# Patient Record
Sex: MALE | Race: WHITE | Employment: OTHER | URBAN - NONMETROPOLITAN AREA
[De-identification: names, ages, dates, MRNs, and addresses within clinical notes are randomized per-mention and may not be internally consistent; named-entity substitution may affect disease eponyms.]

---

## 2017-05-23 PROBLEM — I73.9 PVD (PERIPHERAL VASCULAR DISEASE) (HCC): Status: ACTIVE | Noted: 2017-05-23

## 2017-10-11 RX ORDER — DULOXETIN HYDROCHLORIDE 30 MG/1
60 CAPSULE, DELAYED RELEASE ORAL 2 TIMES DAILY
COMMUNITY

## 2017-10-12 ENCOUNTER — APPOINTMENT (OUTPATIENT)
Dept: CARDIAC CATH/INVASIVE PROCEDURES | Age: 62
End: 2017-10-12
Attending: INTERNAL MEDICINE
Payer: MEDICARE

## 2017-10-12 ENCOUNTER — HOSPITAL ENCOUNTER (OUTPATIENT)
Dept: INPATIENT UNIT | Age: 62
Discharge: HOME | End: 2017-10-13
Attending: INTERNAL MEDICINE | Admitting: INTERNAL MEDICINE
Payer: MEDICARE

## 2017-10-12 LAB
ABO: NORMAL
ALBUMIN SERPL-MCNC: 4.2 G/DL (ref 3.5–5.1)
ALP BLD-CCNC: 95 U/L (ref 38–126)
ALT SERPL-CCNC: 32 U/L (ref 11–66)
ANION GAP SERPL CALCULATED.3IONS-SCNC: 15 MEQ/L (ref 8–16)
ANTIBODY SCREEN: NORMAL
AST SERPL-CCNC: 25 U/L (ref 5–40)
BILIRUB SERPL-MCNC: 0.3 MG/DL (ref 0.3–1.2)
BUN BLDV-MCNC: 16 MG/DL (ref 7–22)
CALCIUM SERPL-MCNC: 10.1 MG/DL (ref 8.5–10.5)
CHLORIDE BLD-SCNC: 102 MEQ/L (ref 98–111)
CHOLESTEROL, TOTAL: 132 MG/DL (ref 100–199)
CO2: 25 MEQ/L (ref 23–33)
CREAT SERPL-MCNC: 0.9 MG/DL (ref 0.4–1.2)
GFR SERPL CREATININE-BSD FRML MDRD: 85 ML/MIN/1.73M2
GLUCOSE BLD-MCNC: 177 MG/DL (ref 70–108)
GLUCOSE BLD-MCNC: 192 MG/DL (ref 70–108)
GLUCOSE BLD-MCNC: 224 MG/DL (ref 70–108)
HCT VFR BLD CALC: 38.1 % (ref 42–52)
HDLC SERPL-MCNC: 51 MG/DL
HEMOGLOBIN: 12.9 GM/DL (ref 14–18)
INR BLD: 0.89 (ref 0.85–1.13)
LDL CHOLESTEROL CALCULATED: 61 MG/DL
MCH RBC QN AUTO: 26.2 PG (ref 27–31)
MCHC RBC AUTO-ENTMCNC: 33.7 GM/DL (ref 33–37)
MCV RBC AUTO: 77.7 FL (ref 80–94)
PDW BLD-RTO: 15.8 % (ref 11.5–14.5)
PLATELET # BLD: 366 THOU/MM3 (ref 130–400)
PMV BLD AUTO: 8 MCM (ref 7.4–10.4)
POTASSIUM SERPL-SCNC: 5.6 MEQ/L (ref 3.5–5.2)
RBC # BLD: 4.9 MILL/MM3 (ref 4.7–6.1)
RH FACTOR: NORMAL
SODIUM BLD-SCNC: 142 MEQ/L (ref 135–145)
TOTAL PROTEIN: 6.5 G/DL (ref 6.1–8)
TRIGL SERPL-MCNC: 98 MG/DL (ref 0–199)
WBC # BLD: 9.2 THOU/MM3 (ref 4.8–10.8)

## 2017-10-12 PROCEDURE — 2580000003 HC RX 258: Performed by: INTERNAL MEDICINE

## 2017-10-12 PROCEDURE — 6370000000 HC RX 637 (ALT 250 FOR IP)

## 2017-10-12 PROCEDURE — 93005 ELECTROCARDIOGRAM TRACING: CPT | Performed by: INTERNAL MEDICINE

## 2017-10-12 PROCEDURE — C1887 CATHETER, GUIDING: HCPCS

## 2017-10-12 PROCEDURE — 2580000003 HC RX 258

## 2017-10-12 PROCEDURE — C1725 CATH, TRANSLUMIN NON-LASER: HCPCS

## 2017-10-12 PROCEDURE — C1874 STENT, COATED/COV W/DEL SYS: HCPCS

## 2017-10-12 PROCEDURE — 2500000003 HC RX 250 WO HCPCS

## 2017-10-12 PROCEDURE — 80053 COMPREHEN METABOLIC PANEL: CPT

## 2017-10-12 PROCEDURE — 85610 PROTHROMBIN TIME: CPT

## 2017-10-12 PROCEDURE — 86850 RBC ANTIBODY SCREEN: CPT

## 2017-10-12 PROCEDURE — 6360000002 HC RX W HCPCS

## 2017-10-12 PROCEDURE — 82948 REAGENT STRIP/BLOOD GLUCOSE: CPT

## 2017-10-12 PROCEDURE — 85027 COMPLETE CBC AUTOMATED: CPT

## 2017-10-12 PROCEDURE — 96360 HYDRATION IV INFUSION INIT: CPT

## 2017-10-12 PROCEDURE — C1760 CLOSURE DEV, VASC: HCPCS

## 2017-10-12 PROCEDURE — C1894 INTRO/SHEATH, NON-LASER: HCPCS

## 2017-10-12 PROCEDURE — 86900 BLOOD TYPING SEROLOGIC ABO: CPT

## 2017-10-12 PROCEDURE — 2780000010 HC IMPLANT OTHER

## 2017-10-12 PROCEDURE — 93458 L HRT ARTERY/VENTRICLE ANGIO: CPT | Performed by: INTERNAL MEDICINE

## 2017-10-12 PROCEDURE — 96361 HYDRATE IV INFUSION ADD-ON: CPT

## 2017-10-12 PROCEDURE — A6258 TRANSPARENT FILM >16<=48 IN: HCPCS

## 2017-10-12 PROCEDURE — 80061 LIPID PANEL: CPT

## 2017-10-12 PROCEDURE — 36415 COLL VENOUS BLD VENIPUNCTURE: CPT

## 2017-10-12 PROCEDURE — 86901 BLOOD TYPING SEROLOGIC RH(D): CPT

## 2017-10-12 PROCEDURE — C1769 GUIDE WIRE: HCPCS

## 2017-10-12 PROCEDURE — 92928 PRQ TCAT PLMT NTRAC ST 1 LES: CPT | Performed by: INTERNAL MEDICINE

## 2017-10-12 RX ORDER — ATORVASTATIN CALCIUM 20 MG/1
20 TABLET, FILM COATED ORAL DAILY
Status: DISCONTINUED | OUTPATIENT
Start: 2017-10-12 | End: 2017-10-13 | Stop reason: HOSPADM

## 2017-10-12 RX ORDER — SODIUM CHLORIDE 9 MG/ML
INJECTION, SOLUTION INTRAVENOUS CONTINUOUS
Status: ACTIVE | OUTPATIENT
Start: 2017-10-12 | End: 2017-10-12

## 2017-10-12 RX ORDER — DEXTROSE MONOHYDRATE 25 G/50ML
12.5 INJECTION, SOLUTION INTRAVENOUS PRN
Status: DISCONTINUED | OUTPATIENT
Start: 2017-10-12 | End: 2017-10-13 | Stop reason: HOSPADM

## 2017-10-12 RX ORDER — NITROGLYCERIN 0.4 MG/1
0.4 TABLET SUBLINGUAL EVERY 5 MIN PRN
Status: DISCONTINUED | OUTPATIENT
Start: 2017-10-12 | End: 2017-10-13 | Stop reason: HOSPADM

## 2017-10-12 RX ORDER — SODIUM CHLORIDE 0.9 % (FLUSH) 0.9 %
10 SYRINGE (ML) INJECTION 2 TIMES DAILY
Status: DISCONTINUED | OUTPATIENT
Start: 2017-10-12 | End: 2017-10-12 | Stop reason: SDUPTHER

## 2017-10-12 RX ORDER — ASPIRIN 325 MG
325 TABLET ORAL ONCE
Status: DISCONTINUED | OUTPATIENT
Start: 2017-10-12 | End: 2017-10-12

## 2017-10-12 RX ORDER — ONDANSETRON 2 MG/ML
4 INJECTION INTRAMUSCULAR; INTRAVENOUS EVERY 6 HOURS PRN
Status: DISCONTINUED | OUTPATIENT
Start: 2017-10-12 | End: 2017-10-13 | Stop reason: HOSPADM

## 2017-10-12 RX ORDER — ATROPINE SULFATE 0.4 MG/ML
0.5 AMPUL (ML) INJECTION
Status: ACTIVE | OUTPATIENT
Start: 2017-10-12 | End: 2017-10-12

## 2017-10-12 RX ORDER — MULTIVITAMIN WITH FOLIC ACID 400 MCG
1 TABLET ORAL DAILY
Status: DISCONTINUED | OUTPATIENT
Start: 2017-10-13 | End: 2017-10-13 | Stop reason: HOSPADM

## 2017-10-12 RX ORDER — SODIUM CHLORIDE 0.9 % (FLUSH) 0.9 %
10 SYRINGE (ML) INJECTION PRN
Status: DISCONTINUED | OUTPATIENT
Start: 2017-10-12 | End: 2017-10-13 | Stop reason: HOSPADM

## 2017-10-12 RX ORDER — ONDANSETRON 4 MG/1
8 TABLET, ORALLY DISINTEGRATING ORAL EVERY 6 HOURS PRN
Status: DISCONTINUED | OUTPATIENT
Start: 2017-10-12 | End: 2017-10-13 | Stop reason: HOSPADM

## 2017-10-12 RX ORDER — UBIDECARENONE 75 MG
1 CAPSULE ORAL 2 TIMES DAILY
Status: DISCONTINUED | OUTPATIENT
Start: 2017-10-12 | End: 2017-10-12 | Stop reason: RX

## 2017-10-12 RX ORDER — OXYCODONE HYDROCHLORIDE AND ACETAMINOPHEN 5; 325 MG/1; MG/1
1 TABLET ORAL EVERY 6 HOURS PRN
Status: DISCONTINUED | OUTPATIENT
Start: 2017-10-12 | End: 2017-10-13 | Stop reason: HOSPADM

## 2017-10-12 RX ORDER — AMLODIPINE BESYLATE 10 MG/1
5 TABLET ORAL NIGHTLY
Status: DISCONTINUED | OUTPATIENT
Start: 2017-10-12 | End: 2017-10-13 | Stop reason: HOSPADM

## 2017-10-12 RX ORDER — DULOXETIN HYDROCHLORIDE 30 MG/1
30 CAPSULE, DELAYED RELEASE ORAL
Status: DISCONTINUED | OUTPATIENT
Start: 2017-10-12 | End: 2017-10-13 | Stop reason: HOSPADM

## 2017-10-12 RX ORDER — SODIUM CHLORIDE 9 MG/ML
INJECTION, SOLUTION INTRAVENOUS CONTINUOUS
Status: DISCONTINUED | OUTPATIENT
Start: 2017-10-12 | End: 2017-10-12 | Stop reason: SDUPTHER

## 2017-10-12 RX ORDER — SPIRONOLACTONE 25 MG/1
25 TABLET ORAL DAILY
Status: DISCONTINUED | OUTPATIENT
Start: 2017-10-13 | End: 2017-10-13 | Stop reason: HOSPADM

## 2017-10-12 RX ORDER — DIPHENHYDRAMINE HCL 25 MG
50 TABLET ORAL ONCE
Status: DISCONTINUED | OUTPATIENT
Start: 2017-10-12 | End: 2017-10-13 | Stop reason: HOSPADM

## 2017-10-12 RX ORDER — AMPICILLIN TRIHYDRATE 250 MG
2 CAPSULE ORAL 2 TIMES DAILY
Status: DISCONTINUED | OUTPATIENT
Start: 2017-10-13 | End: 2017-10-12 | Stop reason: RX

## 2017-10-12 RX ORDER — PANTOPRAZOLE SODIUM 40 MG/1
40 TABLET, DELAYED RELEASE ORAL 2 TIMES DAILY
Status: DISCONTINUED | OUTPATIENT
Start: 2017-10-12 | End: 2017-10-13 | Stop reason: HOSPADM

## 2017-10-12 RX ORDER — ACETAMINOPHEN 325 MG/1
650 TABLET ORAL EVERY 4 HOURS PRN
Status: DISCONTINUED | OUTPATIENT
Start: 2017-10-12 | End: 2017-10-13 | Stop reason: HOSPADM

## 2017-10-12 RX ORDER — ASPIRIN 325 MG
325 TABLET ORAL DAILY
Status: DISCONTINUED | OUTPATIENT
Start: 2017-10-13 | End: 2017-10-13 | Stop reason: HOSPADM

## 2017-10-12 RX ORDER — NICOTINE POLACRILEX 4 MG
15 LOZENGE BUCCAL PRN
Status: DISCONTINUED | OUTPATIENT
Start: 2017-10-12 | End: 2017-10-13 | Stop reason: HOSPADM

## 2017-10-12 RX ORDER — SODIUM CHLORIDE 0.9 % (FLUSH) 0.9 %
10 SYRINGE (ML) INJECTION EVERY 12 HOURS SCHEDULED
Status: DISCONTINUED | OUTPATIENT
Start: 2017-10-12 | End: 2017-10-13 | Stop reason: HOSPADM

## 2017-10-12 RX ORDER — PRASUGREL 10 MG/1
10 TABLET, FILM COATED ORAL DAILY
Status: DISCONTINUED | OUTPATIENT
Start: 2017-10-13 | End: 2017-10-13 | Stop reason: HOSPADM

## 2017-10-12 RX ORDER — DEXTROSE MONOHYDRATE 50 MG/ML
100 INJECTION, SOLUTION INTRAVENOUS PRN
Status: DISCONTINUED | OUTPATIENT
Start: 2017-10-12 | End: 2017-10-13 | Stop reason: HOSPADM

## 2017-10-12 RX ORDER — ASPIRIN 325 MG
325 TABLET ORAL DAILY
Status: DISCONTINUED | OUTPATIENT
Start: 2017-10-13 | End: 2017-10-12 | Stop reason: SDUPTHER

## 2017-10-12 RX ORDER — INSULIN LISPRO 100 [IU]/ML
INJECTION, SOLUTION INTRAVENOUS; SUBCUTANEOUS 2 TIMES DAILY
COMMUNITY
End: 2022-09-08 | Stop reason: ALTCHOICE

## 2017-10-12 RX ORDER — ACETAMINOPHEN 325 MG/1
650 TABLET ORAL 3 TIMES DAILY
Status: DISCONTINUED | OUTPATIENT
Start: 2017-10-12 | End: 2017-10-13 | Stop reason: HOSPADM

## 2017-10-12 RX ADMIN — Medication 10 ML: at 19:17

## 2017-10-12 RX ADMIN — DULOXETIN HYDROCHLORIDE 30 MG: 30 CAPSULE, DELAYED RELEASE ORAL at 17:00

## 2017-10-12 RX ADMIN — Medication 10 ML: at 22:05

## 2017-10-12 RX ADMIN — ATORVASTATIN CALCIUM 20 MG: 20 TABLET, FILM COATED ORAL at 22:04

## 2017-10-12 RX ADMIN — SODIUM CHLORIDE: 9 INJECTION, SOLUTION INTRAVENOUS at 17:01

## 2017-10-12 RX ADMIN — SODIUM CHLORIDE: 9 INJECTION, SOLUTION INTRAVENOUS at 09:29

## 2017-10-12 RX ADMIN — Medication 25 MG: at 22:03

## 2017-10-12 RX ADMIN — PANTOPRAZOLE SODIUM 40 MG: 40 TABLET, DELAYED RELEASE ORAL at 22:02

## 2017-10-12 RX ADMIN — AMLODIPINE BESYLATE 5 MG: 10 TABLET ORAL at 22:04

## 2017-10-12 ASSESSMENT — SLEEP AND FATIGUE QUESTIONNAIRES
DIFFICULTY FALLING ASLEEP: NO
DO YOU USE A SLEEP AID: NO
DO YOU HAVE DIFFICULTY SLEEPING: YES
DIFFICULTY ARISING: NO
DIFFICULTY STAYING ASLEEP: YES
SLEEP PATTERN: DISTURBED/INTERRUPTED SLEEP
RESTFUL SLEEP: NO

## 2017-10-12 ASSESSMENT — PAIN SCALES - GENERAL
PAINLEVEL_OUTOF10: 0

## 2017-10-12 ASSESSMENT — LIFESTYLE VARIABLES: HISTORY_ALCOHOL_USE: COMMENT

## 2017-10-12 NOTE — PROGRESS NOTES
Returned to [phone]. Monitor attached showing SB. 0.9nss infusing with Angiomax. Dressing to right groin remains dry and intact.   No bleeding, swelling, or edema noted

## 2017-10-12 NOTE — OP NOTE
[address] [address]  Sedation/Analgesia Post Sedation Record      Pt Name: Elliot Robb  MRN: [id number]  YOB: 1955  Procedure Performed By: Berenice Felton MD  Primary Care Physician: Ronny De La Cruz DO    POST-PROCEDURE    Physician: Berenice Felton MD    Procedure Performed:  Left Heart Cath and Possible Percutaneous Coronary Intervention    Sedation/Anesthesia:  Local Anesthesia and IV Conscious Sedation with continuous O2 monitoring    Estimated Blood Loss:  Minimal    Specimens Removed:  None    Complications:  None     Post Procedure Diagnosis/Findings:  Coronary Artery Disease    Recommendations:  Medical treatment and review films.        Berenice Felton MD  Electronically signed 10/12/2017 at 1:22 PM

## 2017-10-12 NOTE — PROGRESS NOTES
Assumed care from 2-E staff. Patient awake, alert, and denies discomfort. IV 0.9 NS infusing. Right groin cath site intact with angioseal.  No swelling, firmness, or drainage noted. See post procedure flow sheet.

## 2017-10-12 NOTE — PROGRESS NOTES
Inpatient Cardiac Rehabilitation Consult    Received consult for Phase II Cardiac Rehabilitation. Patient does not meet qualifications for cardiac rehabilitation due to no documentation of PCI. We will continue to follow.

## 2017-10-12 NOTE — OP NOTE
[address] [address]  Sedation/Analgesia Post Sedation Record      Pt Name: Alee Arizmendi  MRN: [id number]  YOB: 1955  Procedure Performed By: Magnolia Le MD  Primary Care Physician: Kayden Carpenter DO    POST-PROCEDURE    Physician: Magnolia Le MD    Procedure Performed:  Left Heart Cath and  Percutaneous Coronary Intervention FFR OF LAD STENT OF LAD,SVG OG DIAG AND LIMA    Sedation/Anesthesia:  Local Anesthesia and IV Conscious Sedation with continuous O2 monitoring    Estimated Blood Loss:  Minimal    Specimens Removed:  None    Complications:  None     Post Procedure Diagnosis/Findings:  Coronary Artery Disease    Recommendations:  Medical treatment and review films.        Magnolia Le MD  Electronically signed 10/12/2017 at 11:44 AM

## 2017-10-12 NOTE — H&P
Provider, MD   metoprolol tartrate (LOPRESSOR) 25 MG tablet Take 25 mg by mouth 2 times daily   Yes Historical Provider, MD   aspirin 325 MG tablet Take 325 mg by mouth daily   Yes Historical Provider, MD   acetaminophen (TYLENOL) 325 MG tablet Take 650 mg by mouth 3 times daily   Yes Historical Provider, MD   ondansetron (ZOFRAN-ODT) 8 MG disintegrating tablet Take 8 mg by mouth every 6 hours as needed for Nausea or Vomiting   Yes Historical Provider, MD   pantoprazole (PROTONIX) 40 MG tablet Take 40 mg by mouth 2 times daily   Yes Historical Provider, MD   atorvastatin (LIPITOR) 20 MG tablet Take 20 mg by mouth daily   Yes Historical Provider, MD   Coenzyme Q10 (CO Q-10) 200 MG CAPS Take 1 capsule by mouth 2 times daily    Yes Historical Provider, MD   nitroGLYCERIN (NITROSTAT) 0.4 MG SL tablet Place 1 tablet under the tongue every 5 minutes as needed for Chest pain 7/16/15  Yes Bella Garza MD   spironolactone (ALDACTONE) 25 MG tablet Take 25 mg by mouth daily   Yes Historical Provider, MD   metFORMIN (GLUCOPHAGE) 500 MG tablet Take 1,000 mg by mouth 2 times daily (with meals)    Yes Historical Provider, MD   Cinnamon 500 MG CAPS Take 2 capsules by mouth 2 times daily    Yes Historical Provider, MD   amLODIPine (NORVASC) 10 MG tablet Take 1 tablet by mouth daily. Patient taking differently: Take 5 mg by mouth nightly  10/24/13  Yes Latia Friedman MD   insulin detemir (LEVEMIR FLEXPEN) 100 UNIT/ML injection Inject 24 Units into the skin 2 times daily. Patient taking differently: Inject 18 Units into the skin 2 times daily  5/14/13  Yes Michelle Luu CNP   multivitamin (THERAGRAN) per tablet Take 1 tablet by mouth daily.    Yes Historical Provider, MD   oxyCODONE-acetaminophen (PERCOCET) 5-325 MG per tablet Take 1 tablet by mouth every 6 hours as needed for Pain    Historical Provider, MD   amylase-lipase-protease (CREON 02453) 39421 UNITS per capsule Take 1 capsule by mouth 3 times daily (before meals)

## 2017-10-13 VITALS
BODY MASS INDEX: 22.68 KG/M2 | SYSTOLIC BLOOD PRESSURE: 189 MMHG | TEMPERATURE: 98.6 F | WEIGHT: 162 LBS | DIASTOLIC BLOOD PRESSURE: 91 MMHG | HEIGHT: 71 IN | RESPIRATION RATE: 15 BRPM | HEART RATE: 70 BPM | OXYGEN SATURATION: 97 %

## 2017-10-13 PROBLEM — R94.39 ABNORMAL NUCLEAR STRESS TEST: Status: ACTIVE | Noted: 2017-10-13

## 2017-10-13 PROBLEM — I20.9 ANGINA PECTORIS (HCC): Status: ACTIVE | Noted: 2017-10-13

## 2017-10-13 LAB
GLUCOSE BLD-MCNC: 201 MG/DL (ref 70–108)
GLUCOSE BLD-MCNC: 228 MG/DL (ref 70–108)
GLUCOSE BLD-MCNC: 249 MG/DL (ref 70–108)

## 2017-10-13 PROCEDURE — 2580000003 HC RX 258: Performed by: INTERNAL MEDICINE

## 2017-10-13 PROCEDURE — 82948 REAGENT STRIP/BLOOD GLUCOSE: CPT

## 2017-10-13 PROCEDURE — 96374 THER/PROPH/DIAG INJ IV PUSH: CPT

## 2017-10-13 PROCEDURE — 6370000000 HC RX 637 (ALT 250 FOR IP): Performed by: INTERNAL MEDICINE

## 2017-10-13 RX ORDER — PRASUGREL 10 MG/1
10 TABLET, FILM COATED ORAL DAILY
Qty: 30 TABLET | Refills: 5 | Status: SHIPPED | OUTPATIENT
Start: 2017-10-13 | End: 2021-02-25

## 2017-10-13 RX ORDER — LOSARTAN POTASSIUM 25 MG/1
50 TABLET ORAL DAILY
Qty: 30 TABLET | Refills: 3 | Status: SHIPPED | OUTPATIENT
Start: 2017-10-13 | End: 2021-02-25

## 2017-10-13 RX ADMIN — DULOXETIN HYDROCHLORIDE 30 MG: 30 CAPSULE, DELAYED RELEASE ORAL at 13:59

## 2017-10-13 RX ADMIN — SPIRONOLACTONE 25 MG: 25 TABLET ORAL at 08:11

## 2017-10-13 RX ADMIN — Medication 10 ML: at 08:16

## 2017-10-13 RX ADMIN — Medication 25 MG: at 08:12

## 2017-10-13 RX ADMIN — DULOXETIN HYDROCHLORIDE 30 MG: 30 CAPSULE, DELAYED RELEASE ORAL at 08:14

## 2017-10-13 RX ADMIN — PANTOPRAZOLE SODIUM 40 MG: 40 TABLET, DELAYED RELEASE ORAL at 08:13

## 2017-10-13 RX ADMIN — Medication 1 TABLET: at 08:10

## 2017-10-13 RX ADMIN — PRASUGREL HYDROCHLORIDE 10 MG: 10 TABLET, FILM COATED ORAL at 08:16

## 2017-10-13 RX ADMIN — DULOXETIN HYDROCHLORIDE 30 MG: 30 CAPSULE, DELAYED RELEASE ORAL at 16:43

## 2017-10-13 ASSESSMENT — PAIN SCALES - GENERAL
PAINLEVEL_OUTOF10: 0
PAINLEVEL_OUTOF10: 0

## 2017-10-13 NOTE — CARE COORDINATION
10/13/17, 2:00 PM    Anticipate discharge today. Plans home with wife. Denies needs. Discharge plan discussed by  and . Discharge plan reviewed with patient/ family. Patient/ family verbalize understanding of discharge plan and are in agreement with plan. Understanding was demonstrated using the teach back method.

## 2017-10-13 NOTE — PLAN OF CARE
Problem: Nutrition  Goal: Optimal nutrition therapy  Outcome: Ongoing  Nutrition Problem: Inadequate oral intake  Intervention: Food and/or Nutrient Delivery: Continue current diet, Start ONS  Nutritional Goals: Pt. will consume 75% or more of meals during LOS.

## 2017-10-13 NOTE — PLAN OF CARE
Problem: Falls - Risk of  Goal: Absence of falls  Outcome: Ongoing  Patient absent of falls this shift. Patient assisted with ambulation. Patients call light within reach, fall band on, non-skid socks, fall sign posted on door, and patient educated to not get up per self to reduce the risk of falls. Bed and chair alarms being used. Problem: Suicide risk  Goal: Provide patient with safe environment  Provide patient with safe environment   Outcome: Ongoing  Patient stated no intentions of self harm. Problem: Discharge Planning:  Goal: Participates in care planning  Participates in care planning   Outcome: Ongoing  Patient plans to return to home at discharge. Patient has no further needs at home. Problem: Tissue Perfusion, Altered:  Goal: Circulatory function within specified parameters  Circulatory function within specified parameters   Outcome: Ongoing  Patient on telemetry monitoring. Monitoring patient for edema. With any edema extremity elevated. Monitoring patients labs. Replacing electrolytes per protocol. Problem: Serum Glucose Level - Abnormal:  Goal: Ability to maintain appropriate glucose levels will improve  Ability to maintain appropriate glucose levels will improve   Outcome: Ongoing  Accu checks being done ACHS. PRN insulin and medications given as needed. Patient educated on signs and symptoms of hypo and hyperglycemia. Diet instructions given to patient on monitoring carb intake. Problem: Tissue Perfusion - Peripheral, Altered:  Goal: Absence of hematoma at arterial access site  Absence of hematoma at arterial access site   Outcome: Ongoing  Patient has no complications at arterial access site. Goal: Circulatory function of lower extremities is within specified parameters  Circulatory function of lower extremities is within specified parameters   Outcome: Ongoing  Pulses in lower extremities equal and palpable bilaterally.     Problem: Nutrition  Goal: Optimal nutrition

## 2017-10-13 NOTE — CARE COORDINATION
10/13/17, 8:22 AM      Meseret Huynh       Admitted from:2E 10/12/2017/ 0804 Hospital day: 0   Location: -23/023-A Reason for admit: CAD in native artery [I25.10] Status: Outpatient in a bed  Admit order signed?: no  PMH:  has a past medical history of Alcoholism ([facility] Alopecia; Angina at rest [facility]); CAD (coronary artery disease); Depression; Esophageal cancer ([facility] GERD (gastroesophageal reflux disease); Hyperlipidemia; Hypertension; Hypothyroidism; Kidney stones; Migraines; Osteoarthritis; PVD (peripheral vascular disease) ([facility] and Type II diabetes mellitus ([facility] Procedure: 10/13 Cardiac Cath  Medications:  Scheduled Meds:   diphenhydrAMINE  50 mg Oral Once    hydrocortisone sodium succinate PF  200 mg Intravenous Once    sodium chloride flush  10 mL Intravenous 2 times per day    aspirin  325 mg Oral Daily    prasugrel  10 mg Oral Daily    acetaminophen  650 mg Oral TID    amLODIPine  5 mg Oral Nightly    lipase-protease-amylase  1 capsule Oral TID AC    atorvastatin  20 mg Oral Daily    DULoxetine  30 mg Oral TID WC    insulin detemir  18 Units Subcutaneous BID    metoprolol tartrate  25 mg Oral BID    multivitamin  1 tablet Oral Daily    pantoprazole  40 mg Oral BID    spironolactone  25 mg Oral Daily     Continuous Infusions:   dextrose        Pertinent Info/Orders/Treatment Plan:  Post-cath care. Telemetry monitoring. Effient to start today. Diet: DIET CARDIAC;   DVT Prophylaxis: Heparin  Smoking status:  reports that he quit smoking about 5 years ago. He has a 40.00 pack-year smoking history.  He has never used smokeless tobacco.   Influenza Vaccination Screening Completed: yes  Pneumonia Vaccination Screening Completed:  yes  Core measures: vte  PCP: Tom Villanueva DO  Readmission:   no  Risk Score: 22.75     Discharge Planning  Current Residence:  Private Residence  Living Arrangements:  Spouse/Significant Other   Support Systems:  Spouse/Significant Other, Family

## 2017-10-13 NOTE — PROGRESS NOTES
Nutrition Assessment    Type and Reason for Visit: Initial, Positive Nutrition Screen (difficulty chewing/swallowing; gets throat stretched)    Nutrition Recommendations: Continue current diet as tolerated. Consider esophageal dilation as OP. Malnutrition Assessment:  · Malnutrition Status: No malnutrition    Nutrition Diagnosis:   · Problem: Inadequate oral intake  · Etiology: related to Alteration in GI function     Signs and symptoms:  as evidenced by Patient report of    Nutrition Assessment:  · Subjective Assessment: Pt. seen -reports having some trouble swallowing meats. States does ok if meat is moistened (broth or gravy). States needs to have esophagus stretched again. Denies need for altered textures. Reports \"not much\" of an appetite. Reports drinks Glucerna occasionally at home. States he will drink it more often if appetite is really poor. Denies any questions on diabetic diet. Pt. States has \"acute pancreatitis\". Reinforced low fat diet with pancreatitis. Pt. declines handouts. Rx includes Creon, MVI, Insulin. Agrees to Glucerna TID. · Wound Type: None  · Current Nutrition Therapies:  · Oral Diet Orders: Cardiac   · Oral Diet intake: Unable to assess  · Oral Nutrition Supplement (ONS) Orders: Diabetic Oral Supplement (TID)  · ONS intake:  (initiated)  · Anthropometric Measures:  · Ht: 5' 11\" (180.3 cm)   · Current Body Wt: 162 lb (73.5 kg) (no edema)  · Admission Body Wt: 162 lb (73.5 kg) (10/12; no edema)  · Usual Body Wt:  (per pt. weight goes up/down 4#; 5/23/17: 160#, 12/15: 156#; states weighed ~200# 3 years ago)  · BMI Classification: BMI 18.5 - 24.9 Normal Weight  · Comparative Standards (Estimated Nutrition Needs):  · Estimated Daily Total Kcal: 4971-5802 kcals  · Estimated Daily Protein (g):  gm    Estimated Intake vs Estimated Needs: Intake Less Than Needs    Nutrition Risk Level:  Moderate    Nutrition Interventions:   Continue current diet, Start ONS  Continued Inpatient Monitoring, Education Initiated (10/13 Reviewed low fat diet for pancreatitis (pt. declined handouts). Discussed appropriate use of ONS.)    Nutrition Evaluation:   · Evaluation: Goals set   · Goals: Pt. will consume 75% or more of meals during LOS. · Monitoring: Meal Intake, Supplement Intake, Diet Tolerance, Weight, Pertinent Labs, Chewing/Swallowing, Nausea or Vomiting    See Adult Nutrition Doc Flowsheet for more detail.      Electronically signed by Chelo Gleason RD, CONSUELO on 10/13/17 at 11:02 AM    Contact Number: [phone]

## 2017-10-13 NOTE — FLOWSHEET NOTE
10/13/17 1900   Provider Notification   Reason for Communication Evaluate   Provider Name Dr. Dejuan Sterling   Provider Notification Physician   Method of Communication Face to face   Response No new orders   Notification Time Rea Sterling about pt's potassium of 5.6 this a.m, MD did not give new orders, discharged pt

## 2017-10-14 LAB
EKG ATRIAL RATE: 49 BPM
EKG P AXIS: 2 DEGREES
EKG P-R INTERVAL: 158 MS
EKG Q-T INTERVAL: 452 MS
EKG QRS DURATION: 94 MS
EKG QTC CALCULATION (BAZETT): 408 MS
EKG R AXIS: 45 DEGREES
EKG T AXIS: 33 DEGREES
EKG VENTRICULAR RATE: 49 BPM

## 2017-10-14 NOTE — DISCHARGE SUMMARY
[address] [id number]                              DISCHARGE SUMMARY    PATIENT NAME: Karlene King                             :       1955  MED REC NO:   [id number]                                      ROOM:      0023  ACCOUNT NO:   [de-identified]                                      ADMISSION  DATE:  10/12/2017  PROVIDER:     Renuka Whitley. Naye Cesar M.D.                          [facility] Pick:  10/13/2017      FINAL DIAGNOSES:  1. Angina pectoris class III. 2.  Abnormal nuclear stress test.  3.  Atherosclerotic coronary artery disease. 4.  Diabetes mellitus, insulin-dependent. 5.  History of hypertension. 6.  Dyslipidemia. 7.  Hypercholesterolemia. 8.  History of recurrent pancreatitis secondary to pancreas divisum. 9.  History of tobacco use. 10.  History of peripheral vascular disease. PROCEDURES PERFORMED DURING THIS HOSPITALIZATION:  1.  IV conscious sedation. 2.  Left heart catheterization, left ventriculogram, selective  coronary angiogram.  3.  Angioplasty and stent deployment of the RCA. HOSPITALIZATION COURSE:  The patient is a 57-year-old gentleman with a  history of coronary artery disease, history of prior intervention, who  has recently been complaining of shortness of breath, chest pain. The  patient did have a nuclear stress test, which was abnormal high-risk  scan. He has angina pectoris class III. He was treated medically. He continued to be symptomatic, admitted for the above. The patient  has history of anemia of chronic disease, history of hypertension,  diabetes mellitus. He has a history of peripheral vascular disease. The patient was admitted to the cath lab, heart catheterization was  performed, significant disease of the RCA, underwent intervention of  posterolateral branch of the RCA utilizing drug-eluting stent,  reducing the stenosis to 0%.   The patient tolerated the procedure  well. The patient was monitored overnight, ambulated, continued to be  chest pain-free. The patient was discharged home in a stable  condition. The patient will be seen in the office and he is to seek medical  attention if he has any change in clinical condition. He will be  enrolled in cardiac rehab, need to be on dual antiplatelet treatment. The patient is to seek medical attention if he has any change in  clinical condition. The patient was found to have microcytic  hypochromic anemia and hyperkalemia. He is to follow up with his  family physician within a week for these issues. He is to hold his  metformin for 48 hours and seek medical attention if he has any change  in clinical condition.         Deny Machado M.D.    D: 10/13/2017 19:15:18       T: 10/13/2017 21:28:48     [email or web address]  Job#: [id number]     Doc#: [id number]

## 2017-10-16 NOTE — PROCEDURES
[address] [place] [address]                           CARDIAC CATHETERIZATION    PATIENT NAME: Retta Cabot                   :             1955  MED REC NO:   [id number]                            ROOM:  ACCOUNT NO:   [de-identified]                            ADMISSION DATE:  10/12/2017  PROVIDER:     Giselle Benjamin. Aureliano Canchola M.D.    Blancamyla Escamilla:  10/12/2017    PROCEDURE:  Cath intervention procedure. INDICATION FOR PROCEDURE:  This is a 49-year-old gentleman with a  history of coronary artery disease. The patient had a history of  prior intervention. Recently, the patient has been having angina  pectoris, class III. He did have a stress Cardiolite test and nuclear  stress test.  The patient had a high-risk scan. He was treated  medically, continued to be symptomatic, has angina pectoris, class  III, admitted for heart cath and possible intervention. The patient  understands the procedure, the benefits, the risks, the alternative  methods of treatment, the possible complications, and he wants it to  be done. This patient has a history of diabetes mellitus. He has a history of  peripheral vascular disease. The patient has a history of  hypertension, hypercholesterolemia, history of gastroesophageal  reflux, recurrent pancreatitis, history of depression, history of  alcohol utilization. The patient understands the procedure, the benefits, the risks, the  alternative methods of treatment, the possible complications, and he  wants it to be done. PROCEDURES PERFORMED:  1.  IV conscious sedation. 2.  Left heart cath, left ventriculogram, and selective coronary  angiogram.  3.  Angioplasty and stent deployment of the distal RCA to the first  lateral branch of the RCA utilizing PROMUS drug-eluting stent reducing  the stenosis from 95% to 0% HARIKA-3 flow.     Angiogram of the carotid artery, distal runoff, and deployment of inflated up to 15 atmospheric pressure. The balloon was then removed. We placed a 2.5 x 20 mm length PROMUS  drug-eluting stent into the posterolateral branch in the area of the  maximum stenosis. The stent balloon was deployed at 18 atmospheric  pressure for 25 seconds, reducing the stenosis to 0% with a HARIKA-3  flow. During the procedure, Angiomax therapy started, he has been already on  the aspirin and Plavix. The angiogram of the right iliac artery showed patent right iliac  artery with a good distal runoff and successful deployment of the  Angio-Seal closure device. IMPRESSION:  1. Preserved systolic function with left ventricular ejection  fraction of about 65%. 2.  The patient had small-caliber arteries. 3.  Severe stenosis of the mid posterolateral branch of the RCA. The  RCA is a dominant artery. Stented areas of the RCA are still patent  with diffuse disease of the proximal mid distal RCA. 4.  Patent left main. 5.  Stented area of the circumflex is still patent. 6.  Severe stenosis of distal circumflex in the AV groove of 1 mm  diameter artery. 7.  Diffuse nonobstructive disease of the mid LAD, distal LAD was  patent. 8.  Severe stenosis at the ostium of small-to-moderate-sized diagonal  artery. 9.  Successful angioplasty, subsequent stent deployment to  posterolateral branch of the RCA utilizing PROMUS drug-eluting stent,  reducing the stenosis to 0% HARIKA-3 flow. The successful deployment of the Angio-Seal closure device. The patient tolerated the procedure well. We recommend to continue  with aggressive medical treatment, risk factor modification,  dual-antiplatelet treatment, cardiac rehab, and close monitoring.         Kishan Rodriguez M.D.    D: 10/16/2017 6:36:52       T: 10/16/2017 8:18:06     [email or web address]  Job#: [id number]     Doc#: [id number]

## 2018-09-18 ENCOUNTER — HOSPITAL ENCOUNTER (OUTPATIENT)
Dept: WOUND CARE | Age: 63
Discharge: HOME | End: 2018-09-18
Payer: MEDICARE

## 2018-09-18 VITALS
OXYGEN SATURATION: 98 % | HEART RATE: 54 BPM | SYSTOLIC BLOOD PRESSURE: 180 MMHG | DIASTOLIC BLOOD PRESSURE: 74 MMHG | TEMPERATURE: 97.9 F | RESPIRATION RATE: 20 BRPM

## 2018-09-18 DIAGNOSIS — L97.522 DIABETIC ULCER OF TOE OF LEFT FOOT ASSOCIATED WITH TYPE 2 DIABETES MELLITUS, WITH FAT LAYER EXPOSED (HCC): Primary | ICD-10-CM

## 2018-09-18 DIAGNOSIS — E11.621 DIABETIC ULCER OF TOE OF LEFT FOOT ASSOCIATED WITH TYPE 2 DIABETES MELLITUS, WITH FAT LAYER EXPOSED (HCC): Primary | ICD-10-CM

## 2018-09-18 PROCEDURE — 99213 OFFICE O/P EST LOW 20 MIN: CPT

## 2018-09-18 RX ORDER — ALBUTEROL SULFATE 90 UG/1
2 AEROSOL, METERED RESPIRATORY (INHALATION) EVERY 6 HOURS PRN
COMMUNITY
End: 2021-02-25

## 2018-09-18 ASSESSMENT — PAIN SCALES - GENERAL: PAINLEVEL_OUTOF10: 0

## 2018-09-18 NOTE — PROGRESS NOTES
PROCEDURE      ESOPHAGECTOMY         FAMILY HISTORY    Family History   Problem Relation Age of Onset    Heart Disease Mother     Diabetes Mother     Heart Disease Father     Heart Attack Father     Early Death Father 46         at 48    Cancer Sister     Cancer Brother     Early Death Brother 52         52       SOCIAL HISTORY    Social History   Substance Use Topics    Smoking status: Former Smoker     Packs/day: 2.00     Years: 20.00     Quit date: 2012    Smokeless tobacco: Never Used    Alcohol use No       ALLERGIES    No Known Allergies    MEDICATIONS    Current Outpatient Prescriptions on File Prior to Encounter   Medication Sig Dispense Refill    losartan (COZAAR) 25 MG tablet Take 2 tablets by mouth daily 30 tablet 3    prasugrel (EFFIENT) 10 MG TABS Take 1 tablet by mouth daily 30 tablet 5    insulin lispro (HUMALOG) 100 UNIT/ML injection cartridge Inject into the skin 2 times daily If over 200 uses scale      DULoxetine (CYMBALTA) 30 MG extended release capsule Take 30 mg by mouth 3 times daily (with meals)      metoprolol tartrate (LOPRESSOR) 25 MG tablet Take 25 mg by mouth 2 times daily      aspirin 325 MG tablet Take 325 mg by mouth daily      ondansetron (ZOFRAN-ODT) 8 MG disintegrating tablet Take 8 mg by mouth every 6 hours as needed for Nausea or Vomiting      pantoprazole (PROTONIX) 40 MG tablet Take 40 mg by mouth 2 times daily      atorvastatin (LIPITOR) 20 MG tablet Take 20 mg by mouth daily      Coenzyme Q10 (CO Q-10) 200 MG CAPS Take 1 capsule by mouth 2 times daily       spironolactone (ALDACTONE) 25 MG tablet Take 25 mg by mouth daily      metFORMIN (GLUCOPHAGE) 500 MG tablet Take 1,000 mg by mouth 2 times daily (with meals)       Cinnamon 500 MG CAPS Take 2 capsules by mouth 2 times daily       amLODIPine (NORVASC) 10 MG tablet Take 1 tablet by mouth daily.  (Patient taking differently: Take 5 mg by mouth nightly ) 30 tablet 1    insulin detemir (LEVEMIR FLEXPEN) 100 UNIT/ML injection Inject 24 Units into the skin 2 times daily. (Patient taking differently: Inject 18 Units into the skin 2 times daily ) 1 Pen 3    multivitamin (THERAGRAN) per tablet Take 1 tablet by mouth daily.  acetaminophen (TYLENOL) 325 MG tablet Take 650 mg by mouth 3 times daily      nitroGLYCERIN (NITROSTAT) 0.4 MG SL tablet Place 1 tablet under the tongue every 5 minutes as needed for Chest pain 25 tablet 3    amylase-lipase-protease (CREON 17622) 54790 UNITS per capsule Take 1 capsule by mouth 3 times daily (before meals)        No current facility-administered medications on file prior to encounter. REVIEW OF SYSTEMS    A comprehensive review of systems was negative.     Objective:      BP (!) 180/74   Pulse 54   Temp 97.9 °F (36.6 °C) (Oral)   Resp 20   SpO2 98%     Wt Readings from Last 3 Encounters:   10/12/17 162 lb (73.5 kg)   05/23/17 160 lb (72.6 kg)   11/05/15 155 lb (70.3 kg)       PHYSICAL EXAM    General Appearance: alert and oriented to person, place and time, well developed and well- nourished, in no acute distress  Skin: warm and dry, no rash or erythema  Head: normocephalic and atraumatic  Eyes: pupils equal, round, and reactive to light, extraocular eye movements intact, conjunctivae normal  ENT: tympanic membrane, external ear and ear canal normal bilaterally, nose without deformity, nasal mucosa and turbinates normal without polyps  Neck: supple and non-tender without mass, no thyromegaly or thyroid nodules, no cervical lymphadenopathy  Pulmonary/Chest: clear to auscultation bilaterally- no wheezes, rales or rhonchi, normal air movement, no respiratory distress  Cardiovascular: normal rate, regular rhythm, normal S1 and S2, no murmurs, rubs, clicks, or gallops, distal pulses intact, no carotid bruits  Abdomen: soft, non-tender, non-distended, normal bowel sounds, no masses or organomegaly  Extremities: no cyanosis, clubbing or edema  Musculoskeletal: normal range of motion, no joint swelling, deformity or tenderness  Neurologic: reflexes normal and symmetric, no cranial nerve deficit, gait, coordination and speech normal    Full thickness wound with fibrotic base and macerated margins. Mild surrounding erythema and edema. Does not probe to bone. Serous exudate noted.             Wound 09/18/18 Toe (Comment  which one) Dorsal;Left 1 3rd toe (Active)   Wound Image   9/18/2018 11:05 AM   Wound Type Wound 9/18/2018 11:05 AM   Dressing Status Intact 9/18/2018 11:05 AM   Dressing Changed Changed/New 9/18/2018 11:05 AM   Wound Cleansed Rinsed/Irrigated with saline 9/18/2018 11:05 AM   Wound Length (cm) 0.3 cm 9/18/2018 11:05 AM   Wound Width (cm) 0.4 cm 9/18/2018 11:05 AM   Wound Depth (cm)  0.2 9/18/2018 11:05 AM   Calculated Wound Size (cm^2) (l*w) 0.12 cm^2 9/18/2018 11:05 AM   Undermining Starts ___ O'Clock 12 9/18/2018 11:05 AM   Undermining Ends___ O'Clock 12 9/18/2018 11:05 AM   Undermining Maxium Distance (cm) 0.1 9/18/2018 11:05 AM   Wound Assessment Yellow;Slough 9/18/2018 11:05 AM   Drainage Amount Moderate 9/18/2018 11:05 AM   Drainage Description Yellow 9/18/2018 11:05 AM   Odor None 9/18/2018 11:05 AM   Margins Unattached edges 9/18/2018 11:05 AM   Zeenat-wound Assessment Red 9/18/2018 11:05 AM   Yellow%Wound Bed 100 9/18/2018 11:05 AM   Number of days: 0       LABS      CBC:   Lab Results   Component Value Date    WBC 9.2 10/12/2017    HGB 12.9 10/12/2017    HCT 38.1 10/12/2017    MCV 77.7 10/12/2017     10/12/2017     BMP:   Lab Results   Component Value Date     10/12/2017    K 5.6 10/12/2017     10/12/2017    CO2 25 10/12/2017    BUN 16 10/12/2017    CREATININE 0.9 10/12/2017     PT/INR:   Lab Results   Component Value Date    INR 0.89 10/12/2017     Prealbumin: No results found for: PREALBUMIN  Albumin:  Lab Results   Component Value Date    LABALBU 4.2 10/12/2017     Sed Rate:  Lab Results   Component Value Date    SEDRATE 2 05/08/2013     CRP:   Lab Results   Component Value Date    CRP <0.40 10/22/2013     Micro: No results found for: BC   Hemoglobin A1C: No results found for: LABA1C    Assessment:     Ulcer Identification:  Ulcer Type: arterial and diabetic  Contributing Factors: diabetes and arterial insufficiency    Wound: Abrasion    Depth of Diabetic/Pressure/Non Pressure Ulcers or Wound:  Wound, full thickness    Patient Active Problem List   Diagnosis Code    Ischemia, bowel ([address] [facility] K55.9    Uncontrolled hypertension I10    Diabetes mellitus ([facility] [address] E11.9    CAD (coronary artery disease) I25.10    AF [facility]atrial fibrillation) ([facility] I48.91    Gastroenteritis K52.9    Diabetes mellitus type II, uncontrolled ([facility] E11.65    Peptic ulcer disease K27.9    History of esophageal cancer Z85.01    Hypothyroidism E03.9    Palpitation R00.2    Chest pain R07.9    PVD (peripheral vascular disease) ([facility]) I73.9    Angina pectoris ([facility]) I20.9    Abnormal nuclear stress test R94.39       Procedure Note  Indications:  Based on my examination of this patient's wound(s)/ulcer(s) today, debridement is not required to promote healing and evaluate the extent healing. Plan:     Patient examined and evaluated    - instructed patient to stop soaking his foot in epsom salt  - educated patient on signs of infection and informed patient that the wound is not infected  - dressed wound with iodoflex and gauze  - will return to clinic in 2 weeks     Treatment:   Orders Placed This Encounter   Procedures    Apply dressing     Standing Status:   Standing     Number of Occurrences:   1    Wound care     Left third toe: iodoflex, gauze, love. Standing Status:   Standing     Number of Occurrences:   1         Antibiotics: No    Follow up: 2 weeks    Please see attached Discharge Instructions    Written patient dismissal instructions given to patient and signed by patient or POA.          Discharge Instructions       Visit

## 2018-09-18 NOTE — PROGRESS NOTES
I was present with the resident during the visit . I discussed the case with the resident and agree with the findings and the plan as documented in the residents note.     Meg Mayorga DPM, FACFAS  Foot & Ankle Surgical Residency  [facility]

## 2018-10-02 ENCOUNTER — HOSPITAL ENCOUNTER (OUTPATIENT)
Dept: WOUND CARE | Age: 63
Discharge: HOME | End: 2018-10-02
Payer: MEDICARE

## 2018-10-02 VITALS
BODY MASS INDEX: 21.56 KG/M2 | RESPIRATION RATE: 18 BRPM | OXYGEN SATURATION: 97 % | SYSTOLIC BLOOD PRESSURE: 154 MMHG | TEMPERATURE: 98.1 F | WEIGHT: 154.6 LBS | DIASTOLIC BLOOD PRESSURE: 95 MMHG | HEART RATE: 55 BPM

## 2018-10-02 DIAGNOSIS — I73.9 PVD (PERIPHERAL VASCULAR DISEASE) (HCC): ICD-10-CM

## 2018-10-02 DIAGNOSIS — L98.491 ISCHEMIC ULCER, LIMITED TO BREAKDOWN OF SKIN (HCC): Primary | ICD-10-CM

## 2018-10-02 PROCEDURE — 2709999900 HC NON-CHARGEABLE SUPPLY

## 2018-10-02 PROCEDURE — 97597 DBRDMT OPN WND 1ST 20 CM/<: CPT

## 2018-10-02 NOTE — PROGRESS NOTES
44         at 44   [address] [facility] Early Death Brother 52         52       SOCIAL HISTORY    Social History   Substance Use Topics    Smoking status: Former Smoker     Packs/day: 2.00     Years: 20.00     Quit date: 2012    Smokeless tobacco: Never Used    Alcohol use No       ALLERGIES    No Known Allergies    MEDICATIONS    Current Outpatient Prescriptions on File Prior to Encounter   Medication Sig Dispense Refill    albuterol sulfate  (90 Base) MCG/ACT inhaler Inhale 2 puffs into the lungs every 6 hours as needed for Wheezing      losartan (COZAAR) 25 MG tablet Take 2 tablets by mouth daily 30 tablet 3    prasugrel (EFFIENT) 10 MG TABS Take 1 tablet by mouth daily 30 tablet 5    insulin lispro (HUMALOG) 100 UNIT/ML injection cartridge Inject into the skin 2 times daily If over 200 uses scale      DULoxetine (CYMBALTA) 30 MG extended release capsule Take 60 mg by mouth 2 times daily       metoprolol tartrate (LOPRESSOR) 25 MG tablet Take 25 mg by mouth 2 times daily      aspirin 325 MG tablet Take 325 mg by mouth daily      acetaminophen (TYLENOL) 325 MG tablet Take 650 mg by mouth 3 times daily      ondansetron (ZOFRAN-ODT) 8 MG disintegrating tablet Take 8 mg by mouth every 6 hours as needed for Nausea or Vomiting      pantoprazole (PROTONIX) 40 MG tablet Take 40 mg by mouth 2 times daily      atorvastatin (LIPITOR) 20 MG tablet Take 20 mg by mouth daily      Coenzyme Q10 (CO Q-10) 200 MG CAPS Take 1 capsule by mouth 2 times daily       spironolactone (ALDACTONE) 25 MG tablet Take 25 mg by mouth daily      metFORMIN (GLUCOPHAGE) 500 MG tablet Take 1,000 mg by mouth 2 times daily (with meals)       amylase-lipase-protease (CREON 67021) 25192 UNITS per capsule Take 1 capsule by mouth 3 times daily (before meals)       Cinnamon 500 MG CAPS Take 2 capsules by mouth 2 times daily       amLODIPine (NORVASC) 10 MG tablet Take 1 tablet by mouth daily. 10/2/2018 11:14 AM   Drainage Amount Scant 10/2/2018 11:14 AM   Drainage Description Yellow 10/2/2018 11:14 AM   Odor None 10/2/2018 11:14 AM   Margins Attached edges 10/2/2018 11:14 AM   Zeenat-wound Assessment Dry;Pink 10/2/2018 11:14 AM   Yellow%Wound Bed 100 10/2/2018 11:14 AM   Number of days: 14       LABS      CBC:   Lab Results   Component Value Date    WBC 9.2 10/12/2017    HGB 12.9 10/12/2017    HCT 38.1 10/12/2017    MCV 77.7 10/12/2017     10/12/2017     BMP:   Lab Results   Component Value Date     10/12/2017    K 5.6 10/12/2017     10/12/2017    CO2 25 10/12/2017    BUN 16 10/12/2017    CREATININE 0.9 10/12/2017     PT/INR:   Lab Results   Component Value Date    INR 0.89 10/12/2017     Prealbumin: No results found for: PREALBUMIN  Albumin:  Lab Results   Component Value Date    LABALBU 4.2 10/12/2017     Sed Rate:  Lab Results   Component Value Date    SEDRATE 2 05/08/2013     Micro: No results found for: BC     Assessment:     Ulcer Identification:  Ulcer Type: arterial  Contributing Factors: smoking, arterial insufficiency and non-adherence    Wound: N/A    Depth of Diabetic/Pressure/Non Pressure Ulcers or Wound:  Wound, full thickness\",    Patient Active Problem List   Diagnosis Code    Ischemia, bowel ([facility] [address] K55.9    Uncontrolled hypertension I10    Diabetes mellitus ([facility] [address] E11.9    CAD (coronary artery disease) I25.10    AF (atrial fibrillation) ([facility]) I48.91    Gastroenteritis K52.9    Diabetes mellitus type II, uncontrolled ([facility] [address] E11.65    Peptic ulcer disease K27.9    History of esophageal cancer Z85.01    Hypothyroidism E03.9    Palpitation R00.2    Chest pain R07.9    PVD (peripheral vascular disease) ([facility]) I73.9    Angina pectoris ([facility]) I20.9    Abnormal nuclear stress test R94.39       Procedure Note  Indications:  Based on my examination of this patient's wound(s)/ulcer(s) today, debridement is required to promote healing and evaluate the extent healing. Performed by: Ruthie Esparza DPM    Consent obtained: Yes    Time out taken:  Yes    Pain control: None      Debridement:Non-excisional Debridement    Using forceps the wound(s)/ulcer(s) was/were sharply debrided down through and including the removal of epidermis. Devitalized Tissue Debrided:  slough    Pre Debridement Measurements:  Are located in the Wound/Ulcer Documentation Flow Sheet    Wound/Ulcer #: 1    Post Debridement Measurements:    Wound/Ulcer Descriptions are listed under Physical Exam above. Wound/Ulcer Descriptions are Pre Debridement except measurements    Percent of Wound/Ulcer Debrided: 100%    Total Surface Area Debrided:  0.1 sq cm     Bleeding:  None    Hemostasis Achieved:  not needed    Procedural Pain:  0  / 10     Post Procedural Pain:  0 / 10     Response to treatment:  Well tolerated by patient. Plan:     Patient examined and evaluated    Discuss negative effects of tobacco use on wound healing. Clinically poor vascular flow to lower extremities. Ordered arterial doppler for further work up. Consider HBO/vasc intervention pending arterial doppler results    Treatment:   Orders Placed This Encounter   Procedures    Debridement     Selective of necrotic tissue     Standing Status:   Standing     Number of Occurrences:   1    VL DUP LOWER EXTREMITY ARTERIES BILATERAL     Standing Status:   Future     Standing Expiration Date:   10/2/2019         Antibiotics: No    Follow up: 2 weeks    Please see attached Discharge Instructions    Written patient dismissal instructions given to patient and signed by patient or POA.          Discharge Instructions         Discharge Instructions        Visit Discharge/Physician Orders:   - Stop foot soaks  - will give orders for segmental pressures     Wound Location: Left foot wound     Dressing orders:      1) Gather wound care supplies and arrange on clean table.      2) Wash your hands with soap and water or use alcohol based hand  for 20 seconds (sing \"Happy Birthday\" twice).    3) Cleanse wounds with normal saline or wound cleanser and gauze. Pat dry with clean gauze.    4) Apply iodoflex to wound bed.      5) Cover with gauze. Wrap with love.     6) Change dressing every other day.     Keep all dressings clean & dry.     Do not shower, take baths or get wound wet, unless otherwise instructed by your Wound Care doctor.      Follow up visit:   2 Weeks      Keep next scheduled appointment. Please give 24 hour notice if unable to keep appointment. [phone]     If you experience any of the following, please call the Wound Care Service during business hours: Monday through Friday 8:00 am - 4:30 pm  ([phone]). *Increase in pain              *Temperature over 101              *Increase in drainage from your wound or a foul odor              *Uncontrolled swelling              *Need for compression bandage changes due to slippage, breakthrough drainage     If you need medical attention outside of business hours, please contact your Primary Care Doctor or go to the nearest emergency room.            Electronically signed by Xochilt Higgins DPM on 10/2/2018 at 11:46 AM

## 2018-10-03 ENCOUNTER — HOSPITAL ENCOUNTER (OUTPATIENT)
Dept: INTERVENTIONAL RADIOLOGY/VASCULAR | Age: 63
Discharge: HOME | End: 2018-10-03
Payer: MEDICARE

## 2018-10-03 DIAGNOSIS — I73.9 PVD (PERIPHERAL VASCULAR DISEASE) (HCC): ICD-10-CM

## 2018-10-03 PROCEDURE — 93925 LOWER EXTREMITY STUDY: CPT

## 2018-10-11 ENCOUNTER — HOSPITAL ENCOUNTER (OUTPATIENT)
Dept: NUCLEAR MEDICINE | Age: 63
Discharge: HOME | End: 2018-10-11
Payer: MEDICARE

## 2018-10-11 DIAGNOSIS — S91.102A OPEN WOUND OF LEFT GREAT TOE, INITIAL ENCOUNTER: ICD-10-CM

## 2018-10-11 PROCEDURE — A9503 TC99M MEDRONATE: HCPCS | Performed by: FAMILY MEDICINE

## 2018-10-11 PROCEDURE — 3430000000 HC RX DIAGNOSTIC RADIOPHARMACEUTICAL: Performed by: FAMILY MEDICINE

## 2018-10-11 PROCEDURE — 78315 BONE IMAGING 3 PHASE: CPT

## 2018-10-11 RX ORDER — TC 99M MEDRONATE 20 MG/10ML
25 INJECTION, POWDER, LYOPHILIZED, FOR SOLUTION INTRAVENOUS
Status: COMPLETED | OUTPATIENT
Start: 2018-10-11 | End: 2018-10-11

## 2018-10-11 RX ADMIN — TC 99M MEDRONATE 26.7 MILLICURIE: 20 INJECTION, POWDER, LYOPHILIZED, FOR SOLUTION INTRAVENOUS at 09:35

## 2018-10-16 ENCOUNTER — HOSPITAL ENCOUNTER (OUTPATIENT)
Dept: WOUND CARE | Age: 63
Discharge: HOME | End: 2018-10-16
Payer: MEDICARE

## 2018-10-16 VITALS
DIASTOLIC BLOOD PRESSURE: 77 MMHG | RESPIRATION RATE: 15 BRPM | HEART RATE: 50 BPM | BODY MASS INDEX: 21.14 KG/M2 | HEIGHT: 71 IN | OXYGEN SATURATION: 100 % | SYSTOLIC BLOOD PRESSURE: 178 MMHG | WEIGHT: 151 LBS | TEMPERATURE: 98.8 F

## 2018-10-16 PROCEDURE — 97597 DBRDMT OPN WND 1ST 20 CM/<: CPT

## 2018-10-16 ASSESSMENT — PAIN SCALES - GENERAL: PAINLEVEL_OUTOF10: 0

## 2018-10-16 NOTE — PROGRESS NOTES
tablet Take 1 tablet by mouth daily. (Patient taking differently: Take 5 mg by mouth nightly ) 30 tablet 1    insulin detemir (LEVEMIR FLEXPEN) 100 UNIT/ML injection Inject 24 Units into the skin 2 times daily. (Patient taking differently: Inject 18 Units into the skin 2 times daily ) 1 Pen 3    multivitamin (THERAGRAN) per tablet Take 1 tablet by mouth daily. No current facility-administered medications on file prior to encounter. REVIEW OF SYSTEMS    A comprehensive review of systems was negative. Objective:      BP (!) 178/77   Pulse 50   Temp 98.8 °F (37.1 °C) (Oral)   Resp 15   Ht 5' 11\" (1.803 m)   Wt 151 lb (68.5 kg)   SpO2 100%   BMI 21.06 kg/m²     Wt Readings from Last 3 Encounters:   10/16/18 151 lb (68.5 kg)   10/02/18 154 lb 9.6 oz (70.1 kg)   10/12/17 162 lb (73.5 kg)       PHYSICAL EXAM    General Appearance: alert and oriented to person, place and time, well-developed and well-nourished, in no acute distress    Full thickness wound on the dorsal aspect of the 3rd digit with fibrotic base and epithelialized margins. No surrounding erythema, edema, or exudate noted. Does not probe to bone, track or undermine.           Wound 09/18/18 Toe (Comment  which one) Dorsal;Left 1 3rd toe (Active)   Wound Image   10/16/2018 11:53 AM   Wound Type Wound 10/16/2018 11:53 AM   Dressing Status Old drainage 10/2/2018 11:14 AM   Dressing Changed Changed/New 10/16/2018 11:53 AM   Wound Cleansed Rinsed/Irrigated with saline 10/16/2018 11:53 AM   Wound Length (cm) 0.3 cm 10/16/2018 11:53 AM   Wound Width (cm) 0.2 cm 10/16/2018 11:53 AM   Wound Depth (cm)  scabbed 10/16/2018 11:53 AM   Calculated Wound Size (cm^2) (l*w) 0.06 cm^2 10/16/2018 11:53 AM   Change in Wound Size % (l*w) 50 10/16/2018 11:53 AM   Undermining Starts ___ O'Clock 0 10/2/2018 11:14 AM   Undermining Ends___ O'Clock 0 10/2/2018 11:14 AM   Undermining Maxium Distance (cm) 0 10/2/2018 11:14 AM   Wound Assessment Shobha Rush 10/16/2018 11:53 AM   Drainage Amount None 10/16/2018 11:53 AM   Drainage Description Yellow 10/2/2018 11:14 AM   Odor None 10/16/2018 11:53 AM   Margins Attached edges 10/16/2018 11:53 AM   Zeenat-wound Assessment Dry 10/16/2018 11:53 AM   Yellow%Wound Bed 100 10/2/2018 11:14 AM   Other%Wound Bed 100 brown  10/16/2018 11:53 AM   Number of days: 28       LABS      CBC:   Lab Results   Component Value Date    WBC 9.2 10/12/2017    HGB 12.9 10/12/2017    HCT 38.1 10/12/2017    MCV 77.7 10/12/2017     10/12/2017     BMP:   Lab Results   Component Value Date     10/12/2017    K 5.6 10/12/2017     10/12/2017    CO2 25 10/12/2017    BUN 16 10/12/2017    CREATININE 0.9 10/12/2017     PT/INR:   Lab Results   Component Value Date    INR 0.89 10/12/2017     Prealbumin: No results found for: PREALBUMIN  Albumin:  Lab Results   Component Value Date    LABALBU 4.2 10/12/2017     Sed Rate:  Lab Results   Component Value Date    SEDRATE 2 05/08/2013     CRP:   Lab Results   Component Value Date    CRP <0.40 10/22/2013     Micro: No results found for: BC   Hemoglobin A1C: No results found for: LABA1C    Assessment:     Ulcer Identification:  Ulcer Type: arterial  Contributing Factors: arterial insufficiency    Wound: N/A    Depth of Diabetic/Pressure/Non Pressure Ulcers or Wound:  Wound, full thickness    Patient Active Problem List   Diagnosis Code    Ischemia, bowel ([facility]) K55.9    Uncontrolled hypertension I10    Diabetes mellitus ([facility] [address] E11.9    CAD (coronary artery disease) I25.10    AF (atrial fibrillation) ([facility]) I48.91    Gastroenteritis K52.9    Diabetes mellitus type II, uncontrolled ([facility]) E11.65    Peptic ulcer disease K27.9    History of esophageal cancer Z85.01    Hypothyroidism E03.9    Palpitation R00.2    Chest pain R07.9    PVD (peripheral vascular disease) ([facility]) I73.9    Angina pectoris ([facility]) I20.9    Abnormal nuclear stress test R94.39       Procedure Note  Indications:  Based on my examination

## 2018-11-06 ENCOUNTER — HOSPITAL ENCOUNTER (OUTPATIENT)
Dept: WOUND CARE | Age: 63
Discharge: HOME | End: 2018-11-06
Payer: MEDICARE

## 2018-11-06 VITALS
DIASTOLIC BLOOD PRESSURE: 78 MMHG | OXYGEN SATURATION: 98 % | HEART RATE: 52 BPM | RESPIRATION RATE: 18 BRPM | WEIGHT: 150.1 LBS | TEMPERATURE: 98.1 F | SYSTOLIC BLOOD PRESSURE: 170 MMHG | BODY MASS INDEX: 20.93 KG/M2

## 2018-11-06 PROCEDURE — 99212 OFFICE O/P EST SF 10 MIN: CPT

## 2018-11-06 NOTE — PROGRESS NOTES
[address] [facility]          Progress Note and Procedure Note      [address] [facility] RECORD NUMBER:  [id number]  AGE: 70 y.o. GENDER: male  : 1955  EPISODE DATE:  2018    Subjective:     Chief Complaint   Patient presents with    Wound Check     Left 3rd toe         HISTORY of PRESENT ILLNESS HPI     Angelito Burnett is a 70 y.o. male Established patient, who presents today for wound/ulcer evaluation. History of Wound Context: ischemic  Wound/Ulcer Pain Timing/Severity: none  Quality of pain: N/A  Severity:  0 / 10   Modifying Factors: None  Associated Signs/Symptoms: none    Interval History:   Patient presents today for follow up on wound/ulcer's progression. The patient is currently not on antibiotics. Current dressing care includes iodoflex, which patient discontinued about a week ago because he felt like the wound was healed.          PAST MEDICAL HISTORY        Diagnosis Date    Alcoholism [facility])      quit in the 80    Alopecia     Angina at rest [facility])     CAD (coronary artery disease)      self , stents     Depression     Esophageal cancer ([facility]     GERD (gastroesophageal reflux disease)     Hyperlipidemia     self and family    Hypertension     self and family    Hypothyroidism     family    Kidney stones     Migraines     self and family    Osteoarthritis     self and family (in back)   Marylu Hamman PVD (peripheral vascular disease) ([facility]     Type II diabetes mellitus ([facility] [address]     self and family       PAST SURGICAL HISTORY    Past Surgical History:   Procedure Laterality Date    CARDIAC CATHETERIZATION      8 stents    CHOLECYSTECTOMY      CORONARY ANGIOPLASTY  10/8/15    DIAGNOSTIC CARDIAC CATH LAB PROCEDURE      ESOPHAGECTOMY         FAMILY HISTORY    Family History   Problem Relation Age of Onset    Heart Disease Mother     Diabetes Mother     Heart Disease Father     Heart Attack Father     Early Death Father 46         at 46    Cancer Sister

## 2020-09-22 ENCOUNTER — HOSPITAL ENCOUNTER (OUTPATIENT)
Dept: NUCLEAR MEDICINE | Age: 65
Discharge: HOME | End: 2020-09-22
Payer: MEDICARE

## 2020-09-22 PROCEDURE — A9541 TC99M SULFUR COLLOID: HCPCS | Performed by: INTERNAL MEDICINE

## 2020-09-22 PROCEDURE — 78264 GASTRIC EMPTYING IMG STUDY: CPT

## 2020-09-22 PROCEDURE — 3430000000 HC RX DIAGNOSTIC RADIOPHARMACEUTICAL: Performed by: INTERNAL MEDICINE

## 2020-09-22 RX ADMIN — Medication 1 MILLICURIE: at 07:58

## 2020-09-28 ENCOUNTER — HOSPITAL ENCOUNTER (OUTPATIENT)
Dept: CT IMAGING | Age: 65
Discharge: HOME | End: 2020-09-28
Payer: MEDICARE

## 2020-09-28 LAB — POC CREATININE WHOLE BLOOD: 0.8 MG/DL (ref 0.5–1.2)

## 2020-09-28 PROCEDURE — 74174 CTA ABD&PLVS W/CONTRAST: CPT

## 2020-09-28 PROCEDURE — 6360000004 HC RX CONTRAST MEDICATION: Performed by: INTERNAL MEDICINE

## 2020-09-28 PROCEDURE — 82565 ASSAY OF CREATININE: CPT

## 2020-09-28 RX ADMIN — IOPAMIDOL 85 ML: 755 INJECTION, SOLUTION INTRAVENOUS at 09:24

## 2020-10-30 ENCOUNTER — HOSPITAL ENCOUNTER (OUTPATIENT)
Dept: GENERAL RADIOLOGY | Age: 65
Discharge: HOME | End: 2020-10-30
Payer: MEDICARE

## 2020-10-30 PROCEDURE — 74248 X-RAY SM INT F-THRU STD: CPT

## 2020-10-30 PROCEDURE — 2500000003 HC RX 250 WO HCPCS: Performed by: NURSE PRACTITIONER

## 2020-10-30 PROCEDURE — A4641 RADIOPHARM DX AGENT NOC: HCPCS | Performed by: NURSE PRACTITIONER

## 2020-10-30 PROCEDURE — 6370000000 HC RX 637 (ALT 250 FOR IP): Performed by: NURSE PRACTITIONER

## 2020-10-30 PROCEDURE — 6360000004 HC RX CONTRAST MEDICATION: Performed by: NURSE PRACTITIONER

## 2020-10-30 RX ADMIN — BARIUM SULFATE 140 ML: 980 POWDER, FOR SUSPENSION ORAL at 08:45

## 2020-10-30 RX ADMIN — ANTACID/ANTIFLATULENT 1 EACH: 380; 550; 10; 10 GRANULE, EFFERVESCENT ORAL at 08:45

## 2020-10-30 RX ADMIN — BARIUM SULFATE 100 ML: 0.6 SUSPENSION ORAL at 08:45

## 2021-02-15 ENCOUNTER — TELEPHONE (OUTPATIENT)
Dept: CARDIOLOGY CLINIC | Age: 66
End: 2021-02-15

## 2021-02-15 NOTE — TELEPHONE ENCOUNTER
Received re-faxed referral from Dr. Bello Crum office stating patient does want to follow with Dr. Apryl Boggs.     LM for patient to return call

## 2021-02-15 NOTE — TELEPHONE ENCOUNTER
Received referral from Dr Villanueva's office. Said patient has been seeing [facility] and would like to be seen closer to home. Fax said patient has been seen by this office in past, but it looks like patient has seen Dr Justin Mccray. I called Dr Villanueva's office in regards to this and they will talk to Dr and patient to find out if he wants seen by our office or Dr Justin Mccray. Referral notes are in chart.

## 2021-02-16 NOTE — TELEPHONE ENCOUNTER
Pt appt made, they wanted records from [facility] (in [place]) and [facility]. Called to have them faxed. Dr. Buck Severe is in care everywhere.

## 2021-02-25 ENCOUNTER — TELEPHONE (OUTPATIENT)
Dept: CARDIOLOGY CLINIC | Age: 66
End: 2021-02-25

## 2021-02-25 ENCOUNTER — OFFICE VISIT (OUTPATIENT)
Dept: CARDIOLOGY CLINIC | Age: 66
End: 2021-02-25
Payer: MEDICARE

## 2021-02-25 VITALS
BODY MASS INDEX: 21.84 KG/M2 | HEIGHT: 71 IN | HEART RATE: 81 BPM | SYSTOLIC BLOOD PRESSURE: 162 MMHG | WEIGHT: 156 LBS | DIASTOLIC BLOOD PRESSURE: 100 MMHG

## 2021-02-25 DIAGNOSIS — I10 ESSENTIAL HYPERTENSION: ICD-10-CM

## 2021-02-25 DIAGNOSIS — I25.10 CORONARY ARTERY DISEASE INVOLVING NATIVE CORONARY ARTERY OF NATIVE HEART WITHOUT ANGINA PECTORIS: Primary | ICD-10-CM

## 2021-02-25 DIAGNOSIS — R09.89 BRUIT: ICD-10-CM

## 2021-02-25 DIAGNOSIS — E78.01 FAMILIAL HYPERCHOLESTEROLEMIA: ICD-10-CM

## 2021-02-25 DIAGNOSIS — I48.91 ATRIAL FIBRILLATION, UNSPECIFIED TYPE (HCC): ICD-10-CM

## 2021-02-25 PROCEDURE — 3017F COLORECTAL CA SCREEN DOC REV: CPT | Performed by: NUCLEAR MEDICINE

## 2021-02-25 PROCEDURE — 99204 OFFICE O/P NEW MOD 45 MIN: CPT | Performed by: NUCLEAR MEDICINE

## 2021-02-25 PROCEDURE — G8484 FLU IMMUNIZE NO ADMIN: HCPCS | Performed by: NUCLEAR MEDICINE

## 2021-02-25 PROCEDURE — 1036F TOBACCO NON-USER: CPT | Performed by: NUCLEAR MEDICINE

## 2021-02-25 PROCEDURE — 1123F ACP DISCUSS/DSCN MKR DOCD: CPT | Performed by: NUCLEAR MEDICINE

## 2021-02-25 PROCEDURE — 93000 ELECTROCARDIOGRAM COMPLETE: CPT | Performed by: NUCLEAR MEDICINE

## 2021-02-25 PROCEDURE — G8420 CALC BMI NORM PARAMETERS: HCPCS | Performed by: NUCLEAR MEDICINE

## 2021-02-25 PROCEDURE — 4040F PNEUMOC VAC/ADMIN/RCVD: CPT | Performed by: NUCLEAR MEDICINE

## 2021-02-25 PROCEDURE — G8427 DOCREV CUR MEDS BY ELIG CLIN: HCPCS | Performed by: NUCLEAR MEDICINE

## 2021-02-25 RX ORDER — ATORVASTATIN CALCIUM 80 MG/1
80 TABLET, FILM COATED ORAL DAILY
COMMUNITY
End: 2021-09-07 | Stop reason: SDUPTHER

## 2021-02-25 RX ORDER — ISOSORBIDE MONONITRATE 60 MG/1
60 TABLET, EXTENDED RELEASE ORAL DAILY
COMMUNITY
End: 2021-10-06 | Stop reason: SDUPTHER

## 2021-02-25 RX ORDER — SOTALOL HYDROCHLORIDE 80 MG/1
80 TABLET ORAL 2 TIMES DAILY
COMMUNITY
End: 2021-03-29 | Stop reason: SDUPTHER

## 2021-02-25 RX ORDER — CLOPIDOGREL BISULFATE 75 MG/1
75 TABLET ORAL DAILY
COMMUNITY
End: 2021-09-07 | Stop reason: SDUPTHER

## 2021-02-25 RX ORDER — SUCRALFATE 1 G/1
1 TABLET ORAL 4 TIMES DAILY
COMMUNITY

## 2021-02-25 RX ORDER — LOSARTAN POTASSIUM 100 MG/1
100 TABLET ORAL DAILY
COMMUNITY
End: 2021-09-07 | Stop reason: SDUPTHER

## 2021-02-25 ASSESSMENT — ENCOUNTER SYMPTOMS
SHORTNESS OF BREATH: 1
COLOR CHANGE: 0
NAUSEA: 0
DIARRHEA: 0
BACK PAIN: 0
VOMITING: 0
ABDOMINAL DISTENTION: 0
ABDOMINAL PAIN: 0
CHEST TIGHTNESS: 0
ANAL BLEEDING: 0
PHOTOPHOBIA: 0
RECTAL PAIN: 0
BLOOD IN STOOL: 0
CONSTIPATION: 0

## 2021-02-25 NOTE — TELEPHONE ENCOUNTER
PT WAS SEEN BY DR Jolly Cisse TODAY. HAS A [employer] DEVICE. HE BELONGS TO THE PACER CLINIC AT [facility]. DORI NOTIFIED AND SHE SAID SHE WILL RELEASE THIS PT SO WE CAN ADD HIM TO OUR CLINIC.

## 2021-02-25 NOTE — PROGRESS NOTES
[address] [facility]  [address] .  [address]  [facility] [address]  Dept: [phone]  Dept Fax: [phone]  Loc: [phone]    Visit Date: 2021    Cristiana Toscano is a 74 y.o. male who presents todayfor:  Chief Complaint   Patient presents with    New Patient     EKG done today    Establish Cardiologist    Coronary Artery Disease    Atrial Fibrillation    Hypertension    Hyperlipidemia     Here for the firs time  Used to see Arnulfo munguia   Does have a pacemaker  Does have a fib   Has been on medical RX   Here to establish care  Does have stents   Last stent a while back  Does have some baseline dyspnea  Does have chest heaviness   Exertional   Does have Dm for many years   Seems fairly controlled  Does have HTn on medical Rx  Does have hyperlipidemia  On statins   No smoking   Family history of CAd     HPI:  HPI  Past Medical History:   Diagnosis Date    Alcoholism ([address]      quit in the 80    Alopecia     Ang[facility] at rest [facility])     CAD (coronary artery disease)      self , stents     Depression     Esophageal cancer ([address]     GERD (gastroesophageal reflux disease)     Hyperlipidemia     self and family    Hypertension     self and family    Hypothyroidism     family    Kidney stones     Migraines     self and family    Osteoarthritis     self and family (in back)   [facility] PVD (peripheral vascular disease) ([address]     Type II diabetes mellitus ([address]     self and family      Past Surgical History:   Procedure Laterality Date    CARDIAC CATHETERIZATION      8 stents    CHOLECYSTECTOMY      CORONARY ANGIOPLASTY  10/8/15    DIAGNOSTIC CARDIAC CATH LAB PROCEDURE      ESOPHAGECTOMY       Family History   Problem Relation Age of Onset    Heart Disease Mother     Diabetes Mother     Heart Disease Father     Heart Attack Father     Early Death Father 46         at 46    Cancer Sister     Cancer Brother     Early Death Brother 52         52     Social History     Tobacco Use    Smoking status: Former Smoker     Packs/day: 0.25     Years: 20.00     Pack years: 5.00     Types: Cigars     Quit date: 2012     Years since quittin.8    Smokeless tobacco: Never Used   Substance Use Topics    Alcohol use: No      Current Outpatient Medications   Medication Sig Dispense Refill    clopidogrel (PLAVIX) 75 MG tablet Take 75 mg by mouth daily      losartan (COZAAR) 100 MG tablet Take 100 mg by mouth daily      apixaban (ELIQUIS) 5 MG TABS tablet Take 5 mg by mouth 2 times daily      sotalol (BETAPACE) 80 MG tablet Take 80 mg by mouth 2 times daily      sucralfate (CARAFATE) 1 GM tablet Take 1 g by mouth 4 times daily      atorvastatin (LIPITOR) 80 MG tablet Take 80 mg by mouth daily      isosorbide mononitrate (IMDUR) 60 MG extended release tablet Take 60 mg by mouth daily      insulin lispro (HUMALOG) 100 UNIT/ML injection cartridge Inject into the skin 2 times daily If over 200 uses scale      DULoxetine (CYMBALTA) 30 MG extended release capsule Take 60 mg by mouth 2 times daily       pantoprazole (PROTONIX) 40 MG tablet Take 40 mg by mouth 2 times daily      Coenzyme Q10 (CO Q-10) 200 MG CAPS Take 1 capsule by mouth daily       nitroGLYCERIN (NITROSTAT) 0.4 MG SL tablet Place 1 tablet under the tongue every 5 minutes as needed for Chest pain 25 tablet 3    amLODIPine (NORVASC) 10 MG tablet Take 1 tablet by mouth daily. 30 tablet 1    insulin detemir (LEVEMIR FLEXPEN) 100 UNIT/ML injection Inject 24 Units into the skin 2 times daily. (Patient taking differently: Inject 28 Units into the skin 2 times daily 25 qhs) 1 Pen 3     No current facility-administered medications for this visit.       Allergies   Allergen Reactions    Erythromycin Nausea And Vomiting     Health Maintenance   Topic Date Due    Diabetic foot exam  1963    A1C test (Diabetic or Prediabetic)  1963    Diabetic retinal exam 02/22/1963    COVID-19 Vaccine (1 of 2) 02/22/1969    Diabetic microalbuminuria test  02/22/1971    DTaP/Tdap/Td vaccine (1 - Tdap) 02/22/1972    Shingles Vaccine (1 of 2) 02/22/2003    Colon cancer screen colonoscopy  02/22/2003    TSH testing  07/15/2016    Lipid screen  10/12/2018    Potassium monitoring  10/12/2018    Flu vaccine (1) 09/01/2020    Creatinine monitoring  12/31/2020    Annual Wellness Visit (AWV)  02/25/2021    Pneumococcal 65+ years Vaccine  Completed    AAA screen  Completed    Hepatitis C screen  Completed    Hepatitis A vaccine  Aged Out    Hib vaccine  Aged Out    Meningococcal (ACWY) vaccine  Aged Out       Subjective:  Review of Systems   Constitutional: Positive for fatigue. HENT: Negative for ear pain and nosebleeds. Eyes: Negative for photophobia. Respiratory: Positive for shortness of breath. Negative for chest tightness. Cardiovascular: Positive for chest pain and palpitations. Gastrointestinal: Negative for abdominal distention, abdominal pain, anal bleeding, blood in stool, constipation, diarrhea, nausea, rectal pain and vomiting. Endocrine: Negative for polyphagia. Genitourinary: Negative for dysuria, frequency and urgency. Musculoskeletal: Negative for arthralgias, back pain, gait problem, joint swelling, myalgias, neck pain and neck stiffness. Skin: Negative for color change, pallor, rash and wound. Allergic/Immunologic: Negative for food allergies. Neurological: Negative for dizziness, syncope and light-headedness. Psychiatric/Behavioral: Negative for confusion, decreased concentration, dysphoric mood and hallucinations. The patient is not nervous/anxious and is not hyperactive. Objective:  Physical Exam  HENT:      Head: Normocephalic. Nose: Nose normal.   Eyes:      Pupils: Pupils are equal, round, and reactive to light. Neck:      Musculoskeletal: Normal range of motion.    Cardiovascular:      Rate and Rhythm: Normal rate and regular rhythm. Heart sounds: Murmur present. Pulmonary:      Effort: No respiratory distress. Breath sounds: No stridor. No wheezing, rhonchi or rales. Chest:      Chest wall: No tenderness. Abdominal:      General: There is no distension. Palpations: There is no mass. Tenderness: There is no abdominal tenderness. There is no right CVA tenderness, left CVA tenderness, guarding or rebound. Hernia: No hernia is present. Musculoskeletal:         General: No swelling, tenderness, deformity or signs of injury. Right lower leg: No edema. Left lower leg: No edema. Skin:     Coloration: Skin is not jaundiced or pale. Findings: No bruising, erythema, lesion or rash. Neurological:      Mental Status: He is alert and oriented to person, place, and time. Cranial Nerves: No cranial nerve deficit. Sensory: No sensory deficit. Motor: No weakness. Coordination: Coordination normal.      Gait: Gait normal.      Deep Tendon Reflexes: Reflexes normal.   Psychiatric:         Mood and Affect: Mood normal.         Thought Content: Thought content normal.       BP (!) 162/100   Pulse 81   Ht 5' 11\" (1.803 m)   Wt 156 lb (70.8 kg)   BMI 21.76 kg/m²     Assessment:      Diagnosis Orders   1. Coronary artery disease involving native coronary artery of native heart without angina pectoris  EKG 12 Lead   2. Atrial fibrillation, unspecified type (HCC)  EKG 12 Lead   3. Familial hypercholesterolemia     4. Essential hypertension     as above  Complicated patient  Not followed in a while  Symptoms as above  . ECG in office was done today. I reviewed the ECG. No acute findings      Plan:  No follow-ups on file. As above  Non invasive cardiac testing will be ordered to further evaluate for any ischemic or structural heart disease as a cause of the patient symptoms. We will proceed with a Stress Cardiolite test and echo soon.   Continue risk factor modification and medical management,. Thank you for allowing me to participate in the care of your patient. Please don't hesitate to contact me regarding any further issues related to the patient care      Orders Placed:  Orders Placed This Encounter   Procedures    EKG 12 Lead     Order Specific Question:   Reason for Exam?     Answer: Other       Medications Prescribed:  No orders of the defined types were placed in this encounter. Discussed use, benefit, and side effects of prescribed medications. All patient questions answered. Pt voicedunderstanding. Instructed to continue current medications, diet and exercise. Continue risk factor modification and medical management. Patient agreed with treatment plan. Follow up as directed.     Electronically signedby Rad Bermudez MD on 2/25/2021 at 2:10 PM

## 2021-03-04 NOTE — TELEPHONE ENCOUNTER
Mevlin for pt to call this office .  Told him that we need to get his serial number off of his device so we can get him transferred into this clinic

## 2021-03-09 ENCOUNTER — TELEPHONE (OUTPATIENT)
Dept: CARDIOLOGY CLINIC | Age: 66
End: 2021-03-09

## 2021-03-16 ENCOUNTER — HOSPITAL ENCOUNTER (OUTPATIENT)
Dept: NON INVASIVE DIAGNOSTICS | Age: 66
Discharge: HOME | End: 2021-03-16
Payer: MEDICARE

## 2021-03-16 ENCOUNTER — HOSPITAL ENCOUNTER (OUTPATIENT)
Dept: INTERVENTIONAL RADIOLOGY/VASCULAR | Age: 66
Discharge: HOME | End: 2021-03-16
Payer: MEDICARE

## 2021-03-16 ENCOUNTER — TELEPHONE (OUTPATIENT)
Dept: CARDIOLOGY CLINIC | Age: 66
End: 2021-03-16

## 2021-03-16 VITALS — HEIGHT: 71 IN | WEIGHT: 150 LBS | BODY MASS INDEX: 21 KG/M2

## 2021-03-16 DIAGNOSIS — I48.91 ATRIAL FIBRILLATION, UNSPECIFIED TYPE (HCC): ICD-10-CM

## 2021-03-16 DIAGNOSIS — R09.89 BRUIT: ICD-10-CM

## 2021-03-16 DIAGNOSIS — R93.89 ABNORMAL CAROTID ULTRASOUND: Primary | ICD-10-CM

## 2021-03-16 DIAGNOSIS — E78.01 FAMILIAL HYPERCHOLESTEROLEMIA: ICD-10-CM

## 2021-03-16 DIAGNOSIS — I10 ESSENTIAL HYPERTENSION: ICD-10-CM

## 2021-03-16 DIAGNOSIS — I25.10 CORONARY ARTERY DISEASE INVOLVING NATIVE CORONARY ARTERY OF NATIVE HEART WITHOUT ANGINA PECTORIS: ICD-10-CM

## 2021-03-16 LAB
LV EF: 60 %
LVEF MODALITY: NORMAL

## 2021-03-16 PROCEDURE — 93880 EXTRACRANIAL BILAT STUDY: CPT

## 2021-03-16 PROCEDURE — 3430000000 HC RX DIAGNOSTIC RADIOPHARMACEUTICAL: Performed by: NUCLEAR MEDICINE

## 2021-03-16 PROCEDURE — 93306 TTE W/DOPPLER COMPLETE: CPT

## 2021-03-16 PROCEDURE — 78452 HT MUSCLE IMAGE SPECT MULT: CPT

## 2021-03-16 PROCEDURE — 93017 CV STRESS TEST TRACING ONLY: CPT | Performed by: NUCLEAR MEDICINE

## 2021-03-16 PROCEDURE — 6360000002 HC RX W HCPCS

## 2021-03-16 PROCEDURE — A9500 TC99M SESTAMIBI: HCPCS | Performed by: NUCLEAR MEDICINE

## 2021-03-16 RX ADMIN — Medication 30.9 MILLICURIE: at 15:40

## 2021-03-16 RX ADMIN — Medication 9 MILLICURIE: at 14:52

## 2021-03-16 NOTE — TELEPHONE ENCOUNTER
Dr Ariela Siu result note from [facility].      LM for pt to return call.    ----- Message from Marisa Foreman MD sent at 3/16/2021  3:45 PM EDT -----  CTA chest

## 2021-03-17 NOTE — TELEPHONE ENCOUNTER
Spoke to patient's wife. Clarified with Dr. Aurelio Ceron. Order CTA neck. Wife agreed to order. Order given to scheduling.

## 2021-03-25 ENCOUNTER — HOSPITAL ENCOUNTER (OUTPATIENT)
Dept: CT IMAGING | Age: 66
Discharge: HOME | End: 2021-03-25
Payer: MEDICARE

## 2021-03-25 ENCOUNTER — TELEPHONE (OUTPATIENT)
Dept: CARDIOLOGY CLINIC | Age: 66
End: 2021-03-25

## 2021-03-25 DIAGNOSIS — R93.89 ABNORMAL CAROTID ULTRASOUND: ICD-10-CM

## 2021-03-25 DIAGNOSIS — E78.01 FAMILIAL HYPERCHOLESTEROLEMIA: ICD-10-CM

## 2021-03-25 DIAGNOSIS — R09.89 BRUIT: ICD-10-CM

## 2021-03-25 DIAGNOSIS — I48.91 ATRIAL FIBRILLATION, UNSPECIFIED TYPE (HCC): ICD-10-CM

## 2021-03-25 DIAGNOSIS — I10 ESSENTIAL HYPERTENSION: ICD-10-CM

## 2021-03-25 DIAGNOSIS — I25.10 CORONARY ARTERY DISEASE INVOLVING NATIVE CORONARY ARTERY OF NATIVE HEART WITHOUT ANGINA PECTORIS: ICD-10-CM

## 2021-03-25 LAB — POC CREATININE WHOLE BLOOD: 0.9 MG/DL (ref 0.5–1.2)

## 2021-03-25 PROCEDURE — 82565 ASSAY OF CREATININE: CPT

## 2021-03-25 PROCEDURE — 6360000004 HC RX CONTRAST MEDICATION: Performed by: NUCLEAR MEDICINE

## 2021-03-25 PROCEDURE — 70498 CT ANGIOGRAPHY NECK: CPT

## 2021-03-25 RX ADMIN — IOPAMIDOL 85 ML: 755 INJECTION, SOLUTION INTRAVENOUS at 07:49

## 2021-03-25 NOTE — TELEPHONE ENCOUNTER
Result note for CTA NECK W WO CONTRAST      ----- Message from Rad Bermudez MD sent at 3/25/2021 12:51 PM EDT -----  See if jong wants to see this patient for his carotids

## 2021-03-29 RX ORDER — SOTALOL HYDROCHLORIDE 80 MG/1
80 TABLET ORAL 2 TIMES DAILY
Qty: 60 TABLET | Refills: 5 | Status: SHIPPED | OUTPATIENT
Start: 2021-03-29 | End: 2021-09-07 | Stop reason: SDUPTHER

## 2021-03-29 NOTE — TELEPHONE ENCOUNTER
Kyrie Lezama called requesting a refill on the following medications:    ONLY HAS ENOUGH FOR HENRIQUE EVENING  Requested Prescriptions     Pending Prescriptions Disp Refills    apixaban (ELIQUIS) 5 MG TABS tablet 60 tablet      Sig: Take 1 tablet by mouth 2 times daily    sotalol (BETAPACE) 80 MG tablet 60 tablet      Sig: Take 1 tablet by mouth 2 times daily     Pharmacy verified:  [facility]      Date of last visit: 02-25-21  Date of next visit (if applicable): 4/13/1577

## 2021-04-20 ENCOUNTER — PROCEDURE VISIT (OUTPATIENT)
Dept: CARDIOLOGY CLINIC | Age: 66
End: 2021-04-20
Payer: MEDICARE

## 2021-04-20 DIAGNOSIS — Z95.0 PACEMAKER: ICD-10-CM

## 2021-04-20 PROCEDURE — 93296 REM INTERROG EVL PM/IDS: CPT | Performed by: NUCLEAR MEDICINE

## 2021-04-20 PROCEDURE — 93294 REM INTERROG EVL PM/LDLS PM: CPT | Performed by: NUCLEAR MEDICINE

## 2021-04-20 NOTE — PROGRESS NOTES
[facility]     . Kory Flores Battery longevity:  11.6 years on device   Presenting rhythm  AS VS  Very long AV delay AAI>=<DDD programming   SVT     Atrial impedance 494  RV impedance 608    P wave sensing 2.9  R wave sensing 16.1    92.4 % atrial paced  2.8 % RV paced     Atrial threshold 1 V  at 0.4ms  RV threshold 0.875 V at 0.4ms  Mode switches   0

## 2021-04-21 ENCOUNTER — TELEPHONE (OUTPATIENT)
Dept: CARDIOLOGY CLINIC | Age: 66
End: 2021-04-21

## 2021-04-21 NOTE — TELEPHONE ENCOUNTER
Reviewed presenting strips reviewed with Dr Lety Vazquez  Could either be atrial lead in RV, junctional rhythm, or a slow VT    LM with Denisa at [facility], he was recently transferred to us, what did prior checks look like, has this been looked into before?   Asked to call office back or fax records [phone]

## 2021-04-26 ENCOUNTER — NURSE ONLY (OUTPATIENT)
Dept: CARDIOLOGY CLINIC | Age: 66
End: 2021-04-26
Payer: MEDICARE

## 2021-04-26 DIAGNOSIS — Z95.0 PACEMAKER: Primary | ICD-10-CM

## 2021-04-26 PROCEDURE — 93280 PM DEVICE PROGR EVAL DUAL: CPT | Performed by: INTERNAL MEDICINE

## 2021-04-26 NOTE — PROGRESS NOTES
medtronic dual pacer in office   tx from [facility], this is the first check in our office     Assessing at lead placement and rhythm  Has extended rhythms below plus VT         . Kandice Rued Battery longevity:  11.9 years on device   Presenting rhythm  AP VS     Atrial impedance 494  RV impedance 589    P wave sensing 1.1  R wave sensing 19    90.8 % atrial paced  1.8 % RV paced     Atrial threshold 1 V  at 0.4ms  RV threshold 1 V at 0.4ms

## 2021-09-07 ENCOUNTER — OFFICE VISIT (OUTPATIENT)
Dept: CARDIOLOGY CLINIC | Age: 66
End: 2021-09-07
Payer: MEDICARE

## 2021-09-07 VITALS
BODY MASS INDEX: 21.56 KG/M2 | DIASTOLIC BLOOD PRESSURE: 88 MMHG | HEIGHT: 71 IN | HEART RATE: 64 BPM | SYSTOLIC BLOOD PRESSURE: 142 MMHG | WEIGHT: 154 LBS

## 2021-09-07 DIAGNOSIS — I10 ESSENTIAL HYPERTENSION: Primary | ICD-10-CM

## 2021-09-07 DIAGNOSIS — I48.0 PAROXYSMAL ATRIAL FIBRILLATION (HCC): ICD-10-CM

## 2021-09-07 DIAGNOSIS — I25.10 CORONARY ARTERY DISEASE INVOLVING NATIVE CORONARY ARTERY OF NATIVE HEART WITHOUT ANGINA PECTORIS: ICD-10-CM

## 2021-09-07 DIAGNOSIS — Z95.0 PACEMAKER: ICD-10-CM

## 2021-09-07 PROCEDURE — 99214 OFFICE O/P EST MOD 30 MIN: CPT | Performed by: NUCLEAR MEDICINE

## 2021-09-07 PROCEDURE — G8427 DOCREV CUR MEDS BY ELIG CLIN: HCPCS | Performed by: NUCLEAR MEDICINE

## 2021-09-07 PROCEDURE — G8420 CALC BMI NORM PARAMETERS: HCPCS | Performed by: NUCLEAR MEDICINE

## 2021-09-07 PROCEDURE — 1123F ACP DISCUSS/DSCN MKR DOCD: CPT | Performed by: NUCLEAR MEDICINE

## 2021-09-07 PROCEDURE — 3017F COLORECTAL CA SCREEN DOC REV: CPT | Performed by: NUCLEAR MEDICINE

## 2021-09-07 PROCEDURE — 4040F PNEUMOC VAC/ADMIN/RCVD: CPT | Performed by: NUCLEAR MEDICINE

## 2021-09-07 PROCEDURE — 1036F TOBACCO NON-USER: CPT | Performed by: NUCLEAR MEDICINE

## 2021-09-07 RX ORDER — AMLODIPINE BESYLATE 10 MG/1
10 TABLET ORAL DAILY
Qty: 90 TABLET | Refills: 3 | Status: SHIPPED | OUTPATIENT
Start: 2021-09-07 | End: 2022-09-08 | Stop reason: SDUPTHER

## 2021-09-07 RX ORDER — NITROGLYCERIN 0.4 MG/1
0.4 TABLET SUBLINGUAL EVERY 5 MIN PRN
Qty: 25 TABLET | Refills: 3 | Status: SHIPPED | OUTPATIENT
Start: 2021-09-07 | End: 2022-09-08 | Stop reason: SDUPTHER

## 2021-09-07 RX ORDER — LOSARTAN POTASSIUM 100 MG/1
100 TABLET ORAL DAILY
Qty: 90 TABLET | Refills: 3 | Status: SHIPPED | OUTPATIENT
Start: 2021-09-07 | End: 2022-04-06 | Stop reason: ALTCHOICE

## 2021-09-07 RX ORDER — M-VIT,TX,IRON,MINS/CALC/FOLIC 27MG-0.4MG
1 TABLET ORAL DAILY
COMMUNITY

## 2021-09-07 RX ORDER — ATORVASTATIN CALCIUM 80 MG/1
80 TABLET, FILM COATED ORAL DAILY
Qty: 90 TABLET | Refills: 3 | Status: SHIPPED | OUTPATIENT
Start: 2021-09-07 | End: 2022-09-08 | Stop reason: SDUPTHER

## 2021-09-07 RX ORDER — CLOPIDOGREL BISULFATE 75 MG/1
75 TABLET ORAL DAILY
Qty: 90 TABLET | Refills: 3 | Status: SHIPPED | OUTPATIENT
Start: 2021-09-07 | End: 2022-09-08 | Stop reason: SDUPTHER

## 2021-09-07 RX ORDER — SOTALOL HYDROCHLORIDE 80 MG/1
80 TABLET ORAL 2 TIMES DAILY
Qty: 180 TABLET | Refills: 3 | Status: SHIPPED | OUTPATIENT
Start: 2021-09-07 | End: 2022-09-08 | Stop reason: SDUPTHER

## 2021-09-07 NOTE — PROGRESS NOTES
[address] [facility] [address]  [address] [place] [address]  Dept: [phone]  Dept Fax: [phone]  Loc: [phone]    Visit Date: 2021    Tee Benedict is a 74 y.o. male who presents todayfor:  Chief Complaint   Patient presents with    Check-Up    Coronary Artery Disease    Hypertension    Atrial Fibrillation   some issues with DM   Not well controlled  Known risk for CAD  Cath    Patent stents  Moderate CAD then   As well pacer and A fib   Some baseline dyspnea  No changes in breathing  Bp is stable   No dizziness  No syncope  Some baseline dyspnea  A fib seems to be stable   Pacer is followed   More limited by the hips and so on than the heart   On statins for hyperlipidemia      HPI:  HPI  Past Medical History:   Diagnosis Date    Alcoholism ([facility]      quit in the 80    Alopecia     [facility] rest [facility])     CAD (coronary artery disease)      self , stents     Depression     Esophageal cancer ([address]     GERD (gastroesophageal reflux disease)     Hyperlipidemia     self and family    Hypertension     self and family    Hypothyroidism     family    Kidney stones     medtronic dual pacer  2021    Migraines     self and family    Osteoarthritis     self and family (in back)    PVD (peripheral vascular disease) ([address]     Type II diabetes mellitus ([address]     self and family      Past Surgical History:   Procedure Laterality Date    CARDIAC CATHETERIZATION      8 stents    CHOLECYSTECTOMY      CORONARY ANGIOPLASTY  10/8/15    DIAGNOSTIC CARDIAC CATH LAB PROCEDURE      ESOPHAGECTOMY       Family History   Problem Relation Age of Onset    Heart Disease Mother     Diabetes Mother     Heart Disease Father     Heart Attack Father     Early Death Father 46         at 46    Cancer Sister     Cancer Brother     Early Death Brother 52         52     Social History     Tobacco Use    Smoking status: Former Smoker     Packs/day: 0.25     Years: 20.00     Pack years: 5.00     Types: Cigars     Quit date: 2012     Years since quittin.4    Smokeless tobacco: Never Used   Substance Use Topics    Alcohol use: No      Current Outpatient Medications   Medication Sig Dispense Refill    Multiple Vitamins-Minerals (THERAPEUTIC MULTIVITAMIN-MINERALS) tablet Take 1 tablet by mouth daily      apixaban (ELIQUIS) 5 MG TABS tablet Take 1 tablet by mouth 2 times daily 60 tablet 5    sotalol (BETAPACE) 80 MG tablet Take 1 tablet by mouth 2 times daily 60 tablet 5    clopidogrel (PLAVIX) 75 MG tablet Take 75 mg by mouth daily      losartan (COZAAR) 100 MG tablet Take 100 mg by mouth daily      sucralfate (CARAFATE) 1 GM tablet Take 1 g by mouth 4 times daily      atorvastatin (LIPITOR) 80 MG tablet Take 80 mg by mouth daily      isosorbide mononitrate (IMDUR) 60 MG extended release tablet Take 60 mg by mouth daily      insulin lispro (HUMALOG) 100 UNIT/ML injection cartridge Inject into the skin 2 times daily If over 200 uses scale      DULoxetine (CYMBALTA) 30 MG extended release capsule Take 60 mg by mouth 2 times daily       pantoprazole (PROTONIX) 40 MG tablet Take 40 mg by mouth 2 times daily      Coenzyme Q10 (CO Q-10) 200 MG CAPS Take 1 capsule by mouth daily       nitroGLYCERIN (NITROSTAT) 0.4 MG SL tablet Place 1 tablet under the tongue every 5 minutes as needed for Chest pain 25 tablet 3    amLODIPine (NORVASC) 10 MG tablet Take 1 tablet by mouth daily. 30 tablet 1    insulin detemir (LEVEMIR FLEXPEN) 100 UNIT/ML injection Inject 24 Units into the skin 2 times daily. (Patient taking differently: Inject 28 Units into the skin 2 times daily 25 qhs) 1 Pen 3     No current facility-administered medications for this visit.      Allergies   Allergen Reactions    Erythromycin Nausea And Vomiting     Health Maintenance   Topic Date Due    Diabetic foot exam  Never done    A1C test (Diabetic or Prediabetic) Never done    Diabetic retinal exam  Never done    Diabetic microalbuminuria test  Never done    DTaP/Tdap/Td vaccine (1 - Tdap) Never done    Colon cancer screen colonoscopy  Never done    TSH testing  07/15/2016    Lipid screen  10/12/2018    Potassium monitoring  10/12/2018    Creatinine monitoring  12/31/2020    Annual Wellness Visit (AWV)  Never done    Flu vaccine (1) 09/01/2021    Shingles Vaccine  Completed    Pneumococcal 65+ years Vaccine  Completed    COVID-19 Vaccine  Completed    AAA screen  Completed    Hepatitis C screen  Completed    Hepatitis A vaccine  Aged Out    Hib vaccine  Aged Out    Meningococcal (ACWY) vaccine  Aged Out       Subjective:  Review of Systems  General:   No fever, no chills, some fatigue or weight loss  Pulmonary:    some dyspnea, no wheezing  Cardiac:    Denies recent chest pain,   GI:     No nausea or vomiting, no abdominal pain  Neuro:    No dizziness or light headedness,   Musculoskeletal:  No recent active issues  Extremities:   No edema, no obvious claudication       Objective:  Physical Exam  BP (!) 142/88   Pulse 64   Ht 5' 11\" (1.803 m)   Wt 154 lb (69.9 kg)   BMI 21.48 kg/m²   General:   Well developed, well nourished  Lungs:   Clear to auscultation  Heart:    Normal S1 S2, Slight murmur. no rubs, no gallops  Abdomen:   Soft, non tender, no organomegalies, positive bowel sounds  Extremities:   No edema, no cyanosis, good peripheral pulses  Neurological:   Awake, alert, oriented. No obvious focal deficits  Musculoskelatal:  No obvious deformities    Assessment:      Diagnosis Orders   1. Essential hypertension     2. Paroxysmal atrial fibrillation (HCC)     3. Pacemaker     4. Coronary artery disease involving native coronary artery of native heart without angina pectoris     higher risk patient  Limited by the hips   Negative stress test lately   No clear indication for a cath     Plan:  No follow-ups on file.   As above  Discussed at length   No indication for a cath for now  Continue risk factor modification and medical management  Thank you for allowing me to participate in the care of your patient. Please don't hesitate to contact me regarding any further issues related to the patient care    Orders Placed:  No orders of the defined types were placed in this encounter. Medications Prescribed:  No orders of the defined types were placed in this encounter. Discussed use, benefit, and side effects of prescribed medications. All patient questions answered. Pt voicedunderstanding. Instructed to continue current medications, diet and exercise. Continue risk factor modification and medical management. Patient agreed with treatment plan. Follow up as directed.     Electronically signedby Nga Douglas MD on 9/7/2021 at 12:35 PM

## 2021-10-06 RX ORDER — ISOSORBIDE MONONITRATE 60 MG/1
60 TABLET, EXTENDED RELEASE ORAL DAILY
Qty: 30 TABLET | Refills: 11 | Status: SHIPPED | OUTPATIENT
Start: 2021-10-06 | End: 2022-04-06 | Stop reason: SDUPTHER

## 2021-10-06 NOTE — TELEPHONE ENCOUNTER
Deanne Howard called requesting a refill on the following medications:  Requested Prescriptions     Pending Prescriptions Disp Refills    isosorbide mononitrate (IMDUR) 60 MG extended release tablet 30 tablet      Sig: Take 1 tablet by mouth daily     [facility]  [email or web address]    Date of last visit: 09/07/2021  Date of next visit (if applicable): 65/13/1019

## 2021-11-23 ENCOUNTER — PROCEDURE VISIT (OUTPATIENT)
Dept: CARDIOLOGY CLINIC | Age: 66
End: 2021-11-23
Payer: MEDICARE

## 2021-11-23 ENCOUNTER — PATIENT MESSAGE (OUTPATIENT)
Dept: CARDIOLOGY CLINIC | Age: 66
End: 2021-11-23

## 2021-11-23 DIAGNOSIS — Z95.0 PACEMAKER: Primary | ICD-10-CM

## 2021-11-23 PROCEDURE — 93296 REM INTERROG EVL PM/IDS: CPT | Performed by: NUCLEAR MEDICINE

## 2021-11-23 PROCEDURE — 93294 REM INTERROG EVL PM/LDLS PM: CPT | Performed by: NUCLEAR MEDICINE

## 2021-11-23 NOTE — PROGRESS NOTES
[facility] Medtronic Dual Pacemaker --  Patient of [employer]    Battery 11.3 years    Presenting rhythm AP VS    A Impedance 475  RV Impedance 570    P wave sensing 1.3  R wave sensing 16.6    A Threshold 0.875 @ 0.40  A Amplitude 1.75 @ 0.40  RV Thresholds 0.875 @ 0.40  RV Amplitude 2.0 @ 0.40      A Paced 90.4%  V Paced 2.5%    Programmed Mode AAIR <=> DDDR       Afib Junior 0%    Episodes   10/10/21- 27 seconds SVT rate 154  11/9/21 - 10 beats VT rate 167-200

## 2021-11-23 NOTE — LETTER
[address] [place] [address] [address]  [place] [address]  Phone: [phone]  Fax: [phone]        November 30, 2021    [facility] [address]      Dear Kaycee Lopez: We receive your Carelink/Lat[employer]/Merlin on 11/23/21. It is on your physician's desk to read. Your next scheduled transmission from home is 3/1/22.     Your yearly pacemaker/defibrillator in office check is 5/2/22 at [address] [place] [address] Drive have 11.3 years left on your battery   You pace the top part of your heart (atrium) 90.4%  You pace the bottom part of your heart (ventricle) 2.5%        IF  YOU HAVE QUESTIONS REGARDING YOUR HOME MONITOR PLEASE CALL:     Medtronic Carelink:  [phone]        Thank You!   [address] [place] [address],[address]

## 2022-01-11 ENCOUNTER — HOSPITAL ENCOUNTER (EMERGENCY)
Age: 67
Discharge: HOME | End: 2022-01-11
Payer: MEDICARE

## 2022-01-11 ENCOUNTER — OFFICE VISIT (OUTPATIENT)
Dept: ENT CLINIC | Age: 67
End: 2022-01-11
Payer: MEDICARE

## 2022-01-11 VITALS
TEMPERATURE: 98.2 F | HEIGHT: 71 IN | RESPIRATION RATE: 12 BRPM | DIASTOLIC BLOOD PRESSURE: 81 MMHG | BODY MASS INDEX: 21.06 KG/M2 | WEIGHT: 150.4 LBS | HEART RATE: 110 BPM | SYSTOLIC BLOOD PRESSURE: 107 MMHG | OXYGEN SATURATION: 100 %

## 2022-01-11 VITALS
HEART RATE: 109 BPM | SYSTOLIC BLOOD PRESSURE: 110 MMHG | RESPIRATION RATE: 14 BRPM | TEMPERATURE: 97.1 F | OXYGEN SATURATION: 100 % | BODY MASS INDEX: 20.92 KG/M2 | DIASTOLIC BLOOD PRESSURE: 70 MMHG | WEIGHT: 150 LBS

## 2022-01-11 DIAGNOSIS — T16.1XXA ACUTE FOREIGN BODY OF RIGHT EAR CANAL, INITIAL ENCOUNTER: Primary | ICD-10-CM

## 2022-01-11 PROCEDURE — 3017F COLORECTAL CA SCREEN DOC REV: CPT | Performed by: NURSE PRACTITIONER

## 2022-01-11 PROCEDURE — G8427 DOCREV CUR MEDS BY ELIG CLIN: HCPCS | Performed by: NURSE PRACTITIONER

## 2022-01-11 PROCEDURE — 99202 OFFICE O/P NEW SF 15 MIN: CPT | Performed by: NURSE PRACTITIONER

## 2022-01-11 PROCEDURE — 4040F PNEUMOC VAC/ADMIN/RCVD: CPT | Performed by: NURSE PRACTITIONER

## 2022-01-11 PROCEDURE — 99213 OFFICE O/P EST LOW 20 MIN: CPT

## 2022-01-11 PROCEDURE — G8484 FLU IMMUNIZE NO ADMIN: HCPCS | Performed by: NURSE PRACTITIONER

## 2022-01-11 PROCEDURE — 1036F TOBACCO NON-USER: CPT | Performed by: NURSE PRACTITIONER

## 2022-01-11 PROCEDURE — 1123F ACP DISCUSS/DSCN MKR DOCD: CPT | Performed by: NURSE PRACTITIONER

## 2022-01-11 PROCEDURE — G8420 CALC BMI NORM PARAMETERS: HCPCS | Performed by: NURSE PRACTITIONER

## 2022-01-11 ASSESSMENT — ENCOUNTER SYMPTOMS
CHOKING: 0
STRIDOR: 0
ABDOMINAL PAIN: 0
DIFFICULTY BREATHING: 0
RECTAL BLEEDING: 0
VOICE CHANGE: 0
COUGH: 0
APNEA: 0
WHEEZING: 0
SHORTNESS OF BREATH: 0
SORE THROAT: 0
VOMITING: 0
TROUBLE SWALLOWING: 0
RECTAL PAIN: 0
CHEST TIGHTNESS: 0
RHINORRHEA: 0
NAUSEA: 0

## 2022-01-11 NOTE — ED PROVIDER NOTES
[place] [address]  Urgent Care Encounter      CHIEF COMPLAINT       Chief Complaint   Patient presents with    Foreign Body     right ear hearing aid piece       Nurses Notes reviewed and I agree except as noted in the HPI. HISTORY OFPRESENT ILLNESS   Subhash Pradhan is a 74 y.o. Foreign Body  Location:  R ear  Suspected object: Silicone tip of hearing aid. Pain quality:  Aching, tightness and pressure  Pain severity:  Severe  Duration:  12 hours  Timing:  Constant  Progression:  Worsening (Several attempts made at home with tweezers and eardrops)  Chronicity:  New  Ineffective treatments:  Removal attempts with tweezers (ear drops)  Associated symptoms: ear pain    Associated symptoms: no abdominal pain, no choking, no congestion, no cough, no cyanosis, no difficulty breathing, no drooling, no ear discharge, no hearing loss, no nasal discharge, no nausea, no nosebleeds, no rectal bleeding, no rectal pain, no rhinorrhea, no sore throat, no trouble swallowing, no vaginal discharge, no vaginal pain, no voice change and no vomiting    Risk factors: no developmental delay, no hx of esophageal strictures, no mental health problem, no prior similar events and no prior surgery to area        REVIEW OF SYSTEMS     Review of Systems   Constitutional: Negative for activity change, appetite change, chills, diaphoresis, fatigue, fever and unexpected weight change. HENT: Positive for ear pain. Negative for congestion, drooling, ear discharge, hearing loss, nosebleeds, rhinorrhea, sore throat, trouble swallowing and voice change. Respiratory: Negative for apnea, cough, choking, chest tightness, shortness of breath, wheezing and stridor. Cardiovascular: Negative for chest pain, palpitations, leg swelling and cyanosis. Gastrointestinal: Negative for abdominal pain, nausea, rectal pain and vomiting. Genitourinary: Negative for vaginal discharge and vaginal pain.    Neurological: Negative for dizziness, light-headedness and headaches. PAST MEDICAL HISTORY         Diagnosis Date    Alcoholism [facility])      quit in the 80    Alopecia     Angina at rest [facility])     CAD (coronary artery disease)      self , stents     Depression     Esophageal cancer ([facility]     GERD (gastroesophageal reflux disease)     Hyperlipidemia     self and family    Hypertension     self and family    Hypothyroidism     family    Kidney stones     medtronic dual pacer  4/20/2021    Migraines     self and family    Osteoarthritis     self and family (in back)    PVD (peripheral vascular disease) ([facility]     Type II diabetes mellitus ([facility]     self and family       SURGICAL HISTORY     Patient  has a past surgical history that includes Esophagectomy; Cholecystectomy; Cardiac catheterization; Diagnostic Cardiac Cath Lab Procedure; and Coronary angioplasty (10/8/15).     CURRENT MEDICATIONS       Discharge Medication List as of 1/11/2022  1:09 PM      CONTINUE these medications which have NOT CHANGED    Details   isosorbide mononitrate (IMDUR) 60 MG extended release tablet Take 1 tablet by mouth daily, Disp-30 tablet, R-11Normal      Multiple Vitamins-Minerals (THERAPEUTIC MULTIVITAMIN-MINERALS) tablet Take 1 tablet by mouth dailyHistorical Med      amLODIPine (NORVASC) 10 MG tablet Take 1 tablet by mouth daily, Disp-90 tablet, R-3Normal      clopidogrel (PLAVIX) 75 MG tablet Take 1 tablet by mouth daily, Disp-90 tablet, R-3Normal      losartan (COZAAR) 100 MG tablet Take 1 tablet by mouth daily, Disp-90 tablet, R-3Normal      apixaban (ELIQUIS) 5 MG TABS tablet Take 1 tablet by mouth 2 times daily, Disp-180 tablet, R-3Normal      sotalol (BETAPACE) 80 MG tablet Take 1 tablet by mouth 2 times daily, Disp-180 tablet, R-3Normal      atorvastatin (LIPITOR) 80 MG tablet Take 1 tablet by mouth daily, Disp-90 tablet, R-3Normal      sucralfate (CARAFATE) 1 GM tablet Take 1 g by mouth 4 times dailyHistorical Med      insulin lispro (HUMALOG) 100 UNIT/ML injection cartridge Inject into the skin 2 times daily If over 200 uses scaleHistorical Med      DULoxetine (CYMBALTA) 30 MG extended release capsule Take 60 mg by mouth 2 times daily Historical Med      pantoprazole (PROTONIX) 40 MG tablet Take 40 mg by mouth 2 times daily      Coenzyme Q10 (CO Q-10) 200 MG CAPS Take 1 capsule by mouth daily Historical Med      insulin detemir (LEVEMIR FLEXPEN) 100 UNIT/ML injection Inject 24 Units into the skin 2 times daily. , Disp-1 Pen, R-3      nitroGLYCERIN (NITROSTAT) 0.4 MG SL tablet Place 1 tablet under the tongue every 5 minutes as needed for Chest pain, Disp-25 tablet, R-3Normal             ALLERGIES     Patient is is allergic to erythromycin. FAMILY HISTORY     Patient's family history includes Cancer in his brother and sister; Diabetes in his mother; Early Death (age of onset: 52) in his brother; Early Death (age of onset: 46) in his father; Heart Attack in his father; Heart Disease in his father and mother. SOCIAL HISTORY     Patient  reports that he quit smoking about 9 years ago. His smoking use included cigars. He has a 5.00 pack-year smoking history. He has never used smokeless tobacco. He reports that he does not drink alcohol and does not use drugs. PHYSICAL EXAM     ED TRIAGE VITALS  BP: 107/81, Temp: 98.2 °F (36.8 °C), Pulse: 110, Resp: 12, SpO2: 100 %  Physical Exam  Vitals and nursing note reviewed. Constitutional:       General: He is not in acute distress. Appearance: Normal appearance. He is normal weight. He is not ill-appearing, toxic-appearing or diaphoretic. HENT:      Head: Normocephalic and atraumatic. Right Ear: External ear normal. A foreign body is present. Left Ear: External ear normal.      Ears:      Comments: Unsuccessful attempts made with lighted curette and alligator forceps patient is recoiling and noticeably in pain. Additional attempts made by Merline Feinstein, APRN without results. Talked with staff at ENT office, Brunilda STARKEY will see the patient today. Nose: Nose normal.      Mouth/Throat:      Mouth: Mucous membranes are moist.   Eyes:      Extraocular Movements: Extraocular movements intact. Conjunctiva/sclera: Conjunctivae normal.   Musculoskeletal:         General: Normal range of motion. Cervical back: Normal range of motion. Skin:     General: Skin is warm. Neurological:      General: No focal deficit present. Mental Status: He is alert and oriented to person, place, and time. Psychiatric:         Mood and Affect: Mood normal.         Behavior: Behavior normal.         Thought Content: Thought content normal.         Judgment: Judgment normal.         DIAGNOSTIC RESULTS   Labs:No results found for this visit on 01/11/22. IMAGING:  No orders to display     URGENT CARE COURSE:     Vitals:    01/11/22 1219   BP: 107/81   Pulse: 110   Resp: 12   Temp: 98.2 °F (36.8 °C)   TempSrc: Oral   SpO2: 100%   Weight: 150 lb 6.4 oz (68.2 kg)   Height: 5' 11\" (1.803 m)       Medications - No data to display  PROCEDURES:  None  FINAL IMPRESSION      1. Acute foreign body of right ear canal, initial encounter        DISPOSITION/PLAN   Decision To Discharge     After reviewing the patients History of Present illness, Vital Signs,Clinical Findings,Comorbities, and Clinical Data obtained I discussed with the patient or patient representative that there is a very real potential for serious injury / illness and the patient will need to be transfer to a level of higher care for further evaluation and continued care. It was explained that this would provide the best care for the patient.          PATIENT REFERRED TO:  Renetta Hogan MD  [address] [facility] [address]  [facility] [address]  [phone]    Go today      DISCHARGE MEDICATIONS:  Discharge Medication List as of 1/11/2022  1:09 PM        Discharge Medication List as of 1/11/2022  1:09 PM          JAKY Bolton - REECE Hillman, JAKY - REECE  01/11/22 7603

## 2022-01-11 NOTE — ED NOTES
ZOEY Saucedo CNp and HAROON Perez CNP attempt Fb removal without success.   Edith Ray CNp contacts ENT     Lawernce Hodgkin, RN  01/11/22 1257

## 2022-01-17 NOTE — PROGRESS NOTES
[facility] [place] [facility]  [facility] EAR, NOSE AND THROAT  [facility]  Dept: [phone]  Dept Fax: [phone]  Loc: [facility] [address] Dorita Brown is a 74 y.o. male who was referred by No ref. provider found for:  Chief Complaint   Patient presents with    New Patient     Patient is here for hearing aid piece stuck in ear   . HPI:     Lokesh Curry is a 74 y.o. male new patient here for evaluation of right ear canal foreign body. He was sent over form the ER after getting a hearing aid dome stuck in his right ear last night. Attempts to remove at home and in ER were unsuccessful. His ear is now tender. History:      Allergies   Allergen Reactions    Erythromycin Nausea And Vomiting     Current Outpatient Medications   Medication Sig Dispense Refill    isosorbide mononitrate (IMDUR) 60 MG extended release tablet Take 1 tablet by mouth daily 30 tablet 11    Multiple Vitamins-Minerals (THERAPEUTIC MULTIVITAMIN-MINERALS) tablet Take 1 tablet by mouth daily      amLODIPine (NORVASC) 10 MG tablet Take 1 tablet by mouth daily 90 tablet 3    clopidogrel (PLAVIX) 75 MG tablet Take 1 tablet by mouth daily 90 tablet 3    losartan (COZAAR) 100 MG tablet Take 1 tablet by mouth daily 90 tablet 3    apixaban (ELIQUIS) 5 MG TABS tablet Take 1 tablet by mouth 2 times daily 180 tablet 3    sotalol (BETAPACE) 80 MG tablet Take 1 tablet by mouth 2 times daily 180 tablet 3    atorvastatin (LIPITOR) 80 MG tablet Take 1 tablet by mouth daily 90 tablet 3    nitroGLYCERIN (NITROSTAT) 0.4 MG SL tablet Place 1 tablet under the tongue every 5 minutes as needed for Chest pain 25 tablet 3    sucralfate (CARAFATE) 1 GM tablet Take 1 g by mouth 4 times daily      insulin lispro (HUMALOG) 100 UNIT/ML injection cartridge Inject into the skin 2 times daily If over 200 uses scale      DULoxetine (CYMBALTA) 30 MG extended release capsule Take 60 mg by mouth 2 times daily       pantoprazole (PROTONIX) 40 MG tablet Take 40 mg by mouth 2 times daily      Coenzyme Q10 (CO Q-10) 200 MG CAPS Take 1 capsule by mouth daily       insulin detemir (LEVEMIR FLEXPEN) 100 UNIT/ML injection Inject 24 Units into the skin 2 times daily. (Patient taking differently: Inject 28 Units into the skin 2 times daily 25 qhs) 1 Pen 3     No current facility-administered medications for this visit. Past Medical History:   Diagnosis Date    Alcoholism ([facility]      quit in the 80    Alopecia     Angina at rest [facility])     CAD (coronary artery disease)      self , stents     Depression     Esophageal cancer ([facility]     GERD (gastroesophageal reflux disease)     Hyperlipidemia     self and family    Hypertension     self and family    Hypothyroidism     family    Kidney stones     medtronic dual pacer  2021    Migraines     self and family    Osteoarthritis     self and family (in back)   Sonya Lion PVD (peripheral vascular disease) ([facility]     Type II diabetes mellitus ([facility]     self and family      Past Surgical History:   Procedure Laterality Date    CARDIAC CATHETERIZATION      8 stents    CHOLECYSTECTOMY      CORONARY ANGIOPLASTY  10/8/15    DIAGNOSTIC CARDIAC CATH LAB PROCEDURE      ESOPHAGECTOMY       Family History   Problem Relation Age of Onset    Heart Disease Mother     Diabetes Mother     Heart Disease Father     Heart Attack Father     Early Death Father 46         at 46    Cancer Sister    [address] [facility] Early Death Brother [address] [facility]         [address] [facility]     Social History     Tobacco Use    Smoking status: Former Smoker     Packs/day: 0.25     Years: 20.00     Pack years: 5.00     Types: Cigars     Quit date: 2012     Years since quittin.7    Smokeless tobacco: Never Used   Substance Use Topics    Alcohol use: No        Subjective:      Review of Systems  Rest of review of systems are negative, except as noted in HPI.      Objective:     /70 (Site: Right Upper Arm, Position: Sitting)   Pulse 109   Temp 97.1 °F (36.2 °C)   Resp 14   Wt 150 lb (68 kg)   SpO2 100%   BMI 20.92 kg/m²     PHYSICAL EXAM    Left EAC clear with intact/translucent TM. White/clear hearing aid dome removed from right EAC with alligator forceps easily and without incident. The ear canal is mildly irritated. TM intact/translucent. Vitals reviewed. Data:  All of the past medical history, past surgical history, family history,social history, allergies and current medications were reviewed with the patient. Assessment & Plan   Diagnoses and all orders for this visit:     Diagnosis Orders   1. Acute foreign body of right ear canal, initial encounter         The findings were explained and his questions were answered. Right EAC foreign body removed. Given Otovel sample for one time use secondary to ear canal irritation from prior attempts at removal.  Patient to contact our office for removal if this happens again. Return if symptoms worsen or fail to improve. **This report has been created using voice recognition software. It may contain minor errors which are inherent in voice recognition technology. **

## 2022-03-01 ENCOUNTER — PROCEDURE VISIT (OUTPATIENT)
Dept: CARDIOLOGY CLINIC | Age: 67
End: 2022-03-01
Payer: MEDICARE

## 2022-03-01 DIAGNOSIS — Z95.0 PACEMAKER: Primary | ICD-10-CM

## 2022-03-01 PROCEDURE — 93296 REM INTERROG EVL PM/IDS: CPT | Performed by: NUCLEAR MEDICINE

## 2022-03-01 PROCEDURE — 93294 REM INTERROG EVL PM/LDLS PM: CPT | Performed by: NUCLEAR MEDICINE

## 2022-03-01 NOTE — PROGRESS NOTES
[employer] medtronic dual pacer     . Meryle Sandman Battery longevity:  11.3 years   Presenting rhythm  AP VS   SVT     Atrial impedance 513  RV impedance 589    P wave sensing 1.1  R wave sensing 17.6    85.6 % atrial paced  2.1 % RV paced     Atrial threshold 0.75 V  at 0.4ms  RV threshold 0.875 V at 0.4ms  Mode switches   0

## 2022-04-06 ENCOUNTER — OFFICE VISIT (OUTPATIENT)
Dept: CARDIOLOGY CLINIC | Age: 67
End: 2022-04-06
Payer: MEDICARE

## 2022-04-06 ENCOUNTER — NURSE ONLY (OUTPATIENT)
Dept: CARDIOLOGY CLINIC | Age: 67
End: 2022-04-06
Payer: MEDICARE

## 2022-04-06 VITALS
BODY MASS INDEX: 22.12 KG/M2 | HEIGHT: 71 IN | HEART RATE: 63 BPM | SYSTOLIC BLOOD PRESSURE: 128 MMHG | WEIGHT: 158 LBS | DIASTOLIC BLOOD PRESSURE: 78 MMHG

## 2022-04-06 DIAGNOSIS — R42 DIZZINESS: ICD-10-CM

## 2022-04-06 DIAGNOSIS — I65.23 BILATERAL CAROTID ARTERY STENOSIS: ICD-10-CM

## 2022-04-06 DIAGNOSIS — I25.10 CORONARY ARTERY DISEASE INVOLVING NATIVE CORONARY ARTERY OF NATIVE HEART WITHOUT ANGINA PECTORIS: ICD-10-CM

## 2022-04-06 DIAGNOSIS — I48.0 PAROXYSMAL ATRIAL FIBRILLATION (HCC): Primary | ICD-10-CM

## 2022-04-06 DIAGNOSIS — I10 UNCONTROLLED HYPERTENSION: ICD-10-CM

## 2022-04-06 DIAGNOSIS — Z95.0 PACEMAKER: Primary | ICD-10-CM

## 2022-04-06 PROCEDURE — G8420 CALC BMI NORM PARAMETERS: HCPCS | Performed by: STUDENT IN AN ORGANIZED HEALTH CARE EDUCATION/TRAINING PROGRAM

## 2022-04-06 PROCEDURE — 1036F TOBACCO NON-USER: CPT | Performed by: STUDENT IN AN ORGANIZED HEALTH CARE EDUCATION/TRAINING PROGRAM

## 2022-04-06 PROCEDURE — 99214 OFFICE O/P EST MOD 30 MIN: CPT | Performed by: STUDENT IN AN ORGANIZED HEALTH CARE EDUCATION/TRAINING PROGRAM

## 2022-04-06 PROCEDURE — 93000 ELECTROCARDIOGRAM COMPLETE: CPT | Performed by: STUDENT IN AN ORGANIZED HEALTH CARE EDUCATION/TRAINING PROGRAM

## 2022-04-06 PROCEDURE — 4040F PNEUMOC VAC/ADMIN/RCVD: CPT | Performed by: STUDENT IN AN ORGANIZED HEALTH CARE EDUCATION/TRAINING PROGRAM

## 2022-04-06 PROCEDURE — G8427 DOCREV CUR MEDS BY ELIG CLIN: HCPCS | Performed by: STUDENT IN AN ORGANIZED HEALTH CARE EDUCATION/TRAINING PROGRAM

## 2022-04-06 PROCEDURE — 93288 INTERROG EVL PM/LDLS PM IP: CPT | Performed by: NUCLEAR MEDICINE

## 2022-04-06 PROCEDURE — 1123F ACP DISCUSS/DSCN MKR DOCD: CPT | Performed by: STUDENT IN AN ORGANIZED HEALTH CARE EDUCATION/TRAINING PROGRAM

## 2022-04-06 PROCEDURE — 3017F COLORECTAL CA SCREEN DOC REV: CPT | Performed by: STUDENT IN AN ORGANIZED HEALTH CARE EDUCATION/TRAINING PROGRAM

## 2022-04-06 RX ORDER — ISOSORBIDE MONONITRATE 60 MG/1
60 TABLET, EXTENDED RELEASE ORAL DAILY
Qty: 90 TABLET | Refills: 3 | Status: SHIPPED | OUTPATIENT
Start: 2022-04-06 | End: 2022-09-08 | Stop reason: SDUPTHER

## 2022-04-06 RX ORDER — HYDRALAZINE HYDROCHLORIDE 25 MG/1
25 TABLET, FILM COATED ORAL 3 TIMES DAILY
Qty: 90 TABLET | Refills: 3 | Status: SHIPPED | OUTPATIENT
Start: 2022-04-06 | End: 2022-08-15

## 2022-04-06 RX ORDER — IRBESARTAN 300 MG/1
TABLET ORAL
COMMUNITY
Start: 2022-03-30 | End: 2022-09-08 | Stop reason: SDUPTHER

## 2022-04-06 RX ORDER — HYDROCHLOROTHIAZIDE 12.5 MG/1
TABLET ORAL
COMMUNITY
Start: 2022-03-21

## 2022-04-06 NOTE — PROGRESS NOTES
[facility] PROFESSIONAL SERVICES  [facility] [place]   [address] [facility] [address] [address]   Dept: [phone]   Dept Fax: [phone]   Loc: [phone]      Chief Complaint   Patient presents with   Orly Mays     uncontrolled b/p    Hypertension     Cardiologist:  Dr. Lisa Nguyen  68 yo male presents for BP issues. Hx of CAD with prior PCI, HTN, afib, pacer, DM. Denies cp, sob, swelling, does have headaches and dizzy, feels like he may pass out occasionally, unsure if LOC vs mechanical falls. Does have pacer, dizziness is worse lately. Has hx of carotid stenosis. States  His BP meds have been adjusted and added recently with no change in pressures. Losartan changed to irbesartan recently then HCTZ added. Still running 505U/673A systolic reguarly. Is 128/78 today in office.       General:   No fever, no chills, no weight loss, + fatigue  Pulmonary:    + dyspnea, no wheezing  Cardiac:    Denies recent chest pain   GI:     No nausea or vomiting, no abdominal pain  Neuro:      + dizziness or light headedness  Musculoskeletal:  No recent active issues  Extremities:   No edema      Past Medical History:   Diagnosis Date    Alcoholism ([facility]      quit in the 80    Alopecia     Angina at rest [facility])     CAD (coronary artery disease)      self , stents     Depression     Esophageal cancer ([facility]     GERD (gastroesophageal reflux disease)     Hyperlipidemia     self and family    Hypertension     self and family    Hypothyroidism     family    Kidney stones     medtronic dual pacer  4/20/2021    Migraines     self and family    Osteoarthritis     self and family (in back)    PVD (peripheral vascular disease) ([facility]     Type II diabetes mellitus ([address]     self and family       Allergies   Allergen Reactions    Erythromycin Nausea And Vomiting       Current Outpatient Medications   Medication Sig Dispense Refill    hydroCHLOROthiazide (HYDRODIURIL) 12.5 MG tablet TAKE 1/2 (ONE-HALF) TABLET BY MOUTH ONCE DAILY      irbesartan (AVAPRO) 300 MG tablet TAKE 1 TABLET BY MOUTH ONCE DAILY      Fluticasone Propionate (FLONASE NA) by Nasal route      isosorbide mononitrate (IMDUR) 60 MG extended release tablet Take 1 tablet by mouth daily 90 tablet 3    hydrALAZINE (APRESOLINE) 25 MG tablet Take 1 tablet by mouth 3 times daily 90 tablet 3    Multiple Vitamins-Minerals (THERAPEUTIC MULTIVITAMIN-MINERALS) tablet Take 1 tablet by mouth daily      amLODIPine (NORVASC) 10 MG tablet Take 1 tablet by mouth daily 90 tablet 3    clopidogrel (PLAVIX) 75 MG tablet Take 1 tablet by mouth daily 90 tablet 3    apixaban (ELIQUIS) 5 MG TABS tablet Take 1 tablet by mouth 2 times daily 180 tablet 3    sotalol (BETAPACE) 80 MG tablet Take 1 tablet by mouth 2 times daily 180 tablet 3    atorvastatin (LIPITOR) 80 MG tablet Take 1 tablet by mouth daily 90 tablet 3    nitroGLYCERIN (NITROSTAT) 0.4 MG SL tablet Place 1 tablet under the tongue every 5 minutes as needed for Chest pain 25 tablet 3    sucralfate (CARAFATE) 1 GM tablet Take 1 g by mouth 4 times daily      insulin lispro (HUMALOG) 100 UNIT/ML injection cartridge Inject into the skin 2 times daily If over 200 uses scale      DULoxetine (CYMBALTA) 30 MG extended release capsule Take 60 mg by mouth 2 times daily       pantoprazole (PROTONIX) 40 MG tablet Take 40 mg by mouth 2 times daily      Coenzyme Q10 (CO Q-10) 200 MG CAPS Take 1 capsule by mouth daily       insulin detemir (LEVEMIR FLEXPEN) 100 UNIT/ML injection Inject 24 Units into the skin 2 times daily. (Patient taking differently: Inject 28 Units into the skin 2 times daily 25 qhs) 1 Pen 3     No current facility-administered medications for this visit.        Social History     Socioeconomic History    Marital status:      Spouse name: None    Number of children: None    Years of education: None    Highest education level: None   Occupational History    None   Tobacco Use  Smoking status: Former Smoker     Packs/day: 0.25     Years: 20.00     Pack years: 5.00     Types: Cigars     Quit date: 2012     Years since quitting: 10.0    Smokeless tobacco: Never Used   Vaping Use    Vaping Use: Former   Substance and Sexual Activity    Alcohol use: No    Drug use: No    Sexual activity: None   Other Topics Concern    None   Social History Narrative    None     Social Determinants of Health     Financial Resource Strain:     Difficulty of Paying Living Expenses: Not on file   Food Insecurity:     Worried About Running Out of Food in the Last Year: Not on file    Lila of Food in the Last Year: Not on file   Transportation Needs:     Lack of Transportation (Medical): Not on file    Lack of Transportation (Non-Medical):  Not on file   Physical Activity:     Days of Exercise per Week: Not on file    Minutes of Exercise per Session: Not on file   Stress:     Feeling of Stress : Not on file   Social Connections:     Frequency of Communication with Friends and Family: Not on file    Frequency of Social Gatherings with Friends and Family: Not on file    Attends Yarsanism Services: Not on file    Active Member of [address] [place] [address] or Organizations: Not on file    Attends Club or Organization Meetings: Not on file    Marital Status: Not on file   Intimate Partner Violence:     Fear of Current or Ex-Partner: Not on file    Emotionally Abused: Not on file    Physically Abused: Not on file    Sexually Abused: Not on file   Housing Stability:     Unable to Pay for Housing in the Last Year: Not on file    Number of [place] in the Last Year: Not on file    Unstable Housing in the Last Year: Not on file       Family History   Problem Relation Age of Onset    Heart Disease Mother     Diabetes Mother     Heart Disease Father     Heart Attack Father     Early Death Father 46         at 46   [address] [facility] Early Death Brother 52         50 Blood pressure 128/78, pulse 63, height 5' 11\" (1.803 m), weight 158 lb (71.7 kg). General:   Well developed, well nourished  Lungs:   Clear to auscultation  Heart:    Normal S1 S2, No murmur, rubs, or gallops, ? Right Carotid bruit on exam  Abdomen:   Soft, non tender, no organomegalies, positive bowel sounds  Extremities:   No edema, no cyanosis, good peripheral pulses  Neurological:   Awake, alert, oriented. No obvious focal deficits  Musculoskeletal:  No obvious deformities    EKG: reviewed, no acute findings. Diagnosis Orders   1. Paroxysmal atrial fibrillation (HCC)  EKG 12 lead   2. Uncontrolled hypertension  EKG 12 lead   3. Coronary artery disease involving native coronary artery of native heart without angina pectoris  EKG 12 lead   4. Bilateral carotid artery stenosis  VL DUP CAROTID BILATERAL   5. Dizziness         Orders Placed This Encounter   Procedures    VL DUP CAROTID BILATERAL     Standing Status:   Future     Standing Expiration Date:   4/6/2023    EKG 12 lead     Order Specific Question:   Reason for Exam?     Answer: Other       Assessment/Plan:   PAF- no current issues with afib, has pacer. Continue eliquis, sotalol. Uncontrolled HTN- on norvasc 10, imdur 60, sotalol 80 BID, irbesartan 300. Will add hydralazine 25 mg TID, thinks he was on this previously but stopped. CAD- no cp, no sob. Some dizzy. Carotid stenosis- recheck carotid duplex for increasing dizziiness, occ syncope?, unclear if LOC or just mechanical falls, patient cannot describe, family not usually a witness to events. Carotid duplex for hx of carotid stenosis, increasing dizziness. Disposition:   F/u with Dr Juan Westbrook in 6-8 weeks.    Continue Dr Caryle Becker current treatment plan  Follow up with Dr Wilhelminia Claude as scheduled or sooner if needed

## 2022-04-06 NOTE — PROGRESS NOTES
medtronic dual pacer in office  [facility] implant     11/19/21  10 beats VT   12/19/21 VT vs SVT      . Paula Delio Battery longevity:  10.9 years   Presenting rhythm  AP VS     Atrial impedance 475  RV impedance 646    P wave sensing 1.8  R wave sensing 20    89.4 % atrial paced  2.5 % RV paced     Atrial threshold 1 V  at 0.4ms  RV threshold 1 V at 0.4ms  Mode switches   0

## 2022-04-06 NOTE — PROGRESS NOTES
Pt here for check up - uncontrolled b/p    Pt brought b/p log for review    Pt continues with chest pain since pacemaker placed, notices daily, dizziness, sob on exertion ,

## 2022-04-14 ENCOUNTER — HOSPITAL ENCOUNTER (OUTPATIENT)
Dept: INTERVENTIONAL RADIOLOGY/VASCULAR | Age: 67
Discharge: HOME | End: 2022-04-14
Payer: MEDICARE

## 2022-04-14 DIAGNOSIS — I65.23 BILATERAL CAROTID ARTERY STENOSIS: ICD-10-CM

## 2022-04-14 PROCEDURE — 93880 EXTRACRANIAL BILAT STUDY: CPT

## 2022-05-18 ENCOUNTER — HOSPITAL ENCOUNTER (OUTPATIENT)
Dept: INTERVENTIONAL RADIOLOGY/VASCULAR | Age: 67
Discharge: HOME | End: 2022-05-18
Payer: MEDICARE

## 2022-05-18 DIAGNOSIS — I73.9 PERIPHERAL VASCULAR DISEASE, UNSPECIFIED (HCC): ICD-10-CM

## 2022-05-18 DIAGNOSIS — I73.9 INTERMITTENT CLAUDICATION (HCC): ICD-10-CM

## 2022-05-18 PROCEDURE — 93925 LOWER EXTREMITY STUDY: CPT

## 2022-08-02 ENCOUNTER — HOSPITAL ENCOUNTER (OUTPATIENT)
Age: 67
Setting detail: SPECIMEN
Discharge: HOME | End: 2022-08-02

## 2022-08-04 ENCOUNTER — NURSE ONLY (OUTPATIENT)
Dept: LAB | Age: 67
End: 2022-08-04

## 2022-08-04 LAB
BASOPHILS # BLD: 1 %
BASOPHILS ABSOLUTE: 0.1 THOU/MM3 (ref 0–0.1)
CALCIUM URINE: 2.8 MG/DL
CALCIUM URINE: 31 MG/24HR (ref 100–240)
EOSINOPHIL # BLD: 3 %
EOSINOPHILS ABSOLUTE: 0.3 THOU/MM3 (ref 0–0.4)
ERYTHROCYTE [DISTWIDTH] IN BLOOD BY AUTOMATED COUNT: 14 % (ref 11.5–14.5)
ERYTHROCYTE [DISTWIDTH] IN BLOOD BY AUTOMATED COUNT: 46.2 FL (ref 35–45)
HCT VFR BLD CALC: 46.6 % (ref 42–52)
HEMOGLOBIN: 15.1 GM/DL (ref 14–18)
HOURS COLLECTED: 24 HRS
IMMATURE GRANS (ABS): 0.06 THOU/MM3 (ref 0–0.07)
IMMATURE GRANULOCYTES: 0.6 %
LYMPHOCYTES # BLD: 30.9 %
LYMPHOCYTES ABSOLUTE: 3.2 THOU/MM3 (ref 1–4.8)
MCH RBC QN AUTO: 29.3 PG (ref 26–33)
MCHC RBC AUTO-ENTMCNC: 32.4 GM/DL (ref 32.2–35.5)
MCV RBC AUTO: 90.3 FL (ref 80–94)
MONOCYTES # BLD: 7.7 %
MONOCYTES ABSOLUTE: 0.8 THOU/MM3 (ref 0.4–1.3)
NUCLEATED RED BLOOD CELLS: 0 /100 WBC
PLATELET # BLD: 358 THOU/MM3 (ref 130–400)
PMV BLD AUTO: 9.9 FL (ref 9.4–12.4)
POTASSIUM SERPL-SCNC: 4.4 MEQ/L (ref 3.5–5.2)
PROLACTIN: 13.5 NG/ML
PROSTATE SPECIFIC ANTIGEN: 0.66 NG/ML (ref 0–1)
PTH INTACT: 209.7 PG/ML (ref 15–65)
RBC # BLD: 5.16 MILL/MM3 (ref 4.7–6.1)
SEG NEUTROPHILS: 56.8 %
SEGMENTED NEUTROPHILS ABSOLUTE COUNT: 6 THOU/MM3 (ref 1.8–7.7)
URINE VOLUME MEASURE: 1100 ML
VITAMIN D 25-HYDROXY: 47 NG/ML (ref 30–100)
WBC # BLD: 10.5 THOU/MM3 (ref 4.8–10.8)

## 2022-08-07 LAB — VITAMIN D 1,25-DIHYDROXY: 59.9 PG/ML (ref 19.9–79.3)

## 2022-08-12 ENCOUNTER — PROCEDURE VISIT (OUTPATIENT)
Dept: CARDIOLOGY CLINIC | Age: 67
End: 2022-08-12
Payer: MEDICARE

## 2022-08-12 DIAGNOSIS — Z95.0 PACEMAKER: Primary | ICD-10-CM

## 2022-08-12 PROCEDURE — 93296 REM INTERROG EVL PM/IDS: CPT | Performed by: INTERNAL MEDICINE

## 2022-08-12 PROCEDURE — 93294 REM INTERROG EVL PM/LDLS PM: CPT | Performed by: INTERNAL MEDICINE

## 2022-08-15 RX ORDER — HYDRALAZINE HYDROCHLORIDE 25 MG/1
TABLET, FILM COATED ORAL
Qty: 90 TABLET | Refills: 0 | Status: SHIPPED | OUTPATIENT
Start: 2022-08-15 | End: 2022-09-08 | Stop reason: SDUPTHER

## 2022-09-08 ENCOUNTER — OFFICE VISIT (OUTPATIENT)
Dept: CARDIOLOGY CLINIC | Age: 67
End: 2022-09-08
Payer: MEDICARE

## 2022-09-08 ENCOUNTER — NURSE ONLY (OUTPATIENT)
Dept: CARDIOLOGY CLINIC | Age: 67
End: 2022-09-08
Payer: MEDICARE

## 2022-09-08 VITALS
HEART RATE: 64 BPM | SYSTOLIC BLOOD PRESSURE: 110 MMHG | WEIGHT: 148 LBS | HEIGHT: 71 IN | DIASTOLIC BLOOD PRESSURE: 58 MMHG | BODY MASS INDEX: 20.72 KG/M2

## 2022-09-08 DIAGNOSIS — I10 PRIMARY HYPERTENSION: Primary | ICD-10-CM

## 2022-09-08 DIAGNOSIS — Z95.0 PACEMAKER: Primary | ICD-10-CM

## 2022-09-08 DIAGNOSIS — E78.01 FAMILIAL HYPERCHOLESTEROLEMIA: ICD-10-CM

## 2022-09-08 DIAGNOSIS — I48.0 PAROXYSMAL ATRIAL FIBRILLATION (HCC): ICD-10-CM

## 2022-09-08 DIAGNOSIS — Z95.0 PACEMAKER: ICD-10-CM

## 2022-09-08 PROCEDURE — G8427 DOCREV CUR MEDS BY ELIG CLIN: HCPCS | Performed by: NUCLEAR MEDICINE

## 2022-09-08 PROCEDURE — 99214 OFFICE O/P EST MOD 30 MIN: CPT | Performed by: NUCLEAR MEDICINE

## 2022-09-08 PROCEDURE — 3017F COLORECTAL CA SCREEN DOC REV: CPT | Performed by: NUCLEAR MEDICINE

## 2022-09-08 PROCEDURE — G8420 CALC BMI NORM PARAMETERS: HCPCS | Performed by: NUCLEAR MEDICINE

## 2022-09-08 PROCEDURE — 1036F TOBACCO NON-USER: CPT | Performed by: NUCLEAR MEDICINE

## 2022-09-08 PROCEDURE — 93280 PM DEVICE PROGR EVAL DUAL: CPT | Performed by: NUCLEAR MEDICINE

## 2022-09-08 PROCEDURE — 1123F ACP DISCUSS/DSCN MKR DOCD: CPT | Performed by: NUCLEAR MEDICINE

## 2022-09-08 RX ORDER — HYDRALAZINE HYDROCHLORIDE 25 MG/1
TABLET, FILM COATED ORAL
Qty: 270 TABLET | Refills: 0 | Status: SHIPPED | OUTPATIENT
Start: 2022-09-08

## 2022-09-08 RX ORDER — ISOSORBIDE MONONITRATE 60 MG/1
60 TABLET, EXTENDED RELEASE ORAL DAILY
Qty: 90 TABLET | Refills: 3 | Status: SHIPPED | OUTPATIENT
Start: 2022-09-08

## 2022-09-08 RX ORDER — ATORVASTATIN CALCIUM 80 MG/1
80 TABLET, FILM COATED ORAL DAILY
Qty: 90 TABLET | Refills: 3 | Status: SHIPPED | OUTPATIENT
Start: 2022-09-08

## 2022-09-08 RX ORDER — AMLODIPINE BESYLATE 10 MG/1
10 TABLET ORAL DAILY
Qty: 90 TABLET | Refills: 3 | Status: SHIPPED | OUTPATIENT
Start: 2022-09-08 | End: 2022-10-19 | Stop reason: ALTCHOICE

## 2022-09-08 RX ORDER — INSULIN ASPART 100 [IU]/ML
INJECTION, SOLUTION INTRAVENOUS; SUBCUTANEOUS
COMMUNITY
Start: 2022-07-16

## 2022-09-08 RX ORDER — NITROGLYCERIN 0.4 MG/1
0.4 TABLET SUBLINGUAL EVERY 5 MIN PRN
Qty: 25 TABLET | Refills: 3 | Status: SHIPPED | OUTPATIENT
Start: 2022-09-08

## 2022-09-08 RX ORDER — CLOPIDOGREL BISULFATE 75 MG/1
75 TABLET ORAL DAILY
Qty: 90 TABLET | Refills: 3 | Status: SHIPPED | OUTPATIENT
Start: 2022-09-08

## 2022-09-08 RX ORDER — SOTALOL HYDROCHLORIDE 80 MG/1
80 TABLET ORAL 2 TIMES DAILY
Qty: 180 TABLET | Refills: 3 | Status: SHIPPED | OUTPATIENT
Start: 2022-09-08 | End: 2022-10-19 | Stop reason: DRUGHIGH

## 2022-09-08 RX ORDER — IRBESARTAN 300 MG/1
TABLET ORAL
Qty: 90 TABLET | Refills: 3 | Status: SHIPPED | OUTPATIENT
Start: 2022-09-08

## 2022-09-08 RX ORDER — BUPROPION HYDROCHLORIDE 150 MG/1
TABLET ORAL
COMMUNITY
Start: 2022-07-18

## 2022-09-08 NOTE — PROGRESS NOTES
[address] [facility] [place]  [email or web address]  [address]  [facility] [address]  Dept: [phone]  Dept Fax: [phone]  Loc: [phone]    Visit Date: 2022    Heriberto Zamora is a 69 y.o. male who presents todayfor:  Chief Complaint   Patient presents with    Check-Up    Coronary Artery Disease    Hypertension    Atrial Fibrillation    Hyperlipidemia     Having issues falling   Dizziness  Balance issues  Was thought to be BP related  Meds adjusted   Still having the symptoms  Know stents   Known pacer and A fib   A fib is PAF  Some chest pain at times  Intermittent in nature  More like a heart burn   On statins   BP seems to be stable now   Does have carotid disease   Moderate to severe   HPI:  HPI  Past Medical History:   Diagnosis Date    Alcoholism ([facility] [address]      quit in the     Alopecia     Angina at rest [facility])     CAD (coronary artery disease)      self , stents     Depression     Esophageal cancer (HCC)     GERD (gastroesophageal reflux disease)     Hyperlipidemia     self and family    Hypertension     self and family    Hypothyroidism     family    Kidney stones     medtronic dual pacer  2021    Migraines     self and family    Osteoarthritis     self and family (in back)    PVD (peripheral vascular disease) ([facility] [address]     Type II diabetes mellitus ([facility] [address]     self and family      Past Surgical History:   Procedure Laterality Date    CARDIAC CATHETERIZATION      8 stents    CHOLECYSTECTOMY      CORONARY ANGIOPLASTY  10/8/15    DIAGNOSTIC CARDIAC CATH LAB PROCEDURE      ESOPHAGECTOMY       Family History   Problem Relation Age of Onset    Heart Disease Mother     Diabetes Mother     Heart Disease Father     Heart Attack Father     Early Death Father 46         at 46    Cancer Sister     Cancer Brother     Early Death Brother 52         52     Social History     Tobacco Use    Smoking status: Former     Packs/day: 0.25     Years: 20.00     Pack years: 5.00     Types: Cigars, Cigarettes     Quit date: 4/7/2012     Years since quitting: 10.4    Smokeless tobacco: Never   Substance Use Topics    Alcohol use: No      Current Outpatient Medications   Medication Sig Dispense Refill    NOVOLOG FLEXPEN 100 UNIT/ML injection pen Inject into the skin 3 times daily (before meals) Sliding scale      buPROPion (WELLBUTRIN XL) 150 MG extended release tablet TAKE 1 TABLET BY MOUTH ONCE DAILY      hydrALAZINE (APRESOLINE) 25 MG tablet TAKE 1 TABLET BY MOUTH THREE TIMES DAILY 90 tablet 0    apixaban (ELIQUIS) 5 MG TABS tablet Take 1 tablet by mouth 2 times daily 180 tablet 1    hydroCHLOROthiazide (HYDRODIURIL) 12.5 MG tablet TAKE 1/2 (ONE-HALF) TABLET BY MOUTH ONCE DAILY      irbesartan (AVAPRO) 300 MG tablet TAKE 1 TABLET BY MOUTH ONCE DAILY      Fluticasone Propionate (FLONASE NA) by Nasal route      isosorbide mononitrate (IMDUR) 60 MG extended release tablet Take 1 tablet by mouth daily 90 tablet 3    Multiple Vitamins-Minerals (THERAPEUTIC MULTIVITAMIN-MINERALS) tablet Take 1 tablet by mouth daily      amLODIPine (NORVASC) 10 MG tablet Take 1 tablet by mouth daily 90 tablet 3    clopidogrel (PLAVIX) 75 MG tablet Take 1 tablet by mouth daily 90 tablet 3    sotalol (BETAPACE) 80 MG tablet Take 1 tablet by mouth 2 times daily 180 tablet 3    atorvastatin (LIPITOR) 80 MG tablet Take 1 tablet by mouth daily 90 tablet 3    nitroGLYCERIN (NITROSTAT) 0.4 MG SL tablet Place 1 tablet under the tongue every 5 minutes as needed for Chest pain 25 tablet 3    sucralfate (CARAFATE) 1 GM tablet Take 1 g by mouth 4 times daily      DULoxetine (CYMBALTA) 30 MG extended release capsule Take 60 mg by mouth 2 times daily       pantoprazole (PROTONIX) 40 MG tablet Take 40 mg by mouth 2 times daily      Coenzyme Q10 (CO Q-10) 200 MG CAPS Take 1 capsule by mouth daily       insulin detemir (LEVEMIR FLEXPEN) 100 UNIT/ML injection Inject 24 Units into the skin 2 times daily.  (Patient taking differently: Inject 28 Units into the skin 2 times daily 25 qhs) 1 Pen 3     No current facility-administered medications for this visit. Allergies   Allergen Reactions    Erythromycin Nausea And Vomiting     Health Maintenance   Topic Date Due    Annual Wellness Visit (AWV)  Never done    Diabetic foot exam  Never done    A1C test (Diabetic or Prediabetic)  Never done    Depression Screen  Never done    Diabetic microalbuminuria test  Never done    Diabetic retinal exam  Never done    DTaP/Tdap/Td vaccine (1 - Tdap) Never done    Colorectal Cancer Screen  Never done    Lipids  10/12/2018    Pneumococcal 65+ years Vaccine (2 - PCV) 10/04/2020    COVID-19 Vaccine (4 - Booster for Pfizer series) 03/12/2022    Flu vaccine (1) 09/01/2022    Shingles vaccine  Completed    AAA screen  Completed    Hepatitis C screen  Completed    Hepatitis A vaccine  Aged Out    Hib vaccine  Aged Out    Meningococcal (ACWY) vaccine  Aged Out       Subjective:  Review of Systems  General:   No fever, no chills, some fatigue or weight loss  Pulmonary:    Some more dyspnea, no wheezing  Cardiac:    Did have recent chest pain,   GI:     No nausea or vomiting, no abdominal pain  Neuro:    Recurrent  dizziness or light headedness,   Musculoskeletal:  No recent active issues  Extremities:   No edema, no obvious claudication     Objective:  Physical Exam  BP (!) 110/58   Pulse 64   Ht 5' 11\" (1.803 m)   Wt 148 lb (67.1 kg)   BMI 20.64 kg/m²   General:   Well developed, well nourished  Lungs:   Clear to auscultation  Heart:    Normal S1 S2, Slight murmur. no rubs, no gallops  Abdomen:   Soft, non tender, no organomegalies, positive bowel sounds  Extremities:   No edema, no cyanosis, good peripheral pulses  Neurological:   Awake, alert, oriented. No obvious focal deficits  Musculoskelatal:  No obvious deformities    Assessment:      Diagnosis Orders   1. Primary hypertension        2. Familial hypercholesterolemia        3.  Paroxysmal atrial fibrillation ([facility] [address]        4. Pacemaker        As above  Possible orthostatic vs neurological   Does have carotid disease      Plan:  No follow-ups on file. As above  Refer to neurology   Consider a tilt if he can   Continue risk factor modification and medical management  Thank you for allowing me to participate in the care of your patient. Please don't hesitate to contact me regarding any further issues related to the patient care    Orders Placed:  No orders of the defined types were placed in this encounter. Medications Prescribed:  No orders of the defined types were placed in this encounter. Discussed use, benefit, and side effects of prescribed medications. All patient questions answered. Pt voicedunderstanding. Instructed to continue current medications, diet and exercise. Continue risk factor modification and medical management. Patient agreed with treatment plan. Follow up as directed.     Electronically signedby Ashley Estrada MD on 9/8/2022 at 1:46 PM

## 2022-09-08 NOTE — PROGRESS NOTES
Dr Mee Pulido pt   Pt here to see dr Mee Pulido today   Medtronic dual pacer check in office / has [facility]       Battery 10.6 yrs remaining    Known afib/ eliquis  Afib burden 0.2%  Longest afib episode with an ekg attached 9/7/22 for 2 minutes     Aair<=>dddr   A paced 92.2%  V paced 2.8%    Presents in apvs 86  Underlying asvs 30's    P waves 1.3  Rv waves 17.4    Atrial impedence 494  Vent impedence 627    Atrial threshold 0.75 @ 0.4  Vent threshold 1 @ 0.4    Atrial amplitude 1.5 @ 0.4  Vent amplitude 2 @ 0.4

## 2022-10-19 ENCOUNTER — HOSPITAL ENCOUNTER (OUTPATIENT)
Dept: NON INVASIVE DIAGNOSTICS | Age: 67
Discharge: HOME | End: 2022-10-19
Payer: MEDICARE

## 2022-10-19 ENCOUNTER — TELEPHONE (OUTPATIENT)
Dept: CARDIOLOGY CLINIC | Age: 67
End: 2022-10-19

## 2022-10-19 DIAGNOSIS — I48.0 PAROXYSMAL ATRIAL FIBRILLATION (HCC): ICD-10-CM

## 2022-10-19 DIAGNOSIS — E78.01 FAMILIAL HYPERCHOLESTEROLEMIA: ICD-10-CM

## 2022-10-19 DIAGNOSIS — I48.0 PAROXYSMAL ATRIAL FIBRILLATION (HCC): Primary | ICD-10-CM

## 2022-10-19 DIAGNOSIS — Z95.0 PACEMAKER: ICD-10-CM

## 2022-10-19 DIAGNOSIS — I10 PRIMARY HYPERTENSION: ICD-10-CM

## 2022-10-19 PROCEDURE — 93660 TILT TABLE EVALUATION: CPT | Performed by: NUCLEAR MEDICINE

## 2022-10-19 RX ORDER — SOTALOL HYDROCHLORIDE 120 MG/1
120 TABLET ORAL 2 TIMES DAILY
COMMUNITY
End: 2022-10-19 | Stop reason: SDUPTHER

## 2022-10-19 RX ORDER — SOTALOL HYDROCHLORIDE 120 MG/1
120 TABLET ORAL 2 TIMES DAILY
Qty: 180 TABLET | Refills: 1 | Status: SHIPPED | OUTPATIENT
Start: 2022-10-19

## 2022-10-19 RX ORDER — SOTALOL HYDROCHLORIDE 120 MG/1
120 TABLET ORAL 2 TIMES DAILY
Qty: 60 TABLET | Refills: 11 | Status: CANCELLED | OUTPATIENT
Start: 2022-10-19

## 2022-10-19 NOTE — TELEPHONE ENCOUNTER
Per Dr. Fartun Bob verbal order stop amlodipine. Increase Sotalol to 120 mg BID. Referral to Dr. Misti Matthews for uncontrolled AFIB. Have patient come to office for 12 lead EKG after being on Sotalol for a few days. Spoke to patient's wife Carlene Mustafa. Orders above given to kin. Carlene Mustafa scheduled EKG nurse visit for Monday 10-24-22. Referral given to Dr. Misti Matthews office. Script pended in refill encounter.

## 2022-10-20 NOTE — PROCEDURES
[address] [place] [address]                                TILT TABLE TEST    PATIENT NAME: July Carrington                  :        1955  MED REC NO:   [id number]                           ROOM:  ACCOUNT NO:   [de-identified]                           ADMIT DATE: 10/19/2022  PROVIDER:     Linus Etienne M.D.    Alice Shell STUDY:  10/19/2022    CLINICAL HISTORY AND INDICATION:  This is a gentleman with symptoms of  dizziness. TILT TABLE TEST DESCRIPTION AND FINDINGS:  Baseline EKG showed sinus  rhythm alternating with AFib. Baseline blood pressure was 117/92. Baseline heart rate was 90 beats per minute. The patient was tilted for  a total of 30 minutes. Tilt was associated with no major symptoms,  however, blood pressure was noted to be relatively on the low side  fluctuating between high 12O systolic to 864A. Heart rate was ranging  between 100 to 130 with frequent paroxysmal atrial fibrillation. CONCLUSION:  1. Tilt table test with no acute changes, however, the patient was  noted to have relatively low blood pressure with tachycardia and  uncontrolled AFib. 2.  No complication. RECOMMENDATION:  At this point, we will cut down on the patient's  amlodipine, increase the dose of sotalol. Follow closely and consider  referral for possible EP evaluation. Thank you for allowing me to participate in the care of this patient.         Romana Rich, M.D.    D: 10/19/2022 14:57:25       T: 10/19/2022 15:10:58     [email or web address]  Job#: [id number]     Doc#: [id number]    CC:

## 2022-10-24 ENCOUNTER — PROCEDURE VISIT (OUTPATIENT)
Dept: CARDIOLOGY CLINIC | Age: 67
End: 2022-10-24
Payer: MEDICARE

## 2022-10-24 VITALS — SYSTOLIC BLOOD PRESSURE: 118 MMHG | DIASTOLIC BLOOD PRESSURE: 60 MMHG

## 2022-10-24 DIAGNOSIS — I48.0 PAROXYSMAL ATRIAL FIBRILLATION (HCC): Primary | ICD-10-CM

## 2022-10-24 PROCEDURE — 93000 ELECTROCARDIOGRAM COMPLETE: CPT | Performed by: NUCLEAR MEDICINE

## 2022-10-24 NOTE — PROGRESS NOTES
NURSE/MA visit for EKG due to increasing Sotalol dose. Patient states dizziness has improved. Dr. Sergio German verbal order keep medications the same. Order 30 day event monitor. Follow up six weeks after event monitor. Patient notified and voiced understanding. Patient taken to scheduling.

## 2022-11-01 ENCOUNTER — HOSPITAL ENCOUNTER (OUTPATIENT)
Dept: NON INVASIVE DIAGNOSTICS | Age: 67
Discharge: HOME | End: 2022-11-01
Payer: MEDICARE

## 2022-11-01 DIAGNOSIS — I48.0 PAROXYSMAL ATRIAL FIBRILLATION (HCC): ICD-10-CM

## 2022-11-01 PROCEDURE — 93270 REMOTE 30 DAY ECG REV/REPORT: CPT

## 2022-11-01 NOTE — PROCEDURES
30 Day Cardiac Event Monitor was applied to patient. Instructions were given and skin/monitor prep and application was demonstrated. Patient was instructed to remove monitor on 12/1 and mail back to [employer].

## 2022-11-17 ENCOUNTER — INITIAL CONSULT (OUTPATIENT)
Dept: NEUROLOGY | Age: 67
End: 2022-11-17
Payer: MEDICARE

## 2022-11-17 ENCOUNTER — NURSE ONLY (OUTPATIENT)
Dept: LAB | Age: 67
End: 2022-11-17

## 2022-11-17 ENCOUNTER — HOSPITAL ENCOUNTER (OUTPATIENT)
Age: 67
Discharge: HOME | End: 2022-11-17

## 2022-11-17 VITALS
HEART RATE: 83 BPM | HEIGHT: 68 IN | OXYGEN SATURATION: 99 % | SYSTOLIC BLOOD PRESSURE: 144 MMHG | DIASTOLIC BLOOD PRESSURE: 78 MMHG | BODY MASS INDEX: 22.58 KG/M2 | WEIGHT: 149 LBS

## 2022-11-17 DIAGNOSIS — I65.21 STENOSIS OF RIGHT CAROTID ARTERY: ICD-10-CM

## 2022-11-17 DIAGNOSIS — R27.8 SENSORY ATAXIA: ICD-10-CM

## 2022-11-17 DIAGNOSIS — R42 DIZZINESS: Primary | ICD-10-CM

## 2022-11-17 DIAGNOSIS — R42 DIZZINESS: ICD-10-CM

## 2022-11-17 DIAGNOSIS — R29.6 FREQUENT FALLS: ICD-10-CM

## 2022-11-17 LAB
FOLATE: > 20 NG/ML (ref 4.8–24.2)
VITAMIN B-12: 1240 PG/ML (ref 211–911)

## 2022-11-17 PROCEDURE — 93294 REM INTERROG EVL PM/LDLS PM: CPT | Performed by: NUCLEAR MEDICINE

## 2022-11-17 PROCEDURE — 99205 OFFICE O/P NEW HI 60 MIN: CPT | Performed by: PSYCHIATRY & NEUROLOGY

## 2022-11-17 PROCEDURE — 3074F SYST BP LT 130 MM HG: CPT | Performed by: PSYCHIATRY & NEUROLOGY

## 2022-11-17 PROCEDURE — 3017F COLORECTAL CA SCREEN DOC REV: CPT | Performed by: PSYCHIATRY & NEUROLOGY

## 2022-11-17 PROCEDURE — 93296 REM INTERROG EVL PM/IDS: CPT | Performed by: NUCLEAR MEDICINE

## 2022-11-17 PROCEDURE — 3078F DIAST BP <80 MM HG: CPT | Performed by: PSYCHIATRY & NEUROLOGY

## 2022-11-17 PROCEDURE — G8427 DOCREV CUR MEDS BY ELIG CLIN: HCPCS | Performed by: PSYCHIATRY & NEUROLOGY

## 2022-11-17 PROCEDURE — G8420 CALC BMI NORM PARAMETERS: HCPCS | Performed by: PSYCHIATRY & NEUROLOGY

## 2022-11-17 PROCEDURE — G8484 FLU IMMUNIZE NO ADMIN: HCPCS | Performed by: PSYCHIATRY & NEUROLOGY

## 2022-11-17 PROCEDURE — 1036F TOBACCO NON-USER: CPT | Performed by: PSYCHIATRY & NEUROLOGY

## 2022-11-17 PROCEDURE — 1123F ACP DISCUSS/DSCN MKR DOCD: CPT | Performed by: PSYCHIATRY & NEUROLOGY

## 2022-11-17 RX ORDER — DULOXETIN HYDROCHLORIDE 60 MG/1
CAPSULE, DELAYED RELEASE ORAL
COMMUNITY
Start: 2022-11-07

## 2022-11-17 NOTE — PATIENT INSTRUCTIONS
MRI brain WO contrast  CTA head and neck W/WO contrast  You need to use your cane at all times  Continue following with cardiology   Continue with 30 day cardiac event monitor   Vitamin B12, folate  Vitamin B6 level  Call with any new symptoms or concerns. Follow up in 4 weeks.

## 2022-11-18 ENCOUNTER — PROCEDURE VISIT (OUTPATIENT)
Dept: CARDIOLOGY CLINIC | Age: 67
End: 2022-11-18
Payer: MEDICARE

## 2022-11-18 DIAGNOSIS — Z95.0 PACEMAKER: Primary | ICD-10-CM

## 2022-11-18 NOTE — PROGRESS NOTES
Dr Shantell Jackman pt  Medtronic dual pacer remote   Battery 10.4 yrs remaining    Aair<=>dddr   A paced 75.9%  V paced 3.8%    P waves 0.9  Rv waves 13.1    Atrial impedence 418  Vent impedence 570    Atrial threshold 0.625 @ 0.4  Vent threshold 0.875 @ 0.4    Atrial amplitude 1.5  @ 0.4  Vent amplitude 2 @ 0.4      Known afib/eliquis   Afib burden 19%      10/1/22  high vent rate episode / lasted :05 seconds looks like it could be nsvt       10/09/22 episode of afib with rvr 353/162 for :08 seconds

## 2022-11-22 LAB — VITAMIN B6: 41.4 NMOL/L (ref 20–125)

## 2022-11-28 NOTE — PROGRESS NOTES
Chief Complaint   Patient presents with    Consultation     NP dizziness, balance issues         Anjali Dukes is a 69 y.o. male who presents today for evaluation of dizziness for the past 1 year that has progressively gotten worse. He describes the dizziness as lightheaded wooziness. When he falls, he can fall in all directions. He experiences the dizziness when changing positions or standing for prolonged periods of time. Tilt table test done 10/19/22 showed Tilt table test with no acute changes, however, the patient was noted to have relatively low blood pressure with tachycardia and uncontrolled AFib. He had some of his medications adjusted by cardiology. He is also is wearing 30 day cardiac event monitor. CTA neck W/WO contrast done 3/25/21 showed Calcified and noncalcified mural plaque at the distal most common carotid arteries extending into the bifurcations with tandem lesion in the proximal right internal carotid artery with approximately 74% stenosis in the right carotid bulb and approximately 41% stenosis in the proximal left internal carotid artery by NASCET criteria. Moderate stenosis at the takeoff of the left common carotid artery and takeoffs of the bilateral subclavian arteries. Severe stenosis at the takeoff of the left vertebral artery. He has been diabetic for the past 27 years, poorly controlled. He denies chest pain. No shortness of breath, no neck pain. No vision changes. No dysphagia. No fever. No rash. No weight loss.  History provided by patient       Past Medical History:   Diagnosis Date    Alcoholism ([address]      quit in the 90    Alopecia     Angina at rest [facility])     CAD (coronary artery disease)      self , stents     Depression     Esophageal cancer (HCC)     GERD (gastroesophageal reflux disease)     Hyperlipidemia     self and family    Hypertension     self and family    Hypothyroidism     family    Kidney stones     medtronic dual pacer  4/20/2021    Migraines     self and family Osteoarthritis     self and family (in back)    PVD (peripheral vascular disease) ([facility]     Type II diabetes mellitus ([facility]     self and family       Patient Active Problem List   Diagnosis    Ischemia, bowel ([facility]    Uncontrolled hypertension    Diabetes mellitus ([facility])    CAD (coronary artery disease)    AF (atrial fibrillation) ([facility]    Gastroenteritis    Diabetes mellitus type II, uncontrolled    Peptic ulcer disease    History of esophageal cancer    Hypothyroidism    Palpitation    Chest pain    PVD (peripheral vascular disease) ([facility])    Angina pectoris ([facility])    Abnormal nuclear stress test    medtronic dual pacer        Allergies   Allergen Reactions    Erythromycin Nausea And Vomiting       Current Outpatient Medications   Medication Sig Dispense Refill    DULoxetine (CYMBALTA) 60 MG extended release capsule TAKE 1 CAPSULE BY MOUTH TWICE DAILY      sotalol (BETAPACE) 120 MG tablet Take 1 tablet by mouth 2 times daily 180 tablet 1    Glucagon, rDNA, (GLUCAGON EMERGENCY) 1 MG KIT Use as directed for hypoglycemia 1 kit 0    NOVOLOG FLEXPEN 100 UNIT/ML injection pen Inject into the skin 3 times daily (before meals) Sliding scale      buPROPion (WELLBUTRIN XL) 150 MG extended release tablet TAKE 1 TABLET BY MOUTH ONCE DAILY      apixaban (ELIQUIS) 5 MG TABS tablet Take 1 tablet by mouth 2 times daily 180 tablet 3    atorvastatin (LIPITOR) 80 MG tablet Take 1 tablet by mouth daily 90 tablet 3    hydrALAZINE (APRESOLINE) 25 MG tablet TAKE 1 TABLET BY MOUTH THREE TIMES DAILY 270 tablet 0    irbesartan (AVAPRO) 300 MG tablet TAKE 1 TABLET BY MOUTH ONCE DAILY 90 tablet 3    isosorbide mononitrate (IMDUR) 60 MG extended release tablet Take 1 tablet by mouth daily 90 tablet 3    nitroGLYCERIN (NITROSTAT) 0.4 MG SL tablet Place 1 tablet under the tongue every 5 minutes as needed for Chest pain 25 tablet 3    hydroCHLOROthiazide (HYDRODIURIL) 12.5 MG tablet TAKE 1/2 (ONE-HALF) TABLET BY MOUTH ONCE DAILY      Fluticasone Propionate (FLONASE NA) by Nasal route      Multiple Vitamins-Minerals (THERAPEUTIC MULTIVITAMIN-MINERALS) tablet Take 1 tablet by mouth daily      sucralfate (CARAFATE) 1 GM tablet Take 1 g by mouth 4 times daily      DULoxetine (CYMBALTA) 30 MG extended release capsule Take 60 mg by mouth 2 times daily       pantoprazole (PROTONIX) 40 MG tablet Take 40 mg by mouth 2 times daily      Coenzyme Q10 (CO Q-10) 200 MG CAPS Take 1 capsule by mouth daily       insulin detemir (LEVEMIR FLEXPEN) 100 UNIT/ML injection Inject 24 Units into the skin 2 times daily. (Patient taking differently: Inject 28 Units into the skin 2 times daily 25 qhs) 1 Pen 3    clopidogrel (PLAVIX) 75 MG tablet Take 1 tablet by mouth daily (Patient not taking: Reported on 2022) 90 tablet 3     No current facility-administered medications for this visit. Social History     Socioeconomic History    Marital status:      Spouse name: None    Number of children: None    Years of education: None    Highest education level: None   Tobacco Use    Smoking status: Former     Packs/day: 1.00     Years: 40.00     Pack years: 40.00     Types: Cigars, Cigarettes     Quit date: 2012     Years since quitting: 10.6    Smokeless tobacco: Never   Vaping Use    Vaping Use: Former   Substance and Sexual Activity    Alcohol use: No    Drug use: No    Sexual activity: Yes     Partners: Female       Family History   Problem Relation Age of Onset    Heart Disease Mother     Diabetes Mother     Heart Disease Father     Heart Attack Father     Early Death Father 46         at 46    Cancer Sister     Cancer Brother     Early Death Brother 52         52         I reviewed the past medical history, allergies, medications, social history and family history.    Review of Systems   All systems reviewed, and are all negative, except what is mentioned in HPI      Vitals:    22 1349   BP: (!) 144/78   Site: Right Upper Arm   Position: Sitting   Cuff Size: Medium Adult   Pulse: 83   SpO2: 99%   Weight: 149 lb (67.6 kg)   Height: 5' 8\" (1.727 m)       Physical Examination:  General appearance - alert, well appearing, and in no distress, oriented to person, place, and time and normal weight  Mental status- Level of Alertness: awake  Orientation: person, place, time  Memory: normal  Fund of Knowledge: normal  Attention/Concentration: normal  Language: normal. Mood is normal.   Neck - supple, no significant adenopathy, carotids upstroke normal bilaterally. There is no axillary lymphadenopathy. There is no carotid bruit. No neck lymphadenopathy. No thyroid enlargement   Neurological -   Cranial [id number]-[id number]-SCC:.   Cranial nerve II: Normal   Cranial nerve III: Pupils: equal, round, reactive to light  Cranial nerves III, IV, VI: Extraocular Movements: intact   Cranial nerve V: Facial sensation: intact   Cranial nerve VII:Facial strength: intact   Cranial nerve VIII: Hearing: intact    Cranial nerve IX: Palate Elevation intact bilaterally  Cranial nerve XI: Shoulder shrug intact bilaterally  Cranial nerve XII: Tongue midline   neck supple without rigidity, there is no limitation of range of motion of the neck. DTR's are brisk distal and symmetric  Babinski sign negative   Motor exam is 5/5 in the bilateral  upper and  -3/5 in bilateral lower extremities. Normal muscle tone. There is no muscle atrophy. Sensory is intact for light touch   Coordination: finger to nose,  intact  Gait and station guarded, uses cane  Abnormal movement none, vibration reduced distally  Skin - warm, dry to touch, normal coloration, no rashes, no suspicious skin lesions  Superficial temporal artery pulses are normal.   There is no limitation of range of motion of the neck. There is no resting tremor, no pin rolling, no bradykinesia, no Hypohonia, normal blink rate. Musculoskeletal: Has no hand arthritis, no limitation of ROM in any of the four extremities. There is no leg edema.    The Heart was regular in rate and rhythm. No heart murmur  Chest Clear, with  good effort. Abdomen soft, intact bowel sounds. Tilt table test done 10/19/22:  TILT TABLE TEST DESCRIPTION AND FINDINGS:  Baseline EKG showed sinus  rhythm alternating with AFib. Baseline blood pressure was 117/92. Baseline heart rate was 90 beats per minute. The patient was tilted for  a total of 30 minutes. Tilt was associated with no major symptoms,  however, blood pressure was noted to be relatively on the low side  fluctuating between high 52T systolic to 486N. Heart rate was ranging  between 100 to 130 with frequent paroxysmal atrial fibrillation. CONCLUSION:  1. Tilt table test with no acute changes, however, the patient was  noted to have relatively low blood pressure with tachycardia and  uncontrolled AFib. 2.  No complication. RECOMMENDATION:  At this point, we will cut down on the patient's  amlodipine, increase the dose of sotalol. Follow closely and consider  referral for possible EP evaluation. Thank you for allowing me to participate in the care of this patient. Colin Michelle M.D. Results for orders placed during the hospital encounter of 03/25/21    CTA NECK W WO CONTRAST    Narrative  PROCEDURE: CTA NECK W WO CONTRAST    CLINICAL INFORMATION: Abnormal carotid ultrasound, Bruit, Coronary artery disease involving native coronary artery of native heart without angina pectoris, Atrial fibrillation, unspecified type ([address] Familial hypercholesterolemia, Essential  hypertension. Intermittent vertigo. History of esophageal cancer with prior esophagectomy and neoesophagus. COMPARISON: Carotid ultrasound dated 3/16/2021. TECHNIQUE: 1 mm axial images were obtained through the neck during fast bolus administration of 90 mL Isovue-370 injected in the right AC. A noncontrast localizer was obtained. 3-D reconstructions were performed on a dedicated 3-D workstation.  These  include multiplanar MPR images, multiplanar MIP images and centerline reconstructions. All CT scans at this facility use dose modulation, iterative reconstruction, and/or weight-based dosing when appropriate to reduce radiation dose to as low as reasonably achievable. FINDINGS:  There is a typical 3 vessel arch. There is calcified and noncalcified mural plaque in the great vessels with moderate stenosis at the takeoffs of the bilateral subclavian arteries and moderate stenosis at the takeoff of the left common carotid artery. There is no flow-limiting stenosis in the brachiocephalic artery. There are areas of mild stenosis in the mid common carotid arteries. There is prominent calcified and noncalcified mural plaque at the distal common carotid arteries extending into the  bifurcations with severe stenosis at the distal most common carotid artery in the right and moderate stenosis of the distal most segment of the common carotid artery on the left. The narrowest luminal diameter of the carotid bulb on the right is  approximately 1 mm. There is a tandem lesion in the proximal right internal carotid artery with narrowest luminal diameter approximately 1.5 mm. The point more distal, where the walls are parallel, measures 3.8 mm. On the left side, the narrowest luminal  diameter of the proximal internal carotid artery is 2.7 mm with a point more distal, where the walls are parallel, measuring 4.6 mm. There are areas of mild stenosis in the distal cervical segments of internal carotid arteries bilaterally. Cervical  segments are otherwise patent without focal stenosis. Vertebral arteries are codominant. There is severe stenosis at the takeoff of left vertebral artery. There are focal areas of mild stenosis in the V2 segment of the right vertebral artery. There is  focal area of moderate stenosis in the proximal V4 segment on the right.  There are focal calcifications in the distal V4 segments without flow-limiting stenosis. There are mural calcifications in the petrous, cavernous and clinoid segments of the internal carotid arteries with areas of mild stenosis. There is no aneurysmal dilatation identified in the intracranial segments of internal carotid arteries. The  visualized portions of the middle cerebral and anterior cerebral arteries appear patent. The basilar artery is patent without focal stenosis or visualized aneurysm. The visualized portions of the posterior cerebral arteries are patent. Visualized intracranial contents are unremarkable. The patient is status post bilateral lens extractions. Orbits are otherwise unremarkable. Visualized portions of the paranasal sinuses are clear. Mastoid air cells are clear. There is medialization of  the superior horn of the thyroid cartilage on the right which causes mild asymmetry in the hypopharyngeal mucosa. The focal cords are closely apposed limiting evaluation of this area. However, there appears to be some rotation of the right arytenoid  cartilage with ipsilateral enlargement of the right piriform sinus. Mucosal surfaces of the aerodigestive tract are otherwise symmetric without focal nodular thickening or visualized mass. No cervical lymphadenopathy is identified. The parotid,  submandibular and thyroid glands are unremarkable. The neoesophagus is patulous. Visualized portions of the lungs are clear. There are mild degenerative changes of the cervical spine without suspicious osseous lesion. Impression  1. Calcified and noncalcified mural plaque at the distal most common carotid arteries extending into the bifurcations with tandem lesion in the proximal right internal carotid artery with approximately 74% stenosis in the right carotid bulb and  approximately 41% stenosis in the proximal left internal carotid artery by NASCET criteria. 2. Moderate stenosis at the takeoff of the left common carotid artery and takeoffs of the bilateral subclavian arteries.   3. Severe stenosis at the takeoff of the left vertebral artery. 4. Possible right vocal cord paresis. **This report has been created using voice recognition software. It may contain minor errors which are inherent in voice recognition technology. **    Final report electronically signed by Dr. Diane Mann MD on 3/25/2021 11:54 AM    No results found for this or any previous visit. Results for orders placed during the hospital encounter of 12/16/16    MRI Brain W WO Contrast    Narrative  PROCEDURE: MRI BRAIN W WO CONTRAST    CLINICAL INFORMATION: Hearing loss, sensorineural    COMPARISON: MRI brain, 27 January 2012. TECHNIQUE: Using a 1.5 Felicia Siemens scanner, axial diffusion weighted echoplanar imaging, T2-weighted turbo spin-echo, fat-saturated T2-weighted fluid attenuated inversion recovery, and proton density weighted gradient recall images were obtained of the  entire brain. Pre-and postcontrast (OptiMARK, 15 mL) sagittal 3-D T1-weighted volume acquisition gradient recall images were also obtained, with multiplanar reconstructions provided. Additionally, high-resolution fat-saturated T2-weighted turbo  spin-echo images were obtained of the posterior fossa in the axial plane as well as pre-and postcontrast fat-saturated T1-weighted turbo spin-echo images, centered on the internal auditory canal. Reconstructions from the pre-and postcontrast T1-weighted  gradient recall images were also provided in the coronal plane centered on the internal auditory canals. FINDINGS: The global brain volume is at the lower limits of normal, stable. There are no areas of restricted diffusion. There are no areas of convincing blood products in the brain parenchyma. Minimal amounts of hyperintense T2 abnormalities are again demonstrated in the white matter of both cerebral hemispheres.  These are slightly more numerous, though still within minimal degree severity, and again are most consistent with minimal small  vessel disease sequelae. The cerebral cortical surfaces demonstrate no definite signal abnormality, and have not changed since the prior exam.    Posterior fossa parenchyma also is without definite abnormal signal, though possibly there is a small remote lacunar infarction of the inferior left cerebellum. More likely, this represents tangential imaging artifact, and was also present on the prior  exam, having not appreciably changed. There is also a minimal amount of hyperintense T2 signal in the pontine white matter, also similar to the prior exam, also likely representing minimal small vessel disease sequelae. There are no areas of pathologic contrast enhancement within the brain parenchyma. The internal auditory canals are symmetric. Cerebellopontine angles are widely patent bilaterally. Normal contour of the cisternal seventh and eighth cranial nerves demonstrated on both sides, neither having evidence of associated pathologic enhancement. No pathologic enhancement demonstrated in the internal auditory canal on either side. The middle ear cavities are within normal limits, and the labyrinthine structures also demonstrate normal characteristics. Very minimal amounts of fluid signal  demonstrated in the mastoid air cells bilaterally, nonspecific. No structural abnormality demonstrated that would indicate explanation for right side sensorineural hearing loss. There are normal characteristics of the ventricular system throughout. Remaining extra-axial spaces as well are within normal limits. Vascular structures are grossly satisfactory. Mild cervical spine degenerative changes are implied in the limited detail of the cervical canal, though there is no acute or aggressive finding appreciated. Sella contents are grossly unremarkable. The paranasal sinuses are grossly clear.     Impression  NO EVIDENCE OF ACUTE BRAIN PROCESS; ESSENTIALLY STABLE EXAM DEMONSTRATED, THOUGH THE PREVIOUSLY DEMONSTRATED WHITE MATTER ABNORMALITIES ARE SLIGHTLY MORE CONSPICUOUS, STILL CONSISTENT WITH MINIMAL SMALL VESSEL DISEASE SEQUELAE. NO STRUCTURAL  ABNORMALITY TO SUGGEST EXPLANATION FOR RIGHT-SIDED HEARING LOSS/TINNITUS. OTHER INCIDENTAL FINDINGS AS DISCUSSED. **This report has been created using voice recognition software. It may contain minor errors which are inherent in voice recognition technology. **          Final report electronically signed by Dr. Jose David on 12/16/2016 11:08 PM    No results found for this or any previous visit. No results found for this or any previous visit. We reviewed the patient records from referring provider and available information in the EHR       ASSESSMENT:      Diagnosis Orders   1. Dizziness        2. Frequent falls        3. Sensory ataxia        4. Stenosis of right carotid artery           This is a 64-year-old male who presents with dizziness for the past 1 year that is progressively gotten worse. He describes his dizziness as lightheaded wooziness. He experiences the dizziness when changing positions or standing for prolonged periods of time. Tilt table test done 10/19/22 showed Tilt table test with no acute changes, however, the patient was noted to have relatively low blood pressure with tachycardia and uncontrolled AFib. He had some of his medications adjusted by cardiology. He is also is wearing 30 day cardiac event monitor. I reviewed the CT Brain and agree with interpretation, I also reviewed the patient pertinent labs and records in the EHR and from other providers.    CTA neck W/WO contrast done 3/25/21 showed calcified and noncalcified mural plaque at the distal most common carotid arteries extending into the bifurcations with tandem lesion in the proximal right internal carotid artery with approximately 74% stenosis in the right carotid bulb and approximately 41% stenosis in the proximal left internal carotid artery by NASCET criteria. Moderate stenosis at the takeoff of the left common carotid artery and takeoffs of the bilateral subclavian arteries. Severe stenosis at the takeoff of the left vertebral artery. The patient was counseled about his symptoms and work up recommended, he was also counseled about medication options and side effects. We will arrange for him to undergo an MRI of the brain without contrast to screen for organic cause of his symptoms as well as CTA head and neck with without contrast given his previous history of right carotid artery stenosis. After detailed discussion with patient we agreed on the following plan. Plan    MRI brain WO contrast  CTA head and neck W/WO contrast  You need to use your cane at all times  Continue following with cardiology   Continue with 30 day cardiac event monitor   Vitamin B12, folate  Vitamin B6 level  Call with any new symptoms or concerns. Follow up in 4 weeks.      Total time 64 min    Tiffani Beck MD

## 2022-12-07 ENCOUNTER — TELEPHONE (OUTPATIENT)
Dept: CARDIOLOGY CLINIC | Age: 67
End: 2022-12-07

## 2022-12-07 NOTE — PROCEDURES
[address] [place] [address]                                 EVENT MONITOR    PATIENT NAME: Luciana Turner                  :        1955  MED REC NO:   [id number]                           ROOM:  ACCOUNT NO:   [de-identified]                           ADMIT DATE: 2022  PROVIDER:     Gary Salter M.D.    TEST TYPE:  Event monitor. CLINICAL HISTORY AND INDICATION:  This is a patient with atrial  fibrillation. EVENT MONITOR DESCRIPTION:  Event monitor was attached to the patient  between 2022 and 2022. EVENT MONITOR FINDINGS:  Baseline rhythm showed sinus rhythm. Short  episodes of narrow complex tachycardia were noted, likely  supraventricular tachycardia. Heart rate ranging between 110 and 140  beats per minute. Episodes of wide complex tachycardia were also noted,  cannot exclude nonsustained ventricular tachycardia versus possible SVT  with aberrant conduction. No pauses and no high-grade AV block. CONCLUSIONS:  1. Sinus rhythm. 2.  Intermittent episodes of supraventricular tachycardia with episodes  of wide complex tachycardia as described above. 3.  No pauses. 4.  Abnormal event monitor.         David Duval M.D.    D: 2022 7:17:26       T: 2022 7:19:54     [email or web address]  Job#: [id number]     Doc#: [id number]    CC:

## 2022-12-08 ENCOUNTER — HOSPITAL ENCOUNTER (OUTPATIENT)
Dept: CT IMAGING | Age: 67
Discharge: HOME | End: 2022-12-08
Payer: MEDICARE

## 2022-12-08 DIAGNOSIS — R27.8 SENSORY ATAXIA: ICD-10-CM

## 2022-12-08 DIAGNOSIS — I65.21 STENOSIS OF RIGHT CAROTID ARTERY: ICD-10-CM

## 2022-12-08 DIAGNOSIS — R29.6 FREQUENT FALLS: ICD-10-CM

## 2022-12-08 DIAGNOSIS — R42 DIZZINESS: ICD-10-CM

## 2022-12-08 LAB — POC CREATININE WHOLE BLOOD: 1.2 MG/DL (ref 0.5–1.2)

## 2022-12-08 PROCEDURE — 70498 CT ANGIOGRAPHY NECK: CPT

## 2022-12-08 PROCEDURE — 82565 ASSAY OF CREATININE: CPT

## 2022-12-08 PROCEDURE — 6360000004 HC RX CONTRAST MEDICATION: Performed by: NURSE PRACTITIONER

## 2022-12-08 PROCEDURE — 70496 CT ANGIOGRAPHY HEAD: CPT

## 2022-12-08 RX ADMIN — IOPAMIDOL 80 ML: 755 INJECTION, SOLUTION INTRAVENOUS at 14:24

## 2022-12-09 ENCOUNTER — TELEPHONE (OUTPATIENT)
Dept: NEUROLOGY | Age: 67
End: 2022-12-09

## 2022-12-09 DIAGNOSIS — R29.6 FREQUENT FALLS: ICD-10-CM

## 2022-12-09 DIAGNOSIS — I65.21 STENOSIS OF RIGHT CAROTID ARTERY: ICD-10-CM

## 2022-12-09 DIAGNOSIS — R42 DIZZINESS: Primary | ICD-10-CM

## 2022-12-09 NOTE — TELEPHONE ENCOUNTER
----- Message from JAKY Breaux CNP sent at 12/9/2022  8:13 AM EST -----  Please let patient know his CTA head and neck done showed There is approximately 70% stenosis at the origin of the right internal carotid artery. There is approximately 50%. He needs to be seen by interventional neurology re: the right carotid artery stenosis.   Tasneem Breaux CNP

## 2022-12-12 ENCOUNTER — OFFICE VISIT (OUTPATIENT)
Dept: NEUROLOGY | Age: 67
End: 2022-12-12
Payer: MEDICARE

## 2022-12-12 VITALS
HEIGHT: 68 IN | HEART RATE: 93 BPM | WEIGHT: 150 LBS | SYSTOLIC BLOOD PRESSURE: 110 MMHG | DIASTOLIC BLOOD PRESSURE: 65 MMHG | BODY MASS INDEX: 22.73 KG/M2 | OXYGEN SATURATION: 99 %

## 2022-12-12 DIAGNOSIS — I65.21 STENOSIS OF RIGHT CAROTID ARTERY: ICD-10-CM

## 2022-12-12 DIAGNOSIS — R42 DIZZINESS: Primary | ICD-10-CM

## 2022-12-12 DIAGNOSIS — R29.6 FREQUENT FALLS: ICD-10-CM

## 2022-12-12 DIAGNOSIS — R27.8 SENSORY ATAXIA: ICD-10-CM

## 2022-12-12 PROCEDURE — G8420 CALC BMI NORM PARAMETERS: HCPCS | Performed by: NURSE PRACTITIONER

## 2022-12-12 PROCEDURE — G8427 DOCREV CUR MEDS BY ELIG CLIN: HCPCS | Performed by: NURSE PRACTITIONER

## 2022-12-12 PROCEDURE — 3078F DIAST BP <80 MM HG: CPT | Performed by: NURSE PRACTITIONER

## 2022-12-12 PROCEDURE — 3074F SYST BP LT 130 MM HG: CPT | Performed by: NURSE PRACTITIONER

## 2022-12-12 PROCEDURE — G8484 FLU IMMUNIZE NO ADMIN: HCPCS | Performed by: NURSE PRACTITIONER

## 2022-12-12 PROCEDURE — 3017F COLORECTAL CA SCREEN DOC REV: CPT | Performed by: NURSE PRACTITIONER

## 2022-12-12 PROCEDURE — 1036F TOBACCO NON-USER: CPT | Performed by: NURSE PRACTITIONER

## 2022-12-12 PROCEDURE — 1123F ACP DISCUSS/DSCN MKR DOCD: CPT | Performed by: NURSE PRACTITIONER

## 2022-12-12 PROCEDURE — 99213 OFFICE O/P EST LOW 20 MIN: CPT | Performed by: NURSE PRACTITIONER

## 2022-12-12 NOTE — PROGRESS NOTES
NEUROLOGY OUT PATIENT FOLLOW UP NOTE:  12/12/20222:26 PM    Charles Fuentes is here for follow up for dizziness, frequent falls, sensory ataxia. Patient Active Problem List   Diagnosis    Ischemia, bowel ([facility]    Uncontrolled hypertension    Diabetes mellitus ([facility])    CAD (coronary artery disease)    [facility] (at[facility]) [facility]    Gastroenteritis    Diabetes mellitus type II, uncontrolled    Peptic ulcer disease    History of esophageal cancer    Hypothyroidism    Palpitation    Chest pain    PVD (peripheral vascular disease) ([facility])    Angina pectoris ([facility])    Abnormal nuclear stress test    medtronic dual pacer              ROS:  Respiratory : no cough, no shortness of breath  Cardiac: no chest pain. No palpitations.   Renal : no flank pain, no hematuria    Skin: no rash      Allergies   Allergen Reactions    Erythromycin Nausea And Vomiting       Current Outpatient Medications:     DULoxetine (CYMBALTA) 60 MG extended release capsule, TAKE 1 CAPSULE BY MOUTH TWICE DAILY, Disp: , Rfl:     sotalol (BETAPACE) 120 MG tablet, Take 1 tablet by mouth 2 times daily, Disp: 180 tablet, Rfl: 1    NOVOLOG FLEXPEN 100 UNIT/ML injection pen, Inject into the skin 3 times daily (before meals) Sliding scale, Disp: , Rfl:     buPROPion (WELLBUTRIN XL) 150 MG extended release tablet, TAKE 1 TABLET BY MOUTH ONCE DAILY, Disp: , Rfl:     apixaban (ELIQUIS) 5 MG TABS tablet, Take 1 tablet by mouth 2 times daily, Disp: 180 tablet, Rfl: 3    atorvastatin (LIPITOR) 80 MG tablet, Take 1 tablet by mouth daily, Disp: 90 tablet, Rfl: 3    clopidogrel (PLAVIX) 75 MG tablet, Take 1 tablet by mouth daily, Disp: 90 tablet, Rfl: 3    hydrALAZINE (APRESOLINE) 25 MG tablet, TAKE 1 TABLET BY MOUTH THREE TIMES DAILY, Disp: 270 tablet, Rfl: 0    irbesartan (AVAPRO) 300 MG tablet, TAKE 1 TABLET BY MOUTH ONCE DAILY, Disp: 90 tablet, Rfl: 3    isosorbide mononitrate (IMDUR) 60 MG extended release tablet, Take 1 tablet by mouth daily, Disp: 90 tablet, Rfl: 3    nitroGLYCERIN (NITROSTAT) 0.4 MG SL tablet, Place 1 tablet under the tongue every 5 minutes as needed for Chest pain, Disp: 25 tablet, Rfl: 3    hydroCHLOROthiazide (HYDRODIURIL) 12.5 MG tablet, TAKE 1/2 (ONE-HALF) TABLET BY MOUTH ONCE DAILY, Disp: , Rfl:     Fluticasone Propionate (FLONASE NA), by Nasal route, Disp: , Rfl:     Multiple Vitamins-Minerals (THERAPEUTIC MULTIVITAMIN-MINERALS) tablet, Take 1 tablet by mouth daily, Disp: , Rfl:     sucralfate (CARAFATE) 1 GM tablet, Take 1 g by mouth 4 times daily, Disp: , Rfl:     pantoprazole (PROTONIX) 40 MG tablet, Take 40 mg by mouth 2 times daily, Disp: , Rfl:     Coenzyme Q10 (CO Q-10) 200 MG CAPS, Take 1 capsule by mouth daily , Disp: , Rfl:     insulin detemir (LEVEMIR FLEXPEN) 100 UNIT/ML injection, Inject 24 Units into the skin 2 times daily. (Patient taking differently: Inject 28 Units into the skin 2 times daily 25 qhs), Disp: 1 Pen, Rfl: 3    Glucagon, rDNA, (GLUCAGON EMERGENCY) 1 MG KIT, Use as directed for hypoglycemia (Patient not taking: Reported on 12/12/2022), Disp: 1 kit, Rfl: 0    DULoxetine (CYMBALTA) 30 MG extended release capsule, Take 60 mg by mouth 2 times daily  (Patient not taking: Reported on 12/12/2022), Disp: , Rfl:     I reviewed the past medical history, allergies, medications, social history and family history. PE:   Vitals:    12/12/22 1412   BP: 110/65   Site: Left Upper Arm   Position: Sitting   Cuff Size: Medium Adult   Pulse: 93   SpO2: 99%   Weight: 150 lb (68 kg)   Height: 5' 8\" (1.727 m)     General Appearance:  awake, alert, oriented, in no acute distress  Gen: NAD, Language is Intact. Skin: no rash, lesion, dry  to touch. warm  Head: no rash, no icterus  Neck: There is no carotid bruits. The Neck is supple. There is no neck lymphadenopathy. Neuro: CN 2-12 grossly intact with no focal deficits. Power 5/5 in the bilateral  upper and  -3/5 in bilateral lower extremities, Reflexes are +2 symmetric.  Long tracts are reduced distally. Cerebellar exam is Intact. Sensory exam is intact to light touch. Gait is guarded, uses cane  Musculoskeletal:  Has no hand arthritis, no limitation of ROM in any of the four extremities,. Lower extremities no edema          DATA:      Results for orders placed or performed during the hospital encounter of 12/08/22   POCT Creatinine   Result Value Ref Range    POC CREATININE WHOLE BLOOD 1.2 0.5 - 1.2 mg/dl          No results found for this or any previous visit. No results found for this or any previous visit. Results for orders placed during the hospital encounter of 12/08/22    CTA HEAD W WO CONTRAST    Narrative  PROCEDURE: CTA NECK W WO CONTRAST, CTA HEAD W WO CONTRAST    CLINICAL INFORMATION: Dizziness, Frequent falls, Sensory ataxia, Stenosis of right carotid artery. COMPARISON: Carotid ultrasound dated 4/14/2022. CTA neck dated 3/25/2021. .    TECHNIQUE: 1 mm axial images were obtained through the head and neck after the fast bolus administration of contrast. A noncontrast localizer was obtained. 3-D reconstructions were performed on a dedicated 3-D workstation. These include multiplanar MPR  images and multiplanar MIP images. Centerline reconstructions were obtained of the carotid systems. Isovue intravenous contrast was given. All carotid artery measurements are performed utilizing Nascet criteria. All CT scans at this facility use dose modulation, iterative reconstruction, and/or weight-based dosing when appropriate to reduce radiation dose to as low as reasonably achievable. FINDINGS:      CTA NECK:    Aortic arch and branches: There is atherosclerotic calcification in the aortic arch at the origin of the brachiocephalic, left common carotid and left subclavian arteries. Right common carotid artery/ICA: There is calcified plaque involving the right common carotid artery, carotid bifurcation and origin of the right internal carotid artery.  There is approximately 70% stenosis in the proximal right internal carotid artery  and 50% stenosis in the distal right common carotid artery. Left common carotid artery/ICA: There is calcified plaque involving the left common carotid artery, left carotid bifurcation and origin of the left internal carotid artery. There is approximately 50% stenosis in the proximal left internal carotid artery. There is no significant hemodynamic stenosis in the left common carotid artery. Vertebral arteries: There is antegrade flow in the right and left vertebral arteries        CTA HEAD:      Internal carotid arteries: Atherosclerotic calcification in the cavernous segments of both internal carotid arteries. No evidence of severe stenosis. .    Middle cerebral arteries: Antegrade flow in the middle cerebral arteries bilaterally. Anterior cerebral arteries: Antegrade flow in the anterior cerebral arteries bilaterally. There is a normal small anterior communicating artery. Vertebral arteries: There is calcification in both distal vertebral arteries. There is antegrade flow in the vertebral arteries bilaterally    Basilar artery: Antegrade flow in the basilar artery. Superior cerebellar arteries: Antegrade flow in the right and left superior cerebellar arteries. .    Posterior cerebral arteries: This a patent posterior communicating artery which helps supply the right posterior cerebral artery. There is antegrade flow in the left posterior cerebral artery. No aneurysms, stenoses or occlusions are noted. The superior sagittal sinus, vein of Nacho, internal cerebral veins, straight sinus, transverse sinuses and sigmoid sinuses are patent. Axial source data: There is a pacemaker in place. There are postoperative changes in the left supraclavicular region. There is evidence for old granulomatous disease in the right upper lobe. Impression  1.  Stable CTA of the head and neck, no interval change since previous study dated 25 March 2021.  2. Calcified plaque involving the right common carotid artery, carotid bifurcation and origin of the right internal carotid artery. There is approximately 70% stenosis at the origin of the right internal carotid artery. There is approximately 50%  stenosis in the distal right common carotid artery. 3. Calcified plaque involving the left common carotid artery, carotid bifurcation and origin of the left internal carotid artery. There is approximately 50% stenosis at the origin of the left internal carotid artery. Is no hemodynamic stenosis in the  left common carotid artery. 4. Atherosclerotic calcification in the aortic arch, at the origins of the brachiocephalic, left common carotid and left subclavian arteries. 5. Atherosclerotic calcification in the cavernous segments of both internal carotid arteries. 6. Atherosclerotic calcification in the distal vertebral arteries. Antegrade flow in the right and left vertebral arteries. 7. There is a patent posterior communicating artery which helps supply the right posterior cerebral artery. 8. Otherwise negative CTA of the head and neck. 9. Pacemaker in place. An. Old granulomatous disease in the right upper lobe of the lung. **This report has been created using voice recognition software. It may contain minor errors which are inherent in voice recognition technology. **    Final report electronically signed by DR Stan Smith on 12/8/2022 4:49 PM    Results for orders placed during the hospital encounter of 12/08/22    CTA NECK W WO CONTRAST    Narrative  PROCEDURE: CTA NECK W WO CONTRAST, CTA HEAD W WO CONTRAST    CLINICAL INFORMATION: Dizziness, Frequent falls, Sensory ataxia, Stenosis of right carotid artery. COMPARISON: Carotid ultrasound dated 4/14/2022. CTA neck dated 3/25/2021. .    TECHNIQUE: 1 mm axial images were obtained through the head and neck after the fast bolus administration of contrast. A noncontrast localizer was obtained.  3-D reconstructions were performed on a dedicated 3-D workstation. These include multiplanar MPR  images and multiplanar MIP images. Centerline reconstructions were obtained of the carotid systems. Isovue intravenous contrast was given. All carotid artery measurements are performed utilizing Nascet criteria. All CT scans at this facility use dose modulation, iterative reconstruction, and/or weight-based dosing when appropriate to reduce radiation dose to as low as reasonably achievable. FINDINGS:      CTA NECK:    Aortic arch and branches: There is atherosclerotic calcification in the aortic arch at the origin of the brachiocephalic, left common carotid and left subclavian arteries. Right common carotid artery/ICA: There is calcified plaque involving the right common carotid artery, carotid bifurcation and origin of the right internal carotid artery. There is approximately 70% stenosis in the proximal right internal carotid artery  and 50% stenosis in the distal right common carotid artery. Left common carotid artery/ICA: There is calcified plaque involving the left common carotid artery, left carotid bifurcation and origin of the left internal carotid artery. There is approximately 50% stenosis in the proximal left internal carotid artery. There is no significant hemodynamic stenosis in the left common carotid artery. Vertebral arteries: There is antegrade flow in the right and left vertebral arteries        CTA HEAD:      Internal carotid arteries: Atherosclerotic calcification in the cavernous segments of both internal carotid arteries. No evidence of severe stenosis. .    Middle cerebral arteries: Antegrade flow in the middle cerebral arteries bilaterally. Anterior cerebral arteries: Antegrade flow in the anterior cerebral arteries bilaterally. There is a normal small anterior communicating artery. Vertebral arteries: There is calcification in both distal vertebral arteries.  There is antegrade flow in the vertebral arteries bilaterally    Basilar artery: Antegrade flow in the basilar artery. Superior cerebellar arteries: Antegrade flow in the right and left superior cerebellar arteries. .    Posterior cerebral arteries: This a patent posterior communicating artery which helps supply the right posterior cerebral artery. There is antegrade flow in the left posterior cerebral artery. No aneurysms, stenoses or occlusions are noted. The superior sagittal sinus, vein of Nacho, internal cerebral veins, straight sinus, transverse sinuses and sigmoid sinuses are patent. Axial source data: There is a pacemaker in place. There are postoperative changes in the left supraclavicular region. There is evidence for old granulomatous disease in the right upper lobe. Impression  1. Stable CTA of the head and neck, no interval change since previous study dated 25 March 2021.  2. Calcified plaque involving the right common carotid artery, carotid bifurcation and origin of the right internal carotid artery. There is approximately 70% stenosis at the origin of the right internal carotid artery. There is approximately 50%  stenosis in the distal right common carotid artery. 3. Calcified plaque involving the left common carotid artery, carotid bifurcation and origin of the left internal carotid artery. There is approximately 50% stenosis at the origin of the left internal carotid artery. Is no hemodynamic stenosis in the  left common carotid artery. 4. Atherosclerotic calcification in the aortic arch, at the origins of the brachiocephalic, left common carotid and left subclavian arteries. 5. Atherosclerotic calcification in the cavernous segments of both internal carotid arteries. 6. Atherosclerotic calcification in the distal vertebral arteries. Antegrade flow in the right and left vertebral arteries.   7. There is a patent posterior communicating artery which helps supply the right posterior cerebral artery. 8. Otherwise negative CTA of the head and neck. 9. Pacemaker in place. An. Old granulomatous disease in the right upper lobe of the lung. **This report has been created using voice recognition software. It may contain minor errors which are inherent in voice recognition technology. **    Final report electronically signed by DR Bill Mancera on 12/8/2022 4:49 PM    No results found for this or any previous visit. Results for orders placed during the hospital encounter of 12/16/16    MRI Brain W WO Contrast    Narrative  PROCEDURE: MRI BRAIN W WO CONTRAST    CLINICAL INFORMATION: Hearing loss, sensorineural    COMPARISON: MRI brain, 27 January 2012. TECHNIQUE: Using a 1.5 Felicia Siemens scanner, axial diffusion weighted echoplanar imaging, T2-weighted turbo spin-echo, fat-saturated T2-weighted fluid attenuated inversion recovery, and proton density weighted gradient recall images were obtained of the  entire brain. Pre-and postcontrast (OptiMARK, 15 mL) sagittal 3-D T1-weighted volume acquisition gradient recall images were also obtained, with multiplanar reconstructions provided. Additionally, high-resolution fat-saturated T2-weighted turbo  spin-echo images were obtained of the posterior fossa in the axial plane as well as pre-and postcontrast fat-saturated T1-weighted turbo spin-echo images, centered on the internal auditory canal. Reconstructions from the pre-and postcontrast T1-weighted  gradient recall images were also provided in the coronal plane centered on the internal auditory canals. FINDINGS: The global brain volume is at the lower limits of normal, stable. There are no areas of restricted diffusion. There are no areas of convincing blood products in the brain parenchyma. Minimal amounts of hyperintense T2 abnormalities are again demonstrated in the white matter of both cerebral hemispheres.  These are slightly more numerous, though still within minimal degree severity, and again are most consistent with minimal small  vessel disease sequelae. The cerebral cortical surfaces demonstrate no definite signal abnormality, and have not changed since the prior exam.    Posterior fossa parenchyma also is without definite abnormal signal, though possibly there is a small remote lacunar infarction of the inferior left cerebellum. More likely, this represents tangential imaging artifact, and was also present on the prior  exam, having not appreciably changed. There is also a minimal amount of hyperintense T2 signal in the pontine white matter, also similar to the prior exam, also likely representing minimal small vessel disease sequelae. There are no areas of pathologic contrast enhancement within the brain parenchyma. The internal auditory canals are symmetric. Cerebellopontine angles are widely patent bilaterally. Normal contour of the cisternal seventh and eighth cranial nerves demonstrated on both sides, neither having evidence of associated pathologic enhancement. No pathologic enhancement demonstrated in the internal auditory canal on either side. The middle ear cavities are within normal limits, and the labyrinthine structures also demonstrate normal characteristics. Very minimal amounts of fluid signal  demonstrated in the mastoid air cells bilaterally, nonspecific. No structural abnormality demonstrated that would indicate explanation for right side sensorineural hearing loss. There are normal characteristics of the ventricular system throughout. Remaining extra-axial spaces as well are within normal limits. Vascular structures are grossly satisfactory. Mild cervical spine degenerative changes are implied in the limited detail of the cervical canal, though there is no acute or aggressive finding appreciated. Sella contents are grossly unremarkable. The paranasal sinuses are grossly clear.     Impression  NO EVIDENCE OF ACUTE BRAIN PROCESS; ESSENTIALLY STABLE EXAM DEMONSTRATED, THOUGH THE PREVIOUSLY DEMONSTRATED WHITE MATTER ABNORMALITIES ARE SLIGHTLY MORE CONSPICUOUS, STILL CONSISTENT WITH MINIMAL SMALL VESSEL DISEASE SEQUELAE. NO STRUCTURAL  ABNORMALITY TO SUGGEST EXPLANATION FOR RIGHT-SIDED HEARING LOSS/TINNITUS. OTHER INCIDENTAL FINDINGS AS DISCUSSED. **This report has been created using voice recognition software. It may contain minor errors which are inherent in voice recognition technology. **          Final report electronically signed by Dr. Mahesh Sidhu on 12/16/2016 11:08 PM    30 day cardiac event monitor done 11/1/22:  CONCLUSIONS:  1. Sinus rhythm. 2.  Intermittent episodes of supraventricular tachycardia with episodes  of wide complex tachycardia as described above. 3.  No pauses. 4.  Abnormal event monitor. Jonathan Gill M.D. Component Ref Range & Units 11/17/22 1530 6/10/19 1621   Vitamin B-12 211 - 911 pg/mL 1240 High   836 R, CM    Folate 4.8 - 24.2 ng/mL > 20.0  18.4 R         11/17/22 1530      Vitamin B6, Plasma 20.0 - 125.0 nmol/L 41.4      Assessment:     Diagnosis Orders   1. Dizziness        2. Frequent falls        3. Sensory ataxia        4. Stenosis of right carotid artery             He reports his dizziness is some better. He denies any falls. He is pending MRI brain to be done next week. CTA head and neck done that showed 70% stenosis in the right ICA, 50% on the left ICA. His cardiac event monitor done 11/1/22 Intermittent episodes of supraventricular tachycardia with episodes of wide complex tachycardia as described above. No pauses. He is following with cardiology and recently had his medications adjusted which has helped with his dizziness. I shared with the patient and his wife that his lab work checking vitamin levels were within acceptable range. After detailed discussion with patient and his wife we agreed on the following plan.          Plan:  Continue with plavix and eliquis  Continue with lipid lowering medication target LDL below 70  Proceed with MRI brain WO contrast as scheduled  Follow up with cardiology re: abnormal cardiac event montor  Follow up with interventional neurology re: 70% stenosis in the right ICA  Continue using your cane. Follow up in 2 months or sooner if needed. Call if any questions or concerns.     Total time 26 min    JAKY Loco - CNP

## 2022-12-12 NOTE — PATIENT INSTRUCTIONS
Continue with plavix and eliquis  Continue with lipid lowering medication target LDL below 70  Proceed with MRI brain WO contrast as scheduled  Follow up with cardiology re: abnormal cardiac event montor  Follow up with interventional neurology re: 70% stenosis in the right ICA  Continue using your cane. Follow up in 2 months or sooner if needed. Call if any questions or concerns.

## 2022-12-14 ENCOUNTER — OFFICE VISIT (OUTPATIENT)
Dept: CARDIOLOGY CLINIC | Age: 67
End: 2022-12-14
Payer: MEDICARE

## 2022-12-14 ENCOUNTER — NURSE ONLY (OUTPATIENT)
Dept: CARDIOLOGY CLINIC | Age: 67
End: 2022-12-14
Payer: MEDICARE

## 2022-12-14 VITALS
SYSTOLIC BLOOD PRESSURE: 118 MMHG | BODY MASS INDEX: 22.73 KG/M2 | HEART RATE: 76 BPM | HEIGHT: 68 IN | DIASTOLIC BLOOD PRESSURE: 77 MMHG | OXYGEN SATURATION: 100 % | WEIGHT: 150 LBS

## 2022-12-14 DIAGNOSIS — Z95.0 PACEMAKER: Primary | ICD-10-CM

## 2022-12-14 DIAGNOSIS — I48.0 PAROXYSMAL ATRIAL FIBRILLATION (HCC): Primary | ICD-10-CM

## 2022-12-14 PROCEDURE — 1123F ACP DISCUSS/DSCN MKR DOCD: CPT | Performed by: INTERNAL MEDICINE

## 2022-12-14 PROCEDURE — 1036F TOBACCO NON-USER: CPT | Performed by: INTERNAL MEDICINE

## 2022-12-14 PROCEDURE — 93000 ELECTROCARDIOGRAM COMPLETE: CPT | Performed by: INTERNAL MEDICINE

## 2022-12-14 PROCEDURE — 99204 OFFICE O/P NEW MOD 45 MIN: CPT | Performed by: INTERNAL MEDICINE

## 2022-12-14 PROCEDURE — 3017F COLORECTAL CA SCREEN DOC REV: CPT | Performed by: INTERNAL MEDICINE

## 2022-12-14 PROCEDURE — 93288 INTERROG EVL PM/LDLS PM IP: CPT | Performed by: INTERNAL MEDICINE

## 2022-12-14 PROCEDURE — G8484 FLU IMMUNIZE NO ADMIN: HCPCS | Performed by: INTERNAL MEDICINE

## 2022-12-14 PROCEDURE — 3074F SYST BP LT 130 MM HG: CPT | Performed by: INTERNAL MEDICINE

## 2022-12-14 PROCEDURE — G8427 DOCREV CUR MEDS BY ELIG CLIN: HCPCS | Performed by: INTERNAL MEDICINE

## 2022-12-14 PROCEDURE — 3078F DIAST BP <80 MM HG: CPT | Performed by: INTERNAL MEDICINE

## 2022-12-14 PROCEDURE — G8420 CALC BMI NORM PARAMETERS: HCPCS | Performed by: INTERNAL MEDICINE

## 2022-12-14 NOTE — PROGRESS NOTES
[address] ([place]) [address] [facility]  Dept: [phone]  Cardiac Electrophysiology: Consultation Note  Patient's demographics:  Date:   12/14/2022  Patient name:              Ariana Segovia  YOB: 1955  Sex:    male   MRN:   [id number]    Primary Care Physician:  Natali Medley DO    Cardiologist:  Chuy Dooley MD    Reason for Consultation:  Atrial fibrillation and atrial flutter. Clinical Summary:  67/M was diagnosed to have atrial fibrillation and atrial flutter many years ago, currently on sotalol and apixaban has been experiencing repeated episodes of dizziness not related to the posture. He was noted to have frequent episodes of atrial flutter and fibrillation on device interrogation. Electrical study was unremarkable. He was referred here for evaluation for catheter ablation. Patient denies palpitation. Denies syncope, shortness of breath, orthopnea or lower extremity swelling. Medical Hx: PAF and atrial flutter x many years on Sotalol and apixaban, SSS/DCPM (Medtronic DOI 9/2/2019), CAD/multiple PCI most recent-OM (10/2015), HTN, HPL, DM2, non-obstructive and asymptomatic carotid artery disease, PVD/right femoral stent, GERD, hypothyroidism and migraine. Review of systems:  Constitutional: Negative for chills and fever  HENT: Negative for congestion, sinus pressure, sneezing and sore throat. Eyes: Negative for pain, discharge, redness and itching. Respiratory: Negative for apnea, cough  Gastrointestinal: Negative for blood in stool, constipation, diarrhea   Endocrine: Negative for cold intolerance, heat intolerance, polydipsia. Genitourinary: Negative for dysuria, enuresis, flank pain and hematuria. Musculoskeletal: Negative for arthralgias, joint swelling and neck pain. Neurological: Negative for numbness and headaches.    Psychiatric/Behavioral: Negative for agitation, confusion, decreased concentration and dysphoric mood. Past Medical History[de-identified]  Past Medical History:   Diagnosis Date    Alcoholism ([facility] [address]      quit in the 78    Alopecia     Angina at rest [facility])     CAD (coronary artery disease)      self , stents     Depression     Esophageal cancer (HCC)     GERD (gastroesophageal reflux disease)     Hyperlipidemia     self and family    Hypertension     self and family    Hypothyroidism     family    Kidney stones     medtronic dual pacer  2021    Migraines     self and family    Osteoarthritis     self and family (in back)    PVD (peripheral vascular disease) ([facility] [address]     Type II diabetes mellitus ([facility] [address]     self and family       Past Surgical History:  Past Surgical History:   Procedure Laterality Date    CARDIAC CATHETERIZATION      8 stents    CHOLECYSTECTOMY      CORONARY ANGIOPLASTY  10/8/15    DIAGNOSTIC CARDIAC CATH LAB PROCEDURE      ESOPHAGECTOMY         Family History:  Family History   Problem Relation Age of Onset    Heart Disease Mother     Diabetes Mother     Heart Disease Father     Heart Attack Father     Early Death Father 46         at 46    Cancer Sister     Cancer Brother     Early Death Brother 52         52        Social History:  Social History     Socioeconomic History    Marital status:    Tobacco Use    Smoking status: Former     Packs/day: 1.00     Years: 40.00     Pack years: 40.00     Types: Cigars, Cigarettes     Quit date: 2012     Years since quitting: 10.6    Smokeless tobacco: Never   Vaping Use    Vaping Use: Former   Substance and Sexual Activity    Alcohol use: No    Drug use: No    Sexual activity: Yes     Partners: Female        Allergies:   Allergies   Allergen Reactions    Erythromycin Nausea And Vomiting        Medications:  Current Outpatient Medications   Medication Sig Dispense Refill    Coenzyme Q10 (UBIDECARENONE) POWD by Does not apply route      DULoxetine (CYMBALTA) 60 MG extended release capsule TAKE 1 CAPSULE BY MOUTH TWICE DAILY      sotalol (BETAPACE) 120 MG tablet Take 1 tablet by mouth 2 times daily 180 tablet 1    Glucagon, rDNA, (GLUCAGON EMERGENCY) 1 MG KIT Use as directed for hypoglycemia (Patient not taking: Reported on 12/12/2022) 1 kit 0    NOVOLOG FLEXPEN 100 UNIT/ML injection pen Inject into the skin 3 times daily (before meals) Sliding scale      buPROPion (WELLBUTRIN XL) 150 MG extended release tablet TAKE 1 TABLET BY MOUTH ONCE DAILY      apixaban (ELIQUIS) 5 MG TABS tablet Take 1 tablet by mouth 2 times daily 180 tablet 3    atorvastatin (LIPITOR) 80 MG tablet Take 1 tablet by mouth daily 90 tablet 3    clopidogrel (PLAVIX) 75 MG tablet Take 1 tablet by mouth daily 90 tablet 3    hydrALAZINE (APRESOLINE) 25 MG tablet TAKE 1 TABLET BY MOUTH THREE TIMES DAILY 270 tablet 0    irbesartan (AVAPRO) 300 MG tablet TAKE 1 TABLET BY MOUTH ONCE DAILY 90 tablet 3    isosorbide mononitrate (IMDUR) 60 MG extended release tablet Take 1 tablet by mouth daily 90 tablet 3    nitroGLYCERIN (NITROSTAT) 0.4 MG SL tablet Place 1 tablet under the tongue every 5 minutes as needed for Chest pain 25 tablet 3    hydroCHLOROthiazide (HYDRODIURIL) 12.5 MG tablet TAKE 1/2 (ONE-HALF) TABLET BY MOUTH ONCE DAILY      Fluticasone Propionate (FLONASE NA) by Nasal route      Multiple Vitamins-Minerals (THERAPEUTIC MULTIVITAMIN-MINERALS) tablet Take 1 tablet by mouth daily      sucralfate (CARAFATE) 1 GM tablet Take 1 g by mouth 4 times daily      DULoxetine (CYMBALTA) 30 MG extended release capsule Take 60 mg by mouth 2 times daily  (Patient not taking: Reported on 12/12/2022)      pantoprazole (PROTONIX) 40 MG tablet Take 40 mg by mouth 2 times daily      Coenzyme Q10 (CO Q-10) 200 MG CAPS Take 1 capsule by mouth daily       insulin detemir (LEVEMIR FLEXPEN) 100 UNIT/ML injection Inject 24 Units into the skin 2 times daily.  (Patient taking differently: Inject 28 Units into the skin 2 times daily 25 qhs) 1 Pen 3     No current facility-administered medications for this visit. Physical Examination:  Vitals:    12/14/22 0934   BP: 118/77   Pulse: 76   SpO2: 100%     Body mass index is 22.81 kg/m². GENERAL: Alert and oriented. No distress. EYES: No pallor or icterus. ENT: No cyanosis. No thyromegaly or cervical LAP. VESSELS: No jugular venous distension or carotid bruits. HEART: Tachycardic, Normal S1/S2. No murmur, rub or gallop. LUNGS: Clear to auscultation. ABDOMEN: Soft and non-tender. EXTREMITIES: No lower extremity edema. Feet are warm. NEUROLOGICAL: Grossly normal.     Laboratory And Diagnostic Data  I have personally reviewed and interpreted the results of the following diagnostic testing    Lab Results   Component Value Date    WBC 10.5 08/04/2022    HGB 15.1 08/04/2022    HCT 46.6 08/04/2022     08/04/2022    CHOL 132 10/12/2017    TRIG 98 10/12/2017    HDL 51 10/12/2017    ALT 17 06/10/2019    AST 18 06/10/2019     10/12/2017    K 4.4 08/04/2022     10/12/2017    CREATININE 1.08 12/31/2019    BUN 15 12/31/2019    CO2 25 10/12/2017    TSH 2.560 09/12/2022    INR 1.36 (H) 12/31/2019     Echocardiogram 2/25/2021: LVEF 60%, LVWT 0.9 cm, LAD/YENNIFER 4.2 cm. No significant valvular abnormalities. ECG sinus rhythm and old anterior infarct. Coronary angiogram patent stent to LCx and severe disease of posterolateral branch of RCA (not intervene)  Pacemaker interrogation [employer], longevity 10.2 years. Stable electrical parameters. Currently in atrial flutter with 2 is to 1 AV conduction. AT/AF burden 1%. Atrial pacing 76% and ventricle pacing 4.2%. Impression:  Intermittent dizziness. Atrial flutter with CVR  Paroxysmal atrial fibrillation. [id number] score 4 (age, HTN, DM and PVD. On sotalol and apixaban. Sick sinus syndrome/dual-chamber pacemaker. CAD/multiple PCI, on Plavix. PVD/PCI to right femoral artery.     Assessment And Recommendations:  I do not think the patient's dizziness is related to atrial fibrillation/atrial flutter. He is currently in atrial flutter with controlled ventricular rate. He is compliant with sotalol and apixaban. We will proceed with cardioversion and see if improves his symptoms. His tilt table study is negative for neurocardiogenic syncope or orthostatic hypotension. If he has recurrences with symptom correlation will consider catheter ablation for atrial fibrillation atrial flutter. Discussed plan with the patient and his wife. Questions answered. **This report has been created using voice recognition software. It may contain minor errors which are inherent in voice recognition technology. **       Julio Cheng MD, MRCP, [facility], [facility] on 12/14/2022 at 10:12 AM

## 2022-12-14 NOTE — PROGRESS NOTES
Medtronic dual pacer in office w/ hajanaym   At flutter 19.5% burden    . [facility] Battery longevity:  10.2 years  Presenting rhythm  at flutter VS     Atrial impedance 399  RV impedance 570    P wave sensing 1.9  R wave sensing 18.1    76.7 % atrial paced  4.2 % RV paced     Atrial threshold at flutter   RV threshold 1 V at 0.4ms

## 2022-12-15 ENCOUNTER — PRE-PROCEDURE TELEPHONE (OUTPATIENT)
Dept: INPATIENT UNIT | Age: 67
End: 2022-12-15

## 2022-12-15 ENCOUNTER — PREP FOR PROCEDURE (OUTPATIENT)
Dept: CARDIOLOGY | Age: 67
End: 2022-12-15

## 2022-12-15 RX ORDER — SODIUM CHLORIDE 9 MG/ML
INJECTION, SOLUTION INTRAVENOUS PRN
Status: CANCELLED | OUTPATIENT
Start: 2022-12-15

## 2022-12-15 RX ORDER — SODIUM CHLORIDE 0.9 % (FLUSH) 0.9 %
5-40 SYRINGE (ML) INJECTION PRN
Status: CANCELLED | OUTPATIENT
Start: 2022-12-15

## 2022-12-15 RX ORDER — SODIUM CHLORIDE 9 MG/ML
INJECTION, SOLUTION INTRAVENOUS CONTINUOUS
Status: CANCELLED | OUTPATIENT
Start: 2022-12-15

## 2022-12-15 RX ORDER — SODIUM CHLORIDE 0.9 % (FLUSH) 0.9 %
5-40 SYRINGE (ML) INJECTION EVERY 12 HOURS SCHEDULED
Status: CANCELLED | OUTPATIENT
Start: 2022-12-15

## 2022-12-15 NOTE — PROGRESS NOTES
Called patient  scheduled for cardioversion, patient asleep, spoke with wife,   instructed to bring in  license,  Insurance cards, overnight bag, medications in the original bottles. Patient instructed on precedure and where and when to arrive. Medication to take day of procedure  went over with patient. NPO after midnight , 12 hour fasting. Wear comfortable clean clothes, shower morning of and night before with liquid antibacterial,and  remove jewery. Follow all instructions given  to you by your doctor. Please notify your doctor if you need to cancel or reschedule your procedure.  needed at discharge. Instructed to take usual heart medications, no diabetic meds am of procedure.

## 2022-12-16 ENCOUNTER — ANESTHESIA (OUTPATIENT)
Dept: CARDIAC CATH/INVASIVE PROCEDURES | Age: 67
End: 2022-12-16
Payer: MEDICARE

## 2022-12-16 ENCOUNTER — ANESTHESIA EVENT (OUTPATIENT)
Dept: CARDIAC CATH/INVASIVE PROCEDURES | Age: 67
End: 2022-12-16
Payer: MEDICARE

## 2022-12-16 ENCOUNTER — HOSPITAL ENCOUNTER (OUTPATIENT)
Dept: INPATIENT UNIT | Age: 67
Discharge: HOME | End: 2022-12-16
Attending: INTERNAL MEDICINE | Admitting: INTERNAL MEDICINE
Payer: MEDICARE

## 2022-12-16 VITALS
SYSTOLIC BLOOD PRESSURE: 116 MMHG | TEMPERATURE: 97.3 F | HEART RATE: 80 BPM | WEIGHT: 150 LBS | DIASTOLIC BLOOD PRESSURE: 60 MMHG | RESPIRATION RATE: 18 BRPM | OXYGEN SATURATION: 100 % | HEIGHT: 68 IN | BODY MASS INDEX: 22.73 KG/M2

## 2022-12-16 LAB
ANION GAP SERPL CALCULATED.3IONS-SCNC: 11 MEQ/L (ref 8–16)
BUN BLDV-MCNC: 15 MG/DL (ref 7–22)
CALCIUM SERPL-MCNC: 9.9 MG/DL (ref 8.5–10.5)
CHLORIDE BLD-SCNC: 103 MEQ/L (ref 98–111)
CO2: 25 MEQ/L (ref 23–33)
CREAT SERPL-MCNC: 1.1 MG/DL (ref 0.4–1.2)
ERYTHROCYTE [DISTWIDTH] IN BLOOD BY AUTOMATED COUNT: 14.8 % (ref 11.5–14.5)
ERYTHROCYTE [DISTWIDTH] IN BLOOD BY AUTOMATED COUNT: 47.1 FL (ref 35–45)
GFR SERPL CREATININE-BSD FRML MDRD: > 60 ML/MIN/1.73M2
GLUCOSE BLD-MCNC: 197 MG/DL (ref 70–108)
GLUCOSE BLD-MCNC: 300 MG/DL (ref 70–108)
HCT VFR BLD CALC: 43.2 % (ref 42–52)
HEMOGLOBIN: 13.8 GM/DL (ref 14–18)
INR BLD: 1.39 (ref 0.85–1.13)
MCH RBC QN AUTO: 27.8 PG (ref 26–33)
MCHC RBC AUTO-ENTMCNC: 31.9 GM/DL (ref 32.2–35.5)
MCV RBC AUTO: 87.1 FL (ref 80–94)
PLATELET # BLD: 349 THOU/MM3 (ref 130–400)
PMV BLD AUTO: 10 FL (ref 9.4–12.4)
POTASSIUM SERPL-SCNC: 4.4 MEQ/L (ref 3.5–5.2)
RBC # BLD: 4.96 MILL/MM3 (ref 4.7–6.1)
SODIUM BLD-SCNC: 139 MEQ/L (ref 135–145)
WBC # BLD: 11.2 THOU/MM3 (ref 4.8–10.8)

## 2022-12-16 PROCEDURE — 6360000002 HC RX W HCPCS: Performed by: REGISTERED NURSE

## 2022-12-16 PROCEDURE — 2580000003 HC RX 258: Performed by: PHYSICIAN ASSISTANT

## 2022-12-16 PROCEDURE — 93005 ELECTROCARDIOGRAM TRACING: CPT | Performed by: PHYSICIAN ASSISTANT

## 2022-12-16 PROCEDURE — 92960 CARDIOVERSION ELECTRIC EXT: CPT

## 2022-12-16 PROCEDURE — 80048 BASIC METABOLIC PNL TOTAL CA: CPT

## 2022-12-16 PROCEDURE — 2500000003 HC RX 250 WO HCPCS

## 2022-12-16 PROCEDURE — 36415 COLL VENOUS BLD VENIPUNCTURE: CPT

## 2022-12-16 PROCEDURE — 85027 COMPLETE CBC AUTOMATED: CPT

## 2022-12-16 PROCEDURE — 92960 CARDIOVERSION ELECTRIC EXT: CPT | Performed by: INTERNAL MEDICINE

## 2022-12-16 PROCEDURE — 82948 REAGENT STRIP/BLOOD GLUCOSE: CPT

## 2022-12-16 PROCEDURE — 85610 PROTHROMBIN TIME: CPT

## 2022-12-16 RX ORDER — SODIUM CHLORIDE 0.9 % (FLUSH) 0.9 %
5-40 SYRINGE (ML) INJECTION PRN
Status: DISCONTINUED | OUTPATIENT
Start: 2022-12-16 | End: 2022-12-16 | Stop reason: HOSPADM

## 2022-12-16 RX ORDER — PROPOFOL 10 MG/ML
INJECTION, EMULSION INTRAVENOUS PRN
Status: DISCONTINUED | OUTPATIENT
Start: 2022-12-16 | End: 2022-12-16 | Stop reason: SDUPTHER

## 2022-12-16 RX ORDER — SODIUM CHLORIDE 0.9 % (FLUSH) 0.9 %
5-40 SYRINGE (ML) INJECTION EVERY 12 HOURS SCHEDULED
Status: DISCONTINUED | OUTPATIENT
Start: 2022-12-16 | End: 2022-12-16 | Stop reason: HOSPADM

## 2022-12-16 RX ORDER — SODIUM CHLORIDE 9 MG/ML
INJECTION, SOLUTION INTRAVENOUS CONTINUOUS
Status: DISCONTINUED | OUTPATIENT
Start: 2022-12-16 | End: 2022-12-16 | Stop reason: HOSPADM

## 2022-12-16 RX ADMIN — SODIUM CHLORIDE: 9 INJECTION, SOLUTION INTRAVENOUS at 12:16

## 2022-12-16 RX ADMIN — PROPOFOL 25 MG: 10 INJECTION, EMULSION INTRAVENOUS at 14:12

## 2022-12-16 RX ADMIN — PROPOFOL 25 MG: 10 INJECTION, EMULSION INTRAVENOUS at 14:13

## 2022-12-16 RX ADMIN — PHENYLEPHRINE HYDROCHLORIDE 200 MCG: 10 INJECTION INTRAVENOUS at 14:15

## 2022-12-16 RX ADMIN — PROPOFOL 25 MG: 10 INJECTION, EMULSION INTRAVENOUS at 14:11

## 2022-12-16 RX ADMIN — PROPOFOL 25 MG: 10 INJECTION, EMULSION INTRAVENOUS at 14:14

## 2022-12-16 ASSESSMENT — LIFESTYLE VARIABLES
HOW MANY STANDARD DRINKS CONTAINING ALCOHOL DO YOU HAVE ON A TYPICAL DAY: PATIENT DOES NOT DRINK
HOW OFTEN DO YOU HAVE A DRINK CONTAINING ALCOHOL: NEVER

## 2022-12-16 ASSESSMENT — PAIN SCALES - GENERAL: PAINLEVEL_OUTOF10: 0

## 2022-12-16 NOTE — PROCEDURES
[address]  [id number] [place] [address] [address]  Dept: [phone]  Loc: [phone]   CARDIAC ELECTROPHYSIOLOGY: PROCEDURE NOTE  Patients Demographics:  Date:   12/16/2022  Patient name:              Simi Katz  YOB: 1955  Sex: male   MRN:   [id number]    Primary Care Provider:   Linda Padron DO     Cardiologist:  Daniel Shell MD    Procedure Planned:   Direct Current Electro-cardioversion . Indication of the Procedure:  Atrial fibrillation. Procedure/s Performed:    Direct Current Electro-cardioversion . Description of the Procedure: The risks and benefits of DCCV were discussed with the patient. Informed consent was obtained. Uninterrupted anticoagulation with Apixaban was confirmed. Time out performed and patient identified by 2 identifiers. After achieving deep sedation  with intravenous Propofol a synchronized electric shock  with 100 Joules, 200 Joules  and 360 Joules were unsuccessful (AP vector with external compression). The patient's hemodynamics including heart rate, blood pressure and oxygen saturation were monitored throughout the procedure. Arousal from sedation was spontaneous and uncomplicated. He tolerated the procedure well. Medications:  Propofol 100 mg ntravenously. Complications:  None. Summary:  Unsuccessful attempts at cardioversion for atrial fibrillation. Recommendations:   Continue antiarrhythmic medications and anticoagulation. Discharge and post procedure care per protocol. Follow up 2 weeks in EP clinic to discuss catheter ablation.          Electronically signed by Kiana Mathews MD, Nickolas Mendenhall on 12/16/2022 at 2:38 PM

## 2022-12-16 NOTE — FLOWSHEET NOTE
Discharged per wc per transport team with personal belongings with no needs, has dentures    Has ambulated and did well

## 2022-12-16 NOTE — H&P
fElectrophysiology: H&P and pre-procedure sedation      Patient demographics:  Date:   12/16/2022  Patient name: Jessica Mccray  YOB: 1955  Sex: male   MRN:   [id number]    Reason for admission:  DCCV    Code Status:FULL CODE    Consent: I have discussed with the patient and/or the patient representative the indication, alternatives, and the possible risks and/or complications of the planned procedure and the anesthesia methods. The patient and/or patient representative appear to understand and agree to proceed. Brief clinical summary:  Atrial fibrillation with RVR. Uninterrupted anticoagulation. Past Medical History:  Past Medical History:   Diagnosis Date    Alcoholism ([facility] [address]      quit in the 78    Alopecia     Angina at rest [facility])     CAD (coronary artery disease)      self , stents     Depression     Esophageal cancer (HCC)     Gastroparesis     GERD (gastroesophageal reflux disease)     Hyperlipidemia     self and family    Hypertension     self and family    Hypothyroidism     family    Kidney stones     medtronic dual pacer  04/20/2021    Migraines     self and family    Osteoarthritis     self and family (in back)    PVD (peripheral vascular disease) ([facility] [address]     Type II diabetes mellitus ([facility] [address]     self and family       Past Surgical History:  Past Surgical History:   Procedure Laterality Date    CARDIAC CATHETERIZATION      8 stents    CHOLECYSTECTOMY      CORONARY ANGIOPLASTY  10/8/15    DIAGNOSTIC CARDIAC CATH LAB PROCEDURE      ESOPHAGECTOMY         Physical examination:  Vital Signs:   Vitals:    12/16/22 1142   BP: 104/74   Pulse: (!) 120   Resp: 20   Temp: 97.3 °F (36.3 °C)   SpO2: 99%     GENERAL: Alert and oriented. No distress. EYES: No pallor or icterus. ENT: No central cyanosis. VESSELS: No jugular venous distension or carotid bruits. HEART: Normal S1/S2. No murmur, rub or gallop. LUNGS: Clear to auscultation. ABDOMEN: Soft and non-tender.    EXTREMITIES: No lower extremity edema. Feet are warm. NEUROLOGICAL: Grossly intact. Family history:  Family History   Problem Relation Age of Onset    Heart Disease Mother     Diabetes Mother     Heart Disease Father     Heart Attack Father     Early Death Father 46         at 46    Cancer Sister     Cancer Brother     Early Death Brother 52         52        Laboratory results:  Lab Results   Component Value Date    WBC 11.2 (H) 2022    HGB 13.8 (L) 2022    HCT 43.2 2022     2022    CHOL 132 10/12/2017    TRIG 98 10/12/2017    HDL 51 10/12/2017    ALT 17 06/10/2019    AST 18 06/10/2019     2022    K 4.4 2022     2022    CREATININE 1.1 2022    BUN 15 2022    CO2 25 2022    TSH 2.560 2022    INR 1.39 (H) 2022            Medications:   PRN Meds: sodium chloride flush  Home Meds:   No current facility-administered medications on file prior to encounter.      Current Outpatient Medications on File Prior to Encounter   Medication Sig Dispense Refill    Insulin Detemir (LEVEMIR FLEXPEN SC) Inject 50 Units into the skin daily      DULoxetine (CYMBALTA) 60 MG extended release capsule TAKE 1 CAPSULE BY MOUTH TWICE DAILY      sotalol (BETAPACE) 120 MG tablet Take 1 tablet by mouth 2 times daily 180 tablet 1    Glucagon, rDNA, (GLUCAGON EMERGENCY) 1 MG KIT Use as directed for hypoglycemia (Patient not taking: No sig reported) 1 kit 0    NOVOLOG FLEXPEN 100 UNIT/ML injection pen Inject into the skin 3 times daily (before meals) Sliding scale      buPROPion (WELLBUTRIN XL) 150 MG extended release tablet TAKE 1 TABLET BY MOUTH ONCE DAILY      apixaban (ELIQUIS) 5 MG TABS tablet Take 1 tablet by mouth 2 times daily 180 tablet 3    atorvastatin (LIPITOR) 80 MG tablet Take 1 tablet by mouth daily 90 tablet 3    clopidogrel (PLAVIX) 75 MG tablet Take 1 tablet by mouth daily 90 tablet 3    hydrALAZINE (APRESOLINE) 25 MG tablet TAKE 1 TABLET BY MOUTH THREE TIMES DAILY 270 tablet 0    irbesartan (AVAPRO) 300 MG tablet TAKE 1 TABLET BY MOUTH ONCE DAILY 90 tablet 3    isosorbide mononitrate (IMDUR) 60 MG extended release tablet Take 1 tablet by mouth daily 90 tablet 3    nitroGLYCERIN (NITROSTAT) 0.4 MG SL tablet Place 1 tablet under the tongue every 5 minutes as needed for Chest pain 25 tablet 3    hydroCHLOROthiazide (HYDRODIURIL) 12.5 MG tablet TAKE 1/2 (ONE-HALF) TABLET BY MOUTH ONCE DAILY      Fluticasone Propionate (FLONASE NA) by Nasal route      Multiple Vitamins-Minerals (THERAPEUTIC MULTIVITAMIN-MINERALS) tablet Take 1 tablet by mouth daily      sucralfate (CARAFATE) 1 GM tablet Take 1 g by mouth 4 times daily      pantoprazole (PROTONIX) 40 MG tablet Take 40 mg by mouth 2 times daily      Coenzyme Q10 (CO Q-10) 200 MG CAPS Take 1 capsule by mouth daily      . Coumadin Use Last 7 Days:  no  Antiplatelet drug therapy use last 7 days: no  Other anticoagulant use last 7 days: YEs  Additional Medication Information:  Per medical records.          Electronically signed by Oleg Cabrera MD, MRCP, Jud G[facility]mez on 12/16/2022 at 1:31 PM

## 2022-12-16 NOTE — ANESTHESIA POSTPROCEDURE EVALUATION
Department of Anesthesiology  Postprocedure Note    Patient: Felipa Norton  MRN: [id number]  YOB: 1955  Date of evaluation: 12/16/2022      Procedure Summary     Date: 12/16/22 Room / Location: [facility] CATH LAB    Anesthesia Start: 1409 Anesthesia Stop: 5357    Procedure: STR CARDIOVERSION W/ ANES Diagnosis:     Scheduled Providers:  Responsible Provider: Lilibeth Garcia MD    Anesthesia Type: MAC ASA Status: 4          Anesthesia Type: No value filed. Rosaline Phase I:      Roslaine Phase II:        Anesthesia Post Evaluation    Patient location during evaluation: bedside  Patient participation: complete - patient cannot participate  Level of consciousness: awake and alert  Airway patency: patent  Nausea & Vomiting: no nausea and no vomiting  Complications: no  Cardiovascular status: hemodynamically stable  Respiratory status: acceptable  Hydration status: euvolemic      [address]  POST-ANESTHESIA NOTE       Name:  Felipa Norton                                         Age:  69 y.o.   MRN:  [id number]      Last Vitals:  /60   Pulse 80   Temp 97.3 °F (36.3 °C) (Oral)   Resp 18   Ht 5' 8\" (1.727 m)   Wt 150 lb (68 kg)   SpO2 100%   BMI 22.81 kg/m²   Patient Vitals for the past 4 hrs:   BP Pulse Resp SpO2   12/16/22 1630 116/60 80 18 100 %   12/16/22 1600 127/72 91 20 99 %   12/16/22 1530 105/80 (!) 102 15 --   12/16/22 1510 111/69 89 20 98 %   12/16/22 1450 117/79 82 20 98 %   12/16/22 1445 117/79 81 12 97 %   12/16/22 1440 (!) 97/55 75 18 98 %   12/16/22 1435 103/62 74 20 98 %   12/16/22 1434 120/61 86 20 98 %   12/16/22 1433 -- 72 20 99 %   12/16/22 1432 (!) 85/54 93 12 100 %   12/16/22 1431 (!) 77/59 84 30 98 %   12/16/22 1430 (!) 86/56 83 21 99 %   12/16/22 1429 -- 77 16 100 %   12/16/22 1428 (!) 79/49 93 15 100 %   12/16/22 1427 (!) 98/54 89 23 100 %   12/16/22 1426 (!) 95/59 93 19 97 %   12/16/22 1425 -- 81 21 100 %   12/16/22 1424 101/62 93 20 100 %   12/16/22 1423 -- 77 23 100 %   12/16/22 1422 (!) 148/70 79 22 100 %   12/16/22 1421 125/83 93 21 --   12/16/22 1420 (!) 70/50 90 23 --   12/16/22 1419 (!) 73/46 (!) 101 21 --   12/16/22 1418 (!) 72/60 93 20 --   12/16/22 1417 -- 97 18 98 %   12/16/22 1416 (!) 83/58 (!) 111 22 100 %   12/16/22 1415 -- 89 26 --   12/16/22 1414 99/70 (!) 105 26 --   12/16/22 1413 -- (!) 112 24 --   12/16/22 1412 -- 99 28 --   12/16/22 1411 -- 91 19 100 %   12/16/22 1410 (!) 144/80 90 13 100 %   12/16/22 1409 -- 77 -- 100 %   12/16/22 1408 (!) 141/81 88 13 100 %   12/16/22 1407 -- (!) 118 22 --   12/16/22 1406 -- (!) 103 15 --   12/16/22 1405 -- (!) 122 15 --       Level of Consciousness:  Awake    Respiratory:  Stable    Oxygen Saturation:  Stable    Cardiovascular:  Stable    Hydration:  Adequate    PONV:  Stable    Post-op Pain:  Adequate analgesia    Post-op Assessment:  No apparent anesthetic complications    Additional Follow-Up / Treatment / Comment:  None    Nicci Schaffer MD  December 16, 2022   5:09 PM

## 2022-12-16 NOTE — ANESTHESIA PRE PROCEDURE
Department of Anesthesiology  Preprocedure Note       Name:  Ariana Segovia   Age:  69 y.o.  :  1955                                          MRN:  [id number]         Date:  2022      Surgeon: * Surgery not found *    Procedure:     Medications prior to admission:   Prior to Admission medications    Medication Sig Start Date End Date Taking?  Authorizing Provider   Insulin Detemir (LEVEMIR FLEXPEN SC) Inject 50 Units into the skin daily   Yes Historical Provider, MD   DULoxetine (CYMBALTA) 60 MG extended release capsule TAKE 1 CAPSULE BY MOUTH TWICE DAILY 22   Historical Provider, MD   sotalol (BETAPACE) 120 MG tablet Take 1 tablet by mouth 2 times daily 10/19/22   Cinthya Machado MD   Glucagon, rDNA, (GLUCAGON EMERGENCY) 1 MG KIT Use as directed for hypoglycemia  Patient not taking: No sig reported 10/11/22   Alex Live MD   NOVOLOG FLEXPEN 100 UNIT/ML injection pen Inject into the skin 3 times daily (before meals) Sliding scale 22   Historical Provider, MD   buPROPion (WELLBUTRIN XL) 150 MG extended release tablet TAKE 1 TABLET BY MOUTH ONCE DAILY 22   Historical Provider, MD   apixaban (ELIQUIS) 5 MG TABS tablet Take 1 tablet by mouth 2 times daily 22   Sandy Guillen MD   atorvastatin (LIPITOR) 80 MG tablet Take 1 tablet by mouth daily 22   Sandy Guillen MD   clopidogrel (PLAVIX) 75 MG tablet Take 1 tablet by mouth daily 22   Sandy Guillen MD   hydrALAZINE (APRESOLINE) 25 MG tablet TAKE 1 TABLET BY MOUTH THREE TIMES DAILY 22   Sandy Guillen MD   irbesartan (AVAPRO) 300 MG tablet TAKE 1 TABLET BY MOUTH ONCE DAILY 22   Cinthya Hampton MD   isosorbide mononitrate (IMDUR) 60 MG extended release tablet Take 1 tablet by mouth daily 22   Sandy Guillen MD   nitroGLYCERIN (NITROSTAT) 0.4 MG SL tablet Place 1 tablet under the tongue every 5 minutes as needed for Chest pain 22   Sandy Guillen MD   hydroCHLOROthiazide (HYDRODIURIL) 12.5 MG tablet TAKE 1/2 (ONE-HALF) TABLET BY MOUTH ONCE DAILY 3/21/22   Historical Provider, MD   Fluticasone Propionate (FLONASE NA) by Nasal route    Historical Provider, MD   Multiple Vitamins-Minerals (THERAPEUTIC MULTIVITAMIN-MINERALS) tablet Take 1 tablet by mouth daily    Historical Provider, MD   sucralfate (CARAFATE) 1 GM tablet Take 1 g by mouth 4 times daily    Historical Provider, MD   pantoprazole (PROTONIX) 40 MG tablet Take 40 mg by mouth 2 times daily    Historical Provider, MD   Coenzyme Q10 (CO Q-10) 200 MG CAPS Take 1 capsule by mouth daily     Historical Provider, MD       Current medications:    Current Facility-Administered Medications   Medication Dose Route Frequency Provider Last Rate Last Admin    0.9 % sodium chloride infusion   IntraVENous Continuous Ace Muss, PA-C 75 mL/hr at 12/16/22 1216 New Bag at 12/16/22 1216    sodium chloride flush 0.9 % injection 5-40 mL  5-40 mL IntraVENous 2 times per day Ace Muss, PA-C        sodium chloride flush 0.9 % injection 5-40 mL  5-40 mL IntraVENous PRN Ace Muss, PA-C           Allergies:     Allergies   Allergen Reactions    Erythromycin Nausea And Vomiting       Problem List:    Patient Active Problem List   Diagnosis Code    [facility] K55.9    Uncontrolled hypertension I10    Diabetes mellitus ([facility] E11.9    CAD (coronary artery disease) I25.10    AF (atrial fibrillation) ([facility]) I48.91    Gastroenteritis K52.9    Diabetes mellitus type II, uncontrolled [id number]    Peptic ulcer disease K27.9    History of esophageal cancer Z85.01    Hypothyroidism E03.9    Palpitation R00.2    Chest pain R07.9    PVD (peripheral vascular disease) ([facility]) I73.9    Angina pectoris ([facility]) I20.9    Abnormal nuclear stress test R94.39    medtronic dual pacer  Z95.0       Past Medical History:        Diagnosis Date    Alcoholism ([facility]      quit in the 80    Alopecia     [facility] at rest [facility])     CAD (coronary artery disease)      self , stents     Depression     Esophageal cancer (HCC)     Gastroparesis     GERD (gastroesophageal reflux disease)     Hyperlipidemia     self and family    Hypertension     self and family    Hypothyroidism     family    Kidney stones     medtronic dual pacer  04/20/2021    Migraines     self and family    Osteoarthritis     self and family (in back)    PVD [facility]peripheral vascular disease) [facility]     Type II diabetes mellitus ([facility]     self and family       Past Surgical History:        Procedure Laterality Date    CARDIAC CATHETERIZATION      8 stents    CHOLECYSTECTOMY      CORONARY ANGIOPLASTY  10/8/15    DIAGNOSTIC CARDIAC CATH LAB PROCEDURE      ESOPHAGECTOMY         Social History:    Social History     Tobacco Use    Smoking status: Former     Packs/day: 1.00     Years: 40.00     Pack years: 40.00     Types: [de-identified], Cigarettes     Quit date: 4/7/2012     Years since quitting: 10.6    Smokeless tobacco: Never   Substance Use Topics    Alcohol use: No                                Counseling given: Not Answered      Vital Signs (Current):   Vitals:    12/16/22 1130 12/16/22 1142   BP: 116/72 104/74   Pulse: (!) 121 (!) 120   Resp: 24 20   Temp: 36.7 °C (98.1 °F) 36.3 °C (97.3 °F)   TempSrc: Oral Oral   SpO2:  99%   Weight: 150 lb (68 kg)    Height: 5' 8\" (1.727 m)                                               BP Readings from Last 3 Encounters:   12/16/22 104/74   12/14/22 118/77   12/13/22 (!) 150/100       NPO Status:                                                                                 BMI:   Wt Readings from Last 3 Encounters:   12/16/22 150 lb (68 kg)   12/14/22 150 lb (68 kg)   12/13/22 151 lb (68.5 kg)     Body mass index is 22.81 kg/m².     CBC:   Lab Results   Component Value Date/Time    WBC 11.2 12/16/2022 11:31 AM    RBC 4.96 12/16/2022 11:31 AM    HGB 13.8 12/16/2022 11:31 AM    HCT 43.2 12/16/2022 11:31 AM    MCV 87.1 12/16/2022 11:31 AM    RDW 24.3 08/13/2019 02:50 PM     12/16/2022 11:31 AM       CMP:   Lab Results   Component Value Date/Time     12/16/2022 11:31 AM    K 4.4 12/16/2022 11:31 AM     12/16/2022 11:31 AM    CO2 25 12/16/2022 11:31 AM    BUN 15 12/16/2022 11:31 AM    CREATININE 1.1 12/16/2022 11:31 AM    LABGLOM >60 12/16/2022 11:31 AM    GLUCOSE 197 12/16/2022 11:31 AM    PROT 6.8 06/10/2019 04:21 PM    CALCIUM 9.9 12/16/2022 11:31 AM    BILITOT 0.3 06/10/2019 04:21 PM    ALKPHOS 70 06/10/2019 04:21 PM    AST 18 06/10/2019 04:21 PM    ALT 17 06/10/2019 04:21 PM       POC Tests: No results for input(s): POCGLU, POCNA, POCK, POCCL, POCBUN, POCHEMO, POCHCT in the last 72 hours.     Coags:   Lab Results   Component Value Date/Time    PROTIME 15.6 12/31/2019 09:11 AM    INR 1.39 12/16/2022 11:31 AM    APTT 38.2 12/31/2019 09:11 AM       HCG (If Applicable): No results found for: PREGTESTUR, PREGSERUM, HCG, HCGQUANT     ABGs: No results found for: PHART, PO2ART, VFY4MNQ, IID2TYT, BEART, M7KQYMKC     Type & Screen (If Applicable):  Lab Results   Component Value Date    LABRH POS 10/12/2017       Drug/Infectious Status (If Applicable):  Lab Results   Component Value Date/Time    HEPCAB NEGATIVE 10/22/2013 04:17 PM       COVID-19 Screening (If Applicable): No results found for: COVID19        Anesthesia Evaluation  Patient summary reviewed and Nursing notes reviewed  Airway: Mallampati: II  TM distance: <3 FB   Neck ROM: full  Mouth opening: > = 3 FB   Dental:    (+) edentulous      Pulmonary:Negative Pulmonary ROS and normal exam                               Cardiovascular:    (+) hypertension:, angina:, pacemaker: pacemaker, CAD:, CABG/stent:, dysrhythmias: atrial fibrillation,       ECG reviewed  Rhythm: irregular    Echocardiogram reviewed               ROS comment: 9 heart stents last placed 2019     Neuro/Psych:   (+) TIA, headaches:, psychiatric history:             ROS comment: Pt had a stroke after his last cardiac stent placement at [facility]: wife took him to [address] [place] [address] for treatment  GI/Hepatic/Renal:   (+) GERD:, PUD,           Endo/Other:    (+) DiabetesType II DM, poorly controlled, , hypothyroidism::., malignancy/cancer. ROS comment: 1 pancreatic stent pt has gastroparesis: last food was at 1130 12/15/2022: pudding: history of esophageal CA  Abdominal:             Vascular: Other Findings:           Anesthesia Plan      MAC     ASA 4       Induction: intravenous. Anesthetic plan and risks discussed with patient and spouse. Plan discussed with attending.                     JAKY Rios - CRNA   12/16/2022

## 2022-12-16 NOTE — DISCHARGE INSTRUCTIONS
Discharge Instructions for Cardioversion    Your healthcare provider performed a procedure called cardioversion. Your healthcare provider used a controlled electric shock or a medicine to briefly stop all electrical activity in your heart. This helped restore your hearts normal rhythm. Here are some instructions to follow while you recover. Home care  Because cardioversion typically requires sedation, you won't be able to drive home. You will need a ride. Wait at least 24 hours before driving a car or operating heavy machinery after receiving sedating medicines. Dont be alarmed if the skin on your chest is irritated or feels like it is sunburned. Your healthcare provider may prescribe a soothing lotion to relieve this discomfort. These minor symptoms will go away in a few days. Ask your healthcare provider about medicines to keep your heart rhythm steady. If you were prescribed medicine, take it as instructed by your healthcare provider. Dont skip doses or take double doses. Cardioversion requires blood thinners for at least 4 weeks to prevent a delayed risk of stroke when treating atrial fibrillation or atrial flutter. Be sure you discuss which medicine you are taking to prevent stroke. Ask when you need to have your medicine levels checked, and whether you may be able to stop taking it in the future or whether it is recommended that you take it for life. Some of these blood-thinning medicines will have the dose adjusted and interact with other medicines or foods. Your healthcare team will give you full instructions on what to watch out for. Report bleeding or symptoms of stroke immediately to your healthcare team and seek emergency medical attention. Learn to take your own pulse. Keep a record of your results. Ask your healthcare provider when you should seek emergency medical attention. He or she will tell you which pulse rate reading is dangerous.      Keep in mind this procedure may need to be repeated if the abnormal heart rhythm returns. After the procedure, your healthcare provider will tell you if the treatment worked or if you will need further treatments or medication. Follow-up Care  Make a follow-up appointment, or as directed. Call 911  Call 911 right away if you have:    Chest pain    Shortness of breath    Loss of vision, speech, or strength or coordination in any body part    When to call your healthcare provider  Call your healthcare provider right away if you:    Feel faint, dizzy, or lightheaded    Have chest pain with increased activity    Have irregular heartbeat or fast pulse    Have bleeding issues from blood-thinning medicines.

## 2022-12-16 NOTE — PROGRESS NOTES
1130  Pt admitted to  2E09 ambulatory for Cardioversion. Pt NPO. Patient accompanied by spouse. Vital signs obtained. Assessment and data collection initiated. Oriented to room. Policies and procedures for 2E explained   All questions answered with no further questions at this time. Fall prevention and safety precautions discussed with patient. Care plan reviewed with patient and spouse. Patient and spouse verbalize understanding of the plan of care and contribute to goal setting.      Data collection and medication review per Bella Hall RN
Class II - visualization of the soft palate, fauces, and uvula

## 2022-12-16 NOTE — PROCEDURES
1405: This RN and Lindsey RN into room to prepare patient for cardioversion. Consent and patient identifiers checked. 1406: [facility] CRNA arrives. 1408: Dr. Juana Merino notified that patient ready for cardioversion. 1410: Dr. Juana Merino in room, procedure start. Timeout complete. 1411: Medications given per Anesthesia. 1412: Attempted synchronized cardioversion at 100J. Patient post rhythm remains in Afib.     1413: Attempted synchronized cardioversion at 200J. Patient post rhythm remains in Afib.     1413: Attempted synchronized cardioversion at 360J Patient post rhythm remains in Afib.    1414: Procedure end. Dr. Juana Merino to speak with family. 1428: Pt to room air. 1430: Pt responding and moving all extremities to command. 1435: Pt awake and talking. 1439: Report given at bedside to primary RN Honey Pablo.

## 2022-12-18 LAB
EKG Q-T INTERVAL: 340 MS
EKG QRS DURATION: 100 MS
EKG QTC CALCULATION (BAZETT): 488 MS
EKG R AXIS: 76 DEGREES
EKG T AXIS: -55 DEGREES
EKG VENTRICULAR RATE: 124 BPM

## 2022-12-18 PROCEDURE — 93010 ELECTROCARDIOGRAM REPORT: CPT | Performed by: INTERNAL MEDICINE

## 2022-12-19 ENCOUNTER — HOSPITAL ENCOUNTER (OUTPATIENT)
Dept: MRI IMAGING | Age: 67
Discharge: HOME | End: 2022-12-19
Payer: MEDICARE

## 2022-12-19 VITALS — OXYGEN SATURATION: 98 % | HEART RATE: 119 BPM

## 2022-12-19 DIAGNOSIS — R27.8 SENSORY ATAXIA: ICD-10-CM

## 2022-12-19 DIAGNOSIS — R29.6 FREQUENT FALLS: ICD-10-CM

## 2022-12-19 DIAGNOSIS — R42 DIZZINESS: ICD-10-CM

## 2022-12-19 PROCEDURE — 70551 MRI BRAIN STEM W/O DYE: CPT

## 2022-12-19 NOTE — PROGRESS NOTES
44 Pt arrived to MRI for MRI Brain with pacemaker. Wife Nya Pedro remains in waiting room. 1359 Exam explained using teach back method. Pt states understanding. 1403 Call placed to [employer] rep. Rep not available at this time. Awaiting a return call. Cm Whatley returned call to adjust pacemaker settings. 1413 Patient assisted to table in supine position. Monitor attached to patient. Comfort ensured. 1416 Exam begins. 1430 Exam complete. 1431 Monitor removed. Patient assisted to seated position. Comfort ensured. Call placed to [employer] rep Kyleigh Whatley to adjust pacemaker settings. 1434 Patient ambulated to discharge lobby in stable condition. Wife at side.

## 2022-12-21 ENCOUNTER — OFFICE VISIT (OUTPATIENT)
Dept: INTERNAL MEDICINE CLINIC | Age: 67
End: 2022-12-21
Payer: MEDICARE

## 2022-12-21 VITALS
DIASTOLIC BLOOD PRESSURE: 60 MMHG | SYSTOLIC BLOOD PRESSURE: 122 MMHG | WEIGHT: 149.6 LBS | HEART RATE: 89 BPM | BODY MASS INDEX: 22.67 KG/M2 | TEMPERATURE: 97.3 F | HEIGHT: 68 IN

## 2022-12-21 DIAGNOSIS — Z79.4 TYPE 2 DIABETES MELLITUS WITH OTHER SPECIFIED COMPLICATION, WITH LONG-TERM CURRENT USE OF INSULIN (HCC): Primary | ICD-10-CM

## 2022-12-21 DIAGNOSIS — E11.69 TYPE 2 DIABETES MELLITUS WITH OTHER SPECIFIED COMPLICATION, WITH LONG-TERM CURRENT USE OF INSULIN (HCC): Primary | ICD-10-CM

## 2022-12-21 PROCEDURE — G0108 DIAB MANAGE TRN  PER INDIV: HCPCS | Performed by: REGISTERED NURSE

## 2022-12-21 ASSESSMENT — PATIENT HEALTH QUESTIONNAIRE - PHQ9
SUM OF ALL RESPONSES TO PHQ9 QUESTIONS 1 & 2: 4
SUM OF ALL RESPONSES TO PHQ QUESTIONS 1-9: 13
10. IF YOU CHECKED OFF ANY PROBLEMS, HOW DIFFICULT HAVE THESE PROBLEMS MADE IT FOR YOU TO DO YOUR WORK, TAKE CARE OF THINGS AT HOME, OR GET ALONG WITH OTHER PEOPLE: 2
9. THOUGHTS THAT YOU WOULD BE BETTER OFF DEAD, OR OF HURTING YOURSELF: 1
4. FEELING TIRED OR HAVING LITTLE ENERGY: 2
6. FEELING BAD ABOUT YOURSELF - OR THAT YOU ARE A FAILURE OR HAVE LET YOURSELF OR YOUR FAMILY DOWN: 1
8. MOVING OR SPEAKING SO SLOWLY THAT OTHER PEOPLE COULD HAVE NOTICED. OR THE OPPOSITE, BEING SO FIGETY OR RESTLESS THAT YOU HAVE BEEN MOVING AROUND A LOT MORE THAN USUAL: 0
3. TROUBLE FALLING OR STAYING ASLEEP: 2
7. TROUBLE CONCENTRATING ON THINGS, SUCH AS READING THE NEWSPAPER OR WATCHING TELEVISION: 2
SUM OF ALL RESPONSES TO PHQ QUESTIONS 1-9: 14
2. FEELING DOWN, DEPRESSED OR HOPELESS: 2
SUM OF ALL RESPONSES TO PHQ QUESTIONS 1-9: 14
1. LITTLE INTEREST OR PLEASURE IN DOING THINGS: 2
5. POOR APPETITE OR OVEREATING: 2
SUM OF ALL RESPONSES TO PHQ QUESTIONS 1-9: 14

## 2022-12-21 NOTE — PROGRESS NOTES
Diabetes Mellitus Type II, Initial Visit:   Dr. Gabriella Jones  Patient here for an initial evaluation of Type 2 diabetes mellitus. Current symptoms/problems include hyperglycemia  , hypoglycemia , and paresthesia of the feet and have been stable. Symptoms have been present for a few years. The patient was initially diagnosed with Type 2 diabetes mellitus ; 42 yo. Known diabetic complications: peripheral neuropathy, cardiovascular disease, and peripheral vascular disease  Gastroparesis    Cardiovascular risk factors: advanced age (older than 54 for men, 72 for women), diabetes mellitus, dyslipidemia, hypertension, male gender, sedentary lifestyle, and smoking/ tobacco exposure  Current diabetic medications include:               Levemir 50 units in the morning               Novolog 3 units with each meal; plus group 3 scale  Depression screenin22  Score 14  *better in the past 2 months                       Eye exam current (within one year): no  [facility]  Foot exam:  10/28/2022  Previous Dietician visit: none  Weight trend: stable trying to keep weight at 150#  Prior visit with dietician: no  Current diet: B 10am hashbrown cake/ 2 eggs                     L 2pm sandwich --bologna/ cheese                     D 6-7pm baked potato/ steak                     Snacks - cookies  or munchies single serving daytime                            Bedtime -9pm cheese/ crackers or pudding                     Drink: crystal light; coffee w/ Jenifer Mimi  Current exercise: busy outdoors during spring/fall/ summer                        Winter -recumbant bike/ treadmill 1 mile                               Total gym  Current monitoring regimen: Rimma CGM. Using phone and reader--download information is very limited. He did not bring his reader, which he reports has all the other BS information on it. Time in Range:  Very High 10%, High 16% In Target Range 60%, Low 6%, Very Low 8%   Any episodes of hypoglycemia?  yes - 3

## 2022-12-21 NOTE — PATIENT INSTRUCTIONS
Try to use your phone for all scans on your OROS Crum so that we can get all             The information on blood sugars      *keep phone by the bedside for nighttime checks      -we will check on the [place] C[place] 2 sensor for high and low alarms       Make sure you scan at least every 8 hours--and right before going to                 bed  We will check on the [address] for severe low blood sugars  Continue with meal plan at this time  Make an effort to exercise every day--outdoors or a combination                    Of your bike/treadmill and total gym

## 2022-12-22 ENCOUNTER — OFFICE VISIT (OUTPATIENT)
Dept: CARDIOLOGY CLINIC | Age: 67
End: 2022-12-22

## 2022-12-22 VITALS
SYSTOLIC BLOOD PRESSURE: 144 MMHG | WEIGHT: 150 LBS | HEIGHT: 68 IN | HEART RATE: 74 BPM | BODY MASS INDEX: 22.73 KG/M2 | DIASTOLIC BLOOD PRESSURE: 82 MMHG

## 2022-12-22 DIAGNOSIS — Z95.0 PACEMAKER: ICD-10-CM

## 2022-12-22 DIAGNOSIS — I10 PRIMARY HYPERTENSION: ICD-10-CM

## 2022-12-22 DIAGNOSIS — I48.0 PAROXYSMAL ATRIAL FIBRILLATION (HCC): Primary | ICD-10-CM

## 2022-12-22 NOTE — PROGRESS NOTES
[id number]  [address]  [address]  [facility] [address]  Dept: [phone]  Dept Fax: [phone]  Loc: [phone]    Visit Date: 2022    Franchesca Stack is a 69 y.o. male who presents todayfor:  Chief Complaint   Patient presents with    Check-Up    Atrial Fibrillation    Hypertension   Known a fib   Failed cardioversion with Yudelka   Seeing him for possible ablation   No chest pain  Some dyspnea  No dizziness  No syncope  BP is stab; e        HPI:  HPI  Past Medical History:   Diagnosis Date    Alcoholism ([facility]      quit in the 90    Alopecia     Angina at rest [facility])     CAD (coronary artery disease)      self , stents     Depression     Esophageal cancer (HCC)     Gastroparesis     GERD (gastroesophageal reflux disease)     Hyperlipidemia     self and family    Hypertension     self and family    Hypothyroidism     family    Kidney stones     medtronic dual pacer  2021    Migraines     self and family    Osteoarthritis     self and family (in back)    PVD (peripheral vascular disease) ([facility]     Type II diabetes mellitus ([facility]     self and family      Past Surgical History:   Procedure Laterality Date    CARDIAC CATHETERIZATION      8 stents    CHOLECYSTECTOMY      CORONARY ANGIOPLASTY  10/8/15    DIAGNOSTIC CARDIAC CATH LAB PROCEDURE      ESOPHAGECTOMY       Family History   Problem Relation Age of Onset    Heart Disease Mother     Diabetes Mother     Heart Disease Father     Heart Attack Father     Early Death Father 46         at 46    Cancer Sister     Cancer Brother     Early Death Brother 52         52     Social History     Tobacco Use    Smoking status: Former     Packs/day: 1.00     Years: 40.00     Pack years: 40.00     Types: [de-identified], Cigarettes     Quit date: 2012     Years since quitting: 10.7    Smokeless tobacco: Never   Substance Use Topics    Alcohol use: No      Current Outpatient Medications   Medication Sig Dispense Refill    Insulin Detemir (LEVEMIR FLEXPEN SC) Inject 50 Units into the skin every morning      DULoxetine (CYMBALTA) 60 MG extended release capsule TAKE 1 CAPSULE BY MOUTH TWICE DAILY      sotalol (BETAPACE) 120 MG tablet Take 1 tablet by mouth 2 times daily 180 tablet 1    Glucagon, rDNA, (GLUCAGON EMERGENCY) 1 MG KIT Use as directed for hypoglycemia 1 kit 0    NOVOLOG FLEXPEN 100 UNIT/ML injection pen Inject into the skin 3 times daily (before meals) Inject 3 units with meals plus group 3 Sliding scale      buPROPion (WELLBUTRIN XL) 150 MG extended release tablet TAKE 1 TABLET BY MOUTH ONCE DAILY      apixaban (ELIQUIS) 5 MG TABS tablet Take 1 tablet by mouth 2 times daily 180 tablet 3    atorvastatin (LIPITOR) 80 MG tablet Take 1 tablet by mouth daily 90 tablet 3    clopidogrel (PLAVIX) 75 MG tablet Take 1 tablet by mouth daily 90 tablet 3    hydrALAZINE (APRESOLINE) 25 MG tablet TAKE 1 TABLET BY MOUTH THREE TIMES DAILY 270 tablet 0    irbesartan (AVAPRO) 300 MG tablet TAKE 1 TABLET BY MOUTH ONCE DAILY 90 tablet 3    isosorbide mononitrate (IMDUR) 60 MG extended release tablet Take 1 tablet by mouth daily 90 tablet 3    nitroGLYCERIN (NITROSTAT) 0.4 MG SL tablet Place 1 tablet under the tongue every 5 minutes as needed for Chest pain 25 tablet 3    hydroCHLOROthiazide (HYDRODIURIL) 12.5 MG tablet TAKE 1/2 (ONE-HALF) TABLET BY MOUTH ONCE DAILY      Fluticasone Propionate (FLONASE NA) by Nasal route      Multiple Vitamins-Minerals (THERAPEUTIC MULTIVITAMIN-MINERALS) tablet Take 1 tablet by mouth daily      sucralfate (CARAFATE) 1 GM tablet Take 1 g by mouth 4 times daily      pantoprazole (PROTONIX) 40 MG tablet Take 40 mg by mouth 2 times daily      Coenzyme Q10 (CO Q-10) 200 MG CAPS Take 1 capsule by mouth daily        No current facility-administered medications for this visit.      Allergies   Allergen Reactions    Erythromycin Nausea And Vomiting     Health Maintenance   Topic Date Due    Diabetic retinal exam  Never done    DTaP/Tdap/Td vaccine (1 - Tdap) Never done    Colorectal Cancer Screen  Never done    Low dose CT lung screening  Never done    Pneumococcal 65+ years Vaccine (2 - PCV) 10/04/2020    Annual Wellness Visit (AWV)  Never done    Diabetic foot exam  10/28/2023    A1C test (Diabetic or Prediabetic)  12/20/2023    Diabetic microalbuminuria test  12/20/2023    Lipids  12/20/2023    Depression Monitoring  12/21/2023    Flu vaccine  Completed    Shingles vaccine  Completed    COVID-19 Vaccine  Completed    AAA screen  Completed    Hepatitis C screen  Completed    Hepatitis A vaccine  Aged Out    Hib vaccine  Aged Out    Meningococcal (ACWY) vaccine  Aged Out       Subjective:  Review of Systems  General:   No fever, no chills, some fatigue or weight loss  Pulmonary:    some dyspnea, no wheezing  Cardiac:    Denies recent chest pain,   GI:     No nausea or vomiting, no abdominal pain  Neuro:    No dizziness or light headedness,   Musculoskeletal:  No recent active issues  Extremities:   No edema, no obvious claudication     Objective:  Physical Exam  BP (!) 144/82   Pulse 74   Ht 5' 8\" (1.727 m)   Wt 150 lb (68 kg)   BMI 22.81 kg/m²   General:   Well developed, well nourished  Lungs:   Clear to auscultation  Heart:    Normal S1 S2, Slight murmur. no rubs, no gallops  Abdomen:   Soft, non tender, no organomegalies, positive bowel sounds  Extremities:   No edema, no cyanosis, good peripheral pulses  Neurological:   Awake, alert, oriented. No obvious focal deficits  Musculoskelatal:  No obvious deformities    Assessment:      Diagnosis Orders   1. Paroxysmal atrial fibrillation (HCC)  EKG 12 Lead      2. Pacemaker        3. Primary hypertension        As above  Cardiac seems stable for now  ECG in office was done today. I reviewed the ECG. No acute findings    Plan:  No follow-ups on file.   As above  Follow with Yudelka   Continue risk factor modification and medical management  Thank you for allowing me to participate in the care of your patient. Please don't hesitate to contact me regarding any further issues related to the patient care    Orders Placed:  Orders Placed This Encounter   Procedures    EKG 12 Lead     Order Specific Question:   Reason for Exam?     Answer: Other       Medications Prescribed:  No orders of the defined types were placed in this encounter. Discussed use, benefit, and side effects of prescribed medications. All patient questions answered. Pt voicedunderstanding. Instructed to continue current medications, diet and exercise. Continue risk factor modification and medical management. Patient agreed with treatment plan. Follow up as directed.     Electronically signedby Bard Rayna MD on 12/22/2022 at 12:28 PM

## 2022-12-28 ENCOUNTER — PHARMACY VISIT (OUTPATIENT)
Dept: INTERNAL MEDICINE CLINIC | Age: 67
End: 2022-12-28
Payer: MEDICARE

## 2022-12-28 VITALS
HEART RATE: 82 BPM | DIASTOLIC BLOOD PRESSURE: 84 MMHG | SYSTOLIC BLOOD PRESSURE: 152 MMHG | BODY MASS INDEX: 22.96 KG/M2 | WEIGHT: 151 LBS | TEMPERATURE: 97.2 F

## 2022-12-28 DIAGNOSIS — Z79.4 TYPE 2 DIABETES MELLITUS WITH OTHER SPECIFIED COMPLICATION, WITH LONG-TERM CURRENT USE OF INSULIN (HCC): Primary | ICD-10-CM

## 2022-12-28 DIAGNOSIS — E11.69 TYPE 2 DIABETES MELLITUS WITH OTHER SPECIFIED COMPLICATION, WITH LONG-TERM CURRENT USE OF INSULIN (HCC): Primary | ICD-10-CM

## 2022-12-28 PROCEDURE — G0108 DIAB MANAGE TRN  PER INDIV: HCPCS | Performed by: INTERNAL MEDICINE

## 2022-12-28 RX ORDER — INSULIN DETEMIR 100 [IU]/ML
36 INJECTION, SOLUTION SUBCUTANEOUS EVERY MORNING
Qty: 5 ADJUSTABLE DOSE PRE-FILLED PEN SYRINGE | Refills: 3
Start: 2022-12-28 | End: 2022-12-29 | Stop reason: SDUPTHER

## 2022-12-28 RX ORDER — INSULIN ASPART 100 [IU]/ML
INJECTION, SOLUTION INTRAVENOUS; SUBCUTANEOUS
Qty: 5 ADJUSTABLE DOSE PRE-FILLED PEN SYRINGE | Refills: 3
Start: 2022-12-28 | End: 2022-12-29 | Stop reason: SDUPTHER

## 2022-12-28 NOTE — PATIENT INSTRUCTIONS
Decrease Levemir to 36 units        Increase Novolog to 5 units + scale with meals    GROUP 2    Blood Sugar Dose fast acting insulin    None + 5 units   140-199 2 unit + 5 units   200-249 4 units + 5 units   250-299 6 units + 5 units   300-349 8 units + 5 units   350-399 10 units + 5 units   400 and above 12 units + 5 units     2. Try to get scheduled with [facility] as soon as possible    3. We will talk to Dr. Zainab Kahn about getting a podiatry referral    4. Consider the Prevnar 20 pneumonia vaccine     5.  We will send the [place] 2 to [address] [facility]   -Try to get switched to the [place] 2 as soon as possible

## 2022-12-28 NOTE — TELEPHONE ENCOUNTER
Leonardrosanne Andree reports that he has been unable to get Glucagon; not covered by insurance. Per insurance letter 10/12/22, Vickie Marian is preferred agent for Haylee Crane. Dr. Glenn Ortez,   Please consider the pended script for [employer]ickie Marian emergency kit.

## 2022-12-28 NOTE — TELEPHONE ENCOUNTER
Dr. Maco Richard is being seeing by the [facility] for T2DM      Diabetes Pharmacotherapy:  Levemir 44 units QAM (decreasing by 2 units every 2-3 days)  Novolog 3 units + group 2 scale    Glucose Trends:   Current monitoring regimen: Freestyle Rimma 14 Day CGM - checks 4+ times daily  Home blood sugar trends:    -V. High: 24%, High: 23%, Target: 41%, Low: 3%, V. Low: 9%   -Significant period of lows overnight  Any episodes of hypoglycemia? yes - nightly   -Treats with OJ or glucose gel   -Fairlife protein drinks   -Low overnight last night caused by overtreatment of bedtime hyperglycemia (500) with Novolog 15 units     Last A1C: 8.2% (12/20/22)    Assessment:   Moo Elliott is experiencing lows 12% of the time with significant hypoglycemia unawareness. Emphasized the importance of switching to the Garcia 2 as soon as possible for hyper and hypoglycemia alarms. Mumtaz needs a rebalance of basal/bolus insulin. Reduced TDD of insulin by 2 units and distributed 6 units of insulin from basal to bolus dosing today. Moo Elliott would benefit from podiatry referral due to significant neuropathy. Recommendations/Plan:    Decreased Levemir to 36 units in the morning (change made during visit)        Increased Novolog to 5 units + scale with meals    GROUP 2    Blood Sugar Dose fast acting insulin    None + 5 units   140-199 2 unit + 5 units   200-249 4 units + 5 units   250-299 6 units + 5 units   300-349 8 units + 5 units   350-399 10 units + 5 units   400 and above 12 units + 5 units     2 . Recommend placement of podiatry referral d/t significant neuropathy     3. Recommend order for insurance preferred form of glucagon (Gvoke) **pended    4. An order for Garcia 2 sensors has been sent to [address] [place] [address] through [employer] per patient request- your office staff can review/sign electronically or via fax    Please message back and/or review orders in response to the above recommendations.      Thank you,     Tyrese Espinoza, PharmD, [address]  Internal Medicine Clinical Pharmacist  [phone],

## 2022-12-28 NOTE — PROGRESS NOTES
[facility]  [facility]. [address] [facility], [address]  [phone] (phone)  [phone] (fax)    Patient ID: Simi Katz 1955  Referring Provider: Dr. Janae Khoury     Patient's name and  were verified. Subjective:    He presents for His follow-up diabetic visit. He has type 2 diabetes mellitus. Home regimen includes: Insulin He is compliant most of the time. Assessment:     Lab Results   Component Value Date/Time    LABA1C 8.2 2022 12:00 AM    BUN 23 2022 12:00 AM    CREATININE 1.3 2022 12:00 AM     Vitals:    22 1516 22 1517   BP: (!) 173/79 (!) 152/84   Pulse: 82    Temp: 97.2 °F (36.2 °C)    Weight: 151 lb (68.5 kg)      Wt Readings from Last 3 Encounters:   22 151 lb (68.5 kg)   22 150 lb (68 kg)   22 149 lb 9.6 oz (67.9 kg)     Ht Readings from Last 3 Encounters:   22 5' 8\" (1.727 m)   22 5' 8\" (1.727 m)   22 5' 8\" (1.727 m)       Diabetes Pharmacotherapy:  Levemir 44 units QAM (decreasing by 2 units every 2-3 days)  Novolog 3 units + group 2 scale    Glucose Trends:   Current monitoring regimen: Freestyle Rimma 14 Day CGM - checks 4+ times daily  Home blood sugar trends:    -V. High: 24%, High: 23%, Target: 41%, Low: 3%, V. Low: 9%   -Significant period of lows overnight  Any episodes of hypoglycemia? yes - nightly   -Treats with OJ or glucose gel   -Fairlife protein drinks   -Low overnight last night caused by overtreatment of bedtime hyperglycemia (500) with Novolog 15 units     Lifestyle Factors:   Did not review in depth    Health Maintenance:  Eye exam current (within one year): no Date: 2019, [facility]   -Red spots/floaters in vision  Any history of foot problems?  yes - significant neuropathy  Foot exam up to date: yes Date: 10/2022, Dr. Janae Khoury   Abbreviated foot exam  Skin: without breakdown   Ulcers/calluses: none   Nails: short due to breakage  Sensation: significantly reduced sensation on both feet  Immunizations up to date:    Pneumonia: no   Influenza: yes  Last panel - LDL:   Lab Results   Component Value Date    LDLCALC 50 12/20/2022   Taking ASA:  No   Taking statin: Yes - atorvastatin  Last microalbumin/creatinine ratio:   Microalbumin Creatinine Ratio   Date Value Ref Range Status   12/20/2022 16.5  Final    Taking ACE/ARB: Yes- irbesartan  Depression screening completed 12/2022    Focus:   Diabetes Education. Last A1C: 8.2% (12/20/22). CGM TIR is below target at 41%. Stephan Romano is experiencing lows 12% of the time with significant hypoglycemia unawareness. Emphasized the importance of switching to the Jacquelyn Bach 2 as soon as possible - will use the Jacquelyn Bach 2 valentín instead of the reader. Per instructions previously provided, Stephan Romano & his wife have been decreasing his Levemir dose by 2 units every 2-3 days; decreased from 50 units to 44 units. Despite this decrease, Stephan Romano continues to have frequent and severe lows. They still do not have glucagon on hand- will discuss with Dr. Ivonne Cardoso. Reviewed tx hypoglycemia. One episode of hypoglycemia was caused by overtreatment of hyperglycemia (500) at bedtime with Novolog 15 units. We discussed Novolog for meal coverage vs. correction. Mumtaz needs a rebalance of basal/bolus insulin. Reduced TDD of insulin by 2 units and distributed 6 units of insulin from basal to bolus dosing today. Stephan Romano and wife will continue to decrease Levemir in 2 unit increments if lows persist. Encouraged podiatry referral due to significant neuropathy. Encouraged prompt scheduling of diabetes eye exam. Encouraged continued checking of home BP due to elevated readings in offfce x2. Curriculum Area Focus:  Foot care, Retinal exams, Immunizations, Glucose checks/goals, Hypoglycemia management, and Diabetes Technology  DSME PLAN:     Decrease Levemir to 36 units in the morning        Increase Novolog to 5 units + scale with meals    GROUP 2    Blood Sugar Dose fast acting insulin    None + 5 units   140-199 2 unit + 5 units   200-249 4 units + 5 units   250-299 6 units + 5 units   300-349 8 units + 5 units   350-399 10 units + 5 units   400 and above 12 units + 5 units     2. Try to get scheduled with [facility] as soon as possible    3. We will talk to Dr. Tracy Sevilla about getting a podiatry referral    4. Consider the Prevnar 20 pneumonia vaccine     5. We will send the [place] 2 to [facility]   -Try to get switched to the Noe Hartfield 2 as soon as possible     Goals Addressed                   This Visit's Progress     HEMOGLOBIN A1C < 7        scan blood sugars at least every 8 hours using your phone   On track             Meter download, medications, PMH and nursing assessment reviewed. Cuong Peters states He is willing to participate in this plan of care and verbalized understanding of all instructions provided. Teach back used to verify comprehension. Follow-up: 1 week download review, 1 month PharmD     Total time involved in direct patient education: 60 minutes.      For Pharmacy Admin Tracking Only    Program: Medical Group  CPA in place:  Yes  Recommendation Provided To: Provider: 1 via Fax sent to office and Patient/Caregiver: 1 via In person  Intervention Detail: Dose Adjustment: 1, reason: Therapy Optimization and New Rx: 1, reason: Patient Preference  Intervention Accepted By: Provider: 1 and Patient/Caregiver: 1  Gap Closed?: No   Time Spent (min): Epi Moseley, [facility]  Internal Medicine Clinical Pharmacist  [phone]

## 2022-12-29 ENCOUNTER — TELEPHONE (OUTPATIENT)
Dept: INTERNAL MEDICINE CLINIC | Age: 67
End: 2022-12-29

## 2022-12-29 DIAGNOSIS — E11.69 TYPE 2 DIABETES MELLITUS WITH OTHER SPECIFIED COMPLICATION, WITH LONG-TERM CURRENT USE OF INSULIN (HCC): Primary | ICD-10-CM

## 2022-12-29 DIAGNOSIS — Z79.4 TYPE 2 DIABETES MELLITUS WITH OTHER SPECIFIED COMPLICATION, WITH LONG-TERM CURRENT USE OF INSULIN (HCC): Primary | ICD-10-CM

## 2022-12-29 RX ORDER — INSULIN DETEMIR 100 [IU]/ML
36 INJECTION, SOLUTION SUBCUTANEOUS EVERY MORNING
Qty: 10 ADJUSTABLE DOSE PRE-FILLED PEN SYRINGE | Refills: 1 | Status: SHIPPED | OUTPATIENT
Start: 2022-12-29

## 2022-12-29 RX ORDER — INSULIN ASPART 100 [IU]/ML
INJECTION, SOLUTION INTRAVENOUS; SUBCUTANEOUS
Qty: 10 ADJUSTABLE DOSE PRE-FILLED PEN SYRINGE | Refills: 1 | Status: SHIPPED | OUTPATIENT
Start: 2022-12-29

## 2022-12-29 NOTE — TELEPHONE ENCOUNTER
Please review & sign pended Gvoke order. Please place a podiatry referral order.      Thank you,     Zeinab Yadav

## 2022-12-29 NOTE — TELEPHONE ENCOUNTER
Denis's wife called- he is almost out of insulin (Levemir & Novolog). Refills provided per CPA. Also briefly reviewed [employer]. He experienced an episode of hypoglycemia yesterday (likely d/t overcorrection of a high blood sugar). Today, BG have remained in the target range almost all day with the new insulin dosing. Will review again next week.        For Pharmacy Admin Tracking Only    Program: Medical Group  CPA in place:  Yes  Recommendation Provided To: Patient/Caregiver: 1 via Telephone  Intervention Detail: Refill(s) Provided  Intervention Accepted By: Patient/Caregiver: 1  Gap Closed?: No   Time Spent (min): 1 [facility] 270, PharmD, [facility]  Internal Medicine Clinical Pharmacist  [phone]

## 2023-01-02 RX ORDER — GLUCAGON INJECTION, SOLUTION 1 MG/.2ML
1 INJECTION, SOLUTION SUBCUTANEOUS PRN
Qty: 1 EACH | Refills: 2 | Status: SHIPPED | OUTPATIENT
Start: 2023-01-02 | End: 2023-02-13

## 2023-01-04 ENCOUNTER — OFFICE VISIT (OUTPATIENT)
Dept: CARDIOLOGY CLINIC | Age: 68
End: 2023-01-04
Payer: MEDICARE

## 2023-01-04 ENCOUNTER — NURSE ONLY (OUTPATIENT)
Dept: CARDIOLOGY CLINIC | Age: 68
End: 2023-01-04
Payer: MEDICARE

## 2023-01-04 VITALS
HEIGHT: 68 IN | WEIGHT: 149 LBS | BODY MASS INDEX: 22.58 KG/M2 | DIASTOLIC BLOOD PRESSURE: 90 MMHG | HEART RATE: 64 BPM | OXYGEN SATURATION: 100 % | SYSTOLIC BLOOD PRESSURE: 156 MMHG

## 2023-01-04 DIAGNOSIS — I48.0 PAROXYSMAL ATRIAL FIBRILLATION (HCC): Primary | ICD-10-CM

## 2023-01-04 DIAGNOSIS — Z95.0 PACEMAKER: Primary | ICD-10-CM

## 2023-01-04 PROCEDURE — 1124F ACP DISCUSS-NO DSCNMKR DOCD: CPT | Performed by: INTERNAL MEDICINE

## 2023-01-04 PROCEDURE — 3080F DIAST BP >= 90 MM HG: CPT | Performed by: INTERNAL MEDICINE

## 2023-01-04 PROCEDURE — 3077F SYST BP >= 140 MM HG: CPT | Performed by: INTERNAL MEDICINE

## 2023-01-04 PROCEDURE — 99214 OFFICE O/P EST MOD 30 MIN: CPT | Performed by: INTERNAL MEDICINE

## 2023-01-04 PROCEDURE — 1036F TOBACCO NON-USER: CPT | Performed by: INTERNAL MEDICINE

## 2023-01-04 PROCEDURE — G8484 FLU IMMUNIZE NO ADMIN: HCPCS | Performed by: INTERNAL MEDICINE

## 2023-01-04 PROCEDURE — G8427 DOCREV CUR MEDS BY ELIG CLIN: HCPCS | Performed by: INTERNAL MEDICINE

## 2023-01-04 PROCEDURE — G8420 CALC BMI NORM PARAMETERS: HCPCS | Performed by: INTERNAL MEDICINE

## 2023-01-04 PROCEDURE — 3017F COLORECTAL CA SCREEN DOC REV: CPT | Performed by: INTERNAL MEDICINE

## 2023-01-04 NOTE — PROGRESS NOTES
Medtronic dual pacer in office /hakim   AV delays increased for AV conduction  . Annie Kate Battery longevity:  10.1 years   Presenting rhythm  AP     Atrial impedance 380  RV impedance 570    P wave sensing 1.6  R wave sensing 16.9    79 % atrial paced  3.3 % RV paced     Atrial threshold 0.75 V  at 0.4ms  RV threshold 1 V at 0.4ms  Mode switches   0.2

## 2023-01-04 NOTE — PROGRESS NOTES
[address] ([place])  [address] [address]  Dept: [phone]  Cardiac Electrophysiology: Follow up Note  Patient's demographics:  Date:   1/4/2023  Patient name:              Johanny Yepez  YOB: 1955  Sex:    male   MRN:   [id number]    Primary Care Physician:  Awa Mongtomery DO    Cardiologist:  Doc Kuhn MD    Reason for Follow up:  Status post cardioversion for atrial fibrillation. Clinical Summary:  67/M underwent elective cardioversion for atrial fibrillation on 12/16/2022. Here for follow-up visit. Doing well. More energy. No palpitation no chest pain or shortness of breath. Patient denies palpitation. Denies syncope, shortness of breath, orthopnea or lower extremity swelling. Medical Hx: PAF and atrial flutter x many years on Sotalol and apixaban => DCCV (12/16/22), SSS/DCPM (Medtronic DOI 9/2/2019), CAD/multiple PCI most recent-OM (10/2015), HTN, HPL, DM2, non-obstructive and asymptomatic carotid artery disease, PVD/right femoral stent, GERD, hypothyroidism and migraine. Review of systems:  Constitutional: Negative for chills and fever  HENT: Negative for congestion, sinus pressure, sneezing and sore throat. Eyes: Negative for pain, discharge, redness and itching. Respiratory: Negative for apnea, cough  Gastrointestinal: Negative for blood in stool, constipation, diarrhea   Endocrine: Negative for cold intolerance, heat intolerance, polydipsia. Genitourinary: Negative for dysuria, enuresis, flank pain and hematuria. Musculoskeletal: Negative for arthralgias, joint swelling and neck pain. Neurological: Negative for numbness and headaches. Psychiatric/Behavioral: Negative for agitation, confusion, decreased concentration and dysphoric mood.       Past Medical History[de-identified]  Past Medical History:   Diagnosis Date    Alcoholism ([address]      quit in the 90    Alopecia     Angina at rest [facility])     CAD (coronary artery disease)      self , stents     Depression     Esophageal cancer (HCC)     Gastroparesis     GERD (gastroesophageal reflux disease)     Hyperlipidemia     self and family    Hypertension     self and family    Hypothyroidism     family    Kidney stones     medtronic dual pacer  2021    Migraines     self and family    Osteoarthritis     self and family (in back)    PVD (peripheral vascular disease) ([facility]     Type II diabetes mellitus ([facility]     self and family       Past Surgical History:  Past Surgical History:   Procedure Laterality Date    CARDIAC CATHETERIZATION      8 stents    CHOLECYSTECTOMY      CORONARY ANGIOPLASTY  10/8/15    DIAGNOSTIC CARDIAC CATH LAB PROCEDURE      ESOPHAGECTOMY         Family History:  Family History   Problem Relation Age of Onset    Heart Disease Mother     Diabetes Mother     Heart Disease Father     Heart Attack Father     Early Death Father 46         at 46    Cancer Sister     Cancer Brother     Early Death Brother 52         52        Social History:  Social History     Socioeconomic History    Marital status:    Tobacco Use    Smoking status: Former     Packs/day: 1.00     Years: 40.00     Pack years: 40.00     Types: Cigars, Cigarettes     Quit date: 2012     Years since quitting: 10.7    Smokeless tobacco: Never   Vaping Use    Vaping Use: Former   Substance and Sexual Activity    Alcohol use: No    Drug use: No    Sexual activity: Yes     Partners: Female        Allergies: Allergies   Allergen Reactions    Erythromycin Nausea And Vomiting        Medications:  Current Outpatient Medications   Medication Sig Dispense Refill    Glucagon (GVOKE HYPOPEN 2-PACK) 1 MG/0.2ML SOAJ Inject 1 each into the skin as needed (as needed for severe low blood sugar when unable to drink or swallow) 1 each 2    NOVOLOG FLEXPEN 100 UNIT/ML injection pen Inject 5 units plus group 2 Sliding scale into the skin 3x daily before meals.  Max daily dose 51 units 10 Adjustable Dose Pre-filled Pen Syringe 1    insulin detemir (LEVEMIR FLEXTOUCH) 100 UNIT/ML injection pen Inject 36 Units into the skin every morning 10 Adjustable Dose Pre-filled Pen Syringe 1    apixaban (ELIQUIS) 5 MG TABS tablet Take 1 tablet by mouth 2 times daily 180 tablet 3    DULoxetine (CYMBALTA) 60 MG extended release capsule TAKE 1 CAPSULE BY MOUTH TWICE DAILY      sotalol (BETAPACE) 120 MG tablet Take 1 tablet by mouth 2 times daily 180 tablet 1    Glucagon, rDNA, (GLUCAGON EMERGENCY) 1 MG KIT Use as directed for hypoglycemia 1 kit 0    buPROPion (WELLBUTRIN XL) 150 MG extended release tablet TAKE 1 TABLET BY MOUTH ONCE DAILY      atorvastatin (LIPITOR) 80 MG tablet Take 1 tablet by mouth daily 90 tablet 3    clopidogrel (PLAVIX) 75 MG tablet Take 1 tablet by mouth daily 90 tablet 3    hydrALAZINE (APRESOLINE) 25 MG tablet TAKE 1 TABLET BY MOUTH THREE TIMES DAILY 270 tablet 0    irbesartan (AVAPRO) 300 MG tablet TAKE 1 TABLET BY MOUTH ONCE DAILY 90 tablet 3    isosorbide mononitrate (IMDUR) 60 MG extended release tablet Take 1 tablet by mouth daily 90 tablet 3    nitroGLYCERIN (NITROSTAT) 0.4 MG SL tablet Place 1 tablet under the tongue every 5 minutes as needed for Chest pain 25 tablet 3    hydroCHLOROthiazide (HYDRODIURIL) 12.5 MG tablet TAKE 1/2 (ONE-HALF) TABLET BY MOUTH ONCE DAILY      Fluticasone Propionate (FLONASE NA) by Nasal route      Multiple Vitamins-Minerals (THERAPEUTIC MULTIVITAMIN-MINERALS) tablet Take 1 tablet by mouth daily      sucralfate (CARAFATE) 1 GM tablet Take 1 g by mouth 4 times daily      pantoprazole (PROTONIX) 40 MG tablet Take 40 mg by mouth 2 times daily      Coenzyme Q10 (CO Q-10) 200 MG CAPS Take 1 capsule by mouth daily        No current facility-administered medications for this visit. Physical Examination:  Vitals:    01/04/23 1303   BP: (!) 164/91   Pulse: 64   SpO2: 100%       GENERAL: Alert and oriented. No distress. EYES: No pallor or icterus.   ENT: No cyanosis. No thyromegaly or cervical LAP. VESSELS: No jugular venous distension or carotid bruits. HEART: Normal S1/S2. No murmur, rub or gallop. LUNGS: Clear to auscultation. ABDOMEN: Soft and non-tender. EXTREMITIES: No lower extremity edema. Feet are warm. NEUROLOGICAL: Grossly normal.     Laboratory And Diagnostic Data  I have personally reviewed and interpreted the results of the following diagnostic testing    Lab Results   Component Value Date    WBC 11.2 (H) 12/16/2022    HGB 13.8 (L) 12/16/2022    HCT 43.2 12/16/2022     12/16/2022    CHOL 106 12/20/2022    TRIG 92 12/20/2022    HDL 38 12/20/2022    ALT 31 12/20/2022    AST 26 12/20/2022     12/20/2022    K 4.2 12/20/2022     12/20/2022    CREATININE 1.3 12/20/2022    BUN 23 12/20/2022    CO2 29 12/20/2022    TSH 2.560 09/12/2022    INR 1.39 (H) 12/16/2022    LABA1C 8.2 (H) 12/20/2022     Echocardiogram 2/25/2021: LVEF 60%, LVWT 0.9 cm, LAD/YENNIFER 4.2 cm. No significant valvular abnormalities. ECG 1/4/2023: sinus rhythm and old anterior infarct. QTc 445  Coronary angiogram patent stent to LCx and severe disease of posterolateral branch of RCA (not intervene)  Pacemaker interrogation PeeplePasstronic, longevity 10.1 years. Stable electrical parameters. Currently in atrial flutter with 2 is to 1 AV conduction. AT/AF burden 0% since DCCV. Atrial pacing 76% and ventricle pacing 3%. Impression:  PeAF s/p DCCV. [id number] score 4 (age, HTN, DM and PVD. On sotalol and apixaban. Hx of atrial flutter. Sick sinus syndrome/dual-chamber pacemaker. CAD/multiple PCI, on Plavix. PVD/PCI to right femoral artery. Assessment And Recommendations:  Doing well. Feeling better no palpitations. In sinus rhythm. Tolerating sotalol, QTC within normal limits. Follow-up in a year. **This report has been created using voice recognition software. It may contain minor errors which are inherent in voice recognition technology. Avtar AVILES MD Yudelka, MRCP, Wesley Campbell on 1/4/2023 at 1:16 PM

## 2023-01-05 PROCEDURE — 93288 INTERROG EVL PM/LDLS PM IP: CPT | Performed by: INTERNAL MEDICINE

## 2023-01-31 ENCOUNTER — PHARMACY VISIT (OUTPATIENT)
Dept: INTERNAL MEDICINE CLINIC | Age: 68
End: 2023-01-31
Payer: MEDICARE

## 2023-01-31 DIAGNOSIS — E11.69 TYPE 2 DIABETES MELLITUS WITH OTHER SPECIFIED COMPLICATION, WITH LONG-TERM CURRENT USE OF INSULIN (HCC): Primary | ICD-10-CM

## 2023-01-31 DIAGNOSIS — Z79.4 TYPE 2 DIABETES MELLITUS WITH OTHER SPECIFIED COMPLICATION, WITH LONG-TERM CURRENT USE OF INSULIN (HCC): Primary | ICD-10-CM

## 2023-01-31 PROCEDURE — G0108 DIAB MANAGE TRN  PER INDIV: HCPCS | Performed by: INTERNAL MEDICINE

## 2023-01-31 NOTE — PROGRESS NOTES
[facility]  [facility]. [address] [facility], [address]  [phone] (phone)  [phone] (fax)    Patient ID: Lalitha Bailey 1955  Referring Provider: Dr. Karel Sterling     Patient's name and  were verified. Subjective:    He presents for His follow-up diabetic visit. He has type 2 diabetes mellitus. Home regimen includes: Insulin He is compliant a majority of the time. Assessment:     Lab Results   Component Value Date/Time    LABA1C 8.2 2022 12:00 AM    BUN 23 2022 12:00 AM    CREATININE 1.3 2022 12:00 AM     Vitals:    23 0810 23 0811   BP: (!) 139/94 (!) 170/90   Pulse: 75    Temp: 97.3 °F (36.3 °C)    Weight: 147 lb 9.6 oz (67 kg)      Wt Readings from Last 3 Encounters:   23 147 lb 9.6 oz (67 kg)   23 149 lb (67.6 kg)   22 151 lb (68.5 kg)     Ht Readings from Last 3 Encounters:   23 5' 8\" (1.727 m)   22 5' 8\" (1.727 m)   22 5' 8\" (1.727 m)       Diabetes Pharmacotherapy:  Levemir 36 units QAM  Novolog per sliding scale    -5 units + gp 2 scale before meals    Glucose Trends:   Current monitoring regimen: Freestyle Rimma 14 Day CGM - checks 4+ times daily  Home blood sugar trends:    -V. High: 30%, High: 32%, Target: 37%, Low: 1%, V. Low: 0%   -Average B mg/dL  Any episodes of hypoglycemia?  yes - 1-2x per week   -Treats with OJ & protein drink    Lifestyle Factors:   Previous visit with dietician: no  Current diet: B: 2 slices breaux + 2 eggs  + hashbrown            L: bologna sand (2 pcs bologna, 2 pieces bread, 2 slices cheese, ketchup)                       D: turkey sausage + eggs + baked potato + cheese +/- applesauce OR mixed fruit                       Snacks: 3 big pretzels                       Beverages:crystal light, coffee in the AM w/ stevia  Current exercise:  walk or recumbent bike; once weekly, limited by tiredness    Health Maintenance:  Eye exam current (within one year): no, overdue 2019 [facility]; scheduled for 3/2023  Any history of foot problems? no  Foot exam up to date: yes Date: 10/2022, Dr. Bennett Mahmood up to date:    Pneumonia: no   Influenza: yes  Last panel - LDL:   Lab Results   Component Value Date    LDLCALC 50 12/20/2022   Taking ASA:  No   Taking statin: Yes - atorvastatin  Last microalbumin/creatinine ratio:   Microalbumin Creatinine Ratio   Date Value Ref Range Status   12/20/2022 16.5  Final      Taking ACE/ARB: Yes- irbesartan  Depression screening completed 12/2022. Focus:   Diabetes Education. Last A1C: 8.2% (12/2022). CGM TIR is mostly unchanged from Mumtaz's previous appt; however, his percentage of lows has decreased from 12% lows to 1%, which is a significant improvement! We will continue to shift the basal bolus balance today - less basal, more bolus insulin to continue to improve lows. Early Yoko still does not have a glucagon pen at home, as it was expensive. Mumtaz/his wife refuse another home glucagon pen due to cost. Of note, Mumtaz's blood pressures were elevated x2 in office today. Home BP checks are 150s/60-70s, per patient. We discussed the risks of uncontrolled hypertension. Encouraged Mumtaz to bring his BP log to his PCP appt tomorrow. Curriculum Area Focus: Cardiovascular risk management, Glucose checks/goals, Hypoglycemia management, and Pattern management  DSME PLAN:     Decrease Levemir to 34 units in the morning        Increase Novolog to 6 units + scale with meals     GROUP 2     Blood Sugar Dose fast acting insulin    None + 6 units   140-199 2 unit + 6 units   200-249 4 units + 6 units   250-299 6 units + 6 units   300-349 8 units + 6 units   350-399 10 units + 6 units   400 and above 12 units + 6 units     2. Bring home blood pressure numbers into your provider appointment tomorrow    3.  Try to limit your bedtime carbs to 20 grams or less       Goals Addressed                   This Visit's Progress     Blood Pressure < 140/90   170/90     Exercise 15-20 minutes daily   Not on track     scan blood sugars at least every 8 hours using your phone   On track             Meter download, medications, PMH and nursing assessment reviewed. Lucas Ramsey states He is willing to participate in this plan of care and verbalized understanding of all instructions provided. Teach back used to verify comprehension. Follow-up: 2 week Dr. Josse Jean, 2 month [facility] Clinic RN    Total time involved in direct patient education: 60 minutes.      For Pharmacy Admin Tracking Only    Program: Medical Group  CPA in place:  Yes  Recommendation Provided To: Patient/Caregiver: 2 via In person  Intervention Detail: Dose Adjustment: 2, reason: Therapy Optimization  Intervention Accepted By: Patient/Caregiver: 2  Gap Closed?: No   Time Spent (min): [address] Jay, PharmD, [facility]  Internal Medicine Clinical Pharmacist  [phone]

## 2023-01-31 NOTE — PATIENT INSTRUCTIONS
Decrease Levemir to 34 units in the morning        Increase Novolog to 6 units + scale with meals     GROUP 2     Blood Sugar Dose fast acting insulin    None + 6 units   140-199 2 unit + 6 units   200-249 4 units + 6 units   250-299 6 units + 6 units   300-349 8 units + 6 units   350-399 10 units + 6 units   400 and above 12 units + 6 units     2. Bring home blood pressure numbers into your provider appointment tomorrow    3.  Try to limit your bedtime carbs to 20 grams or less

## 2023-02-06 VITALS
SYSTOLIC BLOOD PRESSURE: 170 MMHG | TEMPERATURE: 97.3 F | BODY MASS INDEX: 22.44 KG/M2 | DIASTOLIC BLOOD PRESSURE: 90 MMHG | HEART RATE: 75 BPM | WEIGHT: 147.6 LBS

## 2023-02-07 RX ORDER — HYDRALAZINE HYDROCHLORIDE 25 MG/1
TABLET, FILM COATED ORAL
Qty: 270 TABLET | Refills: 2 | Status: SHIPPED | OUTPATIENT
Start: 2023-02-07

## 2023-02-07 NOTE — TELEPHONE ENCOUNTER
Lalitha Bailey called requesting a refill on the following medications:  Requested Prescriptions     Pending Prescriptions Disp Refills    hydrALAZINE (APRESOLINE) 25 MG tablet 270 tablet 0     Sig: TAKE 1 TABLET BY MOUTH THREE TIMES DAILY     Pharmacy verified: [facility]  .       Date of last visit: 12/22/2022  Date of next visit (if applicable): 22/72/5877

## 2023-02-13 ENCOUNTER — OFFICE VISIT (OUTPATIENT)
Dept: NEUROLOGY | Age: 68
End: 2023-02-13

## 2023-02-13 VITALS
WEIGHT: 148 LBS | DIASTOLIC BLOOD PRESSURE: 65 MMHG | BODY MASS INDEX: 22.43 KG/M2 | SYSTOLIC BLOOD PRESSURE: 120 MMHG | HEIGHT: 68 IN | OXYGEN SATURATION: 99 % | HEART RATE: 75 BPM

## 2023-02-13 DIAGNOSIS — R27.8 SENSORY ATAXIA: ICD-10-CM

## 2023-02-13 DIAGNOSIS — R29.6 FREQUENT FALLS: ICD-10-CM

## 2023-02-13 DIAGNOSIS — R42 DIZZINESS: Primary | ICD-10-CM

## 2023-02-13 DIAGNOSIS — I65.21 CAROTID STENOSIS, RIGHT: ICD-10-CM

## 2023-02-13 NOTE — PROGRESS NOTES
NEUROLOGY OUT PATIENT FOLLOW UP NOTE:  2/13/20231:11 PM    Alvaro Zapata is here for follow up for dizziness, frequent falls, sensory ataxia. Patient Active Problem List   Diagnosis    Ischemia, bowel ([facility] [address]    Uncontrolled hypertension    Diabetes mellitus ([facility] [place])    CAD (coronary artery disease)    AF (atrial fibrillation) ([facility] [address]    Gastroenteritis    Diabetes mellitus type II, uncontrolled    Peptic ulcer disease    History of esophageal cancer    Hypothyroidism    Palpitation    Chest pain    PVD (peripheral vascular disease) ([facility] [place])    Angina pectoris ([facility] [place])    Abnormal nuclear stress test    medtronic dual pacer        Follow up for falls, sensory ataxia, he is here to go over plan. Allergies   Allergen Reactions    Erythromycin Nausea And Vomiting       Current Outpatient Medications:     hydrALAZINE (APRESOLINE) 25 MG tablet, TAKE 1 TABLET BY MOUTH THREE TIMES DAILY, Disp: 270 tablet, Rfl: 2    NOVOLOG FLEXPEN 100 UNIT/ML injection pen, Inject 5 units plus group 2 Sliding scale into the skin 3x daily before meals.  Max daily dose 51 units, Disp: 10 Adjustable Dose Pre-filled Pen Syringe, Rfl: 1    insulin detemir (LEVEMIR FLEXTOUCH) 100 UNIT/ML injection pen, Inject 36 Units into the skin every morning, Disp: 10 Adjustable Dose Pre-filled Pen Syringe, Rfl: 1    apixaban (ELIQUIS) 5 MG TABS tablet, Take 1 tablet by mouth 2 times daily, Disp: 180 tablet, Rfl: 3    DULoxetine (CYMBALTA) 60 MG extended release capsule, TAKE 1 CAPSULE BY MOUTH TWICE DAILY, Disp: , Rfl:     sotalol (BETAPACE) 120 MG tablet, Take 1 tablet by mouth 2 times daily, Disp: 180 tablet, Rfl: 1    buPROPion (WELLBUTRIN XL) 150 MG extended release tablet, TAKE 1 TABLET BY MOUTH ONCE DAILY, Disp: , Rfl:     atorvastatin (LIPITOR) 80 MG tablet, Take 1 tablet by mouth daily, Disp: 90 tablet, Rfl: 3    clopidogrel (PLAVIX) 75 MG tablet, Take 1 tablet by mouth daily, Disp: 90 tablet, Rfl: 3    irbesartan (AVAPRO) 300 MG tablet, TAKE 1 TABLET BY MOUTH ONCE DAILY, Disp: 90 tablet, Rfl: 3    isosorbide mononitrate (IMDUR) 60 MG extended release tablet, Take 1 tablet by mouth daily, Disp: 90 tablet, Rfl: 3    nitroGLYCERIN (NITROSTAT) 0.4 MG SL tablet, Place 1 tablet under the tongue every 5 minutes as needed for Chest pain, Disp: 25 tablet, Rfl: 3    hydroCHLOROthiazide (HYDRODIURIL) 12.5 MG tablet, TAKE 1/2 (ONE-HALF) TABLET BY MOUTH ONCE DAILY, Disp: , Rfl:     Multiple Vitamins-Minerals (THERAPEUTIC MULTIVITAMIN-MINERALS) tablet, Take 1 tablet by mouth daily, Disp: , Rfl:     sucralfate (CARAFATE) 1 GM tablet, Take 1 g by mouth 4 times daily, Disp: , Rfl:     pantoprazole (PROTONIX) 40 MG tablet, Take 40 mg by mouth 2 times daily, Disp: , Rfl:     Coenzyme Q10 (CO Q-10) 200 MG CAPS, Take 1 capsule by mouth daily , Disp: , Rfl:     I reviewed the past medical history, allergies, medications, social history and family history. PE:   Vitals:    02/13/23 1300   BP: 120/65   Site: Left Upper Arm   Position: Sitting   Cuff Size: Medium Adult   Pulse: 75   SpO2: 99%   Weight: 148 lb (67.1 kg)   Height: 5' 8\" (1.727 m)     General Appearance:  awake, alert, oriented, in no acute distress  Gen: NAD, Language is Intact. Skin: no rash, lesion, dry  to touch. warm  Head: no rash, no icterus  Neck: There is no carotid bruits. The Neck is supple. There is LROM of the neck to the sides. Neuro: CN 2-12 he is wearing hearing aids, otherwise CN are grossly intact with no focal deficits. Power 5/5 right arm, 4/5 left arm, 4/5 right leg, -4/5 left leg. Reflexes are symmetric. Long tracts are reduced distally. Cerebellar exam is Intact. Sensory exam is intact to light touch. Gait is guarded, uses cane  Musculoskeletal:  Has no hand arthritis, no limitation of ROM in any of the four extremities,.   Lower extremities no edema          DATA:      Results for orders placed or performed in visit on 12/20/22   Comprehensive Metabolic Panel   Result Value Ref Range Sodium 140 mmol/L    Chloride 107 mmol/L    Potassium 4.2 mmol/L    BUN 23 mg/dL    Creatinine 1.3     Glucose 141 mg/dL    AST 26 U/L    ALT 31 U/L    Calcium 10.3 mg/dL    Total Protein 6.3     CO2 29 mmol/L    Albumin 3.9     Alkaline Phosphatase 61 U/L    Total Bilirubin 0.7 0.1 - 1.4 mg/dL    Gfr Calculated 59     Anion Gap 8 mmol/L   Hemoglobin A1C   Result Value Ref Range    Hemoglobin A1C 8.2 (H) %    AVERAGE GLUCOSE 189 (H)    Microalbumin / Creatinine Urine Ratio   Result Value Ref Range    Microalbumin Creatinine Ratio 16.5     Microalb, Ur 19.0     Creatinine, Urine 115.2    Lipid Panel   Result Value Ref Range    Cholesterol, Total 106 mg/dL    HDL 38 35 - 70 mg/dL    LDL Calculated 50 0 - 160 mg/dL    Triglycerides 92 mg/dL    Chol/HDL Ratio 3     VLDL 18 mg/dL    Cholesterol non HDL     PTH, Intact   Result Value Ref Range    Pth Intact 226    Vitamin D 25 Hydroxy   Result Value Ref Range    Vit D, 25-Hydroxy 47     Vitamin D3,25 Hydroxy      Vitamin D2, 25 Hydroxy            CTA HEAD W WO CONTRAST    Narrative  PROCEDURE: CTA NECK W WO CONTRAST, CTA HEAD W WO CONTRAST    CLINICAL INFORMATION: Dizziness, Frequent falls, Sensory ataxia, Stenosis of right carotid artery. COMPARISON: Carotid ultrasound dated 4/14/2022. CTA neck dated 3/25/2021. .    TECHNIQUE: 1 mm axial images were obtained through the head and neck after the fast bolus administration of contrast. A noncontrast localizer was obtained. 3-D reconstructions were performed on a dedicated 3-D workstation. These include multiplanar MPR  images and multiplanar MIP images. Centerline reconstructions were obtained of the carotid systems. Isovue intravenous contrast was given. All carotid artery measurements are performed utilizing Nascet criteria.     All CT scans at this facility use dose modulation, iterative reconstruction, and/or weight-based dosing when appropriate to reduce radiation dose to as low as reasonably achievable. FINDINGS:      CTA NECK:    Aortic arch and branches: There is atherosclerotic calcification in the aortic arch at the origin of the brachiocephalic, left common carotid and left subclavian arteries. Right common carotid artery/ICA: There is calcified plaque involving the right common carotid artery, carotid bifurcation and origin of the right internal carotid artery. There is approximately 70% stenosis in the proximal right internal carotid artery  and 50% stenosis in the distal right common carotid artery. Left common carotid artery/ICA: There is calcified plaque involving the left common carotid artery, left carotid bifurcation and origin of the left internal carotid artery. There is approximately 50% stenosis in the proximal left internal carotid artery. There is no significant hemodynamic stenosis in the left common carotid artery. Vertebral arteries: There is antegrade flow in the right and left vertebral arteries    CTA HEAD:  Internal carotid arteries: Atherosclerotic calcification in the cavernous segments of both internal carotid arteries. No evidence of severe stenosis. .  Middle cerebral arteries: Antegrade flow in the middle cerebral arteries bilaterally. Anterior cerebral arteries: Antegrade flow in the anterior cerebral arteries bilaterally. There is a normal small anterior communicating artery. Vertebral arteries: There is calcification in both distal vertebral arteries. There is antegrade flow in the vertebral arteries bilaterally  Basilar artery: Antegrade flow in the basilar artery. Superior cerebellar arteries: Antegrade flow in the right and left superior cerebellar arteries. .  Posterior cerebral arteries: This a patent posterior communicating artery which helps supply the right posterior cerebral artery. There is antegrade flow in the left posterior cerebral artery. No aneurysms, stenoses or occlusions are noted.   The superior sagittal sinus, vein of Nacho, internal cerebral veins, straight sinus, transverse sinuses and sigmoid sinuses are patent. Axial source data: There is a pacemaker in place. There are postoperative changes in the left supraclavicular region. There is evidence for old granulomatous disease in the right upper lobe. Impression  1. Stable CTA of the head and neck, no interval change since previous study dated 25 March 2021.  2. Calcified plaque involving the right common carotid artery, carotid bifurcation and origin of the right internal carotid artery. There is approximately 70% stenosis at the origin of the right internal carotid artery. There is approximately 50%  stenosis in the distal right common carotid artery. 3. Calcified plaque involving the left common carotid artery, carotid bifurcation and origin of the left internal carotid artery. There is approximately 50% stenosis at the origin of the left internal carotid artery. Is no hemodynamic stenosis in the  left common carotid artery. 4. Atherosclerotic calcification in the aortic arch, at the origins of the brachiocephalic, left common carotid and left subclavian arteries. 5. Atherosclerotic calcification in the cavernous segments of both internal carotid arteries. 6. Atherosclerotic calcification in the distal vertebral arteries. Antegrade flow in the right and left vertebral arteries. 7. There is a patent posterior communicating artery which helps supply the right posterior cerebral artery. 8. Otherwise negative CTA of the head and neck. 9. Pacemaker in place. An. Old granulomatous disease in the right upper lobe of the lung. **This report has been created using voice recognition software. It may contain minor errors which are inherent in voice recognition technology. **  Final report electronically signed by DR Emily Stewart on 12/8/2022 4:49 PM    Results for orders placed during the hospital encounter of 12/08/22    CTA NECK W WO CONTRAST    Narrative  PROCEDURE: CTA [address] CTA HEAD W WO CONTRAST    CLINICAL INFORMATION: Dizziness, Frequent falls, Sensory ataxia, Stenosis of right carotid artery.    COMPARISON: Carotid ultrasound dated 4/14/2022. CTA neck dated 3/25/2021..    TECHNIQUE: 1 mm axial images were obtained through the head and neck after the fast bolus administration of contrast. A noncontrast localizer was obtained. 3-D reconstructions were performed on a dedicated 3-D workstation. These include multiplanar MPR  images and multiplanar MIP images.  Centerline reconstructions were obtained of the carotid systems. Isovue intravenous contrast was given. All carotid artery measurements are performed utilizing Nascet criteria.    All CT scans at this facility use dose modulation, iterative reconstruction, and/or weight-based dosing when appropriate to reduce radiation dose to as low as reasonably achievable.    FINDINGS:      CTA NECK:  Aortic arch and branches: There is atherosclerotic calcification in the aortic arch at the origin of the brachiocephalic, left common carotid and left subclavian arteries.  Right common carotid artery/ICA: There is calcified plaque involving the right common carotid artery, carotid bifurcation and origin of the right internal carotid artery. There is approximately 70% stenosis in the proximal right internal carotid artery  and 50% stenosis in the distal right common carotid artery.  Left common carotid artery/ICA: There is calcified plaque involving the left common carotid artery, left carotid bifurcation and origin of the left internal carotid artery. There is approximately 50% stenosis in the proximal left internal carotid artery.  There is no significant hemodynamic stenosis in the left common carotid artery.  Vertebral arteries: There is antegrade flow in the right and left vertebral arteries        CTA HEAD:  Internal carotid arteries: Atherosclerotic calcification in the cavernous segments of both internal carotid arteries. No evidence of  severe stenosis. .  Middle cerebral arteries: Antegrade flow in the middle cerebral arteries bilaterally. Anterior cerebral arteries: Antegrade flow in the anterior cerebral arteries bilaterally. There is a normal small anterior communicating artery. Vertebral arteries: There is calcification in both distal vertebral arteries. There is antegrade flow in the vertebral arteries bilaterally  Basilar artery: Antegrade flow in the basilar artery. Superior cerebellar arteries: Antegrade flow in the right and left superior cerebellar arteries. .  Posterior cerebral arteries: This a patent posterior communicating artery which helps supply the right posterior cerebral artery. There is antegrade flow in the left posterior cerebral artery. No aneurysms, stenoses or occlusions are noted. The superior sagittal sinus, vein of Nacho, internal cerebral veins, straight sinus, transverse sinuses and sigmoid sinuses are patent. Axial source data: There is a pacemaker in place. There are postoperative changes in the left supraclavicular region. There is evidence for old granulomatous disease in the right upper lobe. Impression  1. Stable CTA of the head and neck, no interval change since previous study dated 25 March 2021.  2. Calcified plaque involving the right common carotid artery, carotid bifurcation and origin of the right internal carotid artery. There is approximately 70% stenosis at the origin of the right internal carotid artery. There is approximately 50%  stenosis in the distal right common carotid artery. 3. Calcified plaque involving the left common carotid artery, carotid bifurcation and origin of the left internal carotid artery. There is approximately 50% stenosis at the origin of the left internal carotid artery. Is no hemodynamic stenosis in the  left common carotid artery.   4. Atherosclerotic calcification in the aortic arch, at the origins of the brachiocephalic, left common carotid and left subclavian arteries. 5. Atherosclerotic calcification in the cavernous segments of both internal carotid arteries. 6. Atherosclerotic calcification in the distal vertebral arteries. Antegrade flow in the right and left vertebral arteries. 7. There is a patent posterior communicating artery which helps supply the right posterior cerebral artery. 8. Otherwise negative CTA of the head and neck. 9. Pacemaker in place. An. Old granulomatous disease in the right upper lobe of the lung. **This report has been created using voice recognition software. It may contain minor errors which are inherent in voice recognition technology. **  Final report electronically signed by DR Radha Mejia on 12/8/2022 4:49 PM        MRI Brain W WO Contrast    Narrative  PROCEDURE: MRI BRAIN W WO CONTRAST    CLINICAL INFORMATION: Hearing loss, sensorineural    COMPARISON: MRI brain, 27 January 2012. TECHNIQUE: Using a 1.5 Felicia Siemens scanner, axial diffusion weighted echoplanar imaging, T2-weighted turbo spin-echo, fat-saturated T2-weighted fluid attenuated inversion recovery, and proton density weighted gradient recall images were obtained of the  entire brain. Pre-and postcontrast (OptiMARK, 15 mL) sagittal 3-D T1-weighted volume acquisition gradient recall images were also obtained, with multiplanar reconstructions provided. Additionally, high-resolution fat-saturated T2-weighted turbo  spin-echo images were obtained of the posterior fossa in the axial plane as well as pre-and postcontrast fat-saturated T1-weighted turbo spin-echo images, centered on the internal auditory canal. Reconstructions from the pre-and postcontrast T1-weighted  gradient recall images were also provided in the coronal plane centered on the internal auditory canals. FINDINGS: The global brain volume is at the lower limits of normal, stable. There are no areas of restricted diffusion.  There are no areas of convincing blood products in the brain parenchyma. Minimal amounts of hyperintense T2 abnormalities are again demonstrated in the white matter of both cerebral hemispheres. These are slightly more numerous, though still within minimal degree severity, and again are most consistent with minimal small  vessel disease sequelae. The cerebral cortical surfaces demonstrate no definite signal abnormality, and have not changed since the prior exam.    Posterior fossa parenchyma also is without definite abnormal signal, though possibly there is a small remote lacunar infarction of the inferior left cerebellum. More likely, this represents tangential imaging artifact, and was also present on the prior  exam, having not appreciably changed. There is also a minimal amount of hyperintense T2 signal in the pontine white matter, also similar to the prior exam, also likely representing minimal small vessel disease sequelae. There are no areas of pathologic contrast enhancement within the brain parenchyma. The internal auditory canals are symmetric. Cerebellopontine angles are widely patent bilaterally. Normal contour of the cisternal seventh and eighth cranial nerves demonstrated on both sides, neither having evidence of associated pathologic enhancement. No pathologic enhancement demonstrated in the internal auditory canal on either side. The middle ear cavities are within normal limits, and the labyrinthine structures also demonstrate normal characteristics. Very minimal amounts of fluid signal  demonstrated in the mastoid air cells bilaterally, nonspecific. No structural abnormality demonstrated that would indicate explanation for right side sensorineural hearing loss. There are normal characteristics of the ventricular system throughout. Remaining extra-axial spaces as well are within normal limits. Vascular structures are grossly satisfactory.     Mild cervical spine degenerative changes are implied in the limited detail of the cervical canal, though there is no acute or aggressive finding appreciated. Sella contents are grossly unremarkable. The paranasal sinuses are grossly clear. Impression  NO EVIDENCE OF ACUTE BRAIN PROCESS; ESSENTIALLY STABLE EXAM DEMONSTRATED, THOUGH THE PREVIOUSLY DEMONSTRATED WHITE MATTER ABNORMALITIES ARE SLIGHTLY MORE CONSPICUOUS, STILL CONSISTENT WITH MINIMAL SMALL VESSEL DISEASE SEQUELAE. NO STRUCTURAL  ABNORMALITY TO SUGGEST EXPLANATION FOR RIGHT-SIDED HEARING LOSS/TINNITUS. OTHER INCIDENTAL FINDINGS AS DISCUSSED. **This report has been created using voice recognition software. It may contain minor errors which are inherent in voice recognition technology. **          Final report electronically signed by Dr. Cali Levin on 12/16/2016 11:08 PM    30 day cardiac event monitor done 11/1/22:  CONCLUSIONS:  1. Sinus rhythm. 2.  Intermittent episodes of supraventricular tachycardia with episodes  of wide complex tachycardia as described above. 3.  No pauses. 4.  Abnormal event monitor. Zack Tran M.D. Component Ref Range & Units 11/17/22 1530 6/10/19 1621   Vitamin B-12 211 - 911 pg/mL 1240 High   836 R, CM    Folate 4.8 - 24.2 ng/mL > 20.0  18.4 R         11/17/22 1530      Vitamin B6, Plasma 20.0 - 125.0 nmol/L 41.4      Assessment:     Diagnosis Orders   1. Kulwant Stiles MD, Neuro Intervention, [facility] [place] [facility] [place]      2. Frequent falls  [address], MD, Neuro Intervention, [place] [facility] [place]      3. Sensory ataxia        4. Carotid stenosis, right  [address], MD, Neuro Intervention, [place] [facility] [place]           He reports his dizziness is some better. He denies any falls. He is pending MRI brain to be done next week. CTA head and neck done that showed 70% stenosis in the right ICA, 50% on the left ICA. His cardiac event monitor done 11/1/22 Intermittent episodes of supraventricular tachycardia with episodes of wide complex tachycardia as described above. No pauses.  He is following with cardiology and recently had his medications adjusted which has helped with his dizziness. I shared with the patient and his wife that his lab work checking vitamin levels were within acceptable range. After detailed discussion with patient and his wife we agreed on the following plan. Follow up for dizziness, falls, sensory ataxia, right ICA stenosis. He reports one fall since last visit here is 12/2022. MRI brain 12/19/2022 shows mild atrophy, small old left basal ganglia hge. He is steady with his ambulation using. He was not using the cane when he had the fall. He has not followed with the interventional group re the 70% right ICA stenosis. He denies any change in vision. He is guarded when he ambulates, he is using a cane today. I reviewed the MRI Brain 12/19/2022, and agree with interpretation, I also reviewed the patient pertinent labs and records in the EHR and from other providers. After detailed discussion with patient we agreed on the following plan. Plan:  Continue with Plavix and Eliquis  Continue with lipid lowering medication target LDL below 70  Follow up with cardiology re: abnormal cardiac event florecitaor  Referral to interventional neurology re: 70% stenosis in the right ICA  Continue using your cane. Follow up in 4 months or sooner if needed. Call if any questions or concerns.     Total time 32 min    Mitesh Kidd MD

## 2023-02-13 NOTE — PATIENT INSTRUCTIONS
Continue with Plavix and Eliquis  Continue with lipid lowering medication target LDL below 70  Follow up with cardiology re: abnormal cardiac event baudilio  Referral to interventional neurology re: 70% stenosis in the right ICA  Continue using your cane. Follow up in 4 months or sooner if needed. Call if any questions or concerns.

## 2023-02-14 ENCOUNTER — OFFICE VISIT (OUTPATIENT)
Dept: NEUROLOGY | Age: 68
End: 2023-02-14

## 2023-02-14 VITALS
WEIGHT: 153 LBS | OXYGEN SATURATION: 99 % | HEIGHT: 68 IN | DIASTOLIC BLOOD PRESSURE: 70 MMHG | HEART RATE: 56 BPM | SYSTOLIC BLOOD PRESSURE: 134 MMHG | BODY MASS INDEX: 23.19 KG/M2

## 2023-02-14 DIAGNOSIS — I65.23 BILATERAL CAROTID ARTERY STENOSIS: Primary | ICD-10-CM

## 2023-02-14 ASSESSMENT — ENCOUNTER SYMPTOMS
TROUBLE SWALLOWING: 0
SHORTNESS OF BREATH: 0
PHOTOPHOBIA: 0
NAUSEA: 0
VOMITING: 0
COUGH: 0
COLOR CHANGE: 0
DIARRHEA: 0

## 2023-02-14 NOTE — PROGRESS NOTES
Outpatient Interventional Neurology Progress Note    Date:2/14/2023         Patient Name:Mumtaz Vergara Last     YOB: 1955     Age:67 y.o.    CC: Carotid Stenosis     Subjective      Jr Mendez is a 68-year-old  male with past medical history significant for CAD, HTN, type 2 diabetes, pacemaker and A-fib on Eliquis and Plavix who presents to outpatient interventional neurology clinic for evaluation of right internal carotid artery stenosis. Patient was seeing outpatient neurology for dizziness, frequent falls and sensory ataxia. He reports that his dizziness usually occurs when standing and is not characterized by room spinning, but rather by a feeling of lightheadedness. Patient does report 1 previous neuro event which was in 2019 after a stent procedure, he was having double vision and difficulty moving. He was evaluated at [facility] clinic where he was found to have a negative brain MRI and carotid ultrasound demonstrating 50 to 69% stenosis in the right ICA and 39 to 50% stenosis in the left ICA. Most recent CTA read as 70% stenosis of the right internal carotid artery. Review of Systems   Review of Systems   Constitutional:  Negative for activity change, fatigue and fever. HENT:  Negative for tinnitus and trouble swallowing. Eyes:  Negative for photophobia and visual disturbance. Respiratory:  Negative for cough and shortness of breath. Cardiovascular:  Negative for chest pain and palpitations. Gastrointestinal:  Negative for diarrhea, nausea and vomiting. Genitourinary:  Negative for dysuria and flank pain. Musculoskeletal:  Negative for neck pain and neck stiffness. Skin:  Negative for color change and rash. Neurological:  Positive for light-headedness. Negative for dizziness, facial asymmetry, speech difficulty, weakness, numbness and headaches. Psychiatric/Behavioral:  Negative for agitation and confusion.       Medications     Current Outpatient Medications   Medication Sig Dispense Refill    hydrALAZINE (APRESOLINE) 25 MG tablet TAKE 1 TABLET BY MOUTH THREE TIMES DAILY 270 tablet 2    NOVOLOG FLEXPEN 100 UNIT/ML injection pen Inject 5 units plus group 2 Sliding scale into the skin 3x daily before meals. Max daily dose 51 units 10 Adjustable Dose Pre-filled Pen Syringe 1    insulin detemir (LEVEMIR FLEXTOUCH) 100 UNIT/ML injection pen Inject 36 Units into the skin every morning 10 Adjustable Dose Pre-filled Pen Syringe 1    apixaban (ELIQUIS) 5 MG TABS tablet Take 1 tablet by mouth 2 times daily 180 tablet 3    DULoxetine (CYMBALTA) 60 MG extended release capsule TAKE 1 CAPSULE BY MOUTH TWICE DAILY      sotalol (BETAPACE) 120 MG tablet Take 1 tablet by mouth 2 times daily 180 tablet 1    buPROPion (WELLBUTRIN XL) 150 MG extended release tablet TAKE 1 TABLET BY MOUTH ONCE DAILY      atorvastatin (LIPITOR) 80 MG tablet Take 1 tablet by mouth daily 90 tablet 3    clopidogrel (PLAVIX) 75 MG tablet Take 1 tablet by mouth daily 90 tablet 3    irbesartan (AVAPRO) 300 MG tablet TAKE 1 TABLET BY MOUTH ONCE DAILY 90 tablet 3    isosorbide mononitrate (IMDUR) 60 MG extended release tablet Take 1 tablet by mouth daily 90 tablet 3    nitroGLYCERIN (NITROSTAT) 0.4 MG SL tablet Place 1 tablet under the tongue every 5 minutes as needed for Chest pain 25 tablet 3    hydroCHLOROthiazide (HYDRODIURIL) 12.5 MG tablet TAKE 1/2 (ONE-HALF) TABLET BY MOUTH ONCE DAILY      Multiple Vitamins-Minerals (THERAPEUTIC MULTIVITAMIN-MINERALS) tablet Take 1 tablet by mouth daily      sucralfate (CARAFATE) 1 GM tablet Take 1 g by mouth 4 times daily      pantoprazole (PROTONIX) 40 MG tablet Take 40 mg by mouth 2 times daily      Coenzyme Q10 (CO Q-10) 200 MG CAPS Take 1 capsule by mouth daily        No current facility-administered medications for this visit.         Past History    Past Medical History:   has a past medical history of Alcoholism ([address] Alopecia, Angina at rest [facility]), CAD (coronary artery disease), Depression, Esophageal cancer ([facility] Gastroparesis, GERD (gastroesophageal reflux disease), Hyperlipidemia, Hypertension, Hypothyroidism, Kidney stones, medtronic dual pacer , Migraines, Osteoarthritis, PVD (peripheral vascular disease) ([facility] [address] and [facility] Social History:   reports that he quit smoking about 10 years ago. His smoking use included cigars and cigarettes. He has a 40.00 pack-year smoking history. He has never used smokeless tobacco. He reports that he does not drink alcohol and does not use drugs. Family History:   Family History   Problem Relation Age of Onset    Heart Disease Mother     Diabetes Mother     Heart Disease Father     Heart Attack Father     Early Death Father 46         at 46    Cancer Sister         Breast   52's    Cancer Sister         covid    Cancer Brother         Bone    Early Death Brother 52         52    Early Death Brother     Heart Disease Brother     Diabetes Brother        Physical Examination      Vitals:  /70 (Site: Left Upper Arm, Position: Sitting, Cuff Size: Large Adult)   Pulse 56   Ht 5' 7.99\" (1.727 m)   Wt 153 lb (69.4 kg)   SpO2 99%   BMI 23.27 kg/m²     Physical Exam  Vitals reviewed. Constitutional:       Appearance: Normal appearance. HENT:      Head: Normocephalic and atraumatic. Right Ear: External ear normal.      Left Ear: External ear normal.      Nose: Nose normal.      Mouth/Throat:      Mouth: Mucous membranes are moist.      Pharynx: Oropharynx is clear. Eyes:      Extraocular Movements: Extraocular movements intact and EOM normal.      Pupils: Pupils are equal, round, and reactive to light. Cardiovascular:      Rate and Rhythm: Normal rate and regular rhythm. Pulmonary:      Effort: Pulmonary effort is normal. No respiratory distress. Abdominal:      General: There is no distension. Palpations: Abdomen is soft. Tenderness:  There is no abdominal tenderness. Musculoskeletal:         General: No deformity or signs of injury. Cervical back: No rigidity or tenderness. Skin:     General: Skin is warm and dry. Neurological:      Mental Status: He is alert and oriented to person, place, and time. Coordination: Finger-Nose-Finger Test and Heel to [place] jackelyn Test normal.      Deep Tendon Reflexes:      Reflex Scores:       Bicep reflexes are 2+ on the right side and 2+ on the left side. Brachioradialis reflexes are 2+ on the right side and 2+ on the left side. Patellar reflexes are 1+ on the right side and 1+ on the left side. Psychiatric:         Mood and Affect: Mood normal.         Speech: Speech normal.         Behavior: Behavior normal.         Thought Content: Thought content normal.     Neurologic Exam     Mental Status   Oriented to person, place, and time. Follows 2 step commands. Attention: normal.   Speech: speech is normal   Level of consciousness: alert  Knowledge: good. Able to name object. Able to read. Able to repeat. Cranial Nerves     CN II   Visual fields full to confrontation. CN III, IV, VI   Pupils are equal, round, and reactive to light. Extraocular motions are normal.     CN V   Facial sensation intact. CN VII   Facial expression full, symmetric.      CN VIII   Hearing: impaired    CN IX, X   CN IX normal.   Palate: symmetric    CN XI   CN XI normal.   Right sternocleidomastoid strength: normal  Left sternocleidomastoid strength: normal  Right trapezius strength: normal  Left trapezius strength: normal    CN XII   CN XII normal.   Tongue: not atrophic    Motor Exam   Muscle bulk: normal  Overall muscle tone: normal  Strength:  RUE 5/5  LUE 4/5  BLE 4/5     Sensory Exam   Light touch normal.     Gait, Coordination, and Reflexes     Coordination   Finger to nose coordination: normal  Heel to shin coordination: normal    Reflexes   Right brachioradialis: 2+  Left brachioradialis: 2+  Right biceps: 2+  Left biceps: 2+  Right patellar: 1+  Left patellar: 1+     Labs/Imaging/Diagnostics   Labs:  CBC:No results for input(s): WBC, RBC, HGB, HCT, MCV, RDW, PLT in the last 72 hours. CHEMISTRIES:No results for input(s): NA, K, CL, CO2, BUN, CREATININE, GLUCOSE, CA, PHOS, MG in the last 72 hours. PT/INR:No results for input(s): PROTIME, INR in the last 72 hours. APTT:No results for input(s): APTT in the last 72 hours. LIVER PROFILE:No results for input(s): AST, ALT, BILIDIR, BILITOT, ALKPHOS in the last 72 hours. Imaging Last 24 Hours:      Assessment and Plan            Bilateral Carotid Stenosis, asymptomatic  CTA H&N- stable since previous study in 3/2021. Calcified plaque at origin of right ICA, stenosis thought to be less than 70%. Left internal carotid artery origin less than 50% stenosis. MRI brain no acute or chronic infarcts visualized  No need for intervention at this time  Continue Eliquis plus Plavix  Follow-up with neuro interventional clinic in 6 months with carotid ultrasound prior to visit. This patient was seen and evaluated with Dr. Cynthia Squires and he is in agreement with the assessment and plan.     Electronically signed by Adi Noonan PA-C on 2/14/23 at 6:10 PM EST

## 2023-02-20 ENCOUNTER — TELEPHONE (OUTPATIENT)
Dept: NEUROLOGY | Age: 68
End: 2023-02-20

## 2023-02-20 DIAGNOSIS — I65.23 BILATERAL CAROTID ARTERY STENOSIS: Primary | ICD-10-CM

## 2023-02-24 ENCOUNTER — PROCEDURE VISIT (OUTPATIENT)
Dept: CARDIOLOGY CLINIC | Age: 68
End: 2023-02-24

## 2023-02-24 ENCOUNTER — NURSE ONLY (OUTPATIENT)
Dept: LAB | Age: 68
End: 2023-02-24

## 2023-02-24 DIAGNOSIS — Z95.0 PACEMAKER: Primary | ICD-10-CM

## 2023-02-24 DIAGNOSIS — E21.3 HYPERPARATHYROIDISM (HCC): ICD-10-CM

## 2023-02-24 LAB
ANION GAP SERPL CALC-SCNC: 13 MEQ/L (ref 8–16)
BUN SERPL-MCNC: 26 MG/DL (ref 7–22)
CALCIUM SERPL-MCNC: 11.1 MG/DL (ref 8.5–10.5)
CHLORIDE SERPL-SCNC: 99 MEQ/L (ref 98–111)
CO2 SERPL-SCNC: 25 MEQ/L (ref 23–33)
CREAT SERPL-MCNC: 1.4 MG/DL (ref 0.4–1.2)
GFR SERPL CREATININE-BSD FRML MDRD: 54 ML/MIN/1.73M2
GLUCOSE SERPL-MCNC: 370 MG/DL (ref 70–108)
POTASSIUM SERPL-SCNC: 4.5 MEQ/L (ref 3.5–5.2)
PTH-INTACT SERPL-MCNC: 156.7 PG/ML (ref 15–65)
SODIUM SERPL-SCNC: 137 MEQ/L (ref 135–145)

## 2023-02-24 NOTE — PROGRESS NOTES
[employer] Medtronic Dual Pacemaker   Patient of Baki    Battery 10.2 years    Presenting rhythm APVS    A Impedance 456  RV Impedance 608    P wave sensing 1.9  R wave sensing 17.8    A Threshold 0.625 @ 0.40  A Amplitude 1.5 @ 0.40  RV Thresholds 0.75 @ 0.40  RV Amplitude 2.0 @ 0.40      A Paced 92.9%  V Paced 0.6%    Programmed Mode AAIR <=> DDDR       Afib [place] <0.1%    Episodes   1/19/23- 2 runs SVT rates 154-- lasting 1 min 38 sec and 2 min 58 sec

## 2023-02-28 ENCOUNTER — NURSE ONLY (OUTPATIENT)
Dept: LAB | Age: 68
End: 2023-02-28

## 2023-02-28 DIAGNOSIS — E21.3 HYPERPARATHYROIDISM (HCC): ICD-10-CM

## 2023-02-28 LAB
CALCIUM 24H UR-MRATE: 62 MG/24HR (ref 100–240)
CALCIUM UR-MCNC: 4.4 MG/DL
CREAT 24H UR-MRATE: 1.1 GM/24HR
CREAT UR-MCNC: 81.2 MG/DL
HOURS COLLECTED: 24 HRS
SODIUM 24H UR-SRATE: 161 MEQ/24HR (ref 75–200)
SODIUM UR-SCNC: 115 MEQ/L
URINE VOLUME MEASURE: 1400 ML
URINE VOLUME, 24 HOUR: 1400 ML
URINE VOLUME, 24 HOUR: 1400 ML

## 2023-04-20 ENCOUNTER — OFFICE VISIT (OUTPATIENT)
Dept: INTERNAL MEDICINE CLINIC | Age: 68
End: 2023-04-20

## 2023-04-20 VITALS
BODY MASS INDEX: 22.82 KG/M2 | HEIGHT: 67 IN | WEIGHT: 145.4 LBS | HEART RATE: 76 BPM | TEMPERATURE: 97.6 F | DIASTOLIC BLOOD PRESSURE: 74 MMHG | SYSTOLIC BLOOD PRESSURE: 138 MMHG

## 2023-04-20 DIAGNOSIS — Z79.4 TYPE 2 DIABETES MELLITUS WITH OTHER SPECIFIED COMPLICATION, WITH LONG-TERM CURRENT USE OF INSULIN (HCC): Primary | ICD-10-CM

## 2023-04-20 DIAGNOSIS — E11.69 TYPE 2 DIABETES MELLITUS WITH OTHER SPECIFIED COMPLICATION, WITH LONG-TERM CURRENT USE OF INSULIN (HCC): Primary | ICD-10-CM

## 2023-04-20 LAB — HBA1C MFR BLD: 8.8 % (ref 4.3–5.7)

## 2023-04-20 PROCEDURE — NBSRV NON-BILLABLE SERVICE: Performed by: REGISTERED NURSE

## 2023-05-25 ENCOUNTER — NURSE ONLY (OUTPATIENT)
Dept: LAB | Age: 68
End: 2023-05-25

## 2023-05-25 DIAGNOSIS — E11.65 TYPE 2 DIABETES MELLITUS WITH HYPERGLYCEMIA, WITH LONG-TERM CURRENT USE OF INSULIN (HCC): ICD-10-CM

## 2023-05-25 DIAGNOSIS — E83.52 HYPERCALCEMIA: ICD-10-CM

## 2023-05-25 DIAGNOSIS — Z79.4 TYPE 2 DIABETES MELLITUS WITH HYPERGLYCEMIA, WITH LONG-TERM CURRENT USE OF INSULIN (HCC): ICD-10-CM

## 2023-05-25 DIAGNOSIS — E21.3 HYPERPARATHYROIDISM (HCC): ICD-10-CM

## 2023-05-25 LAB
25(OH)D3 SERPL-MCNC: 38 NG/ML (ref 30–100)
ALBUMIN SERPL BCG-MCNC: 4 G/DL (ref 3.5–5.1)
ALP SERPL-CCNC: 84 U/L (ref 38–126)
ALT SERPL W/O P-5'-P-CCNC: 33 U/L (ref 11–66)
ANION GAP SERPL CALC-SCNC: 11 MEQ/L (ref 8–16)
AST SERPL-CCNC: 35 U/L (ref 5–40)
BILIRUB SERPL-MCNC: 0.5 MG/DL (ref 0.3–1.2)
BUN SERPL-MCNC: 33 MG/DL (ref 7–22)
CALCIUM SERPL-MCNC: 10.7 MG/DL (ref 8.5–10.5)
CHLORIDE SERPL-SCNC: 107 MEQ/L (ref 98–111)
CO2 SERPL-SCNC: 24 MEQ/L (ref 23–33)
CREAT SERPL-MCNC: 1.1 MG/DL (ref 0.4–1.2)
FOLATE SERPL-MCNC: > 20 NG/ML (ref 4.8–24.2)
GFR SERPL CREATININE-BSD FRML MDRD: > 60 ML/MIN/1.73M2
GLUCOSE SERPL-MCNC: 193 MG/DL (ref 70–108)
MAGNESIUM SERPL-MCNC: 2.3 MG/DL (ref 1.6–2.4)
POTASSIUM SERPL-SCNC: 4.8 MEQ/L (ref 3.5–5.2)
PROT SERPL-MCNC: 6.5 G/DL (ref 6.1–8)
PTH-INTACT SERPL-MCNC: 134.4 PG/ML (ref 15–65)
SODIUM SERPL-SCNC: 142 MEQ/L (ref 135–145)
VIT B12 SERPL-MCNC: 1270 PG/ML (ref 211–911)

## 2023-06-05 RX ORDER — CLOPIDOGREL BISULFATE 75 MG/1
75 TABLET ORAL DAILY
Qty: 90 TABLET | Refills: 3 | Status: SHIPPED | OUTPATIENT
Start: 2023-06-05

## 2023-06-05 RX ORDER — SOTALOL HYDROCHLORIDE 120 MG/1
120 TABLET ORAL 2 TIMES DAILY
Qty: 180 TABLET | Refills: 1 | Status: SHIPPED | OUTPATIENT
Start: 2023-06-05

## 2023-06-05 RX ORDER — ISOSORBIDE MONONITRATE 60 MG/1
60 TABLET, EXTENDED RELEASE ORAL DAILY
Qty: 90 TABLET | Refills: 3 | Status: SHIPPED | OUTPATIENT
Start: 2023-06-05

## 2023-06-05 RX ORDER — NITROGLYCERIN 0.4 MG/1
0.4 TABLET SUBLINGUAL EVERY 5 MIN PRN
Qty: 25 TABLET | Refills: 3 | Status: SHIPPED | OUTPATIENT
Start: 2023-06-05

## 2023-06-05 RX ORDER — HYDRALAZINE HYDROCHLORIDE 25 MG/1
TABLET, FILM COATED ORAL
Qty: 270 TABLET | Refills: 2 | Status: SHIPPED | OUTPATIENT
Start: 2023-06-05

## 2023-06-05 RX ORDER — ATORVASTATIN CALCIUM 80 MG/1
80 TABLET, FILM COATED ORAL DAILY
Qty: 90 TABLET | Refills: 3 | Status: SHIPPED | OUTPATIENT
Start: 2023-06-05

## 2023-06-05 NOTE — TELEPHONE ENCOUNTER
Tammy Mckeon called requesting a refill on the following medications:    PT HAS REFILLS AT [facility] BUT SWITCH TO MAIL ORDER DUE TO BEING CHEAPER  Requested Prescriptions     Pending Prescriptions Disp Refills    clopidogrel (PLAVIX) 75 MG tablet 90 tablet 3     Sig: Take 1 tablet by mouth daily    apixaban (ELIQUIS) 5 MG TABS tablet 180 tablet 3     Sig: Take 1 tablet by mouth 2 times daily    sotalol (BETAPACE) 120 MG tablet 180 tablet 1     Sig: Take 1 tablet by mouth 2 times daily    atorvastatin (LIPITOR) 80 MG tablet 90 tablet 3     Sig: Take 1 tablet by mouth daily    hydrALAZINE (APRESOLINE) 25 MG tablet 270 tablet 2     Sig: TAKE 1 TABLET BY MOUTH THREE TIMES DAILY    isosorbide mononitrate (IMDUR) 60 MG extended release tablet 90 tablet 3     Sig: Take 1 tablet by mouth daily    nitroGLYCERIN (NITROSTAT) 0.4 MG SL tablet 25 tablet 3     Sig: Place 1 tablet under the tongue every 5 minutes as needed for Chest pain     Pharmacy verified:  [facility]      Date of last visit: 12-22-22  Date of next visit (if applicable): [id number]

## 2023-06-09 ENCOUNTER — PROCEDURE VISIT (OUTPATIENT)
Dept: CARDIOLOGY CLINIC | Age: 68
End: 2023-06-09
Payer: MEDICARE

## 2023-06-09 DIAGNOSIS — Z95.0 PACEMAKER: Primary | ICD-10-CM

## 2023-06-09 PROCEDURE — 93296 REM INTERROG EVL PM/IDS: CPT | Performed by: NUCLEAR MEDICINE

## 2023-06-09 PROCEDURE — 93294 REM INTERROG EVL PM/LDLS PM: CPT | Performed by: NUCLEAR MEDICINE

## 2023-06-09 NOTE — PROGRESS NOTES
[facility] medtronic dual pacer     . Emeterio Oliveros Battery longevity:  9.6 years   Presenting rhythm  we've got either junctional or a huge AV delay of 440ms  AS VS  Programmed MVP    Atrial impedance 361  RV impedance 513    P wave sensing 1.3  R wave sensing 8.4    96.4 % atrial paced  0.7 % RV paced     Atrial threshold 0.625 V  at 0.4ms  RV threshold 0.875 V at 0.4ms  Mode switches   <0.1 eliquis

## 2023-06-14 ENCOUNTER — HOSPITAL ENCOUNTER (EMERGENCY)
Age: 68
Discharge: HOME | End: 2023-06-14
Payer: MEDICARE

## 2023-06-14 VITALS
RESPIRATION RATE: 16 BRPM | WEIGHT: 150 LBS | DIASTOLIC BLOOD PRESSURE: 60 MMHG | TEMPERATURE: 98.5 F | SYSTOLIC BLOOD PRESSURE: 101 MMHG | HEIGHT: 67 IN | BODY MASS INDEX: 23.54 KG/M2 | HEART RATE: 62 BPM | OXYGEN SATURATION: 99 %

## 2023-06-14 DIAGNOSIS — M62.838 SPASM OF MUSCLE: Primary | ICD-10-CM

## 2023-06-14 DIAGNOSIS — S39.012A STRAIN OF LUMBAR REGION, INITIAL ENCOUNTER: ICD-10-CM

## 2023-06-14 PROCEDURE — 99213 OFFICE O/P EST LOW 20 MIN: CPT | Performed by: NURSE PRACTITIONER

## 2023-06-14 PROCEDURE — 6370000000 HC RX 637 (ALT 250 FOR IP): Performed by: NURSE PRACTITIONER

## 2023-06-14 PROCEDURE — 99213 OFFICE O/P EST LOW 20 MIN: CPT

## 2023-06-14 RX ORDER — HYDROCODONE BITARTRATE AND ACETAMINOPHEN 5; 325 MG/1; MG/1
1 TABLET ORAL EVERY 6 HOURS PRN
COMMUNITY

## 2023-06-14 RX ORDER — CYCLOBENZAPRINE HCL 10 MG
10 TABLET ORAL ONCE
Status: COMPLETED | OUTPATIENT
Start: 2023-06-14 | End: 2023-06-14

## 2023-06-14 RX ORDER — CYCLOBENZAPRINE HCL 5 MG
5 TABLET ORAL 3 TIMES DAILY PRN
Qty: 15 TABLET | Refills: 0 | Status: SHIPPED | OUTPATIENT
Start: 2023-06-14 | End: 2023-06-19

## 2023-06-14 RX ORDER — KETOROLAC TROMETHAMINE 30 MG/ML
15 INJECTION, SOLUTION INTRAMUSCULAR; INTRAVENOUS ONCE
Status: DISCONTINUED | OUTPATIENT
Start: 2023-06-14 | End: 2023-06-14

## 2023-06-14 RX ADMIN — CYCLOBENZAPRINE 10 MG: 10 TABLET, FILM COATED ORAL at 16:51

## 2023-06-14 ASSESSMENT — ENCOUNTER SYMPTOMS
NAUSEA: 0
BACK PAIN: 1
VOMITING: 0

## 2023-06-14 ASSESSMENT — PAIN DESCRIPTION - ONSET: ONSET: SUDDEN

## 2023-06-14 ASSESSMENT — PAIN DESCRIPTION - DESCRIPTORS: DESCRIPTORS: STABBING;SHOOTING

## 2023-06-14 ASSESSMENT — PAIN DESCRIPTION - PAIN TYPE: TYPE: ACUTE PAIN

## 2023-06-14 ASSESSMENT — PAIN DESCRIPTION - ORIENTATION: ORIENTATION: RIGHT;LOWER

## 2023-06-14 ASSESSMENT — PAIN SCALES - GENERAL: PAINLEVEL_OUTOF10: 8

## 2023-06-14 ASSESSMENT — PAIN - FUNCTIONAL ASSESSMENT: PAIN_FUNCTIONAL_ASSESSMENT: 0-10

## 2023-06-14 ASSESSMENT — PAIN DESCRIPTION - LOCATION: LOCATION: BACK

## 2023-06-14 ASSESSMENT — PAIN DESCRIPTION - FREQUENCY: FREQUENCY: INTERMITTENT

## 2023-06-14 NOTE — ED PROVIDER NOTES
[facility]  Urgent Care Encounter       CHIEF COMPLAINT       Chief Complaint   Patient presents with    Back Pain     Lower right, fell on 5/26       Nurses Notes reviewed and I agree except as noted in the HPI. HISTORY OF PRESENT ILLNESS   Lisa Correa is a 77 y.o. male who presents with worsening lower back pain with spasms. Patient reports falling on 5/26/23 to his left side and rolling down a hill. Since that time he has complained of left-sided rib and back discomfort. However, greater than 3 days ago he began to have spasms in his right lower back. Admits to improvement when laying down. Denies any numbness, tingling, or radiation of pain. Was given Norco by his PCP for discomfort. Has not taken since Sunday. Complains of pain 10 out of 10. Has also tried lidocaine patches and Tylenol. The history is provided by the patient and the spouse. REVIEW OF SYSTEMS     Review of Systems   Constitutional:  Negative for activity change. Cardiovascular:  Negative for chest pain. Gastrointestinal:  Negative for nausea and vomiting. Musculoskeletal:  Positive for back pain and gait problem. Skin:  Negative for rash. Neurological:  Negative for weakness and numbness.      PAST MEDICAL HISTORY         Diagnosis Date    Alcoholism ([facility]      quit in the 80    Alopecia     Angina at rest [facility])     CAD (coronary artery disease)      self , stents     Depression     Esophageal cancer (HCC)     Gastroparesis     GERD (gastroesophageal reflux disease)     Hyperlipidemia     self and family    Hypertension     self and family    Hypothyroidism     family    Kidney stones     medtronic dual pacer  04/20/2021    Migraines     self and family    Osteoarthritis     self and family (in back)    PVD (peripheral vascular disease) ([address]     Type II diabetes mellitus ([address]     self and family       SURGICALHISTORY     Patient  has a past surgical history that includes Esophagectomy;

## 2023-06-16 ENCOUNTER — OFFICE VISIT (OUTPATIENT)
Dept: NEUROLOGY | Age: 68
End: 2023-06-16
Payer: MEDICARE

## 2023-06-16 VITALS
HEIGHT: 67 IN | OXYGEN SATURATION: 99 % | DIASTOLIC BLOOD PRESSURE: 80 MMHG | BODY MASS INDEX: 22.44 KG/M2 | SYSTOLIC BLOOD PRESSURE: 115 MMHG | WEIGHT: 143 LBS | HEART RATE: 54 BPM

## 2023-06-16 DIAGNOSIS — I65.21 STENOSIS OF RIGHT CAROTID ARTERY: ICD-10-CM

## 2023-06-16 DIAGNOSIS — R27.8 SENSORY ATAXIA: ICD-10-CM

## 2023-06-16 DIAGNOSIS — R42 DIZZINESS: Primary | ICD-10-CM

## 2023-06-16 DIAGNOSIS — R29.6 FREQUENT FALLS: ICD-10-CM

## 2023-06-16 PROCEDURE — G8427 DOCREV CUR MEDS BY ELIG CLIN: HCPCS | Performed by: NURSE PRACTITIONER

## 2023-06-16 PROCEDURE — 99213 OFFICE O/P EST LOW 20 MIN: CPT | Performed by: NURSE PRACTITIONER

## 2023-06-16 PROCEDURE — 1124F ACP DISCUSS-NO DSCNMKR DOCD: CPT | Performed by: NURSE PRACTITIONER

## 2023-06-16 PROCEDURE — 1036F TOBACCO NON-USER: CPT | Performed by: NURSE PRACTITIONER

## 2023-06-16 PROCEDURE — 3017F COLORECTAL CA SCREEN DOC REV: CPT | Performed by: NURSE PRACTITIONER

## 2023-06-16 PROCEDURE — 3078F DIAST BP <80 MM HG: CPT | Performed by: NURSE PRACTITIONER

## 2023-06-16 PROCEDURE — G8420 CALC BMI NORM PARAMETERS: HCPCS | Performed by: NURSE PRACTITIONER

## 2023-06-16 PROCEDURE — 3074F SYST BP LT 130 MM HG: CPT | Performed by: NURSE PRACTITIONER

## 2023-06-21 ENCOUNTER — OFFICE VISIT (OUTPATIENT)
Dept: INTERNAL MEDICINE CLINIC | Age: 68
End: 2023-06-21

## 2023-06-21 VITALS
TEMPERATURE: 97.8 F | HEART RATE: 64 BPM | HEIGHT: 67 IN | WEIGHT: 144.4 LBS | BODY MASS INDEX: 22.66 KG/M2 | SYSTOLIC BLOOD PRESSURE: 102 MMHG | DIASTOLIC BLOOD PRESSURE: 58 MMHG

## 2023-06-21 DIAGNOSIS — E11.69 TYPE 2 DIABETES MELLITUS WITH OTHER SPECIFIED COMPLICATION, WITH LONG-TERM CURRENT USE OF INSULIN (HCC): Primary | ICD-10-CM

## 2023-06-21 DIAGNOSIS — Z79.4 TYPE 2 DIABETES MELLITUS WITH OTHER SPECIFIED COMPLICATION, WITH LONG-TERM CURRENT USE OF INSULIN (HCC): Primary | ICD-10-CM

## 2023-06-21 NOTE — PROGRESS NOTES
[facility]  [facility] [address] [facility], [address]  [phone] (phone)  [phone] (fax)    Patient ID: Jamee Garcia 1955  Referring Provider: Dr. Jose Pike     Patient's name and  were verified. Subjective:    He presents for a follow-up diabetic visit. He has type 2  diabetes mellitus. Home regimen includes: Insulin He is compliant some of the time. Assessment:     Lab Results   Component Value Date/Time    LABA1C 8.8 2023 08:03 AM    LABA1C 8.2 2022 12:00 AM    BUN 33 2023 12:29 PM    CREATININE 1.1 2023 12:29 PM     Vitals:    23 1843 23 1844   BP: (!) 104/56 (!) 102/58   Site: Right Upper Arm Left Upper Arm   Position: Sitting Sitting   Pulse: 64    Temp: 97.8 °F (36.6 °C)    Weight: 144 lb 6.4 oz (65.5 kg)    Height: 5' 7\" (1.702 m)      Wt Readings from Last 3 Encounters:   23 144 lb 6.4 oz (65.5 kg)   23 143 lb (64.9 kg)   23 150 lb (68 kg)     Ht Readings from Last 3 Encounters:   23 5' 7\" (1.702 m)   23 5' 7\" (1.702 m)   23 5' 7\" (1.702 m)       Diabetes Pharmacotherapy:  Levemir 19 units morning and night  Novolog 8 units with meals plus scale              Only takes 6 units with lunch if going outside in the aftrenoon    Glucose Trends:   Current monitoring regimen: Fingerstick blood tests - 2 times daily. Charlotte Brody and his wife have been unable to get their [address] working-phone won't scan. Sample [address] given per Dr. Marky Barrios cannot get phone to scan  Home blood sugar trends:    -Fasting AM: 23,   -Before lunch:    -Before dinner:    -Bedtime: 219-367  Any episodes of hypoglycemia?  yes -2-3 times per week   - highest risk while working outdoors in the afternoon             -treats with juice or [employer] protein drink  Lifestyle Factors:   Previous visit with dietician: remote  Current diet: B: sausage croisant            L: not hungry;skips several day per week

## 2023-06-21 NOTE — TELEPHONE ENCOUNTER
Patient was in for an appointment with Radha Peters RN, CDE and was having issues with his phone to scan the [facility] 2 Sensor. I believe that his phone is not compatible to scan the sensor. Please sign the script for the [facility] 2 [place].  Thank you

## 2023-06-21 NOTE — PATIENT INSTRUCTIONS
Focus on getting lunch every day    \"Not hungry\" --offer it anyway.  Focus on foods you like  Cut Novolog dose at lunch time to 5-6  units for carbs when                      Going outside afterwards  When blood sugar is low --treat the low BS and give Novolog for            Your meal  Meals should have 2-3 servings of carbohydrate in them  Go home and get Mon[place] Rain 2 [address]entín open and start new sensor          --if unable to start call the clinic or call [employer]!!

## 2023-06-26 RX ORDER — BLOOD-GLUCOSE METER
1 EACH MISCELLANEOUS ONCE
Qty: 1 KIT | Refills: 0 | Status: SHIPPED | OUTPATIENT
Start: 2023-06-26 | End: 2023-06-26

## 2023-07-11 ENCOUNTER — HOSPITAL ENCOUNTER (OUTPATIENT)
Dept: INTERVENTIONAL RADIOLOGY/VASCULAR | Age: 68
Discharge: HOME | End: 2023-07-11
Payer: MEDICARE

## 2023-07-11 DIAGNOSIS — I65.23 BILATERAL CAROTID ARTERY STENOSIS: ICD-10-CM

## 2023-07-11 PROCEDURE — 93880 EXTRACRANIAL BILAT STUDY: CPT

## 2023-07-13 RX ORDER — IRBESARTAN 300 MG/1
TABLET ORAL
Qty: 90 TABLET | Refills: 3 | Status: SHIPPED | OUTPATIENT
Start: 2023-07-13

## 2023-07-28 ENCOUNTER — HOSPITAL ENCOUNTER (EMERGENCY)
Age: 68
Discharge: HOME | End: 2023-07-28
Payer: MEDICARE

## 2023-07-28 VITALS
DIASTOLIC BLOOD PRESSURE: 69 MMHG | OXYGEN SATURATION: 100 % | TEMPERATURE: 98.4 F | HEART RATE: 60 BPM | RESPIRATION RATE: 20 BRPM | SYSTOLIC BLOOD PRESSURE: 141 MMHG

## 2023-07-28 DIAGNOSIS — E16.2 HYPOGLYCEMIA: Primary | ICD-10-CM

## 2023-07-28 DIAGNOSIS — S61.213A LACERATION OF MIDDLE FINGER OF LEFT HAND WITHOUT COMPLICATION, INITIAL ENCOUNTER: ICD-10-CM

## 2023-07-28 LAB
BUN BLD-MCNC: 24 MG/DL (ref 8–26)
CHLORIDE BLD-SCNC: 106 MEQ/L (ref 98–109)
CO2 BLD CALC-SCNC: 22 MEQ/L (ref 23–33)
CREAT BLD-MCNC: 1.4 MG/DL (ref 0.5–1.2)
ERYTHROCYTE [DISTWIDTH] IN BLOOD BY AUTOMATED COUNT: 14.8 % (ref 11.5–14.5)
GFR SERPL CREATININE-BSD FRML MDRD: 54 ML/MIN/1.73M2
GLUCOSE BLD STRIP.AUTO-MCNC: 289 MG/DL (ref 70–108)
GLUCOSE BLD STRIP.AUTO-MCNC: 306 MG/DL (ref 70–108)
GLUCOSE BLD-MCNC: 327 MG/DL (ref 70–108)
HCT VFR BLD AUTO: 36.9 % (ref 42–52)
HGB BLD-MCNC: 12.4 GM/DL (ref 14–18)
MCH RBC QN AUTO: 26.4 PG (ref 27–31)
MCHC RBC AUTO-ENTMCNC: 33.6 GM/DL (ref 33–37)
MCV RBC AUTO: 79 FL (ref 80–94)
PLATELET # BLD AUTO: 346 THOU/MM3 (ref 130–400)
PMV BLD AUTO: 10.3 FL (ref 7.4–10.4)
POTASSIUM BLD-SCNC: 4.8 MEQ/L (ref 3.5–4.9)
RBC # BLD AUTO: 4.69 MILL/MM3 (ref 4.7–6.1)
SODIUM BLD-SCNC: 138 MEQ/L (ref 138–146)
WBC # BLD AUTO: 8.7 THOU/MM3 (ref 4.8–10.8)

## 2023-07-28 PROCEDURE — 90715 TDAP VACCINE 7 YRS/> IM: CPT | Performed by: EMERGENCY MEDICINE

## 2023-07-28 PROCEDURE — 80051 ELECTROLYTE PANEL: CPT

## 2023-07-28 PROCEDURE — 2500000003 HC RX 250 WO HCPCS: Performed by: EMERGENCY MEDICINE

## 2023-07-28 PROCEDURE — 85027 COMPLETE CBC AUTOMATED: CPT

## 2023-07-28 PROCEDURE — 82565 ASSAY OF CREATININE: CPT

## 2023-07-28 PROCEDURE — 84520 ASSAY OF UREA NITROGEN: CPT

## 2023-07-28 PROCEDURE — 82947 ASSAY GLUCOSE BLOOD QUANT: CPT

## 2023-07-28 PROCEDURE — 93005 ELECTROCARDIOGRAM TRACING: CPT | Performed by: EMERGENCY MEDICINE

## 2023-07-28 PROCEDURE — 6370000000 HC RX 637 (ALT 250 FOR IP): Performed by: EMERGENCY MEDICINE

## 2023-07-28 PROCEDURE — 99213 OFFICE O/P EST LOW 20 MIN: CPT

## 2023-07-28 PROCEDURE — 82948 REAGENT STRIP/BLOOD GLUCOSE: CPT

## 2023-07-28 PROCEDURE — 99215 OFFICE O/P EST HI 40 MIN: CPT | Performed by: EMERGENCY MEDICINE

## 2023-07-28 PROCEDURE — 12001 RPR S/N/AX/GEN/TRNK 2.5CM/<: CPT | Performed by: EMERGENCY MEDICINE

## 2023-07-28 PROCEDURE — 90471 IMMUNIZATION ADMIN: CPT | Performed by: EMERGENCY MEDICINE

## 2023-07-28 PROCEDURE — 6360000002 HC RX W HCPCS: Performed by: EMERGENCY MEDICINE

## 2023-07-28 RX ORDER — BACITRACIN ZINC 500 [USP'U]/G
OINTMENT TOPICAL 2 TIMES DAILY
Status: DISCONTINUED | OUTPATIENT
Start: 2023-07-28 | End: 2023-07-28 | Stop reason: HOSPADM

## 2023-07-28 RX ORDER — LIDOCAINE HYDROCHLORIDE 10 MG/ML
2 INJECTION, SOLUTION EPIDURAL; INFILTRATION; INTRACAUDAL; PERINEURAL ONCE
Status: COMPLETED | OUTPATIENT
Start: 2023-07-28 | End: 2023-07-28

## 2023-07-28 RX ADMIN — BACITRACIN ZINC: 500 OINTMENT TOPICAL at 19:40

## 2023-07-28 RX ADMIN — LIDOCAINE HYDROCHLORIDE 2 ML: 10 INJECTION, SOLUTION EPIDURAL; INFILTRATION; INTRACAUDAL; PERINEURAL at 19:43

## 2023-07-28 RX ADMIN — TETANUS TOXOID, REDUCED DIPHTHERIA TOXOID AND ACELLULAR PERTUSSIS VACCINE, ADSORBED 0.5 ML: 5; 2.5; 8; 8; 2.5 SUSPENSION INTRAMUSCULAR at 19:25

## 2023-07-28 ASSESSMENT — ENCOUNTER SYMPTOMS
COUGH: 0
SHORTNESS OF BREATH: 0

## 2023-07-28 ASSESSMENT — PAIN - FUNCTIONAL ASSESSMENT: PAIN_FUNCTIONAL_ASSESSMENT: NONE - DENIES PAIN

## 2023-07-28 NOTE — ED TRIAGE NOTES
Pt with complaints of a left middle finger laceration and low blood sugar that started today. Caretaker accompanied pt with complaints of pt becoming combative and diaphoretic while using a  today. Caretaker states he cut his finger and was not allowing her to dress the area. States she gave him grape juice. BS noted at 306. Pt states he feels better. Denies any chest pain or shortness of breath. EKG complete.

## 2023-07-28 NOTE — ED PROVIDER NOTES
by: JAKY Yee CNP     Consent:     Consent obtained:  Verbal    Consent given by:  Patient    Risks, benefits, and alternatives were discussed: yes      Risks discussed:  Infection, need for additional repair, pain, poor cosmetic result, vascular damage, poor wound healing and nerve damage    Alternatives discussed:  No treatment  Universal protocol:     Procedure explained and questions answered to patient or proxy's satisfaction: yes      Patient identity confirmed:  Verbally with patient  Anesthesia:     Anesthesia method:  Local infiltration    Local anesthetic:  Lidocaine 1% w/o epi  Laceration details:     Location:  Finger    Finger location:  L long finger    Length (cm):  2  Pre-procedure details:     Preparation:  Patient was prepped and draped in usual sterile fashion  Exploration:     Limited defect created (wound extended): no      Wound extent: no foreign bodies/material noted, no muscle damage noted, no nerve damage noted, no tendon damage noted, no underlying fracture noted and no vascular damage noted      Contaminated: no    Treatment:     Area cleansed with:  Shdarien-Clens    Amount of cleaning:  Standard  Skin repair:     Repair method:  Sutures    Suture size:  4-0    Suture material:  Nylon    Suture technique:  Simple interrupted    Number of sutures:  4  Approximation:     Approximation:  Close  Repair type:     Repair type:  Simple  Post-procedure details:     Dressing:  Antibiotic ointment and non-adherent dressing    Procedure completion:  Tolerated well, no immediate complications     FINAL IMPRESSION      1. Hypoglycemia    2. Laceration of middle finger of left hand without complication, initial encounter          DISPOSITION/ PLAN   Patient presents for what was likely hypoglycemic state. Finger laceration. The patient's wife administered grape juice to the patient prior to arrival to get his blood sugar up.   He was confused, somewhat combative, and diaphoretic on arrival but quickly started becoming alert. His initial blood sugar at the urgent care was 306. I advised the wife and patient that he should be sent to the emergency department for further evaluation and monitoring but they declined transfer. They report that this is how he acts when his blood sugar goes low and that he is now improving. An EKG was performed and showed no significant changes. A CBC and BMP were drawn with IV start with reassuring results. Patient's blood glucose was similar in his BMP. Patient's finger laceration was repaired while the patient was being monitored. He also drank an entire protein shake with 30 g of protein while at the urgent care. His skin was pink warm and dry after 30 minutes at the urgent care. He remained alert and oriented with no neuro deficits. Patient was monitored for an hour and repeat blood sugar 289. The patient was again offered transfer to the emergency department to assure there is no other reason for his confused state but the patient and wife declined transfer. Patient will be discharged and advised to eat a meal when he gets home. The wife and patient will monitor his blood sugars closely this evening. They are advised to call 911 for any further episodes of hypoglycemia, confusion or any other concerns.         PATIENT REFERRED TO:  Tom Villanueva, DO  [address] / [facility] [address]      DISCHARGE MEDICATIONS:  New Prescriptions    No medications on file       Discontinued Medications    No medications on file       Current Discharge Medication List          JAKY Mauricio CNP    (Please note that portions of this note were completed with a voice recognition program. Efforts were made to edit the dictations but occasionally words are mis-transcribed.)           JAKY Mauricio CNP  07/29/23 7622

## 2023-07-29 LAB
EKG ATRIAL RATE: 66 BPM
EKG P-R INTERVAL: 266 MS
EKG Q-T INTERVAL: 422 MS
EKG QRS DURATION: 114 MS
EKG QTC CALCULATION (BAZETT): 442 MS
EKG R AXIS: 41 DEGREES
EKG T AXIS: -18 DEGREES
EKG VENTRICULAR RATE: 66 BPM

## 2023-07-29 PROCEDURE — 93010 ELECTROCARDIOGRAM REPORT: CPT | Performed by: INTERNAL MEDICINE

## 2023-07-29 NOTE — DISCHARGE INSTRUCTIONS
The wife is to monitor the patient closely this evening at home.   Call 911 for any further episodes of confusion or low blood sugar    Eat a full meal when you arrive at home    Wash the wound twice daily with soap and water, pat dry and apply antibiotic ointment    Follow-up with your endocrinologist for further management of your blood sugars

## 2023-07-29 NOTE — ED NOTES
with 20g of protein consumed since last blood sugar check.      Laureen Sullivan, SINA  04/39/32 [id number]

## 2023-08-22 ENCOUNTER — OFFICE VISIT (OUTPATIENT)
Dept: NEUROLOGY | Age: 68
End: 2023-08-22
Payer: MEDICARE

## 2023-08-22 VITALS
BODY MASS INDEX: 22.66 KG/M2 | DIASTOLIC BLOOD PRESSURE: 71 MMHG | HEART RATE: 71 BPM | WEIGHT: 144.4 LBS | HEIGHT: 67 IN | SYSTOLIC BLOOD PRESSURE: 120 MMHG

## 2023-08-22 DIAGNOSIS — I65.23 BILATERAL CAROTID ARTERY STENOSIS: Primary | ICD-10-CM

## 2023-08-22 PROCEDURE — 3074F SYST BP LT 130 MM HG: CPT | Performed by: NURSE PRACTITIONER

## 2023-08-22 PROCEDURE — 3078F DIAST BP <80 MM HG: CPT | Performed by: NURSE PRACTITIONER

## 2023-08-22 PROCEDURE — G8427 DOCREV CUR MEDS BY ELIG CLIN: HCPCS | Performed by: NURSE PRACTITIONER

## 2023-08-22 PROCEDURE — 1036F TOBACCO NON-USER: CPT | Performed by: NURSE PRACTITIONER

## 2023-08-22 PROCEDURE — 1124F ACP DISCUSS-NO DSCNMKR DOCD: CPT | Performed by: NURSE PRACTITIONER

## 2023-08-22 PROCEDURE — 99213 OFFICE O/P EST LOW 20 MIN: CPT | Performed by: NURSE PRACTITIONER

## 2023-08-22 PROCEDURE — G8420 CALC BMI NORM PARAMETERS: HCPCS | Performed by: NURSE PRACTITIONER

## 2023-08-22 PROCEDURE — 3017F COLORECTAL CA SCREEN DOC REV: CPT | Performed by: NURSE PRACTITIONER

## 2023-08-22 NOTE — PROGRESS NOTES
daily      pantoprazole (PROTONIX) 40 MG tablet Take 1 tablet by mouth 2 times daily      Coenzyme Q10 (CO Q-10) 200 MG CAPS Take 1 capsule by mouth daily       No current facility-administered medications on file prior to visit. PRN Meds:     Past History    Past Medical History:   has a past medical history of Alcoholism ([facility]), Alopecia, Angina at rest [facility]), CAD (coronary artery disease), Depression, Esophageal cancer ([facility]), Gastroparesis, GERD (gastroesophageal reflux disease), Hyperlipidemia, Hypertension, Hypothyroidism, Kidney stones, medtronic dual pacer , Migraines, Osteoarthritis, PVD (peripheral vascular disease) ([facility]), and Type II diabetes mellitus ([address] [place] [address]). Social History:   reports that he quit smoking about 11 years ago. His smoking use included cigars and cigarettes. He has a 40.00 pack-year smoking history. He has never used smokeless tobacco. He reports that he does not drink alcohol and does not use drugs. Family History:   Family History   Problem Relation Age of Onset    Heart Disease Mother     Diabetes Mother     Heart Disease Father     Heart Attack Father     Early Death Father 46         at 46    Cancer Sister         Breast   52's    Cancer Sister         covid    Cancer Brother         Bone    Early Death Brother 52         52    Early Death Brother     Heart Disease Brother     Diabetes Brother        Physical Examination      Vitals:  Vitals:    23 1327   BP: 120/71   Pulse: 71               Physical Exam  Vitals reviewed. Constitutional:       General: He is not in acute distress. Appearance: Normal appearance. He is not ill-appearing. HENT:      Head: Normocephalic and atraumatic. Right Ear: External ear normal.      Left Ear: External ear normal.      Nose: Nose normal.      Mouth/Throat:      Mouth: Mucous membranes are moist.      Pharynx: No oropharyngeal exudate or posterior oropharyngeal erythema.    Eyes:      Extraocular Movements: EOM

## 2023-09-22 ENCOUNTER — PROCEDURE VISIT (OUTPATIENT)
Dept: CARDIOLOGY CLINIC | Age: 68
End: 2023-09-22

## 2023-09-22 DIAGNOSIS — Z95.0 PACEMAKER: Primary | ICD-10-CM

## 2023-09-22 NOTE — PROGRESS NOTES
[facility] Medtronic Dual Pacemaker   Patient of Baki    Battery 9.3 years    Presenting rhythm APVS    A Impedance 342  RV Impedance 494    P wave sensing 1.5  R wave sensing 10.1    A Threshold 0.625 @ 0.40  A Amplitude 1.50 @ 0.40  RV Thresholds 0.875 @ 0.40  RV Amplitude 2.0 @ 0.40      A Paced 93.1%  V Paced 1.2%    Programmed Mode AAIR <=> DDDR     PAF/eliquis  Afib [place] <0.1%    Episodes   AF

## 2023-09-26 ENCOUNTER — OFFICE VISIT (OUTPATIENT)
Dept: INTERNAL MEDICINE CLINIC | Age: 68
End: 2023-09-26
Payer: MEDICARE

## 2023-09-26 DIAGNOSIS — E11.9 TYPE 2 DIABETES MELLITUS WITHOUT COMPLICATION, WITH LONG-TERM CURRENT USE OF INSULIN (HCC): Primary | ICD-10-CM

## 2023-09-26 DIAGNOSIS — Z79.4 TYPE 2 DIABETES MELLITUS WITHOUT COMPLICATION, WITH LONG-TERM CURRENT USE OF INSULIN (HCC): Primary | ICD-10-CM

## 2023-09-26 PROCEDURE — G0108 DIAB MANAGE TRN  PER INDIV: HCPCS | Performed by: INTERNAL MEDICINE

## 2023-09-26 PROCEDURE — NBSRV NON-BILLABLE SERVICE

## 2023-09-26 RX ORDER — BUPROPION HYDROCHLORIDE 150 MG/1
150 TABLET ORAL DAILY
COMMUNITY
Start: 2023-09-07

## 2023-09-26 ASSESSMENT — PATIENT HEALTH QUESTIONNAIRE - PHQ9
7. TROUBLE CONCENTRATING ON THINGS, SUCH AS READING THE NEWSPAPER OR WATCHING TELEVISION: NEARLY EVERY DAY
9. THOUGHTS THAT YOU WOULD BE BETTER OFF DEAD, OR OF HURTING YOURSELF: SEVERAL DAYS
1. LITTLE INTEREST OR PLEASURE IN DOING THINGS: NEARLY EVERY DAY
5. POOR APPETITE OR OVEREATING: NEARLY EVERY DAY
4. FEELING TIRED OR HAVING LITTLE ENERGY: NEARLY EVERY DAY
10. IF YOU CHECKED OFF ANY PROBLEMS, HOW DIFFICULT HAVE THESE PROBLEMS MADE IT FOR YOU TO DO YOUR WORK, TAKE CARE OF THINGS AT HOME, OR GET ALONG WITH OTHER PEOPLE: SOMEWHAT DIFFICULT
SUM OF ALL RESPONSES TO PHQ QUESTIONS 1-9: 21
SUM OF ALL RESPONSES TO PHQ9 QUESTIONS 1 & 2: 5
6. FEELING BAD ABOUT YOURSELF - OR THAT YOU ARE A FAILURE OR HAVE LET YOURSELF OR YOUR FAMILY DOWN: NEARLY EVERY DAY
3. TROUBLE FALLING OR STAYING ASLEEP: NEARLY EVERY DAY
8. MOVING OR SPEAKING SO SLOWLY THAT OTHER PEOPLE COULD HAVE NOTICED. OR THE OPPOSITE, BEING SO FIGETY OR RESTLESS THAT YOU HAVE BEEN MOVING AROUND A LOT MORE THAN USUAL: NOT AT ALL
2. FEELING DOWN, DEPRESSED OR HOPELESS: MORE THAN HALF THE DAYS

## 2023-09-26 NOTE — PROGRESS NOTES
[facility]  [facility] [place]. [address] [facility], [address] [place] [address]  [phone] (phone)  [phone] (fax)    Patient ID: Juvenal Marshall 1955  Referring Provider: Dr. Berry Caraballo     Patient's name and  were verified. Subjective:    He presents for a follow-up diabetic visit. He has type 2 diabetes mellitus. Home regimen includes: Insulin He is compliant some of the time. Assessment:     Lab Results   Component Value Date/Time    LABA1C 8.8 2023 08:03 AM    LABA1C 8.2 2022 12:00 AM    BUN 33 2023 12:29 PM    CREATININE 1.4 2023 07:26 PM    CREATININE 1.1 2023 12:29 PM     Wt Readings from Last 3 Encounters:   23 140 lb 9.6 oz (63.8 kg)   23 144 lb 6.4 oz (65.5 kg)   23 144 lb 6.4 oz (65.5 kg)     Ht Readings from Last 3 Encounters:   23 5' 7.01\" (1.702 m)   23 5' 7.01\" (1.702 m)   23 5' 7\" (1.702 m)       Diabetes Pharmacotherapy:  Levemir 19 units BID  Novolog 8 units/7-9 units/8 units plus gp 2 scale before B/L/D   -Gives between 7-9 units before lunch depending on activity    Glucose Trends:   Current monitoring regimen: Fingerstick blood tests - 3-4 times daily, has stopped using CGM  Home blood sugar trends:               -Fasting AM:    -Before lunch:    -Before dinner:    -Bedtime: 113-287  Any episodes of hypoglycemia?  yes - 3-4x weekly, random timing   -Treats with half OJ and half Fairlife milk (15 grams carb total    Lifestyle Factors:   Previous visit with dietician: no  Current diet: B: hashbrown cube w/ cheese + eggs +/- breaux (rarely) + 2-3 coffees w/ Stevia            L: bologna OR beef hotdogs w/ 1 piece bread                       D: steak/noodles/corn OR green beans                       Snacks: cheese  & crackers OR 2 biscuits w/ protein + cheese                       Beverages: crystal light  Current exercise:  yardwork in the afternoon    Health Maintenance:  Diabetes Management   Topic Date Due

## 2023-09-26 NOTE — PATIENT INSTRUCTIONS
We will send in the [place] 3 prescription to [facility]    2. Keep taking Levemir 19 units twice a day      Decrease Novolog to 7 units before meals, except for lunch (6-8 units depending on activity level)    3.  We will research if there are any less expensive glucagon options for you

## 2023-10-02 ENCOUNTER — HOSPITAL ENCOUNTER (OUTPATIENT)
Dept: ULTRASOUND IMAGING | Age: 68
Discharge: HOME | End: 2023-10-02
Attending: INTERNAL MEDICINE
Payer: MEDICARE

## 2023-10-02 ENCOUNTER — HOSPITAL ENCOUNTER (OUTPATIENT)
Dept: WOMENS IMAGING | Age: 68
Discharge: HOME | End: 2023-10-02
Attending: INTERNAL MEDICINE
Payer: MEDICARE

## 2023-10-02 DIAGNOSIS — E21.3 HYPERPARATHYROIDISM (HCC): ICD-10-CM

## 2023-10-02 PROCEDURE — 76770 US EXAM ABDO BACK WALL COMP: CPT

## 2023-10-02 PROCEDURE — 77080 DXA BONE DENSITY AXIAL: CPT

## 2023-10-20 ENCOUNTER — OFFICE VISIT (OUTPATIENT)
Dept: NEUROLOGY | Age: 68
End: 2023-10-20
Payer: MEDICARE

## 2023-10-20 VITALS
BODY MASS INDEX: 21.97 KG/M2 | OXYGEN SATURATION: 99 % | HEIGHT: 67 IN | HEART RATE: 60 BPM | WEIGHT: 140 LBS | SYSTOLIC BLOOD PRESSURE: 140 MMHG | DIASTOLIC BLOOD PRESSURE: 68 MMHG

## 2023-10-20 DIAGNOSIS — I65.23 BILATERAL CAROTID ARTERY STENOSIS: ICD-10-CM

## 2023-10-20 DIAGNOSIS — R42 DIZZINESS: ICD-10-CM

## 2023-10-20 DIAGNOSIS — R27.8 SENSORY ATAXIA: ICD-10-CM

## 2023-10-20 DIAGNOSIS — R29.6 FREQUENT FALLS: Primary | ICD-10-CM

## 2023-10-20 PROCEDURE — 3074F SYST BP LT 130 MM HG: CPT | Performed by: PSYCHIATRY & NEUROLOGY

## 2023-10-20 PROCEDURE — G8420 CALC BMI NORM PARAMETERS: HCPCS | Performed by: PSYCHIATRY & NEUROLOGY

## 2023-10-20 PROCEDURE — 3017F COLORECTAL CA SCREEN DOC REV: CPT | Performed by: PSYCHIATRY & NEUROLOGY

## 2023-10-20 PROCEDURE — 1124F ACP DISCUSS-NO DSCNMKR DOCD: CPT | Performed by: PSYCHIATRY & NEUROLOGY

## 2023-10-20 PROCEDURE — G8427 DOCREV CUR MEDS BY ELIG CLIN: HCPCS | Performed by: PSYCHIATRY & NEUROLOGY

## 2023-10-20 PROCEDURE — 3078F DIAST BP <80 MM HG: CPT | Performed by: PSYCHIATRY & NEUROLOGY

## 2023-10-20 PROCEDURE — 1036F TOBACCO NON-USER: CPT | Performed by: PSYCHIATRY & NEUROLOGY

## 2023-10-20 PROCEDURE — G8484 FLU IMMUNIZE NO ADMIN: HCPCS | Performed by: PSYCHIATRY & NEUROLOGY

## 2023-10-20 PROCEDURE — 99213 OFFICE O/P EST LOW 20 MIN: CPT | Performed by: PSYCHIATRY & NEUROLOGY

## 2023-10-20 NOTE — PROGRESS NOTES
restricted diffusion. There are no areas of convincing blood products in the brain parenchyma. Minimal amounts of hyperintense T2 abnormalities are again demonstrated in the white matter of both cerebral hemispheres. These are slightly more numerous, though still within minimal degree severity, and again are most consistent with minimal small  vessel disease sequelae. The cerebral cortical surfaces demonstrate no definite signal abnormality, and have not changed since the prior exam.  Posterior fossa parenchyma also is without definite abnormal signal, though possibly there is a small remote lacunar infarction of the inferior left cerebellum. More likely, this represents tangential imaging artifact, and was also present on the prior  exam, having not appreciably changed. There is also a minimal amount of hyperintense T2 signal in the pontine white matter, also similar to the prior exam, also likely representing minimal small vessel disease sequelae. There are no areas of pathologic contrast enhancement within the brain parenchyma. The internal auditory canals are symmetric. Cerebellopontine angles are widely patent bilaterally. Normal contour of the cisternal seventh and eighth cranial nerves demonstrated on both sides, neither having evidence of associated pathologic enhancement. No pathologic enhancement demonstrated in the internal auditory canal on either side. The middle ear cavities are within normal limits, and the labyrinthine structures also demonstrate normal characteristics. Very minimal amounts of fluid signal  demonstrated in the mastoid air cells bilaterally, nonspecific. No structural abnormality demonstrated that would indicate explanation for right side sensorineural hearing loss. There are normal characteristics of the ventricular system throughout. Remaining extra-axial spaces as well are within normal limits. Vascular structures are grossly satisfactory.   Mild cervical spine degenerative

## 2023-10-20 NOTE — PATIENT INSTRUCTIONS
Continue with Plavix and Eliquis  Continue with lipid lowering medication target LDL below 70  Continue following interventional neurology recommendations as discussed. Continue using your cane. Follow up as needed. Call if any questions or concerns.

## 2023-10-30 ENCOUNTER — HOSPITAL ENCOUNTER (OUTPATIENT)
Age: 68
Setting detail: OBSERVATION
Discharge: HOME | End: 2023-11-02
Attending: EMERGENCY MEDICINE | Admitting: PHYSICIAN ASSISTANT
Payer: MEDICARE

## 2023-10-30 ENCOUNTER — APPOINTMENT (OUTPATIENT)
Dept: GENERAL RADIOLOGY | Age: 68
End: 2023-10-30
Payer: MEDICARE

## 2023-10-30 DIAGNOSIS — E16.2 HYPOGLYCEMIA: Primary | ICD-10-CM

## 2023-10-30 DIAGNOSIS — T68.XXXA HYPOTHERMIA, INITIAL ENCOUNTER: ICD-10-CM

## 2023-10-30 LAB
ALBUMIN SERPL BCG-MCNC: 4.4 G/DL (ref 3.5–5.1)
ALP SERPL-CCNC: 126 U/L (ref 38–126)
ALT SERPL W/O P-5'-P-CCNC: 55 U/L (ref 11–66)
ANION GAP SERPL CALC-SCNC: 26 MEQ/L (ref 8–16)
ANISOCYTOSIS BLD QL SMEAR: PRESENT
AST SERPL-CCNC: 37 U/L (ref 5–40)
BACTERIA: ABNORMAL
BASOPHILS ABSOLUTE: 0.1 THOU/MM3 (ref 0–0.1)
BASOPHILS NFR BLD AUTO: 0.9 %
BILIRUB CONJ SERPL-MCNC: < 0.2 MG/DL (ref 0–0.3)
BILIRUB SERPL-MCNC: 0.4 MG/DL (ref 0.3–1.2)
BILIRUB UR QL STRIP: NEGATIVE
BUN SERPL-MCNC: 12 MG/DL (ref 7–22)
CALCIUM SERPL-MCNC: 11.1 MG/DL (ref 8.5–10.5)
CASTS #/AREA URNS LPF: ABNORMAL /LPF
CASTS #/AREA URNS LPF: ABNORMAL /LPF
CHARACTER UR: CLEAR
CHARCOAL URNS QL MICRO: ABNORMAL
CHLORIDE SERPL-SCNC: 103 MEQ/L (ref 98–111)
CO2 SERPL-SCNC: 15 MEQ/L (ref 23–33)
COLOR UR: YELLOW
CREAT SERPL-MCNC: 1.1 MG/DL (ref 0.4–1.2)
CRYSTALS URNS QL MICRO: ABNORMAL
DEPRECATED RDW RBC AUTO: 45.7 FL (ref 35–45)
EOSINOPHIL NFR BLD AUTO: 2.9 %
EOSINOPHILS ABSOLUTE: 0.4 THOU/MM3 (ref 0–0.4)
EPITHELIAL CELLS, UA: ABNORMAL /HPF
ERYTHROCYTE [DISTWIDTH] IN BLOOD BY AUTOMATED COUNT: 15.8 % (ref 11.5–14.5)
FLUAV RNA RESP QL NAA+PROBE: NOT DETECTED
FLUBV RNA RESP QL NAA+PROBE: NOT DETECTED
GFR SERPL CREATININE-BSD FRML MDRD: > 60 ML/MIN/1.73M2
GLUCOSE BLD STRIP.AUTO-MCNC: 112 MG/DL (ref 70–108)
GLUCOSE BLD STRIP.AUTO-MCNC: 167 MG/DL (ref 70–108)
GLUCOSE BLD STRIP.AUTO-MCNC: 193 MG/DL (ref 70–108)
GLUCOSE BLD STRIP.AUTO-MCNC: 200 MG/DL (ref 70–108)
GLUCOSE BLD STRIP.AUTO-MCNC: 247 MG/DL (ref 70–108)
GLUCOSE BLD STRIP.AUTO-MCNC: 259 MG/DL (ref 70–108)
GLUCOSE BLD STRIP.AUTO-MCNC: 88 MG/DL (ref 70–108)
GLUCOSE SERPL-MCNC: 128 MG/DL (ref 70–108)
GLUCOSE UR QL STRIP.AUTO: NEGATIVE MG/DL
HCT VFR BLD AUTO: 39.8 % (ref 42–52)
HGB BLD-MCNC: 11.5 GM/DL (ref 14–18)
HGB UR QL STRIP.AUTO: NEGATIVE
HYPOCHROMIA BLD QL SMEAR: PRESENT
IMM GRANULOCYTES # BLD AUTO: 0.09 THOU/MM3 (ref 0–0.07)
IMM GRANULOCYTES NFR BLD AUTO: 0.6 %
KETONES UR QL STRIP.AUTO: NEGATIVE
LACTIC ACID, SEPSIS: 4.4 MMOL/L (ref 0.5–1.9)
LEUKOCYTE ESTERASE UR QL STRIP.AUTO: NEGATIVE
LYMPHOCYTES ABSOLUTE: 4.3 THOU/MM3 (ref 1–4.8)
LYMPHOCYTES NFR BLD AUTO: 27.9 %
MCH RBC QN AUTO: 23.3 PG (ref 26–33)
MCHC RBC AUTO-ENTMCNC: 28.9 GM/DL (ref 32.2–35.5)
MCV RBC AUTO: 80.6 FL (ref 80–94)
MONOCYTES ABSOLUTE: 1.2 THOU/MM3 (ref 0.4–1.3)
MONOCYTES NFR BLD AUTO: 7.6 %
NEUTROPHILS NFR BLD AUTO: 60.1 %
NITRITE UR QL STRIP.AUTO: NEGATIVE
NRBC BLD AUTO-RTO: 0 /100 WBC
OSMOLALITY SERPL CALC.SUM OF ELEC: 288.2 MOSMOL/KG (ref 275–300)
PH UR STRIP.AUTO: 6 [PH] (ref 5–9)
PLATELET # BLD AUTO: 465 THOU/MM3 (ref 130–400)
PLATELET BLD QL SMEAR: ADEQUATE
PMV BLD AUTO: 10.2 FL (ref 9.4–12.4)
POTASSIUM SERPL-SCNC: 4.6 MEQ/L (ref 3.5–5.2)
PROCALCITONIN SERPL IA-MCNC: 0.04 NG/ML (ref 0.01–0.09)
PROT SERPL-MCNC: 6.9 G/DL (ref 6.1–8)
PROT UR STRIP.AUTO-MCNC: ABNORMAL MG/DL
RBC # BLD AUTO: 4.94 MILL/MM3 (ref 4.7–6.1)
RBC #/AREA URNS HPF: ABNORMAL /HPF
RENAL EPI CELLS #/AREA URNS HPF: ABNORMAL /[HPF]
SARS-COV-2 RNA RESP QL NAA+PROBE: NOT DETECTED
SCAN OF BLOOD SMEAR: NORMAL
SEGMENTED NEUTROPHILS ABSOLUTE COUNT: 9.3 THOU/MM3 (ref 1.8–7.7)
SODIUM SERPL-SCNC: 144 MEQ/L (ref 135–145)
SP GR UR REFRACT.AUTO: 1.01 (ref 1–1.03)
T4 FREE SERPL-MCNC: 1.29 NG/DL (ref 0.93–1.76)
TSH SERPL DL<=0.005 MIU/L-ACNC: 4.2 UIU/ML (ref 0.4–4.2)
UROBILINOGEN UR QL STRIP.AUTO: 0.2 EU/DL (ref 0–1)
WBC # BLD AUTO: 15.5 THOU/MM3 (ref 4.8–10.8)
WBC #/AREA URNS HPF: ABNORMAL /HPF
YEAST LIKE FUNGI URNS QL MICRO: ABNORMAL

## 2023-10-30 PROCEDURE — 84145 PROCALCITONIN (PCT): CPT

## 2023-10-30 PROCEDURE — 99285 EMERGENCY DEPT VISIT HI MDM: CPT

## 2023-10-30 PROCEDURE — 82248 BILIRUBIN DIRECT: CPT

## 2023-10-30 PROCEDURE — 83605 ASSAY OF LACTIC ACID: CPT

## 2023-10-30 PROCEDURE — 84443 ASSAY THYROID STIM HORMONE: CPT

## 2023-10-30 PROCEDURE — 96361 HYDRATE IV INFUSION ADD-ON: CPT

## 2023-10-30 PROCEDURE — 96360 HYDRATION IV INFUSION INIT: CPT

## 2023-10-30 PROCEDURE — 87086 URINE CULTURE/COLONY COUNT: CPT

## 2023-10-30 PROCEDURE — 87040 BLOOD CULTURE FOR BACTERIA: CPT

## 2023-10-30 PROCEDURE — 71046 X-RAY EXAM CHEST 2 VIEWS: CPT

## 2023-10-30 PROCEDURE — 82948 REAGENT STRIP/BLOOD GLUCOSE: CPT

## 2023-10-30 PROCEDURE — 93005 ELECTROCARDIOGRAM TRACING: CPT | Performed by: EMERGENCY MEDICINE

## 2023-10-30 PROCEDURE — 36415 COLL VENOUS BLD VENIPUNCTURE: CPT

## 2023-10-30 PROCEDURE — 84439 ASSAY OF FREE THYROXINE: CPT

## 2023-10-30 PROCEDURE — 96365 THER/PROPH/DIAG IV INF INIT: CPT

## 2023-10-30 PROCEDURE — 81001 URINALYSIS AUTO W/SCOPE: CPT

## 2023-10-30 PROCEDURE — 87636 SARSCOV2 & INF A&B AMP PRB: CPT

## 2023-10-30 PROCEDURE — 80053 COMPREHEN METABOLIC PANEL: CPT

## 2023-10-30 PROCEDURE — 2580000003 HC RX 258: Performed by: EMERGENCY MEDICINE

## 2023-10-30 PROCEDURE — 85025 COMPLETE CBC W/AUTO DIFF WBC: CPT

## 2023-10-30 PROCEDURE — 6360000002 HC RX W HCPCS: Performed by: EMERGENCY MEDICINE

## 2023-10-30 RX ORDER — SOTALOL HYDROCHLORIDE 120 MG/1
120 TABLET ORAL 2 TIMES DAILY
Qty: 180 TABLET | Refills: 0 | Status: SHIPPED | OUTPATIENT
Start: 2023-10-30

## 2023-10-30 RX ORDER — DEXTROSE AND SODIUM CHLORIDE 5; .9 G/100ML; G/100ML
INJECTION, SOLUTION INTRAVENOUS CONTINUOUS
Status: DISCONTINUED | OUTPATIENT
Start: 2023-10-30 | End: 2023-10-31

## 2023-10-30 RX ORDER — 0.9 % SODIUM CHLORIDE 0.9 %
1000 INTRAVENOUS SOLUTION INTRAVENOUS ONCE
Status: COMPLETED | OUTPATIENT
Start: 2023-10-30 | End: 2023-10-30

## 2023-10-30 RX ADMIN — SODIUM CHLORIDE 1000 ML: 9 INJECTION, SOLUTION INTRAVENOUS at 21:53

## 2023-10-30 RX ADMIN — DEXTROSE MONOHYDRATE 250 ML: 100 INJECTION, SOLUTION INTRAVENOUS at 19:55

## 2023-10-30 RX ADMIN — DEXTROSE AND SODIUM CHLORIDE: 5; 900 INJECTION, SOLUTION INTRAVENOUS at 21:45

## 2023-10-30 RX ADMIN — PIPERACILLIN AND TAZOBACTAM 4500 MG: 4; .5 INJECTION, POWDER, LYOPHILIZED, FOR SOLUTION INTRAVENOUS at 21:49

## 2023-10-30 ASSESSMENT — PAIN - FUNCTIONAL ASSESSMENT
PAIN_FUNCTIONAL_ASSESSMENT: 0-10
PAIN_FUNCTIONAL_ASSESSMENT: NONE - DENIES PAIN
PAIN_FUNCTIONAL_ASSESSMENT: NONE - DENIES PAIN

## 2023-10-30 ASSESSMENT — PAIN SCALES - GENERAL: PAINLEVEL_OUTOF10: 5

## 2023-10-30 ASSESSMENT — PAIN DESCRIPTION - LOCATION: LOCATION: CHEST

## 2023-10-31 PROBLEM — I73.9 PAD (PERIPHERAL ARTERY DISEASE) (HCC): Status: ACTIVE | Noted: 2019-10-03

## 2023-10-31 PROBLEM — C15.9 ESOPHAGEAL CARCINOMA (HCC): Status: ACTIVE | Noted: 2019-10-03

## 2023-10-31 PROBLEM — E16.2 HYPOGLYCEMIA: Status: ACTIVE | Noted: 2023-10-31

## 2023-10-31 PROBLEM — I48.0 PAF (PAROXYSMAL ATRIAL FIBRILLATION) (HCC): Status: ACTIVE | Noted: 2019-10-11

## 2023-10-31 PROBLEM — E11.9 TYPE 2 DIABETES MELLITUS WITHOUT COMPLICATIONS (HCC): Status: ACTIVE | Noted: 2019-10-11

## 2023-10-31 PROBLEM — I63.9 CEREBELLAR INFARCT (HCC): Status: ACTIVE | Noted: 2019-10-09

## 2023-10-31 LAB
ANION GAP SERPL CALC-SCNC: 9 MEQ/L (ref 8–16)
BASOPHILS ABSOLUTE: 0.1 THOU/MM3 (ref 0–0.1)
BASOPHILS NFR BLD AUTO: 0.6 %
BUN SERPL-MCNC: 10 MG/DL (ref 7–22)
CALCIUM SERPL-MCNC: 9.7 MG/DL (ref 8.5–10.5)
CHLORIDE SERPL-SCNC: 109 MEQ/L (ref 98–111)
CO2 SERPL-SCNC: 21 MEQ/L (ref 23–33)
CREAT SERPL-MCNC: 0.9 MG/DL (ref 0.4–1.2)
DEPRECATED MEAN GLUCOSE BLD GHB EST-ACNC: 183 MG/DL (ref 70–126)
DEPRECATED RDW RBC AUTO: 43.3 FL (ref 35–45)
EOSINOPHIL NFR BLD AUTO: 1.1 %
EOSINOPHILS ABSOLUTE: 0.1 THOU/MM3 (ref 0–0.4)
ERYTHROCYTE [DISTWIDTH] IN BLOOD BY AUTOMATED COUNT: 15.9 % (ref 11.5–14.5)
GFR SERPL CREATININE-BSD FRML MDRD: > 60 ML/MIN/1.73M2
GLUCOSE BLD STRIP.AUTO-MCNC: 180 MG/DL (ref 70–108)
GLUCOSE BLD STRIP.AUTO-MCNC: 229 MG/DL (ref 70–108)
GLUCOSE BLD STRIP.AUTO-MCNC: 283 MG/DL (ref 70–108)
GLUCOSE BLD STRIP.AUTO-MCNC: 337 MG/DL (ref 70–108)
GLUCOSE BLD STRIP.AUTO-MCNC: 406 MG/DL (ref 70–108)
GLUCOSE SERPL-MCNC: 203 MG/DL (ref 70–108)
HBA1C MFR BLD HPLC: 8.1 % (ref 4.4–6.4)
HCT VFR BLD AUTO: 32.8 % (ref 42–52)
HGB BLD-MCNC: 10 GM/DL (ref 14–18)
IMM GRANULOCYTES # BLD AUTO: 0.04 THOU/MM3 (ref 0–0.07)
IMM GRANULOCYTES NFR BLD AUTO: 0.4 %
LACTATE SERPL-SCNC: 1.4 MMOL/L (ref 0.5–2)
LYMPHOCYTES ABSOLUTE: 2.4 THOU/MM3 (ref 1–4.8)
LYMPHOCYTES NFR BLD AUTO: 21.6 %
MAGNESIUM SERPL-MCNC: 1.6 MG/DL (ref 1.6–2.4)
MCH RBC QN AUTO: 23.4 PG (ref 26–33)
MCHC RBC AUTO-ENTMCNC: 30.5 GM/DL (ref 32.2–35.5)
MCV RBC AUTO: 76.6 FL (ref 80–94)
MONOCYTES ABSOLUTE: 1 THOU/MM3 (ref 0.4–1.3)
MONOCYTES NFR BLD AUTO: 9.6 %
NEUTROPHILS NFR BLD AUTO: 66.7 %
NRBC BLD AUTO-RTO: 0 /100 WBC
OSMOLALITY SERPL CALC.SUM OF ELEC: 282.4 MOSMOL/KG (ref 275–300)
PLATELET # BLD AUTO: 324 THOU/MM3 (ref 130–400)
PMV BLD AUTO: 10.3 FL (ref 9.4–12.4)
POTASSIUM SERPL-SCNC: 4 MEQ/L (ref 3.5–5.2)
RBC # BLD AUTO: 4.28 MILL/MM3 (ref 4.7–6.1)
SEGMENTED NEUTROPHILS ABSOLUTE COUNT: 7.3 THOU/MM3 (ref 1.8–7.7)
SODIUM SERPL-SCNC: 139 MEQ/L (ref 135–145)
TROPONIN, HIGH SENSITIVITY: 37 NG/L (ref 0–12)
WBC # BLD AUTO: 10.9 THOU/MM3 (ref 4.8–10.8)

## 2023-10-31 PROCEDURE — 99223 1ST HOSP IP/OBS HIGH 75: CPT | Performed by: PHYSICIAN ASSISTANT

## 2023-10-31 PROCEDURE — 6370000000 HC RX 637 (ALT 250 FOR IP): Performed by: PHYSICIAN ASSISTANT

## 2023-10-31 PROCEDURE — 6370000000 HC RX 637 (ALT 250 FOR IP)

## 2023-10-31 PROCEDURE — 85025 COMPLETE CBC W/AUTO DIFF WBC: CPT

## 2023-10-31 PROCEDURE — 99232 SBSQ HOSP IP/OBS MODERATE 35: CPT | Performed by: STUDENT IN AN ORGANIZED HEALTH CARE EDUCATION/TRAINING PROGRAM

## 2023-10-31 PROCEDURE — 83605 ASSAY OF LACTIC ACID: CPT

## 2023-10-31 PROCEDURE — 84681 ASSAY OF C-PEPTIDE: CPT

## 2023-10-31 PROCEDURE — 2580000003 HC RX 258: Performed by: PHYSICIAN ASSISTANT

## 2023-10-31 PROCEDURE — 93010 ELECTROCARDIOGRAM REPORT: CPT | Performed by: NUCLEAR MEDICINE

## 2023-10-31 PROCEDURE — 83036 HEMOGLOBIN GLYCOSYLATED A1C: CPT

## 2023-10-31 PROCEDURE — 93005 ELECTROCARDIOGRAM TRACING: CPT | Performed by: STUDENT IN AN ORGANIZED HEALTH CARE EDUCATION/TRAINING PROGRAM

## 2023-10-31 PROCEDURE — G0378 HOSPITAL OBSERVATION PER HR: HCPCS

## 2023-10-31 PROCEDURE — 82948 REAGENT STRIP/BLOOD GLUCOSE: CPT

## 2023-10-31 PROCEDURE — 6360000002 HC RX W HCPCS

## 2023-10-31 PROCEDURE — 83735 ASSAY OF MAGNESIUM: CPT

## 2023-10-31 PROCEDURE — 36415 COLL VENOUS BLD VENIPUNCTURE: CPT

## 2023-10-31 PROCEDURE — 6360000002 HC RX W HCPCS: Performed by: EMERGENCY MEDICINE

## 2023-10-31 PROCEDURE — 84484 ASSAY OF TROPONIN QUANT: CPT

## 2023-10-31 PROCEDURE — 80048 BASIC METABOLIC PNL TOTAL CA: CPT

## 2023-10-31 RX ORDER — HYDRALAZINE HYDROCHLORIDE 25 MG/1
25 TABLET, FILM COATED ORAL EVERY 8 HOURS SCHEDULED
Status: DISCONTINUED | OUTPATIENT
Start: 2023-10-31 | End: 2023-11-02 | Stop reason: HOSPADM

## 2023-10-31 RX ORDER — SODIUM CHLORIDE 0.9 % (FLUSH) 0.9 %
5-40 SYRINGE (ML) INJECTION EVERY 12 HOURS SCHEDULED
Status: DISCONTINUED | OUTPATIENT
Start: 2023-10-31 | End: 2023-11-02 | Stop reason: HOSPADM

## 2023-10-31 RX ORDER — POTASSIUM CHLORIDE 20 MEQ/1
40 TABLET, EXTENDED RELEASE ORAL PRN
Status: DISCONTINUED | OUTPATIENT
Start: 2023-10-31 | End: 2023-11-02 | Stop reason: HOSPADM

## 2023-10-31 RX ORDER — SODIUM CHLORIDE 9 MG/ML
INJECTION, SOLUTION INTRAVENOUS PRN
Status: DISCONTINUED | OUTPATIENT
Start: 2023-10-31 | End: 2023-11-02 | Stop reason: HOSPADM

## 2023-10-31 RX ORDER — ONDANSETRON 2 MG/ML
4 INJECTION INTRAMUSCULAR; INTRAVENOUS EVERY 6 HOURS PRN
Status: DISCONTINUED | OUTPATIENT
Start: 2023-10-31 | End: 2023-11-02 | Stop reason: HOSPADM

## 2023-10-31 RX ORDER — SUCRALFATE 1 G/1
1 TABLET ORAL 4 TIMES DAILY
Status: DISCONTINUED | OUTPATIENT
Start: 2023-10-31 | End: 2023-11-02 | Stop reason: HOSPADM

## 2023-10-31 RX ORDER — PANTOPRAZOLE SODIUM 40 MG/1
40 TABLET, DELAYED RELEASE ORAL 2 TIMES DAILY
Status: DISCONTINUED | OUTPATIENT
Start: 2023-10-31 | End: 2023-11-02 | Stop reason: HOSPADM

## 2023-10-31 RX ORDER — INSULIN GLARGINE 100 [IU]/ML
10 INJECTION, SOLUTION SUBCUTANEOUS NIGHTLY
Status: DISCONTINUED | OUTPATIENT
Start: 2023-10-31 | End: 2023-11-02 | Stop reason: HOSPADM

## 2023-10-31 RX ORDER — SODIUM CHLORIDE 0.9 % (FLUSH) 0.9 %
5-40 SYRINGE (ML) INJECTION PRN
Status: DISCONTINUED | OUTPATIENT
Start: 2023-10-31 | End: 2023-11-02 | Stop reason: HOSPADM

## 2023-10-31 RX ORDER — DEXTROSE MONOHYDRATE 100 MG/ML
INJECTION, SOLUTION INTRAVENOUS CONTINUOUS PRN
Status: DISCONTINUED | OUTPATIENT
Start: 2023-10-31 | End: 2023-11-02 | Stop reason: HOSPADM

## 2023-10-31 RX ORDER — HYDROCHLOROTHIAZIDE 25 MG/1
12.5 TABLET ORAL DAILY
Status: DISCONTINUED | OUTPATIENT
Start: 2023-10-31 | End: 2023-11-02 | Stop reason: HOSPADM

## 2023-10-31 RX ORDER — INSULIN GLARGINE 100 [IU]/ML
19 INJECTION, SOLUTION SUBCUTANEOUS 2 TIMES DAILY
Status: DISCONTINUED | OUTPATIENT
Start: 2023-10-31 | End: 2023-10-31

## 2023-10-31 RX ORDER — BUPROPION HYDROCHLORIDE 150 MG/1
150 TABLET ORAL DAILY
Status: DISCONTINUED | OUTPATIENT
Start: 2023-10-31 | End: 2023-11-02 | Stop reason: HOSPADM

## 2023-10-31 RX ORDER — ISOSORBIDE MONONITRATE 60 MG/1
60 TABLET, EXTENDED RELEASE ORAL DAILY
Status: DISCONTINUED | OUTPATIENT
Start: 2023-10-31 | End: 2023-11-02 | Stop reason: HOSPADM

## 2023-10-31 RX ORDER — ATORVASTATIN CALCIUM 80 MG/1
80 TABLET, FILM COATED ORAL DAILY
Status: DISCONTINUED | OUTPATIENT
Start: 2023-10-31 | End: 2023-11-02 | Stop reason: HOSPADM

## 2023-10-31 RX ORDER — IBUPROFEN 600 MG/1
1 TABLET ORAL PRN
Status: DISCONTINUED | OUTPATIENT
Start: 2023-10-31 | End: 2023-11-02 | Stop reason: HOSPADM

## 2023-10-31 RX ORDER — LOSARTAN POTASSIUM 100 MG/1
100 TABLET ORAL DAILY
Status: DISCONTINUED | OUTPATIENT
Start: 2023-10-31 | End: 2023-11-02 | Stop reason: HOSPADM

## 2023-10-31 RX ORDER — DOXEPIN HYDROCHLORIDE 10 MG/1
10 CAPSULE ORAL NIGHTLY PRN
Status: DISCONTINUED | OUTPATIENT
Start: 2023-10-31 | End: 2023-11-02 | Stop reason: HOSPADM

## 2023-10-31 RX ORDER — ACETAMINOPHEN 650 MG/1
650 SUPPOSITORY RECTAL EVERY 6 HOURS PRN
Status: DISCONTINUED | OUTPATIENT
Start: 2023-10-31 | End: 2023-11-02 | Stop reason: HOSPADM

## 2023-10-31 RX ORDER — MAGNESIUM SULFATE IN WATER 40 MG/ML
2000 INJECTION, SOLUTION INTRAVENOUS ONCE
Status: COMPLETED | OUTPATIENT
Start: 2023-10-31 | End: 2023-10-31

## 2023-10-31 RX ORDER — POTASSIUM CHLORIDE 7.45 MG/ML
10 INJECTION INTRAVENOUS PRN
Status: DISCONTINUED | OUTPATIENT
Start: 2023-10-31 | End: 2023-11-02 | Stop reason: HOSPADM

## 2023-10-31 RX ORDER — MAGNESIUM SULFATE IN WATER 40 MG/ML
2000 INJECTION, SOLUTION INTRAVENOUS PRN
Status: DISCONTINUED | OUTPATIENT
Start: 2023-10-31 | End: 2023-11-02 | Stop reason: HOSPADM

## 2023-10-31 RX ORDER — SOTALOL HYDROCHLORIDE 80 MG/1
120 TABLET ORAL 2 TIMES DAILY
Status: DISCONTINUED | OUTPATIENT
Start: 2023-10-31 | End: 2023-11-02 | Stop reason: HOSPADM

## 2023-10-31 RX ORDER — POLYETHYLENE GLYCOL 3350 17 G/17G
17 POWDER, FOR SOLUTION ORAL DAILY PRN
Status: DISCONTINUED | OUTPATIENT
Start: 2023-10-31 | End: 2023-11-02 | Stop reason: HOSPADM

## 2023-10-31 RX ORDER — ACETAMINOPHEN 325 MG/1
650 TABLET ORAL EVERY 6 HOURS PRN
Status: DISCONTINUED | OUTPATIENT
Start: 2023-10-31 | End: 2023-11-02 | Stop reason: HOSPADM

## 2023-10-31 RX ORDER — CLOPIDOGREL BISULFATE 75 MG/1
75 TABLET ORAL DAILY
Status: DISCONTINUED | OUTPATIENT
Start: 2023-10-31 | End: 2023-11-02 | Stop reason: HOSPADM

## 2023-10-31 RX ORDER — ONDANSETRON 4 MG/1
4 TABLET, ORALLY DISINTEGRATING ORAL EVERY 8 HOURS PRN
Status: DISCONTINUED | OUTPATIENT
Start: 2023-10-31 | End: 2023-11-02 | Stop reason: HOSPADM

## 2023-10-31 RX ORDER — DULOXETIN HYDROCHLORIDE 60 MG/1
60 CAPSULE, DELAYED RELEASE ORAL 2 TIMES DAILY
Status: DISCONTINUED | OUTPATIENT
Start: 2023-10-31 | End: 2023-11-02 | Stop reason: HOSPADM

## 2023-10-31 RX ADMIN — SODIUM CHLORIDE, PRESERVATIVE FREE 10 ML: 5 INJECTION INTRAVENOUS at 20:35

## 2023-10-31 RX ADMIN — SOTALOL HYDROCHLORIDE 120 MG: 80 TABLET ORAL at 11:38

## 2023-10-31 RX ADMIN — DULOXETINE HYDROCHLORIDE 60 MG: 60 CAPSULE, DELAYED RELEASE ORAL at 20:13

## 2023-10-31 RX ADMIN — MAGNESIUM SULFATE HEPTAHYDRATE 2000 MG: 40 INJECTION, SOLUTION INTRAVENOUS at 14:10

## 2023-10-31 RX ADMIN — Medication 1250 MG: at 02:01

## 2023-10-31 RX ADMIN — APIXABAN 5 MG: 5 TABLET, FILM COATED ORAL at 20:14

## 2023-10-31 RX ADMIN — SUCRALFATE 1 G: 1 TABLET ORAL at 20:16

## 2023-10-31 RX ADMIN — LOSARTAN POTASSIUM 100 MG: 100 TABLET, FILM COATED ORAL at 11:34

## 2023-10-31 RX ADMIN — DOXEPIN HYDROCHLORIDE 10 MG: 10 CAPSULE ORAL at 04:51

## 2023-10-31 RX ADMIN — BUPROPION HYDROCHLORIDE 150 MG: 150 TABLET, EXTENDED RELEASE ORAL at 11:34

## 2023-10-31 RX ADMIN — PANTOPRAZOLE SODIUM 40 MG: 40 TABLET, DELAYED RELEASE ORAL at 11:34

## 2023-10-31 RX ADMIN — HYDRALAZINE HYDROCHLORIDE 25 MG: 25 TABLET, FILM COATED ORAL at 14:11

## 2023-10-31 RX ADMIN — APIXABAN 5 MG: 5 TABLET, FILM COATED ORAL at 11:34

## 2023-10-31 RX ADMIN — DULOXETINE HYDROCHLORIDE 60 MG: 60 CAPSULE, DELAYED RELEASE ORAL at 11:34

## 2023-10-31 RX ADMIN — SUCRALFATE 1 G: 1 TABLET ORAL at 11:37

## 2023-10-31 RX ADMIN — SUCRALFATE 1 G: 1 TABLET ORAL at 15:47

## 2023-10-31 RX ADMIN — HYDRALAZINE HYDROCHLORIDE 25 MG: 25 TABLET, FILM COATED ORAL at 06:26

## 2023-10-31 RX ADMIN — CLOPIDOGREL BISULFATE 75 MG: 75 TABLET ORAL at 11:34

## 2023-10-31 RX ADMIN — ISOSORBIDE MONONITRATE 60 MG: 60 TABLET, EXTENDED RELEASE ORAL at 11:34

## 2023-10-31 RX ADMIN — HYDRALAZINE HYDROCHLORIDE 25 MG: 25 TABLET, FILM COATED ORAL at 20:14

## 2023-10-31 RX ADMIN — SOTALOL HYDROCHLORIDE 120 MG: 80 TABLET ORAL at 20:13

## 2023-10-31 RX ADMIN — PANTOPRAZOLE SODIUM 40 MG: 40 TABLET, DELAYED RELEASE ORAL at 20:13

## 2023-10-31 RX ADMIN — INSULIN GLARGINE 10 UNITS: 100 INJECTION, SOLUTION SUBCUTANEOUS at 20:13

## 2023-10-31 ASSESSMENT — PAIN - FUNCTIONAL ASSESSMENT
PAIN_FUNCTIONAL_ASSESSMENT: NONE - DENIES PAIN
PAIN_FUNCTIONAL_ASSESSMENT: NONE - DENIES PAIN

## 2023-10-31 ASSESSMENT — ENCOUNTER SYMPTOMS
RESPIRATORY NEGATIVE: 1
EYES NEGATIVE: 1
ALLERGIC/IMMUNOLOGIC NEGATIVE: 1
GASTROINTESTINAL NEGATIVE: 1

## 2023-10-31 ASSESSMENT — PAIN SCALES - GENERAL: PAINLEVEL_OUTOF10: 5

## 2023-10-31 ASSESSMENT — PAIN DESCRIPTION - ORIENTATION: ORIENTATION: MID

## 2023-10-31 ASSESSMENT — PAIN DESCRIPTION - LOCATION: LOCATION: CHEST

## 2023-10-31 ASSESSMENT — PAIN DESCRIPTION - PAIN TYPE: TYPE: CHRONIC PAIN

## 2023-10-31 ASSESSMENT — PAIN DESCRIPTION - DESCRIPTORS: DESCRIPTORS: CRUSHING

## 2023-10-31 NOTE — CARE COORDINATION
Case Management Assessment  Initial Evaluation    Date/Time of Evaluation: 10/31/2023 12:21 PM  Assessment Completed by: Marva Menendez RN    If patient is discharged prior to next notation, then this note serves as note for discharge by case management. Patient Name: Alicia Snow                   YOB: 1955  Diagnosis: Hypoglycemia [E16.2]  Hypothermia, initial encounter Sree Hernandez. XXXA]                   Date / Time: 10/30/2023  7:06 PM  Location: [facility]     Patient Admission Status: Observation   Readmission Risk Low 0-14, Mod 15-19), High > 20: No data recorded  Current PCP: Tom Villanueva, DO  PCP verified by CM? Yes    Chart Reviewed: Yes      History Provided by: Patient  Patient Orientation: Alert and Oriented    Patient Cognition: Alert    Hospitalization in the last 30 days (Readmission):  No    If yes, Readmission Assessment in CM Navigator will be completed. Advance Directives:      Code Status: Full Code   Patient's Primary Decision Maker is: Legal Next of Kin      Discharge Planning:    Patient lives with: Spouse/Significant Other Type of Home: House  Primary Care Giver: Self  Patient Support Systems include: Spouse/Significant Other   Current Financial resources: Medicare  Current community resources: None  Current services prior to admission: Durable Medical Equipment            Current DME: John Linares            Type of Home Care services:  None    ADLS  Prior functional level: Independent in ADLs/IADLs  Current functional level: Independent in ADLs/IADLs    Family can provide assistance at DC: Yes  Would you like Case Management to discuss the discharge plan with any other family members/significant others, and if so, who?  No  Plans to Return to Present Housing: Yes  Other Identified Issues/Barriers to RETURNING to current housing: no  Potential Assistance needed at discharge: N/A            Potential DME:    Patient expects to discharge to:

## 2023-10-31 NOTE — ED NOTES
. No action taken.       Kary Frausto RN  10/30/23 2037
. Pt is eating and drinking orange juice.      Shawna Hopson, RN  10/30/23 5100
BS 88. Provider made aware     Eder Beltre RN  10/30/23 9771
D10 infusion completed.  at this time. Pt states he is feeling a little better and feeling warmer.      Bhavin Gresham RN  10/30/23 2020
EKG unable to perform at this time due to pt shivering.       Jess Stover RN  10/30/23 1945
Pt . Pt medicated per MAR. Pt expresses no needs at this time.       Desire Jasso RN  10/31/23 2035
Pt back from x-ray. Pt no longer connected to the bear hugger. Medicating pt per [facility].       Richardean Hatchet, RN  10/30/23 1762
Pt c/o sternal area hurting rates pain 5/10. Pt states pain feels like needles poking.      Jane Johns RN  10/30/23 211
Pt is resting in bed with family at bedside. Pt states he is starting to get warmer. Pt and family have no requests at this time.       Bethany Pagan RN  10/30/23 2026
Pt resting in bed with asia hugger on. Pt has no signs of distress. Pt medicated per MAR. Pt has no requests at this time.       Faraz Pearson RN  10/31/23 2288
Pt resting in bed. Pt has no signs of distress. Pt has no requests at this time.       Kary Frausto RN  10/31/23 8898
Pt resting in bed. Respirations even and unlabored. Blood sugar 247, Dr Thompson Mars informed. Bed lowest position, call light in reach, side rails x2.       Lisbeth Delacruz RN  10/30/23 3975
Pt resting in bed. Respirations even and unlabored. Updated pt on plan of care. Urine sample collected and sent to lab. Provided pt with warm blankets. No other needs expressed at this time. Bed lowest position, call light in reach, side rails x2.      Charles Leblanc RN  10/30/23 0954
Pt resting on cot comfortably at this time. No additional needs expressed at this time. Pt repositioned for comfort.       Padmini Warren RN  10/31/23 2564
Pt to ED via [address] [place] [address] EMS for hypoglycemia, blood sugar stated as 27 at home. EMS states giving dextrose en route, blood sugar currently 112 upon arrival.  Pt shivers in bed violently, axillary temp 92.1, will obtain rectal upon moving to room 19 as pt is in hallway at this time. Soha hugger warming measures in room 19 awaiting pt arrival.  Pt states no pain, only states complaint as being cold. No distress noted, pt alert and oriented X4 and breaths easy and unlabored.        Byron Cardoso RN  10/30/23 1922
Report given to Tawana Orr.       Holden Noel RN  10/31/23 3895
Soha donaldson applied to pt upon arrival to room 19.        Hilda Su RN  10/30/23 1946
Past Medical History:   Diagnosis Date    Alcoholism ([address])      quit in the 78    Alopecia     Angina at rest     CAD (coronary artery disease)      self , stents     Depression     Esophageal cancer (HCC)     Gastroparesis     GERD (gastroesophageal reflux disease)     Hyperlipidemia     self and family    Hypertension     self and family    Hypothyroidism     family    Kidney stones     medtronic dual pacer  04/20/2021    Migraines     self and family    Osteoarthritis     self and family (in back)    PVD (peripheral vascular disease) ([address])     Type II diabetes mellitus ([address])     self and family           Electronically signed by Nida Lobo RN on 10/31/2023 at 6:51 AM      Emely Barnett  10/31/23 5467

## 2023-10-31 NOTE — ED PROVIDER NOTES
[facility] DEPT  EMERGENCY DEPARTMENT ENCOUNTER          Pt Name: Enrike Arellano  MRN: [id number]  [address] [facility] 1955  Date of evaluation: 10/30/2023  Physician: Roberta Allan MD, Karina Morin, [place]      CHIEF COMPLAINT       Chief Complaint   Patient presents with    Hypoglycemia         HISTORY OF PRESENT ILLNESS    HPI  Enrike Arellano is a 77 y.o. male who presents to the emergency department from home, brought in by EMS for evaluation of hypoglycemia. Patient's wife states that he has not been feeling well for about a week but he is unable to characterize exactly why. Today patient's wife heard the dog barking and running back-and-forth on went to check on him and found him unresponsive in the floor. She called 911 and checked his glucose and found her glucose to be 27. Try to get some glucose at home and upon arrival paramedics also gave glucose. At the moment of my evaluation patient is awake and back to baseline but despite my insistence he is reluctant to eating at all as he says he does not feel hungry. He states he did eat lunch today and this episode happened while dinner was being prepared. On further questions, states that he has been having some cough and having increased sputum production, but is not sure for how long he has had the symptom. The patient has no other acute complaints at this time.       PAST MEDICAL AND SURGICAL HISTORY     Past Medical History:   Diagnosis Date    Alcoholism ([address])      quit in the 80    Alopecia     Angina at rest     CAD (coronary artery disease)      self , stents     Depression     Esophageal cancer (HCC)     Gastroparesis     GERD (gastroesophageal reflux disease)     Hyperlipidemia     self and family    Hypertension     self and family    Hypothyroidism     family    Kidney stones     medtronic dual pacer  04/20/2021    Migraines     self and family    Osteoarthritis     self and family (in back)    PVD (peripheral

## 2023-10-31 NOTE — H&P
Hospitalist - History & Physical      Patient: Rosemary Larose    Unit/Bed:19/019A  YOB: 1955  MRN: [id number]   Acct: [de-identified]   PCP: Tom Villanueva,       Assessment and Plan:        Hypoglycemia in IDDM type II:   Blood glucose 27 on arrival to the ED  Persistently low despite D10 and nutrition  Patient also hypothermic in the ED  Hold insulin therapy overnight  Continue D5/NS at 100 ml/hour  Paroxysmal atrial fibrillation:   Anticoagulated on Eliquis -we will continue  Continue rate/rhythm control with sotalol  PAD:   Continue Plavix, statin therapy  CAD:   As #3  Essential hypertension:   Continue irbesartan -Sub with losartan  Continue hydralazine  Continue Imdur  Continue hydrochlorothiazide  Lactic acidosis:   Patient was given empiric vancomycin and Zosyn in the emergency department -we will not plan to continue   Most likely due to prolonged hypoglycemia and hypothermia  Will trend    CC: Hypoglycemia    HPI: Patient presents to the emergency department today from home with a blood glucose of 27. Per the patient's wife the patient has not been feeling well for approximately 1 week today she found him unresponsive on the floor. She contacted 911 and took his blood sugar and found it to be 27. She administered glucose at home, EMS administered glucose and the patient's blood glucose was still low. Patient denies any recent fevers. He does endorse some recent cough and increased sputum production but no shortness of breath. Patient denies any nausea vomiting or diarrhea illness. Patient will be admitted to observation for D5 fluid administration and continued monitoring. ROS: Review of Systems   Constitutional:  Positive for activity change and fatigue. HENT: Negative. Eyes: Negative. Respiratory: Negative. Cardiovascular: Negative. Gastrointestinal: Negative. Endocrine: Negative. Genitourinary: Negative. Musculoskeletal: Negative.     Skin:

## 2023-10-31 NOTE — DISCHARGE INSTRUCTIONS
Pt to follow up with Dr. Seamus Deleon, endocrinology, for optimization of insulin     Pt to follow up with urology for BPH

## 2023-11-01 LAB
ANION GAP SERPL CALC-SCNC: 8 MEQ/L (ref 8–16)
BACTERIA UR CULT: ABNORMAL
BUN SERPL-MCNC: 14 MG/DL (ref 7–22)
C PEPTIDE SERPL-MCNC: 2.1 NG/ML (ref 1.1–4.4)
CALCIUM SERPL-MCNC: 10.1 MG/DL (ref 8.5–10.5)
CHLORIDE SERPL-SCNC: 106 MEQ/L (ref 98–111)
CO2 SERPL-SCNC: 22 MEQ/L (ref 23–33)
CREAT SERPL-MCNC: 1 MG/DL (ref 0.4–1.2)
DEPRECATED RDW RBC AUTO: 43 FL (ref 35–45)
EKG ATRIAL RATE: 64 BPM
EKG ATRIAL RATE: 66 BPM
EKG P-R INTERVAL: 220 MS
EKG Q-T INTERVAL: 470 MS
EKG Q-T INTERVAL: 480 MS
EKG QRS DURATION: 102 MS
EKG QRS DURATION: 102 MS
EKG QTC CALCULATION (BAZETT): 492 MS
EKG QTC CALCULATION (BAZETT): 495 MS
EKG R AXIS: 66 DEGREES
EKG R AXIS: 79 DEGREES
EKG T AXIS: -3 DEGREES
EKG T AXIS: -4 DEGREES
EKG VENTRICULAR RATE: 64 BPM
EKG VENTRICULAR RATE: 66 BPM
ERYTHROCYTE [DISTWIDTH] IN BLOOD BY AUTOMATED COUNT: 15.7 % (ref 11.5–14.5)
GFR SERPL CREATININE-BSD FRML MDRD: > 60 ML/MIN/1.73M2
GLUCOSE BLD STRIP.AUTO-MCNC: 241 MG/DL (ref 70–108)
GLUCOSE BLD STRIP.AUTO-MCNC: 266 MG/DL (ref 70–108)
GLUCOSE BLD STRIP.AUTO-MCNC: 292 MG/DL (ref 70–108)
GLUCOSE BLD STRIP.AUTO-MCNC: 295 MG/DL (ref 70–108)
GLUCOSE BLD STRIP.AUTO-MCNC: 310 MG/DL (ref 70–108)
GLUCOSE SERPL-MCNC: 256 MG/DL (ref 70–108)
HCT VFR BLD AUTO: 32.7 % (ref 42–52)
HGB BLD-MCNC: 9.9 GM/DL (ref 14–18)
MAGNESIUM SERPL-MCNC: 1.9 MG/DL (ref 1.6–2.4)
MCH RBC QN AUTO: 23.3 PG (ref 26–33)
MCHC RBC AUTO-ENTMCNC: 30.3 GM/DL (ref 32.2–35.5)
MCV RBC AUTO: 76.9 FL (ref 80–94)
ORGANISM: ABNORMAL
PLATELET # BLD AUTO: 307 THOU/MM3 (ref 130–400)
PMV BLD AUTO: 9.9 FL (ref 9.4–12.4)
POTASSIUM SERPL-SCNC: 4.2 MEQ/L (ref 3.5–5.2)
RBC # BLD AUTO: 4.25 MILL/MM3 (ref 4.7–6.1)
SODIUM SERPL-SCNC: 136 MEQ/L (ref 135–145)
WBC # BLD AUTO: 10 THOU/MM3 (ref 4.8–10.8)

## 2023-11-01 PROCEDURE — 80048 BASIC METABOLIC PNL TOTAL CA: CPT

## 2023-11-01 PROCEDURE — 97166 OT EVAL MOD COMPLEX 45 MIN: CPT

## 2023-11-01 PROCEDURE — 36415 COLL VENOUS BLD VENIPUNCTURE: CPT

## 2023-11-01 PROCEDURE — 2580000003 HC RX 258: Performed by: PHYSICIAN ASSISTANT

## 2023-11-01 PROCEDURE — G0378 HOSPITAL OBSERVATION PER HR: HCPCS

## 2023-11-01 PROCEDURE — 97530 THERAPEUTIC ACTIVITIES: CPT

## 2023-11-01 PROCEDURE — 83735 ASSAY OF MAGNESIUM: CPT

## 2023-11-01 PROCEDURE — 6370000000 HC RX 637 (ALT 250 FOR IP)

## 2023-11-01 PROCEDURE — 6370000000 HC RX 637 (ALT 250 FOR IP): Performed by: PHYSICIAN ASSISTANT

## 2023-11-01 PROCEDURE — 97535 SELF CARE MNGMENT TRAINING: CPT

## 2023-11-01 PROCEDURE — 85027 COMPLETE CBC AUTOMATED: CPT

## 2023-11-01 PROCEDURE — 82948 REAGENT STRIP/BLOOD GLUCOSE: CPT

## 2023-11-01 RX ORDER — INSULIN LISPRO 100 [IU]/ML
0-4 INJECTION, SOLUTION INTRAVENOUS; SUBCUTANEOUS
Status: DISCONTINUED | OUTPATIENT
Start: 2023-11-01 | End: 2023-11-02

## 2023-11-01 RX ORDER — METOCLOPRAMIDE 10 MG/1
5 TABLET ORAL
Status: DISCONTINUED | OUTPATIENT
Start: 2023-11-01 | End: 2023-11-02 | Stop reason: HOSPADM

## 2023-11-01 RX ORDER — INSULIN LISPRO 100 [IU]/ML
0-4 INJECTION, SOLUTION INTRAVENOUS; SUBCUTANEOUS NIGHTLY
Status: DISCONTINUED | OUTPATIENT
Start: 2023-11-01 | End: 2023-11-02

## 2023-11-01 RX ADMIN — BUPROPION HYDROCHLORIDE 150 MG: 150 TABLET, EXTENDED RELEASE ORAL at 08:58

## 2023-11-01 RX ADMIN — INSULIN LISPRO 1 UNITS: 100 INJECTION, SOLUTION INTRAVENOUS; SUBCUTANEOUS at 10:11

## 2023-11-01 RX ADMIN — SUCRALFATE 1 G: 1 TABLET ORAL at 21:26

## 2023-11-01 RX ADMIN — INSULIN GLARGINE 10 UNITS: 100 INJECTION, SOLUTION SUBCUTANEOUS at 21:26

## 2023-11-01 RX ADMIN — DULOXETINE HYDROCHLORIDE 60 MG: 60 CAPSULE, DELAYED RELEASE ORAL at 21:26

## 2023-11-01 RX ADMIN — ATORVASTATIN CALCIUM 80 MG: 80 TABLET, FILM COATED ORAL at 08:58

## 2023-11-01 RX ADMIN — APIXABAN 5 MG: 5 TABLET, FILM COATED ORAL at 08:58

## 2023-11-01 RX ADMIN — HYDRALAZINE HYDROCHLORIDE 25 MG: 25 TABLET, FILM COATED ORAL at 14:20

## 2023-11-01 RX ADMIN — HYDROCHLOROTHIAZIDE 12.5 MG: 25 TABLET ORAL at 08:59

## 2023-11-01 RX ADMIN — PANTOPRAZOLE SODIUM 40 MG: 40 TABLET, DELAYED RELEASE ORAL at 21:26

## 2023-11-01 RX ADMIN — LOSARTAN POTASSIUM 100 MG: 100 TABLET, FILM COATED ORAL at 08:58

## 2023-11-01 RX ADMIN — SUCRALFATE 1 G: 1 TABLET ORAL at 14:20

## 2023-11-01 RX ADMIN — SOTALOL HYDROCHLORIDE 120 MG: 80 TABLET ORAL at 21:26

## 2023-11-01 RX ADMIN — SODIUM CHLORIDE, PRESERVATIVE FREE 10 ML: 5 INJECTION INTRAVENOUS at 21:27

## 2023-11-01 RX ADMIN — HYDRALAZINE HYDROCHLORIDE 25 MG: 25 TABLET, FILM COATED ORAL at 05:20

## 2023-11-01 RX ADMIN — SUCRALFATE 1 G: 1 TABLET ORAL at 08:59

## 2023-11-01 RX ADMIN — METOCLOPRAMIDE 5 MG: 10 TABLET ORAL at 21:27

## 2023-11-01 RX ADMIN — ISOSORBIDE MONONITRATE 60 MG: 60 TABLET, EXTENDED RELEASE ORAL at 08:59

## 2023-11-01 RX ADMIN — INSULIN LISPRO 2 UNITS: 100 INJECTION, SOLUTION INTRAVENOUS; SUBCUTANEOUS at 17:42

## 2023-11-01 RX ADMIN — APIXABAN 5 MG: 5 TABLET, FILM COATED ORAL at 21:26

## 2023-11-01 RX ADMIN — PANTOPRAZOLE SODIUM 40 MG: 40 TABLET, DELAYED RELEASE ORAL at 08:59

## 2023-11-01 RX ADMIN — CLOPIDOGREL BISULFATE 75 MG: 75 TABLET ORAL at 08:59

## 2023-11-01 RX ADMIN — SUCRALFATE 1 G: 1 TABLET ORAL at 17:43

## 2023-11-01 RX ADMIN — DULOXETINE HYDROCHLORIDE 60 MG: 60 CAPSULE, DELAYED RELEASE ORAL at 08:59

## 2023-11-01 RX ADMIN — SOTALOL HYDROCHLORIDE 120 MG: 80 TABLET ORAL at 08:59

## 2023-11-01 RX ADMIN — HYDRALAZINE HYDROCHLORIDE 25 MG: 25 TABLET, FILM COATED ORAL at 21:26

## 2023-11-01 RX ADMIN — INSULIN LISPRO 2 UNITS: 100 INJECTION, SOLUTION INTRAVENOUS; SUBCUTANEOUS at 13:06

## 2023-11-01 ASSESSMENT — PAIN SCALES - GENERAL
PAINLEVEL_OUTOF10: 0
PAINLEVEL_OUTOF10: 0

## 2023-11-01 NOTE — CARE COORDINATION
11/1/23, 1:46 PM EDT    DISCHARGE ON GOING [place] day: 0  Location: 8A-06/006-A Reason for admit: Hypoglycemia [E16.2]  Hypothermia, initial encounter [T68. XXXA]   Procedure: 10/30 CXR: Bilateral pleural effusions. Recommend follow-up to clearing  Barriers to Discharge: Hospitalist and therapy following. - Orthostatic vitals today. Glucose management. RN reports there are concerns with dizziness and weight loss prior to admission. PCP: Tom Villanueva, DO   %  Patient Goals/Plan/Treatment Preferences: Planning to return home with wife. Has walker, cane, wheelchair, and glucometer. Denies needs at this time.

## 2023-11-01 NOTE — PLAN OF CARE
Problem: Discharge Planning  Goal: Discharge to home or other facility with appropriate resources  Outcome: Progressing  Flowsheets (Taken 10/31/2023 2000)  Discharge to home or other facility with appropriate resources: Identify barriers to discharge with patient and caregiver     Problem: Pain  Goal: Verbalizes/displays adequate comfort level or baseline comfort level  Outcome: Progressing     Problem: ABCDS Injury Assessment  Goal: Absence of physical injury  Outcome: Progressing     Problem: Safety - Adult  Goal: Free from fall injury  Outcome: Progressing

## 2023-11-02 VITALS
RESPIRATION RATE: 18 BRPM | BODY MASS INDEX: 21.07 KG/M2 | WEIGHT: 139 LBS | OXYGEN SATURATION: 100 % | HEART RATE: 67 BPM | HEIGHT: 68 IN | SYSTOLIC BLOOD PRESSURE: 130 MMHG | TEMPERATURE: 98 F | DIASTOLIC BLOOD PRESSURE: 61 MMHG

## 2023-11-02 LAB
ANION GAP SERPL CALC-SCNC: 14 MEQ/L (ref 8–16)
BUN SERPL-MCNC: 15 MG/DL (ref 7–22)
CALCIUM SERPL-MCNC: 10.9 MG/DL (ref 8.5–10.5)
CHLORIDE SERPL-SCNC: 104 MEQ/L (ref 98–111)
CO2 SERPL-SCNC: 22 MEQ/L (ref 23–33)
CREAT SERPL-MCNC: 1 MG/DL (ref 0.4–1.2)
DEPRECATED RDW RBC AUTO: 42.1 FL (ref 35–45)
ERYTHROCYTE [DISTWIDTH] IN BLOOD BY AUTOMATED COUNT: 15.9 % (ref 11.5–14.5)
GFR SERPL CREATININE-BSD FRML MDRD: > 60 ML/MIN/1.73M2
GLUCOSE BLD STRIP.AUTO-MCNC: 274 MG/DL (ref 70–108)
GLUCOSE BLD STRIP.AUTO-MCNC: 279 MG/DL (ref 70–108)
GLUCOSE SERPL-MCNC: 252 MG/DL (ref 70–108)
HCT VFR BLD AUTO: 34.5 % (ref 42–52)
HGB BLD-MCNC: 10.6 GM/DL (ref 14–18)
MAGNESIUM SERPL-MCNC: 1.8 MG/DL (ref 1.6–2.4)
MCH RBC QN AUTO: 22.8 PG (ref 26–33)
MCHC RBC AUTO-ENTMCNC: 30.7 GM/DL (ref 32.2–35.5)
MCV RBC AUTO: 74.4 FL (ref 80–94)
PLATELET # BLD AUTO: 338 THOU/MM3 (ref 130–400)
PMV BLD AUTO: 9.7 FL (ref 9.4–12.4)
POTASSIUM SERPL-SCNC: 4.2 MEQ/L (ref 3.5–5.2)
RBC # BLD AUTO: 4.64 MILL/MM3 (ref 4.7–6.1)
SODIUM SERPL-SCNC: 140 MEQ/L (ref 135–145)
WBC # BLD AUTO: 9.1 THOU/MM3 (ref 4.8–10.8)

## 2023-11-02 PROCEDURE — 36415 COLL VENOUS BLD VENIPUNCTURE: CPT

## 2023-11-02 PROCEDURE — G0378 HOSPITAL OBSERVATION PER HR: HCPCS

## 2023-11-02 PROCEDURE — 83735 ASSAY OF MAGNESIUM: CPT

## 2023-11-02 PROCEDURE — 2580000003 HC RX 258: Performed by: PHYSICIAN ASSISTANT

## 2023-11-02 PROCEDURE — 97116 GAIT TRAINING THERAPY: CPT

## 2023-11-02 PROCEDURE — 97162 PT EVAL MOD COMPLEX 30 MIN: CPT

## 2023-11-02 PROCEDURE — 80048 BASIC METABOLIC PNL TOTAL CA: CPT

## 2023-11-02 PROCEDURE — 85027 COMPLETE CBC AUTOMATED: CPT

## 2023-11-02 PROCEDURE — 82948 REAGENT STRIP/BLOOD GLUCOSE: CPT

## 2023-11-02 PROCEDURE — 97535 SELF CARE MNGMENT TRAINING: CPT

## 2023-11-02 PROCEDURE — 6370000000 HC RX 637 (ALT 250 FOR IP): Performed by: PHYSICIAN ASSISTANT

## 2023-11-02 PROCEDURE — 6370000000 HC RX 637 (ALT 250 FOR IP)

## 2023-11-02 RX ORDER — INSULIN LISPRO 100 [IU]/ML
2 INJECTION, SOLUTION INTRAVENOUS; SUBCUTANEOUS
Status: DISCONTINUED | OUTPATIENT
Start: 2023-11-02 | End: 2023-11-02 | Stop reason: HOSPADM

## 2023-11-02 RX ORDER — METOCLOPRAMIDE 5 MG/1
5 TABLET ORAL
Qty: 120 TABLET | Refills: 3 | Status: SHIPPED | OUTPATIENT
Start: 2023-11-02

## 2023-11-02 RX ORDER — ALBUTEROL SULFATE 90 UG/1
2 AEROSOL, METERED RESPIRATORY (INHALATION) EVERY 6 HOURS PRN
Qty: 18 G | Refills: 3 | Status: SHIPPED | OUTPATIENT
Start: 2023-11-02

## 2023-11-02 RX ORDER — INSULIN LISPRO 100 [IU]/ML
0-8 INJECTION, SOLUTION INTRAVENOUS; SUBCUTANEOUS
Status: DISCONTINUED | OUTPATIENT
Start: 2023-11-02 | End: 2023-11-02 | Stop reason: HOSPADM

## 2023-11-02 RX ORDER — HYDRALAZINE HYDROCHLORIDE 25 MG/1
25 TABLET, FILM COATED ORAL EVERY 8 HOURS SCHEDULED
Qty: 90 TABLET | Refills: 1 | Status: SHIPPED | OUTPATIENT
Start: 2023-11-02

## 2023-11-02 RX ORDER — INSULIN LISPRO 100 [IU]/ML
0-4 INJECTION, SOLUTION INTRAVENOUS; SUBCUTANEOUS NIGHTLY
Status: DISCONTINUED | OUTPATIENT
Start: 2023-11-02 | End: 2023-11-02 | Stop reason: HOSPADM

## 2023-11-02 RX ORDER — ALBUTEROL SULFATE 90 UG/1
2 AEROSOL, METERED RESPIRATORY (INHALATION) EVERY 6 HOURS PRN
Status: DISCONTINUED | OUTPATIENT
Start: 2023-11-02 | End: 2023-11-02 | Stop reason: HOSPADM

## 2023-11-02 RX ORDER — HYDROCHLOROTHIAZIDE 12.5 MG/1
12.5 TABLET ORAL DAILY
Qty: 30 TABLET | Refills: 1 | Status: SHIPPED | OUTPATIENT
Start: 2023-11-02

## 2023-11-02 RX ADMIN — HYDRALAZINE HYDROCHLORIDE 25 MG: 25 TABLET, FILM COATED ORAL at 05:25

## 2023-11-02 RX ADMIN — PANTOPRAZOLE SODIUM 40 MG: 40 TABLET, DELAYED RELEASE ORAL at 08:18

## 2023-11-02 RX ADMIN — ISOSORBIDE MONONITRATE 60 MG: 60 TABLET, EXTENDED RELEASE ORAL at 08:19

## 2023-11-02 RX ADMIN — INSULIN LISPRO 2 UNITS: 100 INJECTION, SOLUTION INTRAVENOUS; SUBCUTANEOUS at 13:28

## 2023-11-02 RX ADMIN — SUCRALFATE 1 G: 1 TABLET ORAL at 12:49

## 2023-11-02 RX ADMIN — ACETAMINOPHEN 650 MG: 325 TABLET ORAL at 14:54

## 2023-11-02 RX ADMIN — BUPROPION HYDROCHLORIDE 150 MG: 150 TABLET, EXTENDED RELEASE ORAL at 08:19

## 2023-11-02 RX ADMIN — METOCLOPRAMIDE 5 MG: 10 TABLET ORAL at 05:25

## 2023-11-02 RX ADMIN — INSULIN LISPRO 4 UNITS: 100 INJECTION, SOLUTION INTRAVENOUS; SUBCUTANEOUS at 13:28

## 2023-11-02 RX ADMIN — LOSARTAN POTASSIUM 100 MG: 100 TABLET, FILM COATED ORAL at 08:18

## 2023-11-02 RX ADMIN — HYDRALAZINE HYDROCHLORIDE 25 MG: 25 TABLET, FILM COATED ORAL at 12:49

## 2023-11-02 RX ADMIN — CLOPIDOGREL BISULFATE 75 MG: 75 TABLET ORAL at 08:20

## 2023-11-02 RX ADMIN — APIXABAN 5 MG: 5 TABLET, FILM COATED ORAL at 08:19

## 2023-11-02 RX ADMIN — SUCRALFATE 1 G: 1 TABLET ORAL at 08:18

## 2023-11-02 RX ADMIN — METOCLOPRAMIDE 5 MG: 10 TABLET ORAL at 12:49

## 2023-11-02 RX ADMIN — INSULIN LISPRO 4 UNITS: 100 INJECTION, SOLUTION INTRAVENOUS; SUBCUTANEOUS at 08:18

## 2023-11-02 RX ADMIN — SOTALOL HYDROCHLORIDE 120 MG: 80 TABLET ORAL at 08:21

## 2023-11-02 RX ADMIN — ATORVASTATIN CALCIUM 80 MG: 80 TABLET, FILM COATED ORAL at 08:21

## 2023-11-02 RX ADMIN — DULOXETINE HYDROCHLORIDE 60 MG: 60 CAPSULE, DELAYED RELEASE ORAL at 08:19

## 2023-11-02 RX ADMIN — HYDROCHLOROTHIAZIDE 12.5 MG: 25 TABLET ORAL at 08:19

## 2023-11-02 RX ADMIN — SODIUM CHLORIDE, PRESERVATIVE FREE 10 ML: 5 INJECTION INTRAVENOUS at 08:21

## 2023-11-02 ASSESSMENT — PAIN DESCRIPTION - LOCATION: LOCATION: ABDOMEN

## 2023-11-02 ASSESSMENT — PAIN SCALES - GENERAL: PAINLEVEL_OUTOF10: 3

## 2023-11-02 NOTE — DISCHARGE SUMMARY
Resident Discharge Summary (Hospitalist)      Patient Identification:   Negin Sosa   : 1955  MRN: [id number]   Account: [de-identified]   Patient's PCP: Katherine Robbins DO    Admit Date: 10/30/2023   Discharge Date:   23    Admitting Physician: No admitting provider for patient encounter. Discharge Physician: Ector Perez MD       Discharge Diagnoses:  Hypoglycemia 2/2 IDDMII, iso decreased appetite from gastroparesis: pt noted to have episode of hypoglycemia as noted in HPI. BS improving after administration of IV dextrose. Noted prior episode of hypoglycemia 23. 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 Continue home insulin regimen, making sure to eat meals and adjust insulin accordingly  Patient to f/u with Dr. Suzan Loo outpatient  Starting Metoclopramide 5mg QID for gastroparesis  Discussed risks vs benefits of Metoclopramide; including but not limited to the adverse effect of tardive dyskinesia. Discussed the possibility of this side effect, as well as the possibility that the TD may or may not resolve with cessation of the medication. Patient and his wife verbalize understanding and are in agreement to begin the medication. IDDMII: hypoglycemia episode noted above. Prior A1c 10/31 8.1%  Paroxysmal Afib: [id number] 3, HAS-BLED 3. Has pacemaker. Continue home Eliquis and Sotalol  CAD s/p stent placement: follows with Dr. Frederick Kent, cardiology. Last stent placed  by Dr. Vimal Marrero. F/u with cardio outpatient  BPH?: previously on Tamsulosin, but stopped d/t need for renal U/S. Patient notes increased urinary flow, frequency, and occasional accidents. Recommend f/u with urology outpatient  Physical deconditioning: Patient's wife concerned d/t pt not using cane/walker more consistently. Concerns for falls, especially given current admit.    Patient and wife decline home health services at

## 2023-11-02 NOTE — CARE COORDINATION
11/2/23, 3:18 PM EDT    Patient goals/plan/ treatment preferences discussed by  and . Patient goals/plan/ treatment preferences reviewed with patient/ family. Patient/ family verbalize understanding of discharge plan and are in agreement with goal/plan/treatment preferences. Understanding was demonstrated using the teach back method. AVS provided by RN at time of discharge, which includes all necessary medical information pertaining to the patients current course of illness, treatment, post-discharge goals of care, and treatment preferences. Services At/After Discharge: None       IMM Letter  Observation Status Letter date given[de-identified] 10/31/23  Observation Status Letter time given[de-identified] 0007  Observation Status Letter given to Patient/Family/Significant other/Guardian/POA/by[de-identified] patient registration         CM spoke with patient and his wife this afternoon. They both decline  services for any nursing or therapy. Deny and needs for discharge at this time.

## 2023-11-02 NOTE — PLAN OF CARE
Problem: Discharge Planning  Goal: Discharge to home or other facility with appropriate resources  Outcome: Progressing  Flowsheets (Taken 11/1/2023 1949)  Discharge to home or other facility with appropriate resources: Identify barriers to discharge with patient and caregiver     Problem: Pain  Goal: Verbalizes/displays adequate comfort level or baseline comfort level  Outcome: Progressing     Problem: ABCDS Injury Assessment  Goal: Absence of physical injury  Outcome: Progressing     Problem: Safety - Adult  Goal: Free from fall injury  Outcome: Progressing

## 2023-11-04 LAB
BACTERIA BLD AEROBE CULT: NORMAL
BACTERIA BLD AEROBE CULT: NORMAL

## 2023-11-08 VITALS
WEIGHT: 141.4 LBS | TEMPERATURE: 98.4 F | BODY MASS INDEX: 22.14 KG/M2 | HEART RATE: 78 BPM | DIASTOLIC BLOOD PRESSURE: 76 MMHG | SYSTOLIC BLOOD PRESSURE: 170 MMHG

## 2023-11-08 NOTE — TELEPHONE ENCOUNTER
Noted recent hospitalization for hypoglycemia (10/30-11/2). Called patient and spoke with wife Steven Rose) to obtain history. Steven Rose reports that Amari Silvestre was feeling poorly before the hospitalization. She administered his Novolog dose before his meal (7 units) and then suspected that Amari Silvestre did not eat much of his meal (fed to dog instead). She later found him severely hypoglycemic (27mg/dL) & seizing in his room. Steven Rose reports that Amari Silvestre has been having rattling in his chest and a cough since hospitalization. He also had a fever over the weekend. Amari Silvestre will be following up with Dr. Shikha Arce today. Regarding glycemic control, Mumtaz's insulin was not adjusted at discharge, per Adriana's report. Wife already has slightly decreased insulin for safety. No additional lows noted since hospital discharge.   -Levemir 19 units BID  -Novolog before eating - 6 units    Follow-up appt is scheduled with Dr. Suzan Loo 11/27/23      Plan:  -Amari Silvestre may need a further insulin decrease; [facility]; however, wife/Mumtaz are overwhelmed with appts. Requested a device download at Dr. Yulia De La Rosa office by [facility] of next week. -Amari Silvestre was previously unable to afford glucagon; given hypoglycemic episode, will reattempt coverage    -We were working on obtaining a CGM for Amari Silvestre; will check on order status    -Steven Rose reports that Amari Silvestre has been struggling with adhering to a gastroparesis diet; offered dietician visit, but Steven Rose defers at this time. -------------------------------------------------------------------------------------    Dr. Suzan Loo,     Please see pended order for glucagon.      ------------------------------------------------------------------------------------    For Pharmacy Admin Tracking Only    Program: Medical Group  CPA in place:  Yes  Recommendation Provided To: Patient/Caregiver: 2 via Telephone  Intervention Detail: New Rx: 1, reason: Needs

## 2023-11-10 RX ORDER — GLUCAGON INJECTION, SOLUTION 1 MG/.2ML
INJECTION, SOLUTION SUBCUTANEOUS
Qty: 0.4 ML | Refills: 0 | Status: SHIPPED | OUTPATIENT
Start: 2023-11-10

## 2023-11-24 ENCOUNTER — HOSPITAL ENCOUNTER (EMERGENCY)
Age: 68
Discharge: HOME | End: 2023-11-25
Attending: EMERGENCY MEDICINE
Payer: MEDICARE

## 2023-11-24 ENCOUNTER — ANESTHESIA (OUTPATIENT)
Dept: ENDOSCOPY | Age: 68
End: 2023-11-24
Payer: MEDICARE

## 2023-11-24 ENCOUNTER — ANESTHESIA EVENT (OUTPATIENT)
Dept: ENDOSCOPY | Age: 68
End: 2023-11-24
Payer: MEDICARE

## 2023-11-24 DIAGNOSIS — T18.108A FOREIGN BODY IN ESOPHAGUS, INITIAL ENCOUNTER: Primary | ICD-10-CM

## 2023-11-24 LAB
ALBUMIN SERPL BCG-MCNC: 3.9 G/DL (ref 3.5–5.1)
ALP SERPL-CCNC: 118 U/L (ref 38–126)
ALT SERPL W/O P-5'-P-CCNC: 38 U/L (ref 11–66)
ANION GAP SERPL CALC-SCNC: 14 MEQ/L (ref 8–16)
AST SERPL-CCNC: 35 U/L (ref 5–40)
B-OH-BUTYR SERPL-MSCNC: 14.45 MG/DL (ref 0.2–2.81)
BASE EXCESS BLDA CALC-SCNC: -2.7 MMOL/L (ref -2–3)
BASOPHILS ABSOLUTE: 0.1 THOU/MM3 (ref 0–0.1)
BASOPHILS NFR BLD AUTO: 1 %
BILIRUB SERPL-MCNC: 0.5 MG/DL (ref 0.3–1.2)
BUN SERPL-MCNC: 23 MG/DL (ref 7–22)
CALCIUM SERPL-MCNC: 11.1 MG/DL (ref 8.5–10.5)
CHLORIDE SERPL-SCNC: 104 MEQ/L (ref 98–111)
CO2 SERPL-SCNC: 21 MEQ/L (ref 23–33)
COLLECTED BY:: ABNORMAL
CREAT SERPL-MCNC: 1 MG/DL (ref 0.4–1.2)
DEPRECATED RDW RBC AUTO: 44.3 FL (ref 35–45)
EOSINOPHIL NFR BLD AUTO: 1.1 %
EOSINOPHILS ABSOLUTE: 0.1 THOU/MM3 (ref 0–0.4)
ERYTHROCYTE [DISTWIDTH] IN BLOOD BY AUTOMATED COUNT: 17.7 % (ref 11.5–14.5)
GFR SERPL CREATININE-BSD FRML MDRD: > 60 ML/MIN/1.73M2
GLUCOSE SERPL-MCNC: 250 MG/DL (ref 70–108)
HCO3 BLDA-SCNC: 21 MMOL/L (ref 23–28)
HCT VFR BLD AUTO: 40.9 % (ref 42–52)
HGB BLD-MCNC: 12.2 GM/DL (ref 14–18)
IMM GRANULOCYTES # BLD AUTO: 0.03 THOU/MM3 (ref 0–0.07)
IMM GRANULOCYTES NFR BLD AUTO: 0.3 %
LACTIC ACID, SEPSIS: 2.6 MMOL/L (ref 0.5–1.9)
LYMPHOCYTES ABSOLUTE: 2.4 THOU/MM3 (ref 1–4.8)
LYMPHOCYTES NFR BLD AUTO: 20.8 %
MCH RBC QN AUTO: 22.1 PG (ref 26–33)
MCHC RBC AUTO-ENTMCNC: 29.8 GM/DL (ref 32.2–35.5)
MCV RBC AUTO: 74.2 FL (ref 80–94)
MONOCYTES ABSOLUTE: 0.9 THOU/MM3 (ref 0.4–1.3)
MONOCYTES NFR BLD AUTO: 7.8 %
NEUTROPHILS NFR BLD AUTO: 69 %
NRBC BLD AUTO-RTO: 0 /100 WBC
OSMOLALITY SERPL CALC.SUM OF ELEC: 289.6 MOSMOL/KG (ref 275–300)
PCO2 TEMP ADJ BLDMV: 34 MMHG (ref 41–51)
PH BLDMV: 7.41 [PH] (ref 7.31–7.41)
PLATELET # BLD AUTO: 562 THOU/MM3 (ref 130–400)
PMV BLD AUTO: 9.3 FL (ref 9.4–12.4)
PO2 BLDMV: 39 MMHG (ref 25–40)
POTASSIUM SERPL-SCNC: 5.4 MEQ/L (ref 3.5–5.2)
PROT SERPL-MCNC: 6.6 G/DL (ref 6.1–8)
RBC # BLD AUTO: 5.51 MILL/MM3 (ref 4.7–6.1)
SAO2 % BLDMV: 75 %
SEGMENTED NEUTROPHILS ABSOLUTE COUNT: 7.9 THOU/MM3 (ref 1.8–7.7)
SODIUM SERPL-SCNC: 139 MEQ/L (ref 135–145)
TROPONIN, HIGH SENSITIVITY: 37 NG/L (ref 0–12)
WBC # BLD AUTO: 11.5 THOU/MM3 (ref 4.8–10.8)

## 2023-11-24 PROCEDURE — 2709999900 HC NON-CHARGEABLE SUPPLY: Performed by: INTERNAL MEDICINE

## 2023-11-24 PROCEDURE — 93005 ELECTROCARDIOGRAM TRACING: CPT | Performed by: EMERGENCY MEDICINE

## 2023-11-24 PROCEDURE — 87040 BLOOD CULTURE FOR BACTERIA: CPT

## 2023-11-24 PROCEDURE — 80053 COMPREHEN METABOLIC PANEL: CPT

## 2023-11-24 PROCEDURE — 85025 COMPLETE CBC W/AUTO DIFF WBC: CPT

## 2023-11-24 PROCEDURE — 3609012900 HC EGD FOREIGN BODY REMOVAL: Performed by: INTERNAL MEDICINE

## 2023-11-24 PROCEDURE — 96361 HYDRATE IV INFUSION ADD-ON: CPT

## 2023-11-24 PROCEDURE — 2580000003 HC RX 258

## 2023-11-24 PROCEDURE — 83605 ASSAY OF LACTIC ACID: CPT

## 2023-11-24 PROCEDURE — 82803 BLOOD GASES ANY COMBINATION: CPT

## 2023-11-24 PROCEDURE — 96360 HYDRATION IV INFUSION INIT: CPT

## 2023-11-24 PROCEDURE — 96372 THER/PROPH/DIAG INJ SC/IM: CPT

## 2023-11-24 PROCEDURE — 3700000001 HC ADD 15 MINUTES (ANESTHESIA): Performed by: INTERNAL MEDICINE

## 2023-11-24 PROCEDURE — 2720000010 HC SURG SUPPLY STERILE: Performed by: INTERNAL MEDICINE

## 2023-11-24 PROCEDURE — 7100000001 HC PACU RECOVERY - ADDTL 15 MIN: Performed by: INTERNAL MEDICINE

## 2023-11-24 PROCEDURE — 6360000002 HC RX W HCPCS: Performed by: EMERGENCY MEDICINE

## 2023-11-24 PROCEDURE — 36415 COLL VENOUS BLD VENIPUNCTURE: CPT

## 2023-11-24 PROCEDURE — 84484 ASSAY OF TROPONIN QUANT: CPT

## 2023-11-24 PROCEDURE — 2500000003 HC RX 250 WO HCPCS

## 2023-11-24 PROCEDURE — 82010 KETONE BODYS QUAN: CPT

## 2023-11-24 PROCEDURE — 99284 EMERGENCY DEPT VISIT MOD MDM: CPT

## 2023-11-24 PROCEDURE — 7100000000 HC PACU RECOVERY - FIRST 15 MIN: Performed by: INTERNAL MEDICINE

## 2023-11-24 PROCEDURE — 2580000003 HC RX 258: Performed by: EMERGENCY MEDICINE

## 2023-11-24 PROCEDURE — 3700000000 HC ANESTHESIA ATTENDED CARE: Performed by: INTERNAL MEDICINE

## 2023-11-24 PROCEDURE — 6360000002 HC RX W HCPCS

## 2023-11-24 RX ORDER — IBUPROFEN 600 MG/1
1 TABLET ORAL ONCE
Status: COMPLETED | OUTPATIENT
Start: 2023-11-24 | End: 2023-11-24

## 2023-11-24 RX ORDER — DEXAMETHASONE SODIUM PHOSPHATE 10 MG/ML
INJECTION, EMULSION INTRAMUSCULAR; INTRAVENOUS PRN
Status: DISCONTINUED | OUTPATIENT
Start: 2023-11-24 | End: 2023-11-24 | Stop reason: SDUPTHER

## 2023-11-24 RX ORDER — PROPOFOL 10 MG/ML
INJECTION, EMULSION INTRAVENOUS PRN
Status: DISCONTINUED | OUTPATIENT
Start: 2023-11-24 | End: 2023-11-24 | Stop reason: SDUPTHER

## 2023-11-24 RX ORDER — 0.9 % SODIUM CHLORIDE 0.9 %
1000 INTRAVENOUS SOLUTION INTRAVENOUS ONCE
Status: COMPLETED | OUTPATIENT
Start: 2023-11-24 | End: 2023-11-24

## 2023-11-24 RX ORDER — ONDANSETRON 2 MG/ML
INJECTION INTRAMUSCULAR; INTRAVENOUS PRN
Status: DISCONTINUED | OUTPATIENT
Start: 2023-11-24 | End: 2023-11-24 | Stop reason: SDUPTHER

## 2023-11-24 RX ORDER — KETOROLAC TROMETHAMINE 30 MG/ML
INJECTION, SOLUTION INTRAMUSCULAR; INTRAVENOUS PRN
Status: DISCONTINUED | OUTPATIENT
Start: 2023-11-24 | End: 2023-11-24 | Stop reason: SDUPTHER

## 2023-11-24 RX ORDER — SODIUM CHLORIDE 9 MG/ML
25 INJECTION, SOLUTION INTRAVENOUS PRN
Status: CANCELLED | OUTPATIENT
Start: 2023-11-24

## 2023-11-24 RX ORDER — SODIUM CHLORIDE 0.9 % (FLUSH) 0.9 %
5-40 SYRINGE (ML) INJECTION PRN
Status: CANCELLED | OUTPATIENT
Start: 2023-11-24

## 2023-11-24 RX ORDER — SODIUM CHLORIDE, SODIUM LACTATE, POTASSIUM CHLORIDE, CALCIUM CHLORIDE 600; 310; 30; 20 MG/100ML; MG/100ML; MG/100ML; MG/100ML
INJECTION, SOLUTION INTRAVENOUS CONTINUOUS PRN
Status: DISCONTINUED | OUTPATIENT
Start: 2023-11-24 | End: 2023-11-24 | Stop reason: SDUPTHER

## 2023-11-24 RX ORDER — SODIUM CHLORIDE 0.9 % (FLUSH) 0.9 %
5-40 SYRINGE (ML) INJECTION EVERY 12 HOURS SCHEDULED
Status: CANCELLED | OUTPATIENT
Start: 2023-11-24

## 2023-11-24 RX ORDER — SUCCINYLCHOLINE/SOD CL,ISO/PF 200MG/10ML
SYRINGE (ML) INTRAVENOUS PRN
Status: DISCONTINUED | OUTPATIENT
Start: 2023-11-24 | End: 2023-11-24 | Stop reason: SDUPTHER

## 2023-11-24 RX ADMIN — SODIUM CHLORIDE, POTASSIUM CHLORIDE, SODIUM LACTATE AND CALCIUM CHLORIDE: 600; 310; 30; 20 INJECTION, SOLUTION INTRAVENOUS at 22:28

## 2023-11-24 RX ADMIN — DEXAMETHASONE SODIUM PHOSPHATE 10 MG: 10 INJECTION, EMULSION INTRAMUSCULAR; INTRAVENOUS at 22:34

## 2023-11-24 RX ADMIN — SODIUM CHLORIDE 1000 ML: 9 INJECTION, SOLUTION INTRAVENOUS at 19:33

## 2023-11-24 RX ADMIN — Medication 200 MG: at 22:34

## 2023-11-24 RX ADMIN — Medication 1 MG: at 20:00

## 2023-11-24 RX ADMIN — PROPOFOL 100 MG: 10 INJECTION, EMULSION INTRAVENOUS at 22:34

## 2023-11-24 RX ADMIN — ONDANSETRON 4 MG: 2 INJECTION INTRAMUSCULAR; INTRAVENOUS at 22:40

## 2023-11-24 RX ADMIN — KETOROLAC TROMETHAMINE 15 MG: 30 INJECTION, SOLUTION INTRAMUSCULAR at 22:40

## 2023-11-24 RX ADMIN — PROPOFOL 50 MG: 10 INJECTION, EMULSION INTRAVENOUS at 22:40

## 2023-11-24 ASSESSMENT — PAIN - FUNCTIONAL ASSESSMENT
PAIN_FUNCTIONAL_ASSESSMENT: NONE - DENIES PAIN
PAIN_FUNCTIONAL_ASSESSMENT: 0-10
PAIN_FUNCTIONAL_ASSESSMENT: 0-10

## 2023-11-24 ASSESSMENT — PAIN SCALES - GENERAL: PAINLEVEL_OUTOF10: 5

## 2023-11-24 ASSESSMENT — PAIN DESCRIPTION - LOCATION: LOCATION: THROAT

## 2023-11-24 ASSESSMENT — PAIN DESCRIPTION - DESCRIPTORS: DESCRIPTORS: TIGHTNESS

## 2023-11-25 VITALS
OXYGEN SATURATION: 98 % | DIASTOLIC BLOOD PRESSURE: 68 MMHG | HEIGHT: 68 IN | WEIGHT: 132 LBS | BODY MASS INDEX: 20 KG/M2 | HEART RATE: 93 BPM | SYSTOLIC BLOOD PRESSURE: 128 MMHG | TEMPERATURE: 97.4 F | RESPIRATION RATE: 18 BRPM

## 2023-11-25 LAB
EKG Q-T INTERVAL: 328 MS
EKG QRS DURATION: 116 MS
EKG QTC CALCULATION (BAZETT): 469 MS
EKG R AXIS: 67 DEGREES
EKG T AXIS: -30 DEGREES
EKG VENTRICULAR RATE: 123 BPM

## 2023-11-25 PROCEDURE — 93010 ELECTROCARDIOGRAM REPORT: CPT | Performed by: NUCLEAR MEDICINE

## 2023-11-25 NOTE — ED NOTES
Patient medicated per [facility] and updated on plan of care at this time. Respirations even and unlabored at this time.      Farideh Recinos RN  11/24/23 2028

## 2023-11-25 NOTE — ED TRIAGE NOTES
Patient presents to ED from home with report of a piece of steak stuck in his throat as of 1300 yesterday. Patient reports 5/10 throat pain at this time. Patient is A/O x4 and ambulatory upon arrival. Patient is tachycardic on arrival but states that is his baseline. Wife states she gave 4mg zofran sublingual around 1700 for nausea.

## 2023-11-25 NOTE — ED NOTES
Patient updated on plan of care and scope process at this time.       Dawna Mendoza RN  11/24/23 1925

## 2023-11-25 NOTE — ED NOTES
Patient returned from [facility] at this time and is updated with discharge information by Dr. Iram rBenner at this time. Patient resting in bed, respirations even and unlabored.       Yenifer Amlanza RN  11/24/23 0116

## 2023-11-25 NOTE — H&P
[place] [address]                              HISTORY AND PHYSICAL    PATIENT NAME: Anil Hanson                  :        1955  MED REC NO:   [id number]                           ROOM:       030  ACCOUNT NO:   [de-identified]                           ADMIT DATE: 2023  PROVIDER:     Brian Villalobos. Suzanne Ovalle M.D. REASON FOR VISIT:  Foreign body, esophagus. HISTORY OF PRESENT ILLNESS:  The patient is a 73-year-old male who has a  past medical history of esophageal resection in the past for cancer. He  has had a stricture and the last dilation he had was last year by Dr. Monalisa Tang. He has remained on pantoprazole. At about 5:00 p.m., he got a piece of steak stuck. He is unable to  swallow since. Thus, presented to the emergency room. We are planning  EGD and foreign body removal.    MEDICATIONS:  Albuterol, Proventil, Eliquis, atorvastatin, Lipitor,  BuSpar, Plavix, Coenzyme-Q, hydralazine, hydrochlorothiazide, Avapro,  Imdur, Levemir, Reglan, multivitamins, pantoprazole, Betapace, Carafate,  and turmeric. SOCIAL HISTORY:  He did smoke in the past.  No alcohol. FAMILY HISTORY:  No GI malignancy is listed. REVIEW OF SYSTEMS:  GENERAL:  No fever, chills, or weight loss. EYES:   Negative. ENT:  Negative. CARDIAC:  As per HPI. PULMONARY:  No TB or  pneumonia. He did smoke in the past.  GI:  As per HPI. :   Unremarkable. MUSCULOSKELETAL:  Negative. SKIN:  Negative. BREASTS:   Negative. NEUROLOGIC:  No stroke or seizure disorder. EMOTIONAL:   Depression and anxiety. ENDOCRINE:  No diabetes or thyroid disease. BLOOD:  No anemia or bleeding disorder. IMMUNOLOGIC:  No HIV or lupus. CANCER:  No history of cancer. PHYSICAL EXAMINATION:  GENERAL:  Awake, alert, and oriented x3. VITAL SIGNS:  The patient is afebrile.   Heart rate 57, respiratory rate  20, blood pressure 128/764, and oxygen

## 2023-11-25 NOTE — ED PROVIDER NOTES
[facility]  [address] [facility],[address] [address]  Phone: [phone]      CHIEF COMPLAINT     No chief complaint on file. Nurses Notes reviewed and I agree except as noted in the HPI. HISTORY OF PRESENT ILLNESS    Richard Brown is a 77 y.o. male. Yesterday was Thanksgiving. Since then he has been having trouble swallowing liquids as it comes right back up. He thinks he has a piece of steak stuck in his esophagus. He has a distant history of esophageal surgery from more than a decade ago when he had esophageal cancer which is now believed to be in remission. He is a patient formerly of Dr. Savannah Garcia who is now retired, he says someone else from the group did a scope on him last year but he does not remember who did it    [address] of review of systems is otherwise reviewed as negative. PAST MEDICAL HISTORY    has a past medical history of Alcoholism ([address]), Alopecia, Angina at rest, CAD (coronary artery disease), Depression, Esophageal cancer ([address]), Gastroparesis, GERD (gastroesophageal reflux disease), Hyperlipidemia, Hypertension, Hypothyroidism, Kidney stones, medtronic dual pacer , Migraines, Osteoarthritis, PVD (peripheral vascular disease) ([address]), and Type II diabetes mellitus ([address]). SURGICAL HISTORY      has a past surgical history that includes Esophagectomy; Cholecystectomy; Cardiac catheterization; Diagnostic Cardiac Cath Lab Procedure; and Coronary angioplasty (10/8/15). CURRENT MEDICATIONS       Current Discharge Medication List        CONTINUE these medications which have NOT CHANGED    Details   LEVEMIR FLEXPEN 100 UNIT/ML injection pen INJECT 19 UNITS UNDER THE SKIN TWO TIMES DAILY  Qty: 45 mL, Refills: 1    Associated Diagnoses: Type 2 diabetes mellitus with other specified complication, with long-term current use of insulin (HCC)      Glucagon (GVOKE HYPOPEN 2-PACK) 1 MG/0.2ML SOAJ Inject for emergency treatment of hypoglycemia.   Qty: 0.4

## 2023-11-25 NOTE — PROGRESS NOTES
EGD with foreign body removal complete, photos taken, pt tolerated procedure well. Scope Number  used.

## 2023-11-25 NOTE — OP NOTE
Operative Note      Patient: Jesus Metz  YOB: 1955  MRN: [id number]    Date of Procedure: 11/24/2023    Pre-Op Diagnosis Codes:     * Food impaction of esophagus, initial encounter [T18.128A, W44.F3XA]    Post-Op Diagnosis: Same       Procedure(s):  EGD FOREIGN BODY REMOVAL    Surgeon(s):  Ngoc Marte MD    Assistant:   * No surgical staff found *    Anesthesia: General    Estimated Blood Loss (mL): Minimal    Complications: None    Specimens:   * No specimens in log *    Implants:  * No implants in log *      Drains: * No LDAs found *    Findings: above        Detailed Description of Procedure:   dictated    Electronically signed by Ngoc Marte MD on 11/24/2023 at 10:57 PM

## 2023-11-25 NOTE — DISCHARGE INSTRUCTIONS
Liquids for 24 hours    Continue Pantoprazole BID    May resume blood thinners 11/25/23    Call office for appt.  In 3-4 weeks  Kassi Charles

## 2023-11-25 NOTE — OP NOTE
[place] [address]                                OPERATIVE REPORT    PATIENT NAME: Anil Hanson                  :        1955  MED REC NO:   [id number]                           ROOM:       030  ACCOUNT NO:   [de-identified]                           ADMIT DATE: 2023  PROVIDER:     Brian Villalobos. Suzanne Ovalle M.D.    Joaquim Persaudumann:  2023    PROCEDURE PERFORMED:  Upper endoscopy, foreign body removal.    SURGEON:  Brian Villalobos. Suzanne Ovalle MD    INDICATION:  Foreign body, esophagus. ANESTHESIA:  IV Diprivan per Anesthesia. DESCRIPTION OF PROCEDURE:  Prior to procedure, risks, benefits, and  complications (including but not limited to the following; bleeding,  infection, perforation, emergency surgery, stroke, heart attack, death,  possible anesthetic risks, and the fact that the procedure is not 100%  accurate or successful), indications, and alternatives of upper  endoscopy and foreign body removal were explained to the patient. He  understood and agreed to the procedure. All questions were answered. With the patient in supine position, the GIF-180 forward-viewing  videoscope was introduced under direct visualization. The endoscope was  advanced to upper esophagus. There was a large bolus of food present in  the upper esophagus. I tried to use the four-prong catheter to remove  this, but the bolus disintegrated. I then advanced the scope and I was  able to advance some of the food bolus towards the stomach. It was  still unable to be moved, so I grasped a small piece of the foreign body  and removed it through the mouth. The endoscope was reintroduced and I  was able to advance it with multiple passes of the scope into the  stomach (at least 20). The anastomosis was at 20 cm with slight  stricture. There was some edema of the mucosa present.   Once all the  food was in the stomach, endoscope was advanced

## 2023-11-25 NOTE — BRIEF OP NOTE
Brief Postoperative Note      Patient: Jolly Rader  YOB: 1955  MRN: [id number]    Date of Procedure: 11/24/2023    Pre-Op Diagnosis Codes:     * Food impaction of esophagus, initial encounter [T18.128A, W44.F3XA]    Post-Op Diagnosis: Same       Procedure(s):  EGD FOREIGN BODY REMOVAL    Surgeon(s):  Kenrick Vincent MD    Assistant:  * No surgical staff found *    Anesthesia: General    Estimated Blood Loss (mL): Minimal    Complications: None    Specimens:   * No specimens in log *    Implants:  * No implants in log *      Drains: * No LDAs found *    Findings: above      Electronically signed by Kenrick Vincent MD on 11/24/2023 at 10:57 PM

## 2023-11-26 LAB — BACTERIA BLD AEROBE CULT: NORMAL

## 2023-11-29 LAB
BACTERIA BLD AEROBE CULT: NORMAL
EKG Q-T INTERVAL: 328 MS
EKG QRS DURATION: 116 MS
EKG QTC CALCULATION (BAZETT): 469 MS
EKG R AXIS: 67 DEGREES
EKG T AXIS: -30 DEGREES
EKG VENTRICULAR RATE: 123 BPM

## 2023-12-21 ENCOUNTER — CLINICAL DOCUMENTATION (OUTPATIENT)
Age: 68
End: 2023-12-21

## 2023-12-21 DIAGNOSIS — E83.52 HYPERCALCEMIA: Primary | ICD-10-CM

## 2023-12-26 ENCOUNTER — OFFICE VISIT (OUTPATIENT)
Dept: CARDIOLOGY CLINIC | Age: 68
End: 2023-12-26
Payer: MEDICARE

## 2023-12-26 VITALS
HEIGHT: 71 IN | WEIGHT: 136.6 LBS | DIASTOLIC BLOOD PRESSURE: 62 MMHG | BODY MASS INDEX: 19.12 KG/M2 | SYSTOLIC BLOOD PRESSURE: 118 MMHG | HEART RATE: 68 BPM

## 2023-12-26 DIAGNOSIS — I10 PRIMARY HYPERTENSION: ICD-10-CM

## 2023-12-26 DIAGNOSIS — I48.0 PAROXYSMAL ATRIAL FIBRILLATION (HCC): Primary | ICD-10-CM

## 2023-12-26 DIAGNOSIS — I25.119 CORONARY ARTERY DISEASE INVOLVING NATIVE CORONARY ARTERY OF NATIVE HEART WITH ANGINA PECTORIS (HCC): ICD-10-CM

## 2023-12-26 DIAGNOSIS — Z95.0 PACEMAKER: ICD-10-CM

## 2023-12-26 PROCEDURE — G8428 CUR MEDS NOT DOCUMENT: HCPCS | Performed by: NUCLEAR MEDICINE

## 2023-12-26 PROCEDURE — 1123F ACP DISCUSS/DSCN MKR DOCD: CPT | Performed by: NUCLEAR MEDICINE

## 2023-12-26 PROCEDURE — 3074F SYST BP LT 130 MM HG: CPT | Performed by: NUCLEAR MEDICINE

## 2023-12-26 PROCEDURE — 3078F DIAST BP <80 MM HG: CPT | Performed by: NUCLEAR MEDICINE

## 2023-12-26 PROCEDURE — 3017F COLORECTAL CA SCREEN DOC REV: CPT | Performed by: NUCLEAR MEDICINE

## 2023-12-26 PROCEDURE — 1036F TOBACCO NON-USER: CPT | Performed by: NUCLEAR MEDICINE

## 2023-12-26 PROCEDURE — G8420 CALC BMI NORM PARAMETERS: HCPCS | Performed by: NUCLEAR MEDICINE

## 2023-12-26 PROCEDURE — 99214 OFFICE O/P EST MOD 30 MIN: CPT | Performed by: NUCLEAR MEDICINE

## 2023-12-26 PROCEDURE — G8484 FLU IMMUNIZE NO ADMIN: HCPCS | Performed by: NUCLEAR MEDICINE

## 2023-12-26 RX ORDER — SOTALOL HYDROCHLORIDE 120 MG/1
120 TABLET ORAL 2 TIMES DAILY
Qty: 180 TABLET | Refills: 0 | Status: SHIPPED | OUTPATIENT
Start: 2023-12-26

## 2023-12-26 RX ORDER — NITROGLYCERIN 0.4 MG/1
0.4 TABLET SUBLINGUAL EVERY 5 MIN PRN
Qty: 25 TABLET | Refills: 3 | Status: SHIPPED | OUTPATIENT
Start: 2023-12-26

## 2023-12-26 NOTE — PROGRESS NOTES
[facility].  [address] [place] [address]  Dept: [phone]  Dept Fax: [phone]  Loc: [phone]    Visit Date: 2023    Cherri Bell is a 77 y.o. male who presents todayfor:  Chief Complaint   Patient presents with    Atrial Fibrillation    Hypertension   Know stents and cath with naomy   Last cath at [facility]  Multiple stents   Know A fib   PAF  And pacer   Follows with [facility]  Higher BP lately   Some chest pain lately   Intermittent   Some dyspnea as well   Some dizziness  No syncope  On statins for hyperlipidemia     HPI:  HPI  Past Medical History:   Diagnosis Date    Alcoholism ([address])      quit in the     Alopecia     Angina at rest     CAD (coronary artery disease)      self , stents     Depression     Esophageal cancer ([address])     Gastroparesis     GERD (gastroesophageal reflux disease)     Hyperlipidemia     self and family    Hypertension     self and family    Hypothyroidism     family    Kidney stones     medtronic dual pacer  2021    Migraines     self and family    Osteoarthritis     self and family (in back)    PVD (peripheral vascular disease) ([address])     Type II diabetes mellitus ([address])     self and family      Past Surgical History:   Procedure Laterality Date    CARDIAC CATHETERIZATION      8 stents    CHOLECYSTECTOMY      CORONARY ANGIOPLASTY  10/8/15    DIAGNOSTIC CARDIAC CATH LAB PROCEDURE      ESOPHAGECTOMY      UPPER GASTROINTESTINAL ENDOSCOPY N/A 2023    EGD FOREIGN BODY REMOVAL performed by Melina De MD at [address] History   Problem Relation Age of Onset    Heart Disease Mother     Diabetes Mother     Heart Disease Father     Heart Attack Father     Early Death Father 46         at 46    Cancer Sister         Breast   52's    Cancer Sister         covid    Cancer Brother         Bone    Early Death Brother 52         52    Early Death Brother     Heart

## 2023-12-28 ENCOUNTER — OFFICE VISIT (OUTPATIENT)
Dept: INTERNAL MEDICINE CLINIC | Age: 68
End: 2023-12-28

## 2023-12-28 DIAGNOSIS — Z79.4 TYPE 2 DIABETES MELLITUS WITH OTHER SPECIFIED COMPLICATION, WITH LONG-TERM CURRENT USE OF INSULIN (HCC): Primary | ICD-10-CM

## 2023-12-28 DIAGNOSIS — E11.69 TYPE 2 DIABETES MELLITUS WITH OTHER SPECIFIED COMPLICATION, WITH LONG-TERM CURRENT USE OF INSULIN (HCC): Primary | ICD-10-CM

## 2023-12-28 PROCEDURE — NBSRV NON-BILLABLE SERVICE: Performed by: REGISTERED NURSE

## 2023-12-28 RX ORDER — BLOOD-GLUCOSE SENSOR
EACH MISCELLANEOUS
COMMUNITY

## 2023-12-28 ASSESSMENT — PATIENT HEALTH QUESTIONNAIRE - PHQ9
7. TROUBLE CONCENTRATING ON THINGS, SUCH AS READING THE NEWSPAPER OR WATCHING TELEVISION: 3
SUM OF ALL RESPONSES TO PHQ QUESTIONS 1-9: 22
5. POOR APPETITE OR OVEREATING: 1
4. FEELING TIRED OR HAVING LITTLE ENERGY: 3
SUM OF ALL RESPONSES TO PHQ QUESTIONS 1-9: 22
6. FEELING BAD ABOUT YOURSELF - OR THAT YOU ARE A FAILURE OR HAVE LET YOURSELF OR YOUR FAMILY DOWN: 3
2. FEELING DOWN, DEPRESSED OR HOPELESS: 3
9. THOUGHTS THAT YOU WOULD BE BETTER OFF DEAD, OR OF HURTING YOURSELF: 3
SUM OF ALL RESPONSES TO PHQ QUESTIONS 1-9: 22
10. IF YOU CHECKED OFF ANY PROBLEMS, HOW DIFFICULT HAVE THESE PROBLEMS MADE IT FOR YOU TO DO YOUR WORK, TAKE CARE OF THINGS AT HOME, OR GET ALONG WITH OTHER PEOPLE: 2
SUM OF ALL RESPONSES TO PHQ9 QUESTIONS 1 & 2: 6
1. LITTLE INTEREST OR PLEASURE IN DOING THINGS: 3
3. TROUBLE FALLING OR STAYING ASLEEP: 3
8. MOVING OR SPEAKING SO SLOWLY THAT OTHER PEOPLE COULD HAVE NOTICED. OR THE OPPOSITE, BEING SO FIGETY OR RESTLESS THAT YOU HAVE BEEN MOVING AROUND A LOT MORE THAN USUAL: 0
SUM OF ALL RESPONSES TO PHQ QUESTIONS 1-9: 19

## 2023-12-28 ASSESSMENT — COLUMBIA-SUICIDE SEVERITY RATING SCALE - C-SSRS
2. HAVE YOU ACTUALLY HAD ANY THOUGHTS OF KILLING YOURSELF?: NO
1. WITHIN THE PAST MONTH, HAVE YOU WISHED YOU WERE DEAD OR WISHED YOU COULD GO TO SLEEP AND NOT WAKE UP?: YES
6. HAVE YOU EVER DONE ANYTHING, STARTED TO DO ANYTHING, OR PREPARED TO DO ANYTHING TO END YOUR LIFE?: NO

## 2023-12-28 NOTE — PROGRESS NOTES
[facility]  [address] [place] [address]  [phone] (phone)  [phone] (fax)    Patient ID: Mumtaz Islas 1955  Referring Provider: Dr. Blackman     Patient's name and  were verified.    Subjective:    He presents for a follow-up diabetic visit. He has type 2 diabetes mellitus. He is compliant some of the time.  Assessment:     Lab Results   Component Value Date/Time    LABA1C 8.1 10/31/2023 07:58 AM    BUN 23 2023 07:37 PM    CREATININE 1.0 2023 07:37 PM     Vitals:    24 1829 24 1830   BP: (!) 144/66 (!) 142/70   Site: Left Upper Arm Left Upper Arm   Position: Sitting Sitting   Pulse: 88    Temp: 97.3 °F (36.3 °C)    Weight: 62.4 kg (137 lb 9.6 oz)    Height: 1.803 m (5' 11\")      Wt Readings from Last 3 Encounters:   24 62.4 kg (137 lb 9.6 oz)   23 62 kg (136 lb 9.6 oz)   23 60.8 kg (134 lb)     Ht Readings from Last 3 Encounters:   24 1.803 m (5' 11\")   23 1.803 m (5' 11\")   23 1.727 m (5' 8\")     Est, Glom Filt Rate   Date Value Ref Range Status   2023 >60 >60 ml/min/1.73m2 Final         Diabetes Pharmacotherapy:  Levemir 15 units BID  Novolog 4 units plus group 1 scale    Glucose Trends:   Current monitoring regimen: Freestyle Rimma 3 CGM - checks 4+ times daily  Home blood sugar trends:    -V. High: 61%, High: 28%, Target: 11%, Low: 0%, V. Low: 0%   -Average Glucose: 286mg/dL; GMI: 10.2%;  %time CGM active 92%              -glucose levels closest to goal overnight. Elevated after 7am until evening  Any episodes of hypoglycemia? None since reduction in insulin doses s/p    -Treats with juice       Recent history of severe low blood sugar with hospitalization:  doses for noon meal                But did not eat/ went to bed. Had severe low by evening meal  Lifestyle Factors:   Previous visit with dietician: no  Current diet: B: 2 eggs/ potato celestine/ cheese/ coffee            L: 2 hotdogs -2 small buns;

## 2023-12-28 NOTE — PATIENT INSTRUCTIONS
Continue with present insulin doses until we call  Good job eating 3 meals and 2-3 snacks per day  Try to exercise 10 minutes almost every day           --stretch bands or small weights sitting in the chair           --pedaling machine           -stationery recumbant bike--bring upstairs       *keep your legs strong  Always have low blood sugar treatment readily available           If your blood sugar drops low

## 2024-01-02 VITALS
SYSTOLIC BLOOD PRESSURE: 142 MMHG | WEIGHT: 137.6 LBS | DIASTOLIC BLOOD PRESSURE: 70 MMHG | HEIGHT: 71 IN | HEART RATE: 88 BPM | BODY MASS INDEX: 19.26 KG/M2 | TEMPERATURE: 97.3 F

## 2024-01-04 ENCOUNTER — OFFICE VISIT (OUTPATIENT)
Dept: INTERNAL MEDICINE CLINIC | Age: 69
End: 2024-01-04

## 2024-01-04 VITALS
SYSTOLIC BLOOD PRESSURE: 136 MMHG | TEMPERATURE: 97.7 F | BODY MASS INDEX: 19.23 KG/M2 | HEART RATE: 76 BPM | HEIGHT: 71 IN | DIASTOLIC BLOOD PRESSURE: 62 MMHG | WEIGHT: 137.4 LBS

## 2024-01-04 DIAGNOSIS — E11.69 TYPE 2 DIABETES MELLITUS WITH OTHER SPECIFIED COMPLICATION, WITH LONG-TERM CURRENT USE OF INSULIN (HCC): Primary | ICD-10-CM

## 2024-01-04 DIAGNOSIS — Z79.4 TYPE 2 DIABETES MELLITUS WITH OTHER SPECIFIED COMPLICATION, WITH LONG-TERM CURRENT USE OF INSULIN (HCC): Primary | ICD-10-CM

## 2024-01-04 PROCEDURE — NBSRV NON-BILLABLE SERVICE: Performed by: REGISTERED NURSE

## 2024-01-04 NOTE — PROGRESS NOTES
[facility]  [address] [place] [address]  [phone] (phone)  [phone] (fax)    Patient ID: Mumtaz Islas 1955  Referring Provider: Dr. Blackman     Patient's name and  were verified.    Subjective:    He presents for a follow-up diabetic visit. He has type 2 diabetes mellitus. He is compliant some of the time.  Assessment:     Lab Results   Component Value Date/Time    LABA1C 8.1 10/31/2023 07:58 AM    BUN 23 2023 07:37 PM    CREATININE 1.0 2023 07:37 PM     Vitals:    24 1159   BP: 136/62   Site: Right Upper Arm   Position: Sitting   Pulse: 76   Temp: 97.7 °F (36.5 °C)   Weight: 62.3 kg (137 lb 6.4 oz)   Height: 1.803 m (5' 11\")     Wt Readings from Last 3 Encounters:   24 62.3 kg (137 lb 6.4 oz)   24 62.4 kg (137 lb 9.6 oz)   23 62 kg (136 lb 9.6 oz)     Ht Readings from Last 3 Encounters:   24 1.803 m (5' 11\")   24 1.803 m (5' 11\")   23 1.803 m (5' 11\")     Est, Glom Filt Rate   Date Value Ref Range Status   2023 >60 >60 ml/min/1.73m2 Final     Diabetes Pharmacotherapy:  Levemir 15 units morning and evening  Novolog 4 units plus the group 2 scale    Glucose Trends:   Glucose at 1 hrs PPD today resulted at 203mg/dl  Current monitoring regimen: Freestyle Rimma 2 CGM - checks 4+ times daily; Rimma 3 cgm  Home blood sugar trends:    -V. High: 59%, High: 28%, Target: 13%, Low: 0%, V. Low: 0%   -Average Glucose: 277mg/dL; GMI: 9.9%;  %time CGM active 96%              -glucose levels are quite variable; Denis has a fairly recent history of severe hypoglycemia  Any episodes of hypoglycemia? yes - fasting in the past week   -Treats with juice or glucose tablets    Lifestyle Factors:   Previous visit with dietician: no  Current diet: B: 2 breaux/ 1 egg/ potato celestine/ cheese/ fruit juice 8oz (low BS)            L: usually skips lunch or dinner                       D: rice te/ beef/ mushrooms                       Snacks:

## 2024-01-04 NOTE — PATIENT INSTRUCTIONS
Continue Levemir 15 units in the morning and          At night       *let the clinic know which long acting insulin         Your insurance is going to cover  Take Novolog/ Humalog with meals 5 units plus the scale          Try to get 3 meals each day  Keep to keep snacks limited to 1 serving of            Carbohydrate         --try boiled eggs/ 2-4 crackers with peanut          Butter or 2 rice cakes or light yogurt           Sugar free pudding or Sugar free jello  Take the Metoclopromide for appetite with                   Breakfast and lunch  Always treat the blood sugar of 80 and dropping       Right away. If you under 100 and dropping         Treat  with fast glucose  Call your wife and/ or check your blood sugar            Before leaving for home from your friend's            House  Call Dr. Mayorga and make an appointment to be seen

## 2024-01-08 ENCOUNTER — TELEPHONE (OUTPATIENT)
Dept: INTERNAL MEDICINE CLINIC | Age: 69
End: 2024-01-08

## 2024-01-08 NOTE — TELEPHONE ENCOUNTER
Diabetes Pharmacotherapy:  Levemir 15 units morning and evening  Novolog 4 units plus the group 2 scale     Glucose Trends:   Glucose at 1 hrs PPD today resulted at 203mg/dl  Current monitoring regimen: Freestyle Rimma 2 CGM - checks 4+ times daily; Rimma 3 cgm  Home blood sugar trends:               -V. High: 59%, High: 28%, Target: 13%, Low: 0%, V. Low: 0%              -Average Glucose: 277mg/dL; GMI: 9.9%;  %time CGM active 96%              -glucose levels are quite variable; Denis has a fairly recent history of severe hypoglycemia  Any episodes of hypoglycemia? yes - fasting in the past week    Consider:  - Increasing Novolog with meals to 5 units plus group 2 scale    Please advise/ authorize.

## 2024-01-10 ENCOUNTER — NURSE ONLY (OUTPATIENT)
Dept: CARDIOLOGY CLINIC | Age: 69
End: 2024-01-10
Payer: MEDICARE

## 2024-01-10 ENCOUNTER — OFFICE VISIT (OUTPATIENT)
Dept: CARDIOLOGY CLINIC | Age: 69
End: 2024-01-10
Payer: MEDICARE

## 2024-01-10 VITALS
HEART RATE: 69 BPM | DIASTOLIC BLOOD PRESSURE: 84 MMHG | BODY MASS INDEX: 18.81 KG/M2 | SYSTOLIC BLOOD PRESSURE: 166 MMHG | WEIGHT: 134.4 LBS | HEIGHT: 71 IN | OXYGEN SATURATION: 100 %

## 2024-01-10 DIAGNOSIS — Z95.0 PACEMAKER: Primary | ICD-10-CM

## 2024-01-10 DIAGNOSIS — I48.0 PAROXYSMAL ATRIAL FIBRILLATION (HCC): Primary | ICD-10-CM

## 2024-01-10 PROCEDURE — G8427 DOCREV CUR MEDS BY ELIG CLIN: HCPCS | Performed by: INTERNAL MEDICINE

## 2024-01-10 PROCEDURE — 1036F TOBACCO NON-USER: CPT | Performed by: INTERNAL MEDICINE

## 2024-01-10 PROCEDURE — 3017F COLORECTAL CA SCREEN DOC REV: CPT | Performed by: INTERNAL MEDICINE

## 2024-01-10 PROCEDURE — 93280 PM DEVICE PROGR EVAL DUAL: CPT | Performed by: INTERNAL MEDICINE

## 2024-01-10 PROCEDURE — G8420 CALC BMI NORM PARAMETERS: HCPCS | Performed by: INTERNAL MEDICINE

## 2024-01-10 PROCEDURE — 3077F SYST BP >= 140 MM HG: CPT | Performed by: INTERNAL MEDICINE

## 2024-01-10 PROCEDURE — 3079F DIAST BP 80-89 MM HG: CPT | Performed by: INTERNAL MEDICINE

## 2024-01-10 PROCEDURE — 1123F ACP DISCUSS/DSCN MKR DOCD: CPT | Performed by: INTERNAL MEDICINE

## 2024-01-10 PROCEDURE — G8484 FLU IMMUNIZE NO ADMIN: HCPCS | Performed by: INTERNAL MEDICINE

## 2024-01-10 PROCEDURE — 99214 OFFICE O/P EST MOD 30 MIN: CPT | Performed by: INTERNAL MEDICINE

## 2024-01-10 NOTE — PROGRESS NOTES
[facility]   [facility] ([facility])  [address] [facility] [address]  Dept: [phone]  Cardiac Electrophysiology: Follow up Note  Patient's demographics:  Date:   1/10/2024  Patient name:              Mumtaz Islas  YOB: 1955  Sex:    male   MRN:   [id number]    Primary Care Physician:  Tom Villanueva DO    Cardiologist:  Chaparro Villalobos MD    Reason for Follow up:  Status post cardioversion for atrial fibrillation.    Clinical Summary:  67/M with persistent atrial fibrillation and prior cardioversion, currently on sotalol is here for follow-up visit.      Medical Hx: PAF and atrial flutter for many years on Sotalol and apixaban => DCCV (12/16/22), SSS/DCPM (Medtronic DOI 9/2/2019), CAD/multiple PCI most recent-OM (10/2015), HTN, HPL, DM2, non-obstructive and asymptomatic carotid artery disease, PVD/right femoral stent, GERD, hypothyroidism and migraine.     Review of systems:  Constitutional: Negative for chills and fever  HENT: Negative for congestion, sinus pressure, sneezing and sore throat.    Eyes: Negative for pain, discharge, redness and itching.   Respiratory: Negative for apnea, cough  Gastrointestinal: Negative for blood in stool, constipation, diarrhea   Endocrine: Negative for cold intolerance, heat intolerance, polydipsia.  Genitourinary: Negative for dysuria, enuresis, flank pain and hematuria.   Musculoskeletal: Negative for arthralgias, joint swelling and neck pain.   Neurological: Negative for numbness and headaches.   Psychiatric/Behavioral: Negative for agitation, confusion, decreased concentration and dysphoric mood.      Past Medical History::  Past Medical History:   Diagnosis Date    Alcoholism (HCC)      quit in the 90    Alopecia     Angina at rest     CAD (coronary artery disease)      self , stents     Depression     Esophageal cancer (HCC)     Gastroparesis     GERD (gastroesophageal reflux disease)     Hyperlipidemia     self and

## 2024-01-10 NOTE — PROGRESS NOTES
[employer] dual pacer in office [place]      12/16/22 cardioversion    ..Battery longevity:  9.3 years on device   Presenting rhythm  AP VS  Underlying SV 30's    Atrial impedance 418  RV impedance 532    P wave sensing 1.5  R wave sensing 10.6    92.4 % atrial paced  0.9 % RV paced     Atrial threshold 0.75 V  at 0.4ms  RV threshold 1 V at 0.4ms  Mode switches   <0.1%

## 2024-01-10 NOTE — PATIENT INSTRUCTIONS
You may receive a survey regarding the care you received during your visit.  Your input is valuable to us.  We encourage you to complete and return your survey.  We hope you will choose us in the future for your healthcare needs.    Thank you for choosing [facility]!    Your Medical Assistant Today:  Colette REA      Your RN Today:  Marisa REA  Your Device Clinic RN Today :  Cammie MUNOZ  Your Provider for Today: Dr. Jha  Ph. [phone]

## 2024-01-11 DIAGNOSIS — I10 PRIMARY HYPERTENSION: ICD-10-CM

## 2024-01-11 DIAGNOSIS — I48.0 PAROXYSMAL ATRIAL FIBRILLATION (HCC): ICD-10-CM

## 2024-01-11 RX ORDER — SOTALOL HYDROCHLORIDE 120 MG/1
120 TABLET ORAL 2 TIMES DAILY
Qty: 180 TABLET | Refills: 3 | Status: SHIPPED | OUTPATIENT
Start: 2024-01-11

## 2024-01-12 ENCOUNTER — TELEPHONE (OUTPATIENT)
Dept: CARDIOLOGY CLINIC | Age: 69
End: 2024-01-12

## 2024-01-12 ENCOUNTER — HOSPITAL ENCOUNTER (OUTPATIENT)
Age: 69
End: 2024-01-12
Attending: NUCLEAR MEDICINE
Payer: MEDICARE

## 2024-01-12 ENCOUNTER — HOSPITAL ENCOUNTER (OUTPATIENT)
Dept: NUCLEAR MEDICINE | Age: 69
Discharge: HOME | End: 2024-01-12
Attending: NUCLEAR MEDICINE
Payer: MEDICARE

## 2024-01-12 VITALS
WEIGHT: 134 LBS | SYSTOLIC BLOOD PRESSURE: 166 MMHG | HEIGHT: 71 IN | DIASTOLIC BLOOD PRESSURE: 84 MMHG | BODY MASS INDEX: 18.76 KG/M2

## 2024-01-12 VITALS — BODY MASS INDEX: 20 KG/M2 | WEIGHT: 132 LBS | HEIGHT: 68 IN

## 2024-01-12 DIAGNOSIS — I48.0 PAROXYSMAL ATRIAL FIBRILLATION (HCC): Primary | ICD-10-CM

## 2024-01-12 DIAGNOSIS — I10 PRIMARY HYPERTENSION: ICD-10-CM

## 2024-01-12 DIAGNOSIS — I25.119 CORONARY ARTERY DISEASE INVOLVING NATIVE CORONARY ARTERY OF NATIVE HEART WITH ANGINA PECTORIS (HCC): ICD-10-CM

## 2024-01-12 DIAGNOSIS — Z95.0 PACEMAKER: ICD-10-CM

## 2024-01-12 DIAGNOSIS — R42 DIZZINESS: ICD-10-CM

## 2024-01-12 DIAGNOSIS — R94.39 ABNORMAL STRESS TEST: ICD-10-CM

## 2024-01-12 DIAGNOSIS — I48.0 PAROXYSMAL ATRIAL FIBRILLATION (HCC): ICD-10-CM

## 2024-01-12 DIAGNOSIS — I10 UNCONTROLLED HYPERTENSION: ICD-10-CM

## 2024-01-12 LAB
ECHO AV CUSP MM: 1.9 CM
ECHO AV PEAK GRADIENT: 5 MMHG
ECHO AV PEAK VELOCITY: 1.1 M/S
ECHO AV VELOCITY RATIO: 0.73
ECHO BSA: 1.69 M2
ECHO BSA: 1.74 M2
ECHO LA AREA 2C: 14.3 CM2
ECHO LA AREA 4C: 17 CM2
ECHO LA DIAMETER INDEX: 1.97 CM/M2
ECHO LA DIAMETER: 3.5 CM
ECHO LA MAJOR AXIS: 4.4 CM
ECHO LA MINOR AXIS: 4.7 CM
ECHO LA VOL BP: 44 ML (ref 18–58)
ECHO LA VOL MOD A2C: 35 ML (ref 18–58)
ECHO LA VOL MOD A4C: 51 ML (ref 18–58)
ECHO LA VOL/BSA BIPLANE: 25 ML/M2 (ref 16–34)
ECHO LA VOLUME INDEX MOD A2C: 20 ML/M2 (ref 16–34)
ECHO LA VOLUME INDEX MOD A4C: 29 ML/M2 (ref 16–34)
ECHO LV E' LATERAL VELOCITY: 10 CM/S
ECHO LV E' SEPTAL VELOCITY: 4 CM/S
ECHO LV EDV A2C: 74 ML
ECHO LV EDV A4C: 77 ML
ECHO LV EDV INDEX A4C: 43 ML/M2
ECHO LV EDV NDEX A2C: 42 ML/M2
ECHO LV EJECTION FRACTION A2C: 54 %
ECHO LV EJECTION FRACTION A4C: 59 %
ECHO LV EJECTION FRACTION BIPLANE: 56 % (ref 55–100)
ECHO LV ESV A2C: 34 ML
ECHO LV ESV A4C: 32 ML
ECHO LV ESV INDEX A2C: 19 ML/M2
ECHO LV ESV INDEX A4C: 18 ML/M2
ECHO LV ISOVOLUMETRIC RELAXATION TIME (IVRT): 102 MS
ECHO LV IVSD: 1 CM (ref 0.6–1)
ECHO LV POSTERIOR WALL DIASTOLIC: 1 CM (ref 0.6–1)
ECHO LVOT PEAK GRADIENT: 3 MMHG
ECHO LVOT PEAK VELOCITY: 0.8 M/S
ECHO MV A VELOCITY: 0.67 M/S
ECHO MV E DECELERATION TIME (DT): 282 MS
ECHO MV E VELOCITY: 0.79 M/S
ECHO MV E/A RATIO: 1.18
ECHO MV E/E' LATERAL: 7.9
ECHO MV E/E' RATIO (AVERAGED): 13.83
ECHO MV REGURGITANT PEAK GRADIENT: 88 MMHG
ECHO MV REGURGITANT PEAK VELOCITY: 4.7 M/S
ECHO PV MAX VELOCITY: 0.6 M/S
ECHO PV PEAK GRADIENT: 1 MMHG
ECHO RV INTERNAL DIMENSION: 2.5 CM
ECHO RV TAPSE: 1.6 CM (ref 1.7–?)
ECHO TV E WAVE: 0.5 M/S
ECHO TV REGURGITANT MAX VELOCITY: 3.07 M/S
ECHO TV REGURGITANT PEAK GRADIENT: 38 MMHG
NUC STRESS EJECTION FRACTION: 46 %
STRESS BASELINE DIAS BP: 62 MMHG
STRESS BASELINE HR: 60 BPM
STRESS BASELINE SYS BP: 131 MMHG
STRESS STAGE 1 BP: NORMAL MMHG
STRESS STAGE 1 DURATION: 1 MIN:SEC
STRESS STAGE 1 HR: 63 BPM
STRESS STAGE 2 BP: NORMAL MMHG
STRESS STAGE 2 DURATION: 1 MIN:SEC
STRESS STAGE 2 HR: 60 BPM
STRESS STAGE 3 BP: NORMAL MMHG
STRESS STAGE 3 DURATION: 1 MIN:SEC
STRESS STAGE 3 HR: 60 BPM
STRESS STAGE RECOVERY 1 BP: NORMAL MMHG
STRESS STAGE RECOVERY 1 DURATION: 1 MIN:SEC
STRESS STAGE RECOVERY 1 HR: 60 BPM
STRESS STAGE RECOVERY 2 BP: NORMAL MMHG
STRESS STAGE RECOVERY 2 DURATION: 1 MIN:SEC
STRESS STAGE RECOVERY 2 HR: 60 BPM
STRESS STAGE RECOVERY 3 BP: NORMAL MMHG
STRESS STAGE RECOVERY 3 DURATION: 1 MIN:SEC
STRESS STAGE RECOVERY 3 HR: 60 BPM
STRESS STAGE RECOVERY 4 BP: NORMAL MMHG
STRESS STAGE RECOVERY 4 DURATION: 2 MIN:SEC
STRESS STAGE RECOVERY 4 HR: 66 BPM
STRESS TARGET HR: 150 BPM

## 2024-01-12 PROCEDURE — 3430000000 HC RX DIAGNOSTIC RADIOPHARMACEUTICAL: Performed by: NUCLEAR MEDICINE

## 2024-01-12 PROCEDURE — 6360000002 HC RX W HCPCS: Performed by: NUCLEAR MEDICINE

## 2024-01-12 PROCEDURE — 93017 CV STRESS TEST TRACING ONLY: CPT

## 2024-01-12 PROCEDURE — A9500 TC99M SESTAMIBI: HCPCS | Performed by: NUCLEAR MEDICINE

## 2024-01-12 PROCEDURE — 78452 HT MUSCLE IMAGE SPECT MULT: CPT

## 2024-01-12 PROCEDURE — 93306 TTE W/DOPPLER COMPLETE: CPT

## 2024-01-12 RX ORDER — REGADENOSON 0.08 MG/ML
0.4 INJECTION, SOLUTION INTRAVENOUS
Status: COMPLETED | OUTPATIENT
Start: 2024-01-12 | End: 2024-01-12

## 2024-01-12 RX ORDER — TETRAKIS(2-METHOXYISOBUTYLISOCYANIDE)COPPER(I) TETRAFLUOROBORATE 1 MG/ML
29.3 INJECTION, POWDER, LYOPHILIZED, FOR SOLUTION INTRAVENOUS
Status: COMPLETED | OUTPATIENT
Start: 2024-01-12 | End: 2024-01-12

## 2024-01-12 RX ORDER — TETRAKIS(2-METHOXYISOBUTYLISOCYANIDE)COPPER(I) TETRAFLUOROBORATE 1 MG/ML
9.1 INJECTION, POWDER, LYOPHILIZED, FOR SOLUTION INTRAVENOUS
Status: COMPLETED | OUTPATIENT
Start: 2024-01-12 | End: 2024-01-12

## 2024-01-12 RX ADMIN — REGADENOSON 0.4 MG: 0.08 INJECTION, SOLUTION INTRAVENOUS at 10:35

## 2024-01-12 RX ADMIN — Medication 29.3 MILLICURIE: at 10:34

## 2024-01-12 RX ADMIN — Medication 9.1 MILLICURIE: at 09:40

## 2024-01-13 ENCOUNTER — CLINICAL DOCUMENTATION (OUTPATIENT)
Age: 69
End: 2024-01-13

## 2024-01-16 ENCOUNTER — TELEPHONE (OUTPATIENT)
Dept: CARDIOLOGY CLINIC | Age: 69
End: 2024-01-16

## 2024-01-16 PROBLEM — R94.39 ABNORMAL STRESS TEST: Status: ACTIVE | Noted: 2024-01-12

## 2024-01-16 NOTE — TELEPHONE ENCOUNTER
Reviewed glucose trends with KIKO Rayo on 1/4/24.    Recommend increasing Novolog with meals from 4 up to 5 units plus group 2 scale    Follow-up 2 weeks w/ KIKO Rayo    For Pharmacy Admin Tracking Only    Program: Medical Group  CPA in place:  Yes  Recommendation Provided To: Patient/Caregiver: 1 via In person  Intervention Detail: Dose Adjustment: 1, reason: Therapy Optimization  Intervention Accepted By: Patient/Caregiver: 1  Gap Closed?: No   Time Spent (min): 10        Barby Sandhu, PharmD, BCPS, [facility]  Internal Medicine Clinical Pharmacist  [phone]

## 2024-01-16 NOTE — TELEPHONE ENCOUNTER
PROCEDURE: cardiac cath     DATE OF SERVICE: 01/18/2024    SERVICE LOCATION: [facility]     CPT CODE: 14500    PHYSICIAN: DR. VILLATORO     DATE PRIOR AUTH SUBMITTED: 01/16/2024    STATUS: APPROVED.     CASE NUMBER: [id number]     AUTH NUMBER: [id number]     VALID:  01/16/2024-02/18/2024

## 2024-01-16 NOTE — PROGRESS NOTES
Spoke with patients wife, instructions reviewed:  NPO after midnight  Bring drivers license and insurance information  Wear comfortable clean clothes  Shower morning of and night before with liquid antibacterial soap  Remove jewelry   May have to stay overnight if have PTCA/stent  Bring medications in original bottles  Made aware of visitors limit to 2 at a time  Follow all instructions given by your physician  Please notify doctor office if you need to cancel or reschedule your procedure   needed at discharge

## 2024-01-17 ENCOUNTER — TELEPHONE (OUTPATIENT)
Dept: INTERNAL MEDICINE CLINIC | Age: 69
End: 2024-01-17

## 2024-01-17 ENCOUNTER — OFFICE VISIT (OUTPATIENT)
Dept: INTERNAL MEDICINE CLINIC | Age: 69
End: 2024-01-17
Payer: MEDICARE

## 2024-01-17 ENCOUNTER — PREP FOR PROCEDURE (OUTPATIENT)
Dept: CARDIOLOGY | Age: 69
End: 2024-01-17

## 2024-01-17 VITALS
DIASTOLIC BLOOD PRESSURE: 80 MMHG | BODY MASS INDEX: 20.55 KG/M2 | HEIGHT: 68 IN | HEART RATE: 76 BPM | TEMPERATURE: 97.5 F | SYSTOLIC BLOOD PRESSURE: 150 MMHG | WEIGHT: 135.6 LBS

## 2024-01-17 DIAGNOSIS — E11.69 TYPE 2 DIABETES MELLITUS WITH OTHER SPECIFIED COMPLICATION, WITH LONG-TERM CURRENT USE OF INSULIN (HCC): Primary | ICD-10-CM

## 2024-01-17 DIAGNOSIS — Z79.4 TYPE 2 DIABETES MELLITUS WITH OTHER SPECIFIED COMPLICATION, WITH LONG-TERM CURRENT USE OF INSULIN (HCC): Primary | ICD-10-CM

## 2024-01-17 PROCEDURE — G0108 DIAB MANAGE TRN  PER INDIV: HCPCS | Performed by: REGISTERED NURSE

## 2024-01-17 PROCEDURE — NBSRV NON-BILLABLE SERVICE: Performed by: REGISTERED NURSE

## 2024-01-17 RX ORDER — SODIUM CHLORIDE 0.9 % (FLUSH) 0.9 %
5-40 SYRINGE (ML) INJECTION EVERY 12 HOURS SCHEDULED
Status: CANCELLED | OUTPATIENT
Start: 2024-01-17

## 2024-01-17 RX ORDER — SODIUM CHLORIDE 9 MG/ML
INJECTION, SOLUTION INTRAVENOUS CONTINUOUS
Status: CANCELLED | OUTPATIENT
Start: 2024-01-17

## 2024-01-17 RX ORDER — SODIUM CHLORIDE 9 MG/ML
INJECTION, SOLUTION INTRAVENOUS PRN
Status: CANCELLED | OUTPATIENT
Start: 2024-01-17

## 2024-01-17 RX ORDER — NITROGLYCERIN 0.4 MG/1
0.4 TABLET SUBLINGUAL EVERY 5 MIN PRN
Status: CANCELLED | OUTPATIENT
Start: 2024-01-17

## 2024-01-17 RX ORDER — SODIUM CHLORIDE 0.9 % (FLUSH) 0.9 %
5-40 SYRINGE (ML) INJECTION PRN
Status: CANCELLED | OUTPATIENT
Start: 2024-01-17

## 2024-01-17 RX ORDER — ASPIRIN 325 MG
325 TABLET ORAL ONCE
Status: CANCELLED | OUTPATIENT
Start: 2024-01-17 | End: 2024-01-17

## 2024-01-17 NOTE — PROGRESS NOTES
[facility]  [address] [place] [address]  [phone] (phone)  [phone] (fax)    Patient ID: Mumtaz Islas 1955  Referring Provider: Dr. Blackman     Patient's name and  were verified.    Subjective:    He presents for a follow-up diabetic visit. He has type 2 diabetes mellitus. He is compliant some of the time.  Assessment:     Lab Results   Component Value Date/Time    LABA1C 8.1 10/31/2023 07:58 AM    BUN 23 2023 07:37 PM    CREATININE 1.0 2023 07:37 PM     Vitals:    24 1607 24 1609   BP: (!) 176/80 (!) 150/80   Site: Right Upper Arm Left Upper Arm   Position: Sitting Sitting   Pulse: 76    Temp: 97.5 °F (36.4 °C)    Weight: 61.5 kg (135 lb 9.6 oz)    Height: 1.727 m (5' 8\")      Wt Readings from Last 3 Encounters:   24 61.5 kg (135 lb 9.6 oz)   24 59.9 kg (132 lb)   24 60.8 kg (134 lb)     Ht Readings from Last 3 Encounters:   24 1.727 m (5' 8\")   24 1.727 m (5' 8\")   24 1.803 m (5' 11\")     Est, Glom Filt Rate   Date Value Ref Range Status   2023 >60 >60 ml/min/1.73m2 Final     Diabetes Pharmacotherapy:  Levemir 15 units morning and night  Novolog 6 units (7 units if over 300mg) group 2 scale    Glucose Trends:   Current monitoring regimen: Freestyle Rimma 3 CGM - checks 4+ times daily  Home blood sugar trends:    -V. High: 55%, High: 30%, Target: 15%, Low: 0%, V. Low: 0%   -Average Glucose: 270mg/dL; GMI: 9.8%;  %time CGM active 96%              -glucose levels variable; overall elevated with random overnight or daytime trends downward.   Any episodes of hypoglycemia? no   -Treats with juice then pb    Lifestyle Factors:   Previous visit with dietician: no  Current diet: B: croissant (frozen)            L: bologna sandwich; 2 breads (no crust); 8 oz milk                       D: orange chicken 1/2 cup, 1/3 vup rice                       Snacks: bedtime 8 crackers/ 4 slices cheese or yogurt or pudding

## 2024-01-17 NOTE — PATIENT INSTRUCTIONS
Take Novolog 7 units with each meal            Plus the group 2 scale     *do not take the Novolog scale for a blood sugar                 After eating     *do not take the Novolog closer than 4 hours apart  Continue taking Levemir 15 units morning and night  Set a goal to have 2-3 servings of carbohydrate at            Each meal  (30-45 grams carbohydrate)     Good job eating about 1 serving or 15-20 grams carb                 At bedtime  You will want to start pedaling 5-10 minutes after lunch            Every day (unless Dr. Mayorga says not to)  Always carry treatment for low blood sugar  Take your Novolog with you when you are going out        To eat

## 2024-01-17 NOTE — TELEPHONE ENCOUNTER
Denis will no longer be able to obtain Levemir for his basal insulin. He is asking for  a script for alternative therapy that his covered by his insurance.   Letter from [facility] indicates these are the alternate options for Denis:   Lantus, Lantus Solostar   Toujeo Solostar   Tresiba FlextouchRx will need sent to [facility]    Please advise.

## 2024-01-18 ENCOUNTER — HOSPITAL ENCOUNTER (OUTPATIENT)
Age: 69
Setting detail: OUTPATIENT SURGERY
Discharge: HOME | End: 2024-01-18
Attending: NUCLEAR MEDICINE | Admitting: NUCLEAR MEDICINE
Payer: MEDICARE

## 2024-01-18 VITALS
TEMPERATURE: 97.6 F | OXYGEN SATURATION: 97 % | WEIGHT: 135 LBS | DIASTOLIC BLOOD PRESSURE: 64 MMHG | BODY MASS INDEX: 20.46 KG/M2 | SYSTOLIC BLOOD PRESSURE: 123 MMHG | HEART RATE: 60 BPM | RESPIRATION RATE: 18 BRPM | HEIGHT: 68 IN

## 2024-01-18 DIAGNOSIS — R94.39 ABNORMAL STRESS TEST: ICD-10-CM

## 2024-01-18 LAB
ABO: NORMAL
ANION GAP SERPL CALC-SCNC: 13 MEQ/L (ref 8–16)
ANTIBODY SCREEN: NORMAL
APTT PPP: 33.9 SECONDS (ref 22–38)
BUN SERPL-MCNC: 18 MG/DL (ref 7–22)
CALCIUM SERPL-MCNC: 11 MG/DL (ref 8.5–10.5)
CHLORIDE SERPL-SCNC: 107 MEQ/L (ref 98–111)
CHOLEST SERPL-MCNC: 119 MG/DL (ref 100–199)
CO2 SERPL-SCNC: 22 MEQ/L (ref 23–33)
CREAT SERPL-MCNC: 1 MG/DL (ref 0.4–1.2)
DEPRECATED RDW RBC AUTO: 54.3 FL (ref 35–45)
ECHO BSA: 1.71 M2
ERYTHROCYTE [DISTWIDTH] IN BLOOD BY AUTOMATED COUNT: 21.6 % (ref 11.5–14.5)
GFR SERPL CREATININE-BSD FRML MDRD: > 60 ML/MIN/1.73M2
GLUCOSE SERPL-MCNC: 179 MG/DL (ref 70–108)
HCT VFR BLD AUTO: 40.3 % (ref 42–52)
HDLC SERPL-MCNC: 46 MG/DL
HGB BLD-MCNC: 12.1 GM/DL (ref 14–18)
INR PPP: 0.98 (ref 0.85–1.13)
LDLC SERPL CALC-MCNC: 58 MG/DL
MCH RBC QN AUTO: 21.7 PG (ref 26–33)
MCHC RBC AUTO-ENTMCNC: 30 GM/DL (ref 32.2–35.5)
MCV RBC AUTO: 72.4 FL (ref 80–94)
PLATELET # BLD AUTO: 443 THOU/MM3 (ref 130–400)
PMV BLD AUTO: 9.5 FL (ref 9.4–12.4)
POTASSIUM SERPL-SCNC: 4.7 MEQ/L (ref 3.5–5.2)
RBC # BLD AUTO: 5.57 MILL/MM3 (ref 4.7–6.1)
RH FACTOR: NORMAL
SODIUM SERPL-SCNC: 142 MEQ/L (ref 135–145)
TRIGL SERPL-MCNC: 75 MG/DL (ref 0–199)
WBC # BLD AUTO: 10.8 THOU/MM3 (ref 4.8–10.8)

## 2024-01-18 PROCEDURE — 99153 MOD SED SAME PHYS/QHP EA: CPT | Performed by: NUCLEAR MEDICINE

## 2024-01-18 PROCEDURE — C1769 GUIDE WIRE: HCPCS | Performed by: NUCLEAR MEDICINE

## 2024-01-18 PROCEDURE — 86900 BLOOD TYPING SEROLOGIC ABO: CPT

## 2024-01-18 PROCEDURE — 7100000010 HC PHASE II RECOVERY - FIRST 15 MIN: Performed by: NUCLEAR MEDICINE

## 2024-01-18 PROCEDURE — 86850 RBC ANTIBODY SCREEN: CPT

## 2024-01-18 PROCEDURE — 6360000004 HC RX CONTRAST MEDICATION: Performed by: NUCLEAR MEDICINE

## 2024-01-18 PROCEDURE — 80061 LIPID PANEL: CPT

## 2024-01-18 PROCEDURE — 6360000002 HC RX W HCPCS: Performed by: NUCLEAR MEDICINE

## 2024-01-18 PROCEDURE — 7100000011 HC PHASE II RECOVERY - ADDTL 15 MIN: Performed by: NUCLEAR MEDICINE

## 2024-01-18 PROCEDURE — 2500000003 HC RX 250 WO HCPCS: Performed by: NUCLEAR MEDICINE

## 2024-01-18 PROCEDURE — 99152 MOD SED SAME PHYS/QHP 5/>YRS: CPT | Performed by: NUCLEAR MEDICINE

## 2024-01-18 PROCEDURE — 85730 THROMBOPLASTIN TIME PARTIAL: CPT

## 2024-01-18 PROCEDURE — 93458 L HRT ARTERY/VENTRICLE ANGIO: CPT | Performed by: NUCLEAR MEDICINE

## 2024-01-18 PROCEDURE — 93005 ELECTROCARDIOGRAM TRACING: CPT | Performed by: PHYSICIAN ASSISTANT

## 2024-01-18 PROCEDURE — 85027 COMPLETE CBC AUTOMATED: CPT

## 2024-01-18 PROCEDURE — 85610 PROTHROMBIN TIME: CPT

## 2024-01-18 PROCEDURE — 80048 BASIC METABOLIC PNL TOTAL CA: CPT

## 2024-01-18 PROCEDURE — C1894 INTRO/SHEATH, NON-LASER: HCPCS | Performed by: NUCLEAR MEDICINE

## 2024-01-18 PROCEDURE — 36415 COLL VENOUS BLD VENIPUNCTURE: CPT

## 2024-01-18 PROCEDURE — 86901 BLOOD TYPING SEROLOGIC RH(D): CPT

## 2024-01-18 PROCEDURE — 2709999900 HC NON-CHARGEABLE SUPPLY: Performed by: NUCLEAR MEDICINE

## 2024-01-18 RX ORDER — SODIUM CHLORIDE 9 MG/ML
INJECTION, SOLUTION INTRAVENOUS PRN
Status: DISCONTINUED | OUTPATIENT
Start: 2024-01-18 | End: 2024-01-18 | Stop reason: HOSPADM

## 2024-01-18 RX ORDER — ATROPINE SULFATE 0.4 MG/ML
0.5 INJECTION, SOLUTION INTRAVENOUS
Status: DISCONTINUED | OUTPATIENT
Start: 2024-01-18 | End: 2024-01-18 | Stop reason: HOSPADM

## 2024-01-18 RX ORDER — NITROGLYCERIN 0.4 MG/1
0.4 TABLET SUBLINGUAL EVERY 5 MIN PRN
Status: DISCONTINUED | OUTPATIENT
Start: 2024-01-18 | End: 2024-01-18 | Stop reason: HOSPADM

## 2024-01-18 RX ORDER — SODIUM CHLORIDE 9 MG/ML
INJECTION, SOLUTION INTRAVENOUS CONTINUOUS
Status: DISCONTINUED | OUTPATIENT
Start: 2024-01-18 | End: 2024-01-18 | Stop reason: HOSPADM

## 2024-01-18 RX ORDER — FENTANYL CITRATE 50 UG/ML
INJECTION, SOLUTION INTRAMUSCULAR; INTRAVENOUS PRN
Status: DISCONTINUED | OUTPATIENT
Start: 2024-01-18 | End: 2024-01-18 | Stop reason: HOSPADM

## 2024-01-18 RX ORDER — ASPIRIN 325 MG
325 TABLET ORAL ONCE
Status: DISCONTINUED | OUTPATIENT
Start: 2024-01-18 | End: 2024-01-18 | Stop reason: HOSPADM

## 2024-01-18 RX ORDER — SODIUM CHLORIDE 0.9 % (FLUSH) 0.9 %
5-40 SYRINGE (ML) INJECTION PRN
Status: DISCONTINUED | OUTPATIENT
Start: 2024-01-18 | End: 2024-01-18 | Stop reason: HOSPADM

## 2024-01-18 RX ORDER — MIDAZOLAM HYDROCHLORIDE 1 MG/ML
INJECTION INTRAMUSCULAR; INTRAVENOUS PRN
Status: DISCONTINUED | OUTPATIENT
Start: 2024-01-18 | End: 2024-01-18 | Stop reason: HOSPADM

## 2024-01-18 RX ORDER — AMOXICILLIN 500 MG/1
500 CAPSULE ORAL 3 TIMES DAILY
COMMUNITY

## 2024-01-18 RX ORDER — LIDOCAINE HYDROCHLORIDE 20 MG/ML
INJECTION, SOLUTION EPIDURAL; INFILTRATION; INTRACAUDAL; PERINEURAL PRN
Status: DISCONTINUED | OUTPATIENT
Start: 2024-01-18 | End: 2024-01-18 | Stop reason: HOSPADM

## 2024-01-18 RX ORDER — ACETAMINOPHEN 325 MG/1
650 TABLET ORAL EVERY 4 HOURS PRN
Status: DISCONTINUED | OUTPATIENT
Start: 2024-01-18 | End: 2024-01-18 | Stop reason: HOSPADM

## 2024-01-18 RX ORDER — SODIUM CHLORIDE 0.9 % (FLUSH) 0.9 %
5-40 SYRINGE (ML) INJECTION EVERY 12 HOURS SCHEDULED
Status: DISCONTINUED | OUTPATIENT
Start: 2024-01-18 | End: 2024-01-18 | Stop reason: HOSPADM

## 2024-01-18 NOTE — DISCHARGE INSTRUCTIONS
Post Procedure Care - Radial Site    Home Care    * Keep procedure site clean and dry.  * You may shower tomorrow  * Wash site gently with soap and water, pat the area dry and cover with a bandaid daily for the next 5days  * Monitor site for signs of bleeding, swelling or infection.  * Do not use any powders, lotions or creams in the area of the procedure until the site is healed.  * Do not soak in any pools of water until site is healed, such as dish water, a bathtub, hot tub or swimming pool (generally 5-7 days).    * The access site may appear bruised.  It is normal to have some discoloration at the site.  If the discoloration is enlarging or is larger than the palm of your hand, call the Physicians office    * It is normal to have some tenderness or soreness at the site of the procedure.  This may be relieved with the use of tylenol.     Physical Activity     * Minimize movement of right hand/wrist for 24 hours  * Maintain armboard for 24 hours  * Limit activity times 2 days.  * No driving times 2 days.  * No lifting greater than 5 pounds, pushing or pulling with right arm for 72 hours      Call 911 if   You are bleeding from the area where the catheter was put in your artery.  Immediately hold firm pressure on the site, lie down and call 911 to transport you to nearest emergency room.   You have a fast-growing, painful lump at the catheter site. Immediately hold firm pressure on site, lie down and call 911 to transport you to nearest emergency room.  If you notice numbness, tingling, coldness or loss of feeling in the extremity on the side of the procedure, CALL THE PHYSICIAN IMMEDIATELY or 911   Notify office if you have signs of infection, such as:  Increased pain, swelling, warmth, or redness.  Red streaks leading from the catheter site.  Pus draining from the catheter site.  If you develop a fever within 2-3 days after your procedure

## 2024-01-18 NOTE — H&P
[facility]  Sedation/Analgesia History & Physical    Pt Name: Mumtaz Islas  Account number: [id number]  MRN: [id number]  YOB: 1955  Provider Performing Procedure: Cinthya Villalobos MD MD [facility]  Primary Care Physician: Tom Villanueva DO  Date: 1/18/2024    PRE-PROCEDURE    Code Status: FULL CODE  Brief History/Pre-Procedure Diagnosis:   Angina      Consent: : I have discussed with the patient risks, benefits, and alternatives (and relevant risks, benefits, and side effects related to alternatives or not receiving care), and likelihood of the success.   The patient and/or representative appear to understand and agree to proceed.  The discussion encompasses risks, benefits, and side effects related to the alternatives and the risks related to not receiving the proposed care, treatment, and services.         MEDICAL HISTORY  []ASHD/ANGINA/MI/CHF   [x]Hypertension  []Diabetes  []Hyperlipidemia  []Smoking  []Family Hx of ASHD  []Additional information:       has a past medical history of Alcoholism ([facility]), Alopecia, Angina at rest, CAD (coronary artery disease), Depression, Esophageal cancer ([facility]), Gastroparesis, GERD (gastroesophageal reflux disease), Hyperlipidemia, Hypertension, Hypothyroidism, Kidney stones, medtronic dual pacer , Migraines, Osteoarthritis, PVD (peripheral vascular disease) ([facility]), and Type II diabetes mellitus ([facility]).    SURGICAL HISTORY   has a past surgical history that includes Esophagectomy; Cholecystectomy; Cardiac catheterization; Diagnostic Cardiac Cath Lab Procedure; Coronary angioplasty (10/8/15); and Upper gastrointestinal endoscopy (N/A, 11/24/2023).  Additional information:       ALLERGIES   Allergies as of 01/16/2024 - Fully Reviewed 01/12/2024   Allergen Reaction Noted    Erythromycin Nausea And Vomiting 09/22/2020    Seasonal  05/25/2023     Additional information:       MEDICATIONS   Aspirin  [x] 81 mg  [] 325 mg  [] None  Antiplatelet drug

## 2024-01-18 NOTE — FLOWSHEET NOTE
1100 Patient admitted to 2E14  Ambulatory for left heart catherization  Patient NPO. Patient accompanied by spouse, Adriana.   Vital signs obtained.   Assessment and data collection intiated.   Oriented to room.  Policies and procedures for 2E explained.   All questions answered with no further questions at this time.   Fall precautions reviewed with patient.     1100 Care plan reviewed with patient and spouse.  Patient and spouse verbalize understanding of the plan of care and contribute to goal setting.       1230 to cath lab per bed, stable condition.     1340 care taken over from cath lab, site with vasc band on right radial dry and intact.     1455 removed first 2 ml of air from band.   1510 removed 2 ml of air from band.  1525 removed 2 ml of air from band.   1540 last 2 ml of air removed from band.     1545 Dr Mello was in to see patient regarding high risk stent procedure.     1600  up in room, tolerated activity well.     1640 Discharge instructions given.   Patient goals/plan/treatment preferences discussed by this RN.  Discharge planning provided by this RN.  Patient goals/plan/treatment preferences reviewed with patient/family.  Patient/family verbalize understanding of discharge plan and are in agreement with goal/plan/treatment preferences.  Understanding was demonstrated using the teach back method.  AVS provided by this RN at time of discharge, which includes all necessary medical information pertaining to the patients current course of illness, treatment, post-discharge goals of care, and treatment preferences.      1700 Discharged per wheelchair, stable condition.

## 2024-01-21 LAB
EKG Q-T INTERVAL: 376 MS
EKG QRS DURATION: 122 MS
EKG QTC CALCULATION (BAZETT): 499 MS
EKG R AXIS: 62 DEGREES
EKG T AXIS: -39 DEGREES
EKG VENTRICULAR RATE: 106 BPM

## 2024-01-23 ENCOUNTER — TELEPHONE (OUTPATIENT)
Dept: CARDIOLOGY CLINIC | Age: 69
End: 2024-01-23

## 2024-01-23 DIAGNOSIS — I25.119 CORONARY ARTERY DISEASE INVOLVING NATIVE CORONARY ARTERY OF NATIVE HEART WITH ANGINA PECTORIS (HCC): Primary | ICD-10-CM

## 2024-01-23 NOTE — TELEPHONE ENCOUNTER
Pts wife calling in follow up to heart cath, they have thought about their options, they would like to see CT/CV surgeon to discuss bypass.  Okay to place referral?

## 2024-02-01 RX ORDER — INSULIN GLARGINE 100 [IU]/ML
INJECTION, SOLUTION SUBCUTANEOUS
Qty: 30 ML | Refills: 3 | Status: SHIPPED | OUTPATIENT
Start: 2024-02-01

## 2024-02-06 ENCOUNTER — OFFICE VISIT (OUTPATIENT)
Age: 69
End: 2024-02-06

## 2024-02-06 VITALS
WEIGHT: 136 LBS | HEIGHT: 68 IN | BODY MASS INDEX: 20.61 KG/M2 | SYSTOLIC BLOOD PRESSURE: 142 MMHG | DIASTOLIC BLOOD PRESSURE: 68 MMHG | HEART RATE: 56 BPM

## 2024-02-06 DIAGNOSIS — E11.69 TYPE 2 DIABETES MELLITUS WITH OTHER SPECIFIED COMPLICATION, WITH LONG-TERM CURRENT USE OF INSULIN (HCC): Primary | ICD-10-CM

## 2024-02-06 DIAGNOSIS — Z79.4 TYPE 2 DIABETES MELLITUS WITH OTHER SPECIFIED COMPLICATION, WITH LONG-TERM CURRENT USE OF INSULIN (HCC): Primary | ICD-10-CM

## 2024-02-06 DIAGNOSIS — E21.3 HYPERPARATHYROIDISM (HCC): ICD-10-CM

## 2024-02-06 DIAGNOSIS — E83.52 HYPERCALCEMIA: ICD-10-CM

## 2024-02-06 NOTE — PATIENT INSTRUCTIONS
Take 15 units of levemir in the morning and 16 units in the evening     Take mealtime novolog as follows:  Breakfast: 4 units  Lunch: 4 units  Dinner: 4 units     Plus sliding scale novolog as follows:  Below 70, treat for hypoglycemia  , no additional insulin  151-200, 1 units  201-250, 2 units  251-300, 3 units  301-350, 4 units  351-400, 5 units  Above 400, call MD

## 2024-02-06 NOTE — PROGRESS NOTES
Mumtaz Islas is a 68 y.o. , male who comes for f/u for Type 2 diabetes mellitus and hypercalcemia.  PCP: Tom Villanueva, DO HAM     This patient was diagnosed with diabetes mellitus 28 years ago.   Diabetic complications include the following:  Neuropathy and Coronary Heart Disease   current therapy includesLEVEMIR 15 units twice daily with 4 units of mealtime Humalog plus SSI.  The patient has not had any recurrence of GI problems.  Hypoglycemia has also not recurred.  He is dealing with 2 new problems today.  First, he was recently diagnosed with a left heel ulcer which is being followed by podiatry.  The patient is on antibiotics.  It is healing slowly but he still gets pain.  The second issue is that he recently underwent cardiac catheterization and was shown to have extensive occlusive disease for which he is not discussing CABG.  The patient will be seeing the cardiovascular surgeon soon.  He is not really having any chest pains or shortness of breath.  The patient is checking blood sugars using freestyle antonio CGMS.  Average blood glucose is 262 mg/dL with a glucose management indicator of 9.6%.  Target is 15% with no significant hypoglycemia.  His symptoms include tingling and numbness in the toes, burning in the feet, frequent urination.  Finally this patient has osteoporosis.  There is no report of fracture.  We have discussed treatment.  At this point the wife would like to address the more urgent issues and then to call other issues at a later time.  Past Medical History:   Diagnosis Date    Alcoholism ([facility])      quit in the 90    Alopecia     Angina at rest     CAD (coronary artery disease)      self , stents     Depression     Esophageal cancer (HCC)     Gastroparesis     GERD (gastroesophageal reflux disease)     Hyperlipidemia     self and family    Hypertension     self and family    Hypothyroidism     family    Kidney stones     medtronic dual pacer  04/20/2021    Migraines     self and

## 2024-02-08 ENCOUNTER — OFFICE VISIT (OUTPATIENT)
Dept: CARDIOTHORACIC SURGERY | Age: 69
End: 2024-02-08

## 2024-02-08 VITALS
HEART RATE: 62 BPM | WEIGHT: 135.6 LBS | BODY MASS INDEX: 20.55 KG/M2 | OXYGEN SATURATION: 100 % | SYSTOLIC BLOOD PRESSURE: 174 MMHG | DIASTOLIC BLOOD PRESSURE: 80 MMHG | HEIGHT: 68 IN

## 2024-02-08 DIAGNOSIS — R93.89 ABNORMAL FINDINGS ON DIAGNOSTIC IMAGING OF OTHER SPECIFIED BODY STRUCTURES: Primary | ICD-10-CM

## 2024-02-08 DIAGNOSIS — R09.89 OTHER SPECIFIED SYMPTOMS AND SIGNS INVOLVING THE CIRCULATORY AND RESPIRATORY SYSTEMS: ICD-10-CM

## 2024-02-08 DIAGNOSIS — Z01.818 ENCOUNTER FOR OTHER PREPROCEDURAL EXAMINATION: ICD-10-CM

## 2024-02-08 DIAGNOSIS — I05.9 MITRAL VALVE DISEASE: ICD-10-CM

## 2024-02-08 DIAGNOSIS — R93.1 ABNORMAL FINDINGS ON DIAGNOSTIC IMAGING OF HEART AND CORONARY CIRCULATION: ICD-10-CM

## 2024-02-08 DIAGNOSIS — I24.89 OTHER FORMS OF ACUTE ISCHEMIC HEART DISEASE: ICD-10-CM

## 2024-02-08 DIAGNOSIS — I48.20 CHRONIC ATRIAL FIBRILLATION, UNSPECIFIED (HCC): ICD-10-CM

## 2024-02-08 NOTE — PROGRESS NOTES
CT surgery New Patient Office Appointment        Patient's Name/Date of Birth: Mumtaz Islas / 1955 (68 y.o.)    PCP: Tom Villanueva DO    Date: 2024     CC:     Chief Complaint   Patient presents with    New Patient     Ref  Baki- CABG       HPI  We had the pleasure of seeing Mumtaz Islas in the office today, as you know this is a very pleasant 68 y.o. year old male with a history of PAF and atrial flutter for many years on Sotalol and apixaban => DCCV (22), SSS/DCPM (Medtronic DOI 2019), CAD/multiple PCI most recent-OM (10/2015), HTN, HPL, DM2, non-obstructive and asymptomatic carotid artery disease, PVD/right femoral stent, GERD, migraines, osteoporosis,  hypothyroidism, h/o esophageal cancer with previous esophageal resection leading to strictures requiring dilation in the past.  He is referred from cardiology service by Dr. Fry.  His recent cardiac cath results noted below.  He is currently being seen by podiatry service for L heel ulcer.  His main complaint is fatigue and PATHAK with limited activities at home.    TTE:24  Echo (TTE) complete    Patient Name: Mumtaz Islas \"Denis\"   Patient MRN: [id number]   Patient : 1955 (68 y.o.)   Legal Sex: Male   Height: 1.803 m (5' 11\")   Weight: 60.8 kg (134 lb)   BSA: 1.75 m²   Blood Pressure: 166/84    Accession Number: [id number]   Date of Study: 2024 ( 9:24 AM)   Ordering Provider: Cinthya Villalobos MD   Clinical Indications: other       Interpreting Physicians  Performing Staff   Cinthya Villalobos MD Tech/Nurse: Jayda Hinson RDCS        Reason for Exam  Priority: Routine  other   Dx: Paroxysmal atrial fibrillation (HCC) [I48.0 (ICD-10-CM)]; Primary hypertension [I10 (ICD-10-CM)]; Pacemaker [Z95.0 (ICD-10-CM)]; Coronary artery disease involving native coronary artery of native heart with angina pectoris (HCC) [I25.119 (ICD-10-CM)]     PACS Images     Show images for Echo (TTE) complete (PRN

## 2024-02-08 NOTE — PATIENT INSTRUCTIONS
You may receive a survey regarding the care you received during your visit.  Your input is valuable to us.  We encourage you to complete and return your survey.  We hope you will choose us in the future for your healthcare needs.    Thank you for choosing [facility]!    Your Medical Assistant Today:  Marisa REA   Your Provider for Today: [place] Lang PA-C  Your Provider Today: Dr. Bess  Ph. [phone]

## 2024-02-09 ENCOUNTER — PREP FOR PROCEDURE (OUTPATIENT)
Dept: CARDIOTHORACIC SURGERY | Age: 69
End: 2024-02-09

## 2024-02-09 DIAGNOSIS — I25.118 CORONARY ARTERY DISEASE INVOLVING NATIVE CORONARY ARTERY OF NATIVE HEART WITH OTHER FORM OF ANGINA PECTORIS (HCC): Primary | ICD-10-CM

## 2024-02-09 RX ORDER — SODIUM CHLORIDE 0.9 % (FLUSH) 0.9 %
5-40 SYRINGE (ML) INJECTION PRN
Status: CANCELLED | OUTPATIENT
Start: 2024-02-09

## 2024-02-09 RX ORDER — SODIUM CHLORIDE 9 MG/ML
INJECTION, SOLUTION INTRAVENOUS CONTINUOUS
Status: CANCELLED | OUTPATIENT
Start: 2024-02-09

## 2024-02-09 RX ORDER — SODIUM CHLORIDE 0.9 % (FLUSH) 0.9 %
5-40 SYRINGE (ML) INJECTION EVERY 12 HOURS SCHEDULED
Status: CANCELLED | OUTPATIENT
Start: 2024-02-09

## 2024-02-09 RX ORDER — SODIUM CHLORIDE 9 MG/ML
INJECTION, SOLUTION INTRAVENOUS PRN
Status: CANCELLED | OUTPATIENT
Start: 2024-02-09

## 2024-02-12 ENCOUNTER — HOSPITAL ENCOUNTER (OUTPATIENT)
Dept: INTERVENTIONAL RADIOLOGY/VASCULAR | Age: 69
Discharge: HOME | End: 2024-02-14
Attending: THORACIC SURGERY (CARDIOTHORACIC VASCULAR SURGERY)
Payer: MEDICARE

## 2024-02-12 DIAGNOSIS — R93.1 ABNORMAL FINDINGS ON DIAGNOSTIC IMAGING OF HEART AND CORONARY CIRCULATION: ICD-10-CM

## 2024-02-12 DIAGNOSIS — I24.89 OTHER FORMS OF ACUTE ISCHEMIC HEART DISEASE: ICD-10-CM

## 2024-02-12 DIAGNOSIS — Z01.818 ENCOUNTER FOR OTHER PREPROCEDURAL EXAMINATION: ICD-10-CM

## 2024-02-12 DIAGNOSIS — R09.89 OTHER SPECIFIED SYMPTOMS AND SIGNS INVOLVING THE CIRCULATORY AND RESPIRATORY SYSTEMS: ICD-10-CM

## 2024-02-12 DIAGNOSIS — I05.9 MITRAL VALVE DISEASE: ICD-10-CM

## 2024-02-12 DIAGNOSIS — R93.89 ABNORMAL FINDINGS ON DIAGNOSTIC IMAGING OF OTHER SPECIFIED BODY STRUCTURES: ICD-10-CM

## 2024-02-12 DIAGNOSIS — I48.20 CHRONIC ATRIAL FIBRILLATION, UNSPECIFIED (HCC): ICD-10-CM

## 2024-02-12 PROCEDURE — 93880 EXTRACRANIAL BILAT STUDY: CPT

## 2024-02-12 PROCEDURE — 93970 EXTREMITY STUDY: CPT

## 2024-02-13 ENCOUNTER — HOSPITAL ENCOUNTER (OUTPATIENT)
Dept: CT IMAGING | Age: 69
Discharge: HOME | End: 2024-02-13
Attending: THORACIC SURGERY (CARDIOTHORACIC VASCULAR SURGERY)
Payer: MEDICARE

## 2024-02-13 DIAGNOSIS — I05.9 MITRAL VALVE DISEASE: ICD-10-CM

## 2024-02-13 PROCEDURE — 70450 CT HEAD/BRAIN W/O DYE: CPT

## 2024-02-13 PROCEDURE — 71250 CT THORAX DX C-: CPT

## 2024-02-13 RX ORDER — SODIUM CHLORIDE 0.9 % (FLUSH) 0.9 %
5-40 SYRINGE (ML) INJECTION EVERY 12 HOURS SCHEDULED
Status: DISCONTINUED | OUTPATIENT
Start: 2024-02-13 | End: 2024-02-15 | Stop reason: HOSPADM

## 2024-02-13 RX ORDER — SODIUM CHLORIDE 0.9 % (FLUSH) 0.9 %
5-40 SYRINGE (ML) INJECTION PRN
Status: DISCONTINUED | OUTPATIENT
Start: 2024-02-13 | End: 2024-02-15 | Stop reason: HOSPADM

## 2024-02-13 RX ORDER — SODIUM CHLORIDE 9 MG/ML
INJECTION, SOLUTION INTRAVENOUS CONTINUOUS
Status: DISCONTINUED | OUTPATIENT
Start: 2024-02-13 | End: 2024-02-15 | Stop reason: HOSPADM

## 2024-02-13 RX ORDER — SODIUM CHLORIDE 9 MG/ML
25 INJECTION, SOLUTION INTRAVENOUS PRN
Status: DISCONTINUED | OUTPATIENT
Start: 2024-02-13 | End: 2024-02-15 | Stop reason: HOSPADM

## 2024-02-15 ENCOUNTER — HOSPITAL ENCOUNTER (OUTPATIENT)
Age: 69
Setting detail: OUTPATIENT SURGERY
Discharge: HOME | End: 2024-02-15
Attending: NUCLEAR MEDICINE | Admitting: NUCLEAR MEDICINE
Payer: MEDICARE

## 2024-02-15 ENCOUNTER — APPOINTMENT (OUTPATIENT)
Age: 69
End: 2024-02-15
Attending: NUCLEAR MEDICINE
Payer: MEDICARE

## 2024-02-15 VITALS
BODY MASS INDEX: 20.49 KG/M2 | OXYGEN SATURATION: 98 % | RESPIRATION RATE: 18 BRPM | DIASTOLIC BLOOD PRESSURE: 71 MMHG | HEART RATE: 60 BPM | HEIGHT: 68 IN | SYSTOLIC BLOOD PRESSURE: 150 MMHG | TEMPERATURE: 97.1 F | WEIGHT: 135.2 LBS

## 2024-02-15 DIAGNOSIS — I25.10 CAD (CORONARY ARTERY DISEASE): Primary | ICD-10-CM

## 2024-02-15 LAB
ECHO BSA: 1.72 M2
ECHO MV REGURGITANT ALIASING (NYQUIST) VELOCITY: 40 CM/S
ECHO MV REGURGITANT PEAK GRADIENT: 185 MMHG
ECHO MV REGURGITANT PEAK VELOCITY: 6.8 M/S
ECHO MV REGURGITANT RADIUS PISA: 1.1 CM
ECHO MV REGURGITANT VTIA: 252 CM

## 2024-02-15 PROCEDURE — 99152 MOD SED SAME PHYS/QHP 5/>YRS: CPT | Performed by: NUCLEAR MEDICINE

## 2024-02-15 PROCEDURE — 6360000002 HC RX W HCPCS: Performed by: NUCLEAR MEDICINE

## 2024-02-15 PROCEDURE — 7100000010 HC PHASE II RECOVERY - FIRST 15 MIN: Performed by: NUCLEAR MEDICINE

## 2024-02-15 PROCEDURE — 7100000011 HC PHASE II RECOVERY - ADDTL 15 MIN: Performed by: NUCLEAR MEDICINE

## 2024-02-15 PROCEDURE — 2580000003 HC RX 258: Performed by: NUCLEAR MEDICINE

## 2024-02-15 PROCEDURE — 93312 ECHO TRANSESOPHAGEAL: CPT

## 2024-02-15 PROCEDURE — 93312 ECHO TRANSESOPHAGEAL: CPT | Performed by: NUCLEAR MEDICINE

## 2024-02-15 RX ORDER — MIDAZOLAM HYDROCHLORIDE 1 MG/ML
INJECTION INTRAMUSCULAR; INTRAVENOUS PRN
Status: DISCONTINUED | OUTPATIENT
Start: 2024-02-15 | End: 2024-02-15 | Stop reason: ALTCHOICE

## 2024-02-15 RX ORDER — FENTANYL CITRATE 50 UG/ML
INJECTION, SOLUTION INTRAMUSCULAR; INTRAVENOUS PRN
Status: DISCONTINUED | OUTPATIENT
Start: 2024-02-15 | End: 2024-02-15 | Stop reason: ALTCHOICE

## 2024-02-15 RX ADMIN — SODIUM CHLORIDE: 9 INJECTION, SOLUTION INTRAVENOUS at 12:52

## 2024-02-15 NOTE — H&P
[facility]  Sedation/Analgesia History & Physical    Pt Name: Mumtaz Islas  Account number: [id number]  MRN: [id number]  YOB: 1955  Provider Performing Procedure: Cinthya Villalobos MD MD [facility]  Primary Care Physician: Tom Villanueva DO  Date: 2/15/2024    PRE-PROCEDURE    Code Status: FULL CODE  Brief History/Pre-Procedure Diagnosis:   MR     Consent: : I have discussed with the patient risks, benefits, and alternatives (and relevant risks, benefits, and side effects related to alternatives or not receiving care), and likelihood of the success.   The patient and/or representative appear to understand and agree to proceed.  The discussion encompasses risks, benefits, and side effects related to the alternatives and the risks related to not receiving the proposed care, treatment, and services.         MEDICAL HISTORY  []ASHD/ANGINA/MI/CHF   [x]Hypertension  []Diabetes  []Hyperlipidemia  []Smoking  []Family Hx of ASHD  []Additional information:       has a past medical history of Alcoholism ([facility]), Alopecia, Angina at rest, CAD (coronary artery disease), Depression, Esophageal cancer ([facility]), Gastroparesis, GERD (gastroesophageal reflux disease), Hyperlipidemia, Hypertension, Hypothyroidism, Kidney stones, medtronic dual pacer , Migraines, Osteoarthritis, PVD (peripheral vascular disease) ([facility]), and Type II diabetes mellitus ([facility]).    SURGICAL HISTORY   has a past surgical history that includes Esophagectomy; Cholecystectomy; Cardiac catheterization; Diagnostic Cardiac Cath Lab Procedure; Coronary angioplasty (10/8/15); Upper gastrointestinal endoscopy (N/A, 11/24/2023); and Cardiac procedure (N/A, 1/18/2024).  Additional information:       ALLERGIES   Allergies as of 02/08/2024 - Fully Reviewed 02/08/2024   Allergen Reaction Noted    Erythromycin Nausea And Vomiting 09/22/2020    Seasonal  05/25/2023     Additional information:       MEDICATIONS   Aspirin  [x] 81 mg  [] 325 mg  []

## 2024-02-16 ENCOUNTER — ANESTHESIA EVENT (OUTPATIENT)
Dept: OPERATING ROOM | Age: 69
End: 2024-02-16
Payer: MEDICARE

## 2024-02-22 ENCOUNTER — OFFICE VISIT (OUTPATIENT)
Dept: CARDIOTHORACIC SURGERY | Age: 69
End: 2024-02-22
Payer: MEDICARE

## 2024-02-22 VITALS
DIASTOLIC BLOOD PRESSURE: 70 MMHG | HEIGHT: 68 IN | HEART RATE: 64 BPM | BODY MASS INDEX: 21.07 KG/M2 | WEIGHT: 139 LBS | SYSTOLIC BLOOD PRESSURE: 150 MMHG

## 2024-02-22 DIAGNOSIS — I65.23 BILATERAL CAROTID ARTERY STENOSIS: Primary | ICD-10-CM

## 2024-02-22 PROCEDURE — 1036F TOBACCO NON-USER: CPT | Performed by: THORACIC SURGERY (CARDIOTHORACIC VASCULAR SURGERY)

## 2024-02-22 PROCEDURE — 1123F ACP DISCUSS/DSCN MKR DOCD: CPT | Performed by: THORACIC SURGERY (CARDIOTHORACIC VASCULAR SURGERY)

## 2024-02-22 PROCEDURE — G8484 FLU IMMUNIZE NO ADMIN: HCPCS | Performed by: THORACIC SURGERY (CARDIOTHORACIC VASCULAR SURGERY)

## 2024-02-22 PROCEDURE — 3077F SYST BP >= 140 MM HG: CPT | Performed by: THORACIC SURGERY (CARDIOTHORACIC VASCULAR SURGERY)

## 2024-02-22 PROCEDURE — G8427 DOCREV CUR MEDS BY ELIG CLIN: HCPCS | Performed by: THORACIC SURGERY (CARDIOTHORACIC VASCULAR SURGERY)

## 2024-02-22 PROCEDURE — 3017F COLORECTAL CA SCREEN DOC REV: CPT | Performed by: THORACIC SURGERY (CARDIOTHORACIC VASCULAR SURGERY)

## 2024-02-22 PROCEDURE — 99214 OFFICE O/P EST MOD 30 MIN: CPT | Performed by: THORACIC SURGERY (CARDIOTHORACIC VASCULAR SURGERY)

## 2024-02-22 PROCEDURE — 3078F DIAST BP <80 MM HG: CPT | Performed by: THORACIC SURGERY (CARDIOTHORACIC VASCULAR SURGERY)

## 2024-02-22 PROCEDURE — G8420 CALC BMI NORM PARAMETERS: HCPCS | Performed by: THORACIC SURGERY (CARDIOTHORACIC VASCULAR SURGERY)

## 2024-02-22 NOTE — PROGRESS NOTES
Preoperative workup completed  Transesophageal echo done by Dr. Fry with smooth, insertion of the probe went well despite the patient's history of esophageal resection for cancer; there is a moderate mitral regurgitation which is significant and will quire repair  Ventricular function is mildly depressed  Carotid Dopplers show 50 to 69% stenosis bilaterally; not significant narrowing and will need to be followed.;  I have scheduled him for repeat CAT scan of the neck in a couple of months after surgery; I will continue to follow his carotid stenosis    Remainder of workup including venous Dopplers acceptable  Surgery scheduled for March 6 tentatively  The risks, benefits and alternatives were discussed in detail with the patient and family.  The risks include, but are not limited to: Death, stroke, bleeding requiring reoperation, infection, Heart attack with graft failure, cardiac arrhythmias, thromboembolism, renal failure requiring dialysis, pneumonia with respiratory failure requiring tracheostomy. The patient expressed understanding of these issues, confirmed that all questions were answered, and desires to proceed.

## 2024-02-23 ENCOUNTER — TELEPHONE (OUTPATIENT)
Dept: CARDIOTHORACIC SURGERY | Age: 69
End: 2024-02-23

## 2024-02-23 DIAGNOSIS — Z79.4 TYPE 2 DIABETES MELLITUS WITHOUT COMPLICATION, WITH LONG-TERM CURRENT USE OF INSULIN (HCC): Primary | ICD-10-CM

## 2024-02-23 DIAGNOSIS — E11.9 TYPE 2 DIABETES MELLITUS WITHOUT COMPLICATION, WITH LONG-TERM CURRENT USE OF INSULIN (HCC): Primary | ICD-10-CM

## 2024-02-23 PROCEDURE — APPSS30 APP SPLIT SHARED TIME 16-30 MINUTES: Performed by: PHYSICIAN ASSISTANT

## 2024-02-23 NOTE — TELEPHONE ENCOUNTER
PROCEDURE: cabg, mv repair, maze     DATE OF SERVICE: 03/06/2024    SERVICE LOCATION: [facility]    CPT CODE: [id number]    PHYSICIAN:  DR MURRY     DATE PRIOR AUTH SUBMITTED: 02/23/2024    STATUS: APPROVED.    CASE NUMBER: [id number]     AUTH NUMBER: [id number]    VALID:  3/06/2024-05/25/2024

## 2024-02-23 NOTE — TELEPHONE ENCOUNTER
Procedure: cabg, MV REPAIR, MAZE  Date: 03/06/2024  Arrival Time: 0530 AM, 0730 AM ARRIVAL TIME     Meds to Hold: PLAVIX  HOLD 5 DAYS PRIOR TO SURGERY,     ELIQUIS HOLD 2 DAYS PRIOR TO SURGERY,     1/2 INSULIN NIGHT PRIOR TO SURGERY,     HOLD IRBESARTAN 48 HOURS PRIOR TO SURGERY.     TAKE SOTALOL AM OF SURGERY WITH A SMALL SIP OF WATER.   DOES NOT NEED TO TAKE IMDUR AM OF SURGERY.   DOES NOT NEED STARTED ON ASPIRIN PRIOR TO SURGERY, PATIENT IS ON BOTH PLAVIX,AND ELIQUIS.     DOES NOT NEED DENTAL CLEARANCE, HAS DENTURES     PAT scheduled for: 03/01/2024 AT 8 AM     BILATERAL VEIN MAPPING COMPLETED 02/12/2024  BILATERAL CAROTID DUPLEX COMPLETED 02/12/2024  CT CHEST W/O CONTRAST COMPLETED 02/14/2024  CT HEAD W/O CONTRAST COMPLETED 02/14/2024    Instructions verbalized to the patient.  Instructions mailed to the patient.      DR. MURRY, PLEASE AGREE?      I agree

## 2024-02-26 ENCOUNTER — OFFICE VISIT (OUTPATIENT)
Dept: INTERNAL MEDICINE CLINIC | Age: 69
End: 2024-02-26
Payer: MEDICARE

## 2024-02-26 VITALS
SYSTOLIC BLOOD PRESSURE: 176 MMHG | TEMPERATURE: 98.1 F | BODY MASS INDEX: 21.01 KG/M2 | HEART RATE: 83 BPM | DIASTOLIC BLOOD PRESSURE: 72 MMHG | WEIGHT: 138.2 LBS

## 2024-02-26 DIAGNOSIS — E11.69 TYPE 2 DIABETES MELLITUS WITH OTHER SPECIFIED COMPLICATION, WITH LONG-TERM CURRENT USE OF INSULIN (HCC): ICD-10-CM

## 2024-02-26 DIAGNOSIS — Z79.4 TYPE 2 DIABETES MELLITUS WITH OTHER SPECIFIED COMPLICATION, WITH LONG-TERM CURRENT USE OF INSULIN (HCC): ICD-10-CM

## 2024-02-26 PROCEDURE — G0108 DIAB MANAGE TRN  PER INDIV: HCPCS

## 2024-02-26 PROCEDURE — NBSRV NON-BILLABLE SERVICE

## 2024-02-26 RX ORDER — INSULIN ASPART 100 [IU]/ML
INJECTION, SOLUTION INTRAVENOUS; SUBCUTANEOUS
Qty: 10 ADJUSTABLE DOSE PRE-FILLED PEN SYRINGE | Refills: 1
Start: 2024-02-26

## 2024-02-26 RX ORDER — DIABETIC SUPPLIES,MISCELL
MISCELLANEOUS MISCELLANEOUS
Qty: 1 EACH | Refills: 0 | Status: SHIPPED | OUTPATIENT
Start: 2024-02-26 | End: 2024-04-07

## 2024-02-26 RX ORDER — BOLUS INSULIN PUMP, 200 UNIT 2 UNIT
EACH MISCELLANEOUS
Qty: 10 EACH | Refills: 3 | Status: SHIPPED | OUTPATIENT
Start: 2024-02-26 | End: 2024-04-07

## 2024-02-26 NOTE — TELEPHONE ENCOUNTER
Denis is being seen by the [facility] for T2DM.     Current Diabetes Pharmacotherapy:  Levemir 15 units morning and 16 units at night -> Lantus  Novolog 7 units plus group 2 scale before meals              -4 units before bedtime snack     Glucose Trends:   Current monitoring regimen: Freestyle Rimma 3 CGM - checks 4+ times daily  Home blood sugar trends:               -V. High: 38%, High: 34%, Target: 28%, Low: 0%, V. Low: 0%              -Average Glucose: 232 mg/dL; GMI: 8.9%;  %time CGM active: 97%  Any episodes of hypoglycemia? yes - once a week              -Treats with glucose gel and half of a fair life protein drink    Considerations:     Denis expresses an interest in decreasing the number of daily insulin injections. He would be a good candidate for the [employer]qur Simplicity device to administer Novolog and reduce his needlesticks.Denis expressed a strong interest in trying this device.       Please message back and/or review pended order(s) in response to the above.    Thank you for allowing me to participate in the care of your patient,     Barby Sandhu, PharmD, BCPS, [facility]  Internal Medicine Clinical Pharmacist  [phone]

## 2024-02-26 NOTE — PATIENT INSTRUCTIONS
Levemir/Lantus: 15 units in the morning and 16 units in the evening        Novolog 8 units before breakfast, 7 units before lunch, 8 units before supper, and 2 units + scale  before snack    -Plus sliding scale novolog as follows:  Below 70, treat for hypoglycemia  , no additional insulin  151-200, 2 units  201-250, 4 units  251-300, 6 units  301-350, 8 units  351-400, 10 units  Above 400, call MD    2. We will talk to Dr. Blackman about the Cequr Simplicity Device for your Novolog injections

## 2024-02-26 NOTE — PROGRESS NOTES
checks/goals, Carbohydrate counting/meal planning, Medication adherence, and Diabetes Technology  DSME PLAN:     Patient Instructions   Levemir/Lantus: 15 units in the morning and 16 units in the evening        Novolog 8 units before breakfast, 7 units before lunch, 8 units before supper, and 2 units + scale  before snack    -Plus sliding scale novolog as follows:  Below 70, treat for hypoglycemia  , no additional insulin  151-200, 2 units  201-250, 4 units  251-300, 6 units  301-350, 8 units  351-400, 10 units  Above 400, call MD    2. We will talk to Dr. Blackman about the Cequr Simplicity Device for your Novolog injections     Goals Addressed                   This Visit's Progress     Blood Pressure < 130/80   176/72     scan blood sugars at least every 8 hours using your phone   On track             Meter download, medications, PMH and nursing assessment reviewed.  Mumtaz Islas states He is willing to participate in this plan of care and verbalized understanding of all instructions provided. Teach back used to verify comprehension.      Follow-up: 1.5 month [facility] Clinic     Total time involved in direct patient education: 60 minutes.   Individual Education appointment justified due to: Class Availability    For Pharmacy Admin Tracking Only    Program: Medical Group  CPA in place:  Yes  Recommendation Provided To: Patient/Caregiver: 1 via In person  Intervention Detail: Dose Adjustment: 1, reason: Therapy Optimization  Intervention Accepted By: Patient/Caregiver: 1  Gap Closed?: No   Time Spent (min): 60        Barby Sandhu, PharmD, BCPS, [facility]  Internal Medicine Clinical Pharmacist  [phone]

## 2024-03-01 ENCOUNTER — TELEPHONE (OUTPATIENT)
Dept: CARDIOTHORACIC SURGERY | Age: 69
End: 2024-03-01

## 2024-03-01 ENCOUNTER — HOSPITAL ENCOUNTER (OUTPATIENT)
Dept: GENERAL RADIOLOGY | Age: 69
Discharge: HOME | End: 2024-03-01
Payer: MEDICARE

## 2024-03-01 ENCOUNTER — HOSPITAL ENCOUNTER (OUTPATIENT)
Dept: PREADMISSION TESTING | Age: 69
End: 2024-03-01
Payer: MEDICARE

## 2024-03-01 VITALS
DIASTOLIC BLOOD PRESSURE: 76 MMHG | RESPIRATION RATE: 18 BRPM | TEMPERATURE: 97.1 F | HEART RATE: 71 BPM | BODY MASS INDEX: 19.98 KG/M2 | OXYGEN SATURATION: 100 % | SYSTOLIC BLOOD PRESSURE: 151 MMHG | HEIGHT: 69 IN | WEIGHT: 134.92 LBS

## 2024-03-01 DIAGNOSIS — E11.9 TYPE 2 DIABETES MELLITUS WITHOUT COMPLICATION, WITH LONG-TERM CURRENT USE OF INSULIN (HCC): ICD-10-CM

## 2024-03-01 DIAGNOSIS — I25.118 CORONARY ARTERY DISEASE INVOLVING NATIVE CORONARY ARTERY OF NATIVE HEART WITH OTHER FORM OF ANGINA PECTORIS (HCC): ICD-10-CM

## 2024-03-01 DIAGNOSIS — E11.59 TYPE 2 DIABETES MELLITUS WITH OTHER CIRCULATORY COMPLICATION, WITH LONG-TERM CURRENT USE OF INSULIN (HCC): Primary | ICD-10-CM

## 2024-03-01 DIAGNOSIS — Z79.4 TYPE 2 DIABETES MELLITUS WITH OTHER CIRCULATORY COMPLICATION, WITH LONG-TERM CURRENT USE OF INSULIN (HCC): Primary | ICD-10-CM

## 2024-03-01 DIAGNOSIS — Z79.4 TYPE 2 DIABETES MELLITUS WITHOUT COMPLICATION, WITH LONG-TERM CURRENT USE OF INSULIN (HCC): ICD-10-CM

## 2024-03-01 LAB
ABO: NORMAL
ANION GAP SERPL CALC-SCNC: 8 MEQ/L (ref 8–16)
ANTIBODY SCREEN: NORMAL
BACTERIA: ABNORMAL
BILIRUB UR QL STRIP: NEGATIVE
BUN SERPL-MCNC: 23 MG/DL (ref 7–22)
CALCIUM SERPL-MCNC: 10.4 MG/DL (ref 8.5–10.5)
CASTS #/AREA URNS LPF: ABNORMAL /LPF
CASTS #/AREA URNS LPF: ABNORMAL /LPF
CHARACTER UR: CLEAR
CHARCOAL URNS QL MICRO: ABNORMAL
CHLORIDE SERPL-SCNC: 102 MEQ/L (ref 98–111)
CO2 SERPL-SCNC: 23 MEQ/L (ref 23–33)
COLOR UR: YELLOW
CREAT SERPL-MCNC: 1 MG/DL (ref 0.4–1.2)
CRYSTALS URNS QL MICRO: ABNORMAL
DEPRECATED MEAN GLUCOSE BLD GHB EST-ACNC: 216 MG/DL (ref 70–126)
DEPRECATED RDW RBC AUTO: 47.4 FL (ref 35–45)
EKG ATRIAL RATE: 62 BPM
EKG P AXIS: 40 DEGREES
EKG P-R INTERVAL: 250 MS
EKG Q-T INTERVAL: 460 MS
EKG QRS DURATION: 110 MS
EKG QTC CALCULATION (BAZETT): 466 MS
EKG R AXIS: 69 DEGREES
EKG T AXIS: 21 DEGREES
EKG VENTRICULAR RATE: 62 BPM
EPITHELIAL CELLS, UA: ABNORMAL /HPF
ERYTHROCYTE [DISTWIDTH] IN BLOOD BY AUTOMATED COUNT: 18 % (ref 11.5–14.5)
GFR SERPL CREATININE-BSD FRML MDRD: > 60 ML/MIN/1.73M2
GLUCOSE SERPL-MCNC: 226 MG/DL (ref 70–108)
GLUCOSE UR QL STRIP.AUTO: NEGATIVE MG/DL
HBA1C MFR BLD HPLC: 9.2 % (ref 4.4–6.4)
HCT VFR BLD AUTO: 35.8 % (ref 42–52)
HGB BLD-MCNC: 10.8 GM/DL (ref 14–18)
HGB UR QL STRIP.AUTO: NEGATIVE
INR PPP: 1.46 (ref 0.85–1.13)
KETONES UR QL STRIP.AUTO: ABNORMAL
LEUKOCYTE ESTERASE UR QL STRIP.AUTO: NEGATIVE
MCH RBC QN AUTO: 22.4 PG (ref 26–33)
MCHC RBC AUTO-ENTMCNC: 30.2 GM/DL (ref 32.2–35.5)
MCV RBC AUTO: 74.3 FL (ref 80–94)
MRSA DNA SPEC QL NAA+PROBE: NEGATIVE
NITRITE UR QL STRIP.AUTO: NEGATIVE
PH UR STRIP.AUTO: 5.5 [PH] (ref 5–9)
PLATELET # BLD AUTO: 396 THOU/MM3 (ref 130–400)
PMV BLD AUTO: 9.7 FL (ref 9.4–12.4)
POTASSIUM SERPL-SCNC: 4.9 MEQ/L (ref 3.5–5.2)
PROT UR STRIP.AUTO-MCNC: ABNORMAL MG/DL
RBC # BLD AUTO: 4.82 MILL/MM3 (ref 4.7–6.1)
RBC #/AREA URNS HPF: ABNORMAL /HPF
RENAL EPI CELLS #/AREA URNS HPF: ABNORMAL /[HPF]
RH FACTOR: NORMAL
SODIUM SERPL-SCNC: 133 MEQ/L (ref 135–145)
SP GR UR REFRACT.AUTO: 1.02 (ref 1–1.03)
UROBILINOGEN UR QL STRIP.AUTO: 0.2 EU/DL (ref 0–1)
WBC # BLD AUTO: 9.8 THOU/MM3 (ref 4.8–10.8)
WBC #/AREA URNS HPF: ABNORMAL /HPF
YEAST LIKE FUNGI URNS QL MICRO: ABNORMAL

## 2024-03-01 PROCEDURE — 86850 RBC ANTIBODY SCREEN: CPT

## 2024-03-01 PROCEDURE — 86923 COMPATIBILITY TEST ELECTRIC: CPT

## 2024-03-01 PROCEDURE — 93005 ELECTROCARDIOGRAM TRACING: CPT

## 2024-03-01 PROCEDURE — 86901 BLOOD TYPING SEROLOGIC RH(D): CPT

## 2024-03-01 PROCEDURE — 71046 X-RAY EXAM CHEST 2 VIEWS: CPT

## 2024-03-01 PROCEDURE — 80048 BASIC METABOLIC PNL TOTAL CA: CPT

## 2024-03-01 PROCEDURE — 81001 URINALYSIS AUTO W/SCOPE: CPT

## 2024-03-01 PROCEDURE — 36415 COLL VENOUS BLD VENIPUNCTURE: CPT

## 2024-03-01 PROCEDURE — 85610 PROTHROMBIN TIME: CPT

## 2024-03-01 PROCEDURE — 85027 COMPLETE CBC AUTOMATED: CPT

## 2024-03-01 PROCEDURE — 83036 HEMOGLOBIN GLYCOSYLATED A1C: CPT

## 2024-03-01 PROCEDURE — 93010 ELECTROCARDIOGRAM REPORT: CPT | Performed by: INTERNAL MEDICINE

## 2024-03-01 PROCEDURE — 86900 BLOOD TYPING SEROLOGIC ABO: CPT

## 2024-03-01 PROCEDURE — 87641 MR-STAPH DNA AMP PROBE: CPT

## 2024-03-01 NOTE — PROGRESS NOTES
Preop surgery showering for open heart   To keep your skin clean as possible and to help prevent infection, please follow these instructions:  Shower with cleanser containing chlorhexidine gluconate the morning of surgery. Note when using Clearance don not use it on mucous membranes such as your genital area don't get soap in your eyes.  CHG is absorbed by cotton washcloths and may cause discoloration.  In the shower wet skin and wash your body and hair with Cleanser.  Pay special attention to the area(s) where you will have surgery.  Ask someone for help if you are unable to wash certain area of your body.  Rinse well  Gently dry with clean cloth  When you arrive the day of surgery part of your prep for surgery will include clipping the hair on the front of your body and showering again.   Please remember  DO NOT SHAVE ANY BODY PARTS from your neck down (including your legs or underarms) for least 2 days before surgery.  Shaving can increase your risk of infection when you have surgery    AFTER YOUR SHOWER do not use any powder, deodorant, perfumes or lotions prior to surgery   WEAR FRESHLY LAUNDERED pajamas to bed that night and sleep on freshly laundered sheets NPO after midnight  Bring insurance info and drivers license  Wear loose, comfortable clean clothing  Do not bring jewelry or valuables. Bring case for glasses. Do not glue in dentures/partials  You may bring cell phone and     Bring medications in original bottles  Routine preop instructions given for pain, hand hygiene, fall prevention, infection prevention, anesthesia, cough and deep breath, MRSA/MSSA and progressive diet and ambulation  4% Chlorhexidine soap bottle given to patient. Shower and wash hair with chlorhexidine soap morning of surgery  IS instruction given and return demonstration  Open Heart Book reviewed, questions answered and book give to patient  To help prevent bowel problems after surgery we ask that you drink a hot beverage

## 2024-03-01 NOTE — PROGRESS NOTES
Sent message to Dr Bess office in regards to   HGBA1C 9.2, urine abnormal, HBG 10.8 HC 35.8 INR 1.46

## 2024-03-01 NOTE — DISCHARGE INSTRUCTIONS
NPO after midnight  Bring insurance info and drivers license  Wear loose, comfortable clean clothing  Do not bring jewelry or valuables. Bring case for glasses. Do not glue in dentures/partials  You may bring cell phone and     Bring medications in original bottles  Routine preop instructions given for pain, hand hygiene, fall prevention, infection prevention, anesthesia, cough and deep breath, MRSA/MSSA and progressive diet and ambulation  4% Chlorhexidine soap bottle given to patient. Shower and wash hair with chlorhexidine soap morning of surgery  IS instruction given and return demonstration  Open Heart Book reviewed, questions answered and book give to patient  To help prevent bowel problems after surgery we ask that you drink a hot beverage and eat an Activia yogurt with each meal starting 3-4 days before surgery.  Viewed open heart video  IS  Follow all instructions give by your physician     needed at discharge  Report to [facility] on 2nd floor  If you would become ill prior to surgery, please call the surgeon  Masks are recommended but not required. You may  a new mask by our .Preop surgery showering for open heart   To keep your skin clean as possible and to help prevent infection, please follow these instructions:  Shower with cleanser containing chlorhexidine gluconate the morning of surgery. Note when using Clearance don not use it on mucous membranes such as your genital area don't get soap in your eyes.  CHG is absorbed by cotton washcloths and may cause discoloration.  In the shower wet skin and wash your body and hair with Cleanser.  Pay special attention to the area(s) where you will have surgery.  Ask someone for help if you are unable to wash certain area of your body.  Rinse well  Gently dry with clean cloth  When you arrive the day of surgery part of your prep for surgery will include clipping the hair on the front of your body and showering again.   Please remember  DO

## 2024-03-01 NOTE — PROGRESS NOTES
1410  Did message Dr Bess office in regards that pt has a left heel ulcer that pt see dr Mayorga for  Pt wife states dr Bess is aware.

## 2024-03-01 NOTE — PROGRESS NOTES
STARTCLEAN® KIT SHOWER INSTRUCITONS    ___8-2-33_____ (date)   FIRST SHOWER: Two days before surgery: Take a shower and wash your entire body, including your hair and scalp in the following manner:  Wash your hair using normal shampoo. Make sure you rinse the shampoo from your hair and body. Wash your face with your regular soap or cleanser.  Use one of the sponges in the StartClean® kit and apply 1/3 of the Chlorhexidine soap to the sponge, wash from your neck down.  This is very important. Do not use the soap on your face or hair and avoid private areas.  Rinse your body thoroughly. This is very important.  Using a fresh, clean towel, dry your body.  Dress in freshly washed clothes.  Do not use lotions, powders, or creams after this shower.      __6-0-94______ (date)   SECOND SHOWER: The day before surgery: Take a shower and wash your entire body, including your hair and scalp in the following manner:  Wash your hair using normal shampoo. Make sure you rinse the shampoo from your hair and body. Wash your face with your regular soap or cleanser.  Use one of the sponges in the StartClean® kit and apply 1/3 of the Chlorhexidine soap to the sponge, wash from your neck down.  This is very important. Do not use the soap on your face or hair and avoid private areas.  Rinse your body thoroughly. This is very important.  Using a fresh, clean towel, dry your body.  Dress in freshly washed clothes.  Fresh clean sheets and pillow cases should be used after this shower.  Do not use lotions, powders, or creams after this shower.    ___9-1-67_____ (date)   THE FINAL SHOWER: The morning of surgery: Take a shower and wash your entire body, including your hair and scalp in the following manner:  Wash your hair using normal shampoo. Make sure you rinse the shampoo from your hair and body. Wash your face with your regular soap or cleanser.  Use one of the sponges in the StartClean® kit and apply 1/3 of the Chlorhexidine soap to the

## 2024-03-01 NOTE — PROGRESS NOTES
[11:32 AM] Stacy Philip  i sent a message to Denver about [employer], and [employer].

## 2024-03-01 NOTE — PROGRESS NOTES
Pt is very unsteady on feet. Uses cane.   Called Milady at [employer] in regards to surgery she said they got it.     Patient baseline mental status/Drowsy/Alert and oriented to person, place and time

## 2024-03-01 NOTE — PROGRESS NOTES
Preliminary Discharge Planning Questionnaire  Date of Surgery 3-6-24   Surgeon Dr Bess      Having the proper help and care after surgery is very important to your recovery. Who will be able to help you at home when you are discharged from the hospital?    spouse    Name(s) Lisa with cat and dog      How many steps to enter your home?  2    Bathroom on first floor?  Yes    Bedroom on the first floor?  Yes    Do you have an elevated toilet seat to use at home?  No    Do you have a walker to use at home? yes    Total Joints - with wheels N/A   Spine - with wheels  N/A     Have you been doing home exercises?    *You will go home with some outpatient physical therapy, where do you prefer to go? [facility]    This typically will not start until after your post visit to your Dr. (3-4 weeks after surgery)    * What home health agency would you like to use?[facility]      List of all Home Health Agencies Available given to patient, with website ( per Social Work Team)      Please have 2 preferences when you come for surgery- 1st and 2nd choice                                                               *Cardiac Rehab plans [facility]    Pt is wanting to go to   [facility] 1st choice then [facility] in [place].

## 2024-03-05 ENCOUNTER — ANESTHESIA (OUTPATIENT)
Dept: OPERATING ROOM | Age: 69
End: 2024-03-05
Payer: MEDICARE

## 2024-03-06 ENCOUNTER — APPOINTMENT (OUTPATIENT)
Dept: GENERAL RADIOLOGY | Age: 69
DRG: 003 | End: 2024-03-06
Attending: THORACIC SURGERY (CARDIOTHORACIC VASCULAR SURGERY)
Payer: MEDICARE

## 2024-03-06 ENCOUNTER — ANESTHESIA EVENT (OUTPATIENT)
Dept: OPERATING ROOM | Age: 69
End: 2024-03-06
Payer: MEDICARE

## 2024-03-06 ENCOUNTER — ANESTHESIA (OUTPATIENT)
Dept: OPERATING ROOM | Age: 69
End: 2024-03-06
Payer: MEDICARE

## 2024-03-06 ENCOUNTER — HOSPITAL ENCOUNTER (INPATIENT)
Age: 69
LOS: 31 days | DRG: 003 | End: 2024-04-06
Attending: THORACIC SURGERY (CARDIOTHORACIC VASCULAR SURGERY) | Admitting: THORACIC SURGERY (CARDIOTHORACIC VASCULAR SURGERY)
Payer: MEDICARE

## 2024-03-06 DIAGNOSIS — I25.10 CAD, MULTIPLE VESSEL: ICD-10-CM

## 2024-03-06 DIAGNOSIS — I73.9 PAD (PERIPHERAL ARTERY DISEASE) (HCC): ICD-10-CM

## 2024-03-06 DIAGNOSIS — I95.89 OTHER HYPOTENSION: ICD-10-CM

## 2024-03-06 DIAGNOSIS — Z98.890 S/P MITRAL VALVE REPAIR: Primary | ICD-10-CM

## 2024-03-06 DIAGNOSIS — I25.10 CAD IN NATIVE ARTERY: ICD-10-CM

## 2024-03-06 DIAGNOSIS — I25.10 CORONARY ARTERY DISEASE INVOLVING NATIVE CORONARY ARTERY OF NATIVE HEART WITHOUT ANGINA PECTORIS: ICD-10-CM

## 2024-03-06 LAB
ACTIVATED CLOTTING TIME: 131 SECONDS (ref 99–130)
ACTIVATED CLOTTING TIME: 145 SECONDS (ref 99–130)
ACTIVATED CLOTTING TIME: 499 SECONDS (ref 99–130)
ACTIVATED CLOTTING TIME: 549 SECONDS (ref 99–130)
ACTIVATED CLOTTING TIME: 573 SECONDS (ref 99–130)
ACTIVATED CLOTTING TIME: 618 SECONDS (ref 99–130)
ACTIVATED CLOTTING TIME: 648 SECONDS (ref 99–130)
ANION GAP SERPL CALC-SCNC: 13 MEQ/L (ref 8–16)
BASE EXCESS BLDA CALC-SCNC: -1.4 MMOL/L (ref -2.5–2.5)
BASE EXCESS BLDA CALC-SCNC: -3.3 MMOL/L (ref -2.5–2.5)
BASE EXCESS BLDA CALC-SCNC: -4.4 MMOL/L (ref -2–3)
BASE EXCESS BLDA CALC-SCNC: -5.5 MMOL/L (ref -2–3)
BASE EXCESS BLDA CALC-SCNC: -6.1 MMOL/L (ref -2.5–2.5)
BASE EXCESS BLDA CALC-SCNC: -6.8 MMOL/L (ref -2.5–2.5)
BASE EXCESS BLDA CALC-SCNC: -6.9 MMOL/L (ref -2.5–2.5)
BASE EXCESS BLDA CALC-SCNC: -8.2 MMOL/L (ref -2.5–2.5)
BASE EXCESS BLDA CALC-SCNC: -8.6 MMOL/L (ref -2.5–2.5)
BASE EXCESS BLDA CALC-SCNC: 0 MMOL/L (ref -2.5–2.5)
BASE EXCESS BLDA CALC-SCNC: 0.2 MMOL/L (ref -2.5–2.5)
BASE EXCESS BLDA CALC-SCNC: 0.5 MMOL/L (ref -2.5–2.5)
BDY SITE: ABNORMAL
BDY SITE: ABNORMAL
BREATHS SETTING VENT: 16 BPM
BREATHS SETTING VENT: 16 BPM
BUN SERPL-MCNC: 18 MG/DL (ref 7–22)
CA-I BLD ISE-SCNC: 1.02 MMOL/L (ref 1.12–1.32)
CA-I BLD ISE-SCNC: 1.09 MMOL/L (ref 1.12–1.32)
CA-I BLD ISE-SCNC: 1.1 MMOL/L (ref 1.12–1.32)
CA-I BLD ISE-SCNC: 1.1 MMOL/L (ref 1.12–1.32)
CA-I BLD ISE-SCNC: 1.13 MMOL/L (ref 1.12–1.32)
CA-I BLD ISE-SCNC: 1.15 MMOL/L (ref 1.12–1.32)
CA-I BLD ISE-SCNC: 1.16 MMOL/L (ref 1.12–1.32)
CA-I BLD ISE-SCNC: 1.19 MMOL/L (ref 1.12–1.32)
CA-I BLD ISE-SCNC: 1.31 MMOL/L (ref 1.12–1.32)
CA-I BLD ISE-SCNC: 1.38 MMOL/L (ref 1.12–1.32)
CA-I BLD ISE-SCNC: 1.4 MMOL/L (ref 1.12–1.32)
CALCIUM SERPL-MCNC: 8.2 MG/DL (ref 8.5–10.5)
CARTRIDGE COLOR: NORMAL
CFT BLD TEG: 2.7 MINUTES (ref 0.8–2.1)
CHLORIDE BLD-SCNC: 114 MEQ/L (ref 98–109)
CHLORIDE SERPL-SCNC: 110 MEQ/L (ref 98–111)
CLOT ANGLE BLD TEG: 13.9 MM (ref 15–32)
CLOT ANGLE BLD TEG: 57.2 DEG (ref 63–78)
CLOT INIT BLD TEG: 7.3 MINUTES (ref 4.6–9.1)
CLOT INIT P HPASE BLD TEG: 7.1 MINUTES (ref 4.3–8.3)
CO2 SERPL-SCNC: 20 MEQ/L (ref 23–33)
COLLECTED BY:: ABNORMAL
COMMENT: ABNORMAL
CREAT SERPL-MCNC: 0.9 MG/DL (ref 0.4–1.2)
DEPRECATED RDW RBC AUTO: 51.6 FL (ref 35–45)
DEPRECATED RDW RBC AUTO: 56.8 FL (ref 35–45)
DEVICE: ABNORMAL
DEVICE: ABNORMAL
ERYTHROCYTE [DISTWIDTH] IN BLOOD BY AUTOMATED COUNT: 17.9 % (ref 11.5–14.5)
ERYTHROCYTE [DISTWIDTH] IN BLOOD BY AUTOMATED COUNT: 20.5 % (ref 11.5–14.5)
FIO2 ON VENT O2 ANALYZER: 40 %
FIO2 ON VENT O2 ANALYZER: 80 %
FUNCT FIBRINOGEN LEVEL: 253.6 MG/DL (ref 278–581)
GFR SERPL CREATININE-BSD FRML MDRD: > 60 ML/MIN/1.73M2
GLUCOSE BLD STRIP.AUTO-MCNC: 114 MG/DL (ref 70–108)
GLUCOSE BLD STRIP.AUTO-MCNC: 124 MG/DL (ref 70–108)
GLUCOSE BLD STRIP.AUTO-MCNC: 170 MG/DL (ref 70–108)
GLUCOSE BLD STRIP.AUTO-MCNC: 206 MG/DL (ref 70–108)
GLUCOSE BLD STRIP.AUTO-MCNC: 208 MG/DL (ref 70–108)
GLUCOSE BLD STRIP.AUTO-MCNC: 322 MG/DL (ref 70–108)
GLUCOSE BLD-MCNC: 101 MG/DL (ref 70–108)
GLUCOSE BLD-MCNC: 112 MG/DL (ref 70–108)
GLUCOSE BLD-MCNC: 129 MG/DL (ref 70–108)
GLUCOSE BLD-MCNC: 131 MG/DL (ref 70–108)
GLUCOSE BLD-MCNC: 139 MG/DL (ref 70–108)
GLUCOSE BLD-MCNC: 168 MG/DL (ref 70–108)
GLUCOSE BLD-MCNC: 168 MG/DL (ref 70–108)
GLUCOSE BLD-MCNC: 169 MG/DL (ref 70–108)
GLUCOSE BLD-MCNC: 213 MG/DL (ref 70–108)
GLUCOSE BLD-MCNC: 248 MG/DL (ref 70–108)
GLUCOSE BLD-MCNC: 282 MG/DL (ref 70–108)
GLUCOSE BLD-MCNC: 58 MG/DL (ref 70–108)
GLUCOSE SERPL-MCNC: 104 MG/DL (ref 70–108)
HCO3 BLDA-SCNC: 17 MMOL/L (ref 23–28)
HCO3 BLDA-SCNC: 17 MMOL/L (ref 23–28)
HCO3 BLDA-SCNC: 18 MMOL/L (ref 23–28)
HCO3 BLDA-SCNC: 19 MMOL/L (ref 23–28)
HCO3 BLDA-SCNC: 19 MMOL/L (ref 23–28)
HCO3 BLDA-SCNC: 21 MMOL/L (ref 23–28)
HCO3 BLDA-SCNC: 22 MMOL/L (ref 23–28)
HCO3 BLDA-SCNC: 22 MMOL/L (ref 23–28)
HCO3 BLDA-SCNC: 23 MMOL/L (ref 23–28)
HCO3 BLDA-SCNC: 25 MMOL/L (ref 23–28)
HCO3 BLDA-SCNC: 26 MMOL/L (ref 23–28)
HCO3 BLDA-SCNC: 28 MMOL/L (ref 23–28)
HCT VFR BLD AUTO: 21.9 % (ref 42–52)
HCT VFR BLD AUTO: 29.5 % (ref 42–52)
HCT VFR BLD AUTO: 31.1 % (ref 42–52)
HCT VFR BLD AUTO: 41 % (ref 42–52)
HEMOGLOBIN FINGERSTICK, POC: 5.4 G/DL (ref 14–18)
HEMOGLOBIN FINGERSTICK, POC: 6.1 G/DL (ref 14–18)
HEMOGLOBIN FINGERSTICK, POC: 6.2 G/DL (ref 14–18)
HEMOGLOBIN FINGERSTICK, POC: 6.6 G/DL (ref 14–18)
HEMOGLOBIN FINGERSTICK, POC: 7.1 G/DL (ref 14–18)
HEMOGLOBIN FINGERSTICK, POC: 7.7 G/DL (ref 14–18)
HEMOGLOBIN FINGERSTICK, POC: 8.6 G/DL (ref 14–18)
HEMOGLOBIN FINGERSTICK, POC: 9.2 G/DL (ref 14–18)
HGB BLD-MCNC: 10 GM/DL (ref 14–18)
HGB BLD-MCNC: 13.4 GM/DL (ref 14–18)
HGB BLD-MCNC: 7.1 GM/DL (ref 14–18)
HGB BLD-MCNC: 9.4 GM/DL (ref 14–18)
LACTATE BLD-SCNC: 8.4 MMOL/L (ref 0.5–1.9)
MAGNESIUM SERPL-MCNC: 3.3 MG/DL (ref 1.6–2.4)
MCF BLD TEG: 53.1 MM (ref 52–69)
MCF.EXTRINSIC BLD ROTEM: 51.5 MM (ref 52–70)
MCH RBC QN AUTO: 25.8 PG (ref 26–33)
MCH RBC QN AUTO: 25.9 PG (ref 26–33)
MCHC RBC AUTO-ENTMCNC: 32.2 GM/DL (ref 32.2–35.5)
MCHC RBC AUTO-ENTMCNC: 32.7 GM/DL (ref 32.2–35.5)
MCV RBC AUTO: 79 FL (ref 80–94)
MCV RBC AUTO: 80.6 FL (ref 80–94)
PATIENT BOLUS: NORMAL
PATIENT HEPARIN CONCENTRATION: 0
PATIENT HEPARIN CONCENTRATION: 2
PATIENT HEPARIN CONCENTRATION: 3
PCO2 BLDA: 29 MMHG (ref 35–45)
PCO2 BLDA: 34 MMHG (ref 35–45)
PCO2 BLDA: 34 MMHG (ref 35–45)
PCO2 BLDA: 35 MMHG (ref 35–45)
PCO2 BLDA: 38 MMHG (ref 35–45)
PCO2 BLDA: 40 MMHG (ref 35–45)
PCO2 BLDA: 40 MMHG (ref 35–45)
PCO2 BLDA: 44 MMHG (ref 35–45)
PCO2 BLDA: 47 MMHG (ref 35–45)
PCO2 BLDA: 67 MMHG (ref 35–45)
PCO2 TEMP ADJ BLDMV: 44 MMHG (ref 41–51)
PCO2 TEMP ADJ BLDMV: 47 MMHG (ref 41–51)
PEEP SETTING VENT: 10 MMHG
PEEP SETTING VENT: 6 MMHG
PH BLDA: 7.23 [PH] (ref 7.35–7.45)
PH BLDA: 7.29 [PH] (ref 7.35–7.45)
PH BLDA: 7.29 [PH] (ref 7.35–7.45)
PH BLDA: 7.3 [PH] (ref 7.35–7.45)
PH BLDA: 7.31 [PH] (ref 7.35–7.45)
PH BLDA: 7.31 [PH] (ref 7.35–7.45)
PH BLDA: 7.37 [PH] (ref 7.35–7.45)
PH BLDA: 7.4 [PH] (ref 7.35–7.45)
PH BLDA: 7.4 [PH] (ref 7.35–7.45)
PH BLDA: 7.43 [PH] (ref 7.35–7.45)
PH BLDMV: 7.26 [PH] (ref 7.31–7.41)
PH BLDMV: 7.3 [PH] (ref 7.31–7.41)
PIP: 18 CMH2O
PIP: 22 CMH2O
PLATELET # BLD AUTO: 135 THOU/MM3 (ref 130–400)
PLATELET # BLD AUTO: 159 THOU/MM3 (ref 130–400)
PLATELET # BLD AUTO: 166 THOU/MM3 (ref 130–400)
PLATELET # BLD AUTO: 333 THOU/MM3 (ref 130–400)
PMV BLD AUTO: 9.9 FL (ref 9.4–12.4)
PMV BLD AUTO: 9.9 FL (ref 9.4–12.4)
PO2 BLDA: 188 MMHG (ref 71–104)
PO2 BLDA: 193 MMHG (ref 71–104)
PO2 BLDA: 318 MMHG (ref 71–104)
PO2 BLDA: 324 MMHG (ref 71–104)
PO2 BLDA: 327 MMHG (ref 71–104)
PO2 BLDA: 338 MMHG (ref 71–104)
PO2 BLDA: 375 MMHG (ref 71–104)
PO2 BLDA: 490 MMHG (ref 71–104)
PO2 BLDA: 498 MMHG (ref 71–104)
PO2 BLDA: 537 MMHG (ref 71–104)
PO2 BLDMV: 23 MMHG (ref 25–40)
PO2 BLDMV: 40 MMHG (ref 25–40)
POC CREATININE WHOLE BLOOD: 1.6 MG/DL (ref 0.5–1.2)
POTASSIUM BLD-SCNC: 4 MEQ/L (ref 3.5–4.9)
POTASSIUM BLD-SCNC: 4.1 MEQ/L (ref 3.5–4.9)
POTASSIUM BLD-SCNC: 4.1 MEQ/L (ref 3.5–4.9)
POTASSIUM BLD-SCNC: 4.4 MEQ/L (ref 3.5–4.9)
POTASSIUM BLD-SCNC: 4.7 MEQ/L (ref 3.5–4.9)
POTASSIUM BLD-SCNC: 5.1 MEQ/L (ref 3.5–4.9)
POTASSIUM BLD-SCNC: 5.1 MEQ/L (ref 3.5–4.9)
POTASSIUM BLD-SCNC: 5.2 MEQ/L (ref 3.5–4.9)
POTASSIUM BLD-SCNC: 5.8 MEQ/L (ref 3.5–4.9)
POTASSIUM SERPL-SCNC: 4.8 MEQ/L (ref 3.5–5.2)
POTASSIUM SERPL-SCNC: 6 MEQ/L (ref 3.5–5.2)
PROJECTED HEPARIN CONCENTATION: 2
RANGE: NORMAL
RBC # BLD AUTO: 3.86 MILL/MM3 (ref 4.7–6.1)
RBC # BLD AUTO: 5.19 MILL/MM3 (ref 4.7–6.1)
SAO2 % BLDA: 100 %
SAO2 % BLDMV: 31 %
SAO2 % BLDMV: 70 %
SODIUM BLD-SCNC: 138 MEQ/L (ref 138–146)
SODIUM BLD-SCNC: 139 MEQ/L (ref 138–146)
SODIUM BLD-SCNC: 140 MEQ/L (ref 138–146)
SODIUM BLD-SCNC: 143 MEQ/L (ref 138–146)
SODIUM BLD-SCNC: 143 MEQ/L (ref 138–146)
SODIUM BLD-SCNC: 145 MEQ/L (ref 138–146)
SODIUM BLD-SCNC: 145 MEQ/L (ref 138–146)
SODIUM BLD-SCNC: 146 MEQ/L (ref 138–146)
SODIUM BLD-SCNC: 150 MEQ/L (ref 138–146)
SODIUM SERPL-SCNC: 143 MEQ/L (ref 135–145)
VENTILATION MODE VENT: ABNORMAL
WBC # BLD AUTO: 10.4 THOU/MM3 (ref 4.8–10.8)
WBC # BLD AUTO: 15.6 THOU/MM3 (ref 4.8–10.8)

## 2024-03-06 PROCEDURE — 94761 N-INVAS EAR/PLS OXIMETRY MLT: CPT

## 2024-03-06 PROCEDURE — 94002 VENT MGMT INPAT INIT DAY: CPT

## 2024-03-06 PROCEDURE — 82803 BLOOD GASES ANY COMBINATION: CPT

## 2024-03-06 PROCEDURE — 2580000003 HC RX 258: Performed by: PHYSICIAN ASSISTANT

## 2024-03-06 PROCEDURE — 3600000018 HC SURGERY OHS ADDTL 15MIN: Performed by: THORACIC SURGERY (CARDIOTHORACIC VASCULAR SURGERY)

## 2024-03-06 PROCEDURE — 71045 X-RAY EXAM CHEST 1 VIEW: CPT

## 2024-03-06 PROCEDURE — 2500000003 HC RX 250 WO HCPCS: Performed by: PHYSICIAN ASSISTANT

## 2024-03-06 PROCEDURE — 36415 COLL VENOUS BLD VENIPUNCTURE: CPT

## 2024-03-06 PROCEDURE — 84295 ASSAY OF SERUM SODIUM: CPT

## 2024-03-06 PROCEDURE — 86923 COMPATIBILITY TEST ELECTRIC: CPT

## 2024-03-06 PROCEDURE — 2500000003 HC RX 250 WO HCPCS: Performed by: THORACIC SURGERY (CARDIOTHORACIC VASCULAR SURGERY)

## 2024-03-06 PROCEDURE — 2720000010 HC SURG SUPPLY STERILE: Performed by: THORACIC SURGERY (CARDIOTHORACIC VASCULAR SURGERY)

## 2024-03-06 PROCEDURE — 6360000002 HC RX W HCPCS: Performed by: REGISTERED NURSE

## 2024-03-06 PROCEDURE — P9037 PLATE PHERES LEUKOREDU IRRAD: HCPCS

## 2024-03-06 PROCEDURE — 2500000003 HC RX 250 WO HCPCS: Performed by: NURSE ANESTHETIST, CERTIFIED REGISTERED

## 2024-03-06 PROCEDURE — 33508 ENDOSCOPIC VEIN HARVEST: CPT | Performed by: THORACIC SURGERY (CARDIOTHORACIC VASCULAR SURGERY)

## 2024-03-06 PROCEDURE — 82948 REAGENT STRIP/BLOOD GLUCOSE: CPT

## 2024-03-06 PROCEDURE — 87086 URINE CULTURE/COLONY COUNT: CPT

## 2024-03-06 PROCEDURE — 2700000000 HC OXYGEN THERAPY PER DAY

## 2024-03-06 PROCEDURE — 36600 WITHDRAWAL OF ARTERIAL BLOOD: CPT

## 2024-03-06 PROCEDURE — 82565 ASSAY OF CREATININE: CPT

## 2024-03-06 PROCEDURE — 2709999900 HC NON-CHARGEABLE SUPPLY: Performed by: THORACIC SURGERY (CARDIOTHORACIC VASCULAR SURGERY)

## 2024-03-06 PROCEDURE — 85384 FIBRINOGEN ACTIVITY: CPT

## 2024-03-06 PROCEDURE — 6370000000 HC RX 637 (ALT 250 FOR IP): Performed by: STUDENT IN AN ORGANIZED HEALTH CARE EDUCATION/TRAINING PROGRAM

## 2024-03-06 PROCEDURE — 84132 ASSAY OF SERUM POTASSIUM: CPT

## 2024-03-06 PROCEDURE — 2580000003 HC RX 258: Performed by: THORACIC SURGERY (CARDIOTHORACIC VASCULAR SURGERY)

## 2024-03-06 PROCEDURE — 6360000002 HC RX W HCPCS: Performed by: PHYSICIAN ASSISTANT

## 2024-03-06 PROCEDURE — 2500000003 HC RX 250 WO HCPCS: Performed by: REGISTERED NURSE

## 2024-03-06 PROCEDURE — 02L70DK OCCLUSION OF LEFT ATRIAL APPENDAGE WITH INTRALUMINAL DEVICE, OPEN APPROACH: ICD-10-PCS | Performed by: THORACIC SURGERY (CARDIOTHORACIC VASCULAR SURGERY)

## 2024-03-06 PROCEDURE — 83605 ASSAY OF LACTIC ACID: CPT

## 2024-03-06 PROCEDURE — 31500 INSERT EMERGENCY AIRWAY: CPT

## 2024-03-06 PROCEDURE — 6360000002 HC RX W HCPCS: Performed by: THORACIC SURGERY (CARDIOTHORACIC VASCULAR SURGERY)

## 2024-03-06 PROCEDURE — 35820 EXPLORE CHEST VESSELS: CPT | Performed by: THORACIC SURGERY (CARDIOTHORACIC VASCULAR SURGERY)

## 2024-03-06 PROCEDURE — 6360000002 HC RX W HCPCS: Performed by: NURSE ANESTHETIST, CERTIFIED REGISTERED

## 2024-03-06 PROCEDURE — 82330 ASSAY OF CALCIUM: CPT

## 2024-03-06 PROCEDURE — 3600000008 HC SURGERY OHS BASE: Performed by: THORACIC SURGERY (CARDIOTHORACIC VASCULAR SURGERY)

## 2024-03-06 PROCEDURE — 6370000000 HC RX 637 (ALT 250 FOR IP): Performed by: PHYSICIAN ASSISTANT

## 2024-03-06 PROCEDURE — P9012 CRYOPRECIPITATE EACH UNIT: HCPCS

## 2024-03-06 PROCEDURE — 85018 HEMOGLOBIN: CPT

## 2024-03-06 PROCEDURE — 85347 COAGULATION TIME ACTIVATED: CPT

## 2024-03-06 PROCEDURE — 2000000000 HC ICU R&B

## 2024-03-06 PROCEDURE — 2500000003 HC RX 250 WO HCPCS

## 2024-03-06 PROCEDURE — 82947 ASSAY GLUCOSE BLOOD QUANT: CPT

## 2024-03-06 PROCEDURE — 80048 BASIC METABOLIC PNL TOTAL CA: CPT

## 2024-03-06 PROCEDURE — 33259 ABLATE ATRIA W/BYPASS ADD-ON: CPT | Performed by: THORACIC SURGERY (CARDIOTHORACIC VASCULAR SURGERY)

## 2024-03-06 PROCEDURE — 99291 CRITICAL CARE FIRST HOUR: CPT | Performed by: INTERNAL MEDICINE

## 2024-03-06 PROCEDURE — 85520 HEPARIN ASSAY: CPT

## 2024-03-06 PROCEDURE — P9047 ALBUMIN (HUMAN), 25%, 50ML: HCPCS

## 2024-03-06 PROCEDURE — 6360000002 HC RX W HCPCS: Performed by: INTERNAL MEDICINE

## 2024-03-06 PROCEDURE — 6370000000 HC RX 637 (ALT 250 FOR IP): Performed by: REGISTERED NURSE

## 2024-03-06 PROCEDURE — 02UG0JZ SUPPLEMENT MITRAL VALVE WITH SYNTHETIC SUBSTITUTE, OPEN APPROACH: ICD-10-PCS | Performed by: THORACIC SURGERY (CARDIOTHORACIC VASCULAR SURGERY)

## 2024-03-06 PROCEDURE — P9016 RBC LEUKOCYTES REDUCED: HCPCS

## 2024-03-06 PROCEDURE — 33518 CABG ARTERY-VEIN TWO: CPT | Performed by: THORACIC SURGERY (CARDIOTHORACIC VASCULAR SURGERY)

## 2024-03-06 PROCEDURE — 3700000000 HC ANESTHESIA ATTENDED CARE: Performed by: THORACIC SURGERY (CARDIOTHORACIC VASCULAR SURGERY)

## 2024-03-06 PROCEDURE — 02100Z9 BYPASS CORONARY ARTERY, ONE ARTERY FROM LEFT INTERNAL MAMMARY, OPEN APPROACH: ICD-10-PCS | Performed by: THORACIC SURGERY (CARDIOTHORACIC VASCULAR SURGERY)

## 2024-03-06 PROCEDURE — 3700000001 HC ADD 15 MINUTES (ANESTHESIA): Performed by: THORACIC SURGERY (CARDIOTHORACIC VASCULAR SURGERY)

## 2024-03-06 PROCEDURE — 33426 REPAIR OF MITRAL VALVE: CPT | Performed by: THORACIC SURGERY (CARDIOTHORACIC VASCULAR SURGERY)

## 2024-03-06 PROCEDURE — 85576 BLOOD PLATELET AGGREGATION: CPT

## 2024-03-06 PROCEDURE — 2580000003 HC RX 258: Performed by: NURSE ANESTHETIST, CERTIFIED REGISTERED

## 2024-03-06 PROCEDURE — C1713 ANCHOR/SCREW BN/BN,TIS/BN: HCPCS | Performed by: THORACIC SURGERY (CARDIOTHORACIC VASCULAR SURGERY)

## 2024-03-06 PROCEDURE — 85027 COMPLETE CBC AUTOMATED: CPT

## 2024-03-06 PROCEDURE — 82435 ASSAY OF BLOOD CHLORIDE: CPT

## 2024-03-06 PROCEDURE — 021109W BYPASS CORONARY ARTERY, TWO ARTERIES FROM AORTA WITH AUTOLOGOUS VENOUS TISSUE, OPEN APPROACH: ICD-10-PCS | Performed by: THORACIC SURGERY (CARDIOTHORACIC VASCULAR SURGERY)

## 2024-03-06 PROCEDURE — 2500000003 HC RX 250 WO HCPCS: Performed by: STUDENT IN AN ORGANIZED HEALTH CARE EDUCATION/TRAINING PROGRAM

## 2024-03-06 PROCEDURE — 36430 TRANSFUSION BLD/BLD COMPNT: CPT

## 2024-03-06 PROCEDURE — P9017 PLASMA 1 DONOR FRZ W/IN 8 HR: HCPCS

## 2024-03-06 PROCEDURE — 6370000000 HC RX 637 (ALT 250 FOR IP)

## 2024-03-06 PROCEDURE — 3E033XZ INTRODUCTION OF VASOPRESSOR INTO PERIPHERAL VEIN, PERCUTANEOUS APPROACH: ICD-10-PCS | Performed by: THORACIC SURGERY (CARDIOTHORACIC VASCULAR SURGERY)

## 2024-03-06 PROCEDURE — 94640 AIRWAY INHALATION TREATMENT: CPT

## 2024-03-06 PROCEDURE — P9045 ALBUMIN (HUMAN), 5%, 250 ML: HCPCS | Performed by: NURSE ANESTHETIST, CERTIFIED REGISTERED

## 2024-03-06 PROCEDURE — 37799 UNLISTED PX VASCULAR SURGERY: CPT

## 2024-03-06 PROCEDURE — C1889 IMPLANT/INSERT DEVICE, NOC: HCPCS | Performed by: THORACIC SURGERY (CARDIOTHORACIC VASCULAR SURGERY)

## 2024-03-06 PROCEDURE — 5A1955Z RESPIRATORY VENTILATION, GREATER THAN 96 CONSECUTIVE HOURS: ICD-10-PCS | Performed by: THORACIC SURGERY (CARDIOTHORACIC VASCULAR SURGERY)

## 2024-03-06 PROCEDURE — 6360000002 HC RX W HCPCS

## 2024-03-06 PROCEDURE — C1729 CATH, DRAINAGE: HCPCS | Performed by: THORACIC SURGERY (CARDIOTHORACIC VASCULAR SURGERY)

## 2024-03-06 PROCEDURE — 2580000003 HC RX 258: Performed by: REGISTERED NURSE

## 2024-03-06 PROCEDURE — 33533 CABG ARTERIAL SINGLE: CPT | Performed by: THORACIC SURGERY (CARDIOTHORACIC VASCULAR SURGERY)

## 2024-03-06 PROCEDURE — 83735 ASSAY OF MAGNESIUM: CPT

## 2024-03-06 DEVICE — IMPLANTABLE DEVICE: Type: IMPLANTABLE DEVICE | Status: FUNCTIONAL

## 2024-03-06 DEVICE — PLATE BNE 8 H X SHP STERNALOCK BLU PRI CLSR SYS: Type: IMPLANTABLE DEVICE | Status: FUNCTIONAL

## 2024-03-06 RX ORDER — ENALAPRILAT 1.25 MG/ML
0.62 INJECTION INTRAVENOUS
Status: DISCONTINUED | OUTPATIENT
Start: 2024-03-06 | End: 2024-03-22

## 2024-03-06 RX ORDER — PAPAVERINE HYDROCHLORIDE 30 MG/ML
INJECTION INTRAMUSCULAR; INTRAVENOUS PRN
Status: DISCONTINUED | OUTPATIENT
Start: 2024-03-06 | End: 2024-03-06 | Stop reason: HOSPADM

## 2024-03-06 RX ORDER — MAGNESIUM SULFATE IN WATER 40 MG/ML
2000 INJECTION, SOLUTION INTRAVENOUS PRN
Status: DISCONTINUED | OUTPATIENT
Start: 2024-03-06 | End: 2024-03-08 | Stop reason: SDUPTHER

## 2024-03-06 RX ORDER — NOREPINEPHRINE BITARTRATE 0.06 MG/ML
1-100 INJECTION, SOLUTION INTRAVENOUS CONTINUOUS
Status: DISCONTINUED | OUTPATIENT
Start: 2024-03-06 | End: 2024-03-07

## 2024-03-06 RX ORDER — SENNA AND DOCUSATE SODIUM 50; 8.6 MG/1; MG/1
1 TABLET, FILM COATED ORAL 2 TIMES DAILY
Status: DISCONTINUED | OUTPATIENT
Start: 2024-03-06 | End: 2024-03-12 | Stop reason: SDUPTHER

## 2024-03-06 RX ORDER — LIDOCAINE HCL/PF 100 MG/5ML
SYRINGE (ML) INJECTION PRN
Status: DISCONTINUED | OUTPATIENT
Start: 2024-03-06 | End: 2024-03-06 | Stop reason: SDUPTHER

## 2024-03-06 RX ORDER — SENNOSIDES A AND B 8.6 MG/1
1 TABLET, FILM COATED ORAL DAILY PRN
Status: DISCONTINUED | OUTPATIENT
Start: 2024-03-06 | End: 2024-04-06

## 2024-03-06 RX ORDER — POTASSIUM CHLORIDE 29.8 MG/ML
20 INJECTION INTRAVENOUS PRN
Status: DISCONTINUED | OUTPATIENT
Start: 2024-03-06 | End: 2024-03-08

## 2024-03-06 RX ORDER — SODIUM CHLORIDE 0.9 % (FLUSH) 0.9 %
5-40 SYRINGE (ML) INJECTION PRN
Status: DISCONTINUED | OUTPATIENT
Start: 2024-03-06 | End: 2024-03-06 | Stop reason: HOSPADM

## 2024-03-06 RX ORDER — ALBUMIN, HUMAN INJ 5% 5 %
SOLUTION INTRAVENOUS PRN
Status: DISCONTINUED | OUTPATIENT
Start: 2024-03-06 | End: 2024-03-07 | Stop reason: SDUPTHER

## 2024-03-06 RX ORDER — AMINOCAPROIC ACID 250 MG/ML
INJECTION, SOLUTION INTRAVENOUS PRN
Status: DISCONTINUED | OUTPATIENT
Start: 2024-03-06 | End: 2024-03-06 | Stop reason: SDUPTHER

## 2024-03-06 RX ORDER — MIDAZOLAM HYDROCHLORIDE 1 MG/ML
INJECTION INTRAMUSCULAR; INTRAVENOUS PRN
Status: DISCONTINUED | OUTPATIENT
Start: 2024-03-06 | End: 2024-03-06 | Stop reason: SDUPTHER

## 2024-03-06 RX ORDER — SODIUM CHLORIDE 9 MG/ML
INJECTION, SOLUTION INTRAVENOUS PRN
Status: DISCONTINUED | OUTPATIENT
Start: 2024-03-06 | End: 2024-03-07

## 2024-03-06 RX ORDER — NOREPINEPHRINE BITARTRATE 0.06 MG/ML
INJECTION, SOLUTION INTRAVENOUS
Status: COMPLETED
Start: 2024-03-06 | End: 2024-03-06

## 2024-03-06 RX ORDER — IPRATROPIUM BROMIDE AND ALBUTEROL SULFATE 2.5; .5 MG/3ML; MG/3ML
1 SOLUTION RESPIRATORY (INHALATION) ONCE
Status: COMPLETED | OUTPATIENT
Start: 2024-03-06 | End: 2024-03-06

## 2024-03-06 RX ORDER — ATORVASTATIN CALCIUM 40 MG/1
40 TABLET, FILM COATED ORAL NIGHTLY
Status: DISCONTINUED | OUTPATIENT
Start: 2024-03-07 | End: 2024-04-06

## 2024-03-06 RX ORDER — FAMOTIDINE 20 MG/1
20 TABLET, FILM COATED ORAL 2 TIMES DAILY
Status: DISCONTINUED | OUTPATIENT
Start: 2024-03-06 | End: 2024-03-07

## 2024-03-06 RX ORDER — EPINEPHRINE 1 MG/ML
INJECTION, SOLUTION, CONCENTRATE INTRAVENOUS PRN
Status: DISCONTINUED | OUTPATIENT
Start: 2024-03-06 | End: 2024-03-07 | Stop reason: SDUPTHER

## 2024-03-06 RX ORDER — MORPHINE SULFATE 2 MG/ML
2 INJECTION, SOLUTION INTRAMUSCULAR; INTRAVENOUS
Status: DISCONTINUED | OUTPATIENT
Start: 2024-03-06 | End: 2024-04-05

## 2024-03-06 RX ORDER — AMIODARONE HYDROCHLORIDE 200 MG/1
200 TABLET ORAL 2 TIMES DAILY
Status: DISCONTINUED | OUTPATIENT
Start: 2024-03-06 | End: 2024-03-08

## 2024-03-06 RX ORDER — ROCURONIUM BROMIDE 10 MG/ML
INJECTION, SOLUTION INTRAVENOUS PRN
Status: DISCONTINUED | OUTPATIENT
Start: 2024-03-06 | End: 2024-03-06 | Stop reason: SDUPTHER

## 2024-03-06 RX ORDER — HYDRALAZINE HYDROCHLORIDE 20 MG/ML
5 INJECTION INTRAMUSCULAR; INTRAVENOUS EVERY 5 MIN PRN
Status: DISCONTINUED | OUTPATIENT
Start: 2024-03-06 | End: 2024-04-06

## 2024-03-06 RX ORDER — ALBUTEROL SULFATE 90 UG/1
2 AEROSOL, METERED RESPIRATORY (INHALATION) EVERY 6 HOURS PRN
Status: DISCONTINUED | OUTPATIENT
Start: 2024-03-06 | End: 2024-03-09

## 2024-03-06 RX ORDER — CEFAZOLIN SODIUM 1 G/3ML
INJECTION, POWDER, FOR SOLUTION INTRAMUSCULAR; INTRAVENOUS PRN
Status: DISCONTINUED | OUTPATIENT
Start: 2024-03-06 | End: 2024-03-06 | Stop reason: SDUPTHER

## 2024-03-06 RX ORDER — METOPROLOL TARTRATE 1 MG/ML
2.5 INJECTION, SOLUTION INTRAVENOUS EVERY 10 MIN PRN
Status: DISCONTINUED | OUTPATIENT
Start: 2024-03-06 | End: 2024-04-06

## 2024-03-06 RX ORDER — SODIUM CHLORIDE 0.9 % (FLUSH) 0.9 %
5-40 SYRINGE (ML) INJECTION EVERY 12 HOURS SCHEDULED
Status: DISCONTINUED | OUTPATIENT
Start: 2024-03-06 | End: 2024-03-06 | Stop reason: HOSPADM

## 2024-03-06 RX ORDER — PROTAMINE SULFATE 10 MG/ML
50 INJECTION, SOLUTION INTRAVENOUS
Status: DISCONTINUED | OUTPATIENT
Start: 2024-03-06 | End: 2024-03-06 | Stop reason: SDUPTHER

## 2024-03-06 RX ORDER — SODIUM CHLORIDE 0.9 % (FLUSH) 0.9 %
5-40 SYRINGE (ML) INJECTION EVERY 12 HOURS SCHEDULED
Status: DISCONTINUED | OUTPATIENT
Start: 2024-03-06 | End: 2024-04-06

## 2024-03-06 RX ORDER — PHENYLEPHRINE HYDROCHLORIDE 10 MG/ML
INJECTION INTRAVENOUS PRN
Status: DISCONTINUED | OUTPATIENT
Start: 2024-03-06 | End: 2024-03-06

## 2024-03-06 RX ORDER — DEXTROSE MONOHYDRATE 25 G/50ML
50 INJECTION, SOLUTION INTRAVENOUS ONCE
Status: COMPLETED | OUTPATIENT
Start: 2024-03-06 | End: 2024-03-06

## 2024-03-06 RX ORDER — SODIUM CHLORIDE 9 MG/ML
INJECTION, SOLUTION INTRAVENOUS CONTINUOUS
Status: DISCONTINUED | OUTPATIENT
Start: 2024-03-06 | End: 2024-03-14

## 2024-03-06 RX ORDER — DEXTROSE MONOHYDRATE 25 G/50ML
INJECTION, SOLUTION INTRAVENOUS
Status: COMPLETED
Start: 2024-03-06 | End: 2024-03-06

## 2024-03-06 RX ORDER — ROCURONIUM BROMIDE 10 MG/ML
INJECTION, SOLUTION INTRAVENOUS PRN
Status: DISCONTINUED | OUTPATIENT
Start: 2024-03-06 | End: 2024-03-07 | Stop reason: SDUPTHER

## 2024-03-06 RX ORDER — 0.9 % SODIUM CHLORIDE 0.9 %
500 INTRAVENOUS SOLUTION INTRAVENOUS CONTINUOUS PRN
Status: DISCONTINUED | OUTPATIENT
Start: 2024-03-06 | End: 2024-04-06

## 2024-03-06 RX ORDER — ONDANSETRON 2 MG/ML
4 INJECTION INTRAMUSCULAR; INTRAVENOUS EVERY 6 HOURS PRN
Status: DISCONTINUED | OUTPATIENT
Start: 2024-03-06 | End: 2024-03-19 | Stop reason: SDUPTHER

## 2024-03-06 RX ORDER — SODIUM CHLORIDE 0.9 % (FLUSH) 0.9 %
5-40 SYRINGE (ML) INJECTION PRN
Status: DISCONTINUED | OUTPATIENT
Start: 2024-03-06 | End: 2024-04-06

## 2024-03-06 RX ORDER — ONDANSETRON 2 MG/ML
INJECTION INTRAMUSCULAR; INTRAVENOUS PRN
Status: DISCONTINUED | OUTPATIENT
Start: 2024-03-06 | End: 2024-03-06 | Stop reason: SDUPTHER

## 2024-03-06 RX ORDER — FENTANYL CITRATE 50 UG/ML
INJECTION, SOLUTION INTRAMUSCULAR; INTRAVENOUS PRN
Status: DISCONTINUED | OUTPATIENT
Start: 2024-03-06 | End: 2024-03-06 | Stop reason: SDUPTHER

## 2024-03-06 RX ORDER — ONDANSETRON 4 MG/1
4 TABLET, ORALLY DISINTEGRATING ORAL EVERY 8 HOURS PRN
Status: DISCONTINUED | OUTPATIENT
Start: 2024-03-06 | End: 2024-03-19 | Stop reason: SDUPTHER

## 2024-03-06 RX ORDER — MULTIVITAMIN WITH IRON
1 TABLET ORAL
Status: DISCONTINUED | OUTPATIENT
Start: 2024-03-07 | End: 2024-04-06

## 2024-03-06 RX ORDER — GLUCAGON 1 MG/ML
1 KIT INJECTION PRN
Status: DISCONTINUED | OUTPATIENT
Start: 2024-03-06 | End: 2024-03-19 | Stop reason: SDUPTHER

## 2024-03-06 RX ORDER — PROTAMINE SULFATE 10 MG/ML
INJECTION, SOLUTION INTRAVENOUS PRN
Status: DISCONTINUED | OUTPATIENT
Start: 2024-03-06 | End: 2024-03-06 | Stop reason: SDUPTHER

## 2024-03-06 RX ORDER — DEXTROSE MONOHYDRATE 100 MG/ML
INJECTION, SOLUTION INTRAVENOUS CONTINUOUS PRN
Status: DISCONTINUED | OUTPATIENT
Start: 2024-03-06 | End: 2024-03-19 | Stop reason: SDUPTHER

## 2024-03-06 RX ORDER — DEXAMETHASONE SODIUM PHOSPHATE 10 MG/ML
INJECTION, EMULSION INTRAMUSCULAR; INTRAVENOUS PRN
Status: DISCONTINUED | OUTPATIENT
Start: 2024-03-06 | End: 2024-03-06 | Stop reason: SDUPTHER

## 2024-03-06 RX ORDER — MILRINONE LACTATE 0.2 MG/ML
0.25 INJECTION, SOLUTION INTRAVENOUS CONTINUOUS
Status: DISCONTINUED | OUTPATIENT
Start: 2024-03-06 | End: 2024-03-07

## 2024-03-06 RX ORDER — PROPOFOL 10 MG/ML
INJECTION, EMULSION INTRAVENOUS PRN
Status: DISCONTINUED | OUTPATIENT
Start: 2024-03-06 | End: 2024-03-06 | Stop reason: SDUPTHER

## 2024-03-06 RX ORDER — SODIUM CHLORIDE 9 MG/ML
INJECTION, SOLUTION INTRAVENOUS CONTINUOUS
Status: DISCONTINUED | OUTPATIENT
Start: 2024-03-06 | End: 2024-04-06

## 2024-03-06 RX ORDER — SODIUM CHLORIDE 9 MG/ML
INJECTION, SOLUTION INTRAVENOUS PRN
Status: DISCONTINUED | OUTPATIENT
Start: 2024-03-06 | End: 2024-03-08

## 2024-03-06 RX ORDER — ACETAMINOPHEN 325 MG/1
650 TABLET ORAL EVERY 4 HOURS PRN
Status: DISCONTINUED | OUTPATIENT
Start: 2024-03-06 | End: 2024-04-06

## 2024-03-06 RX ORDER — SODIUM CHLORIDE, SODIUM GLUCONATE, SODIUM ACETATE, POTASSIUM CHLORIDE AND MAGNESIUM CHLORIDE 526; 502; 368; 37; 30 MG/100ML; MG/100ML; MG/100ML; MG/100ML; MG/100ML
INJECTION, SOLUTION INTRAVENOUS CONTINUOUS PRN
Status: DISCONTINUED | OUTPATIENT
Start: 2024-03-06 | End: 2024-03-06 | Stop reason: SDUPTHER

## 2024-03-06 RX ORDER — SODIUM CHLORIDE 9 MG/ML
INJECTION, SOLUTION INTRAVENOUS PRN
Status: DISCONTINUED | OUTPATIENT
Start: 2024-03-06 | End: 2024-03-06 | Stop reason: HOSPADM

## 2024-03-06 RX ORDER — HEPARIN SODIUM 1000 [USP'U]/ML
INJECTION, SOLUTION INTRAVENOUS; SUBCUTANEOUS PRN
Status: DISCONTINUED | OUTPATIENT
Start: 2024-03-06 | End: 2024-03-06 | Stop reason: SDUPTHER

## 2024-03-06 RX ADMIN — PROTAMINE SULFATE 25 MG: 10 INJECTION, SOLUTION INTRAVENOUS at 15:53

## 2024-03-06 RX ADMIN — SODIUM CHLORIDE 2.5 MG/HR: 9 INJECTION, SOLUTION INTRAVENOUS at 15:07

## 2024-03-06 RX ADMIN — Medication 10 UNITS: at 09:23

## 2024-03-06 RX ADMIN — PHENYLEPHRINE HYDROCHLORIDE 200 MCG: 10 INJECTION INTRAVENOUS at 22:43

## 2024-03-06 RX ADMIN — EPINEPHRINE 20 MCG: 1 INJECTION, SOLUTION, CONCENTRATE INTRAVENOUS at 23:00

## 2024-03-06 RX ADMIN — Medication 60 MG: at 07:40

## 2024-03-06 RX ADMIN — DEXTROSE MONOHYDRATE 50 G: 25 INJECTION, SOLUTION INTRAVENOUS at 21:10

## 2024-03-06 RX ADMIN — PHENYLEPHRINE HYDROCHLORIDE 300 MCG: 10 INJECTION INTRAVENOUS at 23:32

## 2024-03-06 RX ADMIN — SODIUM CHLORIDE, PRESERVATIVE FREE 10 ML: 5 INJECTION INTRAVENOUS at 22:00

## 2024-03-06 RX ADMIN — AMINOCAPROIC ACID 5000 MG: 250 INJECTION, SOLUTION INTRAVENOUS at 08:04

## 2024-03-06 RX ADMIN — SODIUM CHLORIDE, SODIUM GLUCONATE, SODIUM ACETATE, POTASSIUM CHLORIDE AND MAGNESIUM CHLORIDE: 526; 502; 368; 37; 30 INJECTION, SOLUTION INTRAVENOUS at 22:24

## 2024-03-06 RX ADMIN — Medication 5 MCG/MIN: at 22:05

## 2024-03-06 RX ADMIN — PHENYLEPHRINE HYDROCHLORIDE 200 MCG: 10 INJECTION INTRAVENOUS at 23:34

## 2024-03-06 RX ADMIN — AMINOCAPROIC ACID 5000 MG: 250 INJECTION, SOLUTION INTRAVENOUS at 12:17

## 2024-03-06 RX ADMIN — PHENYLEPHRINE HYDROCHLORIDE 300 MCG: 10 INJECTION INTRAVENOUS at 23:02

## 2024-03-06 RX ADMIN — ROCURONIUM BROMIDE 50 MG: 10 INJECTION INTRAVENOUS at 22:34

## 2024-03-06 RX ADMIN — CEFAZOLIN 2 G: 1 INJECTION, POWDER, FOR SOLUTION INTRAMUSCULAR; INTRAVENOUS at 12:20

## 2024-03-06 RX ADMIN — SODIUM CHLORIDE: 9 INJECTION, SOLUTION INTRAVENOUS at 13:59

## 2024-03-06 RX ADMIN — FENTANYL CITRATE 150 MCG: 50 INJECTION INTRAMUSCULAR; INTRAVENOUS at 08:45

## 2024-03-06 RX ADMIN — VANCOMYCIN HYDROCHLORIDE 1000 MG: 1 INJECTION, POWDER, LYOPHILIZED, FOR SOLUTION INTRAVENOUS at 07:45

## 2024-03-06 RX ADMIN — AMINOCAPROIC ACID 1 G/HR: 250 INJECTION, SOLUTION INTRAVENOUS at 13:01

## 2024-03-06 RX ADMIN — WATER 2000 MG: 1 INJECTION INTRAMUSCULAR; INTRAVENOUS; SUBCUTANEOUS at 20:03

## 2024-03-06 RX ADMIN — PHENYLEPHRINE HYDROCHLORIDE 200 MCG: 10 INJECTION INTRAVENOUS at 23:05

## 2024-03-06 RX ADMIN — SODIUM BICARBONATE 50 MEQ: 84 INJECTION, SOLUTION INTRAVENOUS at 20:48

## 2024-03-06 RX ADMIN — MIDAZOLAM 3 MG: 1 INJECTION INTRAMUSCULAR; INTRAVENOUS at 11:34

## 2024-03-06 RX ADMIN — PROTAMINE SULFATE 120 MG: 10 INJECTION, SOLUTION INTRAVENOUS at 12:09

## 2024-03-06 RX ADMIN — PHENYLEPHRINE HYDROCHLORIDE 300 MCG: 10 INJECTION INTRAVENOUS at 23:25

## 2024-03-06 RX ADMIN — ONDANSETRON 4 MG: 2 INJECTION INTRAMUSCULAR; INTRAVENOUS at 08:04

## 2024-03-06 RX ADMIN — PHENYLEPHRINE HYDROCHLORIDE 300 MCG: 10 INJECTION INTRAVENOUS at 22:47

## 2024-03-06 RX ADMIN — IPRATROPIUM BROMIDE AND ALBUTEROL SULFATE 1 DOSE: .5; 3 SOLUTION RESPIRATORY (INHALATION) at 07:16

## 2024-03-06 RX ADMIN — EPINEPHRINE 4 MCG/MIN: 1 INJECTION INTRAMUSCULAR; INTRAVENOUS; SUBCUTANEOUS at 11:57

## 2024-03-06 RX ADMIN — SODIUM CHLORIDE: 9 INJECTION, SOLUTION INTRAVENOUS at 07:19

## 2024-03-06 RX ADMIN — Medication 10 UNITS: at 08:27

## 2024-03-06 RX ADMIN — PHENYLEPHRINE HYDROCHLORIDE 200 MCG: 10 INJECTION INTRAVENOUS at 23:12

## 2024-03-06 RX ADMIN — MIDAZOLAM 2 MG: 1 INJECTION INTRAMUSCULAR; INTRAVENOUS at 07:33

## 2024-03-06 RX ADMIN — SODIUM BICARBONATE 50 MEQ: 84 INJECTION, SOLUTION INTRAVENOUS at 20:53

## 2024-03-06 RX ADMIN — PHENYLEPHRINE HYDROCHLORIDE 300 MCG: 10 INJECTION INTRAVENOUS at 22:53

## 2024-03-06 RX ADMIN — SODIUM CHLORIDE 500 ML: 9 INJECTION, SOLUTION INTRAVENOUS at 13:59

## 2024-03-06 RX ADMIN — PROPOFOL 120 MG: 10 INJECTION, EMULSION INTRAVENOUS at 07:40

## 2024-03-06 RX ADMIN — PHENYLEPHRINE HYDROCHLORIDE 300 MCG: 10 INJECTION INTRAVENOUS at 23:27

## 2024-03-06 RX ADMIN — PHENYLEPHRINE HYDROCHLORIDE 200 MCG: 10 INJECTION INTRAVENOUS at 22:37

## 2024-03-06 RX ADMIN — ROCURONIUM BROMIDE 100 MG: 10 INJECTION INTRAVENOUS at 07:40

## 2024-03-06 RX ADMIN — EPINEPHRINE 20 MCG: 1 INJECTION, SOLUTION, CONCENTRATE INTRAVENOUS at 23:05

## 2024-03-06 RX ADMIN — PHENYLEPHRINE HYDROCHLORIDE 200 MCG: 10 INJECTION INTRAVENOUS at 23:35

## 2024-03-06 RX ADMIN — HEPARIN SODIUM 22000 UNITS: 1000 INJECTION INTRAVENOUS; SUBCUTANEOUS at 09:12

## 2024-03-06 RX ADMIN — ALBUMIN (HUMAN) 12.5 G: 12.5 INJECTION, SOLUTION INTRAVENOUS at 22:46

## 2024-03-06 RX ADMIN — Medication 2 MCG/MIN: at 19:57

## 2024-03-06 RX ADMIN — Medication 2000 MG: at 08:39

## 2024-03-06 RX ADMIN — NOREPINEPHRINE BITARTRATE 5 MCG/MIN: 0.06 INJECTION, SOLUTION INTRAVENOUS at 22:05

## 2024-03-06 RX ADMIN — SODIUM CHLORIDE 1.1 UNITS/HR: 9 INJECTION, SOLUTION INTRAVENOUS at 15:24

## 2024-03-06 RX ADMIN — SODIUM BICARBONATE 50 MEQ: 84 INJECTION, SOLUTION INTRAVENOUS at 22:48

## 2024-03-06 RX ADMIN — Medication 6 UNITS/HR: at 08:27

## 2024-03-06 RX ADMIN — SODIUM BICARBONATE 50 MEQ: 84 INJECTION, SOLUTION INTRAVENOUS at 22:02

## 2024-03-06 RX ADMIN — PHENYLEPHRINE HYDROCHLORIDE 300 MCG: 10 INJECTION INTRAVENOUS at 23:16

## 2024-03-06 RX ADMIN — EPINEPHRINE 20 MCG: 1 INJECTION, SOLUTION, CONCENTRATE INTRAVENOUS at 23:22

## 2024-03-06 RX ADMIN — DEXAMETHASONE SODIUM PHOSPHATE 10 MG: 10 INJECTION, EMULSION INTRAMUSCULAR; INTRAVENOUS at 08:04

## 2024-03-06 RX ADMIN — ROCURONIUM BROMIDE 100 MG: 10 INJECTION INTRAVENOUS at 09:48

## 2024-03-06 RX ADMIN — SODIUM BICARBONATE 50 MEQ: 84 INJECTION, SOLUTION INTRAVENOUS at 22:43

## 2024-03-06 RX ADMIN — PHENYLEPHRINE HYDROCHLORIDE 300 MCG: 10 INJECTION INTRAVENOUS at 23:20

## 2024-03-06 RX ADMIN — PHENYLEPHRINE HYDROCHLORIDE 300 MCG: 10 INJECTION INTRAVENOUS at 23:43

## 2024-03-06 RX ADMIN — SODIUM CHLORIDE, SODIUM GLUCONATE, SODIUM ACETATE, POTASSIUM CHLORIDE AND MAGNESIUM CHLORIDE: 526; 502; 368; 37; 30 INJECTION, SOLUTION INTRAVENOUS at 08:04

## 2024-03-06 RX ADMIN — MILRINONE LACTATE IN DEXTROSE 0.25 MCG/KG/MIN: 200 INJECTION, SOLUTION INTRAVENOUS at 20:00

## 2024-03-06 RX ADMIN — PHENYLEPHRINE HYDROCHLORIDE 300 MCG: 10 INJECTION INTRAVENOUS at 22:45

## 2024-03-06 RX ADMIN — PHENYLEPHRINE HYDROCHLORIDE 300 MCG: 10 INJECTION INTRAVENOUS at 22:59

## 2024-03-06 RX ADMIN — PHENYLEPHRINE HYDROCHLORIDE 200 MCG: 10 INJECTION INTRAVENOUS at 23:29

## 2024-03-06 RX ADMIN — FAMOTIDINE 20 MG: 20 TABLET, FILM COATED ORAL at 22:00

## 2024-03-06 RX ADMIN — PHENYLEPHRINE HYDROCHLORIDE 100 MCG: 10 INJECTION INTRAVENOUS at 22:41

## 2024-03-06 RX ADMIN — PHENYLEPHRINE HYDROCHLORIDE 200 MCG: 10 INJECTION INTRAVENOUS at 23:23

## 2024-03-06 RX ADMIN — MORPHINE SULFATE 2 MG: 2 INJECTION, SOLUTION INTRAMUSCULAR; INTRAVENOUS at 15:07

## 2024-03-06 RX ADMIN — FENTANYL CITRATE 100 MCG: 50 INJECTION INTRAMUSCULAR; INTRAVENOUS at 08:58

## 2024-03-06 RX ADMIN — SODIUM BICARBONATE 50 MEQ: 84 INJECTION, SOLUTION INTRAVENOUS at 22:01

## 2024-03-06 RX ADMIN — PHENYLEPHRINE HYDROCHLORIDE 300 MCG: 10 INJECTION INTRAVENOUS at 22:49

## 2024-03-06 ASSESSMENT — PAIN SCALES - GENERAL: PAINLEVEL_OUTOF10: 0

## 2024-03-06 ASSESSMENT — PULMONARY FUNCTION TESTS
PIF_VALUE: 20
PIF_VALUE: 20
PIF_VALUE: 16

## 2024-03-06 ASSESSMENT — LIFESTYLE VARIABLES: SMOKING_STATUS: 0

## 2024-03-06 NOTE — H&P
complications were associated with this study.   Documented by Cinthya Villalobos MD - 1/18/2024  3:33 PM         Coronary Findings     Diagnostic  Dominance: Right  Left Anterior Descending   Prox LAD to Mid LAD lesion, 40% stenosed.      Left Circumflex   Ost Cx to Dist Cx lesion, 30% stenosed. The lesion was previously treated using a stent (unknown type).   Dist Cx lesion, 80% stenosed.      Right Coronary Artery   Ost RCA to Prox RCA lesion, 80% stenosed. The lesion was previously treated using a stent (unknown type).   Dist RCA lesion, 90% stenosed with 90% stenosed side branch in RPDA. The lesion was previously treated using a stent (unknown type).      Intervention      No interventions have been documented.      Left Heart     Left Ventricle The left ventricular size is normal. There is mild left ventricular systolic dysfunction. The estimated EF = 45 - 50%.      Link to printable coronary/vascular diagram report     Coronary/Vascular Diagrams      Coronary Diagram     Diagnostic  Dominance: Right    Intervention           Pressures Rest       Systolic (mmHg) Diastolic (mmHg) Mean (mmHg) EDP (mmHg)       59    78        LV 83    1      1        94    1      1         Sedation Time     Moderate conscious sedation of 30 minutes 56 seconds was performed by an independent provider giving the medication per my discretion and monitoring the vital signs throughout the case.     Indications and Appropriate Use     Bleeding Risk Calculator     Bleeding Risk points = 0.      Contrast Administration     Contrast: Isovue. Contrast concentration: 370. Amount: 50 mL.      Radiation Exposure         Event Details User   1:33 PM 1/18/24 Radiation Tracking Panel 1: Cinthya Villalobos MD  Invasive Radiation (mGy) = 120.800; Fluoro Time (min) = 2.2; DAP (Gy-cm2) = 47.400 BW      Estimated Blood Loss     5 mL  Specimen Collection     Order Name Source Comment Collection Info Order Time   CBC Blood     1/18/2024 10:59

## 2024-03-06 NOTE — ANESTHESIA PROCEDURE NOTES
Arterial Line:    An arterial line was placed using ultrasound guidance, in the OR for the following indication(s): continuous blood pressure monitoring and blood sampling needed.    A 20 gauge (size) (length), Arrow (type) catheter was placed, Seldinger technique used, into the left radial artery, secured by tape and Tegaderm and Steristrips.  Anesthesia type: General    Events:  patient tolerated procedure well with no complications and EBL < 5mL.3/6/2024 7:40 AM3/6/2024 7:45 AM  Anesthesiologist: Jarrell Shahid DO  Resident/CRNA: Timothy Morgan, APRN - CRNA  Performed: Anesthesiologist   Preanesthetic Checklist  Completed: patient identified, IV checked, site marked, risks and benefits discussed, surgical/procedural consents, equipment checked, pre-op evaluation, timeout performed, anesthesia consent given, oxygen available, monitors applied/VS acknowledged, fire risk safety assessment completed and verbalized and blood product R/B/A discussed and consented          
Procedure Performed: LAKSHMI       Start Time:  3/6/2024 8:03 AM       End Time:   3/6/2024 8:18 AM    Preanesthesia Checklist:  Patient identified, IV assessed, risks and benefits discussed, monitors and equipment assessed, procedure being performed at surgeon's request and anesthesia consent obtained.    General Procedure Information  Diagnostic Indications for Echo:  assessment of ascending aorta, assessment of surgical repair, defect repair evaluation, hemodynamic monitoring and assessment of valve function  Physician Requesting Echo: Manish Bess MD  CPT Code:  37033 [id number]  Location performed:  OR  Intubated  Bite block placed  Heart visualized  Probe Insertion:  Easy  Probe Type:  3D  Modalities:  2D, 3D, color flow mapping, continuous wave Doppler and pulse wave Doppler    Echocardiographic and Doppler Measurements    Ventricles    Right Ventricle:  Cavity size normal.    Left Ventricle:  Cavity size normal.  Global Function normal.  Ejection Fraction 55%.      Ventricular Regional Function:  1- Basal Anteroseptal:  normal  2- Basal Anterior:  normal  3- Basal Anterolateral:  normal  4- Basal Inferolateral:  normal  5- Basal Inferior:  normal  6- Basal Inferoseptal:  normal  7- Mid Anteroseptal:  normal  8- Mid Anterior:  normal  9- Mid Anterolateral:  normal  10- Mid Inferolateral:  normal  11- Mid Inferior:  normal  12- Mid Inferoseptal:  normal  13- Apical Anterior:  normal  14- Apical Lateral:  normal  15- Apical Inferior:  normal  16- Apical Septal:  normal  17- [place]:  normal      Valves    Aortic Valve:  Annulus normal.  Stenosis not present.  Regurgitation none.      Mitral Valve:  Annulus normal.  Stenosis not present.  Regurgitation moderate.      Tricuspid Valve:  Annulus normal.    Pulmonic Valve:  Annulus normal.        Aorta    Ascending Aorta:  Size normal.    Aortic Arch:  Size normal.        Atria    Right Atrium:  Size normal.    Left Atrium:  Size normal.  Left atrial appendage 
CRNA  Performed by: Timothy Morgan APRN - CRNA  Authorized by: Jarrell Shahid DO    Preanesthetic Checklist  Completed: patient identified, IV checked, site marked, risks and benefits discussed, surgical/procedural consents, equipment checked, pre-op evaluation, timeout performed, anesthesia consent given, oxygen available, monitors applied/VS acknowledged, fire risk safety assessment completed and verbalized and blood product R/B/A discussed and consented

## 2024-03-06 NOTE — ANESTHESIA PRE PROCEDURE
15 UNITS UNDER THE SKIN IN THE MORNING AND 16 UNITS AT NIGHT 11/27/23   Ignacio Blackman MD   Glucagon (GVOKE HYPOPEN 2-PACK) 1 MG/0.2ML SOAJ Inject for emergency treatment of hypoglycemia. 11/10/23   Ignacio Blackman MD   hydrALAZINE (APRESOLINE) 25 MG tablet Take 1 tablet by mouth every 8 hours  Patient taking differently: Take 1 tablet by mouth 3 times daily 11/2/23   Fanta Elmore MD   metoclopramide (REGLAN) 5 MG tablet Take 1 tablet by mouth 4 times daily (before meals and nightly)  Patient taking differently: Take 1 tablet by mouth in the morning and at bedtime Pt wife is only taking 2 times a day am and noon 11/2/23   Fanta Elmore MD   albuterol sulfate HFA (PROVENTIL;VENTOLIN;PROAIR) 108 (90 Base) MCG/ACT inhaler Inhale 2 puffs into the lungs every 6 hours as needed for Wheezing  Patient not taking: Reported on 3/1/2024 11/2/23   Fanta Elmore MD   Turmeric (QC TUMERIC COMPLEX PO) Take 500 mg by mouth daily    ProviderMalachi MD   buPROPion (WELLBUTRIN XL) 150 MG extended release tablet Take 1 tablet by mouth daily 9/7/23   Provider, MD Malachi   irbesartan (AVAPRO) 300 MG tablet TAKE 1 TABLET BY MOUTH ONCE DAILY 7/13/23   Cinthya Villalobos MD   blood glucose test strips (ASCENSIA AUTODISC VI;ONE TOUCH ULTRA TEST VI) strip 1 each by In Vitro route in the morning, at noon, and at bedtime Test 3 times daily and As needed. One Touch Verio or strips/ meter that are covered by his insurance 6/26/23   Ignacio Blackman MD   Blood Glucose Monitoring Suppl ([employer] BLOOD GLUCOSE METER) w/Device KIT 1 each by Does not apply route once for 1 dose One Touch Verio meter or meter/ strips that are covered by his insurance 6/26/23 1/4/24  Ignacio Blackman MD   clopidogrel (PLAVIX) 75 MG tablet Take 1 tablet by mouth daily 6/5/23   Cinthya Villalobos MD   apixaban (ELIQUIS) 5 MG TABS tablet Take 1 tablet by mouth 2 times daily 6/5/23   Cinthya Villalobos MD   atorvastatin (LIPITOR) 80 MG tablet

## 2024-03-07 ENCOUNTER — APPOINTMENT (OUTPATIENT)
Dept: GENERAL RADIOLOGY | Age: 69
DRG: 003 | End: 2024-03-07
Attending: THORACIC SURGERY (CARDIOTHORACIC VASCULAR SURGERY)
Payer: MEDICARE

## 2024-03-07 ENCOUNTER — APPOINTMENT (OUTPATIENT)
Age: 69
DRG: 003 | End: 2024-03-07
Attending: THORACIC SURGERY (CARDIOTHORACIC VASCULAR SURGERY)
Payer: MEDICARE

## 2024-03-07 ENCOUNTER — ANESTHESIA (OUTPATIENT)
Dept: OPERATING ROOM | Age: 69
End: 2024-03-07
Payer: MEDICARE

## 2024-03-07 ENCOUNTER — ANESTHESIA EVENT (OUTPATIENT)
Dept: OPERATING ROOM | Age: 69
End: 2024-03-07
Payer: MEDICARE

## 2024-03-07 PROBLEM — Z98.890 S/P MITRAL VALVE REPAIR: Status: ACTIVE | Noted: 2024-03-07

## 2024-03-07 LAB
ACTIVATED CLOTTING TIME: 150 SECONDS (ref 99–130)
ACTIVATED CLOTTING TIME: 153 SECONDS (ref 99–130)
ACTIVATED CLOTTING TIME: 153 SECONDS (ref 99–130)
ACTIVATED CLOTTING TIME: 493 SECONDS (ref 99–130)
ANION GAP SERPL CALC-SCNC: 22 MEQ/L (ref 8–16)
ANION GAP SERPL CALC-SCNC: 24 MEQ/L (ref 8–16)
ANION GAP SERPL CALC-SCNC: 24 MEQ/L (ref 8–16)
ANION GAP SERPL CALC-SCNC: 25 MEQ/L (ref 8–16)
APTT PPP: 68.2 SECONDS (ref 22–38)
ARTERIAL PATENCY WRIST A: ABNORMAL
ARTERIAL PATENCY WRIST A: ABNORMAL
BASE EXCESS BLDA CALC-SCNC: -1.2 MMOL/L (ref -2.5–2.5)
BASE EXCESS BLDA CALC-SCNC: -11.4 MMOL/L (ref -2–3)
BASE EXCESS BLDA CALC-SCNC: -12.2 MMOL/L (ref -2–3)
BASE EXCESS BLDA CALC-SCNC: -6.1 MMOL/L (ref -2.5–2.5)
BASE EXCESS BLDA CALC-SCNC: -6.6 MMOL/L (ref -2–3)
BASE EXCESS BLDA CALC-SCNC: -6.9 MMOL/L (ref -2–3)
BASE EXCESS BLDA CALC-SCNC: -8.9 MMOL/L (ref -2.5–2.5)
BASE EXCESS BLDA CALC-SCNC: -9.3 MMOL/L (ref -2.5–2.5)
BASE EXCESS BLDA CALC-SCNC: -9.3 MMOL/L (ref -2.5–2.5)
BASOPHILS ABSOLUTE: 0 THOU/MM3 (ref 0–0.1)
BASOPHILS NFR BLD AUTO: 0.1 %
BDY SITE: ABNORMAL
BREATHS SETTING VENT: 12 BPM
BREATHS SETTING VENT: 12 BPM
BREATHS SETTING VENT: 20 BPM
BUN SERPL-MCNC: 21 MG/DL (ref 7–22)
BUN SERPL-MCNC: 25 MG/DL (ref 7–22)
BUN SERPL-MCNC: 31 MG/DL (ref 7–22)
BUN SERPL-MCNC: 32 MG/DL (ref 7–22)
CA-I BLD ISE-SCNC: 0.81 MMOL/L (ref 1.12–1.32)
CA-I BLD ISE-SCNC: 1.05 MMOL/L (ref 1.12–1.32)
CA-I BLD ISE-SCNC: 1.1 MMOL/L (ref 1.12–1.32)
CA-I BLD ISE-SCNC: 1.1 MMOL/L (ref 1.12–1.32)
CA-I BLD ISE-SCNC: 1.14 MMOL/L (ref 1.12–1.32)
CALCIUM SERPL-MCNC: 7.2 MG/DL (ref 8.5–10.5)
CALCIUM SERPL-MCNC: 8.1 MG/DL (ref 8.5–10.5)
CALCIUM SERPL-MCNC: 9 MG/DL (ref 8.5–10.5)
CALCIUM SERPL-MCNC: 9.6 MG/DL (ref 8.5–10.5)
CFT BLD TEG: 3.1 MINUTES (ref 0.8–2.1)
CHLORIDE SERPL-SCNC: 106 MEQ/L (ref 98–111)
CHLORIDE SERPL-SCNC: 108 MEQ/L (ref 98–111)
CHLORIDE SERPL-SCNC: 110 MEQ/L (ref 98–111)
CHLORIDE SERPL-SCNC: 111 MEQ/L (ref 98–111)
CLOT ANGLE BLD TEG: 17.8 MM (ref 15–32)
CLOT ANGLE BLD TEG: 53.3 DEG (ref 63–78)
CLOT INIT BLD TEG: 9.2 MINUTES (ref 4.6–9.1)
CLOT INIT P HPASE BLD TEG: 7.2 MINUTES (ref 4.3–8.3)
CO2 SERPL-SCNC: 17 MEQ/L (ref 23–33)
CO2 SERPL-SCNC: 19 MEQ/L (ref 23–33)
CO2 SERPL-SCNC: 22 MEQ/L (ref 23–33)
CO2 SERPL-SCNC: 22 MEQ/L (ref 23–33)
COLLECTED BY:: ABNORMAL
COMMENT: ABNORMAL
COMMENT: ABNORMAL
CREAT SERPL-MCNC: 1.7 MG/DL (ref 0.4–1.2)
CREAT SERPL-MCNC: 1.9 MG/DL (ref 0.4–1.2)
CREAT SERPL-MCNC: 2.6 MG/DL (ref 0.4–1.2)
CREAT SERPL-MCNC: 2.8 MG/DL (ref 0.4–1.2)
DEPRECATED RDW RBC AUTO: 53.7 FL (ref 35–45)
DEPRECATED RDW RBC AUTO: 54.2 FL (ref 35–45)
DEPRECATED RDW RBC AUTO: 55.7 FL (ref 35–45)
DEVICE: ABNORMAL
ECHO BSA: 1.69 M2
ECHO MV MAX VELOCITY: 1.1 M/S
ECHO MV MEAN GRADIENT: 3 MMHG
ECHO MV MEAN VELOCITY: 0.8 M/S
ECHO MV PEAK GRADIENT: 5 MMHG
ECHO MV VTI: 15 CM
EKG Q-T INTERVAL: 392 MS
EKG Q-T INTERVAL: 436 MS
EKG QRS DURATION: 118 MS
EKG QRS DURATION: 126 MS
EKG QTC CALCULATION (BAZETT): 554 MS
EKG QTC CALCULATION (BAZETT): 600 MS
EKG R AXIS: -19 DEGREES
EKG R AXIS: -23 DEGREES
EKG T AXIS: 113 DEGREES
EKG T AXIS: 131 DEGREES
EKG VENTRICULAR RATE: 114 BPM
EKG VENTRICULAR RATE: 120 BPM
EOSINOPHIL NFR BLD AUTO: 0 %
EOSINOPHILS ABSOLUTE: 0 THOU/MM3 (ref 0–0.4)
ERYTHROCYTE [DISTWIDTH] IN BLOOD BY AUTOMATED COUNT: 18.1 % (ref 11.5–14.5)
ERYTHROCYTE [DISTWIDTH] IN BLOOD BY AUTOMATED COUNT: 18.2 % (ref 11.5–14.5)
ERYTHROCYTE [DISTWIDTH] IN BLOOD BY AUTOMATED COUNT: 18.2 % (ref 11.5–14.5)
FIBRINOGEN PPP-MCNC: 125 MG/100ML (ref 155–475)
FIBRINOGEN PPP-MCNC: 159 MG/100ML (ref 155–475)
FIO2 ON VENT O2 ANALYZER: 40 %
FUNCT FIBRINOGEN LEVEL: 324.8 MG/DL (ref 278–581)
GFR SERPL CREATININE-BSD FRML MDRD: 23 ML/MIN/1.73M2
GFR SERPL CREATININE-BSD FRML MDRD: 26 ML/MIN/1.73M2
GFR SERPL CREATININE-BSD FRML MDRD: 37 ML/MIN/1.73M2
GFR SERPL CREATININE-BSD FRML MDRD: 43 ML/MIN/1.73M2
GLUCOSE BLD STRIP.AUTO-MCNC: 114 MG/DL (ref 70–108)
GLUCOSE BLD STRIP.AUTO-MCNC: 115 MG/DL (ref 70–108)
GLUCOSE BLD STRIP.AUTO-MCNC: 125 MG/DL (ref 70–108)
GLUCOSE BLD STRIP.AUTO-MCNC: 131 MG/DL (ref 70–108)
GLUCOSE BLD STRIP.AUTO-MCNC: 132 MG/DL (ref 70–108)
GLUCOSE BLD STRIP.AUTO-MCNC: 139 MG/DL (ref 70–108)
GLUCOSE BLD STRIP.AUTO-MCNC: 140 MG/DL (ref 70–108)
GLUCOSE BLD STRIP.AUTO-MCNC: 85 MG/DL (ref 70–108)
GLUCOSE BLD STRIP.AUTO-MCNC: 89 MG/DL (ref 70–108)
GLUCOSE BLD STRIP.AUTO-MCNC: 91 MG/DL (ref 70–108)
GLUCOSE BLD STRIP.AUTO-MCNC: 91 MG/DL (ref 70–108)
GLUCOSE BLD STRIP.AUTO-MCNC: 98 MG/DL (ref 70–108)
GLUCOSE BLD-MCNC: 127 MG/DL (ref 70–108)
GLUCOSE BLD-MCNC: 139 MG/DL (ref 70–108)
GLUCOSE BLD-MCNC: 66 MG/DL (ref 70–108)
GLUCOSE BLD-MCNC: 85 MG/DL (ref 70–108)
GLUCOSE BLD-MCNC: 86 MG/DL (ref 70–108)
GLUCOSE BLD-MCNC: 91 MG/DL (ref 70–108)
GLUCOSE BLD-MCNC: 98 MG/DL (ref 70–108)
GLUCOSE SERPL-MCNC: 121 MG/DL (ref 70–108)
GLUCOSE SERPL-MCNC: 144 MG/DL (ref 70–108)
GLUCOSE SERPL-MCNC: 150 MG/DL (ref 70–108)
GLUCOSE SERPL-MCNC: 80 MG/DL (ref 70–108)
HCO3 BLDA-SCNC: 13 MMOL/L (ref 23–28)
HCO3 BLDA-SCNC: 14 MMOL/L (ref 23–28)
HCO3 BLDA-SCNC: 16 MMOL/L (ref 23–28)
HCO3 BLDA-SCNC: 16 MMOL/L (ref 23–28)
HCO3 BLDA-SCNC: 18 MMOL/L (ref 23–28)
HCO3 BLDA-SCNC: 20 MMOL/L (ref 23–28)
HCO3 BLDA-SCNC: 23 MMOL/L (ref 23–28)
HCT VFR BLD AUTO: 25.4 % (ref 42–52)
HCT VFR BLD AUTO: 25.7 % (ref 42–52)
HCT VFR BLD AUTO: 27.3 % (ref 42–52)
HCT VFR BLD AUTO: 27.6 % (ref 42–52)
HCT VFR BLD AUTO: 28.5 % (ref 42–52)
HCT VFR BLD AUTO: 28.8 % (ref 42–52)
HEMOGLOBIN FINGERSTICK, POC: 6.9 G/DL (ref 14–18)
HEMOGLOBIN FINGERSTICK, POC: 8.2 G/DL (ref 14–18)
HGB BLD-MCNC: 8.4 GM/DL (ref 14–18)
HGB BLD-MCNC: 8.5 GM/DL (ref 14–18)
HGB BLD-MCNC: 9.2 GM/DL (ref 14–18)
HGB BLD-MCNC: 9.2 GM/DL (ref 14–18)
HGB BLD-MCNC: 9.3 GM/DL (ref 14–18)
HGB BLD-MCNC: 9.5 GM/DL (ref 14–18)
IMM GRANULOCYTES # BLD AUTO: 0.08 THOU/MM3 (ref 0–0.07)
IMM GRANULOCYTES NFR BLD AUTO: 0.7 %
INR PPP: 1.02 (ref 0.85–1.13)
LACTATE SERPL-SCNC: 10.9 MMOL/L (ref 0.5–2)
LACTATE SERPL-SCNC: 7.8 MMOL/L (ref 0.5–2)
LYMPHOCYTES ABSOLUTE: 0.9 THOU/MM3 (ref 1–4.8)
LYMPHOCYTES NFR BLD AUTO: 7.6 %
MAGNESIUM SERPL-MCNC: 2.4 MG/DL (ref 1.6–2.4)
MAGNESIUM SERPL-MCNC: 2.8 MG/DL (ref 1.6–2.4)
MAGNESIUM SERPL-MCNC: 3 MG/DL (ref 1.6–2.4)
MCF BLD TEG: 55.8 MM (ref 52–69)
MCF.EXTRINSIC BLD ROTEM: 54.2 MM (ref 52–70)
MCH RBC QN AUTO: 27.5 PG (ref 26–33)
MCH RBC QN AUTO: 27.5 PG (ref 26–33)
MCH RBC QN AUTO: 27.7 PG (ref 26–33)
MCHC RBC AUTO-ENTMCNC: 32.7 GM/DL (ref 32.2–35.5)
MCHC RBC AUTO-ENTMCNC: 33.3 GM/DL (ref 32.2–35.5)
MCHC RBC AUTO-ENTMCNC: 33.7 GM/DL (ref 32.2–35.5)
MCV RBC AUTO: 82.2 FL (ref 80–94)
MCV RBC AUTO: 82.6 FL (ref 80–94)
MCV RBC AUTO: 84.3 FL (ref 80–94)
MONOCYTES ABSOLUTE: 1 THOU/MM3 (ref 0.4–1.3)
MONOCYTES NFR BLD AUTO: 8.5 %
NEUTROPHILS NFR BLD AUTO: 83.1 %
NRBC BLD AUTO-RTO: 0 /100 WBC
PATIENT BOLUS: NORMAL
PCO2 BLDA: 26 MMHG (ref 35–45)
PCO2 BLDA: 30 MMHG (ref 35–45)
PCO2 BLDA: 33 MMHG (ref 35–45)
PCO2 BLDA: 34 MMHG (ref 35–45)
PCO2 BLDA: 41 MMHG (ref 35–45)
PCO2 TEMP ADJ BLDMV: 25 MMHG (ref 41–51)
PCO2 TEMP ADJ BLDMV: 30 MMHG (ref 41–51)
PCO2 TEMP ADJ BLDMV: 31 MMHG (ref 41–51)
PCO2 TEMP ADJ BLDMV: 45 MMHG (ref 41–51)
PEEP SETTING VENT: 10 MMHG
PH BLDA: 7.24 [PH] (ref 7.35–7.45)
PH BLDA: 7.3 [PH] (ref 7.35–7.45)
PH BLDA: 7.33 [PH] (ref 7.35–7.45)
PH BLDA: 7.43 [PH] (ref 7.35–7.45)
PH BLDA: 7.44 [PH] (ref 7.35–7.45)
PH BLDMV: 7.26 [PH] (ref 7.31–7.41)
PH BLDMV: 7.27 [PH] (ref 7.31–7.41)
PH BLDMV: 7.34 [PH] (ref 7.31–7.41)
PH BLDMV: 7.37 [PH] (ref 7.31–7.41)
PHOSPHATE SERPL-MCNC: 5.7 MG/DL (ref 2.4–4.7)
PIP: 12 CMH2O
PIP: 22 CMH2O
PIP: 24 CMH2O
PLATELET # BLD AUTO: 134 THOU/MM3 (ref 130–400)
PLATELET # BLD AUTO: 92 THOU/MM3 (ref 130–400)
PLATELET # BLD AUTO: 93 THOU/MM3 (ref 130–400)
PMV BLD AUTO: 10.3 FL (ref 9.4–12.4)
PMV BLD AUTO: 11.9 FL (ref 9.4–12.4)
PMV BLD AUTO: 9.5 FL (ref 9.4–12.4)
PO2 BLDA: 220 MMHG (ref 71–104)
PO2 BLDA: 561 MMHG (ref 71–104)
PO2 BLDA: 601 MMHG (ref 71–104)
PO2 BLDA: 607 MMHG (ref 71–104)
PO2 BLDA: 86 MMHG (ref 71–104)
PO2 BLDMV: 117 MMHG (ref 25–40)
PO2 BLDMV: 218 MMHG (ref 25–40)
PO2 BLDMV: 30 MMHG (ref 25–40)
PO2 BLDMV: 47 MMHG (ref 25–40)
POTASSIUM BLD-SCNC: 4.3 MEQ/L (ref 3.5–4.9)
POTASSIUM BLD-SCNC: 4.5 MEQ/L (ref 3.5–4.9)
POTASSIUM BLD-SCNC: 4.6 MEQ/L (ref 3.5–4.9)
POTASSIUM SERPL-SCNC: 4.2 MEQ/L (ref 3.5–5.2)
POTASSIUM SERPL-SCNC: 4.5 MEQ/L (ref 3.5–5.2)
POTASSIUM SERPL-SCNC: 4.9 MEQ/L (ref 3.5–5.2)
POTASSIUM SERPL-SCNC: 4.9 MEQ/L (ref 3.5–5.2)
PROJECTED HEPARIN CONCENTATION: 2
RBC # BLD AUTO: 3.05 MILL/MM3 (ref 4.7–6.1)
RBC # BLD AUTO: 3.32 MILL/MM3 (ref 4.7–6.1)
RBC # BLD AUTO: 3.34 MILL/MM3 (ref 4.7–6.1)
SAO2 % BLDA: 100 %
SAO2 % BLDA: 97 %
SAO2 % BLDMV: 100 %
SAO2 % BLDMV: 47 %
SAO2 % BLDMV: 82 %
SAO2 % BLDMV: 98 %
SCAN OF BLOOD SMEAR: NORMAL
SCAN OF BLOOD SMEAR: NORMAL
SEGMENTED NEUTROPHILS ABSOLUTE COUNT: 9.9 THOU/MM3 (ref 1.8–7.7)
SET PEEP: 10 MMHG
SET RESPIRATORY RATE: 12 BPM
SITE: ABNORMAL
SITE: ABNORMAL
SODIUM BLD-SCNC: 150 MEQ/L (ref 138–146)
SODIUM BLD-SCNC: 152 MEQ/L (ref 138–146)
SODIUM BLD-SCNC: 153 MEQ/L (ref 138–146)
SODIUM SERPL-SCNC: 151 MEQ/L (ref 135–145)
SODIUM SERPL-SCNC: 152 MEQ/L (ref 135–145)
SODIUM SERPL-SCNC: 152 MEQ/L (ref 135–145)
SODIUM SERPL-SCNC: 155 MEQ/L (ref 135–145)
VENTILATION MODE VENT: ABNORMAL
WBC # BLD AUTO: 10.5 THOU/MM3 (ref 4.8–10.8)
WBC # BLD AUTO: 11.9 THOU/MM3 (ref 4.8–10.8)
WBC # BLD AUTO: 9.1 THOU/MM3 (ref 4.8–10.8)

## 2024-03-07 PROCEDURE — P9017 PLASMA 1 DONOR FRZ W/IN 8 HR: HCPCS

## 2024-03-07 PROCEDURE — 33947 ECMO/ECLS INITIATION ARTERY: CPT | Performed by: THORACIC SURGERY (CARDIOTHORACIC VASCULAR SURGERY)

## 2024-03-07 PROCEDURE — 85576 BLOOD PLATELET AGGREGATION: CPT

## 2024-03-07 PROCEDURE — 93312 ECHO TRANSESOPHAGEAL: CPT | Performed by: INTERNAL MEDICINE

## 2024-03-07 PROCEDURE — P9012 CRYOPRECIPITATE EACH UNIT: HCPCS

## 2024-03-07 PROCEDURE — 2580000003 HC RX 258: Performed by: THORACIC SURGERY (CARDIOTHORACIC VASCULAR SURGERY)

## 2024-03-07 PROCEDURE — 93005 ELECTROCARDIOGRAM TRACING: CPT | Performed by: PHYSICIAN ASSISTANT

## 2024-03-07 PROCEDURE — 93320 DOPPLER ECHO COMPLETE: CPT | Performed by: INTERNAL MEDICINE

## 2024-03-07 PROCEDURE — P9045 ALBUMIN (HUMAN), 5%, 250 ML: HCPCS | Performed by: REGISTERED NURSE

## 2024-03-07 PROCEDURE — 94761 N-INVAS EAR/PLS OXIMETRY MLT: CPT

## 2024-03-07 PROCEDURE — 74018 RADEX ABDOMEN 1 VIEW: CPT

## 2024-03-07 PROCEDURE — 2580000003 HC RX 258: Performed by: INTERNAL MEDICINE

## 2024-03-07 PROCEDURE — 6360000002 HC RX W HCPCS: Performed by: THORACIC SURGERY (CARDIOTHORACIC VASCULAR SURGERY)

## 2024-03-07 PROCEDURE — 71045 X-RAY EXAM CHEST 1 VIEW: CPT

## 2024-03-07 PROCEDURE — 2500000003 HC RX 250 WO HCPCS: Performed by: INTERNAL MEDICINE

## 2024-03-07 PROCEDURE — 6370000000 HC RX 637 (ALT 250 FOR IP)

## 2024-03-07 PROCEDURE — C1889 IMPLANT/INSERT DEVICE, NOC: HCPCS | Performed by: THORACIC SURGERY (CARDIOTHORACIC VASCULAR SURGERY)

## 2024-03-07 PROCEDURE — 6360000002 HC RX W HCPCS: Performed by: NURSE ANESTHETIST, CERTIFIED REGISTERED

## 2024-03-07 PROCEDURE — 94644 CONT INHLJ TX 1ST HOUR: CPT

## 2024-03-07 PROCEDURE — 84100 ASSAY OF PHOSPHORUS: CPT

## 2024-03-07 PROCEDURE — 83605 ASSAY OF LACTIC ACID: CPT

## 2024-03-07 PROCEDURE — 93010 ELECTROCARDIOGRAM REPORT: CPT | Performed by: NUCLEAR MEDICINE

## 2024-03-07 PROCEDURE — 36415 COLL VENOUS BLD VENIPUNCTURE: CPT

## 2024-03-07 PROCEDURE — 84295 ASSAY OF SERUM SODIUM: CPT

## 2024-03-07 PROCEDURE — 37799 UNLISTED PX VASCULAR SURGERY: CPT

## 2024-03-07 PROCEDURE — P9041 ALBUMIN (HUMAN),5%, 50ML: HCPCS | Performed by: THORACIC SURGERY (CARDIOTHORACIC VASCULAR SURGERY)

## 2024-03-07 PROCEDURE — 93312 ECHO TRANSESOPHAGEAL: CPT

## 2024-03-07 PROCEDURE — 2709999900 HC NON-CHARGEABLE SUPPLY: Performed by: THORACIC SURGERY (CARDIOTHORACIC VASCULAR SURGERY)

## 2024-03-07 PROCEDURE — 82803 BLOOD GASES ANY COMBINATION: CPT

## 2024-03-07 PROCEDURE — 6360000002 HC RX W HCPCS: Performed by: STUDENT IN AN ORGANIZED HEALTH CARE EDUCATION/TRAINING PROGRAM

## 2024-03-07 PROCEDURE — 82948 REAGENT STRIP/BLOOD GLUCOSE: CPT

## 2024-03-07 PROCEDURE — 82330 ASSAY OF CALCIUM: CPT

## 2024-03-07 PROCEDURE — 3600000008 HC SURGERY OHS BASE: Performed by: THORACIC SURGERY (CARDIOTHORACIC VASCULAR SURGERY)

## 2024-03-07 PROCEDURE — 83735 ASSAY OF MAGNESIUM: CPT

## 2024-03-07 PROCEDURE — 2500000003 HC RX 250 WO HCPCS: Performed by: THORACIC SURGERY (CARDIOTHORACIC VASCULAR SURGERY)

## 2024-03-07 PROCEDURE — 85018 HEMOGLOBIN: CPT

## 2024-03-07 PROCEDURE — 2500000003 HC RX 250 WO HCPCS: Performed by: REGISTERED NURSE

## 2024-03-07 PROCEDURE — 85025 COMPLETE CBC W/AUTO DIFF WBC: CPT

## 2024-03-07 PROCEDURE — 85610 PROTHROMBIN TIME: CPT

## 2024-03-07 PROCEDURE — 85027 COMPLETE CBC AUTOMATED: CPT

## 2024-03-07 PROCEDURE — 94645 CONT INHLJ TX EACH ADDL HOUR: CPT

## 2024-03-07 PROCEDURE — 85384 FIBRINOGEN ACTIVITY: CPT

## 2024-03-07 PROCEDURE — 2500000003 HC RX 250 WO HCPCS: Performed by: PHYSICIAN ASSISTANT

## 2024-03-07 PROCEDURE — 99291 CRITICAL CARE FIRST HOUR: CPT | Performed by: INTERNAL MEDICINE

## 2024-03-07 PROCEDURE — 36430 TRANSFUSION BLD/BLD COMPNT: CPT

## 2024-03-07 PROCEDURE — 6360000002 HC RX W HCPCS

## 2024-03-07 PROCEDURE — 93325 DOPPLER ECHO COLOR FLOW MAPG: CPT | Performed by: INTERNAL MEDICINE

## 2024-03-07 PROCEDURE — A4216 STERILE WATER/SALINE, 10 ML: HCPCS | Performed by: PHYSICIAN ASSISTANT

## 2024-03-07 PROCEDURE — 2000000000 HC ICU R&B

## 2024-03-07 PROCEDURE — 80048 BASIC METABOLIC PNL TOTAL CA: CPT

## 2024-03-07 PROCEDURE — 3700000001 HC ADD 15 MINUTES (ANESTHESIA): Performed by: THORACIC SURGERY (CARDIOTHORACIC VASCULAR SURGERY)

## 2024-03-07 PROCEDURE — 85347 COAGULATION TIME ACTIVATED: CPT

## 2024-03-07 PROCEDURE — 3700000000 HC ANESTHESIA ATTENDED CARE: Performed by: THORACIC SURGERY (CARDIOTHORACIC VASCULAR SURGERY)

## 2024-03-07 PROCEDURE — 5A1522G EXTRACORPOREAL OXYGENATION, MEMBRANE, PERIPHERAL VENO-ARTERIAL: ICD-10-PCS | Performed by: THORACIC SURGERY (CARDIOTHORACIC VASCULAR SURGERY)

## 2024-03-07 PROCEDURE — 85730 THROMBOPLASTIN TIME PARTIAL: CPT

## 2024-03-07 PROCEDURE — 6360000002 HC RX W HCPCS: Performed by: INTERNAL MEDICINE

## 2024-03-07 PROCEDURE — 2580000003 HC RX 258: Performed by: PHYSICIAN ASSISTANT

## 2024-03-07 PROCEDURE — P9037 PLATE PHERES LEUKOREDU IRRAD: HCPCS

## 2024-03-07 PROCEDURE — 3600000018 HC SURGERY OHS ADDTL 15MIN: Performed by: THORACIC SURGERY (CARDIOTHORACIC VASCULAR SURGERY)

## 2024-03-07 PROCEDURE — 82947 ASSAY GLUCOSE BLOOD QUANT: CPT

## 2024-03-07 PROCEDURE — 94003 VENT MGMT INPAT SUBQ DAY: CPT

## 2024-03-07 PROCEDURE — 6370000000 HC RX 637 (ALT 250 FOR IP): Performed by: PHYSICIAN ASSISTANT

## 2024-03-07 PROCEDURE — 6360000002 HC RX W HCPCS: Performed by: REGISTERED NURSE

## 2024-03-07 PROCEDURE — 2500000003 HC RX 250 WO HCPCS: Performed by: NURSE ANESTHETIST, CERTIFIED REGISTERED

## 2024-03-07 PROCEDURE — 93005 ELECTROCARDIOGRAM TRACING: CPT | Performed by: THORACIC SURGERY (CARDIOTHORACIC VASCULAR SURGERY)

## 2024-03-07 PROCEDURE — 6360000002 HC RX W HCPCS: Performed by: PHYSICIAN ASSISTANT

## 2024-03-07 PROCEDURE — 85014 HEMATOCRIT: CPT

## 2024-03-07 PROCEDURE — 2720000010 HC SURG SUPPLY STERILE: Performed by: THORACIC SURGERY (CARDIOTHORACIC VASCULAR SURGERY)

## 2024-03-07 PROCEDURE — P9047 ALBUMIN (HUMAN), 25%, 50ML: HCPCS

## 2024-03-07 PROCEDURE — 2700000000 HC OXYGEN THERAPY PER DAY

## 2024-03-07 PROCEDURE — 2580000003 HC RX 258: Performed by: REGISTERED NURSE

## 2024-03-07 PROCEDURE — 84132 ASSAY OF SERUM POTASSIUM: CPT

## 2024-03-07 RX ORDER — FENTANYL CITRATE 50 UG/ML
INJECTION, SOLUTION INTRAMUSCULAR; INTRAVENOUS PRN
Status: DISCONTINUED | OUTPATIENT
Start: 2024-03-07 | End: 2024-03-07 | Stop reason: SDUPTHER

## 2024-03-07 RX ORDER — FENTANYL CITRATE 50 UG/ML
INJECTION, SOLUTION INTRAMUSCULAR; INTRAVENOUS
Status: COMPLETED
Start: 2024-03-07 | End: 2024-03-07

## 2024-03-07 RX ORDER — SODIUM CHLORIDE 9 MG/ML
INJECTION, SOLUTION INTRAVENOUS PRN
Status: DISCONTINUED | OUTPATIENT
Start: 2024-03-07 | End: 2024-03-08

## 2024-03-07 RX ORDER — PROTAMINE SULFATE 10 MG/ML
INJECTION, SOLUTION INTRAVENOUS PRN
Status: DISCONTINUED | OUTPATIENT
Start: 2024-03-07 | End: 2024-03-07 | Stop reason: SDUPTHER

## 2024-03-07 RX ORDER — ALBUMIN (HUMAN) 12.5 G/50ML
SOLUTION INTRAVENOUS
Status: COMPLETED
Start: 2024-03-07 | End: 2024-03-07

## 2024-03-07 RX ORDER — ROCURONIUM BROMIDE 10 MG/ML
INJECTION, SOLUTION INTRAVENOUS PRN
Status: DISCONTINUED | OUTPATIENT
Start: 2024-03-07 | End: 2024-03-07 | Stop reason: SDUPTHER

## 2024-03-07 RX ORDER — FAMOTIDINE 20 MG/1
20 TABLET, FILM COATED ORAL DAILY
Status: DISCONTINUED | OUTPATIENT
Start: 2024-03-08 | End: 2024-03-08 | Stop reason: ALTCHOICE

## 2024-03-07 RX ORDER — MIDAZOLAM HYDROCHLORIDE 1 MG/ML
INJECTION INTRAMUSCULAR; INTRAVENOUS
Status: COMPLETED
Start: 2024-03-07 | End: 2024-03-07

## 2024-03-07 RX ORDER — LIDOCAINE HCL/PF 100 MG/5ML
SYRINGE (ML) INJECTION PRN
Status: DISCONTINUED | OUTPATIENT
Start: 2024-03-07 | End: 2024-03-07 | Stop reason: SDUPTHER

## 2024-03-07 RX ORDER — MIDAZOLAM HYDROCHLORIDE 1 MG/ML
INJECTION INTRAMUSCULAR; INTRAVENOUS PRN
Status: DISCONTINUED | OUTPATIENT
Start: 2024-03-07 | End: 2024-03-07 | Stop reason: SDUPTHER

## 2024-03-07 RX ORDER — MILRINONE LACTATE 0.2 MG/ML
0.5 INJECTION, SOLUTION INTRAVENOUS CONTINUOUS
Status: DISCONTINUED | OUTPATIENT
Start: 2024-03-07 | End: 2024-03-10

## 2024-03-07 RX ORDER — MILRINONE LACTATE 0.2 MG/ML
0.25 INJECTION, SOLUTION INTRAVENOUS CONTINUOUS
Status: DISCONTINUED | OUTPATIENT
Start: 2024-03-07 | End: 2024-03-07

## 2024-03-07 RX ORDER — CEFAZOLIN SODIUM 1 G/3ML
INJECTION, POWDER, FOR SOLUTION INTRAMUSCULAR; INTRAVENOUS PRN
Status: DISCONTINUED | OUTPATIENT
Start: 2024-03-07 | End: 2024-03-07 | Stop reason: SDUPTHER

## 2024-03-07 RX ORDER — ALBUMIN, HUMAN INJ 5% 5 %
25 SOLUTION INTRAVENOUS PRN
Status: DISCONTINUED | OUTPATIENT
Start: 2024-03-07 | End: 2024-03-22

## 2024-03-07 RX ORDER — MIDAZOLAM HYDROCHLORIDE 1 MG/ML
4 INJECTION INTRAMUSCULAR; INTRAVENOUS ONCE
Status: COMPLETED | OUTPATIENT
Start: 2024-03-07 | End: 2024-03-07

## 2024-03-07 RX ORDER — ALBUMIN (HUMAN) 12.5 G/50ML
25 SOLUTION INTRAVENOUS PRN
Status: DISCONTINUED | OUTPATIENT
Start: 2024-03-07 | End: 2024-03-07

## 2024-03-07 RX ORDER — SODIUM CHLORIDE, SODIUM GLUCONATE, SODIUM ACETATE, POTASSIUM CHLORIDE AND MAGNESIUM CHLORIDE 526; 502; 368; 37; 30 MG/100ML; MG/100ML; MG/100ML; MG/100ML; MG/100ML
INJECTION, SOLUTION INTRAVENOUS CONTINUOUS PRN
Status: DISCONTINUED | OUTPATIENT
Start: 2024-03-06 | End: 2024-03-07 | Stop reason: SDUPTHER

## 2024-03-07 RX ORDER — 0.9 % SODIUM CHLORIDE 0.9 %
500 INTRAVENOUS SOLUTION INTRAVENOUS ONCE
Status: COMPLETED | OUTPATIENT
Start: 2024-03-07 | End: 2024-03-07

## 2024-03-07 RX ORDER — MILRINONE LACTATE 0.2 MG/ML
0.38 INJECTION, SOLUTION INTRAVENOUS CONTINUOUS
Status: DISCONTINUED | OUTPATIENT
Start: 2024-03-07 | End: 2024-03-07

## 2024-03-07 RX ORDER — FENTANYL CITRATE 50 UG/ML
50 INJECTION, SOLUTION INTRAMUSCULAR; INTRAVENOUS ONCE
Status: COMPLETED | OUTPATIENT
Start: 2024-03-07 | End: 2024-03-07

## 2024-03-07 RX ORDER — CALCIUM CHLORIDE 100 MG/ML
INJECTION INTRAVENOUS; INTRAVENTRICULAR PRN
Status: DISCONTINUED | OUTPATIENT
Start: 2024-03-07 | End: 2024-03-07 | Stop reason: SDUPTHER

## 2024-03-07 RX ORDER — SODIUM CHLORIDE, SODIUM GLUCONATE, SODIUM ACETATE, POTASSIUM CHLORIDE AND MAGNESIUM CHLORIDE 526; 502; 368; 37; 30 MG/100ML; MG/100ML; MG/100ML; MG/100ML; MG/100ML
INJECTION, SOLUTION INTRAVENOUS CONTINUOUS PRN
Status: DISCONTINUED | OUTPATIENT
Start: 2024-03-07 | End: 2024-03-07 | Stop reason: SDUPTHER

## 2024-03-07 RX ORDER — HEPARIN SODIUM 1000 [USP'U]/ML
INJECTION, SOLUTION INTRAVENOUS; SUBCUTANEOUS PRN
Status: DISCONTINUED | OUTPATIENT
Start: 2024-03-07 | End: 2024-03-07 | Stop reason: SDUPTHER

## 2024-03-07 RX ORDER — CHLORHEXIDINE GLUCONATE ORAL RINSE 1.2 MG/ML
15 SOLUTION DENTAL 2 TIMES DAILY
Status: DISCONTINUED | OUTPATIENT
Start: 2024-03-07 | End: 2024-04-06

## 2024-03-07 RX ORDER — ALBUMIN, HUMAN INJ 5% 5 %
SOLUTION INTRAVENOUS PRN
Status: DISCONTINUED | OUTPATIENT
Start: 2024-03-07 | End: 2024-03-07 | Stop reason: SDUPTHER

## 2024-03-07 RX ADMIN — WATER 2000 MG: 1 INJECTION INTRAMUSCULAR; INTRAVENOUS; SUBCUTANEOUS at 22:24

## 2024-03-07 RX ADMIN — CALCIUM CHLORIDE 1000 MG: 100 INJECTION, SOLUTION INTRAVENOUS at 23:40

## 2024-03-07 RX ADMIN — Medication 3 MCG/MIN: at 06:16

## 2024-03-07 RX ADMIN — MIDAZOLAM HYDROCHLORIDE 4 MG: 1 INJECTION INTRAMUSCULAR; INTRAVENOUS at 14:52

## 2024-03-07 RX ADMIN — MIDAZOLAM 2 MG: 1 INJECTION INTRAMUSCULAR; INTRAVENOUS at 17:30

## 2024-03-07 RX ADMIN — MILRINONE LACTATE IN DEXTROSE 0.25 MCG/KG/MIN: 200 INJECTION, SOLUTION INTRAVENOUS at 04:02

## 2024-03-07 RX ADMIN — ROCURONIUM BROMIDE 100 MG: 10 INJECTION INTRAVENOUS at 17:17

## 2024-03-07 RX ADMIN — SODIUM BICARBONATE 100 MEQ: 84 INJECTION, SOLUTION INTRAVENOUS at 22:53

## 2024-03-07 RX ADMIN — EPOPROSTENOL 50 NG/KG/MIN: 1.5 INJECTION, POWDER, LYOPHILIZED, FOR SOLUTION INTRAVENOUS at 11:10

## 2024-03-07 RX ADMIN — SODIUM BICARBONATE: 84 INJECTION, SOLUTION INTRAVENOUS at 21:01

## 2024-03-07 RX ADMIN — MILRINONE LACTATE IN DEXTROSE 0.38 MCG/KG/MIN: 200 INJECTION, SOLUTION INTRAVENOUS at 14:57

## 2024-03-07 RX ADMIN — PROTAMINE SULFATE 25 MG: 10 INJECTION, SOLUTION INTRAVENOUS at 22:53

## 2024-03-07 RX ADMIN — SODIUM CHLORIDE, PRESERVATIVE FREE 10 ML: 5 INJECTION INTRAVENOUS at 21:57

## 2024-03-07 RX ADMIN — SENNOSIDES AND DOCUSATE SODIUM 1 TABLET: 50; 8.6 TABLET ORAL at 22:21

## 2024-03-07 RX ADMIN — SENNOSIDES AND DOCUSATE SODIUM 1 TABLET: 50; 8.6 TABLET ORAL at 02:38

## 2024-03-07 RX ADMIN — Medication 50 MEQ: at 17:32

## 2024-03-07 RX ADMIN — Medication 8 MCG/MIN: at 11:30

## 2024-03-07 RX ADMIN — SODIUM CHLORIDE, PRESERVATIVE FREE 10 ML: 5 INJECTION INTRAVENOUS at 10:08

## 2024-03-07 RX ADMIN — SENNOSIDES AND DOCUSATE SODIUM 1 TABLET: 50; 8.6 TABLET ORAL at 09:55

## 2024-03-07 RX ADMIN — MORPHINE SULFATE 2 MG: 2 INJECTION, SOLUTION INTRAMUSCULAR; INTRAVENOUS at 13:05

## 2024-03-07 RX ADMIN — FAMOTIDINE 20 MG: 10 INJECTION, SOLUTION INTRAVENOUS at 09:55

## 2024-03-07 RX ADMIN — ATORVASTATIN CALCIUM 40 MG: 40 TABLET, FILM COATED ORAL at 22:22

## 2024-03-07 RX ADMIN — PHENYLEPHRINE HYDROCHLORIDE 200 MCG: 10 INJECTION INTRAVENOUS at 00:56

## 2024-03-07 RX ADMIN — CEFAZOLIN 2 G: 1 INJECTION, POWDER, FOR SOLUTION INTRAMUSCULAR; INTRAVENOUS at 17:23

## 2024-03-07 RX ADMIN — ROCURONIUM BROMIDE 50 MG: 10 INJECTION INTRAVENOUS at 00:54

## 2024-03-07 RX ADMIN — ALBUMIN (HUMAN) 25 G: 0.25 INJECTION, SOLUTION INTRAVENOUS at 20:15

## 2024-03-07 RX ADMIN — MIDAZOLAM 4 MG: 1 INJECTION INTRAMUSCULAR; INTRAVENOUS at 14:52

## 2024-03-07 RX ADMIN — MILRINONE LACTATE IN DEXTROSE 0.38 MCG/KG/MIN: 200 INJECTION, SOLUTION INTRAVENOUS at 05:58

## 2024-03-07 RX ADMIN — HEPARIN SODIUM 12000 UNITS: 1000 INJECTION INTRAVENOUS; SUBCUTANEOUS at 18:37

## 2024-03-07 RX ADMIN — SODIUM CHLORIDE 1.58 UNITS/HR: 9 INJECTION, SOLUTION INTRAVENOUS at 03:06

## 2024-03-07 RX ADMIN — ALBUMIN (HUMAN) 12.5 G: 12.5 INJECTION, SOLUTION INTRAVENOUS at 19:04

## 2024-03-07 RX ADMIN — FENTANYL CITRATE 100 MCG: 50 INJECTION INTRAMUSCULAR; INTRAVENOUS at 01:08

## 2024-03-07 RX ADMIN — ALBUMIN (HUMAN) 25 G: 12.5 SOLUTION INTRAVENOUS at 20:15

## 2024-03-07 RX ADMIN — COAGULATION FACTOR VIIA (RECOMBINANT) 2000 MCG: KIT at 00:22

## 2024-03-07 RX ADMIN — PROTAMINE SULFATE 25 MG: 10 INJECTION, SOLUTION INTRAVENOUS at 21:00

## 2024-03-07 RX ADMIN — MORPHINE SULFATE 2 MG: 2 INJECTION, SOLUTION INTRAMUSCULAR; INTRAVENOUS at 03:16

## 2024-03-07 RX ADMIN — Medication 1 TABLET: at 09:55

## 2024-03-07 RX ADMIN — AMIODARONE HYDROCHLORIDE 200 MG: 200 TABLET ORAL at 09:55

## 2024-03-07 RX ADMIN — SODIUM CHLORIDE 500 ML: 9 INJECTION, SOLUTION INTRAVENOUS at 06:16

## 2024-03-07 RX ADMIN — FENTANYL CITRATE 50 MCG: 50 INJECTION, SOLUTION INTRAMUSCULAR; INTRAVENOUS at 14:52

## 2024-03-07 RX ADMIN — Medication 50 MEQ: at 17:40

## 2024-03-07 RX ADMIN — PROTAMINE SULFATE 50 MG: 10 INJECTION, SOLUTION INTRAVENOUS at 18:26

## 2024-03-07 RX ADMIN — MILRINONE LACTATE IN DEXTROSE 0.5 MCG/KG/MIN: 200 INJECTION, SOLUTION INTRAVENOUS at 15:37

## 2024-03-07 RX ADMIN — ALBUMIN (HUMAN) 12.5 G: 12.5 INJECTION, SOLUTION INTRAVENOUS at 18:32

## 2024-03-07 RX ADMIN — HEPARIN SODIUM 12000 UNITS: 1000 INJECTION INTRAVENOUS; SUBCUTANEOUS at 17:30

## 2024-03-07 RX ADMIN — CHLORHEXIDINE GLUCONATE 0.12% ORAL RINSE 15 ML: 1.2 LIQUID ORAL at 22:21

## 2024-03-07 RX ADMIN — WATER 2000 MG: 1 INJECTION INTRAMUSCULAR; INTRAVENOUS; SUBCUTANEOUS at 12:34

## 2024-03-07 RX ADMIN — Medication 100 MG: at 17:34

## 2024-03-07 RX ADMIN — CALCIUM CHLORIDE INJECTION 1 G: 100 INJECTION, SOLUTION INTRAVENOUS at 17:47

## 2024-03-07 RX ADMIN — SODIUM CHLORIDE, SODIUM GLUCONATE, SODIUM ACETATE, POTASSIUM CHLORIDE AND MAGNESIUM CHLORIDE: 526; 502; 368; 37; 30 INJECTION, SOLUTION INTRAVENOUS at 16:36

## 2024-03-07 RX ADMIN — CALCIUM CHLORIDE 2000 MG: 100 INJECTION, SOLUTION INTRAVENOUS at 02:56

## 2024-03-07 RX ADMIN — AMIODARONE HYDROCHLORIDE 200 MG: 200 TABLET ORAL at 22:48

## 2024-03-07 RX ADMIN — WATER 2000 MG: 1 INJECTION INTRAMUSCULAR; INTRAVENOUS; SUBCUTANEOUS at 03:53

## 2024-03-07 RX ADMIN — FENTANYL CITRATE 100 MCG: 50 INJECTION, SOLUTION INTRAMUSCULAR; INTRAVENOUS at 17:30

## 2024-03-07 RX ADMIN — AMIODARONE HYDROCHLORIDE 200 MG: 200 TABLET ORAL at 02:33

## 2024-03-07 RX ADMIN — FENTANYL CITRATE 50 MCG: 50 INJECTION INTRAMUSCULAR; INTRAVENOUS at 14:52

## 2024-03-07 RX ADMIN — PHENYLEPHRINE HYDROCHLORIDE 300 MCG: 10 INJECTION INTRAVENOUS at 00:05

## 2024-03-07 RX ADMIN — ALBUMIN (HUMAN) 25 G: 12.5 INJECTION, SOLUTION INTRAVENOUS at 22:00

## 2024-03-07 ASSESSMENT — PULMONARY FUNCTION TESTS
PIF_VALUE: 20

## 2024-03-07 ASSESSMENT — LIFESTYLE VARIABLES: SMOKING_STATUS: 0

## 2024-03-07 NOTE — ANESTHESIA POSTPROCEDURE EVALUATION
Department of Anesthesiology  Postprocedure Note    Patient: Mumtaz Islas  MRN: [id number]  YOB: 1955  Date of evaluation: 3/7/2024    Procedure Summary     Date: 03[facility]06/24 [facility]    Anesthesia Start: 0733 Anesthesia Stop: 1345    Procedure: Cabg, Mitral Valve Repair, Maze Diagnosis:       Coronary artery disease, unspecified vessel or lesion type, unspecified whether angina present, unspecified whether native or transplanted heart      Paroxysmal atrial fibrillation (HCC)      (Coronary artery disease, unspecified vessel or lesion type, unspecified whether angina present, unspecified whether native or transplanted heart [I25.10])      (Paroxysmal atrial fibrillation (HCC) [I48.0])    Surgeons: Manish Bess MD Responsible Provider: Jarrell Shahid DO    Anesthesia Type: general ASA Status: 4          Anesthesia Type: No value filed.    Rosaline Phase I: Rosaline Score: 10    Rosaline Phase II:      Anesthesia Post Evaluation    Patient location during evaluation: ICU  Patient participation: complete - patient cannot participate  Level of consciousness: sedated and ventilated  Airway patency: patent  Nausea & Vomiting: no vomiting and no nausea  Cardiovascular status: hypotensive and vasoactive/inotropes  Respiratory status: acceptable and intubated  Hydration status: stable  Comments: Pt requiring pressors, pacer dependant and CI down trending per surgeon notes. Pt brought back to the OR for operative management of bleeding.       No notable events documented.

## 2024-03-07 NOTE — ANESTHESIA PRE PROCEDURE
mg  40 mg Oral Nightly Toshia Garcia PA       • ceFAZolin (ANCEF) 2,000 mg in sterile water 20 mL IV syringe  2,000 mg IntraVENous Q8H Toshia Garcia PA   2,000 mg at 03/07/24 1234   • potassium chloride 20 mEq/50 mL IVPB (Central Line)  20 mEq IntraVENous PRN Toshia Garcia PA       • magnesium sulfate 2000 mg in 50 mL IVPB premix  2,000 mg IntraVENous PRN Toshia Garcia PA       • albuterol sulfate HFA (PROVENTIL;VENTOLIN;PROAIR) 108 (90 Base) MCG/ACT inhaler 2 puff  2 puff Inhalation Q6H PRN Toshia Garcia PA       • sodium chloride 0.9 % bolus 500 mL  500 mL IntraVENous Continuous PRN Toshia Garcia PA   Stopped at 03/06/24 1433   • insulin regular (HUMULIN R;NOVOLIN R) 100 Units in sodium chloride 0.9 % 100 mL infusion  0.1-50 Units/hr IntraVENous Continuous Toshia Garcia PA   Stopped at 03/07/24 1313   • glucose chewable tablet 16 g  4 tablet Oral PRN Toshia Garcia PA       • dextrose bolus 10% 125 mL  125 mL IntraVENous PRN Toshia Garcia PA        Or   • dextrose bolus 10% 250 mL  250 mL IntraVENous PRN Toshia Garcia PA       • glucagon injection 1 mg  1 mg IntraMUSCular PRN Toshia Garcia PA       • dextrose 10 % infusion   IntraVENous Continuous PRN Toshia Garcia PA       • EPINEPHrine 5 mg in sodium chloride 0.9 % 250 ml infusion  1-20 mcg/min IntraVENous Continuous Manish Bess MD 54 mL/hr at 03/07/24 1449 18 mcg/min at 03/07/24 1449   • aminocaproic acid (AMICAR) 5,000 mg in sodium chloride 0.9 % 250 mL infusion  1,000 mg/hr IntraVENous Continuous Manish Bess MD       • niCARdipine (CARDENE) 25 mg in sodium chloride 0.9 % 250 mL infusion ([id number])  2.5-15 mg/hr IntraVENous Continuous Manish Bess MD 25 mL/hr at 03/06/24 1507 2.5 mg/hr at 03/06/24 1507   • 0.9 % sodium chloride infusion   IntraVENous PRN Manish Bess MD       • 0.9 % sodium chloride infusion   IntraVENous PRN Fco Olivarez MD           Allergies:

## 2024-03-07 NOTE — ANESTHESIA PRE PROCEDURE
Department of Anesthesiology  Preprocedure Note       Name:  Mumtaz Islas   Age:  69 y.o.  :  1955                                          MRN:  [id number]         Date:  3/7/2024      Surgeon: Surgeon(s):  Manish Bess MD    Procedure:     Medications prior to admission:   Prior to Admission medications    Medication Sig Start Date End Date Taking? Authorizing Provider   albuterol (PROVENTIL) (5 MG/ML) 0.5% nebulizer solution Take 0.5 mLs by nebulization every 6 hours as needed for Wheezing Pt wife states takes every 4 days    Malachi Reynolds MD   NOVOLOG FLEXPEN 100 UNIT/ML injection pen Inject 8 units before breakfast, 7 units before lunch, and 8 units before supper plus group 2 Sliding scale into the skin 3x daily before meals. Inject 2 units + scale before snacks as needed. Max daily dose 55 units 24   Ignacio Blackman MD   Injection Device for Insulin (CEQUR SIMPLICITY ) MISC Use to insert Cequr Simplicity device  Patient not taking: Reported on 3/6/2024 2/26/24   Ignacio Blackman MD   Injection Device for Insulin (CEQUR SIMPLICITY 2U) VERONICA Use to administer rapid-acting insulin. Change every 3 days  Patient not taking: Reported on 3/6/2024 2/26/24   Ignacio Blackman MD   insulin glargine (LANTUS SOLOSTAR) 100 UNIT/ML injection pen Inject 15 units twice daily  Patient not taking: Reported on 3/1/2024 2/1/24   Ignacio Blackman MD   sotalol (BETAPACE) 120 MG tablet TAKE 1 TABLET TWICE DAILY 24   Cinthya Villalobos MD   Continuous Blood Gluc Sensor (FREESTYLE SHILPA 3 SENSOR) MISC by Does not apply route every 14 days    Malachi Reynolds MD   nitroGLYCERIN (NITROSTAT) 0.4 MG SL tablet Place 1 tablet under the tongue every 5 minutes as needed for Chest pain  Patient not taking: Reported on 3/6/2024 12/26/23   Cinthya Villalobos MD   insulin detemir (LEVEMIR FLEXPEN) 100 UNIT/ML injection pen INJECT 15 UNITS UNDER THE SKIN TWO TIMES DAILY  Patient taking differently: INJECT

## 2024-03-07 NOTE — ANESTHESIA PROCEDURE NOTES
Procedure Performed: LAKSHMI       Start Time:  3/6/2024 10:40 PM       End Time:   3/7/2024 12:20 AM    Preanesthesia Checklist:  Patient identified, IV assessed, risks and benefits discussed, monitors and equipment assessed, procedure being performed at surgeon's request and anesthesia consent obtained.    General Procedure Information  Diagnostic Indications for Echo:  assessment of ascending aorta, assessment of surgical repair, defect repair evaluation, hemodynamic monitoring, suspected pericardial effusion and assessment of valve function  Physician Requesting Echo: Manish Bess MD  CPT Code:  [id number] [id number]  Location performed:  OR  Intubated  Heart visualized  Probe Type:  3D  Modalities:  2D, 3D, color flow mapping, continuous wave Doppler and pulse wave Doppler    Echocardiographic and Doppler Measurements    Ventricles    Right Ventricle:  Cavity size normal.    Left Ventricle:  Cavity size normal.  Global Function mildly impaired.  Ejection Fraction 40%.      Ventricular Regional Function:  1- Basal Anteroseptal:  hypokinetic  2- Basal Anterior:  hypokinetic  3- Basal Anterolateral:  hypokinetic  4- Basal Inferolateral:  hypokinetic  5- Basal Inferior:  hypokinetic  6- Basal Inferoseptal:  hypokinetic  7- Mid Anteroseptal:  hypokinetic  8- Mid Anterior:  hypokinetic  9- Mid Anterolateral:  hypokinetic  10- Mid Inferolateral:  hypokinetic  11- Mid Inferior:  hypokinetic  12- Mid Inferoseptal:  hypokinetic  13- Apical Anterior:  hypokinetic  14- Apical Lateral:  hypokinetic  15- Apical Inferior:  hypokinetic  16- Apical Septal:  hypokinetic  17- [place]:  hypokinetic    Wall Motion Comments:       Upon arrival to the OR the heart was empty and looked externally compressed. After opening the chest, the surgeon noted a large amount of clotted blood posterior to the heart, and after resuscitation, the heart visually improved, along with the BP/O2 sat.     Valves    Aortic Valve:  Annulus normal.  Regurgitation

## 2024-03-08 ENCOUNTER — APPOINTMENT (OUTPATIENT)
Dept: GENERAL RADIOLOGY | Age: 69
DRG: 003 | End: 2024-03-08
Attending: THORACIC SURGERY (CARDIOTHORACIC VASCULAR SURGERY)
Payer: MEDICARE

## 2024-03-08 ENCOUNTER — ANESTHESIA (OUTPATIENT)
Dept: OPERATING ROOM | Age: 69
End: 2024-03-08
Payer: MEDICARE

## 2024-03-08 ENCOUNTER — ANESTHESIA EVENT (OUTPATIENT)
Dept: OPERATING ROOM | Age: 69
End: 2024-03-08
Payer: MEDICARE

## 2024-03-08 PROBLEM — E87.0 HYPERNATREMIA: Status: ACTIVE | Noted: 2024-03-08

## 2024-03-08 PROBLEM — E87.79 OTHER FLUID OVERLOAD: Status: ACTIVE | Noted: 2024-03-08

## 2024-03-08 PROBLEM — N17.0 ARF (ACUTE RENAL FAILURE) WITH TUBULAR NECROSIS (HCC): Status: ACTIVE | Noted: 2024-03-08

## 2024-03-08 PROBLEM — E87.20 METABOLIC ACIDOSIS: Status: ACTIVE | Noted: 2024-03-08

## 2024-03-08 PROBLEM — I95.89 OTHER HYPOTENSION: Status: ACTIVE | Noted: 2024-03-08

## 2024-03-08 LAB
ABO: NORMAL
ALBUMIN SERPL BCG-MCNC: 3.6 G/DL (ref 3.5–5.1)
ALP SERPL-CCNC: 35 U/L (ref 38–126)
ALT SERPL W/O P-5'-P-CCNC: 237 U/L (ref 11–66)
ANION GAP SERPL CALC-SCNC: 16 MEQ/L (ref 8–16)
ANION GAP SERPL CALC-SCNC: 17 MEQ/L (ref 8–16)
ANION GAP SERPL CALC-SCNC: 21 MEQ/L (ref 8–16)
ANION GAP SERPL CALC-SCNC: 26 MEQ/L (ref 8–16)
ANION GAP SERPL CALC-SCNC: 27 MEQ/L (ref 8–16)
ANION GAP SERPL CALC-SCNC: 30 MEQ/L (ref 8–16)
ANTIBODY SCREEN: NORMAL
APTT PPP: 48.5 SECONDS (ref 22–38)
APTT PPP: 52.2 SECONDS (ref 22–38)
APTT PPP: 56.3 SECONDS (ref 22–38)
APTT PPP: 57.6 SECONDS (ref 22–38)
APTT PPP: 70.1 SECONDS (ref 22–38)
APTT PPP: 81.5 SECONDS (ref 22–38)
ARTERIAL PATENCY WRIST A: ABNORMAL
AST SERPL-CCNC: 1723 U/L (ref 5–40)
BACTERIA UR CULT: NORMAL
BASE EXCESS BLDA CALC-SCNC: -0.8 MMOL/L (ref -2–3)
BASE EXCESS BLDA CALC-SCNC: -1.2 MMOL/L (ref -2–3)
BASE EXCESS BLDA CALC-SCNC: -10.3 MMOL/L (ref -2–3)
BASE EXCESS BLDA CALC-SCNC: -10.6 MMOL/L (ref -2–3)
BASE EXCESS BLDA CALC-SCNC: -11.6 MMOL/L (ref -2–3)
BASE EXCESS BLDA CALC-SCNC: -11.9 MMOL/L (ref -2.5–2.5)
BASE EXCESS BLDA CALC-SCNC: -3.8 MMOL/L (ref -2.5–2.5)
BASE EXCESS BLDA CALC-SCNC: -5.1 MMOL/L (ref -2–3)
BASE EXCESS BLDA CALC-SCNC: -6.2 MMOL/L (ref -2.5–2.5)
BASE EXCESS BLDA CALC-SCNC: -6.9 MMOL/L (ref -2.5–2.5)
BASE EXCESS BLDA CALC-SCNC: -6.9 MMOL/L (ref -2.5–2.5)
BASE EXCESS BLDA CALC-SCNC: -7.3 MMOL/L (ref -2.5–2.5)
BASE EXCESS BLDA CALC-SCNC: -7.3 MMOL/L (ref -2–3)
BASE EXCESS BLDA CALC-SCNC: -7.8 MMOL/L (ref -2.5–2.5)
BASE EXCESS BLDA CALC-SCNC: -9 MMOL/L (ref -2–3)
BASE EXCESS BLDA CALC-SCNC: -9.5 MMOL/L (ref -2–3)
BASE EXCESS BLDA CALC-SCNC: 1.3 MMOL/L (ref -2.5–2.5)
BASE EXCESS BLDA CALC-SCNC: 2 MMOL/L (ref -2–3)
BASE EXCESS BLDA CALC-SCNC: 2.3 MMOL/L (ref -2.5–2.5)
BASE EXCESS BLDA CALC-SCNC: 2.5 MMOL/L (ref -2–3)
BASE EXCESS BLDA CALC-SCNC: 2.6 MMOL/L (ref -2.5–2.5)
BASE EXCESS BLDA CALC-SCNC: 2.9 MMOL/L (ref -2.5–2.5)
BDY SITE: ABNORMAL
BILIRUB SERPL-MCNC: 1.2 MG/DL (ref 0.3–1.2)
BODY TEMPERATURE: 36.4
BREATHS SETTING VENT: 12 BPM
BUN SERPL-MCNC: 30 MG/DL (ref 7–22)
BUN SERPL-MCNC: 34 MG/DL (ref 7–22)
BUN SERPL-MCNC: 35 MG/DL (ref 7–22)
BUN SERPL-MCNC: 37 MG/DL (ref 7–22)
BUN SERPL-MCNC: 38 MG/DL (ref 7–22)
BUN SERPL-MCNC: 40 MG/DL (ref 7–22)
CA-I BLD ISE-SCNC: 0.89 MMOL/L (ref 1.12–1.32)
CA-I BLD ISE-SCNC: 0.95 MMOL/L (ref 1.12–1.32)
CA-I BLD ISE-SCNC: 1.01 MMOL/L (ref 1.12–1.32)
CA-I BLD ISE-SCNC: 1.1 MMOL/L (ref 1.12–1.32)
CA-I BLD ISE-SCNC: 1.11 MMOL/L (ref 1.12–1.32)
CA-I BLD ISE-SCNC: 1.12 MMOL/L (ref 1.12–1.32)
CA-I BLD ISE-SCNC: 1.14 MMOL/L (ref 1.12–1.32)
CALCIUM SERPL-MCNC: 7 MG/DL (ref 8.5–10.5)
CALCIUM SERPL-MCNC: 8.5 MG/DL (ref 8.5–10.5)
CALCIUM SERPL-MCNC: 8.8 MG/DL (ref 8.5–10.5)
CALCIUM SERPL-MCNC: 9 MG/DL (ref 8.5–10.5)
CALCIUM SERPL-MCNC: 9 MG/DL (ref 8.5–10.5)
CALCIUM SERPL-MCNC: 9.5 MG/DL (ref 8.5–10.5)
CHLORIDE SERPL-SCNC: 108 MEQ/L (ref 98–111)
CHLORIDE SERPL-SCNC: 109 MEQ/L (ref 98–111)
CHLORIDE SERPL-SCNC: 111 MEQ/L (ref 98–111)
CO2 SERPL-SCNC: 14 MEQ/L (ref 23–33)
CO2 SERPL-SCNC: 16 MEQ/L (ref 23–33)
CO2 SERPL-SCNC: 19 MEQ/L (ref 23–33)
CO2 SERPL-SCNC: 24 MEQ/L (ref 23–33)
CO2 SERPL-SCNC: 25 MEQ/L (ref 23–33)
CO2 SERPL-SCNC: 27 MEQ/L (ref 23–33)
COLLECTED BY:: ABNORMAL
COLLECTED BY:: NORMAL
COLLECTED BY:: NORMAL
COMMENT: ABNORMAL
CREAT SERPL-MCNC: 2.5 MG/DL (ref 0.4–1.2)
CREAT SERPL-MCNC: 2.7 MG/DL (ref 0.4–1.2)
CREAT SERPL-MCNC: 2.8 MG/DL (ref 0.4–1.2)
CREAT SERPL-MCNC: 2.9 MG/DL (ref 0.4–1.2)
CREAT SERPL-MCNC: 3.1 MG/DL (ref 0.4–1.2)
CREAT SERPL-MCNC: 3.1 MG/DL (ref 0.4–1.2)
DEPRECATED RDW RBC AUTO: 45 FL (ref 35–45)
DEPRECATED RDW RBC AUTO: 47.8 FL (ref 35–45)
DEPRECATED RDW RBC AUTO: 48.7 FL (ref 35–45)
DEPRECATED RDW RBC AUTO: 51.1 FL (ref 35–45)
DEPRECATED RDW RBC AUTO: 52.9 FL (ref 35–45)
DEPRECATED RDW RBC AUTO: 57 FL (ref 35–45)
DEPRECATED RDW RBC AUTO: 60.6 FL (ref 35–45)
DEVICE: ABNORMAL
EKG Q-T INTERVAL: 446 MS
EKG QRS DURATION: 110 MS
EKG QTC CALCULATION (BAZETT): 446 MS
EKG R AXIS: 7 DEGREES
EKG T AXIS: -114 DEGREES
EKG VENTRICULAR RATE: 60 BPM
ERYTHROCYTE [DISTWIDTH] IN BLOOD BY AUTOMATED COUNT: 14.8 % (ref 11.5–14.5)
ERYTHROCYTE [DISTWIDTH] IN BLOOD BY AUTOMATED COUNT: 15.1 % (ref 11.5–14.5)
ERYTHROCYTE [DISTWIDTH] IN BLOOD BY AUTOMATED COUNT: 15.2 % (ref 11.5–14.5)
ERYTHROCYTE [DISTWIDTH] IN BLOOD BY AUTOMATED COUNT: 15.4 % (ref 11.5–14.5)
ERYTHROCYTE [DISTWIDTH] IN BLOOD BY AUTOMATED COUNT: 15.9 % (ref 11.5–14.5)
ERYTHROCYTE [DISTWIDTH] IN BLOOD BY AUTOMATED COUNT: 17.5 % (ref 11.5–14.5)
ERYTHROCYTE [DISTWIDTH] IN BLOOD BY AUTOMATED COUNT: 18.8 % (ref 11.5–14.5)
FIBRINOGEN PPP-MCNC: 116 MG/100ML (ref 155–475)
FIBRINOGEN PPP-MCNC: 118 MG/100ML (ref 155–475)
FIBRINOGEN PPP-MCNC: 128 MG/100ML (ref 155–475)
FIBRINOGEN PPP-MCNC: 138 MG/100ML (ref 155–475)
FIBRINOGEN PPP-MCNC: 154 MG/100ML (ref 155–475)
FIBRINOGEN PPP-MCNC: 156 MG/100ML (ref 155–475)
FIBRINOGEN PPP-MCNC: 157 MG/100ML (ref 155–475)
FIBRINOGEN PPP-MCNC: 170 MG/100ML (ref 155–475)
FIBRINOGEN PPP-MCNC: 182 MG/100ML (ref 155–475)
FIBRINOGEN PPP-MCNC: 192 MG/100ML (ref 155–475)
FIO2 ON VENT O2 ANALYZER: 40 %
GFR SERPL CREATININE-BSD FRML MDRD: 21 ML/MIN/1.73M2
GFR SERPL CREATININE-BSD FRML MDRD: 21 ML/MIN/1.73M2
GFR SERPL CREATININE-BSD FRML MDRD: 22 ML/MIN/1.73M2
GFR SERPL CREATININE-BSD FRML MDRD: 23 ML/MIN/1.73M2
GFR SERPL CREATININE-BSD FRML MDRD: 24 ML/MIN/1.73M2
GFR SERPL CREATININE-BSD FRML MDRD: 27 ML/MIN/1.73M2
GLUCOSE BLD STRIP.AUTO-MCNC: 180 MG/DL (ref 70–108)
GLUCOSE BLD STRIP.AUTO-MCNC: 265 MG/DL (ref 70–108)
GLUCOSE BLD STRIP.AUTO-MCNC: 271 MG/DL (ref 70–108)
GLUCOSE BLD STRIP.AUTO-MCNC: 276 MG/DL (ref 70–108)
GLUCOSE BLD STRIP.AUTO-MCNC: 285 MG/DL (ref 70–108)
GLUCOSE BLD STRIP.AUTO-MCNC: 83 MG/DL (ref 70–108)
GLUCOSE BLD STRIP.AUTO-MCNC: 86 MG/DL (ref 70–108)
GLUCOSE BLD STRIP.AUTO-MCNC: 94 MG/DL (ref 70–108)
GLUCOSE BLD-MCNC: 106 MG/DL (ref 70–108)
GLUCOSE BLD-MCNC: 156 MG/DL (ref 70–108)
GLUCOSE BLD-MCNC: 176 MG/DL (ref 70–108)
GLUCOSE BLD-MCNC: 217 MG/DL (ref 70–108)
GLUCOSE BLD-MCNC: 217 MG/DL (ref 70–108)
GLUCOSE BLD-MCNC: 220 MG/DL (ref 70–108)
GLUCOSE BLD-MCNC: 228 MG/DL (ref 70–108)
GLUCOSE BLD-MCNC: 250 MG/DL (ref 70–108)
GLUCOSE BLD-MCNC: 254 MG/DL (ref 70–108)
GLUCOSE BLD-MCNC: 74 MG/DL (ref 70–108)
GLUCOSE BLD-MCNC: 96 MG/DL (ref 70–108)
GLUCOSE SERPL-MCNC: 113 MG/DL (ref 70–108)
GLUCOSE SERPL-MCNC: 165 MG/DL (ref 70–108)
GLUCOSE SERPL-MCNC: 208 MG/DL (ref 70–108)
GLUCOSE SERPL-MCNC: 247 MG/DL (ref 70–108)
GLUCOSE SERPL-MCNC: 262 MG/DL (ref 70–108)
GLUCOSE SERPL-MCNC: 80 MG/DL (ref 70–108)
HCO3 BLDA-SCNC: 15 MMOL/L (ref 23–28)
HCO3 BLDA-SCNC: 16 MMOL/L (ref 23–28)
HCO3 BLDA-SCNC: 17 MMOL/L (ref 23–28)
HCO3 BLDA-SCNC: 17 MMOL/L (ref 23–28)
HCO3 BLDA-SCNC: 18 MMOL/L (ref 23–28)
HCO3 BLDA-SCNC: 18 MMOL/L (ref 23–28)
HCO3 BLDA-SCNC: 19 MMOL/L (ref 23–28)
HCO3 BLDA-SCNC: 19 MMOL/L (ref 23–28)
HCO3 BLDA-SCNC: 20 MMOL/L (ref 23–28)
HCO3 BLDA-SCNC: 20 MMOL/L (ref 23–28)
HCO3 BLDA-SCNC: 21 MMOL/L (ref 23–28)
HCO3 BLDA-SCNC: 21 MMOL/L (ref 23–28)
HCO3 BLDA-SCNC: 23 MMOL/L (ref 23–28)
HCO3 BLDA-SCNC: 25 MMOL/L (ref 23–28)
HCO3 BLDA-SCNC: 26 MMOL/L (ref 23–28)
HCO3 BLDA-SCNC: 26 MMOL/L (ref 23–28)
HCO3 BLDA-SCNC: 27 MMOL/L (ref 23–28)
HCO3 BLDA-SCNC: 28 MMOL/L (ref 23–28)
HCT VFR BLD AUTO: 15.5 % (ref 42–52)
HCT VFR BLD AUTO: 23.6 % (ref 42–52)
HCT VFR BLD AUTO: 24.2 % (ref 42–52)
HCT VFR BLD AUTO: 26.7 % (ref 42–52)
HCT VFR BLD AUTO: 29.2 % (ref 42–52)
HCT VFR BLD AUTO: 29.7 % (ref 42–52)
HCT VFR BLD AUTO: 29.9 % (ref 42–52)
HCT VFR BLD AUTO: 35.3 % (ref 42–52)
HEMOGLOBIN FINGERSTICK, POC: 7.5 G/DL (ref 14–18)
HGB BLD-MCNC: 10.5 GM/DL (ref 14–18)
HGB BLD-MCNC: 11.7 GM/DL (ref 14–18)
HGB BLD-MCNC: 5.1 GM/DL (ref 14–18)
HGB BLD-MCNC: 7.7 GM/DL (ref 14–18)
HGB BLD-MCNC: 8.5 GM/DL (ref 14–18)
HGB BLD-MCNC: 9.5 GM/DL (ref 14–18)
HGB BLD-MCNC: 9.8 GM/DL (ref 14–18)
HGB BLD-MCNC: 9.9 GM/DL (ref 14–18)
KCT BLD-ACNC: 168 SECONDS (ref 1–150)
KCT BLD-ACNC: 174 SECONDS (ref 1–150)
KCT BLD-ACNC: 179 SECONDS (ref 1–150)
KCT BLD-ACNC: 266 SECONDS (ref 1–150)
LACTATE SERPL-SCNC: 11.3 MMOL/L (ref 0.5–2)
LACTATE SERPL-SCNC: 12 MMOL/L (ref 0.5–2)
LACTATE SERPL-SCNC: 14.2 MMOL/L (ref 0.5–2)
LACTATE SERPL-SCNC: 2.4 MMOL/L (ref 0.5–2)
LACTATE SERPL-SCNC: 4 MMOL/L (ref 0.5–2)
LACTATE SERPL-SCNC: 7.4 MMOL/L (ref 0.5–2)
MAGNESIUM SERPL-MCNC: 2 MG/DL (ref 1.6–2.4)
MAGNESIUM SERPL-MCNC: 2.2 MG/DL (ref 1.6–2.4)
MCH RBC QN AUTO: 28.9 PG (ref 26–33)
MCH RBC QN AUTO: 29.5 PG (ref 26–33)
MCH RBC QN AUTO: 30.3 PG (ref 26–33)
MCH RBC QN AUTO: 30.4 PG (ref 26–33)
MCH RBC QN AUTO: 30.4 PG (ref 26–33)
MCH RBC QN AUTO: 30.8 PG (ref 26–33)
MCH RBC QN AUTO: 30.9 PG (ref 26–33)
MCHC RBC AUTO-ENTMCNC: 32.6 GM/DL (ref 32.2–35.5)
MCHC RBC AUTO-ENTMCNC: 32.9 GM/DL (ref 32.2–35.5)
MCHC RBC AUTO-ENTMCNC: 33.1 GM/DL (ref 32.2–35.5)
MCHC RBC AUTO-ENTMCNC: 33.6 GM/DL (ref 32.2–35.5)
MCHC RBC AUTO-ENTMCNC: 35.1 GM/DL (ref 32.2–35.5)
MCHC RBC AUTO-ENTMCNC: 35.4 GM/DL (ref 32.2–35.5)
MCHC RBC AUTO-ENTMCNC: 35.6 GM/DL (ref 32.2–35.5)
MCV RBC AUTO: 85.6 FL (ref 80–94)
MCV RBC AUTO: 86.7 FL (ref 80–94)
MCV RBC AUTO: 88 FL (ref 80–94)
MCV RBC AUTO: 88.7 FL (ref 80–94)
MCV RBC AUTO: 89.6 FL (ref 80–94)
MCV RBC AUTO: 90.7 FL (ref 80–94)
MCV RBC AUTO: 91.7 FL (ref 80–94)
PATHOLOGIST REVIEW: ABNORMAL
PCO2 BLDA: 28 MMHG (ref 35–45)
PCO2 BLDA: 29 MMHG (ref 35–45)
PCO2 BLDA: 33 MMHG (ref 35–45)
PCO2 BLDA: 36 MMHG (ref 35–45)
PCO2 BLDA: 39 MMHG (ref 35–45)
PCO2 BLDA: 39 MMHG (ref 35–45)
PCO2 BLDA: 41 MMHG (ref 35–45)
PCO2 BLDA: 42 MMHG (ref 35–45)
PCO2 BLDA: 46 MMHG (ref 35–45)
PCO2 BLDA: 49 MMHG (ref 35–45)
PCO2 BLDA: 50 MMHG (ref 35–45)
PCO2 TEMP ADJ BLDA: 48 MM[HG] (ref 35–45)
PCO2 TEMP ADJ BLDMV: 29 MMHG (ref 41–51)
PCO2 TEMP ADJ BLDMV: 31 MMHG (ref 41–51)
PCO2 TEMP ADJ BLDMV: 34 MMHG (ref 41–51)
PCO2 TEMP ADJ BLDMV: 36 MMHG (ref 41–51)
PCO2 TEMP ADJ BLDMV: 36 MMHG (ref 41–51)
PCO2 TEMP ADJ BLDMV: 43 MMHG (ref 41–51)
PCO2 TEMP ADJ BLDMV: 45 MMHG (ref 41–51)
PCO2 TEMP ADJ BLDMV: 45 MMHG (ref 41–51)
PCO2 TEMP ADJ BLDMV: 48 MMHG (ref 41–51)
PCO2 TEMP ADJ BLDMV: 50 MMHG (ref 41–51)
PCO2 TEMP ADJ BLDMV: 52 MMHG (ref 41–51)
PEEP SETTING VENT: 10 MMHG
PH BLDA: 7.2 [PH] (ref 7.35–7.45)
PH BLDA: 7.21 [PH] (ref 7.35–7.45)
PH BLDA: 7.23 [PH] (ref 7.35–7.45)
PH BLDA: 7.35 [PH] (ref 7.35–7.45)
PH BLDA: 7.36 [PH] (ref 7.35–7.45)
PH BLDA: 7.38 [PH] (ref 7.35–7.45)
PH BLDA: 7.39 [PH] (ref 7.35–7.45)
PH BLDA: 7.4 [PH] (ref 7.35–7.45)
PH BLDA: 7.4 [PH] (ref 7.35–7.45)
PH BLDA: 7.43 [PH] (ref 7.35–7.45)
PH BLDA: 7.47 [PH] (ref 7.35–7.45)
PH BLDMV: 7.17 [PH] (ref 7.31–7.41)
PH BLDMV: 7.18 [PH] (ref 7.31–7.41)
PH BLDMV: 7.29 [PH] (ref 7.31–7.41)
PH BLDMV: 7.3 [PH] (ref 7.31–7.41)
PH BLDMV: 7.3 [PH] (ref 7.31–7.41)
PH BLDMV: 7.31 [PH] (ref 7.31–7.41)
PH BLDMV: 7.32 [PH] (ref 7.31–7.41)
PH BLDMV: 7.34 [PH] (ref 7.31–7.41)
PH BLDMV: 7.35 [PH] (ref 7.31–7.41)
PH BLDMV: 7.37 [PH] (ref 7.31–7.41)
PH BLDMV: 7.42 [PH] (ref 7.31–7.41)
PH TEMP ADJ BLDA: 7.23 [PH] (ref 7.35–7.45)
PHOSPHATE SERPL-MCNC: 5.7 MG/DL (ref 2.4–4.7)
PIP: 22 CMH2O
PLATELET # BLD AUTO: 23 THOU/MM3 (ref 130–400)
PLATELET # BLD AUTO: 26 THOU/MM3 (ref 130–400)
PLATELET # BLD AUTO: 34 THOU/MM3 (ref 130–400)
PLATELET # BLD AUTO: 40 THOU/MM3 (ref 130–400)
PLATELET # BLD AUTO: 48 THOU/MM3 (ref 130–400)
PLATELET # BLD AUTO: 49 THOU/MM3 (ref 130–400)
PLATELET # BLD AUTO: 49 THOU/MM3 (ref 130–400)
PMV BLD AUTO: 10.7 FL (ref 9.4–12.4)
PMV BLD AUTO: 10.9 FL (ref 9.4–12.4)
PMV BLD AUTO: 11.1 FL (ref 9.4–12.4)
PMV BLD AUTO: 11.1 FL (ref 9.4–12.4)
PMV BLD AUTO: 11.4 FL (ref 9.4–12.4)
PMV BLD AUTO: 11.7 FL (ref 9.4–12.4)
PMV BLD AUTO: 12.2 FL (ref 9.4–12.4)
PO2 BLDA: 450 MMHG (ref 71–104)
PO2 BLDA: 456 MMHG (ref 71–104)
PO2 BLDA: 478 MMHG (ref 71–104)
PO2 BLDA: 495 MMHG (ref 71–104)
PO2 BLDA: 496 MMHG (ref 71–104)
PO2 BLDA: 502 MMHG (ref 71–104)
PO2 BLDA: 518 MMHG (ref 71–104)
PO2 BLDA: 534 MMHG (ref 71–104)
PO2 BLDA: 535 MMHG (ref 71–104)
PO2 BLDA: 535 MMHG (ref 71–104)
PO2 BLDA: 569 MMHG (ref 71–104)
PO2 BLDMV: 36 MMHG (ref 25–40)
PO2 BLDMV: 37 MMHG (ref 25–40)
PO2 BLDMV: 37 MMHG (ref 25–40)
PO2 BLDMV: 39 MMHG (ref 25–40)
PO2 BLDMV: 42 MMHG (ref 25–40)
PO2 BLDMV: 44 MMHG (ref 25–40)
PO2 BLDMV: 45 MMHG (ref 25–40)
PO2 BLDMV: 45 MMHG (ref 25–40)
PO2 BLDMV: 48 MMHG (ref 25–40)
PO2 BLDMV: 55 MMHG (ref 25–40)
PO2 BLDMV: 56 MMHG (ref 25–40)
PO2 TEMP ADJ BLDA: 447 MM[HG] (ref 71–104)
POTASSIUM BLD-SCNC: 4.8 MEQ/L (ref 3.5–4.9)
POTASSIUM SERPL-SCNC: 3.9 MEQ/L (ref 3.5–5.2)
POTASSIUM SERPL-SCNC: 4 MEQ/L (ref 3.5–5.2)
POTASSIUM SERPL-SCNC: 4 MEQ/L (ref 3.5–5.2)
POTASSIUM SERPL-SCNC: 4.2 MEQ/L (ref 3.5–5.2)
POTASSIUM SERPL-SCNC: 4.6 MEQ/L (ref 3.5–5.2)
POTASSIUM SERPL-SCNC: 4.7 MEQ/L (ref 3.5–5.2)
PRESSURE SUPPORT SETTING VENT: 12 CMH2O
PROT SERPL-MCNC: 4.4 G/DL (ref 6.1–8)
RBC # BLD AUTO: 1.73 MILL/MM3 (ref 4.7–6.1)
RBC # BLD AUTO: 2.66 MILL/MM3 (ref 4.7–6.1)
RBC # BLD AUTO: 2.75 MILL/MM3 (ref 4.7–6.1)
RBC # BLD AUTO: 3.08 MILL/MM3 (ref 4.7–6.1)
RBC # BLD AUTO: 3.22 MILL/MM3 (ref 4.7–6.1)
RBC # BLD AUTO: 3.26 MILL/MM3 (ref 4.7–6.1)
RBC # BLD AUTO: 3.47 MILL/MM3 (ref 4.7–6.1)
REASON FOR REJECTION: NORMAL
REJECTED TEST: NORMAL
RH FACTOR: NORMAL
SAO2 % BLDA: 100 %
SAO2 % BLDMV: 64 %
SAO2 % BLDMV: 67 %
SAO2 % BLDMV: 68 %
SAO2 % BLDMV: 69 %
SAO2 % BLDMV: 71 %
SAO2 % BLDMV: 73 %
SAO2 % BLDMV: 75 %
SAO2 % BLDMV: 79 %
SAO2 % BLDMV: 79 %
SAO2 % BLDMV: 82 %
SAO2 % BLDMV: 86 %
SCAN OF BLOOD SMEAR: NORMAL
SET PEEP: 10 MMHG
SET PRESS SUPP: 12 CMH2O
SET RESPIRATORY RATE: 12 BPM
SET TIDAL VOLUME: 400 ML
SITE: ABNORMAL
SITE: NORMAL
SITE: NORMAL
SODIUM BLD-SCNC: 152 MEQ/L (ref 138–146)
SODIUM SERPL-SCNC: 150 MEQ/L (ref 135–145)
SODIUM SERPL-SCNC: 151 MEQ/L (ref 135–145)
SODIUM SERPL-SCNC: 153 MEQ/L (ref 135–145)
SODIUM SERPL-SCNC: 154 MEQ/L (ref 135–145)
SODIUM SERPL-SCNC: 154 MEQ/L (ref 135–145)
SODIUM SERPL-SCNC: 155 MEQ/L (ref 135–145)
VENTILATION MODE VENT: ABNORMAL
VT SETTING VENT: 400 ML
WBC # BLD AUTO: 4.7 THOU/MM3 (ref 4.8–10.8)
WBC # BLD AUTO: 5 THOU/MM3 (ref 4.8–10.8)
WBC # BLD AUTO: 5.1 THOU/MM3 (ref 4.8–10.8)
WBC # BLD AUTO: 5.6 THOU/MM3 (ref 4.8–10.8)
WBC # BLD AUTO: 5.6 THOU/MM3 (ref 4.8–10.8)
WBC # BLD AUTO: 6.1 THOU/MM3 (ref 4.8–10.8)
WBC # BLD AUTO: 7.1 THOU/MM3 (ref 4.8–10.8)

## 2024-03-08 PROCEDURE — 2580000003 HC RX 258: Performed by: INTERNAL MEDICINE

## 2024-03-08 PROCEDURE — 33949 ECMO/ECLS DAILY MGMT ARTERY: CPT

## 2024-03-08 PROCEDURE — 82948 REAGENT STRIP/BLOOD GLUCOSE: CPT

## 2024-03-08 PROCEDURE — 6360000002 HC RX W HCPCS: Performed by: INTERNAL MEDICINE

## 2024-03-08 PROCEDURE — 02PA03Z REMOVAL OF INFUSION DEVICE FROM HEART, OPEN APPROACH: ICD-10-PCS | Performed by: THORACIC SURGERY (CARDIOTHORACIC VASCULAR SURGERY)

## 2024-03-08 PROCEDURE — 33964 ECMO/ECLS REPOS PERPH CNULA: CPT | Performed by: THORACIC SURGERY (CARDIOTHORACIC VASCULAR SURGERY)

## 2024-03-08 PROCEDURE — 2500000003 HC RX 250 WO HCPCS: Performed by: NURSE ANESTHETIST, CERTIFIED REGISTERED

## 2024-03-08 PROCEDURE — 85018 HEMOGLOBIN: CPT

## 2024-03-08 PROCEDURE — 82803 BLOOD GASES ANY COMBINATION: CPT

## 2024-03-08 PROCEDURE — 6360000002 HC RX W HCPCS: Performed by: STUDENT IN AN ORGANIZED HEALTH CARE EDUCATION/TRAINING PROGRAM

## 2024-03-08 PROCEDURE — 83605 ASSAY OF LACTIC ACID: CPT

## 2024-03-08 PROCEDURE — 2000000000 HC ICU R&B

## 2024-03-08 PROCEDURE — 2709999900 HC NON-CHARGEABLE SUPPLY: Performed by: THORACIC SURGERY (CARDIOTHORACIC VASCULAR SURGERY)

## 2024-03-08 PROCEDURE — 86901 BLOOD TYPING SEROLOGIC RH(D): CPT

## 2024-03-08 PROCEDURE — 71045 X-RAY EXAM CHEST 1 VIEW: CPT

## 2024-03-08 PROCEDURE — 83735 ASSAY OF MAGNESIUM: CPT

## 2024-03-08 PROCEDURE — 93005 ELECTROCARDIOGRAM TRACING: CPT | Performed by: PHYSICIAN ASSISTANT

## 2024-03-08 PROCEDURE — 94003 VENT MGMT INPAT SUBQ DAY: CPT

## 2024-03-08 PROCEDURE — 36556 INSERT NON-TUNNEL CV CATH: CPT | Performed by: INTERNAL MEDICINE

## 2024-03-08 PROCEDURE — P9041 ALBUMIN (HUMAN),5%, 50ML: HCPCS | Performed by: THORACIC SURGERY (CARDIOTHORACIC VASCULAR SURGERY)

## 2024-03-08 PROCEDURE — 85014 HEMATOCRIT: CPT

## 2024-03-08 PROCEDURE — 93010 ELECTROCARDIOGRAM REPORT: CPT | Performed by: NUCLEAR MEDICINE

## 2024-03-08 PROCEDURE — P9037 PLATE PHERES LEUKOREDU IRRAD: HCPCS

## 2024-03-08 PROCEDURE — 3700000001 HC ADD 15 MINUTES (ANESTHESIA): Performed by: THORACIC SURGERY (CARDIOTHORACIC VASCULAR SURGERY)

## 2024-03-08 PROCEDURE — 37799 UNLISTED PX VASCULAR SURGERY: CPT

## 2024-03-08 PROCEDURE — 3600000018 HC SURGERY OHS ADDTL 15MIN: Performed by: THORACIC SURGERY (CARDIOTHORACIC VASCULAR SURGERY)

## 2024-03-08 PROCEDURE — 2580000003 HC RX 258: Performed by: NURSE ANESTHETIST, CERTIFIED REGISTERED

## 2024-03-08 PROCEDURE — P9016 RBC LEUKOCYTES REDUCED: HCPCS

## 2024-03-08 PROCEDURE — 85384 FIBRINOGEN ACTIVITY: CPT

## 2024-03-08 PROCEDURE — 85730 THROMBOPLASTIN TIME PARTIAL: CPT

## 2024-03-08 PROCEDURE — 6370000000 HC RX 637 (ALT 250 FOR IP)

## 2024-03-08 PROCEDURE — 36430 TRANSFUSION BLD/BLD COMPNT: CPT

## 2024-03-08 PROCEDURE — 82330 ASSAY OF CALCIUM: CPT

## 2024-03-08 PROCEDURE — 84100 ASSAY OF PHOSPHORUS: CPT

## 2024-03-08 PROCEDURE — P9017 PLASMA 1 DONOR FRZ W/IN 8 HR: HCPCS

## 2024-03-08 PROCEDURE — P9047 ALBUMIN (HUMAN), 25%, 50ML: HCPCS | Performed by: INTERNAL MEDICINE

## 2024-03-08 PROCEDURE — 94761 N-INVAS EAR/PLS OXIMETRY MLT: CPT

## 2024-03-08 PROCEDURE — 6360000002 HC RX W HCPCS: Performed by: NURSE ANESTHETIST, CERTIFIED REGISTERED

## 2024-03-08 PROCEDURE — 85027 COMPLETE CBC AUTOMATED: CPT

## 2024-03-08 PROCEDURE — C9113 INJ PANTOPRAZOLE SODIUM, VIA: HCPCS | Performed by: STUDENT IN AN ORGANIZED HEALTH CARE EDUCATION/TRAINING PROGRAM

## 2024-03-08 PROCEDURE — 2500000003 HC RX 250 WO HCPCS: Performed by: THORACIC SURGERY (CARDIOTHORACIC VASCULAR SURGERY)

## 2024-03-08 PROCEDURE — 3700000000 HC ANESTHESIA ATTENDED CARE: Performed by: THORACIC SURGERY (CARDIOTHORACIC VASCULAR SURGERY)

## 2024-03-08 PROCEDURE — 2500000003 HC RX 250 WO HCPCS

## 2024-03-08 PROCEDURE — 86923 COMPATIBILITY TEST ELECTRIC: CPT

## 2024-03-08 PROCEDURE — 5A1522F EXTRACORPOREAL OXYGENATION, MEMBRANE, CENTRAL: ICD-10-PCS | Performed by: THORACIC SURGERY (CARDIOTHORACIC VASCULAR SURGERY)

## 2024-03-08 PROCEDURE — 6370000000 HC RX 637 (ALT 250 FOR IP): Performed by: PHYSICIAN ASSISTANT

## 2024-03-08 PROCEDURE — 85347 COAGULATION TIME ACTIVATED: CPT

## 2024-03-08 PROCEDURE — 6360000002 HC RX W HCPCS: Performed by: PHYSICIAN ASSISTANT

## 2024-03-08 PROCEDURE — 80053 COMPREHEN METABOLIC PANEL: CPT

## 2024-03-08 PROCEDURE — 84132 ASSAY OF SERUM POTASSIUM: CPT

## 2024-03-08 PROCEDURE — 2500000003 HC RX 250 WO HCPCS: Performed by: INTERNAL MEDICINE

## 2024-03-08 PROCEDURE — 36415 COLL VENOUS BLD VENIPUNCTURE: CPT

## 2024-03-08 PROCEDURE — 2580000003 HC RX 258: Performed by: PHYSICIAN ASSISTANT

## 2024-03-08 PROCEDURE — 2500000003 HC RX 250 WO HCPCS: Performed by: PHYSICIAN ASSISTANT

## 2024-03-08 PROCEDURE — P9012 CRYOPRECIPITATE EACH UNIT: HCPCS

## 2024-03-08 PROCEDURE — 84295 ASSAY OF SERUM SODIUM: CPT

## 2024-03-08 PROCEDURE — 99223 1ST HOSP IP/OBS HIGH 75: CPT | Performed by: INTERNAL MEDICINE

## 2024-03-08 PROCEDURE — 02HV33Z INSERTION OF INFUSION DEVICE INTO SUPERIOR VENA CAVA, PERCUTANEOUS APPROACH: ICD-10-PCS | Performed by: THORACIC SURGERY (CARDIOTHORACIC VASCULAR SURGERY)

## 2024-03-08 PROCEDURE — 2700000000 HC OXYGEN THERAPY PER DAY

## 2024-03-08 PROCEDURE — 86850 RBC ANTIBODY SCREEN: CPT

## 2024-03-08 PROCEDURE — 3600000008 HC SURGERY OHS BASE: Performed by: THORACIC SURGERY (CARDIOTHORACIC VASCULAR SURGERY)

## 2024-03-08 PROCEDURE — 2580000003 HC RX 258: Performed by: STUDENT IN AN ORGANIZED HEALTH CARE EDUCATION/TRAINING PROGRAM

## 2024-03-08 PROCEDURE — 90945 DIALYSIS ONE EVALUATION: CPT | Performed by: INTERNAL MEDICINE

## 2024-03-08 PROCEDURE — A4216 STERILE WATER/SALINE, 10 ML: HCPCS | Performed by: PHYSICIAN ASSISTANT

## 2024-03-08 PROCEDURE — 86900 BLOOD TYPING SEROLOGIC ABO: CPT

## 2024-03-08 PROCEDURE — 99291 CRITICAL CARE FIRST HOUR: CPT | Performed by: INTERNAL MEDICINE

## 2024-03-08 PROCEDURE — 82947 ASSAY GLUCOSE BLOOD QUANT: CPT

## 2024-03-08 PROCEDURE — 02580ZZ DESTRUCTION OF CONDUCTION MECHANISM, OPEN APPROACH: ICD-10-PCS | Performed by: THORACIC SURGERY (CARDIOTHORACIC VASCULAR SURGERY)

## 2024-03-08 PROCEDURE — 6360000002 HC RX W HCPCS: Performed by: THORACIC SURGERY (CARDIOTHORACIC VASCULAR SURGERY)

## 2024-03-08 PROCEDURE — 2500000003 HC RX 250 WO HCPCS: Performed by: STUDENT IN AN ORGANIZED HEALTH CARE EDUCATION/TRAINING PROGRAM

## 2024-03-08 PROCEDURE — 2580000003 HC RX 258: Performed by: THORACIC SURGERY (CARDIOTHORACIC VASCULAR SURGERY)

## 2024-03-08 RX ORDER — SODIUM CHLORIDE 9 MG/ML
INJECTION, SOLUTION INTRAVENOUS PRN
Status: DISCONTINUED | OUTPATIENT
Start: 2024-03-08 | End: 2024-03-08

## 2024-03-08 RX ORDER — MIDAZOLAM HYDROCHLORIDE 1 MG/ML
INJECTION INTRAMUSCULAR; INTRAVENOUS PRN
Status: DISCONTINUED | OUTPATIENT
Start: 2024-03-08 | End: 2024-03-08 | Stop reason: SDUPTHER

## 2024-03-08 RX ORDER — CALCIUM CHLORIDE 100 MG/ML
INJECTION INTRAVENOUS; INTRAVENTRICULAR PRN
Status: DISCONTINUED | OUTPATIENT
Start: 2024-03-08 | End: 2024-03-08 | Stop reason: SDUPTHER

## 2024-03-08 RX ORDER — MAGNESIUM SULFATE 1 G/100ML
1000 INJECTION INTRAVENOUS PRN
Status: DISCONTINUED | OUTPATIENT
Start: 2024-03-08 | End: 2024-04-04

## 2024-03-08 RX ORDER — ALBUMIN (HUMAN) 12.5 G/50ML
25 SOLUTION INTRAVENOUS EVERY 6 HOURS
Status: COMPLETED | OUTPATIENT
Start: 2024-03-08 | End: 2024-03-10

## 2024-03-08 RX ORDER — ROCURONIUM BROMIDE 10 MG/ML
INJECTION, SOLUTION INTRAVENOUS PRN
Status: DISCONTINUED | OUTPATIENT
Start: 2024-03-08 | End: 2024-03-08 | Stop reason: SDUPTHER

## 2024-03-08 RX ORDER — POTASSIUM CHLORIDE 29.8 MG/ML
20 INJECTION INTRAVENOUS PRN
Status: DISCONTINUED | OUTPATIENT
Start: 2024-03-08 | End: 2024-04-04

## 2024-03-08 RX ORDER — SODIUM CHLORIDE, SODIUM GLUCONATE, SODIUM ACETATE, POTASSIUM CHLORIDE AND MAGNESIUM CHLORIDE 526; 502; 368; 37; 30 MG/100ML; MG/100ML; MG/100ML; MG/100ML; MG/100ML
INJECTION, SOLUTION INTRAVENOUS CONTINUOUS PRN
Status: DISCONTINUED | OUTPATIENT
Start: 2024-03-08 | End: 2024-03-08 | Stop reason: SDUPTHER

## 2024-03-08 RX ORDER — DEXMEDETOMIDINE HYDROCHLORIDE 4 UG/ML
.1-1.5 INJECTION, SOLUTION INTRAVENOUS CONTINUOUS
Status: DISCONTINUED | OUTPATIENT
Start: 2024-03-08 | End: 2024-03-20

## 2024-03-08 RX ORDER — SODIUM CHLORIDE 9 MG/ML
INJECTION, SOLUTION INTRAVENOUS PRN
Status: DISCONTINUED | OUTPATIENT
Start: 2024-03-08 | End: 2024-03-14

## 2024-03-08 RX ORDER — DEXTROSE MONOHYDRATE 50 MG/ML
INJECTION, SOLUTION INTRAVENOUS CONTINUOUS
Status: DISCONTINUED | OUTPATIENT
Start: 2024-03-08 | End: 2024-03-11

## 2024-03-08 RX ORDER — FENTANYL CITRATE-0.9 % NACL/PF 10 MCG/ML
25-200 PLASTIC BAG, INJECTION (ML) INTRAVENOUS CONTINUOUS
Status: DISCONTINUED | OUTPATIENT
Start: 2024-03-08 | End: 2024-03-09

## 2024-03-08 RX ADMIN — Medication 0.2 MCG/KG/HR: at 20:49

## 2024-03-08 RX ADMIN — SENNOSIDES AND DOCUSATE SODIUM 1 TABLET: 50; 8.6 TABLET ORAL at 20:58

## 2024-03-08 RX ADMIN — CALCIUM CHLORIDE INJECTION 1 G: 100 INJECTION, SOLUTION INTRAVENOUS at 02:41

## 2024-03-08 RX ADMIN — CALCIUM CHLORIDE 2000 MG: 100 INJECTION, SOLUTION INTRAVENOUS at 01:27

## 2024-03-08 RX ADMIN — Medication: at 23:04

## 2024-03-08 RX ADMIN — DEXTROSE MONOHYDRATE: 50 INJECTION, SOLUTION INTRAVENOUS at 20:50

## 2024-03-08 RX ADMIN — Medication: at 12:37

## 2024-03-08 RX ADMIN — SODIUM CHLORIDE, SODIUM GLUCONATE, SODIUM ACETATE, POTASSIUM CHLORIDE AND MAGNESIUM CHLORIDE: 526; 502; 368; 37; 30 INJECTION, SOLUTION INTRAVENOUS at 01:39

## 2024-03-08 RX ADMIN — ALBUMIN (HUMAN) 25 G: 12.5 INJECTION, SOLUTION INTRAVENOUS at 09:26

## 2024-03-08 RX ADMIN — ALBUMIN (HUMAN) 25 G: 12.5 INJECTION, SOLUTION INTRAVENOUS at 06:34

## 2024-03-08 RX ADMIN — Medication: at 12:36

## 2024-03-08 RX ADMIN — FAMOTIDINE 20 MG: 10 INJECTION, SOLUTION INTRAVENOUS at 08:08

## 2024-03-08 RX ADMIN — ROCURONIUM BROMIDE 50 MG: 10 INJECTION INTRAVENOUS at 03:29

## 2024-03-08 RX ADMIN — Medication 8 MCG/MIN: at 05:01

## 2024-03-08 RX ADMIN — Medication: at 23:02

## 2024-03-08 RX ADMIN — AMIODARONE HYDROCHLORIDE 1 MG/MIN: 1.8 INJECTION, SOLUTION INTRAVENOUS at 21:04

## 2024-03-08 RX ADMIN — SODIUM BICARBONATE 50 MEQ: 84 INJECTION, SOLUTION INTRAVENOUS at 02:45

## 2024-03-08 RX ADMIN — ALBUMIN (HUMAN) 25 G: 0.25 INJECTION, SOLUTION INTRAVENOUS at 14:11

## 2024-03-08 RX ADMIN — BIVALIRUDIN 0.25 MG/KG/HR: 250 INJECTION, POWDER, LYOPHILIZED, FOR SOLUTION INTRAVENOUS at 12:04

## 2024-03-08 RX ADMIN — SODIUM CHLORIDE 5 MG/HR: 9 INJECTION, SOLUTION INTRAVENOUS at 22:59

## 2024-03-08 RX ADMIN — SODIUM CHLORIDE: 9 INJECTION, SOLUTION INTRAVENOUS at 17:01

## 2024-03-08 RX ADMIN — PANTOPRAZOLE SODIUM 8 MG/HR: 40 INJECTION, POWDER, FOR SOLUTION INTRAVENOUS at 22:35

## 2024-03-08 RX ADMIN — SODIUM CHLORIDE, PRESERVATIVE FREE 10 ML: 5 INJECTION INTRAVENOUS at 21:05

## 2024-03-08 RX ADMIN — ALBUMIN (HUMAN) 25 G: 12.5 INJECTION, SOLUTION INTRAVENOUS at 00:35

## 2024-03-08 RX ADMIN — SODIUM CHLORIDE, PRESERVATIVE FREE 10 ML: 5 INJECTION INTRAVENOUS at 11:04

## 2024-03-08 RX ADMIN — CALCIUM CHLORIDE 1000 MG: 100 INJECTION, SOLUTION INTRAVENOUS at 22:30

## 2024-03-08 RX ADMIN — MORPHINE SULFATE 2 MG: 2 INJECTION, SOLUTION INTRAMUSCULAR; INTRAVENOUS at 21:09

## 2024-03-08 RX ADMIN — ATORVASTATIN CALCIUM 40 MG: 40 TABLET, FILM COATED ORAL at 21:06

## 2024-03-08 RX ADMIN — Medication 1 TABLET: at 08:32

## 2024-03-08 RX ADMIN — CHLORHEXIDINE GLUCONATE 0.12% ORAL RINSE 15 ML: 1.2 LIQUID ORAL at 11:05

## 2024-03-08 RX ADMIN — SODIUM BICARBONATE: 84 INJECTION, SOLUTION INTRAVENOUS at 09:12

## 2024-03-08 RX ADMIN — BIVALIRUDIN 0.04 MG/KG/HR: 250 INJECTION, POWDER, LYOPHILIZED, FOR SOLUTION INTRAVENOUS at 13:00

## 2024-03-08 RX ADMIN — COLLAGENASE SANTYL: 250 OINTMENT TOPICAL at 09:00

## 2024-03-08 RX ADMIN — Medication 50 MCG/HR: at 23:06

## 2024-03-08 RX ADMIN — ALBUMIN (HUMAN) 25 G: 12.5 INJECTION, SOLUTION INTRAVENOUS at 22:06

## 2024-03-08 RX ADMIN — CHLORHEXIDINE GLUCONATE 0.12% ORAL RINSE 15 ML: 1.2 LIQUID ORAL at 20:58

## 2024-03-08 RX ADMIN — WATER 2000 MG: 1 INJECTION INTRAMUSCULAR; INTRAVENOUS; SUBCUTANEOUS at 04:42

## 2024-03-08 RX ADMIN — ALBUMIN (HUMAN) 25 G: 12.5 INJECTION, SOLUTION INTRAVENOUS at 21:05

## 2024-03-08 RX ADMIN — SENNOSIDES AND DOCUSATE SODIUM 1 TABLET: 50; 8.6 TABLET ORAL at 08:29

## 2024-03-08 RX ADMIN — ROCURONIUM BROMIDE 50 MG: 10 INJECTION INTRAVENOUS at 01:45

## 2024-03-08 RX ADMIN — MORPHINE SULFATE 2 MG: 2 INJECTION, SOLUTION INTRAMUSCULAR; INTRAVENOUS at 22:15

## 2024-03-08 RX ADMIN — MIDAZOLAM 2 MG: 1 INJECTION INTRAMUSCULAR; INTRAVENOUS at 03:21

## 2024-03-08 RX ADMIN — SODIUM BICARBONATE 50 MEQ: 84 INJECTION, SOLUTION INTRAVENOUS at 02:37

## 2024-03-08 RX ADMIN — AMIODARONE HYDROCHLORIDE 150 MG: 1.5 INJECTION, SOLUTION INTRAVENOUS at 00:47

## 2024-03-08 RX ADMIN — ALBUMIN (HUMAN) 25 G: 0.25 INJECTION, SOLUTION INTRAVENOUS at 18:10

## 2024-03-08 RX ADMIN — SODIUM CHLORIDE, SODIUM GLUCONATE, SODIUM ACETATE, POTASSIUM CHLORIDE AND MAGNESIUM CHLORIDE: 526; 502; 368; 37; 30 INJECTION, SOLUTION INTRAVENOUS at 02:41

## 2024-03-08 RX ADMIN — SODIUM CHLORIDE 8.4 UNITS/HR: 9 INJECTION, SOLUTION INTRAVENOUS at 13:22

## 2024-03-08 ASSESSMENT — PULMONARY FUNCTION TESTS
PIF_VALUE: 20
PIF_VALUE: 21
PIF_VALUE: 20
PIF_VALUE: 21

## 2024-03-08 NOTE — ANESTHESIA POSTPROCEDURE EVALUATION
Department of Anesthesiology  Postprocedure Note    Patient: Mumtaz Islas  MRN: [id number]  YOB: 1955  Date of evaluation: 3/8/2024    Procedure Summary       Date: 03/06/24 [facility]    Anesthesia Start: 2224 Anesthesia Stop: 03/07/24 0114    Procedure: CARDIAC RE-ENTRY Diagnosis:       Postoperative hemorrhage involving circulatory system following cardiac bypass      (Postoperative hemorrhage involving circulatory system following cardiac bypass [I97.611])    Surgeons: Manish Bess MD Responsible Provider: Jarrell Shahid DO    Anesthesia Type: general ASA Status: 5 - Emergent            Anesthesia Type: No value filed.    Rosaline Phase I: Rosaline Score: 10    Rosaline Phase II:      Anesthesia Post Evaluation    Patient location during evaluation: ICU  Patient participation: complete - patient cannot participate  Level of consciousness: sedated and ventilated  Airway patency: patent  Cardiovascular status: vasoactive/inotropes and hemodynamically unstable  Respiratory status: ventilator  Pain management: adequate        No notable events documented.

## 2024-03-08 NOTE — ANESTHESIA PRE PROCEDURE
performed by Herberth Butler MD at [facility] ENDOSCOPY       Social History:    Social History     Tobacco Use   • Smoking status: Former     Current packs/day: 0.00     Average packs/day: 1 pack/day for 40.0 years (40.0 ttl pk-yrs)     Types: Cigars, Cigarettes     Start date: 1972     Quit date: 2012     Years since quittin.9   • Smokeless tobacco: Never   Substance Use Topics   • Alcohol use: No                                Counseling given: Not Answered      Vital Signs (Current):   Vitals:    24 2340 24 2345 24 2350 24 235   BP:       Pulse: 90 90 90 89   Resp: 12 12 12 12   Temp:       TempSrc:       SpO2:       Weight:       Height:                                                  BP Readings from Last 3 Encounters:   24 (!) 80/75   24 (!) 151/76   24 (!) 176/72       NPO Status: Time of last liquid consumption: 0330 (Sip of water with am medication)                        Time of last solid consumption:                         Date of last liquid consumption: 24                        Date of last solid food consumption: 24    BMI:   Wt Readings from Last 3 Encounters:   24 64.7 kg (142 lb 10.2 oz)   24 61.2 kg (134 lb 14.7 oz)   24 62.7 kg (138 lb 3.2 oz)     Body mass index is 21.69 kg/m².    CBC:   Lab Results   Component Value Date/Time    WBC 5.6 2024 12:10 AM    RBC 1.73 2024 12:10 AM    HGB 5.1 2024 12:10 AM    HCT 15.5 2024 12:10 AM    MCV 89.6 2024 12:10 AM    RDW 14.8 2023 07:26 PM    PLT 48 2024 12:10 AM       CMP:   Lab Results   Component Value Date/Time     2024 12:10 AM    K 3.9 2024 12:10 AM    K 4.9 2024 12:20 PM     2024 12:10 AM    CO2 14 2024 12:10 AM    BUN 30 2024 12:10 AM    CREATININE 2.5 2024 12:10 AM    LABGLOM 27 2024 12:10 AM    GLUCOSE 165 2024 12:10 AM    PROT 6.6 2023 07:37 PM

## 2024-03-08 NOTE — ANESTHESIA POSTPROCEDURE EVALUATION
Department of Anesthesiology  Postprocedure Note    Patient: Mumtaz Islas  MRN: [id number]  YOB: 1955  Date of evaluation: 3/8/2024    Procedure Summary       Date: 03/08/24 [facility]    Anesthesia Start: 0139 Anesthesia Stop: 0407    Procedure: BRING BACK OPEN HEART - ECMO (Chest) Diagnosis:       History of extracorporeal membrane oxygenation      (History of extracorporeal membrane oxygenation [Z92.81])    Surgeons: Manish Bess MD Responsible Provider: Vaughn Rodriguez DO    Anesthesia Type: general ASA Status: 5 - Emergent            Anesthesia Type: No value filed.    Rosaline Phase I: Rosaline Score: 10    Rosaline Phase II:      Anesthesia Post Evaluation    Patient location during evaluation: ICU  Patient participation: complete - patient cannot participate  Level of consciousness: sedated and ventilated  Airway patency: patent  Cardiovascular status: vasoactive/inotropes  Respiratory status: ventilator        No notable events documented.

## 2024-03-08 NOTE — ANESTHESIA POSTPROCEDURE EVALUATION
Department of Anesthesiology  Postprocedure Note    Patient: Mumtaz Islas  MRN: [id number]  YOB: 1955  Date of evaluation: 3/8/2024    Procedure Summary       Date: 03/07/24 Room / Location: [facility] OR  / [facility] OR    Anesthesia Start: 1636 Anesthesia Stop: 1934    Procedure: ECMO (Chest) Diagnosis:       CAD in native artery      (CAD in native artery [I25.10])    Surgeons: Manish Bess MD Responsible Provider: Elver Pagan DO    Anesthesia Type: general ASA Status: 4 - Emergent            Anesthesia Type: No value filed.    Rosaline Phase I: Rosaline Score: 10    Rosaline Phase II:      Anesthesia Post Evaluation    Patient location during evaluation: ICU  Patient participation: complete - patient cannot participate  Level of consciousness: sedated and ventilated  Airway patency: patent  Cardiovascular status: vasoactive/inotropes and hemodynamically unstable  Respiratory status: ventilator  Pain management: adequate        No notable events documented.

## 2024-03-09 ENCOUNTER — APPOINTMENT (OUTPATIENT)
Dept: GENERAL RADIOLOGY | Age: 69
DRG: 003 | End: 2024-03-09
Attending: THORACIC SURGERY (CARDIOTHORACIC VASCULAR SURGERY)
Payer: MEDICARE

## 2024-03-09 LAB
ALBUMIN SERPL BCG-MCNC: 3.8 G/DL (ref 3.5–5.1)
ALBUMIN SERPL BCG-MCNC: 4 G/DL (ref 3.5–5.1)
ALBUMIN SERPL BCG-MCNC: 4.1 G/DL (ref 3.5–5.1)
ALP SERPL-CCNC: 47 U/L (ref 38–126)
ALP SERPL-CCNC: 48 U/L (ref 38–126)
ALP SERPL-CCNC: 52 U/L (ref 38–126)
ALP SERPL-CCNC: 56 U/L (ref 38–126)
ALT SERPL W/O P-5'-P-CCNC: 100 U/L (ref 11–66)
ALT SERPL W/O P-5'-P-CCNC: 155 U/L (ref 11–66)
ALT SERPL W/O P-5'-P-CCNC: 60 U/L (ref 11–66)
ALT SERPL W/O P-5'-P-CCNC: 67 U/L (ref 11–66)
ALT SERPL W/O P-5'-P-CCNC: 71 U/L (ref 11–66)
ALT SERPL W/O P-5'-P-CCNC: 87 U/L (ref 11–66)
ANION GAP SERPL CALC-SCNC: 13 MEQ/L (ref 8–16)
ANION GAP SERPL CALC-SCNC: 14 MEQ/L (ref 8–16)
ANION GAP SERPL CALC-SCNC: 14 MEQ/L (ref 8–16)
ANION GAP SERPL CALC-SCNC: 15 MEQ/L (ref 8–16)
ANION GAP SERPL CALC-SCNC: 16 MEQ/L (ref 8–16)
ANION GAP SERPL CALC-SCNC: 18 MEQ/L (ref 8–16)
APTT PPP: 46.9 SECONDS (ref 22–38)
APTT PPP: 47.2 SECONDS (ref 22–38)
APTT PPP: 47.7 SECONDS (ref 22–38)
APTT PPP: 47.8 SECONDS (ref 22–38)
APTT PPP: 48.5 SECONDS (ref 22–38)
APTT PPP: 51 SECONDS (ref 22–38)
ARTERIAL PATENCY WRIST A: ABNORMAL
AST SERPL-CCNC: 1091 U/L (ref 5–40)
AST SERPL-CCNC: 1164 U/L (ref 5–40)
AST SERPL-CCNC: 1368 U/L (ref 5–40)
AST SERPL-CCNC: 764 U/L (ref 5–40)
AST SERPL-CCNC: 824 U/L (ref 5–40)
AST SERPL-CCNC: 929 U/L (ref 5–40)
BASE EXCESS BLDA CALC-SCNC: -0.1 MMOL/L (ref -2–3)
BASE EXCESS BLDA CALC-SCNC: -1.4 MMOL/L (ref -2–3)
BASE EXCESS BLDA CALC-SCNC: -1.9 MMOL/L (ref -2–3)
BASE EXCESS BLDA CALC-SCNC: 0.2 MMOL/L (ref -2–3)
BASE EXCESS BLDA CALC-SCNC: 1 MMOL/L (ref -2.5–2.5)
BASE EXCESS BLDA CALC-SCNC: 1.2 MMOL/L (ref -2.5–2.5)
BASE EXCESS BLDA CALC-SCNC: 1.5 MMOL/L (ref -2–3)
BASE EXCESS BLDA CALC-SCNC: 1.7 MMOL/L (ref -2.5–2.5)
BASE EXCESS BLDA CALC-SCNC: 2 MMOL/L (ref -2.5–2.5)
BASE EXCESS BLDA CALC-SCNC: 2.1 MMOL/L (ref -2.5–2.5)
BASE EXCESS BLDA CALC-SCNC: 2.2 MMOL/L (ref -2.5–2.5)
BASE EXCESS BLDA CALC-SCNC: 3 MMOL/L (ref -2.5–2.5)
BDY SITE: ABNORMAL
BILIRUB SERPL-MCNC: 1.6 MG/DL (ref 0.3–1.2)
BILIRUB SERPL-MCNC: 1.7 MG/DL (ref 0.3–1.2)
BILIRUB SERPL-MCNC: 1.7 MG/DL (ref 0.3–1.2)
BILIRUB SERPL-MCNC: 1.8 MG/DL (ref 0.3–1.2)
BILIRUB SERPL-MCNC: 2 MG/DL (ref 0.3–1.2)
BILIRUB SERPL-MCNC: 2 MG/DL (ref 0.3–1.2)
BREATHS SETTING VENT: 12 BPM
BUN SERPL-MCNC: 32 MG/DL (ref 7–22)
BUN SERPL-MCNC: 33 MG/DL (ref 7–22)
BUN SERPL-MCNC: 33 MG/DL (ref 7–22)
CA-I BLD ISE-SCNC: 1.05 MMOL/L (ref 1.12–1.32)
CA-I BLD ISE-SCNC: 1.09 MMOL/L (ref 1.12–1.32)
CA-I BLD ISE-SCNC: 1.12 MMOL/L (ref 1.12–1.32)
CA-I BLD ISE-SCNC: 1.12 MMOL/L (ref 1.12–1.32)
CA-I BLD ISE-SCNC: 1.14 MMOL/L (ref 1.12–1.32)
CA-I BLD ISE-SCNC: 1.16 MMOL/L (ref 1.12–1.32)
CALCIUM SERPL-MCNC: 8.8 MG/DL (ref 8.5–10.5)
CALCIUM SERPL-MCNC: 8.9 MG/DL (ref 8.5–10.5)
CALCIUM SERPL-MCNC: 9 MG/DL (ref 8.5–10.5)
CALCIUM SERPL-MCNC: 9.1 MG/DL (ref 8.5–10.5)
CHLORIDE SERPL-SCNC: 102 MEQ/L (ref 98–111)
CHLORIDE SERPL-SCNC: 104 MEQ/L (ref 98–111)
CHLORIDE SERPL-SCNC: 105 MEQ/L (ref 98–111)
CHLORIDE SERPL-SCNC: 105 MEQ/L (ref 98–111)
CHLORIDE SERPL-SCNC: 106 MEQ/L (ref 98–111)
CHLORIDE SERPL-SCNC: 106 MEQ/L (ref 98–111)
CO2 SERPL-SCNC: 23 MEQ/L (ref 23–33)
CO2 SERPL-SCNC: 23 MEQ/L (ref 23–33)
CO2 SERPL-SCNC: 24 MEQ/L (ref 23–33)
CO2 SERPL-SCNC: 25 MEQ/L (ref 23–33)
COLLECTED BY:: ABNORMAL
CREAT SERPL-MCNC: 2.3 MG/DL (ref 0.4–1.2)
CREAT SERPL-MCNC: 2.4 MG/DL (ref 0.4–1.2)
CREAT SERPL-MCNC: 2.5 MG/DL (ref 0.4–1.2)
CREAT SERPL-MCNC: 2.5 MG/DL (ref 0.4–1.2)
DEPRECATED RDW RBC AUTO: 45.7 FL (ref 35–45)
DEPRECATED RDW RBC AUTO: 46.5 FL (ref 35–45)
DEPRECATED RDW RBC AUTO: 46.8 FL (ref 35–45)
DEPRECATED RDW RBC AUTO: 48.5 FL (ref 35–45)
DEPRECATED RDW RBC AUTO: 49.1 FL (ref 35–45)
DEPRECATED RDW RBC AUTO: 49.1 FL (ref 35–45)
DEVICE: ABNORMAL
ERYTHROCYTE [DISTWIDTH] IN BLOOD BY AUTOMATED COUNT: 14.8 % (ref 11.5–14.5)
ERYTHROCYTE [DISTWIDTH] IN BLOOD BY AUTOMATED COUNT: 15.2 % (ref 11.5–14.5)
ERYTHROCYTE [DISTWIDTH] IN BLOOD BY AUTOMATED COUNT: 15.5 % (ref 11.5–14.5)
ERYTHROCYTE [DISTWIDTH] IN BLOOD BY AUTOMATED COUNT: 15.9 % (ref 11.5–14.5)
ERYTHROCYTE [DISTWIDTH] IN BLOOD BY AUTOMATED COUNT: 15.9 % (ref 11.5–14.5)
ERYTHROCYTE [DISTWIDTH] IN BLOOD BY AUTOMATED COUNT: 16 % (ref 11.5–14.5)
FIBRINOGEN PPP-MCNC: 158 MG/100ML (ref 155–475)
FIBRINOGEN PPP-MCNC: 180 MG/100ML (ref 155–475)
FIBRINOGEN PPP-MCNC: 182 MG/100ML (ref 155–475)
FIBRINOGEN PPP-MCNC: 186 MG/100ML (ref 155–475)
FIBRINOGEN PPP-MCNC: 196 MG/100ML (ref 155–475)
FIBRINOGEN PPP-MCNC: 200 MG/100ML (ref 155–475)
FIO2 ON VENT O2 ANALYZER: 40 %
GFR SERPL CREATININE-BSD FRML MDRD: 27 ML/MIN/1.73M2
GFR SERPL CREATININE-BSD FRML MDRD: 27 ML/MIN/1.73M2
GFR SERPL CREATININE-BSD FRML MDRD: 28 ML/MIN/1.73M2
GFR SERPL CREATININE-BSD FRML MDRD: 30 ML/MIN/1.73M2
GLUCOSE BLD-MCNC: 146 MG/DL (ref 70–108)
GLUCOSE BLD-MCNC: 181 MG/DL (ref 70–108)
GLUCOSE BLD-MCNC: 194 MG/DL (ref 70–108)
GLUCOSE BLD-MCNC: 207 MG/DL (ref 70–108)
GLUCOSE BLD-MCNC: 226 MG/DL (ref 70–108)
GLUCOSE SERPL-MCNC: 120 MG/DL (ref 70–108)
GLUCOSE SERPL-MCNC: 164 MG/DL (ref 70–108)
GLUCOSE SERPL-MCNC: 199 MG/DL (ref 70–108)
GLUCOSE SERPL-MCNC: 216 MG/DL (ref 70–108)
GLUCOSE SERPL-MCNC: 230 MG/DL (ref 70–108)
GLUCOSE SERPL-MCNC: 240 MG/DL (ref 70–108)
HCO3 BLDA-SCNC: 23 MMOL/L (ref 23–28)
HCO3 BLDA-SCNC: 23 MMOL/L (ref 23–28)
HCO3 BLDA-SCNC: 25 MMOL/L (ref 23–28)
HCO3 BLDA-SCNC: 26 MMOL/L (ref 23–28)
HCO3 BLDA-SCNC: 27 MMOL/L (ref 23–28)
HCO3 BLDA-SCNC: 28 MMOL/L (ref 23–28)
HCT VFR BLD AUTO: 24.2 % (ref 42–52)
HCT VFR BLD AUTO: 25.7 % (ref 42–52)
HCT VFR BLD AUTO: 25.9 % (ref 42–52)
HCT VFR BLD AUTO: 26.5 % (ref 42–52)
HCT VFR BLD AUTO: 26.6 % (ref 42–52)
HCT VFR BLD AUTO: 27.2 % (ref 42–52)
HGB BLD-MCNC: 8.5 GM/DL (ref 14–18)
HGB BLD-MCNC: 9 GM/DL (ref 14–18)
HGB BLD-MCNC: 9 GM/DL (ref 14–18)
HGB BLD-MCNC: 9.2 GM/DL (ref 14–18)
HGB BLD-MCNC: 9.5 GM/DL (ref 14–18)
HGB BLD-MCNC: 9.8 GM/DL (ref 14–18)
LACTATE SERPL-SCNC: 1.8 MMOL/L (ref 0.5–2)
LACTATE SERPL-SCNC: 2 MMOL/L (ref 0.5–2)
LACTATE SERPL-SCNC: 2.2 MMOL/L (ref 0.5–2)
LACTATE SERPL-SCNC: 2.2 MMOL/L (ref 0.5–2)
LACTATE SERPL-SCNC: 2.5 MMOL/L (ref 0.5–2)
LACTATE SERPL-SCNC: 2.5 MMOL/L (ref 0.5–2)
LACTATE SERPL-SCNC: 3 MMOL/L (ref 0.5–2)
MAGNESIUM SERPL-MCNC: 2.1 MG/DL (ref 1.6–2.4)
MAGNESIUM SERPL-MCNC: 2.1 MG/DL (ref 1.6–2.4)
MAGNESIUM SERPL-MCNC: 2.2 MG/DL (ref 1.6–2.4)
MAGNESIUM SERPL-MCNC: 2.3 MG/DL (ref 1.6–2.4)
MCH RBC QN AUTO: 29.2 PG (ref 26–33)
MCH RBC QN AUTO: 29.4 PG (ref 26–33)
MCH RBC QN AUTO: 29.5 PG (ref 26–33)
MCH RBC QN AUTO: 29.7 PG (ref 26–33)
MCH RBC QN AUTO: 29.9 PG (ref 26–33)
MCH RBC QN AUTO: 30.4 PG (ref 26–33)
MCHC RBC AUTO-ENTMCNC: 34.6 GM/DL (ref 32.2–35.5)
MCHC RBC AUTO-ENTMCNC: 34.7 GM/DL (ref 32.2–35.5)
MCHC RBC AUTO-ENTMCNC: 35 GM/DL (ref 32.2–35.5)
MCHC RBC AUTO-ENTMCNC: 35.1 GM/DL (ref 32.2–35.5)
MCHC RBC AUTO-ENTMCNC: 35.8 GM/DL (ref 32.2–35.5)
MCHC RBC AUTO-ENTMCNC: 36 GM/DL (ref 32.2–35.5)
MCV RBC AUTO: 83.3 FL (ref 80–94)
MCV RBC AUTO: 84.3 FL (ref 80–94)
MCV RBC AUTO: 84.4 FL (ref 80–94)
MCV RBC AUTO: 84.5 FL (ref 80–94)
MCV RBC AUTO: 84.6 FL (ref 80–94)
MCV RBC AUTO: 84.6 FL (ref 80–94)
PCO2 BLDA: 35 MMHG (ref 35–45)
PCO2 BLDA: 40 MMHG (ref 35–45)
PCO2 BLDA: 40 MMHG (ref 35–45)
PCO2 BLDA: 42 MMHG (ref 35–45)
PCO2 BLDA: 43 MMHG (ref 35–45)
PCO2 BLDA: 43 MMHG (ref 35–45)
PCO2 BLDA: 44 MMHG (ref 35–45)
PCO2 TEMP ADJ BLDMV: 34 MMHG (ref 41–51)
PCO2 TEMP ADJ BLDMV: 38 MMHG (ref 41–51)
PCO2 TEMP ADJ BLDMV: 44 MMHG (ref 41–51)
PCO2 TEMP ADJ BLDMV: 44 MMHG (ref 41–51)
PCO2 TEMP ADJ BLDMV: 45 MMHG (ref 41–51)
PEEP SETTING VENT: 6 MMHG
PEEP SETTING VENT: 8 MMHG
PH BLDA: 7.4 [PH] (ref 7.35–7.45)
PH BLDA: 7.41 [PH] (ref 7.35–7.45)
PH BLDA: 7.41 [PH] (ref 7.35–7.45)
PH BLDA: 7.42 [PH] (ref 7.35–7.45)
PH BLDA: 7.42 [PH] (ref 7.35–7.45)
PH BLDA: 7.43 [PH] (ref 7.35–7.45)
PH BLDA: 7.48 [PH] (ref 7.35–7.45)
PH BLDMV: 7.37 [PH] (ref 7.31–7.41)
PH BLDMV: 7.37 [PH] (ref 7.31–7.41)
PH BLDMV: 7.38 [PH] (ref 7.31–7.41)
PH BLDMV: 7.39 [PH] (ref 7.31–7.41)
PH BLDMV: 7.43 [PH] (ref 7.31–7.41)
PHOSPHATE SERPL-MCNC: 3.9 MG/DL (ref 2.4–4.7)
PHOSPHATE SERPL-MCNC: 3.9 MG/DL (ref 2.4–4.7)
PHOSPHATE SERPL-MCNC: 4.1 MG/DL (ref 2.4–4.7)
PHOSPHATE SERPL-MCNC: 4.4 MG/DL (ref 2.4–4.7)
PHOSPHATE SERPL-MCNC: 4.5 MG/DL (ref 2.4–4.7)
PHOSPHATE SERPL-MCNC: 4.5 MG/DL (ref 2.4–4.7)
PIP: 18 CMH2O
PIP: 20 CMH2O
PLATELET # BLD AUTO: 21 THOU/MM3 (ref 130–400)
PLATELET # BLD AUTO: 36 THOU/MM3 (ref 130–400)
PLATELET # BLD AUTO: 41 THOU/MM3 (ref 130–400)
PLATELET # BLD AUTO: 47 THOU/MM3 (ref 130–400)
PLATELET # BLD AUTO: 57 THOU/MM3 (ref 130–400)
PLATELET # BLD AUTO: 79 THOU/MM3 (ref 130–400)
PMV BLD AUTO: 10.5 FL (ref 9.4–12.4)
PMV BLD AUTO: 10.6 FL (ref 9.4–12.4)
PMV BLD AUTO: 10.6 FL (ref 9.4–12.4)
PMV BLD AUTO: 9.7 FL (ref 9.4–12.4)
PMV BLD AUTO: 9.8 FL (ref 9.4–12.4)
PMV BLD AUTO: 9.9 FL (ref 9.4–12.4)
PO2 BLDA: 338 MMHG (ref 71–104)
PO2 BLDA: 406 MMHG (ref 71–104)
PO2 BLDA: 447 MMHG (ref 71–104)
PO2 BLDA: 456 MMHG (ref 71–104)
PO2 BLDA: 479 MMHG (ref 71–104)
PO2 BLDA: 503 MMHG (ref 71–104)
PO2 BLDA: 512 MMHG (ref 71–104)
PO2 BLDMV: 42 MMHG (ref 25–40)
PO2 BLDMV: 43 MMHG (ref 25–40)
PO2 BLDMV: 44 MMHG (ref 25–40)
PO2 BLDMV: 45 MMHG (ref 25–40)
PO2 BLDMV: 48 MMHG (ref 25–40)
POTASSIUM SERPL-SCNC: 4.1 MEQ/L (ref 3.5–5.2)
POTASSIUM SERPL-SCNC: 4.1 MEQ/L (ref 3.5–5.2)
POTASSIUM SERPL-SCNC: 4.3 MEQ/L (ref 3.5–5.2)
POTASSIUM SERPL-SCNC: 4.5 MEQ/L (ref 3.5–5.2)
PRESSURE SUPPORT SETTING VENT: 12 CMH2O
PRESSURE SUPPORT SETTING VENT: 12 CMH2O
PROLACTIN SERPL-MCNC: 72.4 NG/ML
PROT SERPL-MCNC: 4.8 G/DL (ref 6.1–8)
PROT SERPL-MCNC: 4.9 G/DL (ref 6.1–8)
PROT SERPL-MCNC: 5 G/DL (ref 6.1–8)
PROT SERPL-MCNC: 5 G/DL (ref 6.1–8)
RBC # BLD AUTO: 2.86 MILL/MM3 (ref 4.7–6.1)
RBC # BLD AUTO: 3.05 MILL/MM3 (ref 4.7–6.1)
RBC # BLD AUTO: 3.06 MILL/MM3 (ref 4.7–6.1)
RBC # BLD AUTO: 3.15 MILL/MM3 (ref 4.7–6.1)
RBC # BLD AUTO: 3.18 MILL/MM3 (ref 4.7–6.1)
RBC # BLD AUTO: 3.22 MILL/MM3 (ref 4.7–6.1)
SAO2 % BLDA: 100 %
SAO2 % BLDMV: 78 %
SAO2 % BLDMV: 78 %
SAO2 % BLDMV: 79 %
SAO2 % BLDMV: 80 %
SAO2 % BLDMV: 83 %
SCAN OF BLOOD SMEAR: NORMAL
SET PEEP: 6 MMHG
SET PRESS SUPP: 12 CMH2O
SET PRESS SUPP: 12 CMH2O
SET RESPIRATORY RATE: 12 BPM
SITE: ABNORMAL
SODIUM SERPL-SCNC: 141 MEQ/L (ref 135–145)
SODIUM SERPL-SCNC: 141 MEQ/L (ref 135–145)
SODIUM SERPL-SCNC: 143 MEQ/L (ref 135–145)
SODIUM SERPL-SCNC: 144 MEQ/L (ref 135–145)
SODIUM SERPL-SCNC: 147 MEQ/L (ref 135–145)
SODIUM SERPL-SCNC: 147 MEQ/L (ref 135–145)
VENTILATION MODE VENT: ABNORMAL
VENTILATION MODE VENT: AC
WBC # BLD AUTO: 4.7 THOU/MM3 (ref 4.8–10.8)
WBC # BLD AUTO: 4.8 THOU/MM3 (ref 4.8–10.8)
WBC # BLD AUTO: 4.9 THOU/MM3 (ref 4.8–10.8)
WBC # BLD AUTO: 5.4 THOU/MM3 (ref 4.8–10.8)

## 2024-03-09 PROCEDURE — 94761 N-INVAS EAR/PLS OXIMETRY MLT: CPT

## 2024-03-09 PROCEDURE — 6370000000 HC RX 637 (ALT 250 FOR IP): Performed by: STUDENT IN AN ORGANIZED HEALTH CARE EDUCATION/TRAINING PROGRAM

## 2024-03-09 PROCEDURE — 80053 COMPREHEN METABOLIC PANEL: CPT

## 2024-03-09 PROCEDURE — 6360000002 HC RX W HCPCS: Performed by: THORACIC SURGERY (CARDIOTHORACIC VASCULAR SURGERY)

## 2024-03-09 PROCEDURE — P9012 CRYOPRECIPITATE EACH UNIT: HCPCS

## 2024-03-09 PROCEDURE — 83735 ASSAY OF MAGNESIUM: CPT

## 2024-03-09 PROCEDURE — 2500000003 HC RX 250 WO HCPCS: Performed by: STUDENT IN AN ORGANIZED HEALTH CARE EDUCATION/TRAINING PROGRAM

## 2024-03-09 PROCEDURE — 37799 UNLISTED PX VASCULAR SURGERY: CPT

## 2024-03-09 PROCEDURE — 83605 ASSAY OF LACTIC ACID: CPT

## 2024-03-09 PROCEDURE — 82330 ASSAY OF CALCIUM: CPT

## 2024-03-09 PROCEDURE — C9113 INJ PANTOPRAZOLE SODIUM, VIA: HCPCS | Performed by: STUDENT IN AN ORGANIZED HEALTH CARE EDUCATION/TRAINING PROGRAM

## 2024-03-09 PROCEDURE — 2720000010 HC SURG SUPPLY STERILE

## 2024-03-09 PROCEDURE — 2500000003 HC RX 250 WO HCPCS: Performed by: INTERNAL MEDICINE

## 2024-03-09 PROCEDURE — 2580000003 HC RX 258: Performed by: INTERNAL MEDICINE

## 2024-03-09 PROCEDURE — 2700000000 HC OXYGEN THERAPY PER DAY

## 2024-03-09 PROCEDURE — 82803 BLOOD GASES ANY COMBINATION: CPT

## 2024-03-09 PROCEDURE — 36430 TRANSFUSION BLD/BLD COMPNT: CPT

## 2024-03-09 PROCEDURE — P9047 ALBUMIN (HUMAN), 25%, 50ML: HCPCS | Performed by: INTERNAL MEDICINE

## 2024-03-09 PROCEDURE — 2580000003 HC RX 258: Performed by: THORACIC SURGERY (CARDIOTHORACIC VASCULAR SURGERY)

## 2024-03-09 PROCEDURE — 84146 ASSAY OF PROLACTIN: CPT

## 2024-03-09 PROCEDURE — 33948 ECMO/ECLS DAILY MGMT-VENOUS: CPT | Performed by: INTERNAL MEDICINE

## 2024-03-09 PROCEDURE — 36415 COLL VENOUS BLD VENIPUNCTURE: CPT

## 2024-03-09 PROCEDURE — 85027 COMPLETE CBC AUTOMATED: CPT

## 2024-03-09 PROCEDURE — 85384 FIBRINOGEN ACTIVITY: CPT

## 2024-03-09 PROCEDURE — 6360000002 HC RX W HCPCS: Performed by: STUDENT IN AN ORGANIZED HEALTH CARE EDUCATION/TRAINING PROGRAM

## 2024-03-09 PROCEDURE — 2000000000 HC ICU R&B

## 2024-03-09 PROCEDURE — 33948 ECMO/ECLS DAILY MGMT-VENOUS: CPT | Performed by: THORACIC SURGERY (CARDIOTHORACIC VASCULAR SURGERY)

## 2024-03-09 PROCEDURE — 94003 VENT MGMT INPAT SUBQ DAY: CPT

## 2024-03-09 PROCEDURE — 85730 THROMBOPLASTIN TIME PARTIAL: CPT

## 2024-03-09 PROCEDURE — 99233 SBSQ HOSP IP/OBS HIGH 50: CPT | Performed by: INTERNAL MEDICINE

## 2024-03-09 PROCEDURE — 2580000003 HC RX 258: Performed by: PHYSICIAN ASSISTANT

## 2024-03-09 PROCEDURE — 82947 ASSAY GLUCOSE BLOOD QUANT: CPT

## 2024-03-09 PROCEDURE — 33949 ECMO/ECLS DAILY MGMT ARTERY: CPT

## 2024-03-09 PROCEDURE — 2500000003 HC RX 250 WO HCPCS: Performed by: THORACIC SURGERY (CARDIOTHORACIC VASCULAR SURGERY)

## 2024-03-09 PROCEDURE — 99291 CRITICAL CARE FIRST HOUR: CPT | Performed by: INTERNAL MEDICINE

## 2024-03-09 PROCEDURE — 6360000002 HC RX W HCPCS: Performed by: INTERNAL MEDICINE

## 2024-03-09 PROCEDURE — 6370000000 HC RX 637 (ALT 250 FOR IP): Performed by: PHYSICIAN ASSISTANT

## 2024-03-09 PROCEDURE — 84100 ASSAY OF PHOSPHORUS: CPT

## 2024-03-09 PROCEDURE — 6370000000 HC RX 637 (ALT 250 FOR IP)

## 2024-03-09 PROCEDURE — 95705 EEG W/O VID 2-12 HR UNMNTR: CPT

## 2024-03-09 PROCEDURE — 71045 X-RAY EXAM CHEST 1 VIEW: CPT

## 2024-03-09 PROCEDURE — P9037 PLATE PHERES LEUKOREDU IRRAD: HCPCS

## 2024-03-09 PROCEDURE — 2580000003 HC RX 258: Performed by: STUDENT IN AN ORGANIZED HEALTH CARE EDUCATION/TRAINING PROGRAM

## 2024-03-09 RX ORDER — LORAZEPAM 2 MG/ML
4 INJECTION INTRAMUSCULAR ONCE
Status: COMPLETED | OUTPATIENT
Start: 2024-03-09 | End: 2024-03-10

## 2024-03-09 RX ORDER — FENTANYL CITRATE-0.9 % NACL/PF 10 MCG/ML
25-200 PLASTIC BAG, INJECTION (ML) INTRAVENOUS CONTINUOUS
Status: DISCONTINUED | OUTPATIENT
Start: 2024-03-09 | End: 2024-03-24

## 2024-03-09 RX ORDER — INSULIN LISPRO 100 [IU]/ML
0-8 INJECTION, SOLUTION INTRAVENOUS; SUBCUTANEOUS EVERY 4 HOURS
Status: DISCONTINUED | OUTPATIENT
Start: 2024-03-09 | End: 2024-03-15

## 2024-03-09 RX ORDER — ALBUTEROL SULFATE 90 UG/1
2 AEROSOL, METERED RESPIRATORY (INHALATION) EVERY 6 HOURS PRN
Status: DISCONTINUED | OUTPATIENT
Start: 2024-03-09 | End: 2024-03-10 | Stop reason: ALTCHOICE

## 2024-03-09 RX ADMIN — SODIUM CHLORIDE: 9 INJECTION, SOLUTION INTRAVENOUS at 12:50

## 2024-03-09 RX ADMIN — PANTOPRAZOLE SODIUM 8 MG/HR: 40 INJECTION, POWDER, FOR SOLUTION INTRAVENOUS at 17:47

## 2024-03-09 RX ADMIN — Medication 1 MCG/KG/HR: at 06:20

## 2024-03-09 RX ADMIN — CALCIUM CHLORIDE 1000 MG: 100 INJECTION, SOLUTION INTRAVENOUS at 21:59

## 2024-03-09 RX ADMIN — SODIUM CHLORIDE, PRESERVATIVE FREE 10 ML: 5 INJECTION INTRAVENOUS at 20:08

## 2024-03-09 RX ADMIN — ATORVASTATIN CALCIUM 40 MG: 40 TABLET, FILM COATED ORAL at 22:38

## 2024-03-09 RX ADMIN — PANTOPRAZOLE SODIUM 8 MG/HR: 40 INJECTION, POWDER, FOR SOLUTION INTRAVENOUS at 07:21

## 2024-03-09 RX ADMIN — SODIUM CHLORIDE, PRESERVATIVE FREE 10 ML: 5 INJECTION INTRAVENOUS at 09:20

## 2024-03-09 RX ADMIN — ALBUMIN (HUMAN) 25 G: 0.25 INJECTION, SOLUTION INTRAVENOUS at 12:35

## 2024-03-09 RX ADMIN — Medication: at 19:22

## 2024-03-09 RX ADMIN — SENNOSIDES AND DOCUSATE SODIUM 1 TABLET: 50; 8.6 TABLET ORAL at 08:50

## 2024-03-09 RX ADMIN — ALBUMIN (HUMAN) 25 G: 0.25 INJECTION, SOLUTION INTRAVENOUS at 18:59

## 2024-03-09 RX ADMIN — CHLORHEXIDINE GLUCONATE 0.12% ORAL RINSE 15 ML: 1.2 LIQUID ORAL at 08:52

## 2024-03-09 RX ADMIN — AMIODARONE HYDROCHLORIDE 1 MG/MIN: 1.8 INJECTION, SOLUTION INTRAVENOUS at 02:39

## 2024-03-09 RX ADMIN — Medication: at 09:12

## 2024-03-09 RX ADMIN — CHLORHEXIDINE GLUCONATE 0.12% ORAL RINSE 15 ML: 1.2 LIQUID ORAL at 20:34

## 2024-03-09 RX ADMIN — ALBUMIN (HUMAN) 25 G: 0.25 INJECTION, SOLUTION INTRAVENOUS at 08:39

## 2024-03-09 RX ADMIN — Medication 75 MCG/HR: at 18:53

## 2024-03-09 RX ADMIN — INSULIN LISPRO 2 UNITS: 100 INJECTION, SOLUTION INTRAVENOUS; SUBCUTANEOUS at 20:28

## 2024-03-09 RX ADMIN — SENNOSIDES AND DOCUSATE SODIUM 1 TABLET: 50; 8.6 TABLET ORAL at 20:34

## 2024-03-09 RX ADMIN — CALCIUM CHLORIDE 1000 MG: 100 INJECTION, SOLUTION INTRAVENOUS at 01:24

## 2024-03-09 RX ADMIN — Medication 1.2 MCG/KG/HR: at 20:23

## 2024-03-09 RX ADMIN — Medication 1 MCG/KG/HR: at 01:31

## 2024-03-09 RX ADMIN — Medication 75 MCG/HR: at 12:27

## 2024-03-09 RX ADMIN — Medication: at 19:10

## 2024-03-09 RX ADMIN — Medication: at 09:14

## 2024-03-09 RX ADMIN — POLYETHYLENE GLYCOL 400 AND PROPYLENE GLYCOL 2 DROP: 4; 3 SOLUTION/ DROPS OPHTHALMIC at 20:16

## 2024-03-09 RX ADMIN — POLYETHYLENE GLYCOL 400 AND PROPYLENE GLYCOL 2 DROP: 4; 3 SOLUTION/ DROPS OPHTHALMIC at 06:18

## 2024-03-09 RX ADMIN — ALBUMIN (HUMAN) 25 G: 0.25 INJECTION, SOLUTION INTRAVENOUS at 00:01

## 2024-03-09 RX ADMIN — Medication 1.4 MCG/KG/HR: at 16:30

## 2024-03-09 RX ADMIN — Medication: at 09:16

## 2024-03-09 RX ADMIN — AMIODARONE HYDROCHLORIDE 0.5 MG/MIN: 1.8 INJECTION, SOLUTION INTRAVENOUS at 21:29

## 2024-03-09 RX ADMIN — WATER 2000 MG: 1 INJECTION INTRAMUSCULAR; INTRAVENOUS; SUBCUTANEOUS at 12:51

## 2024-03-09 RX ADMIN — SENNOSIDES 8.6 MG: 8.6 TABLET, FILM COATED ORAL at 08:50

## 2024-03-09 RX ADMIN — Medication 1.2 MCG/KG/HR: at 11:15

## 2024-03-09 RX ADMIN — Medication: at 19:07

## 2024-03-09 RX ADMIN — COLLAGENASE SANTYL: 250 OINTMENT TOPICAL at 08:50

## 2024-03-09 RX ADMIN — Medication 1 TABLET: at 08:50

## 2024-03-09 ASSESSMENT — PULMONARY FUNCTION TESTS
PIF_VALUE: 17
PIF_VALUE: 16
PIF_VALUE: 16
PIF_VALUE: 18
PIF_VALUE: 17
PIF_VALUE: 17

## 2024-03-09 NOTE — SIGNIFICANT EVENT
ECMO DAILY ROUNDING NOTE:    Team Present at bedside.  Family Present at bebside:  No  Condition:  Critical    RE for ECMO initiation: Cardiogenic shock  Do2:  429  Vo2:  158  Sweep Gas:  3  Pump Flow:  4.7L  SvO2:  82  SaO2:  100  pH:  7.431  pCO2:  40  PTT Goal:  normal due to active blood loss  Plt:  21  Hgb:  9.8  Lactic Acid:  3  CXR:  Mild interstitial edema  Telemetry:  sinus  Paralytic:  no  Sedation Goals:  RASS -2  Pupils:  equal  PC 16:  PEEP 8:  RATE 15:  FiO2:  30  Generated :    Electronically signed by Fco Olivarez MD.

## 2024-03-10 ENCOUNTER — APPOINTMENT (OUTPATIENT)
Dept: GENERAL RADIOLOGY | Age: 69
DRG: 003 | End: 2024-03-10
Attending: THORACIC SURGERY (CARDIOTHORACIC VASCULAR SURGERY)
Payer: MEDICARE

## 2024-03-10 LAB
ALBUMIN SERPL BCG-MCNC: 3.9 G/DL (ref 3.5–5.1)
ALBUMIN SERPL BCG-MCNC: 4 G/DL (ref 3.5–5.1)
ALBUMIN SERPL BCG-MCNC: 4 G/DL (ref 3.5–5.1)
ALBUMIN SERPL BCG-MCNC: 4.1 G/DL (ref 3.5–5.1)
ALBUMIN SERPL BCG-MCNC: 4.3 G/DL (ref 3.5–5.1)
ALBUMIN SERPL BCG-MCNC: 4.3 G/DL (ref 3.5–5.1)
ALP SERPL-CCNC: 107 U/L (ref 38–126)
ALP SERPL-CCNC: 134 U/L (ref 38–126)
ALP SERPL-CCNC: 63 U/L (ref 38–126)
ALP SERPL-CCNC: 64 U/L (ref 38–126)
ALP SERPL-CCNC: 78 U/L (ref 38–126)
ALP SERPL-CCNC: 90 U/L (ref 38–126)
ALT SERPL W/O P-5'-P-CCNC: 47 U/L (ref 11–66)
ALT SERPL W/O P-5'-P-CCNC: 50 U/L (ref 11–66)
ALT SERPL W/O P-5'-P-CCNC: 54 U/L (ref 11–66)
ALT SERPL W/O P-5'-P-CCNC: 54 U/L (ref 11–66)
ALT SERPL W/O P-5'-P-CCNC: 57 U/L (ref 11–66)
ALT SERPL W/O P-5'-P-CCNC: 63 U/L (ref 11–66)
ANION GAP SERPL CALC-SCNC: 11 MEQ/L (ref 8–16)
ANION GAP SERPL CALC-SCNC: 11 MEQ/L (ref 8–16)
ANION GAP SERPL CALC-SCNC: 12 MEQ/L (ref 8–16)
ANION GAP SERPL CALC-SCNC: 12 MEQ/L (ref 8–16)
ANION GAP SERPL CALC-SCNC: 13 MEQ/L (ref 8–16)
ANION GAP SERPL CALC-SCNC: 8 MEQ/L (ref 8–16)
APTT PPP: 39.2 SECONDS (ref 22–38)
APTT PPP: 43 SECONDS (ref 22–38)
APTT PPP: 43.1 SECONDS (ref 22–38)
APTT PPP: 44.8 SECONDS (ref 22–38)
APTT PPP: 46.4 SECONDS (ref 22–38)
APTT PPP: 66.9 SECONDS (ref 22–38)
APTT PPP: 68.5 SECONDS (ref 22–38)
ARTERIAL PATENCY WRIST A: ABNORMAL
AST SERPL-CCNC: 602 U/L (ref 5–40)
AST SERPL-CCNC: 615 U/L (ref 5–40)
AST SERPL-CCNC: 628 U/L (ref 5–40)
AST SERPL-CCNC: 639 U/L (ref 5–40)
AST SERPL-CCNC: 639 U/L (ref 5–40)
AST SERPL-CCNC: 689 U/L (ref 5–40)
BASE EXCESS BLDA CALC-SCNC: -1 MMOL/L (ref -2–3)
BASE EXCESS BLDA CALC-SCNC: -1.1 MMOL/L (ref -2–3)
BASE EXCESS BLDA CALC-SCNC: -1.7 MMOL/L (ref -2–3)
BASE EXCESS BLDA CALC-SCNC: -4.6 MMOL/L (ref -2–3)
BASE EXCESS BLDA CALC-SCNC: 0 MMOL/L (ref -2–3)
BASE EXCESS BLDA CALC-SCNC: 0 MMOL/L (ref -2–3)
BASE EXCESS BLDA CALC-SCNC: 0.1 MMOL/L (ref -2.5–2.5)
BASE EXCESS BLDA CALC-SCNC: 0.2 MMOL/L (ref -2.5–2.5)
BASE EXCESS BLDA CALC-SCNC: 0.3 MMOL/L (ref -2–3)
BASE EXCESS BLDA CALC-SCNC: 0.7 MMOL/L (ref -2.5–2.5)
BASE EXCESS BLDA CALC-SCNC: 0.9 MMOL/L (ref -2.5–2.5)
BASE EXCESS BLDA CALC-SCNC: 1.1 MMOL/L (ref -2.5–2.5)
BASE EXCESS BLDA CALC-SCNC: 1.4 MMOL/L (ref -2.5–2.5)
BASE EXCESS BLDA CALC-SCNC: 1.5 MMOL/L (ref -2.5–2.5)
BDY SITE: ABNORMAL
BILIRUB SERPL-MCNC: 1.5 MG/DL (ref 0.3–1.2)
BILIRUB SERPL-MCNC: 1.6 MG/DL (ref 0.3–1.2)
BILIRUB SERPL-MCNC: 1.7 MG/DL (ref 0.3–1.2)
BILIRUB SERPL-MCNC: 2 MG/DL (ref 0.3–1.2)
BILIRUB SERPL-MCNC: 2 MG/DL (ref 0.3–1.2)
BILIRUB SERPL-MCNC: 2.1 MG/DL (ref 0.3–1.2)
BREATHS SETTING VENT: 12 BPM
BUN SERPL-MCNC: 34 MG/DL (ref 7–22)
CA-I BLD ISE-SCNC: 1.19 MMOL/L (ref 1.12–1.32)
CA-I BLD ISE-SCNC: 1.2 MMOL/L (ref 1.12–1.32)
CA-I BLD ISE-SCNC: 1.2 MMOL/L (ref 1.12–1.32)
CA-I BLD ISE-SCNC: 1.21 MMOL/L (ref 1.12–1.32)
CA-I BLD ISE-SCNC: 1.22 MMOL/L (ref 1.12–1.32)
CA-I BLD ISE-SCNC: 1.24 MMOL/L (ref 1.12–1.32)
CA-I BLD ISE-SCNC: 1.25 MMOL/L (ref 1.12–1.32)
CALCIUM SERPL-MCNC: 8.9 MG/DL (ref 8.5–10.5)
CALCIUM SERPL-MCNC: 8.9 MG/DL (ref 8.5–10.5)
CALCIUM SERPL-MCNC: 9.1 MG/DL (ref 8.5–10.5)
CALCIUM SERPL-MCNC: 9.2 MG/DL (ref 8.5–10.5)
CALCIUM SERPL-MCNC: 9.3 MG/DL (ref 8.5–10.5)
CALCIUM SERPL-MCNC: 9.3 MG/DL (ref 8.5–10.5)
CHLORIDE BLD-SCNC: 109 MEQ/L (ref 98–109)
CHLORIDE SERPL-SCNC: 104 MEQ/L (ref 98–111)
CHLORIDE SERPL-SCNC: 104 MEQ/L (ref 98–111)
CHLORIDE SERPL-SCNC: 106 MEQ/L (ref 98–111)
CO2 SERPL-SCNC: 24 MEQ/L (ref 23–33)
CO2 SERPL-SCNC: 25 MEQ/L (ref 23–33)
CO2 SERPL-SCNC: 26 MEQ/L (ref 23–33)
COLLECTED BY:: ABNORMAL
COLLECTED BY:: NORMAL
CREAT SERPL-MCNC: 2.1 MG/DL (ref 0.4–1.2)
CREAT SERPL-MCNC: 2.2 MG/DL (ref 0.4–1.2)
CREAT SERPL-MCNC: 2.2 MG/DL (ref 0.4–1.2)
CREAT SERPL-MCNC: 2.3 MG/DL (ref 0.4–1.2)
DEPRECATED RDW RBC AUTO: 49.6 FL (ref 35–45)
DEPRECATED RDW RBC AUTO: 49.7 FL (ref 35–45)
DEPRECATED RDW RBC AUTO: 49.8 FL (ref 35–45)
DEPRECATED RDW RBC AUTO: 51.4 FL (ref 35–45)
DEPRECATED RDW RBC AUTO: 51.6 FL (ref 35–45)
DEVICE: ABNORMAL
DEVICE: NORMAL
ERYTHROCYTE [DISTWIDTH] IN BLOOD BY AUTOMATED COUNT: 15.8 % (ref 11.5–14.5)
ERYTHROCYTE [DISTWIDTH] IN BLOOD BY AUTOMATED COUNT: 15.9 % (ref 11.5–14.5)
ERYTHROCYTE [DISTWIDTH] IN BLOOD BY AUTOMATED COUNT: 15.9 % (ref 11.5–14.5)
ERYTHROCYTE [DISTWIDTH] IN BLOOD BY AUTOMATED COUNT: 16.3 % (ref 11.5–14.5)
ERYTHROCYTE [DISTWIDTH] IN BLOOD BY AUTOMATED COUNT: 16.3 % (ref 11.5–14.5)
FIBRINOGEN PPP-MCNC: 182 MG/100ML (ref 155–475)
FIBRINOGEN PPP-MCNC: 185 MG/100ML (ref 155–475)
FIBRINOGEN PPP-MCNC: 189 MG/100ML (ref 155–475)
FIBRINOGEN PPP-MCNC: 192 MG/100ML (ref 155–475)
FIBRINOGEN PPP-MCNC: 200 MG/100ML (ref 155–475)
FIBRINOGEN PPP-MCNC: 201 MG/100ML (ref 155–475)
FIO2 ON VENT O2 ANALYZER: 40 %
GFR SERPL CREATININE-BSD FRML MDRD: 30 ML/MIN/1.73M2
GFR SERPL CREATININE-BSD FRML MDRD: 31 ML/MIN/1.73M2
GFR SERPL CREATININE-BSD FRML MDRD: 31 ML/MIN/1.73M2
GFR SERPL CREATININE-BSD FRML MDRD: 33 ML/MIN/1.73M2
GLUCOSE BLD STRIP.AUTO-MCNC: 206 MG/DL (ref 70–108)
GLUCOSE BLD-MCNC: 164 MG/DL (ref 70–108)
GLUCOSE BLD-MCNC: 177 MG/DL (ref 70–108)
GLUCOSE BLD-MCNC: 218 MG/DL (ref 70–108)
GLUCOSE BLD-MCNC: 225 MG/DL (ref 70–108)
GLUCOSE BLD-MCNC: 226 MG/DL (ref 70–108)
GLUCOSE SERPL-MCNC: 171 MG/DL (ref 70–108)
GLUCOSE SERPL-MCNC: 176 MG/DL (ref 70–108)
GLUCOSE SERPL-MCNC: 200 MG/DL (ref 70–108)
GLUCOSE SERPL-MCNC: 218 MG/DL (ref 70–108)
GLUCOSE SERPL-MCNC: 219 MG/DL (ref 70–108)
GLUCOSE SERPL-MCNC: 238 MG/DL (ref 70–108)
HCO3 BLDA-SCNC: 21 MMOL/L (ref 23–28)
HCO3 BLDA-SCNC: 24 MMOL/L (ref 23–28)
HCO3 BLDA-SCNC: 24 MMOL/L (ref 23–28)
HCO3 BLDA-SCNC: 25 MMOL/L (ref 23–28)
HCO3 BLDA-SCNC: 26 MMOL/L (ref 23–28)
HCO3 BLDA-SCNC: 27 MMOL/L (ref 23–28)
HCT VFR BLD AUTO: 25.3 % (ref 42–52)
HCT VFR BLD AUTO: 25.8 % (ref 42–52)
HCT VFR BLD AUTO: 27.2 % (ref 42–52)
HCT VFR BLD AUTO: 27.6 % (ref 42–52)
HCT VFR BLD AUTO: 27.9 % (ref 42–52)
HGB BLD-MCNC: 8.7 GM/DL (ref 14–18)
HGB BLD-MCNC: 9 GM/DL (ref 14–18)
HGB BLD-MCNC: 9.5 GM/DL (ref 14–18)
HGB BLD-MCNC: 9.5 GM/DL (ref 14–18)
HGB BLD-MCNC: 9.6 GM/DL (ref 14–18)
LACTATE BLD-SCNC: 1.3 MMOL/L (ref 0.5–1.9)
LACTATE BLD-SCNC: 1.7 MMOL/L (ref 0.5–1.9)
LACTATE SERPL-SCNC: 1.3 MMOL/L (ref 0.5–2)
LACTATE SERPL-SCNC: 1.4 MMOL/L (ref 0.5–2)
LACTATE SERPL-SCNC: 1.4 MMOL/L (ref 0.5–2)
LACTATE SERPL-SCNC: 1.5 MMOL/L (ref 0.5–2)
LACTATE SERPL-SCNC: 1.9 MMOL/L (ref 0.5–2)
MAGNESIUM SERPL-MCNC: 2.3 MG/DL (ref 1.6–2.4)
MAGNESIUM SERPL-MCNC: 2.3 MG/DL (ref 1.6–2.4)
MAGNESIUM SERPL-MCNC: 2.4 MG/DL (ref 1.6–2.4)
MAGNESIUM SERPL-MCNC: 2.5 MG/DL (ref 1.6–2.4)
MAGNESIUM SERPL-MCNC: 2.5 MG/DL (ref 1.6–2.4)
MAGNESIUM SERPL-MCNC: 2.6 MG/DL (ref 1.6–2.4)
MCH RBC QN AUTO: 29.5 PG (ref 26–33)
MCH RBC QN AUTO: 29.7 PG (ref 26–33)
MCH RBC QN AUTO: 29.7 PG (ref 26–33)
MCH RBC QN AUTO: 30 PG (ref 26–33)
MCH RBC QN AUTO: 30.1 PG (ref 26–33)
MCHC RBC AUTO-ENTMCNC: 34.4 GM/DL (ref 32.2–35.5)
MCHC RBC AUTO-ENTMCNC: 34.9 GM/DL (ref 32.2–35.5)
MCHC RBC AUTO-ENTMCNC: 34.9 GM/DL (ref 32.2–35.5)
MCV RBC AUTO: 85.7 FL (ref 80–94)
MCV RBC AUTO: 85.8 FL (ref 80–94)
MCV RBC AUTO: 86.3 FL (ref 80–94)
MCV RBC AUTO: 86.3 FL (ref 80–94)
MCV RBC AUTO: 86.4 FL (ref 80–94)
PCO2 BLDA: 41 MMHG (ref 35–45)
PCO2 BLDA: 42 MMHG (ref 35–45)
PCO2 BLDA: 44 MMHG (ref 35–45)
PCO2 BLDA: 48 MMHG (ref 35–45)
PCO2 TEMP ADJ BLDMV: 39 MMHG (ref 41–51)
PCO2 TEMP ADJ BLDMV: 41 MMHG (ref 41–51)
PCO2 TEMP ADJ BLDMV: 44 MMHG (ref 41–51)
PCO2 TEMP ADJ BLDMV: 46 MMHG (ref 41–51)
PCO2 TEMP ADJ BLDMV: 47 MMHG (ref 41–51)
PCO2 TEMP ADJ BLDMV: 49 MMHG (ref 41–51)
PCO2 TEMP ADJ BLDMV: 52 MMHG (ref 41–51)
PEEP SETTING VENT: 6 MMHG
PH BLDA: 7.35 [PH] (ref 7.35–7.45)
PH BLDA: 7.37 [PH] (ref 7.35–7.45)
PH BLDA: 7.39 [PH] (ref 7.35–7.45)
PH BLDA: 7.4 [PH] (ref 7.35–7.45)
PH BLDA: 7.4 [PH] (ref 7.35–7.45)
PH BLDA: 7.41 [PH] (ref 7.35–7.45)
PH BLDA: 7.41 [PH] (ref 7.35–7.45)
PH BLDMV: 7.3 [PH] (ref 7.31–7.41)
PH BLDMV: 7.34 [PH] (ref 7.31–7.41)
PH BLDMV: 7.34 [PH] (ref 7.31–7.41)
PH BLDMV: 7.36 [PH] (ref 7.31–7.41)
PH BLDMV: 7.37 [PH] (ref 7.31–7.41)
PHOSPHATE SERPL-MCNC: 2.6 MG/DL (ref 2.4–4.7)
PHOSPHATE SERPL-MCNC: 2.7 MG/DL (ref 2.4–4.7)
PHOSPHATE SERPL-MCNC: 3.1 MG/DL (ref 2.4–4.7)
PHOSPHATE SERPL-MCNC: 3.6 MG/DL (ref 2.4–4.7)
PHOSPHATE SERPL-MCNC: 3.9 MG/DL (ref 2.4–4.7)
PHOSPHATE SERPL-MCNC: 4.1 MG/DL (ref 2.4–4.7)
PIP: 12 CMH2O
PIP: 18 CMH2O
PLATELET # BLD AUTO: 25 THOU/MM3 (ref 130–400)
PLATELET # BLD AUTO: 30 THOU/MM3 (ref 130–400)
PLATELET # BLD AUTO: 31 THOU/MM3 (ref 130–400)
PLATELET # BLD AUTO: 45 THOU/MM3 (ref 130–400)
PLATELET # BLD AUTO: 51 THOU/MM3 (ref 130–400)
PMV BLD AUTO: 10 FL (ref 9.4–12.4)
PMV BLD AUTO: 10.7 FL (ref 9.4–12.4)
PMV BLD AUTO: 11.1 FL (ref 9.4–12.4)
PMV BLD AUTO: 9.5 FL (ref 9.4–12.4)
PMV BLD AUTO: 9.5 FL (ref 9.4–12.4)
PO2 BLDA: 212 MMHG (ref 71–104)
PO2 BLDA: 331 MMHG (ref 71–104)
PO2 BLDA: 380 MMHG (ref 71–104)
PO2 BLDA: 385 MMHG (ref 71–104)
PO2 BLDA: 448 MMHG (ref 71–104)
PO2 BLDA: 490 MMHG (ref 71–104)
PO2 BLDA: 516 MMHG (ref 71–104)
PO2 BLDMV: 39 MMHG (ref 25–40)
PO2 BLDMV: 40 MMHG (ref 25–40)
PO2 BLDMV: 41 MMHG (ref 25–40)
PO2 BLDMV: 43 MMHG (ref 25–40)
PO2 BLDMV: 44 MMHG (ref 25–40)
PO2 BLDMV: 44 MMHG (ref 25–40)
PO2 BLDMV: 46 MMHG (ref 25–40)
POC CREATININE WHOLE BLOOD: 2.3 MG/DL (ref 0.5–1.2)
POTASSIUM BLD-SCNC: 4.6 MEQ/L (ref 3.5–4.9)
POTASSIUM SERPL-SCNC: 4.3 MEQ/L (ref 3.5–5.2)
POTASSIUM SERPL-SCNC: 4.4 MEQ/L (ref 3.5–5.2)
POTASSIUM SERPL-SCNC: 4.4 MEQ/L (ref 3.5–5.2)
POTASSIUM SERPL-SCNC: 4.5 MEQ/L (ref 3.5–5.2)
POTASSIUM SERPL-SCNC: 4.6 MEQ/L (ref 3.5–5.2)
POTASSIUM SERPL-SCNC: 4.6 MEQ/L (ref 3.5–5.2)
PRESSURE SUPPORT SETTING VENT: 12 CMH2O
PROT SERPL-MCNC: 4.8 G/DL (ref 6.1–8)
PROT SERPL-MCNC: 4.9 G/DL (ref 6.1–8)
PROT SERPL-MCNC: 5.1 G/DL (ref 6.1–8)
PROT SERPL-MCNC: 5.1 G/DL (ref 6.1–8)
PROT SERPL-MCNC: 5.2 G/DL (ref 6.1–8)
PROT SERPL-MCNC: 5.3 G/DL (ref 6.1–8)
RBC # BLD AUTO: 2.93 MILL/MM3 (ref 4.7–6.1)
RBC # BLD AUTO: 2.99 MILL/MM3 (ref 4.7–6.1)
RBC # BLD AUTO: 3.17 MILL/MM3 (ref 4.7–6.1)
RBC # BLD AUTO: 3.22 MILL/MM3 (ref 4.7–6.1)
RBC # BLD AUTO: 3.23 MILL/MM3 (ref 4.7–6.1)
SAO2 % BLDA: 100 %
SAO2 % BLDMV: 70 %
SAO2 % BLDMV: 70 %
SAO2 % BLDMV: 72 %
SAO2 % BLDMV: 76 %
SAO2 % BLDMV: 77 %
SAO2 % BLDMV: 78 %
SAO2 % BLDMV: 79 %
SCAN OF BLOOD SMEAR: NORMAL
SCAN OF BLOOD SMEAR: NORMAL
SET PEEP: 6 MMHG
SET PRESS SUPP: 12 CMH2O
SET RESPIRATORY RATE: 12 BPM
SITE: ABNORMAL
SODIUM BLD-SCNC: 141 MEQ/L (ref 138–146)
SODIUM SERPL-SCNC: 140 MEQ/L (ref 135–145)
SODIUM SERPL-SCNC: 141 MEQ/L (ref 135–145)
SODIUM SERPL-SCNC: 142 MEQ/L (ref 135–145)
VENTILATION MODE VENT: ABNORMAL
VENTILATION MODE VENT: AC
VENTILATION MODE VENT: AC
VENTILATION MODE VENT: NORMAL
WBC # BLD AUTO: 5.3 THOU/MM3 (ref 4.8–10.8)
WBC # BLD AUTO: 5.3 THOU/MM3 (ref 4.8–10.8)
WBC # BLD AUTO: 5.5 THOU/MM3 (ref 4.8–10.8)
WBC # BLD AUTO: 5.6 THOU/MM3 (ref 4.8–10.8)
WBC # BLD AUTO: 5.8 THOU/MM3 (ref 4.8–10.8)

## 2024-03-10 PROCEDURE — 2000000000 HC ICU R&B

## 2024-03-10 PROCEDURE — P9037 PLATE PHERES LEUKOREDU IRRAD: HCPCS

## 2024-03-10 PROCEDURE — C9113 INJ PANTOPRAZOLE SODIUM, VIA: HCPCS | Performed by: STUDENT IN AN ORGANIZED HEALTH CARE EDUCATION/TRAINING PROGRAM

## 2024-03-10 PROCEDURE — 2580000003 HC RX 258: Performed by: PHYSICIAN ASSISTANT

## 2024-03-10 PROCEDURE — 2500000003 HC RX 250 WO HCPCS: Performed by: THORACIC SURGERY (CARDIOTHORACIC VASCULAR SURGERY)

## 2024-03-10 PROCEDURE — 33948 ECMO/ECLS DAILY MGMT-VENOUS: CPT | Performed by: INTERNAL MEDICINE

## 2024-03-10 PROCEDURE — 2500000003 HC RX 250 WO HCPCS: Performed by: INTERNAL MEDICINE

## 2024-03-10 PROCEDURE — 82947 ASSAY GLUCOSE BLOOD QUANT: CPT

## 2024-03-10 PROCEDURE — 6370000000 HC RX 637 (ALT 250 FOR IP)

## 2024-03-10 PROCEDURE — 94761 N-INVAS EAR/PLS OXIMETRY MLT: CPT

## 2024-03-10 PROCEDURE — 37799 UNLISTED PX VASCULAR SURGERY: CPT

## 2024-03-10 PROCEDURE — 84100 ASSAY OF PHOSPHORUS: CPT

## 2024-03-10 PROCEDURE — 84132 ASSAY OF SERUM POTASSIUM: CPT

## 2024-03-10 PROCEDURE — 33949 ECMO/ECLS DAILY MGMT ARTERY: CPT

## 2024-03-10 PROCEDURE — 82803 BLOOD GASES ANY COMBINATION: CPT

## 2024-03-10 PROCEDURE — 85730 THROMBOPLASTIN TIME PARTIAL: CPT

## 2024-03-10 PROCEDURE — 84295 ASSAY OF SERUM SODIUM: CPT

## 2024-03-10 PROCEDURE — 83605 ASSAY OF LACTIC ACID: CPT

## 2024-03-10 PROCEDURE — P9047 ALBUMIN (HUMAN), 25%, 50ML: HCPCS | Performed by: INTERNAL MEDICINE

## 2024-03-10 PROCEDURE — 82435 ASSAY OF BLOOD CHLORIDE: CPT

## 2024-03-10 PROCEDURE — 6370000000 HC RX 637 (ALT 250 FOR IP): Performed by: STUDENT IN AN ORGANIZED HEALTH CARE EDUCATION/TRAINING PROGRAM

## 2024-03-10 PROCEDURE — 36430 TRANSFUSION BLD/BLD COMPNT: CPT

## 2024-03-10 PROCEDURE — 2700000000 HC OXYGEN THERAPY PER DAY

## 2024-03-10 PROCEDURE — 2580000003 HC RX 258: Performed by: INTERNAL MEDICINE

## 2024-03-10 PROCEDURE — 6360000002 HC RX W HCPCS: Performed by: STUDENT IN AN ORGANIZED HEALTH CARE EDUCATION/TRAINING PROGRAM

## 2024-03-10 PROCEDURE — 71045 X-RAY EXAM CHEST 1 VIEW: CPT

## 2024-03-10 PROCEDURE — 99233 SBSQ HOSP IP/OBS HIGH 50: CPT | Performed by: INTERNAL MEDICINE

## 2024-03-10 PROCEDURE — 85027 COMPLETE CBC AUTOMATED: CPT

## 2024-03-10 PROCEDURE — 82565 ASSAY OF CREATININE: CPT

## 2024-03-10 PROCEDURE — 94003 VENT MGMT INPAT SUBQ DAY: CPT

## 2024-03-10 PROCEDURE — 82330 ASSAY OF CALCIUM: CPT

## 2024-03-10 PROCEDURE — 6360000002 HC RX W HCPCS: Performed by: INTERNAL MEDICINE

## 2024-03-10 PROCEDURE — 82948 REAGENT STRIP/BLOOD GLUCOSE: CPT

## 2024-03-10 PROCEDURE — 94640 AIRWAY INHALATION TREATMENT: CPT

## 2024-03-10 PROCEDURE — 80053 COMPREHEN METABOLIC PANEL: CPT

## 2024-03-10 PROCEDURE — 36415 COLL VENOUS BLD VENIPUNCTURE: CPT

## 2024-03-10 PROCEDURE — 2500000003 HC RX 250 WO HCPCS: Performed by: STUDENT IN AN ORGANIZED HEALTH CARE EDUCATION/TRAINING PROGRAM

## 2024-03-10 PROCEDURE — 2580000003 HC RX 258: Performed by: THORACIC SURGERY (CARDIOTHORACIC VASCULAR SURGERY)

## 2024-03-10 PROCEDURE — 85384 FIBRINOGEN ACTIVITY: CPT

## 2024-03-10 PROCEDURE — 2580000003 HC RX 258: Performed by: STUDENT IN AN ORGANIZED HEALTH CARE EDUCATION/TRAINING PROGRAM

## 2024-03-10 PROCEDURE — 83735 ASSAY OF MAGNESIUM: CPT

## 2024-03-10 PROCEDURE — 6360000002 HC RX W HCPCS: Performed by: THORACIC SURGERY (CARDIOTHORACIC VASCULAR SURGERY)

## 2024-03-10 PROCEDURE — 99291 CRITICAL CARE FIRST HOUR: CPT | Performed by: INTERNAL MEDICINE

## 2024-03-10 PROCEDURE — 6370000000 HC RX 637 (ALT 250 FOR IP): Performed by: PHYSICIAN ASSISTANT

## 2024-03-10 PROCEDURE — 33949 ECMO/ECLS DAILY MGMT ARTERY: CPT | Performed by: THORACIC SURGERY (CARDIOTHORACIC VASCULAR SURGERY)

## 2024-03-10 RX ORDER — SODIUM CHLORIDE 9 MG/ML
INJECTION, SOLUTION INTRAVENOUS PRN
Status: DISCONTINUED | OUTPATIENT
Start: 2024-03-10 | End: 2024-04-06

## 2024-03-10 RX ORDER — ALBUTEROL SULFATE 2.5 MG/3ML
2.5 SOLUTION RESPIRATORY (INHALATION) EVERY 4 HOURS PRN
Status: DISCONTINUED | OUTPATIENT
Start: 2024-03-10 | End: 2024-04-06

## 2024-03-10 RX ORDER — MILRINONE LACTATE 0.2 MG/ML
0.25 INJECTION, SOLUTION INTRAVENOUS CONTINUOUS
Status: DISCONTINUED | OUTPATIENT
Start: 2024-03-10 | End: 2024-03-11

## 2024-03-10 RX ADMIN — SODIUM CHLORIDE: 9 INJECTION, SOLUTION INTRAVENOUS at 16:48

## 2024-03-10 RX ADMIN — MILRINONE LACTATE IN DEXTROSE 0.25 MCG/KG/MIN: 200 INJECTION, SOLUTION INTRAVENOUS at 18:36

## 2024-03-10 RX ADMIN — INSULIN LISPRO 2 UNITS: 100 INJECTION, SOLUTION INTRAVENOUS; SUBCUTANEOUS at 08:15

## 2024-03-10 RX ADMIN — Medication 2 MCG/MIN: at 20:08

## 2024-03-10 RX ADMIN — SODIUM CHLORIDE, PRESERVATIVE FREE 10 ML: 5 INJECTION INTRAVENOUS at 20:41

## 2024-03-10 RX ADMIN — COLLAGENASE SANTYL: 250 OINTMENT TOPICAL at 08:20

## 2024-03-10 RX ADMIN — CHLORHEXIDINE GLUCONATE 0.12% ORAL RINSE 15 ML: 1.2 LIQUID ORAL at 07:42

## 2024-03-10 RX ADMIN — PANTOPRAZOLE SODIUM 8 MG/HR: 40 INJECTION, POWDER, FOR SOLUTION INTRAVENOUS at 16:35

## 2024-03-10 RX ADMIN — ATORVASTATIN CALCIUM 40 MG: 40 TABLET, FILM COATED ORAL at 20:42

## 2024-03-10 RX ADMIN — WATER 2000 MG: 1 INJECTION INTRAMUSCULAR; INTRAVENOUS; SUBCUTANEOUS at 00:23

## 2024-03-10 RX ADMIN — Medication 75 MCG/HR: at 00:28

## 2024-03-10 RX ADMIN — Medication: at 06:14

## 2024-03-10 RX ADMIN — Medication 1 MCG/KG/HR: at 15:01

## 2024-03-10 RX ADMIN — INSULIN LISPRO 2 UNITS: 100 INJECTION, SOLUTION INTRAVENOUS; SUBCUTANEOUS at 00:23

## 2024-03-10 RX ADMIN — ALBUTEROL SULFATE 2.5 MG: 2.5 SOLUTION RESPIRATORY (INHALATION) at 21:16

## 2024-03-10 RX ADMIN — Medication 25 MCG/HR: at 16:29

## 2024-03-10 RX ADMIN — ALBUMIN (HUMAN) 25 G: 0.25 INJECTION, SOLUTION INTRAVENOUS at 00:26

## 2024-03-10 RX ADMIN — CHLORHEXIDINE GLUCONATE 0.12% ORAL RINSE 15 ML: 1.2 LIQUID ORAL at 20:24

## 2024-03-10 RX ADMIN — Medication 1 MCG/KG/HR: at 19:09

## 2024-03-10 RX ADMIN — AMIODARONE HYDROCHLORIDE 0.5 MG/MIN: 1.8 INJECTION, SOLUTION INTRAVENOUS at 10:30

## 2024-03-10 RX ADMIN — ALBUMIN (HUMAN) 25 G: 0.25 INJECTION, SOLUTION INTRAVENOUS at 07:41

## 2024-03-10 RX ADMIN — MAGNESIUM HYDROXIDE 30 ML: 1200 LIQUID ORAL at 08:30

## 2024-03-10 RX ADMIN — WATER 2000 MG: 1 INJECTION INTRAMUSCULAR; INTRAVENOUS; SUBCUTANEOUS at 13:23

## 2024-03-10 RX ADMIN — Medication: at 16:26

## 2024-03-10 RX ADMIN — Medication: at 16:25

## 2024-03-10 RX ADMIN — LORAZEPAM 4 MG: 2 INJECTION INTRAMUSCULAR; INTRAVENOUS at 04:09

## 2024-03-10 RX ADMIN — SODIUM CHLORIDE, PRESERVATIVE FREE 10 ML: 5 INJECTION INTRAVENOUS at 08:31

## 2024-03-10 RX ADMIN — Medication 1.4 MCG/KG/HR: at 00:20

## 2024-03-10 RX ADMIN — SENNOSIDES AND DOCUSATE SODIUM 1 TABLET: 50; 8.6 TABLET ORAL at 20:42

## 2024-03-10 RX ADMIN — PANTOPRAZOLE SODIUM 8 MG/HR: 40 INJECTION, POWDER, FOR SOLUTION INTRAVENOUS at 05:40

## 2024-03-10 RX ADMIN — AMIODARONE HYDROCHLORIDE 0.5 MG/MIN: 1.8 INJECTION, SOLUTION INTRAVENOUS at 22:54

## 2024-03-10 RX ADMIN — Medication 1.4 MCG/KG/HR: at 05:24

## 2024-03-10 RX ADMIN — SENNOSIDES AND DOCUSATE SODIUM 1 TABLET: 50; 8.6 TABLET ORAL at 08:15

## 2024-03-10 RX ADMIN — Medication 1.4 MCG/KG/HR: at 10:06

## 2024-03-10 RX ADMIN — INSULIN LISPRO 2 UNITS: 100 INJECTION, SOLUTION INTRAVENOUS; SUBCUTANEOUS at 04:34

## 2024-03-10 RX ADMIN — Medication 1 TABLET: at 08:30

## 2024-03-10 RX ADMIN — INSULIN LISPRO 2 UNITS: 100 INJECTION, SOLUTION INTRAVENOUS; SUBCUTANEOUS at 12:52

## 2024-03-10 RX ADMIN — POLYETHYLENE GLYCOL 400 AND PROPYLENE GLYCOL 2 DROP: 4; 3 SOLUTION/ DROPS OPHTHALMIC at 21:40

## 2024-03-10 RX ADMIN — BIVALIRUDIN 0.04 MG/KG/HR: 250 INJECTION, POWDER, LYOPHILIZED, FOR SOLUTION INTRAVENOUS at 16:46

## 2024-03-10 ASSESSMENT — PULMONARY FUNCTION TESTS
PIF_VALUE: 17
PIF_VALUE: 14
PIF_VALUE: 17
PIF_VALUE: 16
PIF_VALUE: 14
PIF_VALUE: 18

## 2024-03-10 ASSESSMENT — PAIN SCALES - GENERAL: PAINLEVEL_OUTOF10: 7

## 2024-03-10 NOTE — SIGNIFICANT EVENT
ECMO DAILY ROUNDING NOTE:    Team Present at bedside.  Family Present at [facility]:  NO  Condition:  Critical    RE for ECMO initiation: cardiogenic shock  Do2:  607  Vo2:  167  Sweep Gas:  1L  Pump Flow:  4.5  SvO2:  79  SaO2:  99.9  pH:  7.4  pCO2:  41.7  PTT Goal:  Normal due to bleeding issues  Plt:  25  Hgb:  9.5  Lactic Acid:  1.7  CXR:  pleural effusion on right  Telemetry:  paced  Paralytic:  no  Sedation Goals:  RASS -2 to -3  Pupils:  equal  PC 12:  PEEP 6:  RATE 15:  FiO2:  40  Generated :    Electronically signed by Fco Olivarez MD.

## 2024-03-11 ENCOUNTER — APPOINTMENT (OUTPATIENT)
Dept: GENERAL RADIOLOGY | Age: 69
DRG: 003 | End: 2024-03-11
Attending: THORACIC SURGERY (CARDIOTHORACIC VASCULAR SURGERY)
Payer: MEDICARE

## 2024-03-11 LAB
ABO: NORMAL
ALBUMIN SERPL BCG-MCNC: 3.3 G/DL (ref 3.5–5.1)
ALBUMIN SERPL BCG-MCNC: 3.4 G/DL (ref 3.5–5.1)
ALBUMIN SERPL BCG-MCNC: 3.5 G/DL (ref 3.5–5.1)
ALBUMIN SERPL BCG-MCNC: 3.7 G/DL (ref 3.5–5.1)
ALBUMIN SERPL BCG-MCNC: 3.7 G/DL (ref 3.5–5.1)
ALBUMIN SERPL BCG-MCNC: 4 G/DL (ref 3.5–5.1)
ALP SERPL-CCNC: 145 U/L (ref 38–126)
ALP SERPL-CCNC: 149 U/L (ref 38–126)
ALP SERPL-CCNC: 162 U/L (ref 38–126)
ALP SERPL-CCNC: 167 U/L (ref 38–126)
ALP SERPL-CCNC: 170 U/L (ref 38–126)
ALP SERPL-CCNC: 180 U/L (ref 38–126)
ALT SERPL W/O P-5'-P-CCNC: 18 U/L (ref 11–66)
ALT SERPL W/O P-5'-P-CCNC: 21 U/L (ref 11–66)
ALT SERPL W/O P-5'-P-CCNC: 30 U/L (ref 11–66)
ALT SERPL W/O P-5'-P-CCNC: 36 U/L (ref 11–66)
ALT SERPL W/O P-5'-P-CCNC: 39 U/L (ref 11–66)
ALT SERPL W/O P-5'-P-CCNC: 45 U/L (ref 11–66)
ANION GAP SERPL CALC-SCNC: 10 MEQ/L (ref 8–16)
ANION GAP SERPL CALC-SCNC: 10 MEQ/L (ref 8–16)
ANION GAP SERPL CALC-SCNC: 11 MEQ/L (ref 8–16)
ANION GAP SERPL CALC-SCNC: 12 MEQ/L (ref 8–16)
ANION GAP SERPL CALC-SCNC: 12 MEQ/L (ref 8–16)
ANION GAP SERPL CALC-SCNC: 9 MEQ/L (ref 8–16)
ANTIBODY SCREEN: NORMAL
APTT PPP: 61 SECONDS (ref 22–38)
APTT PPP: 64.6 SECONDS (ref 22–38)
APTT PPP: 67.3 SECONDS (ref 22–38)
ARTERIAL PATENCY WRIST A: ABNORMAL
ARTERIAL PATENCY WRIST A: NORMAL
AST SERPL-CCNC: 436 U/L (ref 5–40)
AST SERPL-CCNC: 470 U/L (ref 5–40)
AST SERPL-CCNC: 523 U/L (ref 5–40)
AST SERPL-CCNC: 573 U/L (ref 5–40)
AST SERPL-CCNC: 616 U/L (ref 5–40)
AST SERPL-CCNC: 616 U/L (ref 5–40)
BASE EXCESS BLDA CALC-SCNC: -0.2 MMOL/L (ref -2–3)
BASE EXCESS BLDA CALC-SCNC: -0.4 MMOL/L (ref -2–3)
BASE EXCESS BLDA CALC-SCNC: -0.8 MMOL/L (ref -2.5–2.5)
BASE EXCESS BLDA CALC-SCNC: -1.2 MMOL/L (ref -2.5–2.5)
BASE EXCESS BLDA CALC-SCNC: -1.2 MMOL/L (ref -2–3)
BASE EXCESS BLDA CALC-SCNC: -2.1 MMOL/L (ref -2–3)
BASE EXCESS BLDA CALC-SCNC: -3.7 MMOL/L (ref -2–3)
BASE EXCESS BLDA CALC-SCNC: 0.2 MMOL/L (ref -2.5–2.5)
BASE EXCESS BLDA CALC-SCNC: 0.5 MMOL/L (ref -2.5–2.5)
BASE EXCESS BLDA CALC-SCNC: 0.5 MMOL/L (ref -2–3)
BASE EXCESS BLDA CALC-SCNC: 0.6 MMOL/L (ref -2–3)
BASE EXCESS BLDA CALC-SCNC: 1.1 MMOL/L (ref -2.5–2.5)
BASE EXCESS BLDA CALC-SCNC: 2.6 MMOL/L (ref -2.5–2.5)
BDY SITE: ABNORMAL
BDY SITE: NORMAL
BILIRUB SERPL-MCNC: 1.8 MG/DL (ref 0.3–1.2)
BILIRUB SERPL-MCNC: 1.9 MG/DL (ref 0.3–1.2)
BILIRUB SERPL-MCNC: 1.9 MG/DL (ref 0.3–1.2)
BILIRUB SERPL-MCNC: 2 MG/DL (ref 0.3–1.2)
BREATHS SETTING VENT: 12 BPM
BUN SERPL-MCNC: 32 MG/DL (ref 7–22)
BUN SERPL-MCNC: 34 MG/DL (ref 7–22)
BUN SERPL-MCNC: 35 MG/DL (ref 7–22)
CA-I BLD ISE-SCNC: 1.18 MMOL/L (ref 1.12–1.32)
CA-I BLD ISE-SCNC: 1.18 MMOL/L (ref 1.12–1.32)
CA-I BLD ISE-SCNC: 1.19 MMOL/L (ref 1.12–1.32)
CA-I BLD ISE-SCNC: 1.19 MMOL/L (ref 1.12–1.32)
CA-I BLD ISE-SCNC: 1.2 MMOL/L (ref 1.12–1.32)
CA-I BLD ISE-SCNC: 1.2 MMOL/L (ref 1.12–1.32)
CALCIUM SERPL-MCNC: 8.5 MG/DL (ref 8.5–10.5)
CALCIUM SERPL-MCNC: 8.6 MG/DL (ref 8.5–10.5)
CALCIUM SERPL-MCNC: 8.7 MG/DL (ref 8.5–10.5)
CALCIUM SERPL-MCNC: 8.9 MG/DL (ref 8.5–10.5)
CALCIUM SERPL-MCNC: 8.9 MG/DL (ref 8.5–10.5)
CALCIUM SERPL-MCNC: 9 MG/DL (ref 8.5–10.5)
CHLORIDE SERPL-SCNC: 103 MEQ/L (ref 98–111)
CHLORIDE SERPL-SCNC: 103 MEQ/L (ref 98–111)
CHLORIDE SERPL-SCNC: 105 MEQ/L (ref 98–111)
CO2 SERPL-SCNC: 22 MEQ/L (ref 23–33)
CO2 SERPL-SCNC: 23 MEQ/L (ref 23–33)
CO2 SERPL-SCNC: 24 MEQ/L (ref 23–33)
CO2 SERPL-SCNC: 25 MEQ/L (ref 23–33)
COLLECTED BY:: ABNORMAL
COLLECTED BY:: NORMAL
CREAT SERPL-MCNC: 1.8 MG/DL (ref 0.4–1.2)
CREAT SERPL-MCNC: 1.9 MG/DL (ref 0.4–1.2)
CREAT SERPL-MCNC: 1.9 MG/DL (ref 0.4–1.2)
CREAT SERPL-MCNC: 2 MG/DL (ref 0.4–1.2)
CREAT SERPL-MCNC: 2.1 MG/DL (ref 0.4–1.2)
CREAT SERPL-MCNC: 2.1 MG/DL (ref 0.4–1.2)
DEPRECATED RDW RBC AUTO: 47.6 FL (ref 35–45)
DEPRECATED RDW RBC AUTO: 48 FL (ref 35–45)
DEPRECATED RDW RBC AUTO: 48.2 FL (ref 35–45)
DEPRECATED RDW RBC AUTO: 48.6 FL (ref 35–45)
DEPRECATED RDW RBC AUTO: 48.7 FL (ref 35–45)
DEPRECATED RDW RBC AUTO: 49.4 FL (ref 35–45)
DEPRECATED RDW RBC AUTO: 50.4 FL (ref 35–45)
DEVICE: ABNORMAL
DEVICE: NORMAL
ERYTHROCYTE [DISTWIDTH] IN BLOOD BY AUTOMATED COUNT: 15.5 % (ref 11.5–14.5)
ERYTHROCYTE [DISTWIDTH] IN BLOOD BY AUTOMATED COUNT: 15.6 % (ref 11.5–14.5)
ERYTHROCYTE [DISTWIDTH] IN BLOOD BY AUTOMATED COUNT: 15.8 % (ref 11.5–14.5)
ERYTHROCYTE [DISTWIDTH] IN BLOOD BY AUTOMATED COUNT: 15.8 % (ref 11.5–14.5)
ERYTHROCYTE [DISTWIDTH] IN BLOOD BY AUTOMATED COUNT: 15.9 % (ref 11.5–14.5)
FIBRINOGEN PPP-MCNC: 195 MG/100ML (ref 155–475)
FIBRINOGEN PPP-MCNC: 203 MG/100ML (ref 155–475)
FIBRINOGEN PPP-MCNC: 214 MG/100ML (ref 155–475)
FIBRINOGEN PPP-MCNC: 226 MG/100ML (ref 155–475)
FIBRINOGEN PPP-MCNC: 233 MG/100ML (ref 155–475)
FIBRINOGEN PPP-MCNC: 258 MG/100ML (ref 155–475)
FIO2 ON VENT O2 ANALYZER: 40 %
GFR SERPL CREATININE-BSD FRML MDRD: 33 ML/MIN/1.73M2
GFR SERPL CREATININE-BSD FRML MDRD: 33 ML/MIN/1.73M2
GFR SERPL CREATININE-BSD FRML MDRD: 35 ML/MIN/1.73M2
GFR SERPL CREATININE-BSD FRML MDRD: 37 ML/MIN/1.73M2
GFR SERPL CREATININE-BSD FRML MDRD: 37 ML/MIN/1.73M2
GFR SERPL CREATININE-BSD FRML MDRD: 40 ML/MIN/1.73M2
GLUCOSE BLD-MCNC: 187 MG/DL (ref 70–108)
GLUCOSE BLD-MCNC: 196 MG/DL (ref 70–108)
GLUCOSE BLD-MCNC: 198 MG/DL (ref 70–108)
GLUCOSE BLD-MCNC: 200 MG/DL (ref 70–108)
GLUCOSE BLD-MCNC: 201 MG/DL (ref 70–108)
GLUCOSE BLD-MCNC: 219 MG/DL (ref 70–108)
GLUCOSE BLD-MCNC: 220 MG/DL (ref 70–108)
GLUCOSE SERPL-MCNC: 185 MG/DL (ref 70–108)
GLUCOSE SERPL-MCNC: 195 MG/DL (ref 70–108)
GLUCOSE SERPL-MCNC: 197 MG/DL (ref 70–108)
GLUCOSE SERPL-MCNC: 199 MG/DL (ref 70–108)
GLUCOSE SERPL-MCNC: 208 MG/DL (ref 70–108)
GLUCOSE SERPL-MCNC: 214 MG/DL (ref 70–108)
HCO3 BLDA-SCNC: 21 MMOL/L (ref 23–28)
HCO3 BLDA-SCNC: 23 MMOL/L (ref 23–28)
HCO3 BLDA-SCNC: 24 MMOL/L (ref 23–28)
HCO3 BLDA-SCNC: 25 MMOL/L (ref 23–28)
HCO3 BLDA-SCNC: 26 MMOL/L (ref 23–28)
HCT VFR BLD AUTO: 28.3 % (ref 42–52)
HCT VFR BLD AUTO: 28.6 % (ref 42–52)
HCT VFR BLD AUTO: 29 % (ref 42–52)
HCT VFR BLD AUTO: 31.8 % (ref 42–52)
HCT VFR BLD AUTO: 32 % (ref 42–52)
HCT VFR BLD AUTO: 32.5 % (ref 42–52)
HCT VFR BLD AUTO: 33.7 % (ref 42–52)
HGB BLD-MCNC: 11.2 GM/DL (ref 14–18)
HGB BLD-MCNC: 11.4 GM/DL (ref 14–18)
HGB BLD-MCNC: 11.5 GM/DL (ref 14–18)
HGB BLD-MCNC: 11.7 GM/DL (ref 14–18)
HGB BLD-MCNC: 9.6 GM/DL (ref 14–18)
HGB BLD-MCNC: 9.6 GM/DL (ref 14–18)
HGB BLD-MCNC: 9.9 GM/DL (ref 14–18)
LACTATE SERPL-SCNC: 1.2 MMOL/L (ref 0.5–2)
LACTATE SERPL-SCNC: 1.4 MMOL/L (ref 0.5–2)
LACTATE SERPL-SCNC: 1.4 MMOL/L (ref 0.5–2)
LACTATE SERPL-SCNC: 1.5 MMOL/L (ref 0.5–2)
LACTATE SERPL-SCNC: 1.6 MMOL/L (ref 0.5–2)
LACTATE SERPL-SCNC: 1.7 MMOL/L (ref 0.5–2)
MAGNESIUM SERPL-MCNC: 2.4 MG/DL (ref 1.6–2.4)
MAGNESIUM SERPL-MCNC: 2.5 MG/DL (ref 1.6–2.4)
MCH RBC QN AUTO: 29 PG (ref 26–33)
MCH RBC QN AUTO: 29.3 PG (ref 26–33)
MCH RBC QN AUTO: 29.4 PG (ref 26–33)
MCH RBC QN AUTO: 29.9 PG (ref 26–33)
MCH RBC QN AUTO: 30 PG (ref 26–33)
MCH RBC QN AUTO: 30.2 PG (ref 26–33)
MCH RBC QN AUTO: 30.3 PG (ref 26–33)
MCHC RBC AUTO-ENTMCNC: 33.6 GM/DL (ref 32.2–35.5)
MCHC RBC AUTO-ENTMCNC: 33.9 GM/DL (ref 32.2–35.5)
MCHC RBC AUTO-ENTMCNC: 34.1 GM/DL (ref 32.2–35.5)
MCHC RBC AUTO-ENTMCNC: 34.7 GM/DL (ref 32.2–35.5)
MCHC RBC AUTO-ENTMCNC: 35.2 GM/DL (ref 32.2–35.5)
MCHC RBC AUTO-ENTMCNC: 35.4 GM/DL (ref 32.2–35.5)
MCHC RBC AUTO-ENTMCNC: 35.6 GM/DL (ref 32.2–35.5)
MCV RBC AUTO: 85 FL (ref 80–94)
MCV RBC AUTO: 85.1 FL (ref 80–94)
MCV RBC AUTO: 85.3 FL (ref 80–94)
MCV RBC AUTO: 85.5 FL (ref 80–94)
MCV RBC AUTO: 86.1 FL (ref 80–94)
MCV RBC AUTO: 86.4 FL (ref 80–94)
MCV RBC AUTO: 87.2 FL (ref 80–94)
PATHOLOGIST REVIEW: ABNORMAL
PCO2 BLDA: 31 MMHG (ref 35–45)
PCO2 BLDA: 37 MMHG (ref 35–45)
PCO2 BLDA: 39 MMHG (ref 35–45)
PCO2 BLDA: 39 MMHG (ref 35–45)
PCO2 BLDA: 41 MMHG (ref 35–45)
PCO2 BLDA: 46 MMHG (ref 35–45)
PCO2 TEMP ADJ BLDMV: 34 MMHG (ref 41–51)
PCO2 TEMP ADJ BLDMV: 36 MMHG (ref 41–51)
PCO2 TEMP ADJ BLDMV: 37 MMHG (ref 41–51)
PCO2 TEMP ADJ BLDMV: 39 MMHG (ref 41–51)
PCO2 TEMP ADJ BLDMV: 41 MMHG (ref 41–51)
PCO2 TEMP ADJ BLDMV: 44 MMHG (ref 41–51)
PCO2 TEMP ADJ BLDMV: 49 MMHG (ref 41–51)
PEEP SETTING VENT: 6 MMHG
PH BLDA: 7.36 [PH] (ref 7.35–7.45)
PH BLDA: 7.4 [PH] (ref 7.35–7.45)
PH BLDA: 7.41 [PH] (ref 7.35–7.45)
PH BLDA: 7.43 [PH] (ref 7.35–7.45)
PH BLDA: 7.44 [PH] (ref 7.35–7.45)
PH BLDA: 7.52 [PH] (ref 7.35–7.45)
PH BLDMV: 7.33 [PH] (ref 7.31–7.41)
PH BLDMV: 7.37 [PH] (ref 7.31–7.41)
PH BLDMV: 7.38 [PH] (ref 7.31–7.41)
PH BLDMV: 7.39 [PH] (ref 7.31–7.41)
PH BLDMV: 7.4 [PH] (ref 7.31–7.41)
PH BLDMV: 7.42 [PH] (ref 7.31–7.41)
PH BLDMV: 7.44 [PH] (ref 7.31–7.41)
PHOSPHATE SERPL-MCNC: 1.5 MG/DL (ref 2.4–4.7)
PHOSPHATE SERPL-MCNC: 1.7 MG/DL (ref 2.4–4.7)
PHOSPHATE SERPL-MCNC: 2.1 MG/DL (ref 2.4–4.7)
PHOSPHATE SERPL-MCNC: 2.1 MG/DL (ref 2.4–4.7)
PHOSPHATE SERPL-MCNC: 2.4 MG/DL (ref 2.4–4.7)
PHOSPHATE SERPL-MCNC: 2.6 MG/DL (ref 2.4–4.7)
PIP: 18 CMH2O
PIP: 20 CMH2O
PLATELET # BLD AUTO: 27 THOU/MM3 (ref 130–400)
PLATELET # BLD AUTO: 36 THOU/MM3 (ref 130–400)
PLATELET # BLD AUTO: 37 THOU/MM3 (ref 130–400)
PLATELET # BLD AUTO: 45 THOU/MM3 (ref 130–400)
PLATELET # BLD AUTO: 45 THOU/MM3 (ref 130–400)
PMV BLD AUTO: 10.1 FL (ref 9.4–12.4)
PMV BLD AUTO: 10.2 FL (ref 9.4–12.4)
PMV BLD AUTO: 10.3 FL (ref 9.4–12.4)
PMV BLD AUTO: 10.5 FL (ref 9.4–12.4)
PMV BLD AUTO: 10.9 FL (ref 9.4–12.4)
PMV BLD AUTO: 11.2 FL (ref 9.4–12.4)
PMV BLD AUTO: 11.7 FL (ref 9.4–12.4)
PO2 BLDA: 104 MMHG (ref 71–104)
PO2 BLDA: 178 MMHG (ref 71–104)
PO2 BLDA: 216 MMHG (ref 71–104)
PO2 BLDA: 226 MMHG (ref 71–104)
PO2 BLDA: 274 MMHG (ref 71–104)
PO2 BLDA: 277 MMHG (ref 71–104)
PO2 BLDA: 413 MMHG (ref 71–104)
PO2 BLDA: 62 MMHG (ref 71–104)
PO2 BLDMV: 28 MMHG (ref 25–40)
PO2 BLDMV: 32 MMHG (ref 25–40)
PO2 BLDMV: 32 MMHG (ref 25–40)
PO2 BLDMV: 33 MMHG (ref 25–40)
PO2 BLDMV: 36 MMHG (ref 25–40)
PO2 BLDMV: 43 MMHG (ref 25–40)
PO2 BLDMV: 93 MMHG (ref 25–40)
POTASSIUM SERPL-SCNC: 4.3 MEQ/L (ref 3.5–5.2)
POTASSIUM SERPL-SCNC: 4.5 MEQ/L (ref 3.5–5.2)
POTASSIUM SERPL-SCNC: 4.9 MEQ/L (ref 3.5–5.2)
PRESSURE SUPPORT SETTING VENT: 12 CMH2O
PRESSURE SUPPORT SETTING VENT: 14 CMH2O
PRESSURE SUPPORT SETTING VENT: 14 CMH2O
PROT SERPL-MCNC: 4.6 G/DL (ref 6.1–8)
PROT SERPL-MCNC: 4.8 G/DL (ref 6.1–8)
PROT SERPL-MCNC: 4.9 G/DL (ref 6.1–8)
PROT SERPL-MCNC: 5 G/DL (ref 6.1–8)
RBC # BLD AUTO: 3.28 MILL/MM3 (ref 4.7–6.1)
RBC # BLD AUTO: 3.31 MILL/MM3 (ref 4.7–6.1)
RBC # BLD AUTO: 3.37 MILL/MM3 (ref 4.7–6.1)
RBC # BLD AUTO: 3.74 MILL/MM3 (ref 4.7–6.1)
RBC # BLD AUTO: 3.76 MILL/MM3 (ref 4.7–6.1)
RBC # BLD AUTO: 3.81 MILL/MM3 (ref 4.7–6.1)
RBC # BLD AUTO: 3.9 MILL/MM3 (ref 4.7–6.1)
RH FACTOR: NORMAL
SAO2 % BLDA: 100 %
SAO2 % BLDA: 92 %
SAO2 % BLDA: 98 %
SAO2 % BLDMV: 52 %
SAO2 % BLDMV: 62 %
SAO2 % BLDMV: 64 %
SAO2 % BLDMV: 64 %
SAO2 % BLDMV: 65 %
SAO2 % BLDMV: 78 %
SAO2 % BLDMV: 97 %
SET PEEP: 6 MMHG
SET PRESS SUPP: 14 CMH2O
SET RESPIRATORY RATE: 12 BPM
SET RESPIRATORY RATE: 12 BPM
SITE: ABNORMAL
SODIUM SERPL-SCNC: 138 MEQ/L (ref 135–145)
SODIUM SERPL-SCNC: 140 MEQ/L (ref 135–145)
VENTILATION MODE VENT: ABNORMAL
VENTILATION MODE VENT: NORMAL
WBC # BLD AUTO: 10.5 THOU/MM3 (ref 4.8–10.8)
WBC # BLD AUTO: 5.9 THOU/MM3 (ref 4.8–10.8)
WBC # BLD AUTO: 7.1 THOU/MM3 (ref 4.8–10.8)
WBC # BLD AUTO: 7.7 THOU/MM3 (ref 4.8–10.8)
WBC # BLD AUTO: 9.1 THOU/MM3 (ref 4.8–10.8)
WBC # BLD AUTO: 9.4 THOU/MM3 (ref 4.8–10.8)
WBC # BLD AUTO: 9.7 THOU/MM3 (ref 4.8–10.8)

## 2024-03-11 PROCEDURE — 2500000003 HC RX 250 WO HCPCS: Performed by: INTERNAL MEDICINE

## 2024-03-11 PROCEDURE — 6370000000 HC RX 637 (ALT 250 FOR IP): Performed by: PHYSICIAN ASSISTANT

## 2024-03-11 PROCEDURE — 2500000003 HC RX 250 WO HCPCS: Performed by: STUDENT IN AN ORGANIZED HEALTH CARE EDUCATION/TRAINING PROGRAM

## 2024-03-11 PROCEDURE — 99291 CRITICAL CARE FIRST HOUR: CPT | Performed by: INTERNAL MEDICINE

## 2024-03-11 PROCEDURE — 86901 BLOOD TYPING SEROLOGIC RH(D): CPT

## 2024-03-11 PROCEDURE — 99233 SBSQ HOSP IP/OBS HIGH 50: CPT | Performed by: INTERNAL MEDICINE

## 2024-03-11 PROCEDURE — 94761 N-INVAS EAR/PLS OXIMETRY MLT: CPT

## 2024-03-11 PROCEDURE — 83735 ASSAY OF MAGNESIUM: CPT

## 2024-03-11 PROCEDURE — 71045 X-RAY EXAM CHEST 1 VIEW: CPT

## 2024-03-11 PROCEDURE — 2580000003 HC RX 258: Performed by: INTERNAL MEDICINE

## 2024-03-11 PROCEDURE — 95717 EEG PHYS/QHP 2-12 HR W/O VID: CPT | Performed by: PSYCHIATRY & NEUROLOGY

## 2024-03-11 PROCEDURE — 6360000002 HC RX W HCPCS

## 2024-03-11 PROCEDURE — P9016 RBC LEUKOCYTES REDUCED: HCPCS

## 2024-03-11 PROCEDURE — 37799 UNLISTED PX VASCULAR SURGERY: CPT

## 2024-03-11 PROCEDURE — 82330 ASSAY OF CALCIUM: CPT

## 2024-03-11 PROCEDURE — 82947 ASSAY GLUCOSE BLOOD QUANT: CPT

## 2024-03-11 PROCEDURE — 83605 ASSAY OF LACTIC ACID: CPT

## 2024-03-11 PROCEDURE — 94003 VENT MGMT INPAT SUBQ DAY: CPT

## 2024-03-11 PROCEDURE — 6370000000 HC RX 637 (ALT 250 FOR IP): Performed by: STUDENT IN AN ORGANIZED HEALTH CARE EDUCATION/TRAINING PROGRAM

## 2024-03-11 PROCEDURE — 80053 COMPREHEN METABOLIC PANEL: CPT

## 2024-03-11 PROCEDURE — 33949 ECMO/ECLS DAILY MGMT ARTERY: CPT | Performed by: THORACIC SURGERY (CARDIOTHORACIC VASCULAR SURGERY)

## 2024-03-11 PROCEDURE — 85730 THROMBOPLASTIN TIME PARTIAL: CPT

## 2024-03-11 PROCEDURE — 85027 COMPLETE CBC AUTOMATED: CPT

## 2024-03-11 PROCEDURE — 86923 COMPATIBILITY TEST ELECTRIC: CPT

## 2024-03-11 PROCEDURE — 2700000000 HC OXYGEN THERAPY PER DAY

## 2024-03-11 PROCEDURE — 6370000000 HC RX 637 (ALT 250 FOR IP)

## 2024-03-11 PROCEDURE — C9113 INJ PANTOPRAZOLE SODIUM, VIA: HCPCS | Performed by: STUDENT IN AN ORGANIZED HEALTH CARE EDUCATION/TRAINING PROGRAM

## 2024-03-11 PROCEDURE — 2580000003 HC RX 258: Performed by: THORACIC SURGERY (CARDIOTHORACIC VASCULAR SURGERY)

## 2024-03-11 PROCEDURE — 6360000002 HC RX W HCPCS: Performed by: STUDENT IN AN ORGANIZED HEALTH CARE EDUCATION/TRAINING PROGRAM

## 2024-03-11 PROCEDURE — 84100 ASSAY OF PHOSPHORUS: CPT

## 2024-03-11 PROCEDURE — 33948 ECMO/ECLS DAILY MGMT-VENOUS: CPT | Performed by: INTERNAL MEDICINE

## 2024-03-11 PROCEDURE — 86900 BLOOD TYPING SEROLOGIC ABO: CPT

## 2024-03-11 PROCEDURE — 85384 FIBRINOGEN ACTIVITY: CPT

## 2024-03-11 PROCEDURE — 36430 TRANSFUSION BLD/BLD COMPNT: CPT

## 2024-03-11 PROCEDURE — 36415 COLL VENOUS BLD VENIPUNCTURE: CPT

## 2024-03-11 PROCEDURE — 82803 BLOOD GASES ANY COMBINATION: CPT

## 2024-03-11 PROCEDURE — 33949 ECMO/ECLS DAILY MGMT ARTERY: CPT

## 2024-03-11 PROCEDURE — 2580000003 HC RX 258: Performed by: STUDENT IN AN ORGANIZED HEALTH CARE EDUCATION/TRAINING PROGRAM

## 2024-03-11 PROCEDURE — 6360000002 HC RX W HCPCS: Performed by: THORACIC SURGERY (CARDIOTHORACIC VASCULAR SURGERY)

## 2024-03-11 PROCEDURE — 2000000000 HC ICU R&B

## 2024-03-11 PROCEDURE — 2580000003 HC RX 258: Performed by: PHYSICIAN ASSISTANT

## 2024-03-11 PROCEDURE — 86850 RBC ANTIBODY SCREEN: CPT

## 2024-03-11 RX ORDER — MILRINONE LACTATE 0.2 MG/ML
0.12 INJECTION, SOLUTION INTRAVENOUS CONTINUOUS
Status: DISCONTINUED | OUTPATIENT
Start: 2024-03-11 | End: 2024-03-13

## 2024-03-11 RX ORDER — MIDAZOLAM HYDROCHLORIDE 1 MG/ML
INJECTION INTRAMUSCULAR; INTRAVENOUS
Status: COMPLETED
Start: 2024-03-11 | End: 2024-03-11

## 2024-03-11 RX ORDER — MIDAZOLAM HYDROCHLORIDE 1 MG/ML
2 INJECTION INTRAMUSCULAR; INTRAVENOUS
Status: DISCONTINUED | OUTPATIENT
Start: 2024-03-11 | End: 2024-03-22

## 2024-03-11 RX ADMIN — PANTOPRAZOLE SODIUM 8 MG/HR: 40 INJECTION, POWDER, FOR SOLUTION INTRAVENOUS at 03:16

## 2024-03-11 RX ADMIN — PANTOPRAZOLE SODIUM 8 MG/HR: 40 INJECTION, POWDER, FOR SOLUTION INTRAVENOUS at 13:58

## 2024-03-11 RX ADMIN — Medication: at 02:19

## 2024-03-11 RX ADMIN — WATER 2000 MG: 1 INJECTION INTRAMUSCULAR; INTRAVENOUS; SUBCUTANEOUS at 12:53

## 2024-03-11 RX ADMIN — BIVALIRUDIN 0.04 MG/KG/HR: 250 INJECTION, POWDER, LYOPHILIZED, FOR SOLUTION INTRAVENOUS at 20:17

## 2024-03-11 RX ADMIN — Medication: at 12:16

## 2024-03-11 RX ADMIN — SODIUM CHLORIDE: 9 INJECTION, SOLUTION INTRAVENOUS at 20:58

## 2024-03-11 RX ADMIN — Medication 1.2 MCG/KG/HR: at 17:35

## 2024-03-11 RX ADMIN — Medication: at 19:42

## 2024-03-11 RX ADMIN — MILRINONE LACTATE IN DEXTROSE 0.25 MCG/KG/MIN: 200 INJECTION, SOLUTION INTRAVENOUS at 07:50

## 2024-03-11 RX ADMIN — SODIUM CHLORIDE, PRESERVATIVE FREE 10 ML: 5 INJECTION INTRAVENOUS at 08:52

## 2024-03-11 RX ADMIN — SENNOSIDES AND DOCUSATE SODIUM 1 TABLET: 50; 8.6 TABLET ORAL at 20:28

## 2024-03-11 RX ADMIN — SODIUM CHLORIDE, PRESERVATIVE FREE 10 ML: 5 INJECTION INTRAVENOUS at 20:28

## 2024-03-11 RX ADMIN — MIDAZOLAM 2 MG: 1 INJECTION INTRAMUSCULAR; INTRAVENOUS at 19:37

## 2024-03-11 RX ADMIN — WATER 2000 MG: 1 INJECTION INTRAMUSCULAR; INTRAVENOUS; SUBCUTANEOUS at 00:24

## 2024-03-11 RX ADMIN — INSULIN LISPRO 2 UNITS: 100 INJECTION, SOLUTION INTRAVENOUS; SUBCUTANEOUS at 08:59

## 2024-03-11 RX ADMIN — Medication 200 MCG/HR: at 19:20

## 2024-03-11 RX ADMIN — AMIODARONE HYDROCHLORIDE 0.5 MG/MIN: 1.8 INJECTION, SOLUTION INTRAVENOUS at 11:58

## 2024-03-11 RX ADMIN — CHLORHEXIDINE GLUCONATE 0.12% ORAL RINSE 15 ML: 1.2 LIQUID ORAL at 08:51

## 2024-03-11 RX ADMIN — Medication 1 MCG/KG/HR: at 06:34

## 2024-03-11 RX ADMIN — Medication: at 02:20

## 2024-03-11 RX ADMIN — Medication 200 MCG/HR: at 14:16

## 2024-03-11 RX ADMIN — COLLAGENASE SANTYL: 250 OINTMENT TOPICAL at 08:54

## 2024-03-11 RX ADMIN — Medication 12 MMOL: at 21:48

## 2024-03-11 RX ADMIN — Medication 1 MCG/KG/HR: at 01:00

## 2024-03-11 RX ADMIN — Medication 100 MCG/HR: at 05:46

## 2024-03-11 RX ADMIN — Medication 1 MCG/KG/HR: at 12:51

## 2024-03-11 RX ADMIN — CHLORHEXIDINE GLUCONATE 0.12% ORAL RINSE 15 ML: 1.2 LIQUID ORAL at 20:28

## 2024-03-11 RX ADMIN — SENNOSIDES AND DOCUSATE SODIUM 1 TABLET: 50; 8.6 TABLET ORAL at 08:51

## 2024-03-11 RX ADMIN — Medication: at 12:17

## 2024-03-11 RX ADMIN — POLYETHYLENE GLYCOL 400 AND PROPYLENE GLYCOL 2 DROP: 4; 3 SOLUTION/ DROPS OPHTHALMIC at 00:26

## 2024-03-11 RX ADMIN — Medication 1 TABLET: at 08:55

## 2024-03-11 RX ADMIN — Medication 1.2 MCG/KG/HR: at 21:59

## 2024-03-11 RX ADMIN — INSULIN LISPRO 2 UNITS: 100 INJECTION, SOLUTION INTRAVENOUS; SUBCUTANEOUS at 12:52

## 2024-03-11 RX ADMIN — ATORVASTATIN CALCIUM 40 MG: 40 TABLET, FILM COATED ORAL at 20:28

## 2024-03-11 ASSESSMENT — PULMONARY FUNCTION TESTS
PIF_VALUE: 17
PIF_VALUE: 17
PIF_VALUE: 16
PIF_VALUE: 18
PIF_VALUE: 17
PIF_VALUE: 17
PIF_VALUE: 16

## 2024-03-11 NOTE — SIGNIFICANT EVENT
ECMO DAILY ROUNDING NOTE:    Team Present at bedside.  Family Present at bebside:  No  Condition:  critical    RE for ECMO initiation: Cardiogenic Shock  Do2:  573  Vo2:  198  Sweep Gas:  1.5L  70%  Pump Flow:  3.5L  SvO2:  64.2  SaO2:  96.8  pH:  7.37  pCO2:  44  PaO2:  92.6  PTT Goal:  60-80  PTT:  64.6  Plt:  36  Hgb:  11.7  Lactic Acid:  1.4  CXR:  Mild right pleural effusion  Telemetry:  Paced  Paralytic:  no  Sedation Goals:  REASS -2  Pupils:  equal  PC 14:  PEEP 6:  RATE 13:  FiO2:  40  Generated :    Electronically signed by Fco Olivarez MD.

## 2024-03-12 ENCOUNTER — APPOINTMENT (OUTPATIENT)
Dept: GENERAL RADIOLOGY | Age: 69
DRG: 003 | End: 2024-03-12
Attending: THORACIC SURGERY (CARDIOTHORACIC VASCULAR SURGERY)
Payer: MEDICARE

## 2024-03-12 LAB
ALBUMIN SERPL BCG-MCNC: 3 G/DL (ref 3.5–5.1)
ALBUMIN SERPL BCG-MCNC: 3.1 G/DL (ref 3.5–5.1)
ALBUMIN SERPL BCG-MCNC: 3.2 G/DL (ref 3.5–5.1)
ALBUMIN SERPL BCG-MCNC: 3.3 G/DL (ref 3.5–5.1)
ALP SERPL-CCNC: 192 U/L (ref 38–126)
ALP SERPL-CCNC: 209 U/L (ref 38–126)
ALP SERPL-CCNC: 220 U/L (ref 38–126)
ALP SERPL-CCNC: 259 U/L (ref 38–126)
ALP SERPL-CCNC: 270 U/L (ref 38–126)
ALP SERPL-CCNC: 282 U/L (ref 38–126)
ALT SERPL W/O P-5'-P-CCNC: 11 U/L (ref 11–66)
ALT SERPL W/O P-5'-P-CCNC: 13 U/L (ref 11–66)
ALT SERPL W/O P-5'-P-CCNC: 16 U/L (ref 11–66)
ALT SERPL W/O P-5'-P-CCNC: 6 U/L (ref 11–66)
ALT SERPL W/O P-5'-P-CCNC: 8 U/L (ref 11–66)
ALT SERPL W/O P-5'-P-CCNC: 9 U/L (ref 11–66)
ANION GAP SERPL CALC-SCNC: 10 MEQ/L (ref 8–16)
ANION GAP SERPL CALC-SCNC: 11 MEQ/L (ref 8–16)
ANION GAP SERPL CALC-SCNC: 12 MEQ/L (ref 8–16)
ANION GAP SERPL CALC-SCNC: 9 MEQ/L (ref 8–16)
APTT PPP: 56.9 SECONDS (ref 22–38)
APTT PPP: 57.1 SECONDS (ref 22–38)
APTT PPP: 59 SECONDS (ref 22–38)
APTT PPP: 62.8 SECONDS (ref 22–38)
APTT PPP: 63.5 SECONDS (ref 22–38)
ARTERIAL PATENCY WRIST A: ABNORMAL
ARTERIAL PATENCY WRIST A: NORMAL
AST SERPL-CCNC: 215 U/L (ref 5–40)
AST SERPL-CCNC: 239 U/L (ref 5–40)
AST SERPL-CCNC: 275 U/L (ref 5–40)
AST SERPL-CCNC: 313 U/L (ref 5–40)
AST SERPL-CCNC: 351 U/L (ref 5–40)
AST SERPL-CCNC: 389 U/L (ref 5–40)
BASE EXCESS BLDA CALC-SCNC: -0.8 MMOL/L (ref -2.5–2.5)
BASE EXCESS BLDA CALC-SCNC: -0.9 MMOL/L (ref -2.5–2.5)
BASE EXCESS BLDA CALC-SCNC: -0.9 MMOL/L (ref -2–3)
BASE EXCESS BLDA CALC-SCNC: -1 MMOL/L (ref -2.5–2.5)
BASE EXCESS BLDA CALC-SCNC: -1.1 MMOL/L (ref -2.5–2.5)
BASE EXCESS BLDA CALC-SCNC: -1.2 MMOL/L (ref -2.5–2.5)
BASE EXCESS BLDA CALC-SCNC: -1.2 MMOL/L (ref -2.5–2.5)
BASE EXCESS BLDA CALC-SCNC: -1.3 MMOL/L (ref -2.5–2.5)
BASE EXCESS BLDA CALC-SCNC: -1.5 MMOL/L (ref -2.5–2.5)
BASE EXCESS BLDA CALC-SCNC: -1.7 MMOL/L (ref -2.5–2.5)
BASE EXCESS BLDA CALC-SCNC: -1.7 MMOL/L (ref -2–3)
BASE EXCESS BLDA CALC-SCNC: -1.8 MMOL/L (ref -2–3)
BASE EXCESS BLDA CALC-SCNC: -1.9 MMOL/L (ref -2.5–2.5)
BASE EXCESS BLDA CALC-SCNC: -2.2 MMOL/L (ref -2.5–2.5)
BASE EXCESS BLDA CALC-SCNC: -2.7 MMOL/L (ref -2–3)
BASE EXCESS BLDA CALC-SCNC: -3.3 MMOL/L (ref -2–3)
BASE EXCESS BLDA CALC-SCNC: -3.4 MMOL/L (ref -2–3)
BASE EXCESS BLDA CALC-SCNC: -4.3 MMOL/L (ref -2–3)
BASE EXCESS BLDA CALC-SCNC: 0.1 MMOL/L (ref -2.5–2.5)
BASE EXCESS BLDA CALC-SCNC: 0.7 MMOL/L (ref -2.5–2.5)
BASE EXCESS BLDA CALC-SCNC: 0.7 MMOL/L (ref -2.5–2.5)
BASE EXCESS BLDA CALC-SCNC: 0.9 MMOL/L (ref -2.5–2.5)
BASE EXCESS BLDA CALC-SCNC: 1.2 MMOL/L (ref -2.5–2.5)
BDY SITE: ABNORMAL
BDY SITE: NORMAL
BILIRUB SERPL-MCNC: 1.7 MG/DL (ref 0.3–1.2)
BILIRUB SERPL-MCNC: 1.7 MG/DL (ref 0.3–1.2)
BILIRUB SERPL-MCNC: 1.8 MG/DL (ref 0.3–1.2)
BILIRUB SERPL-MCNC: 1.9 MG/DL (ref 0.3–1.2)
BILIRUB SERPL-MCNC: 1.9 MG/DL (ref 0.3–1.2)
BILIRUB SERPL-MCNC: 2 MG/DL (ref 0.3–1.2)
BREATHS SETTING VENT: 12 BPM
BREATHS SETTING VENT: 20 BPM
BUN SERPL-MCNC: 28 MG/DL (ref 7–22)
BUN SERPL-MCNC: 28 MG/DL (ref 7–22)
BUN SERPL-MCNC: 29 MG/DL (ref 7–22)
BUN SERPL-MCNC: 30 MG/DL (ref 7–22)
BUN SERPL-MCNC: 31 MG/DL (ref 7–22)
BUN SERPL-MCNC: 32 MG/DL (ref 7–22)
CA-I BLD ISE-SCNC: 1.14 MMOL/L (ref 1.12–1.32)
CA-I BLD ISE-SCNC: 1.14 MMOL/L (ref 1.12–1.32)
CA-I BLD ISE-SCNC: 1.15 MMOL/L (ref 1.12–1.32)
CA-I BLD ISE-SCNC: 1.15 MMOL/L (ref 1.12–1.32)
CA-I BLD ISE-SCNC: 1.17 MMOL/L (ref 1.12–1.32)
CA-I BLD ISE-SCNC: 1.18 MMOL/L (ref 1.12–1.32)
CALCIUM SERPL-MCNC: 8.3 MG/DL (ref 8.5–10.5)
CALCIUM SERPL-MCNC: 8.3 MG/DL (ref 8.5–10.5)
CALCIUM SERPL-MCNC: 8.4 MG/DL (ref 8.5–10.5)
CALCIUM SERPL-MCNC: 8.5 MG/DL (ref 8.5–10.5)
CHLORIDE SERPL-SCNC: 100 MEQ/L (ref 98–111)
CHLORIDE SERPL-SCNC: 103 MEQ/L (ref 98–111)
CHLORIDE SERPL-SCNC: 104 MEQ/L (ref 98–111)
CO2 SERPL-SCNC: 21 MEQ/L (ref 23–33)
CO2 SERPL-SCNC: 22 MEQ/L (ref 23–33)
CO2 SERPL-SCNC: 22 MEQ/L (ref 23–33)
CO2 SERPL-SCNC: 23 MEQ/L (ref 23–33)
COLLECTED BY:: ABNORMAL
COLLECTED BY:: NORMAL
CREAT SERPL-MCNC: 1.5 MG/DL (ref 0.4–1.2)
CREAT SERPL-MCNC: 1.6 MG/DL (ref 0.4–1.2)
DEPRECATED RDW RBC AUTO: 49.5 FL (ref 35–45)
DEPRECATED RDW RBC AUTO: 49.9 FL (ref 35–45)
DEPRECATED RDW RBC AUTO: 50 FL (ref 35–45)
DEPRECATED RDW RBC AUTO: 50.1 FL (ref 35–45)
DEPRECATED RDW RBC AUTO: 50.4 FL (ref 35–45)
DEPRECATED RDW RBC AUTO: 50.7 FL (ref 35–45)
DEPRECATED RDW RBC AUTO: 51 FL (ref 35–45)
DEVICE: ABNORMAL
DEVICE: NORMAL
ERYTHROCYTE [DISTWIDTH] IN BLOOD BY AUTOMATED COUNT: 16.1 % (ref 11.5–14.5)
ERYTHROCYTE [DISTWIDTH] IN BLOOD BY AUTOMATED COUNT: 16.2 % (ref 11.5–14.5)
ERYTHROCYTE [DISTWIDTH] IN BLOOD BY AUTOMATED COUNT: 16.3 % (ref 11.5–14.5)
ERYTHROCYTE [DISTWIDTH] IN BLOOD BY AUTOMATED COUNT: 16.3 % (ref 11.5–14.5)
ERYTHROCYTE [DISTWIDTH] IN BLOOD BY AUTOMATED COUNT: 16.4 % (ref 11.5–14.5)
FIBRINOGEN PPP-MCNC: 299 MG/100ML (ref 155–475)
FIBRINOGEN PPP-MCNC: 303 MG/100ML (ref 155–475)
FIBRINOGEN PPP-MCNC: 387 MG/100ML (ref 155–475)
FIO2 ON VENT O2 ANALYZER: 40 %
FIO2 ON VENT O2 ANALYZER: 50 %
GFR SERPL CREATININE-BSD FRML MDRD: 46 ML/MIN/1.73M2
GFR SERPL CREATININE-BSD FRML MDRD: 49 ML/MIN/1.73M2
GLUCOSE BLD-MCNC: 176 MG/DL (ref 70–108)
GLUCOSE BLD-MCNC: 182 MG/DL (ref 70–108)
GLUCOSE BLD-MCNC: 184 MG/DL (ref 70–108)
GLUCOSE BLD-MCNC: 190 MG/DL (ref 70–108)
GLUCOSE BLD-MCNC: 194 MG/DL (ref 70–108)
GLUCOSE BLD-MCNC: 201 MG/DL (ref 70–108)
GLUCOSE SERPL-MCNC: 181 MG/DL (ref 70–108)
GLUCOSE SERPL-MCNC: 188 MG/DL (ref 70–108)
GLUCOSE SERPL-MCNC: 192 MG/DL (ref 70–108)
GLUCOSE SERPL-MCNC: 192 MG/DL (ref 70–108)
GLUCOSE SERPL-MCNC: 202 MG/DL (ref 70–108)
GLUCOSE SERPL-MCNC: 203 MG/DL (ref 70–108)
HCO3 BLDA-SCNC: 19 MMOL/L (ref 23–28)
HCO3 BLDA-SCNC: 22 MMOL/L (ref 23–28)
HCO3 BLDA-SCNC: 23 MMOL/L (ref 23–28)
HCO3 BLDA-SCNC: 24 MMOL/L (ref 23–28)
HCO3 BLDA-SCNC: 25 MMOL/L (ref 23–28)
HCO3 BLDA-SCNC: 27 MMOL/L (ref 23–28)
HCT VFR BLD AUTO: 30.7 % (ref 42–52)
HCT VFR BLD AUTO: 30.8 % (ref 42–52)
HCT VFR BLD AUTO: 30.8 % (ref 42–52)
HCT VFR BLD AUTO: 31.1 % (ref 42–52)
HCT VFR BLD AUTO: 32.2 % (ref 42–52)
HCT VFR BLD AUTO: 32.7 % (ref 42–52)
HCT VFR BLD AUTO: 33.1 % (ref 42–52)
HGB BLD-MCNC: 10.5 GM/DL (ref 14–18)
HGB BLD-MCNC: 10.7 GM/DL (ref 14–18)
HGB BLD-MCNC: 10.7 GM/DL (ref 14–18)
HGB BLD-MCNC: 10.8 GM/DL (ref 14–18)
HGB BLD-MCNC: 11.1 GM/DL (ref 14–18)
HGB BLD-MCNC: 11.2 GM/DL (ref 14–18)
HGB BLD-MCNC: 11.3 GM/DL (ref 14–18)
LACTATE SERPL-SCNC: 1.3 MMOL/L (ref 0.5–2)
LACTATE SERPL-SCNC: 1.4 MMOL/L (ref 0.5–2)
LACTATE SERPL-SCNC: 1.4 MMOL/L (ref 0.5–2)
LACTATE SERPL-SCNC: 1.5 MMOL/L (ref 0.5–2)
LACTATE SERPL-SCNC: 1.7 MMOL/L (ref 0.5–2)
MAGNESIUM SERPL-MCNC: 2.4 MG/DL (ref 1.6–2.4)
MAGNESIUM SERPL-MCNC: 2.5 MG/DL (ref 1.6–2.4)
MCH RBC QN AUTO: 29.6 PG (ref 26–33)
MCH RBC QN AUTO: 29.6 PG (ref 26–33)
MCH RBC QN AUTO: 29.7 PG (ref 26–33)
MCH RBC QN AUTO: 29.8 PG (ref 26–33)
MCH RBC QN AUTO: 29.9 PG (ref 26–33)
MCH RBC QN AUTO: 29.9 PG (ref 26–33)
MCH RBC QN AUTO: 30 PG (ref 26–33)
MCHC RBC AUTO-ENTMCNC: 33.8 GM/DL (ref 32.2–35.5)
MCHC RBC AUTO-ENTMCNC: 34.2 GM/DL (ref 32.2–35.5)
MCHC RBC AUTO-ENTMCNC: 34.5 GM/DL (ref 32.2–35.5)
MCHC RBC AUTO-ENTMCNC: 34.6 GM/DL (ref 32.2–35.5)
MCHC RBC AUTO-ENTMCNC: 34.7 GM/DL (ref 32.2–35.5)
MCV RBC AUTO: 85.3 FL (ref 80–94)
MCV RBC AUTO: 85.8 FL (ref 80–94)
MCV RBC AUTO: 86.1 FL (ref 80–94)
MCV RBC AUTO: 86.7 FL (ref 80–94)
MCV RBC AUTO: 86.8 FL (ref 80–94)
MCV RBC AUTO: 87 FL (ref 80–94)
MCV RBC AUTO: 87.6 FL (ref 80–94)
PCO2 BLDA: 28 MMHG (ref 35–45)
PCO2 BLDA: 34 MMHG (ref 35–45)
PCO2 BLDA: 36 MMHG (ref 35–45)
PCO2 BLDA: 38 MMHG (ref 35–45)
PCO2 BLDA: 40 MMHG (ref 35–45)
PCO2 BLDA: 41 MMHG (ref 35–45)
PCO2 BLDA: 41 MMHG (ref 35–45)
PCO2 BLDA: 42 MMHG (ref 35–45)
PCO2 BLDA: 43 MMHG (ref 35–45)
PCO2 BLDA: 46 MMHG (ref 35–45)
PCO2 BLDA: 47 MMHG (ref 35–45)
PCO2 BLDA: 51 MMHG (ref 35–45)
PCO2 BLDA: 52 MMHG (ref 35–45)
PCO2 BLDA: 65 MMHG (ref 35–45)
PCO2 TEMP ADJ BLDMV: 28 MMHG (ref 41–51)
PCO2 TEMP ADJ BLDMV: 36 MMHG (ref 41–51)
PCO2 TEMP ADJ BLDMV: 37 MMHG (ref 41–51)
PCO2 TEMP ADJ BLDMV: 39 MMHG (ref 41–51)
PCO2 TEMP ADJ BLDMV: 42 MMHG (ref 41–51)
PCO2 TEMP ADJ BLDMV: 43 MMHG (ref 41–51)
PCO2 TEMP ADJ BLDMV: 47 MMHG (ref 41–51)
PEEP SETTING VENT: 10 MMHG
PEEP SETTING VENT: 6 MMHG
PH BLDA: 7.23 [PH] (ref 7.35–7.45)
PH BLDA: 7.3 [PH] (ref 7.35–7.45)
PH BLDA: 7.3 [PH] (ref 7.35–7.45)
PH BLDA: 7.34 [PH] (ref 7.35–7.45)
PH BLDA: 7.34 [PH] (ref 7.35–7.45)
PH BLDA: 7.36 [PH] (ref 7.35–7.45)
PH BLDA: 7.36 [PH] (ref 7.35–7.45)
PH BLDA: 7.37 [PH] (ref 7.35–7.45)
PH BLDA: 7.38 [PH] (ref 7.35–7.45)
PH BLDA: 7.38 [PH] (ref 7.35–7.45)
PH BLDA: 7.43 [PH] (ref 7.35–7.45)
PH BLDA: 7.43 [PH] (ref 7.35–7.45)
PH BLDA: 7.44 [PH] (ref 7.35–7.45)
PH BLDA: 7.46 [PH] (ref 7.35–7.45)
PH BLDA: 7.47 [PH] (ref 7.35–7.45)
PH BLDA: 7.53 [PH] (ref 7.35–7.45)
PH BLDMV: 7.31 [PH] (ref 7.31–7.41)
PH BLDMV: 7.32 [PH] (ref 7.31–7.41)
PH BLDMV: 7.34 [PH] (ref 7.31–7.41)
PH BLDMV: 7.39 [PH] (ref 7.31–7.41)
PH BLDMV: 7.41 [PH] (ref 7.31–7.41)
PH BLDMV: 7.42 [PH] (ref 7.31–7.41)
PH BLDMV: 7.44 [PH] (ref 7.31–7.41)
PHOSPHATE SERPL-MCNC: 1.8 MG/DL (ref 2.4–4.7)
PHOSPHATE SERPL-MCNC: 2.2 MG/DL (ref 2.4–4.7)
PHOSPHATE SERPL-MCNC: 2.4 MG/DL (ref 2.4–4.7)
PHOSPHATE SERPL-MCNC: 2.5 MG/DL (ref 2.4–4.7)
PHOSPHATE SERPL-MCNC: 2.5 MG/DL (ref 2.4–4.7)
PHOSPHATE SERPL-MCNC: 2.6 MG/DL (ref 2.4–4.7)
PIP: 18 CMH2O
PIP: 20 CMH2O
PIP: 24 CMH2O
PIP: 26 CMH2O
PIP: 26 CMH2O
PIP: 28 CMH2O
PLATELET # BLD AUTO: 36 THOU/MM3 (ref 130–400)
PLATELET # BLD AUTO: 38 THOU/MM3 (ref 130–400)
PLATELET # BLD AUTO: 41 THOU/MM3 (ref 130–400)
PLATELET # BLD AUTO: 43 THOU/MM3 (ref 130–400)
PMV BLD AUTO: 11 FL (ref 9.4–12.4)
PMV BLD AUTO: 11.3 FL (ref 9.4–12.4)
PMV BLD AUTO: 11.6 FL (ref 9.4–12.4)
PMV BLD AUTO: 12.4 FL (ref 9.4–12.4)
PMV BLD AUTO: 12.5 FL (ref 9.4–12.4)
PMV BLD AUTO: 12.7 FL (ref 9.4–12.4)
PMV BLD AUTO: 12.8 FL (ref 9.4–12.4)
PO2 BLDA: 198 MMHG (ref 71–104)
PO2 BLDA: 234 MMHG (ref 71–104)
PO2 BLDA: 266 MMHG (ref 71–104)
PO2 BLDA: 332 MMHG (ref 71–104)
PO2 BLDA: 349 MMHG (ref 71–104)
PO2 BLDA: 361 MMHG (ref 71–104)
PO2 BLDA: 424 MMHG (ref 71–104)
PO2 BLDA: 458 MMHG (ref 71–104)
PO2 BLDA: 490 MMHG (ref 71–104)
PO2 BLDA: 51 MMHG (ref 71–104)
PO2 BLDA: 561 MMHG (ref 71–104)
PO2 BLDA: 567 MMHG (ref 71–104)
PO2 BLDA: 590 MMHG (ref 71–104)
PO2 BLDA: 595 MMHG (ref 71–104)
PO2 BLDA: 632 MMHG (ref 71–104)
PO2 BLDA: 93 MMHG (ref 71–104)
PO2 BLDMV: 30 MMHG (ref 25–40)
PO2 BLDMV: 33 MMHG (ref 25–40)
PO2 BLDMV: 35 MMHG (ref 25–40)
PO2 BLDMV: 36 MMHG (ref 25–40)
PO2 BLDMV: 38 MMHG (ref 25–40)
PO2 BLDMV: 43 MMHG (ref 25–40)
PO2 BLDMV: 49 MMHG (ref 25–40)
POTASSIUM SERPL-SCNC: 4.3 MEQ/L (ref 3.5–5.2)
POTASSIUM SERPL-SCNC: 4.5 MEQ/L (ref 3.5–5.2)
POTASSIUM SERPL-SCNC: 4.5 MEQ/L (ref 3.5–5.2)
POTASSIUM SERPL-SCNC: 4.6 MEQ/L (ref 3.5–5.2)
PRESSURE SUPPORT SETTING VENT: 12 CMH2O
PRESSURE SUPPORT SETTING VENT: 14 CMH2O
PRESSURE SUPPORT SETTING VENT: 16 CMH2O
PRESSURE SUPPORT SETTING VENT: 18 CMH2O
PRESSURE SUPPORT SETTING VENT: 18 CMH2O
PROT SERPL-MCNC: 4.5 G/DL (ref 6.1–8)
PROT SERPL-MCNC: 4.6 G/DL (ref 6.1–8)
PROT SERPL-MCNC: 4.7 G/DL (ref 6.1–8)
RBC # BLD AUTO: 3.53 MILL/MM3 (ref 4.7–6.1)
RBC # BLD AUTO: 3.59 MILL/MM3 (ref 4.7–6.1)
RBC # BLD AUTO: 3.61 MILL/MM3 (ref 4.7–6.1)
RBC # BLD AUTO: 3.61 MILL/MM3 (ref 4.7–6.1)
RBC # BLD AUTO: 3.71 MILL/MM3 (ref 4.7–6.1)
RBC # BLD AUTO: 3.77 MILL/MM3 (ref 4.7–6.1)
RBC # BLD AUTO: 3.78 MILL/MM3 (ref 4.7–6.1)
SAO2 % BLDA: 100 %
SAO2 % BLDA: 85 %
SAO2 % BLDA: 97 %
SAO2 % BLDMV: 62 %
SAO2 % BLDMV: 64 %
SAO2 % BLDMV: 64 %
SAO2 % BLDMV: 69 %
SAO2 % BLDMV: 74 %
SAO2 % BLDMV: 75 %
SAO2 % BLDMV: 80 %
SET PEEP: 10 MMHG
SET PEEP: 6 MMHG
SET PRESS SUPP: 12 CMH2O
SET PRESS SUPP: 12 CMH2O
SET PRESS SUPP: 14 CMH2O
SET PRESS SUPP: 16 CMH2O
SET RESPIRATORY RATE: 12 BPM
SET RESPIRATORY RATE: 20 BPM
SITE: ABNORMAL
SODIUM SERPL-SCNC: 133 MEQ/L (ref 135–145)
SODIUM SERPL-SCNC: 134 MEQ/L (ref 135–145)
SODIUM SERPL-SCNC: 134 MEQ/L (ref 135–145)
SODIUM SERPL-SCNC: 135 MEQ/L (ref 135–145)
SODIUM SERPL-SCNC: 136 MEQ/L (ref 135–145)
SODIUM SERPL-SCNC: 138 MEQ/L (ref 135–145)
VENTILATION MODE VENT: ABNORMAL
VENTILATION MODE VENT: NORMAL
WBC # BLD AUTO: 10 THOU/MM3 (ref 4.8–10.8)
WBC # BLD AUTO: 10.5 THOU/MM3 (ref 4.8–10.8)
WBC # BLD AUTO: 10.8 THOU/MM3 (ref 4.8–10.8)
WBC # BLD AUTO: 12.2 THOU/MM3 (ref 4.8–10.8)
WBC # BLD AUTO: 9.6 THOU/MM3 (ref 4.8–10.8)
WBC # BLD AUTO: 9.7 THOU/MM3 (ref 4.8–10.8)
WBC # BLD AUTO: 9.8 THOU/MM3 (ref 4.8–10.8)

## 2024-03-12 PROCEDURE — 94761 N-INVAS EAR/PLS OXIMETRY MLT: CPT

## 2024-03-12 PROCEDURE — 6370000000 HC RX 637 (ALT 250 FOR IP): Performed by: STUDENT IN AN ORGANIZED HEALTH CARE EDUCATION/TRAINING PROGRAM

## 2024-03-12 PROCEDURE — 36600 WITHDRAWAL OF ARTERIAL BLOOD: CPT

## 2024-03-12 PROCEDURE — 82803 BLOOD GASES ANY COMBINATION: CPT

## 2024-03-12 PROCEDURE — 2500000003 HC RX 250 WO HCPCS: Performed by: THORACIC SURGERY (CARDIOTHORACIC VASCULAR SURGERY)

## 2024-03-12 PROCEDURE — 2500000003 HC RX 250 WO HCPCS: Performed by: INTERNAL MEDICINE

## 2024-03-12 PROCEDURE — 85730 THROMBOPLASTIN TIME PARTIAL: CPT

## 2024-03-12 PROCEDURE — 99291 CRITICAL CARE FIRST HOUR: CPT | Performed by: INTERNAL MEDICINE

## 2024-03-12 PROCEDURE — 85027 COMPLETE CBC AUTOMATED: CPT

## 2024-03-12 PROCEDURE — 2580000003 HC RX 258: Performed by: PHYSICIAN ASSISTANT

## 2024-03-12 PROCEDURE — 36415 COLL VENOUS BLD VENIPUNCTURE: CPT

## 2024-03-12 PROCEDURE — 99233 SBSQ HOSP IP/OBS HIGH 50: CPT | Performed by: INTERNAL MEDICINE

## 2024-03-12 PROCEDURE — 37799 UNLISTED PX VASCULAR SURGERY: CPT

## 2024-03-12 PROCEDURE — C9113 INJ PANTOPRAZOLE SODIUM, VIA: HCPCS | Performed by: STUDENT IN AN ORGANIZED HEALTH CARE EDUCATION/TRAINING PROGRAM

## 2024-03-12 PROCEDURE — 85384 FIBRINOGEN ACTIVITY: CPT

## 2024-03-12 PROCEDURE — 2500000003 HC RX 250 WO HCPCS: Performed by: STUDENT IN AN ORGANIZED HEALTH CARE EDUCATION/TRAINING PROGRAM

## 2024-03-12 PROCEDURE — 6360000002 HC RX W HCPCS: Performed by: NURSE PRACTITIONER

## 2024-03-12 PROCEDURE — 2580000003 HC RX 258: Performed by: NURSE PRACTITIONER

## 2024-03-12 PROCEDURE — 71045 X-RAY EXAM CHEST 1 VIEW: CPT

## 2024-03-12 PROCEDURE — 33949 ECMO/ECLS DAILY MGMT ARTERY: CPT | Performed by: THORACIC SURGERY (CARDIOTHORACIC VASCULAR SURGERY)

## 2024-03-12 PROCEDURE — 94003 VENT MGMT INPAT SUBQ DAY: CPT

## 2024-03-12 PROCEDURE — 83605 ASSAY OF LACTIC ACID: CPT

## 2024-03-12 PROCEDURE — 33949 ECMO/ECLS DAILY MGMT ARTERY: CPT

## 2024-03-12 PROCEDURE — 2580000003 HC RX 258: Performed by: INTERNAL MEDICINE

## 2024-03-12 PROCEDURE — 33948 ECMO/ECLS DAILY MGMT-VENOUS: CPT | Performed by: INTERNAL MEDICINE

## 2024-03-12 PROCEDURE — 2000000000 HC ICU R&B

## 2024-03-12 PROCEDURE — 80053 COMPREHEN METABOLIC PANEL: CPT

## 2024-03-12 PROCEDURE — 6360000002 HC RX W HCPCS: Performed by: STUDENT IN AN ORGANIZED HEALTH CARE EDUCATION/TRAINING PROGRAM

## 2024-03-12 PROCEDURE — 6360000002 HC RX W HCPCS: Performed by: THORACIC SURGERY (CARDIOTHORACIC VASCULAR SURGERY)

## 2024-03-12 PROCEDURE — 2700000000 HC OXYGEN THERAPY PER DAY

## 2024-03-12 PROCEDURE — 83735 ASSAY OF MAGNESIUM: CPT

## 2024-03-12 PROCEDURE — 6370000000 HC RX 637 (ALT 250 FOR IP): Performed by: PHYSICIAN ASSISTANT

## 2024-03-12 PROCEDURE — 6370000000 HC RX 637 (ALT 250 FOR IP)

## 2024-03-12 PROCEDURE — 2580000003 HC RX 258: Performed by: STUDENT IN AN ORGANIZED HEALTH CARE EDUCATION/TRAINING PROGRAM

## 2024-03-12 PROCEDURE — 82330 ASSAY OF CALCIUM: CPT

## 2024-03-12 PROCEDURE — 82947 ASSAY GLUCOSE BLOOD QUANT: CPT

## 2024-03-12 PROCEDURE — 2580000003 HC RX 258: Performed by: THORACIC SURGERY (CARDIOTHORACIC VASCULAR SURGERY)

## 2024-03-12 PROCEDURE — 84100 ASSAY OF PHOSPHORUS: CPT

## 2024-03-12 PROCEDURE — P9041 ALBUMIN (HUMAN),5%, 50ML: HCPCS | Performed by: THORACIC SURGERY (CARDIOTHORACIC VASCULAR SURGERY)

## 2024-03-12 RX ORDER — MIDAZOLAM HYDROCHLORIDE 1 MG/ML
1-10 INJECTION, SOLUTION INTRAVENOUS CONTINUOUS
Status: DISCONTINUED | OUTPATIENT
Start: 2024-03-12 | End: 2024-03-20

## 2024-03-12 RX ORDER — SENNOSIDES 8.8 MG/5ML
5 LIQUID ORAL 2 TIMES DAILY
Status: DISCONTINUED | OUTPATIENT
Start: 2024-03-12 | End: 2024-04-06

## 2024-03-12 RX ADMIN — Medication: at 02:20

## 2024-03-12 RX ADMIN — Medication 1.2 MCG/KG/HR: at 06:38

## 2024-03-12 RX ADMIN — INSULIN LISPRO 2 UNITS: 100 INJECTION, SOLUTION INTRAVENOUS; SUBCUTANEOUS at 00:16

## 2024-03-12 RX ADMIN — Medication 6 MMOL: at 17:43

## 2024-03-12 RX ADMIN — Medication 1.2 MCG/KG/HR: at 02:18

## 2024-03-12 RX ADMIN — Medication 9 MCG/MIN: at 19:33

## 2024-03-12 RX ADMIN — SODIUM CHLORIDE: 9 INJECTION, SOLUTION INTRAVENOUS at 21:56

## 2024-03-12 RX ADMIN — Medication: at 08:57

## 2024-03-12 RX ADMIN — CHLORHEXIDINE GLUCONATE 0.12% ORAL RINSE 15 ML: 1.2 LIQUID ORAL at 21:01

## 2024-03-12 RX ADMIN — ALBUMIN (HUMAN) 25 G: 12.5 INJECTION, SOLUTION INTRAVENOUS at 22:41

## 2024-03-12 RX ADMIN — WATER 2000 MG: 1 INJECTION INTRAMUSCULAR; INTRAVENOUS; SUBCUTANEOUS at 00:30

## 2024-03-12 RX ADMIN — Medication: at 08:55

## 2024-03-12 RX ADMIN — SENNOSIDES 8.8 MG: 8.8 LIQUID ORAL at 09:21

## 2024-03-12 RX ADMIN — PANTOPRAZOLE SODIUM 8 MG/HR: 40 INJECTION, POWDER, FOR SOLUTION INTRAVENOUS at 20:49

## 2024-03-12 RX ADMIN — AMIODARONE HYDROCHLORIDE 0.5 MG/MIN: 1.8 INJECTION, SOLUTION INTRAVENOUS at 11:44

## 2024-03-12 RX ADMIN — Medication 6 MMOL: at 11:20

## 2024-03-12 RX ADMIN — Medication 200 MCG/HR: at 05:37

## 2024-03-12 RX ADMIN — Medication: at 22:14

## 2024-03-12 RX ADMIN — Medication 1 MCG/KG/HR: at 22:26

## 2024-03-12 RX ADMIN — MILRINONE LACTATE IN DEXTROSE 0.12 MCG/KG/MIN: 200 INJECTION, SOLUTION INTRAVENOUS at 03:00

## 2024-03-12 RX ADMIN — BIVALIRUDIN 0.05 MG/KG/HR: 250 INJECTION, POWDER, LYOPHILIZED, FOR SOLUTION INTRAVENOUS at 20:52

## 2024-03-12 RX ADMIN — Medication 175 MCG/HR: at 11:23

## 2024-03-12 RX ADMIN — COLLAGENASE SANTYL: 250 OINTMENT TOPICAL at 08:18

## 2024-03-12 RX ADMIN — SODIUM CHLORIDE, PRESERVATIVE FREE 10 ML: 5 INJECTION INTRAVENOUS at 08:18

## 2024-03-12 RX ADMIN — CHLORHEXIDINE GLUCONATE 0.12% ORAL RINSE 15 ML: 1.2 LIQUID ORAL at 08:16

## 2024-03-12 RX ADMIN — SODIUM CHLORIDE, PRESERVATIVE FREE 10 ML: 5 INJECTION INTRAVENOUS at 21:01

## 2024-03-12 RX ADMIN — Medication 150 MCG/HR: at 23:56

## 2024-03-12 RX ADMIN — PANTOPRAZOLE SODIUM 8 MG/HR: 40 INJECTION, POWDER, FOR SOLUTION INTRAVENOUS at 11:18

## 2024-03-12 RX ADMIN — Medication 8 MG/HR: at 11:56

## 2024-03-12 RX ADMIN — WATER 2000 MG: 1 INJECTION INTRAMUSCULAR; INTRAVENOUS; SUBCUTANEOUS at 12:51

## 2024-03-12 RX ADMIN — Medication: at 15:43

## 2024-03-12 RX ADMIN — Medication 1 MCG/KG/HR: at 17:08

## 2024-03-12 RX ADMIN — Medication 175 MCG/HR: at 17:24

## 2024-03-12 RX ADMIN — AMIODARONE HYDROCHLORIDE 0.5 MG/MIN: 1.8 INJECTION, SOLUTION INTRAVENOUS at 00:01

## 2024-03-12 RX ADMIN — Medication 1 MCG/KG/HR: at 11:21

## 2024-03-12 RX ADMIN — ATORVASTATIN CALCIUM 40 MG: 40 TABLET, FILM COATED ORAL at 21:01

## 2024-03-12 RX ADMIN — INSULIN LISPRO 2 UNITS: 100 INJECTION, SOLUTION INTRAVENOUS; SUBCUTANEOUS at 16:39

## 2024-03-12 RX ADMIN — Medication 2 MG/HR: at 02:16

## 2024-03-12 RX ADMIN — Medication: at 15:41

## 2024-03-12 RX ADMIN — PANTOPRAZOLE SODIUM 8 MG/HR: 40 INJECTION, POWDER, FOR SOLUTION INTRAVENOUS at 00:00

## 2024-03-12 RX ADMIN — SENNOSIDES 8.8 MG: 8.8 LIQUID ORAL at 21:01

## 2024-03-12 RX ADMIN — Medication 6 MCG/MIN: at 03:21

## 2024-03-12 RX ADMIN — Medication 200 MCG/HR: at 00:02

## 2024-03-12 RX ADMIN — AMIODARONE HYDROCHLORIDE 0.5 MG/MIN: 1.8 INJECTION, SOLUTION INTRAVENOUS at 23:43

## 2024-03-12 RX ADMIN — Medication: at 22:15

## 2024-03-12 ASSESSMENT — PAIN SCALES - GENERAL: PAINLEVEL_OUTOF10: 3

## 2024-03-12 ASSESSMENT — PULMONARY FUNCTION TESTS
PIF_VALUE: 15
PIF_VALUE: 15
PIF_VALUE: 22
PIF_VALUE: 15
PIF_VALUE: 22
PIF_VALUE: 22

## 2024-03-13 ENCOUNTER — APPOINTMENT (OUTPATIENT)
Dept: GENERAL RADIOLOGY | Age: 69
DRG: 003 | End: 2024-03-13
Attending: THORACIC SURGERY (CARDIOTHORACIC VASCULAR SURGERY)
Payer: MEDICARE

## 2024-03-13 LAB
ACINETOBACTER CALCOACETICUS-BAUMANNII BY PCR: NOT DETECTED
ALBUMIN SERPL BCG-MCNC: 3.1 G/DL (ref 3.5–5.1)
ALBUMIN SERPL BCG-MCNC: 3.2 G/DL (ref 3.5–5.1)
ALBUMIN SERPL BCG-MCNC: 3.2 G/DL (ref 3.5–5.1)
ALBUMIN SERPL BCG-MCNC: 3.6 G/DL (ref 3.5–5.1)
ALBUMIN SERPL BCG-MCNC: 3.7 G/DL (ref 3.5–5.1)
ALBUMIN SERPL BCG-MCNC: 3.7 G/DL (ref 3.5–5.1)
ALP SERPL-CCNC: 239 U/L (ref 38–126)
ALP SERPL-CCNC: 244 U/L (ref 38–126)
ALP SERPL-CCNC: 247 U/L (ref 38–126)
ALP SERPL-CCNC: 255 U/L (ref 38–126)
ALP SERPL-CCNC: 263 U/L (ref 38–126)
ALP SERPL-CCNC: 275 U/L (ref 38–126)
ALT SERPL W/O P-5'-P-CCNC: < 5 U/L (ref 11–66)
ANION GAP SERPL CALC-SCNC: 10 MEQ/L (ref 8–16)
ANION GAP SERPL CALC-SCNC: 11 MEQ/L (ref 8–16)
ANION GAP SERPL CALC-SCNC: 11 MEQ/L (ref 8–16)
ANION GAP SERPL CALC-SCNC: 8 MEQ/L (ref 8–16)
APTT PPP: 64.2 SECONDS (ref 22–38)
APTT PPP: 64.3 SECONDS (ref 22–38)
APTT PPP: 65.1 SECONDS (ref 22–38)
APTT PPP: 69.3 SECONDS (ref 22–38)
ARTERIAL PATENCY WRIST A: ABNORMAL
ARTERIAL PATENCY WRIST A: NORMAL
AST SERPL-CCNC: 109 U/L (ref 5–40)
AST SERPL-CCNC: 136 U/L (ref 5–40)
AST SERPL-CCNC: 148 U/L (ref 5–40)
AST SERPL-CCNC: 159 U/L (ref 5–40)
AST SERPL-CCNC: 79 U/L (ref 5–40)
AST SERPL-CCNC: 94 U/L (ref 5–40)
BASE EXCESS BLDA CALC-SCNC: -0.3 MMOL/L (ref -2.5–2.5)
BASE EXCESS BLDA CALC-SCNC: -0.8 MMOL/L (ref -2–3)
BASE EXCESS BLDA CALC-SCNC: -1.4 MMOL/L (ref -2.5–2.5)
BASE EXCESS BLDA CALC-SCNC: -1.4 MMOL/L (ref -2–3)
BASE EXCESS BLDA CALC-SCNC: -1.6 MMOL/L (ref -2.5–2.5)
BASE EXCESS BLDA CALC-SCNC: -1.9 MMOL/L (ref -2.5–2.5)
BASE EXCESS BLDA CALC-SCNC: -2.3 MMOL/L (ref -2–3)
BASE EXCESS BLDA CALC-SCNC: -2.7 MMOL/L (ref -2.5–2.5)
BASE EXCESS BLDA CALC-SCNC: -4.9 MMOL/L (ref -2–3)
BASE EXCESS BLDA CALC-SCNC: -5.4 MMOL/L (ref -2–3)
BASE EXCESS BLDA CALC-SCNC: 1 MMOL/L (ref -2.5–2.5)
BDY SITE: ABNORMAL
BDY SITE: NORMAL
BILIRUB SERPL-MCNC: 1.4 MG/DL (ref 0.3–1.2)
BILIRUB SERPL-MCNC: 1.4 MG/DL (ref 0.3–1.2)
BILIRUB SERPL-MCNC: 1.5 MG/DL (ref 0.3–1.2)
BILIRUB SERPL-MCNC: 1.5 MG/DL (ref 0.3–1.2)
BILIRUB SERPL-MCNC: 1.7 MG/DL (ref 0.3–1.2)
BILIRUB SERPL-MCNC: 2.3 MG/DL (ref 0.3–1.2)
BLACTX-M ISLT/SPM QL: NORMAL
BLAIMP ISLT/SPM QL: NORMAL
BLAKPC ISLT/SPM QL: NORMAL
BLAOXA-48-LIKE ISLT/SPM QL: NORMAL
BLAVIM ISLT/SPM QL: NORMAL
BREATHS SETTING VENT: 12 BPM
BREATHS SETTING VENT: 20 BPM
BUN SERPL-MCNC: 26 MG/DL (ref 7–22)
BUN SERPL-MCNC: 27 MG/DL (ref 7–22)
BUN SERPL-MCNC: 28 MG/DL (ref 7–22)
BUN SERPL-MCNC: 28 MG/DL (ref 7–22)
C PNEUM DNA LOWER RESP QL NAA+NON-PROBE: NOT DETECTED
CA-I BLD ISE-SCNC: 1.13 MMOL/L (ref 1.12–1.32)
CA-I BLD ISE-SCNC: 1.14 MMOL/L (ref 1.12–1.32)
CA-I BLD ISE-SCNC: 1.15 MMOL/L (ref 1.12–1.32)
CA-I BLD ISE-SCNC: 1.18 MMOL/L (ref 1.12–1.32)
CA-I BLD ISE-SCNC: 1.19 MMOL/L (ref 1.12–1.32)
CA-I BLD ISE-SCNC: 1.2 MMOL/L (ref 1.12–1.32)
CALCIUM SERPL-MCNC: 8.2 MG/DL (ref 8.5–10.5)
CALCIUM SERPL-MCNC: 8.4 MG/DL (ref 8.5–10.5)
CALCIUM SERPL-MCNC: 8.5 MG/DL (ref 8.5–10.5)
CALCIUM SERPL-MCNC: 8.5 MG/DL (ref 8.5–10.5)
CALCIUM SERPL-MCNC: 8.6 MG/DL (ref 8.5–10.5)
CALCIUM SERPL-MCNC: 8.8 MG/DL (ref 8.5–10.5)
CHLORIDE SERPL-SCNC: 102 MEQ/L (ref 98–111)
CHLORIDE SERPL-SCNC: 103 MEQ/L (ref 98–111)
CHLORIDE SERPL-SCNC: 104 MEQ/L (ref 98–111)
CHLORIDE SERPL-SCNC: 106 MEQ/L (ref 98–111)
CO2 SERPL-SCNC: 21 MEQ/L (ref 23–33)
CO2 SERPL-SCNC: 21 MEQ/L (ref 23–33)
CO2 SERPL-SCNC: 22 MEQ/L (ref 23–33)
CO2 SERPL-SCNC: 23 MEQ/L (ref 23–33)
COLLECTED BY:: ABNORMAL
COLLECTED BY:: NORMAL
COLLECTED BY:: NORMAL
CREAT SERPL-MCNC: 1.3 MG/DL (ref 0.4–1.2)
CREAT SERPL-MCNC: 1.3 MG/DL (ref 0.4–1.2)
CREAT SERPL-MCNC: 1.4 MG/DL (ref 0.4–1.2)
CREAT SERPL-MCNC: 1.5 MG/DL (ref 0.4–1.2)
DEPRECATED RDW RBC AUTO: 49.8 FL (ref 35–45)
DEPRECATED RDW RBC AUTO: 50.4 FL (ref 35–45)
DEPRECATED RDW RBC AUTO: 50.7 FL (ref 35–45)
DEPRECATED RDW RBC AUTO: 50.9 FL (ref 35–45)
DEPRECATED RDW RBC AUTO: 52.1 FL (ref 35–45)
DEPRECATED RDW RBC AUTO: 53 FL (ref 35–45)
DEVICE: ABNORMAL
DEVICE: NORMAL
DEVICE: NORMAL
ENTEROBACTER CLOACAE COMPLEX BY PCR: NOT DETECTED
ERYTHROCYTE [DISTWIDTH] IN BLOOD BY AUTOMATED COUNT: 16.1 % (ref 11.5–14.5)
ERYTHROCYTE [DISTWIDTH] IN BLOOD BY AUTOMATED COUNT: 16.3 % (ref 11.5–14.5)
ERYTHROCYTE [DISTWIDTH] IN BLOOD BY AUTOMATED COUNT: 16.4 % (ref 11.5–14.5)
ERYTHROCYTE [DISTWIDTH] IN BLOOD BY AUTOMATED COUNT: 16.6 % (ref 11.5–14.5)
ESCHERICHIA COLI BY PCR: NOT DETECTED
FIBRINOGEN PPP-MCNC: 381 MG/100ML (ref 155–475)
FIBRINOGEN PPP-MCNC: 394 MG/100ML (ref 155–475)
FIO2 ON VENT O2 ANALYZER: 40 %
FIO2 ON VENT O2 ANALYZER: 40 %
FIO2 ON VENT O2 ANALYZER: 50 %
FLUAV RNA LOWER RESP QL NAA+NON-PROBE: NOT DETECTED
FLUBV RNA LOWER RESP QL NAA+NON-PROBE: NOT DETECTED
GFR SERPL CREATININE-BSD FRML MDRD: 49 ML/MIN/1.73M2
GFR SERPL CREATININE-BSD FRML MDRD: 54 ML/MIN/1.73M2
GFR SERPL CREATININE-BSD FRML MDRD: 59 ML/MIN/1.73M2
GFR SERPL CREATININE-BSD FRML MDRD: 59 ML/MIN/1.73M2
GLUCOSE BLD-MCNC: 193 MG/DL (ref 70–108)
GLUCOSE BLD-MCNC: 212 MG/DL (ref 70–108)
GLUCOSE BLD-MCNC: 213 MG/DL (ref 70–108)
GLUCOSE BLD-MCNC: 222 MG/DL (ref 70–108)
GLUCOSE BLD-MCNC: 223 MG/DL (ref 70–108)
GLUCOSE SERPL-MCNC: 189 MG/DL (ref 70–108)
GLUCOSE SERPL-MCNC: 202 MG/DL (ref 70–108)
GLUCOSE SERPL-MCNC: 207 MG/DL (ref 70–108)
GLUCOSE SERPL-MCNC: 211 MG/DL (ref 70–108)
GLUCOSE SERPL-MCNC: 216 MG/DL (ref 70–108)
GLUCOSE SERPL-MCNC: 216 MG/DL (ref 70–108)
HADV DNA LOWER RESP QL NAA+NON-PROBE: NOT DETECTED
HAEMOPHILUS INFLUENZAE BY PCR: NOT DETECTED
HCO3 BLDA-SCNC: 21 MMOL/L (ref 23–28)
HCO3 BLDA-SCNC: 21 MMOL/L (ref 23–28)
HCO3 BLDA-SCNC: 23 MMOL/L (ref 23–28)
HCO3 BLDA-SCNC: 24 MMOL/L (ref 23–28)
HCOV RNA LOWER RESP QL NAA+NON-PROBE: NOT DETECTED
HCT VFR BLD AUTO: 26.3 % (ref 42–52)
HCT VFR BLD AUTO: 27.6 % (ref 42–52)
HCT VFR BLD AUTO: 27.7 % (ref 42–52)
HCT VFR BLD AUTO: 27.7 % (ref 42–52)
HCT VFR BLD AUTO: 29 % (ref 42–52)
HCT VFR BLD AUTO: 30.5 % (ref 42–52)
HGB BLD-MCNC: 10.4 GM/DL (ref 14–18)
HGB BLD-MCNC: 9.1 GM/DL (ref 14–18)
HGB BLD-MCNC: 9.2 GM/DL (ref 14–18)
HGB BLD-MCNC: 9.3 GM/DL (ref 14–18)
HGB BLD-MCNC: 9.4 GM/DL (ref 14–18)
HGB BLD-MCNC: 9.9 GM/DL (ref 14–18)
HMPV RNA LOWER RESP QL NAA+NON-PROBE: NOT DETECTED
HPIV RNA LOWER RESP QL NAA+NON-PROBE: NOT DETECTED
KLEBSIELLA AEROGENES BY PCR: NOT DETECTED
KLEBSIELLA OXYTOCA BY PCR: NOT DETECTED
KLEBSIELLA PNEUMONIAE GROUP BY PCR: NOT DETECTED
L PNEUMO DNA LOWER RESP QL NAA+NON-PROBE: NOT DETECTED
LACTATE SERPL-SCNC: 1.3 MMOL/L (ref 0.5–2)
LACTATE SERPL-SCNC: 1.5 MMOL/L (ref 0.5–2)
M PNEUMO DNA LOWER RESP QL NAA+NON-PROBE: NOT DETECTED
MAGNESIUM SERPL-MCNC: 2.4 MG/DL (ref 1.6–2.4)
MAGNESIUM SERPL-MCNC: 2.4 MG/DL (ref 1.6–2.4)
MAGNESIUM SERPL-MCNC: 2.5 MG/DL (ref 1.6–2.4)
MAGNESIUM SERPL-MCNC: 2.6 MG/DL (ref 1.6–2.4)
MCH RBC QN AUTO: 29.3 PG (ref 26–33)
MCH RBC QN AUTO: 29.3 PG (ref 26–33)
MCH RBC QN AUTO: 29.5 PG (ref 26–33)
MCH RBC QN AUTO: 29.6 PG (ref 26–33)
MCH RBC QN AUTO: 30.3 PG (ref 26–33)
MCH RBC QN AUTO: 30.5 PG (ref 26–33)
MCHC RBC AUTO-ENTMCNC: 33 GM/DL (ref 32.2–35.5)
MCHC RBC AUTO-ENTMCNC: 33.6 GM/DL (ref 32.2–35.5)
MCHC RBC AUTO-ENTMCNC: 33.9 GM/DL (ref 32.2–35.5)
MCHC RBC AUTO-ENTMCNC: 34.1 GM/DL (ref 32.2–35.5)
MCHC RBC AUTO-ENTMCNC: 34.1 GM/DL (ref 32.2–35.5)
MCHC RBC AUTO-ENTMCNC: 35 GM/DL (ref 32.2–35.5)
MCV RBC AUTO: 86.3 FL (ref 80–94)
MCV RBC AUTO: 87.1 FL (ref 80–94)
MCV RBC AUTO: 87.1 FL (ref 80–94)
MCV RBC AUTO: 87.4 FL (ref 80–94)
MCV RBC AUTO: 88.7 FL (ref 80–94)
MCV RBC AUTO: 88.9 FL (ref 80–94)
MORAXELLA CATARRHALIS BY PCR: NOT DETECTED
PCO2 BLDA: 31 MMHG (ref 35–45)
PCO2 BLDA: 35 MMHG (ref 35–45)
PCO2 BLDA: 41 MMHG (ref 35–45)
PCO2 BLDA: 41 MMHG (ref 35–45)
PCO2 BLDA: 42 MMHG (ref 35–45)
PCO2 BLDA: 43 MMHG (ref 35–45)
PCO2 TEMP ADJ BLDMV: 40 MMHG (ref 41–51)
PCO2 TEMP ADJ BLDMV: 40 MMHG (ref 41–51)
PCO2 TEMP ADJ BLDMV: 41 MMHG (ref 41–51)
PCO2 TEMP ADJ BLDMV: 43 MMHG (ref 41–51)
PCO2 TEMP ADJ BLDMV: 43 MMHG (ref 41–51)
PEEP SETTING VENT: 6 MMHG
PH BLDA: 7.34 [PH] (ref 7.35–7.45)
PH BLDA: 7.36 [PH] (ref 7.35–7.45)
PH BLDA: 7.37 [PH] (ref 7.35–7.45)
PH BLDA: 7.38 [PH] (ref 7.35–7.45)
PH BLDA: 7.44 [PH] (ref 7.35–7.45)
PH BLDA: 7.5 [PH] (ref 7.35–7.45)
PH BLDMV: 7.31 [PH] (ref 7.31–7.41)
PH BLDMV: 7.32 [PH] (ref 7.31–7.41)
PH BLDMV: 7.34 [PH] (ref 7.31–7.41)
PH BLDMV: 7.36 [PH] (ref 7.31–7.41)
PH BLDMV: 7.39 [PH] (ref 7.31–7.41)
PHOSPHATE SERPL-MCNC: 2 MG/DL (ref 2.4–4.7)
PHOSPHATE SERPL-MCNC: 2.4 MG/DL (ref 2.4–4.7)
PHOSPHATE SERPL-MCNC: 2.7 MG/DL (ref 2.4–4.7)
PHOSPHATE SERPL-MCNC: 2.8 MG/DL (ref 2.4–4.7)
PHOSPHATE SERPL-MCNC: 3.1 MG/DL (ref 2.4–4.7)
PHOSPHATE SERPL-MCNC: 3.2 MG/DL (ref 2.4–4.7)
PIP: 18 CMH2O
PLATELET # BLD AUTO: 37 THOU/MM3 (ref 130–400)
PLATELET # BLD AUTO: 44 THOU/MM3 (ref 130–400)
PLATELET # BLD AUTO: 44 THOU/MM3 (ref 130–400)
PLATELET # BLD AUTO: 47 THOU/MM3 (ref 130–400)
PLATELET # BLD AUTO: 50 THOU/MM3 (ref 130–400)
PLATELET # BLD AUTO: 53 THOU/MM3 (ref 130–400)
PMV BLD AUTO: 11.2 FL (ref 9.4–12.4)
PMV BLD AUTO: 12.2 FL (ref 9.4–12.4)
PMV BLD AUTO: 12.4 FL (ref 9.4–12.4)
PMV BLD AUTO: 12.8 FL (ref 9.4–12.4)
PO2 BLDA: 124 MMHG (ref 71–104)
PO2 BLDA: 206 MMHG (ref 71–104)
PO2 BLDA: 230 MMHG (ref 71–104)
PO2 BLDA: 391 MMHG (ref 71–104)
PO2 BLDA: 88 MMHG (ref 71–104)
PO2 BLDA: 91 MMHG (ref 71–104)
PO2 BLDMV: 33 MMHG (ref 25–40)
PO2 BLDMV: 35 MMHG (ref 25–40)
PO2 BLDMV: 36 MMHG (ref 25–40)
PO2 BLDMV: 36 MMHG (ref 25–40)
PO2 BLDMV: 39 MMHG (ref 25–40)
POTASSIUM SERPL-SCNC: 4.4 MEQ/L (ref 3.5–5.2)
POTASSIUM SERPL-SCNC: 4.5 MEQ/L (ref 3.5–5.2)
POTASSIUM SERPL-SCNC: 4.6 MEQ/L (ref 3.5–5.2)
POTASSIUM SERPL-SCNC: 4.6 MEQ/L (ref 3.5–5.2)
POTASSIUM SERPL-SCNC: 4.7 MEQ/L (ref 3.5–5.2)
POTASSIUM SERPL-SCNC: 4.8 MEQ/L (ref 3.5–5.2)
PRESSURE SUPPORT SETTING VENT: 12 CMH2O
PROT SERPL-MCNC: 4.4 G/DL (ref 6.1–8)
PROT SERPL-MCNC: 4.6 G/DL (ref 6.1–8)
PROT SERPL-MCNC: 4.9 G/DL (ref 6.1–8)
PROT SERPL-MCNC: 5 G/DL (ref 6.1–8)
PROT SERPL-MCNC: 5 G/DL (ref 6.1–8)
PROT SERPL-MCNC: 5.1 G/DL (ref 6.1–8)
PROTEUS SPECIES BY PCR: NOT DETECTED
PSEUDOMONAS AERUGINOSA BY PCR: NOT DETECTED
RBC # BLD AUTO: 3.02 MILL/MM3 (ref 4.7–6.1)
RBC # BLD AUTO: 3.11 MILL/MM3 (ref 4.7–6.1)
RBC # BLD AUTO: 3.17 MILL/MM3 (ref 4.7–6.1)
RBC # BLD AUTO: 3.18 MILL/MM3 (ref 4.7–6.1)
RBC # BLD AUTO: 3.36 MILL/MM3 (ref 4.7–6.1)
RBC # BLD AUTO: 3.43 MILL/MM3 (ref 4.7–6.1)
RESISTANT GENE MECA/C & MREJ BY PCR: NORMAL
RESISTANT GENE NDM BY PCR: NORMAL
RSV RNA LOWER RESP QL NAA+NON-PROBE: NOT DETECTED
RV+EV RNA LOWER RESP QL NAA+NON-PROBE: NOT DETECTED
SAO2 % BLDA: 100 %
SAO2 % BLDA: 96 %
SAO2 % BLDA: 97 %
SAO2 % BLDA: 99 %
SAO2 % BLDMV: 60 %
SAO2 % BLDMV: 61 %
SAO2 % BLDMV: 65 %
SAO2 % BLDMV: 68 %
SAO2 % BLDMV: 71 %
SERRATIA MARCESCENS BY PCR: NOT DETECTED
SET PEEP: 6 MMHG
SET PRESS SUPP: 12 CMH2O
SET RESPIRATORY RATE: 12 BPM
SITE: ABNORMAL
SITE: ABNORMAL
SITE: NORMAL
SODIUM SERPL-SCNC: 134 MEQ/L (ref 135–145)
SODIUM SERPL-SCNC: 136 MEQ/L (ref 135–145)
SODIUM SERPL-SCNC: 136 MEQ/L (ref 135–145)
SODIUM SERPL-SCNC: 140 MEQ/L (ref 135–145)
SOURCE: NORMAL
SPECIMEN ACCEPTABILITY: NORMAL
STAPH AUREUS BY PCR: NOT DETECTED
STREP AGALACTIAE BY PCR: NOT DETECTED
STREP PNEUMONIAE BY PCR: NOT DETECTED
STREP PYOGENES BY PCR: NOT DETECTED
VENTILATION MODE VENT: ABNORMAL
VENTILATION MODE VENT: NORMAL
WBC # BLD AUTO: 10.4 THOU/MM3 (ref 4.8–10.8)
WBC # BLD AUTO: 11.6 THOU/MM3 (ref 4.8–10.8)
WBC # BLD AUTO: 7.5 THOU/MM3 (ref 4.8–10.8)
WBC # BLD AUTO: 8.5 THOU/MM3 (ref 4.8–10.8)
WBC # BLD AUTO: 9.1 THOU/MM3 (ref 4.8–10.8)
WBC # BLD AUTO: 9.3 THOU/MM3 (ref 4.8–10.8)

## 2024-03-13 PROCEDURE — 6360000002 HC RX W HCPCS: Performed by: STUDENT IN AN ORGANIZED HEALTH CARE EDUCATION/TRAINING PROGRAM

## 2024-03-13 PROCEDURE — 94761 N-INVAS EAR/PLS OXIMETRY MLT: CPT

## 2024-03-13 PROCEDURE — 82947 ASSAY GLUCOSE BLOOD QUANT: CPT

## 2024-03-13 PROCEDURE — 33948 ECMO/ECLS DAILY MGMT-VENOUS: CPT | Performed by: INTERNAL MEDICINE

## 2024-03-13 PROCEDURE — 6360000002 HC RX W HCPCS: Performed by: NURSE PRACTITIONER

## 2024-03-13 PROCEDURE — 83735 ASSAY OF MAGNESIUM: CPT

## 2024-03-13 PROCEDURE — 71045 X-RAY EXAM CHEST 1 VIEW: CPT

## 2024-03-13 PROCEDURE — 85730 THROMBOPLASTIN TIME PARTIAL: CPT

## 2024-03-13 PROCEDURE — 82330 ASSAY OF CALCIUM: CPT

## 2024-03-13 PROCEDURE — 80053 COMPREHEN METABOLIC PANEL: CPT

## 2024-03-13 PROCEDURE — 82803 BLOOD GASES ANY COMBINATION: CPT

## 2024-03-13 PROCEDURE — 2500000003 HC RX 250 WO HCPCS: Performed by: INTERNAL MEDICINE

## 2024-03-13 PROCEDURE — 2580000003 HC RX 258: Performed by: INTERNAL MEDICINE

## 2024-03-13 PROCEDURE — 37799 UNLISTED PX VASCULAR SURGERY: CPT

## 2024-03-13 PROCEDURE — 6360000002 HC RX W HCPCS: Performed by: THORACIC SURGERY (CARDIOTHORACIC VASCULAR SURGERY)

## 2024-03-13 PROCEDURE — 2580000003 HC RX 258: Performed by: PHYSICIAN ASSISTANT

## 2024-03-13 PROCEDURE — 36430 TRANSFUSION BLD/BLD COMPNT: CPT

## 2024-03-13 PROCEDURE — P9041 ALBUMIN (HUMAN),5%, 50ML: HCPCS | Performed by: THORACIC SURGERY (CARDIOTHORACIC VASCULAR SURGERY)

## 2024-03-13 PROCEDURE — 83605 ASSAY OF LACTIC ACID: CPT

## 2024-03-13 PROCEDURE — 2500000003 HC RX 250 WO HCPCS: Performed by: STUDENT IN AN ORGANIZED HEALTH CARE EDUCATION/TRAINING PROGRAM

## 2024-03-13 PROCEDURE — 2000000000 HC ICU R&B

## 2024-03-13 PROCEDURE — P9047 ALBUMIN (HUMAN), 25%, 50ML: HCPCS | Performed by: INTERNAL MEDICINE

## 2024-03-13 PROCEDURE — 2580000003 HC RX 258: Performed by: STUDENT IN AN ORGANIZED HEALTH CARE EDUCATION/TRAINING PROGRAM

## 2024-03-13 PROCEDURE — 99233 SBSQ HOSP IP/OBS HIGH 50: CPT | Performed by: INTERNAL MEDICINE

## 2024-03-13 PROCEDURE — 87205 SMEAR GRAM STAIN: CPT

## 2024-03-13 PROCEDURE — 85027 COMPLETE CBC AUTOMATED: CPT

## 2024-03-13 PROCEDURE — 6370000000 HC RX 637 (ALT 250 FOR IP): Performed by: PHYSICIAN ASSISTANT

## 2024-03-13 PROCEDURE — 6370000000 HC RX 637 (ALT 250 FOR IP): Performed by: STUDENT IN AN ORGANIZED HEALTH CARE EDUCATION/TRAINING PROGRAM

## 2024-03-13 PROCEDURE — 87631 RESP VIRUS 3-5 TARGETS: CPT

## 2024-03-13 PROCEDURE — 2500000003 HC RX 250 WO HCPCS: Performed by: THORACIC SURGERY (CARDIOTHORACIC VASCULAR SURGERY)

## 2024-03-13 PROCEDURE — 36415 COLL VENOUS BLD VENIPUNCTURE: CPT

## 2024-03-13 PROCEDURE — C9113 INJ PANTOPRAZOLE SODIUM, VIA: HCPCS | Performed by: STUDENT IN AN ORGANIZED HEALTH CARE EDUCATION/TRAINING PROGRAM

## 2024-03-13 PROCEDURE — 87581 M.PNEUMON DNA AMP PROBE: CPT

## 2024-03-13 PROCEDURE — 2580000003 HC RX 258: Performed by: NURSE PRACTITIONER

## 2024-03-13 PROCEDURE — 6370000000 HC RX 637 (ALT 250 FOR IP)

## 2024-03-13 PROCEDURE — C9113 INJ PANTOPRAZOLE SODIUM, VIA: HCPCS | Performed by: INTERNAL MEDICINE

## 2024-03-13 PROCEDURE — 87541 LEGION PNEUMO DNA AMP PROB: CPT

## 2024-03-13 PROCEDURE — 94003 VENT MGMT INPAT SUBQ DAY: CPT

## 2024-03-13 PROCEDURE — 2700000000 HC OXYGEN THERAPY PER DAY

## 2024-03-13 PROCEDURE — 6360000002 HC RX W HCPCS: Performed by: INTERNAL MEDICINE

## 2024-03-13 PROCEDURE — 87486 CHLMYD PNEUM DNA AMP PROBE: CPT

## 2024-03-13 PROCEDURE — 2580000003 HC RX 258: Performed by: THORACIC SURGERY (CARDIOTHORACIC VASCULAR SURGERY)

## 2024-03-13 PROCEDURE — 6370000000 HC RX 637 (ALT 250 FOR IP): Performed by: INTERNAL MEDICINE

## 2024-03-13 PROCEDURE — 84100 ASSAY OF PHOSPHORUS: CPT

## 2024-03-13 PROCEDURE — 99291 CRITICAL CARE FIRST HOUR: CPT | Performed by: INTERNAL MEDICINE

## 2024-03-13 PROCEDURE — 87798 DETECT AGENT NOS DNA AMP: CPT

## 2024-03-13 PROCEDURE — 87070 CULTURE OTHR SPECIMN AEROBIC: CPT

## 2024-03-13 PROCEDURE — 85384 FIBRINOGEN ACTIVITY: CPT

## 2024-03-13 RX ORDER — PANTOPRAZOLE SODIUM 40 MG/10ML
40 INJECTION, POWDER, LYOPHILIZED, FOR SOLUTION INTRAVENOUS 2 TIMES DAILY
Status: DISCONTINUED | OUTPATIENT
Start: 2024-03-13 | End: 2024-03-13

## 2024-03-13 RX ORDER — PANTOPRAZOLE SODIUM 40 MG/10ML
40 INJECTION, POWDER, LYOPHILIZED, FOR SOLUTION INTRAVENOUS 2 TIMES DAILY
Status: DISCONTINUED | OUTPATIENT
Start: 2024-03-13 | End: 2024-04-06

## 2024-03-13 RX ORDER — MILRINONE LACTATE 0.2 MG/ML
.0625-.75 INJECTION, SOLUTION INTRAVENOUS CONTINUOUS
Status: DISCONTINUED | OUTPATIENT
Start: 2024-03-13 | End: 2024-03-21

## 2024-03-13 RX ORDER — AMIODARONE HYDROCHLORIDE 200 MG/1
400 TABLET ORAL 2 TIMES DAILY
Status: DISCONTINUED | OUTPATIENT
Start: 2024-03-13 | End: 2024-03-24

## 2024-03-13 RX ORDER — ALBUMIN (HUMAN) 12.5 G/50ML
25 SOLUTION INTRAVENOUS EVERY 6 HOURS
Status: COMPLETED | OUTPATIENT
Start: 2024-03-13 | End: 2024-03-15

## 2024-03-13 RX ADMIN — Medication 1 MCG/KG/HR: at 16:31

## 2024-03-13 RX ADMIN — INSULIN LISPRO 2 UNITS: 100 INJECTION, SOLUTION INTRAVENOUS; SUBCUTANEOUS at 04:20

## 2024-03-13 RX ADMIN — Medication: at 17:25

## 2024-03-13 RX ADMIN — ALBUMIN (HUMAN) 25 G: 12.5 INJECTION, SOLUTION INTRAVENOUS at 09:14

## 2024-03-13 RX ADMIN — ALBUMIN (HUMAN) 25 G: 0.25 INJECTION, SOLUTION INTRAVENOUS at 17:04

## 2024-03-13 RX ADMIN — MILRINONE LACTATE IN DEXTROSE 0.25 MCG/KG/MIN: 200 INJECTION, SOLUTION INTRAVENOUS at 07:07

## 2024-03-13 RX ADMIN — AMIODARONE HYDROCHLORIDE 400 MG: 200 TABLET ORAL at 10:38

## 2024-03-13 RX ADMIN — Medication 150 MCG/HR: at 06:37

## 2024-03-13 RX ADMIN — Medication 7 MG/HR: at 19:31

## 2024-03-13 RX ADMIN — COLLAGENASE SANTYL: 250 OINTMENT TOPICAL at 08:01

## 2024-03-13 RX ADMIN — INSULIN LISPRO 2 UNITS: 100 INJECTION, SOLUTION INTRAVENOUS; SUBCUTANEOUS at 12:56

## 2024-03-13 RX ADMIN — Medication 11 MCG/MIN: at 05:47

## 2024-03-13 RX ADMIN — MAGNESIUM HYDROXIDE 30 ML: 1200 LIQUID ORAL at 08:10

## 2024-03-13 RX ADMIN — Medication 1 MCG/KG/HR: at 04:38

## 2024-03-13 RX ADMIN — AMIODARONE HYDROCHLORIDE 400 MG: 200 TABLET ORAL at 20:54

## 2024-03-13 RX ADMIN — CHLORHEXIDINE GLUCONATE 0.12% ORAL RINSE 15 ML: 1.2 LIQUID ORAL at 08:06

## 2024-03-13 RX ADMIN — SODIUM CHLORIDE, PRESERVATIVE FREE 10 ML: 5 INJECTION INTRAVENOUS at 08:05

## 2024-03-13 RX ADMIN — INSULIN LISPRO 2 UNITS: 100 INJECTION, SOLUTION INTRAVENOUS; SUBCUTANEOUS at 10:21

## 2024-03-13 RX ADMIN — WATER 2000 MG: 1 INJECTION INTRAMUSCULAR; INTRAVENOUS; SUBCUTANEOUS at 13:08

## 2024-03-13 RX ADMIN — Medication 6 MMOL: at 02:15

## 2024-03-13 RX ADMIN — PANTOPRAZOLE SODIUM 8 MG/HR: 40 INJECTION, POWDER, FOR SOLUTION INTRAVENOUS at 07:08

## 2024-03-13 RX ADMIN — Medication 7 MG/HR: at 02:18

## 2024-03-13 RX ADMIN — CHLORHEXIDINE GLUCONATE 0.12% ORAL RINSE 15 ML: 1.2 LIQUID ORAL at 20:52

## 2024-03-13 RX ADMIN — INSULIN LISPRO 2 UNITS: 100 INJECTION, SOLUTION INTRAVENOUS; SUBCUTANEOUS at 20:57

## 2024-03-13 RX ADMIN — Medication: at 04:36

## 2024-03-13 RX ADMIN — POLYETHYLENE GLYCOL 400 AND PROPYLENE GLYCOL 2 DROP: 4; 3 SOLUTION/ DROPS OPHTHALMIC at 20:55

## 2024-03-13 RX ADMIN — SENNOSIDES 8.8 MG: 8.8 LIQUID ORAL at 20:55

## 2024-03-13 RX ADMIN — Medication 6 MMOL: at 22:12

## 2024-03-13 RX ADMIN — SODIUM CHLORIDE, PRESERVATIVE FREE 10 ML: 5 INJECTION INTRAVENOUS at 20:52

## 2024-03-13 RX ADMIN — ALBUMIN (HUMAN) 25 G: 0.25 INJECTION, SOLUTION INTRAVENOUS at 21:45

## 2024-03-13 RX ADMIN — ALBUMIN (HUMAN) 25 G: 0.25 INJECTION, SOLUTION INTRAVENOUS at 11:32

## 2024-03-13 RX ADMIN — PANTOPRAZOLE SODIUM 40 MG: 40 INJECTION, POWDER, FOR SOLUTION INTRAVENOUS at 21:01

## 2024-03-13 RX ADMIN — SENNOSIDES 8.8 MG: 8.8 LIQUID ORAL at 08:10

## 2024-03-13 RX ADMIN — Medication: at 04:37

## 2024-03-13 RX ADMIN — Medication: at 11:13

## 2024-03-13 RX ADMIN — Medication 5 MCG/MIN: at 14:46

## 2024-03-13 RX ADMIN — Medication: at 11:12

## 2024-03-13 RX ADMIN — Medication 1 TABLET: at 08:10

## 2024-03-13 RX ADMIN — BIVALIRUDIN 0.05 MG/KG/HR: 250 INJECTION, POWDER, LYOPHILIZED, FOR SOLUTION INTRAVENOUS at 23:43

## 2024-03-13 RX ADMIN — SODIUM CHLORIDE: 9 INJECTION, SOLUTION INTRAVENOUS at 12:56

## 2024-03-13 RX ADMIN — ATORVASTATIN CALCIUM 40 MG: 40 TABLET, FILM COATED ORAL at 20:55

## 2024-03-13 RX ADMIN — Medication 100 MCG/HR: at 15:51

## 2024-03-13 RX ADMIN — Medication 1 MCG/KG/HR: at 10:25

## 2024-03-13 RX ADMIN — Medication: at 04:38

## 2024-03-13 RX ADMIN — Medication 1 MCG/KG/HR: at 21:41

## 2024-03-13 RX ADMIN — WATER 2000 MG: 1 INJECTION INTRAMUSCULAR; INTRAVENOUS; SUBCUTANEOUS at 00:48

## 2024-03-13 RX ADMIN — Medication: at 11:06

## 2024-03-13 ASSESSMENT — PULMONARY FUNCTION TESTS
PIF_VALUE: 16
PIF_VALUE: 15

## 2024-03-14 ENCOUNTER — APPOINTMENT (OUTPATIENT)
Dept: GENERAL RADIOLOGY | Age: 69
DRG: 003 | End: 2024-03-14
Attending: THORACIC SURGERY (CARDIOTHORACIC VASCULAR SURGERY)
Payer: MEDICARE

## 2024-03-14 ENCOUNTER — APPOINTMENT (OUTPATIENT)
Age: 69
DRG: 003 | End: 2024-03-14
Attending: NURSE PRACTITIONER
Payer: MEDICARE

## 2024-03-14 LAB
ABO: NORMAL
ALBUMIN SERPL BCG-MCNC: 3.8 G/DL (ref 3.5–5.1)
ALBUMIN SERPL BCG-MCNC: 3.8 G/DL (ref 3.5–5.1)
ALBUMIN SERPL BCG-MCNC: 4 G/DL (ref 3.5–5.1)
ALBUMIN SERPL BCG-MCNC: 4.2 G/DL (ref 3.5–5.1)
ALP SERPL-CCNC: 263 U/L (ref 38–126)
ALP SERPL-CCNC: 268 U/L (ref 38–126)
ALP SERPL-CCNC: 303 U/L (ref 38–126)
ALP SERPL-CCNC: 345 U/L (ref 38–126)
ALP SERPL-CCNC: 370 U/L (ref 38–126)
ALP SERPL-CCNC: 399 U/L (ref 38–126)
ALT SERPL W/O P-5'-P-CCNC: < 5 U/L (ref 11–66)
ANION GAP SERPL CALC-SCNC: 10 MEQ/L (ref 8–16)
ANION GAP SERPL CALC-SCNC: 11 MEQ/L (ref 8–16)
ANION GAP SERPL CALC-SCNC: 11 MEQ/L (ref 8–16)
ANION GAP SERPL CALC-SCNC: 12 MEQ/L (ref 8–16)
ANTIBODY SCREEN: NORMAL
APTT PPP: 69.5 SECONDS (ref 22–38)
APTT PPP: 70.7 SECONDS (ref 22–38)
APTT PPP: 71.6 SECONDS (ref 22–38)
ARTERIAL PATENCY WRIST A: ABNORMAL
AST SERPL-CCNC: 59 U/L (ref 5–40)
AST SERPL-CCNC: 59 U/L (ref 5–40)
AST SERPL-CCNC: 60 U/L (ref 5–40)
AST SERPL-CCNC: 61 U/L (ref 5–40)
AST SERPL-CCNC: 63 U/L (ref 5–40)
AST SERPL-CCNC: 69 U/L (ref 5–40)
BASE EXCESS BLDA CALC-SCNC: -0.5 MMOL/L (ref -2.5–2.5)
BASE EXCESS BLDA CALC-SCNC: -0.6 MMOL/L (ref -2.5–2.5)
BASE EXCESS BLDA CALC-SCNC: -0.6 MMOL/L (ref -2.5–2.5)
BASE EXCESS BLDA CALC-SCNC: -0.7 MMOL/L (ref -2.5–2.5)
BASE EXCESS BLDA CALC-SCNC: -0.9 MMOL/L (ref -2–3)
BASE EXCESS BLDA CALC-SCNC: -1.3 MMOL/L (ref -2.5–2.5)
BASE EXCESS BLDA CALC-SCNC: -1.6 MMOL/L (ref -2.5–2.5)
BASE EXCESS BLDA CALC-SCNC: -1.6 MMOL/L (ref -2.5–2.5)
BASE EXCESS BLDA CALC-SCNC: -1.7 MMOL/L (ref -2–3)
BASE EXCESS BLDA CALC-SCNC: -1.7 MMOL/L (ref -2–3)
BASE EXCESS BLDA CALC-SCNC: -1.8 MMOL/L (ref -2.5–2.5)
BASE EXCESS BLDA CALC-SCNC: -2 MMOL/L (ref -2–3)
BASE EXCESS BLDA CALC-SCNC: -2.2 MMOL/L (ref -2–3)
BASE EXCESS BLDA CALC-SCNC: -2.6 MMOL/L (ref -2–3)
BASE EXCESS BLDA CALC-SCNC: -3.6 MMOL/L (ref -2–3)
BASE EXCESS BLDA CALC-SCNC: -4.6 MMOL/L (ref -2–3)
BASE EXCESS BLDA CALC-SCNC: 1.3 MMOL/L (ref -2.5–2.5)
BDY SITE: ABNORMAL
BILIRUB SERPL-MCNC: 2.2 MG/DL (ref 0.3–1.2)
BILIRUB SERPL-MCNC: 2.4 MG/DL (ref 0.3–1.2)
BILIRUB SERPL-MCNC: 2.9 MG/DL (ref 0.3–1.2)
BILIRUB SERPL-MCNC: 3.5 MG/DL (ref 0.3–1.2)
BILIRUB SERPL-MCNC: 3.7 MG/DL (ref 0.3–1.2)
BILIRUB SERPL-MCNC: 3.8 MG/DL (ref 0.3–1.2)
BREATHS SETTING VENT: 12 BPM
BUN SERPL-MCNC: 29 MG/DL (ref 7–22)
BUN SERPL-MCNC: 30 MG/DL (ref 7–22)
BUN SERPL-MCNC: 31 MG/DL (ref 7–22)
BUN SERPL-MCNC: 32 MG/DL (ref 7–22)
CA-I BLD ISE-SCNC: 1.15 MMOL/L (ref 1.12–1.32)
CA-I BLD ISE-SCNC: 1.21 MMOL/L (ref 1.12–1.32)
CA-I BLD ISE-SCNC: 1.23 MMOL/L (ref 1.12–1.32)
CA-I BLD ISE-SCNC: 1.24 MMOL/L (ref 1.12–1.32)
CA-I BLD ISE-SCNC: 1.28 MMOL/L (ref 1.12–1.32)
CALCIUM SERPL-MCNC: 8.7 MG/DL (ref 8.5–10.5)
CALCIUM SERPL-MCNC: 8.8 MG/DL (ref 8.5–10.5)
CALCIUM SERPL-MCNC: 9 MG/DL (ref 8.5–10.5)
CALCIUM SERPL-MCNC: 9.2 MG/DL (ref 8.5–10.5)
CALCIUM SERPL-MCNC: 9.3 MG/DL (ref 8.5–10.5)
CALCIUM SERPL-MCNC: 9.4 MG/DL (ref 8.5–10.5)
CHLORIDE SERPL-SCNC: 100 MEQ/L (ref 98–111)
CHLORIDE SERPL-SCNC: 102 MEQ/L (ref 98–111)
CHLORIDE SERPL-SCNC: 103 MEQ/L (ref 98–111)
CHLORIDE SERPL-SCNC: 104 MEQ/L (ref 98–111)
CO2 SERPL-SCNC: 22 MEQ/L (ref 23–33)
CO2 SERPL-SCNC: 23 MEQ/L (ref 23–33)
CO2 SERPL-SCNC: 23 MEQ/L (ref 23–33)
CO2 SERPL-SCNC: 24 MEQ/L (ref 23–33)
COLLECTED BY:: ABNORMAL
COLLECTED BY:: NORMAL
CREAT SERPL-MCNC: 1.1 MG/DL (ref 0.4–1.2)
CREAT SERPL-MCNC: 1.2 MG/DL (ref 0.4–1.2)
CREAT SERPL-MCNC: 1.3 MG/DL (ref 0.4–1.2)
DEPRECATED RDW RBC AUTO: 52.8 FL (ref 35–45)
DEPRECATED RDW RBC AUTO: 53 FL (ref 35–45)
DEPRECATED RDW RBC AUTO: 58.9 FL (ref 35–45)
DEPRECATED RDW RBC AUTO: 59.7 FL (ref 35–45)
DEPRECATED RDW RBC AUTO: 60.6 FL (ref 35–45)
DEPRECATED RDW RBC AUTO: 61.1 FL (ref 35–45)
DEVICE: ABNORMAL
DEVICE: NORMAL
DEVICE: NORMAL
ERYTHROCYTE [DISTWIDTH] IN BLOOD BY AUTOMATED COUNT: 17.1 % (ref 11.5–14.5)
ERYTHROCYTE [DISTWIDTH] IN BLOOD BY AUTOMATED COUNT: 17.2 % (ref 11.5–14.5)
ERYTHROCYTE [DISTWIDTH] IN BLOOD BY AUTOMATED COUNT: 19.4 % (ref 11.5–14.5)
ERYTHROCYTE [DISTWIDTH] IN BLOOD BY AUTOMATED COUNT: 19.4 % (ref 11.5–14.5)
ERYTHROCYTE [DISTWIDTH] IN BLOOD BY AUTOMATED COUNT: 19.9 % (ref 11.5–14.5)
ERYTHROCYTE [DISTWIDTH] IN BLOOD BY AUTOMATED COUNT: 20 % (ref 11.5–14.5)
FIBRINOGEN PPP-MCNC: 356 MG/100ML (ref 155–475)
FIBRINOGEN PPP-MCNC: 381 MG/100ML (ref 155–475)
FIO2 ON VENT O2 ANALYZER: 40 %
FIO2 ON VENT O2 ANALYZER: 50 %
GFR SERPL CREATININE-BSD FRML MDRD: 59 ML/MIN/1.73M2
GFR SERPL CREATININE-BSD FRML MDRD: > 60 ML/MIN/1.73M2
GLUCOSE BLD-MCNC: 210 MG/DL (ref 70–108)
GLUCOSE BLD-MCNC: 214 MG/DL (ref 70–108)
GLUCOSE BLD-MCNC: 217 MG/DL (ref 70–108)
GLUCOSE BLD-MCNC: 218 MG/DL (ref 70–108)
GLUCOSE BLD-MCNC: 224 MG/DL (ref 70–108)
GLUCOSE BLD-MCNC: 232 MG/DL (ref 70–108)
GLUCOSE BLD-MCNC: 237 MG/DL (ref 70–108)
GLUCOSE SERPL-MCNC: 209 MG/DL (ref 70–108)
GLUCOSE SERPL-MCNC: 211 MG/DL (ref 70–108)
GLUCOSE SERPL-MCNC: 212 MG/DL (ref 70–108)
GLUCOSE SERPL-MCNC: 220 MG/DL (ref 70–108)
GLUCOSE SERPL-MCNC: 221 MG/DL (ref 70–108)
GLUCOSE SERPL-MCNC: 231 MG/DL (ref 70–108)
HCO3 BLDA-SCNC: 21 MMOL/L (ref 23–28)
HCO3 BLDA-SCNC: 21 MMOL/L (ref 23–28)
HCO3 BLDA-SCNC: 23 MMOL/L (ref 23–28)
HCO3 BLDA-SCNC: 24 MMOL/L (ref 23–28)
HCO3 BLDA-SCNC: 25 MMOL/L (ref 23–28)
HCO3 BLDA-SCNC: 26 MMOL/L (ref 23–28)
HCO3 BLDA-SCNC: 26 MMOL/L (ref 23–28)
HCT VFR BLD AUTO: 25.3 % (ref 42–52)
HCT VFR BLD AUTO: 26.6 % (ref 42–52)
HCT VFR BLD AUTO: 26.7 % (ref 42–52)
HCT VFR BLD AUTO: 27.1 % (ref 42–52)
HCT VFR BLD AUTO: 27.4 % (ref 42–52)
HCT VFR BLD AUTO: 27.5 % (ref 42–52)
HCT VFR BLD AUTO: 27.6 % (ref 42–52)
HGB BLD-MCNC: 8.6 GM/DL (ref 14–18)
HGB BLD-MCNC: 8.7 GM/DL (ref 14–18)
HGB BLD-MCNC: 9 GM/DL (ref 14–18)
HGB BLD-MCNC: 9.1 GM/DL (ref 14–18)
HGB BLD-MCNC: 9.2 GM/DL (ref 14–18)
HGB BLD-MCNC: 9.2 GM/DL (ref 14–18)
HGB BLD-MCNC: 9.3 GM/DL (ref 14–18)
LACTATE SERPL-SCNC: 1.1 MMOL/L (ref 0.5–2)
LACTATE SERPL-SCNC: 1.2 MMOL/L (ref 0.5–2)
MAGNESIUM SERPL-MCNC: 2.6 MG/DL (ref 1.6–2.4)
MAGNESIUM SERPL-MCNC: 2.6 MG/DL (ref 1.6–2.4)
MAGNESIUM SERPL-MCNC: 2.7 MG/DL (ref 1.6–2.4)
MAGNESIUM SERPL-MCNC: 2.7 MG/DL (ref 1.6–2.4)
MAGNESIUM SERPL-MCNC: 2.8 MG/DL (ref 1.6–2.4)
MAGNESIUM SERPL-MCNC: 2.8 MG/DL (ref 1.6–2.4)
MCH RBC QN AUTO: 28 PG (ref 26–33)
MCH RBC QN AUTO: 28.8 PG (ref 26–33)
MCH RBC QN AUTO: 28.8 PG (ref 26–33)
MCH RBC QN AUTO: 29.2 PG (ref 26–33)
MCH RBC QN AUTO: 29.9 PG (ref 26–33)
MCH RBC QN AUTO: 30 PG (ref 26–33)
MCHC RBC AUTO-ENTMCNC: 32.7 GM/DL (ref 32.2–35.5)
MCHC RBC AUTO-ENTMCNC: 33.2 GM/DL (ref 32.2–35.5)
MCHC RBC AUTO-ENTMCNC: 33.3 GM/DL (ref 32.2–35.5)
MCHC RBC AUTO-ENTMCNC: 33.6 GM/DL (ref 32.2–35.5)
MCHC RBC AUTO-ENTMCNC: 34 GM/DL (ref 32.2–35.5)
MCHC RBC AUTO-ENTMCNC: 34.1 GM/DL (ref 32.2–35.5)
MCV RBC AUTO: 85.5 FL (ref 80–94)
MCV RBC AUTO: 86.5 FL (ref 80–94)
MCV RBC AUTO: 86.6 FL (ref 80–94)
MCV RBC AUTO: 87 FL (ref 80–94)
MCV RBC AUTO: 87.8 FL (ref 80–94)
MCV RBC AUTO: 88.2 FL (ref 80–94)
PCO2 BLDA: 38 MMHG (ref 35–45)
PCO2 BLDA: 42 MMHG (ref 35–45)
PCO2 BLDA: 43 MMHG (ref 35–45)
PCO2 BLDA: 44 MMHG (ref 35–45)
PCO2 BLDA: 45 MMHG (ref 35–45)
PCO2 BLDA: 45 MMHG (ref 35–45)
PCO2 BLDA: 46 MMHG (ref 35–45)
PCO2 TEMP ADJ BLDMV: 35 MMHG (ref 41–51)
PCO2 TEMP ADJ BLDMV: 41 MMHG (ref 41–51)
PCO2 TEMP ADJ BLDMV: 42 MMHG (ref 41–51)
PCO2 TEMP ADJ BLDMV: 43 MMHG (ref 41–51)
PCO2 TEMP ADJ BLDMV: 43 MMHG (ref 41–51)
PCO2 TEMP ADJ BLDMV: 49 MMHG (ref 41–51)
PCO2 TEMP ADJ BLDMV: 50 MMHG (ref 41–51)
PCO2 TEMP ADJ BLDMV: 51 MMHG (ref 41–51)
PEEP SETTING VENT: 6 MMHG
PH BLDA: 7.33 [PH] (ref 7.35–7.45)
PH BLDA: 7.34 [PH] (ref 7.35–7.45)
PH BLDA: 7.35 [PH] (ref 7.35–7.45)
PH BLDA: 7.36 [PH] (ref 7.35–7.45)
PH BLDA: 7.37 [PH] (ref 7.35–7.45)
PH BLDA: 7.38 [PH] (ref 7.35–7.45)
PH BLDA: 7.44 [PH] (ref 7.35–7.45)
PH BLDMV: 7.29 [PH] (ref 7.31–7.41)
PH BLDMV: 7.31 [PH] (ref 7.31–7.41)
PH BLDMV: 7.35 [PH] (ref 7.31–7.41)
PH BLDMV: 7.35 [PH] (ref 7.31–7.41)
PH BLDMV: 7.36 [PH] (ref 7.31–7.41)
PH BLDMV: 7.39 [PH] (ref 7.31–7.41)
PHOSPHATE SERPL-MCNC: 1.6 MG/DL (ref 2.4–4.7)
PHOSPHATE SERPL-MCNC: 1.8 MG/DL (ref 2.4–4.7)
PHOSPHATE SERPL-MCNC: 2.1 MG/DL (ref 2.4–4.7)
PHOSPHATE SERPL-MCNC: 2.2 MG/DL (ref 2.4–4.7)
PHOSPHATE SERPL-MCNC: 2.2 MG/DL (ref 2.4–4.7)
PHOSPHATE SERPL-MCNC: 2.4 MG/DL (ref 2.4–4.7)
PIP: 18 CMH2O
PLATELET # BLD AUTO: 47 THOU/MM3 (ref 130–400)
PLATELET # BLD AUTO: 51 THOU/MM3 (ref 130–400)
PMV BLD AUTO: 11.4 FL (ref 9.4–12.4)
PMV BLD AUTO: 11.5 FL (ref 9.4–12.4)
PMV BLD AUTO: 11.5 FL (ref 9.4–12.4)
PMV BLD AUTO: 11.6 FL (ref 9.4–12.4)
PMV BLD AUTO: 12.1 FL (ref 9.4–12.4)
PMV BLD AUTO: 12.4 FL (ref 9.4–12.4)
PO2 BLDA: 109 MMHG (ref 71–104)
PO2 BLDA: 156 MMHG (ref 71–104)
PO2 BLDA: 212 MMHG (ref 71–104)
PO2 BLDA: 234 MMHG (ref 71–104)
PO2 BLDA: 258 MMHG (ref 71–104)
PO2 BLDA: 306 MMHG (ref 71–104)
PO2 BLDA: 365 MMHG (ref 71–104)
PO2 BLDA: 65 MMHG (ref 71–104)
PO2 BLDA: 90 MMHG (ref 71–104)
PO2 BLDMV: 31 MMHG (ref 25–40)
PO2 BLDMV: 31 MMHG (ref 25–40)
PO2 BLDMV: 34 MMHG (ref 25–40)
PO2 BLDMV: 34 MMHG (ref 25–40)
PO2 BLDMV: 35 MMHG (ref 25–40)
PO2 BLDMV: 35 MMHG (ref 25–40)
PO2 BLDMV: 36 MMHG (ref 25–40)
PO2 BLDMV: 37 MMHG (ref 25–40)
POTASSIUM SERPL-SCNC: 4.3 MEQ/L (ref 3.5–5.2)
POTASSIUM SERPL-SCNC: 4.4 MEQ/L (ref 3.5–5.2)
POTASSIUM SERPL-SCNC: 4.5 MEQ/L (ref 3.5–5.2)
POTASSIUM SERPL-SCNC: 4.5 MEQ/L (ref 3.5–5.2)
PRESSURE SUPPORT SETTING VENT: 12 CMH2O
PROT SERPL-MCNC: 4.7 G/DL (ref 6.1–8)
PROT SERPL-MCNC: 5.1 G/DL (ref 6.1–8)
PROT SERPL-MCNC: 5.2 G/DL (ref 6.1–8)
PROT SERPL-MCNC: 5.2 G/DL (ref 6.1–8)
PROT SERPL-MCNC: 5.3 G/DL (ref 6.1–8)
PROT SERPL-MCNC: 5.4 G/DL (ref 6.1–8)
RBC # BLD AUTO: 2.87 MILL/MM3 (ref 4.7–6.1)
RBC # BLD AUTO: 3.04 MILL/MM3 (ref 4.7–6.1)
RBC # BLD AUTO: 3.11 MILL/MM3 (ref 4.7–6.1)
RBC # BLD AUTO: 3.13 MILL/MM3 (ref 4.7–6.1)
RBC # BLD AUTO: 3.15 MILL/MM3 (ref 4.7–6.1)
RBC # BLD AUTO: 3.19 MILL/MM3 (ref 4.7–6.1)
RH FACTOR: NORMAL
SAO2 % BLDA: 100 %
SAO2 % BLDA: 92 %
SAO2 % BLDA: 96 %
SAO2 % BLDA: 98 %
SAO2 % BLDA: 99 %
SAO2 % BLDMV: 57 %
SAO2 % BLDMV: 59 %
SAO2 % BLDMV: 61 %
SAO2 % BLDMV: 64 %
SAO2 % BLDMV: 65 %
SAO2 % BLDMV: 69 %
SET PEEP: 6 MMHG
SET RESPIRATORY RATE: 12 BPM
SITE: ABNORMAL
SITE: NORMAL
SITE: NORMAL
SODIUM SERPL-SCNC: 134 MEQ/L (ref 135–145)
SODIUM SERPL-SCNC: 136 MEQ/L (ref 135–145)
SODIUM SERPL-SCNC: 136 MEQ/L (ref 135–145)
SODIUM SERPL-SCNC: 137 MEQ/L (ref 135–145)
SODIUM SERPL-SCNC: 138 MEQ/L (ref 135–145)
SODIUM SERPL-SCNC: 138 MEQ/L (ref 135–145)
VENTILATION MODE VENT: ABNORMAL
VENTILATION MODE VENT: NORMAL
VENTILATION MODE VENT: NORMAL
WBC # BLD AUTO: 6.9 THOU/MM3 (ref 4.8–10.8)
WBC # BLD AUTO: 7.2 THOU/MM3 (ref 4.8–10.8)
WBC # BLD AUTO: 7.3 THOU/MM3 (ref 4.8–10.8)
WBC # BLD AUTO: 7.4 THOU/MM3 (ref 4.8–10.8)
WBC # BLD AUTO: 7.5 THOU/MM3 (ref 4.8–10.8)
WBC # BLD AUTO: 8.3 THOU/MM3 (ref 4.8–10.8)

## 2024-03-14 PROCEDURE — 85384 FIBRINOGEN ACTIVITY: CPT

## 2024-03-14 PROCEDURE — 83735 ASSAY OF MAGNESIUM: CPT

## 2024-03-14 PROCEDURE — 82330 ASSAY OF CALCIUM: CPT

## 2024-03-14 PROCEDURE — 6370000000 HC RX 637 (ALT 250 FOR IP): Performed by: STUDENT IN AN ORGANIZED HEALTH CARE EDUCATION/TRAINING PROGRAM

## 2024-03-14 PROCEDURE — 86901 BLOOD TYPING SEROLOGIC RH(D): CPT

## 2024-03-14 PROCEDURE — 36430 TRANSFUSION BLD/BLD COMPNT: CPT

## 2024-03-14 PROCEDURE — 2500000003 HC RX 250 WO HCPCS: Performed by: INTERNAL MEDICINE

## 2024-03-14 PROCEDURE — 99291 CRITICAL CARE FIRST HOUR: CPT | Performed by: INTERNAL MEDICINE

## 2024-03-14 PROCEDURE — 37799 UNLISTED PX VASCULAR SURGERY: CPT

## 2024-03-14 PROCEDURE — 2700000000 HC OXYGEN THERAPY PER DAY

## 2024-03-14 PROCEDURE — 6370000000 HC RX 637 (ALT 250 FOR IP): Performed by: INTERNAL MEDICINE

## 2024-03-14 PROCEDURE — 2580000003 HC RX 258: Performed by: NURSE PRACTITIONER

## 2024-03-14 PROCEDURE — 85027 COMPLETE CBC AUTOMATED: CPT

## 2024-03-14 PROCEDURE — P9041 ALBUMIN (HUMAN),5%, 50ML: HCPCS | Performed by: THORACIC SURGERY (CARDIOTHORACIC VASCULAR SURGERY)

## 2024-03-14 PROCEDURE — P9047 ALBUMIN (HUMAN), 25%, 50ML: HCPCS | Performed by: INTERNAL MEDICINE

## 2024-03-14 PROCEDURE — 85014 HEMATOCRIT: CPT

## 2024-03-14 PROCEDURE — 82803 BLOOD GASES ANY COMBINATION: CPT

## 2024-03-14 PROCEDURE — 86850 RBC ANTIBODY SCREEN: CPT

## 2024-03-14 PROCEDURE — 84100 ASSAY OF PHOSPHORUS: CPT

## 2024-03-14 PROCEDURE — 6360000002 HC RX W HCPCS: Performed by: NURSE PRACTITIONER

## 2024-03-14 PROCEDURE — 80053 COMPREHEN METABOLIC PANEL: CPT

## 2024-03-14 PROCEDURE — 85730 THROMBOPLASTIN TIME PARTIAL: CPT

## 2024-03-14 PROCEDURE — 36415 COLL VENOUS BLD VENIPUNCTURE: CPT

## 2024-03-14 PROCEDURE — 94003 VENT MGMT INPAT SUBQ DAY: CPT

## 2024-03-14 PROCEDURE — 6370000000 HC RX 637 (ALT 250 FOR IP): Performed by: NURSE PRACTITIONER

## 2024-03-14 PROCEDURE — 2000000000 HC ICU R&B

## 2024-03-14 PROCEDURE — P9016 RBC LEUKOCYTES REDUCED: HCPCS

## 2024-03-14 PROCEDURE — 85018 HEMOGLOBIN: CPT

## 2024-03-14 PROCEDURE — 33948 ECMO/ECLS DAILY MGMT-VENOUS: CPT | Performed by: INTERNAL MEDICINE

## 2024-03-14 PROCEDURE — 86900 BLOOD TYPING SEROLOGIC ABO: CPT

## 2024-03-14 PROCEDURE — 94761 N-INVAS EAR/PLS OXIMETRY MLT: CPT

## 2024-03-14 PROCEDURE — C9113 INJ PANTOPRAZOLE SODIUM, VIA: HCPCS | Performed by: INTERNAL MEDICINE

## 2024-03-14 PROCEDURE — 6370000000 HC RX 637 (ALT 250 FOR IP)

## 2024-03-14 PROCEDURE — 2500000003 HC RX 250 WO HCPCS: Performed by: STUDENT IN AN ORGANIZED HEALTH CARE EDUCATION/TRAINING PROGRAM

## 2024-03-14 PROCEDURE — 6360000002 HC RX W HCPCS: Performed by: THORACIC SURGERY (CARDIOTHORACIC VASCULAR SURGERY)

## 2024-03-14 PROCEDURE — 83605 ASSAY OF LACTIC ACID: CPT

## 2024-03-14 PROCEDURE — 82947 ASSAY GLUCOSE BLOOD QUANT: CPT

## 2024-03-14 PROCEDURE — 6360000002 HC RX W HCPCS: Performed by: INTERNAL MEDICINE

## 2024-03-14 PROCEDURE — 93308 TTE F-UP OR LMTD: CPT

## 2024-03-14 PROCEDURE — 99233 SBSQ HOSP IP/OBS HIGH 50: CPT | Performed by: INTERNAL MEDICINE

## 2024-03-14 PROCEDURE — 2580000003 HC RX 258: Performed by: THORACIC SURGERY (CARDIOTHORACIC VASCULAR SURGERY)

## 2024-03-14 PROCEDURE — 6370000000 HC RX 637 (ALT 250 FOR IP): Performed by: PHYSICIAN ASSISTANT

## 2024-03-14 PROCEDURE — 86923 COMPATIBILITY TEST ELECTRIC: CPT

## 2024-03-14 PROCEDURE — 2580000003 HC RX 258: Performed by: PHYSICIAN ASSISTANT

## 2024-03-14 PROCEDURE — 71045 X-RAY EXAM CHEST 1 VIEW: CPT

## 2024-03-14 PROCEDURE — 33949 ECMO/ECLS DAILY MGMT ARTERY: CPT | Performed by: THORACIC SURGERY (CARDIOTHORACIC VASCULAR SURGERY)

## 2024-03-14 PROCEDURE — 2580000003 HC RX 258: Performed by: INTERNAL MEDICINE

## 2024-03-14 RX ORDER — POLYETHYLENE GLYCOL 3350 17 G/17G
17 POWDER, FOR SOLUTION ORAL DAILY
Status: DISCONTINUED | OUTPATIENT
Start: 2024-03-14 | End: 2024-04-06

## 2024-03-14 RX ORDER — SODIUM CHLORIDE 9 MG/ML
INJECTION, SOLUTION INTRAVENOUS PRN
Status: DISCONTINUED | OUTPATIENT
Start: 2024-03-14 | End: 2024-03-18

## 2024-03-14 RX ADMIN — Medication: at 00:52

## 2024-03-14 RX ADMIN — Medication: at 21:14

## 2024-03-14 RX ADMIN — MAGNESIUM HYDROXIDE 30 ML: 1200 LIQUID ORAL at 07:45

## 2024-03-14 RX ADMIN — ATORVASTATIN CALCIUM 40 MG: 40 TABLET, FILM COATED ORAL at 20:38

## 2024-03-14 RX ADMIN — POLYETHYLENE GLYCOL 400 AND PROPYLENE GLYCOL 2 DROP: 4; 3 SOLUTION/ DROPS OPHTHALMIC at 20:44

## 2024-03-14 RX ADMIN — Medication 100 MCG/HR: at 12:55

## 2024-03-14 RX ADMIN — Medication 6 MMOL: at 04:04

## 2024-03-14 RX ADMIN — INSULIN LISPRO 2 UNITS: 100 INJECTION, SOLUTION INTRAVENOUS; SUBCUTANEOUS at 13:15

## 2024-03-14 RX ADMIN — AMIODARONE HYDROCHLORIDE 400 MG: 200 TABLET ORAL at 21:38

## 2024-03-14 RX ADMIN — Medication 1 MCG/KG/HR: at 14:25

## 2024-03-14 RX ADMIN — INSULIN LISPRO 2 UNITS: 100 INJECTION, SOLUTION INTRAVENOUS; SUBCUTANEOUS at 04:37

## 2024-03-14 RX ADMIN — PANTOPRAZOLE SODIUM 40 MG: 40 INJECTION, POWDER, FOR SOLUTION INTRAVENOUS at 20:37

## 2024-03-14 RX ADMIN — ALBUMIN (HUMAN) 25 G: 0.25 INJECTION, SOLUTION INTRAVENOUS at 15:34

## 2024-03-14 RX ADMIN — Medication: at 07:41

## 2024-03-14 RX ADMIN — Medication 1 TABLET: at 07:45

## 2024-03-14 RX ADMIN — Medication 7 MG/HR: at 10:32

## 2024-03-14 RX ADMIN — WATER 2000 MG: 1 INJECTION INTRAMUSCULAR; INTRAVENOUS; SUBCUTANEOUS at 00:28

## 2024-03-14 RX ADMIN — Medication 1 MCG/KG/HR: at 02:37

## 2024-03-14 RX ADMIN — ALBUMIN (HUMAN) 25 G: 0.25 INJECTION, SOLUTION INTRAVENOUS at 04:34

## 2024-03-14 RX ADMIN — CHLORHEXIDINE GLUCONATE 0.12% ORAL RINSE 15 ML: 1.2 LIQUID ORAL at 07:31

## 2024-03-14 RX ADMIN — Medication: at 15:03

## 2024-03-14 RX ADMIN — Medication: at 15:05

## 2024-03-14 RX ADMIN — Medication 1 MCG/KG/HR: at 08:26

## 2024-03-14 RX ADMIN — COLLAGENASE SANTYL: 250 OINTMENT TOPICAL at 07:56

## 2024-03-14 RX ADMIN — Medication 1 MCG/KG/HR: at 19:42

## 2024-03-14 RX ADMIN — Medication: at 15:06

## 2024-03-14 RX ADMIN — POLYETHYLENE GLYCOL (3350) 17 G: 17 POWDER, FOR SOLUTION ORAL at 11:39

## 2024-03-14 RX ADMIN — Medication 12 MMOL: at 07:05

## 2024-03-14 RX ADMIN — Medication 100 MCG/HR: at 01:53

## 2024-03-14 RX ADMIN — ALBUMIN (HUMAN) 25 G: 12.5 INJECTION, SOLUTION INTRAVENOUS at 01:43

## 2024-03-14 RX ADMIN — ALBUMIN (HUMAN) 25 G: 0.25 INJECTION, SOLUTION INTRAVENOUS at 10:59

## 2024-03-14 RX ADMIN — Medication 100 MCG/HR: at 23:59

## 2024-03-14 RX ADMIN — BIVALIRUDIN 0.05 MG/KG/HR: 250 INJECTION, POWDER, LYOPHILIZED, FOR SOLUTION INTRAVENOUS at 22:27

## 2024-03-14 RX ADMIN — INSULIN LISPRO 2 UNITS: 100 INJECTION, SOLUTION INTRAVENOUS; SUBCUTANEOUS at 00:27

## 2024-03-14 RX ADMIN — INSULIN LISPRO 2 UNITS: 100 INJECTION, SOLUTION INTRAVENOUS; SUBCUTANEOUS at 17:42

## 2024-03-14 RX ADMIN — PANTOPRAZOLE SODIUM 40 MG: 40 INJECTION, POWDER, FOR SOLUTION INTRAVENOUS at 07:45

## 2024-03-14 RX ADMIN — Medication: at 07:37

## 2024-03-14 RX ADMIN — INSULIN LISPRO 2 UNITS: 100 INJECTION, SOLUTION INTRAVENOUS; SUBCUTANEOUS at 20:37

## 2024-03-14 RX ADMIN — INSULIN LISPRO 2 UNITS: 100 INJECTION, SOLUTION INTRAVENOUS; SUBCUTANEOUS at 08:21

## 2024-03-14 RX ADMIN — Medication: at 00:50

## 2024-03-14 RX ADMIN — Medication 12 MMOL: at 21:36

## 2024-03-14 RX ADMIN — AMIODARONE HYDROCHLORIDE 400 MG: 200 TABLET ORAL at 07:46

## 2024-03-14 RX ADMIN — CHLORHEXIDINE GLUCONATE 0.12% ORAL RINSE 15 ML: 1.2 LIQUID ORAL at 20:37

## 2024-03-14 RX ADMIN — SODIUM CHLORIDE, PRESERVATIVE FREE 10 ML: 5 INJECTION INTRAVENOUS at 08:24

## 2024-03-14 RX ADMIN — WATER 2000 MG: 1 INJECTION INTRAMUSCULAR; INTRAVENOUS; SUBCUTANEOUS at 14:55

## 2024-03-14 RX ADMIN — SENNOSIDES 8.8 MG: 8.8 LIQUID ORAL at 07:44

## 2024-03-14 RX ADMIN — SENNOSIDES 8.8 MG: 8.8 LIQUID ORAL at 20:37

## 2024-03-14 RX ADMIN — Medication: at 07:39

## 2024-03-14 RX ADMIN — ALBUMIN (HUMAN) 25 G: 0.25 INJECTION, SOLUTION INTRAVENOUS at 21:43

## 2024-03-14 RX ADMIN — Medication: at 00:51

## 2024-03-14 RX ADMIN — SODIUM CHLORIDE, PRESERVATIVE FREE 10 ML: 5 INJECTION INTRAVENOUS at 20:38

## 2024-03-14 ASSESSMENT — PULMONARY FUNCTION TESTS
PIF_VALUE: 16

## 2024-03-15 ENCOUNTER — APPOINTMENT (OUTPATIENT)
Dept: GENERAL RADIOLOGY | Age: 69
DRG: 003 | End: 2024-03-15
Attending: THORACIC SURGERY (CARDIOTHORACIC VASCULAR SURGERY)
Payer: MEDICARE

## 2024-03-15 ENCOUNTER — APPOINTMENT (OUTPATIENT)
Dept: INTERVENTIONAL RADIOLOGY/VASCULAR | Age: 69
DRG: 003 | End: 2024-03-15
Attending: THORACIC SURGERY (CARDIOTHORACIC VASCULAR SURGERY)
Payer: MEDICARE

## 2024-03-15 LAB
ALBUMIN SERPL BCG-MCNC: 3.8 G/DL (ref 3.5–5.1)
ALBUMIN SERPL BCG-MCNC: 3.9 G/DL (ref 3.5–5.1)
ALBUMIN SERPL BCG-MCNC: 4 G/DL (ref 3.5–5.1)
ALBUMIN SERPL BCG-MCNC: 4.1 G/DL (ref 3.5–5.1)
ALBUMIN SERPL BCG-MCNC: 4.1 G/DL (ref 3.5–5.1)
ALBUMIN SERPL BCG-MCNC: 4.2 G/DL (ref 3.5–5.1)
ALP SERPL-CCNC: 454 U/L (ref 38–126)
ALP SERPL-CCNC: 460 U/L (ref 38–126)
ALP SERPL-CCNC: 470 U/L (ref 38–126)
ALP SERPL-CCNC: 507 U/L (ref 38–126)
ALP SERPL-CCNC: 533 U/L (ref 38–126)
ALP SERPL-CCNC: 577 U/L (ref 38–126)
ALT SERPL W/O P-5'-P-CCNC: < 5 U/L (ref 11–66)
ANION GAP SERPL CALC-SCNC: 10 MEQ/L (ref 8–16)
ANION GAP SERPL CALC-SCNC: 11 MEQ/L (ref 8–16)
ANION GAP SERPL CALC-SCNC: 12 MEQ/L (ref 8–16)
ANION GAP SERPL CALC-SCNC: 13 MEQ/L (ref 8–16)
APTT PPP: 66.6 SECONDS (ref 22–38)
APTT PPP: 70 SECONDS (ref 22–38)
ARTERIAL PATENCY WRIST A: ABNORMAL
ARTERIAL PATENCY WRIST A: ABNORMAL
ARTERIAL PATENCY WRIST A: NORMAL
AST SERPL-CCNC: 57 U/L (ref 5–40)
AST SERPL-CCNC: 57 U/L (ref 5–40)
AST SERPL-CCNC: 60 U/L (ref 5–40)
AST SERPL-CCNC: 62 U/L (ref 5–40)
AST SERPL-CCNC: 63 U/L (ref 5–40)
AST SERPL-CCNC: 65 U/L (ref 5–40)
BACTERIA SPEC RESP CULT: NORMAL
BASE EXCESS BLDA CALC-SCNC: -0.2 MMOL/L (ref -2.5–2.5)
BASE EXCESS BLDA CALC-SCNC: -0.5 MMOL/L (ref -2.5–2.5)
BASE EXCESS BLDA CALC-SCNC: -0.8 MMOL/L (ref -2–3)
BASE EXCESS BLDA CALC-SCNC: -1.1 MMOL/L (ref -2.5–2.5)
BASE EXCESS BLDA CALC-SCNC: -1.2 MMOL/L (ref -2.5–2.5)
BASE EXCESS BLDA CALC-SCNC: -1.3 MMOL/L (ref -2–3)
BASE EXCESS BLDA CALC-SCNC: -1.4 MMOL/L (ref -2–3)
BASE EXCESS BLDA CALC-SCNC: -1.5 MMOL/L (ref -2.5–2.5)
BASE EXCESS BLDA CALC-SCNC: -1.5 MMOL/L (ref -2.5–2.5)
BASE EXCESS BLDA CALC-SCNC: -1.6 MMOL/L (ref -2.5–2.5)
BASE EXCESS BLDA CALC-SCNC: -1.7 MMOL/L (ref -2–3)
BASE EXCESS BLDA CALC-SCNC: -2.1 MMOL/L (ref -2.5–2.5)
BASE EXCESS BLDA CALC-SCNC: -3.1 MMOL/L (ref -2–3)
BASE EXCESS BLDA CALC-SCNC: -3.4 MMOL/L (ref -2–3)
BASE EXCESS BLDA CALC-SCNC: -3.6 MMOL/L (ref -2–3)
BDY SITE: ABNORMAL
BDY SITE: NORMAL
BILIRUB CONJ SERPL-MCNC: 2.5 MG/DL (ref 0–0.3)
BILIRUB SERPL-MCNC: 3.5 MG/DL (ref 0.3–1.2)
BILIRUB SERPL-MCNC: 3.8 MG/DL (ref 0.3–1.2)
BILIRUB SERPL-MCNC: 3.9 MG/DL (ref 0.3–1.2)
BILIRUB SERPL-MCNC: 4 MG/DL (ref 0.3–1.2)
BILIRUB SERPL-MCNC: 4 MG/DL (ref 0.3–1.2)
BILIRUB SERPL-MCNC: 4.1 MG/DL (ref 0.3–1.2)
BREATHS SETTING VENT: 12 BPM
BUN SERPL-MCNC: 31 MG/DL (ref 7–22)
BUN SERPL-MCNC: 31 MG/DL (ref 7–22)
BUN SERPL-MCNC: 32 MG/DL (ref 7–22)
BUN SERPL-MCNC: 32 MG/DL (ref 7–22)
BUN SERPL-MCNC: 33 MG/DL (ref 7–22)
BUN SERPL-MCNC: 33 MG/DL (ref 7–22)
CA-I BLD ISE-SCNC: 1.24 MMOL/L (ref 1.12–1.32)
CA-I BLD ISE-SCNC: 1.27 MMOL/L (ref 1.12–1.32)
CA-I BLD ISE-SCNC: 1.3 MMOL/L (ref 1.12–1.32)
CA-I BLD ISE-SCNC: 1.3 MMOL/L (ref 1.12–1.32)
CA-I BLD ISE-SCNC: 1.33 MMOL/L (ref 1.12–1.32)
CA-I BLD ISE-SCNC: 1.34 MMOL/L (ref 1.12–1.32)
CALCIUM SERPL-MCNC: 9.5 MG/DL (ref 8.5–10.5)
CALCIUM SERPL-MCNC: 9.5 MG/DL (ref 8.5–10.5)
CALCIUM SERPL-MCNC: 9.6 MG/DL (ref 8.5–10.5)
CALCIUM SERPL-MCNC: 9.6 MG/DL (ref 8.5–10.5)
CALCIUM SERPL-MCNC: 9.7 MG/DL (ref 8.5–10.5)
CALCIUM SERPL-MCNC: 9.8 MG/DL (ref 8.5–10.5)
CHLORIDE SERPL-SCNC: 102 MEQ/L (ref 98–111)
CHLORIDE SERPL-SCNC: 103 MEQ/L (ref 98–111)
CHLORIDE SERPL-SCNC: 103 MEQ/L (ref 98–111)
CO2 SERPL-SCNC: 21 MEQ/L (ref 23–33)
CO2 SERPL-SCNC: 21 MEQ/L (ref 23–33)
CO2 SERPL-SCNC: 22 MEQ/L (ref 23–33)
CO2 SERPL-SCNC: 22 MEQ/L (ref 23–33)
CO2 SERPL-SCNC: 24 MEQ/L (ref 23–33)
CO2 SERPL-SCNC: 24 MEQ/L (ref 23–33)
COLLECTED BY:: ABNORMAL
COLLECTED BY:: NORMAL
CREAT SERPL-MCNC: 1 MG/DL (ref 0.4–1.2)
CREAT SERPL-MCNC: 1.1 MG/DL (ref 0.4–1.2)
CREAT SERPL-MCNC: 1.2 MG/DL (ref 0.4–1.2)
DEPRECATED RDW RBC AUTO: 56.4 FL (ref 35–45)
DEPRECATED RDW RBC AUTO: 58.6 FL (ref 35–45)
DEPRECATED RDW RBC AUTO: 58.7 FL (ref 35–45)
DEPRECATED RDW RBC AUTO: 58.9 FL (ref 35–45)
DEPRECATED RDW RBC AUTO: 59.6 FL (ref 35–45)
DEPRECATED RDW RBC AUTO: 60.3 FL (ref 35–45)
DEVICE: ABNORMAL
DEVICE: NORMAL
DEVICE: NORMAL
ERYTHROCYTE [DISTWIDTH] IN BLOOD BY AUTOMATED COUNT: 19 % (ref 11.5–14.5)
ERYTHROCYTE [DISTWIDTH] IN BLOOD BY AUTOMATED COUNT: 19.3 % (ref 11.5–14.5)
ERYTHROCYTE [DISTWIDTH] IN BLOOD BY AUTOMATED COUNT: 19.4 % (ref 11.5–14.5)
ERYTHROCYTE [DISTWIDTH] IN BLOOD BY AUTOMATED COUNT: 19.4 % (ref 11.5–14.5)
ERYTHROCYTE [DISTWIDTH] IN BLOOD BY AUTOMATED COUNT: 19.7 % (ref 11.5–14.5)
ERYTHROCYTE [DISTWIDTH] IN BLOOD BY AUTOMATED COUNT: 19.7 % (ref 11.5–14.5)
FIBRINOGEN PPP-MCNC: 340 MG/100ML (ref 155–475)
FIBRINOGEN PPP-MCNC: 381 MG/100ML (ref 155–475)
FIO2 ON VENT O2 ANALYZER: 40 %
FIO2 ON VENT O2 ANALYZER: 50 %
GFR SERPL CREATININE-BSD FRML MDRD: > 60 ML/MIN/1.73M2
GLUCOSE BLD-MCNC: 211 MG/DL (ref 70–108)
GLUCOSE BLD-MCNC: 219 MG/DL (ref 70–108)
GLUCOSE BLD-MCNC: 223 MG/DL (ref 70–108)
GLUCOSE BLD-MCNC: 228 MG/DL (ref 70–108)
GLUCOSE BLD-MCNC: 229 MG/DL (ref 70–108)
GLUCOSE BLD-MCNC: 235 MG/DL (ref 70–108)
GLUCOSE BLD-MCNC: 258 MG/DL (ref 70–108)
GLUCOSE SERPL-MCNC: 210 MG/DL (ref 70–108)
GLUCOSE SERPL-MCNC: 221 MG/DL (ref 70–108)
GLUCOSE SERPL-MCNC: 222 MG/DL (ref 70–108)
GLUCOSE SERPL-MCNC: 224 MG/DL (ref 70–108)
GLUCOSE SERPL-MCNC: 235 MG/DL (ref 70–108)
GLUCOSE SERPL-MCNC: 237 MG/DL (ref 70–108)
GRAM STN SPEC: NORMAL
HCO3 BLDA-SCNC: 22 MMOL/L (ref 23–28)
HCO3 BLDA-SCNC: 22 MMOL/L (ref 23–28)
HCO3 BLDA-SCNC: 23 MMOL/L (ref 23–28)
HCO3 BLDA-SCNC: 23 MMOL/L (ref 23–28)
HCO3 BLDA-SCNC: 24 MMOL/L (ref 23–28)
HCO3 BLDA-SCNC: 25 MMOL/L (ref 23–28)
HCO3 BLDA-SCNC: 26 MMOL/L (ref 23–28)
HCT VFR BLD AUTO: 26.7 % (ref 42–52)
HCT VFR BLD AUTO: 27.1 % (ref 42–52)
HCT VFR BLD AUTO: 27.3 % (ref 42–52)
HCT VFR BLD AUTO: 27.5 % (ref 42–52)
HCT VFR BLD AUTO: 27.9 % (ref 42–52)
HCT VFR BLD AUTO: 28.1 % (ref 42–52)
HCT VFR BLD AUTO: 29.2 % (ref 42–52)
HGB BLD-MCNC: 9 GM/DL (ref 14–18)
HGB BLD-MCNC: 9.4 GM/DL (ref 14–18)
HGB BLD-MCNC: 9.4 GM/DL (ref 14–18)
HGB BLD-MCNC: 9.5 GM/DL (ref 14–18)
HGB BLD-MCNC: 9.8 GM/DL (ref 14–18)
LACTATE SERPL-SCNC: 0.9 MMOL/L (ref 0.5–2)
LACTATE SERPL-SCNC: 1.2 MMOL/L (ref 0.5–2)
MAGNESIUM SERPL-MCNC: 2.7 MG/DL (ref 1.6–2.4)
MAGNESIUM SERPL-MCNC: 2.8 MG/DL (ref 1.6–2.4)
MAGNESIUM SERPL-MCNC: 2.8 MG/DL (ref 1.6–2.4)
MCH RBC QN AUTO: 28.8 PG (ref 26–33)
MCH RBC QN AUTO: 28.9 PG (ref 26–33)
MCH RBC QN AUTO: 29 PG (ref 26–33)
MCH RBC QN AUTO: 29.2 PG (ref 26–33)
MCH RBC QN AUTO: 29.2 PG (ref 26–33)
MCH RBC QN AUTO: 29.5 PG (ref 26–33)
MCHC RBC AUTO-ENTMCNC: 33 GM/DL (ref 32.2–35.5)
MCHC RBC AUTO-ENTMCNC: 33.2 GM/DL (ref 32.2–35.5)
MCHC RBC AUTO-ENTMCNC: 33.5 GM/DL (ref 32.2–35.5)
MCHC RBC AUTO-ENTMCNC: 33.6 GM/DL (ref 32.2–35.5)
MCHC RBC AUTO-ENTMCNC: 33.7 GM/DL (ref 32.2–35.5)
MCHC RBC AUTO-ENTMCNC: 34.5 GM/DL (ref 32.2–35.5)
MCV RBC AUTO: 84.6 FL (ref 80–94)
MCV RBC AUTO: 86.4 FL (ref 80–94)
MCV RBC AUTO: 86.9 FL (ref 80–94)
MCV RBC AUTO: 87.3 FL (ref 80–94)
MCV RBC AUTO: 87.5 FL (ref 80–94)
MCV RBC AUTO: 87.8 FL (ref 80–94)
PCO2 BLDA: 32 MMHG (ref 35–45)
PCO2 BLDA: 41 MMHG (ref 35–45)
PCO2 BLDA: 42 MMHG (ref 35–45)
PCO2 BLDA: 43 MMHG (ref 35–45)
PCO2 BLDA: 43 MMHG (ref 35–45)
PCO2 BLDA: 44 MMHG (ref 35–45)
PCO2 BLDA: 44 MMHG (ref 35–45)
PCO2 BLDA: 45 MMHG (ref 35–45)
PCO2 TEMP ADJ BLDMV: 38 MMHG (ref 41–51)
PCO2 TEMP ADJ BLDMV: 42 MMHG (ref 41–51)
PCO2 TEMP ADJ BLDMV: 45 MMHG (ref 41–51)
PCO2 TEMP ADJ BLDMV: 46 MMHG (ref 41–51)
PCO2 TEMP ADJ BLDMV: 49 MMHG (ref 41–51)
PEEP SETTING VENT: 6 MMHG
PH BLDA: 7.35 [PH] (ref 7.35–7.45)
PH BLDA: 7.36 [PH] (ref 7.35–7.45)
PH BLDA: 7.37 [PH] (ref 7.35–7.45)
PH BLDA: 7.37 [PH] (ref 7.35–7.45)
PH BLDA: 7.47 [PH] (ref 7.35–7.45)
PH BLDMV: 7.32 [PH] (ref 7.31–7.41)
PH BLDMV: 7.32 [PH] (ref 7.31–7.41)
PH BLDMV: 7.33 [PH] (ref 7.31–7.41)
PH BLDMV: 7.34 [PH] (ref 7.31–7.41)
PH BLDMV: 7.34 [PH] (ref 7.31–7.41)
PH BLDMV: 7.35 [PH] (ref 7.31–7.41)
PH BLDMV: 7.37 [PH] (ref 7.31–7.41)
PHOSPHATE SERPL-MCNC: 1.9 MG/DL (ref 2.4–4.7)
PHOSPHATE SERPL-MCNC: 2.1 MG/DL (ref 2.4–4.7)
PHOSPHATE SERPL-MCNC: 2.3 MG/DL (ref 2.4–4.7)
PHOSPHATE SERPL-MCNC: 2.9 MG/DL (ref 2.4–4.7)
PIP: 18 CMH2O
PIP: 20 CMH2O
PIP: 20 CMH2O
PLATELET # BLD AUTO: 39 THOU/MM3 (ref 130–400)
PLATELET # BLD AUTO: 55 THOU/MM3 (ref 130–400)
PLATELET # BLD AUTO: 57 THOU/MM3 (ref 130–400)
PLATELET # BLD AUTO: 59 THOU/MM3 (ref 130–400)
PLATELET # BLD AUTO: 66 THOU/MM3 (ref 130–400)
PLATELET # BLD AUTO: 66 THOU/MM3 (ref 130–400)
PMV BLD AUTO: 10.8 FL (ref 9.4–12.4)
PMV BLD AUTO: 10.8 FL (ref 9.4–12.4)
PMV BLD AUTO: 11.3 FL (ref 9.4–12.4)
PMV BLD AUTO: 11.3 FL (ref 9.4–12.4)
PMV BLD AUTO: 11.4 FL (ref 9.4–12.4)
PMV BLD AUTO: 11.7 FL (ref 9.4–12.4)
PO2 BLDA: 118 MMHG (ref 71–104)
PO2 BLDA: 155 MMHG (ref 71–104)
PO2 BLDA: 160 MMHG (ref 71–104)
PO2 BLDA: 209 MMHG (ref 71–104)
PO2 BLDA: 217 MMHG (ref 71–104)
PO2 BLDA: 251 MMHG (ref 71–104)
PO2 BLDA: 291 MMHG (ref 71–104)
PO2 BLDA: 74 MMHG (ref 71–104)
PO2 BLDMV: 31 MMHG (ref 25–40)
PO2 BLDMV: 32 MMHG (ref 25–40)
PO2 BLDMV: 34 MMHG (ref 25–40)
PO2 BLDMV: 35 MMHG (ref 25–40)
PO2 BLDMV: 36 MMHG (ref 25–40)
PO2 BLDMV: 37 MMHG (ref 25–40)
PO2 BLDMV: 38 MMHG (ref 25–40)
POTASSIUM SERPL-SCNC: 4.3 MEQ/L (ref 3.5–5.2)
POTASSIUM SERPL-SCNC: 4.4 MEQ/L (ref 3.5–5.2)
POTASSIUM SERPL-SCNC: 4.5 MEQ/L (ref 3.5–5.2)
POTASSIUM SERPL-SCNC: 4.7 MEQ/L (ref 3.5–5.2)
PRESSURE SUPPORT SETTING VENT: 12 CMH2O
PRESSURE SUPPORT SETTING VENT: 12 CMH2O
PROT SERPL-MCNC: 4.9 G/DL (ref 6.1–8)
PROT SERPL-MCNC: 5.1 G/DL (ref 6.1–8)
PROT SERPL-MCNC: 5.2 G/DL (ref 6.1–8)
PROT SERPL-MCNC: 5.2 G/DL (ref 6.1–8)
PROT SERPL-MCNC: 5.3 G/DL (ref 6.1–8)
PROT SERPL-MCNC: 5.3 G/DL (ref 6.1–8)
RBC # BLD AUTO: 3.11 MILL/MM3 (ref 4.7–6.1)
RBC # BLD AUTO: 3.12 MILL/MM3 (ref 4.7–6.1)
RBC # BLD AUTO: 3.19 MILL/MM3 (ref 4.7–6.1)
RBC # BLD AUTO: 3.22 MILL/MM3 (ref 4.7–6.1)
RBC # BLD AUTO: 3.25 MILL/MM3 (ref 4.7–6.1)
RBC # BLD AUTO: 3.38 MILL/MM3 (ref 4.7–6.1)
SAO2 % BLDA: 100 %
SAO2 % BLDA: 94 %
SAO2 % BLDA: 98 %
SAO2 % BLDA: 99 %
SAO2 % BLDA: 99 %
SAO2 % BLDMV: 55 %
SAO2 % BLDMV: 57 %
SAO2 % BLDMV: 63 %
SAO2 % BLDMV: 63 %
SAO2 % BLDMV: 64 %
SAO2 % BLDMV: 67 %
SAO2 % BLDMV: 67 %
SCAN OF BLOOD SMEAR: NORMAL
SET PEEP: 6 MMHG
SET RESPIRATORY RATE: 12 BPM
SITE: ABNORMAL
SITE: NORMAL
SODIUM SERPL-SCNC: 134 MEQ/L (ref 135–145)
SODIUM SERPL-SCNC: 135 MEQ/L (ref 135–145)
SODIUM SERPL-SCNC: 136 MEQ/L (ref 135–145)
SODIUM SERPL-SCNC: 136 MEQ/L (ref 135–145)
SODIUM SERPL-SCNC: 137 MEQ/L (ref 135–145)
SODIUM SERPL-SCNC: 138 MEQ/L (ref 135–145)
VENTILATION MODE VENT: ABNORMAL
VENTILATION MODE VENT: NORMAL
WBC # BLD AUTO: 10.1 THOU/MM3 (ref 4.8–10.8)
WBC # BLD AUTO: 7.4 THOU/MM3 (ref 4.8–10.8)
WBC # BLD AUTO: 7.8 THOU/MM3 (ref 4.8–10.8)
WBC # BLD AUTO: 7.9 THOU/MM3 (ref 4.8–10.8)
WBC # BLD AUTO: 8 THOU/MM3 (ref 4.8–10.8)
WBC # BLD AUTO: 8.9 THOU/MM3 (ref 4.8–10.8)

## 2024-03-15 PROCEDURE — 85730 THROMBOPLASTIN TIME PARTIAL: CPT

## 2024-03-15 PROCEDURE — 2580000003 HC RX 258: Performed by: THORACIC SURGERY (CARDIOTHORACIC VASCULAR SURGERY)

## 2024-03-15 PROCEDURE — 94003 VENT MGMT INPAT SUBQ DAY: CPT

## 2024-03-15 PROCEDURE — 71045 X-RAY EXAM CHEST 1 VIEW: CPT

## 2024-03-15 PROCEDURE — 85014 HEMATOCRIT: CPT

## 2024-03-15 PROCEDURE — 2500000003 HC RX 250 WO HCPCS: Performed by: STUDENT IN AN ORGANIZED HEALTH CARE EDUCATION/TRAINING PROGRAM

## 2024-03-15 PROCEDURE — 80053 COMPREHEN METABOLIC PANEL: CPT

## 2024-03-15 PROCEDURE — 36600 WITHDRAWAL OF ARTERIAL BLOOD: CPT

## 2024-03-15 PROCEDURE — 83605 ASSAY OF LACTIC ACID: CPT

## 2024-03-15 PROCEDURE — 82803 BLOOD GASES ANY COMBINATION: CPT

## 2024-03-15 PROCEDURE — 82330 ASSAY OF CALCIUM: CPT

## 2024-03-15 PROCEDURE — P9047 ALBUMIN (HUMAN), 25%, 50ML: HCPCS | Performed by: INTERNAL MEDICINE

## 2024-03-15 PROCEDURE — 33948 ECMO/ECLS DAILY MGMT-VENOUS: CPT | Performed by: INTERNAL MEDICINE

## 2024-03-15 PROCEDURE — P9041 ALBUMIN (HUMAN),5%, 50ML: HCPCS | Performed by: THORACIC SURGERY (CARDIOTHORACIC VASCULAR SURGERY)

## 2024-03-15 PROCEDURE — 37799 UNLISTED PX VASCULAR SURGERY: CPT

## 2024-03-15 PROCEDURE — 93925 LOWER EXTREMITY STUDY: CPT

## 2024-03-15 PROCEDURE — 6370000000 HC RX 637 (ALT 250 FOR IP): Performed by: THORACIC SURGERY (CARDIOTHORACIC VASCULAR SURGERY)

## 2024-03-15 PROCEDURE — 82248 BILIRUBIN DIRECT: CPT

## 2024-03-15 PROCEDURE — 94761 N-INVAS EAR/PLS OXIMETRY MLT: CPT

## 2024-03-15 PROCEDURE — 2000000000 HC ICU R&B

## 2024-03-15 PROCEDURE — 2500000003 HC RX 250 WO HCPCS: Performed by: INTERNAL MEDICINE

## 2024-03-15 PROCEDURE — 33949 ECMO/ECLS DAILY MGMT ARTERY: CPT

## 2024-03-15 PROCEDURE — 36415 COLL VENOUS BLD VENIPUNCTURE: CPT

## 2024-03-15 PROCEDURE — 83735 ASSAY OF MAGNESIUM: CPT

## 2024-03-15 PROCEDURE — 6370000000 HC RX 637 (ALT 250 FOR IP)

## 2024-03-15 PROCEDURE — 2580000003 HC RX 258: Performed by: PHYSICIAN ASSISTANT

## 2024-03-15 PROCEDURE — 85018 HEMOGLOBIN: CPT

## 2024-03-15 PROCEDURE — 2580000003 HC RX 258: Performed by: INTERNAL MEDICINE

## 2024-03-15 PROCEDURE — 6360000002 HC RX W HCPCS: Performed by: THORACIC SURGERY (CARDIOTHORACIC VASCULAR SURGERY)

## 2024-03-15 PROCEDURE — 2580000003 HC RX 258: Performed by: NURSE PRACTITIONER

## 2024-03-15 PROCEDURE — 84100 ASSAY OF PHOSPHORUS: CPT

## 2024-03-15 PROCEDURE — 2700000000 HC OXYGEN THERAPY PER DAY

## 2024-03-15 PROCEDURE — 6370000000 HC RX 637 (ALT 250 FOR IP): Performed by: STUDENT IN AN ORGANIZED HEALTH CARE EDUCATION/TRAINING PROGRAM

## 2024-03-15 PROCEDURE — 6370000000 HC RX 637 (ALT 250 FOR IP): Performed by: INTERNAL MEDICINE

## 2024-03-15 PROCEDURE — 85027 COMPLETE CBC AUTOMATED: CPT

## 2024-03-15 PROCEDURE — 82947 ASSAY GLUCOSE BLOOD QUANT: CPT

## 2024-03-15 PROCEDURE — 6370000000 HC RX 637 (ALT 250 FOR IP): Performed by: PHYSICIAN ASSISTANT

## 2024-03-15 PROCEDURE — C9113 INJ PANTOPRAZOLE SODIUM, VIA: HCPCS | Performed by: INTERNAL MEDICINE

## 2024-03-15 PROCEDURE — 6360000002 HC RX W HCPCS: Performed by: NURSE PRACTITIONER

## 2024-03-15 PROCEDURE — 6370000000 HC RX 637 (ALT 250 FOR IP): Performed by: NURSE PRACTITIONER

## 2024-03-15 PROCEDURE — 6360000002 HC RX W HCPCS: Performed by: INTERNAL MEDICINE

## 2024-03-15 PROCEDURE — 99233 SBSQ HOSP IP/OBS HIGH 50: CPT | Performed by: INTERNAL MEDICINE

## 2024-03-15 PROCEDURE — 99291 CRITICAL CARE FIRST HOUR: CPT | Performed by: INTERNAL MEDICINE

## 2024-03-15 PROCEDURE — 85384 FIBRINOGEN ACTIVITY: CPT

## 2024-03-15 RX ORDER — INSULIN LISPRO 100 [IU]/ML
0-16 INJECTION, SOLUTION INTRAVENOUS; SUBCUTANEOUS
Status: DISCONTINUED | OUTPATIENT
Start: 2024-03-15 | End: 2024-03-16

## 2024-03-15 RX ORDER — INSULIN LISPRO 100 [IU]/ML
0-4 INJECTION, SOLUTION INTRAVENOUS; SUBCUTANEOUS NIGHTLY
Status: DISCONTINUED | OUTPATIENT
Start: 2024-03-15 | End: 2024-03-16

## 2024-03-15 RX ADMIN — INSULIN LISPRO 4 UNITS: 100 INJECTION, SOLUTION INTRAVENOUS; SUBCUTANEOUS at 08:59

## 2024-03-15 RX ADMIN — Medication: at 17:22

## 2024-03-15 RX ADMIN — Medication: at 23:35

## 2024-03-15 RX ADMIN — SENNOSIDES 8.8 MG: 8.8 LIQUID ORAL at 08:59

## 2024-03-15 RX ADMIN — Medication 6 MMOL: at 11:48

## 2024-03-15 RX ADMIN — Medication 1 TABLET: at 09:01

## 2024-03-15 RX ADMIN — Medication 1 MCG/KG/HR: at 00:28

## 2024-03-15 RX ADMIN — CHLORHEXIDINE GLUCONATE 0.12% ORAL RINSE 15 ML: 1.2 LIQUID ORAL at 09:00

## 2024-03-15 RX ADMIN — INSULIN LISPRO 4 UNITS: 100 INJECTION, SOLUTION INTRAVENOUS; SUBCUTANEOUS at 17:37

## 2024-03-15 RX ADMIN — Medication 1 MCG/KG/HR: at 06:10

## 2024-03-15 RX ADMIN — Medication: at 03:58

## 2024-03-15 RX ADMIN — Medication 6 MMOL: at 17:44

## 2024-03-15 RX ADMIN — Medication 1 MCG/KG/HR: at 01:24

## 2024-03-15 RX ADMIN — PANTOPRAZOLE SODIUM 40 MG: 40 INJECTION, POWDER, FOR SOLUTION INTRAVENOUS at 09:02

## 2024-03-15 RX ADMIN — Medication: at 10:40

## 2024-03-15 RX ADMIN — Medication 2 MG/HR: at 23:17

## 2024-03-15 RX ADMIN — Medication: at 17:21

## 2024-03-15 RX ADMIN — ATORVASTATIN CALCIUM 40 MG: 40 TABLET, FILM COATED ORAL at 21:14

## 2024-03-15 RX ADMIN — Medication 50 MCG/HR: at 12:20

## 2024-03-15 RX ADMIN — SODIUM CHLORIDE 5 MG/HR: 9 INJECTION, SOLUTION INTRAVENOUS at 04:06

## 2024-03-15 RX ADMIN — INSULIN LISPRO 2 UNITS: 100 INJECTION, SOLUTION INTRAVENOUS; SUBCUTANEOUS at 01:15

## 2024-03-15 RX ADMIN — BIVALIRUDIN 0.05 MG/KG/HR: 250 INJECTION, POWDER, LYOPHILIZED, FOR SOLUTION INTRAVENOUS at 23:15

## 2024-03-15 RX ADMIN — Medication: at 03:57

## 2024-03-15 RX ADMIN — SENNOSIDES 8.8 MG: 8.8 LIQUID ORAL at 21:12

## 2024-03-15 RX ADMIN — INSULIN LISPRO 2 UNITS: 100 INJECTION, SOLUTION INTRAVENOUS; SUBCUTANEOUS at 05:00

## 2024-03-15 RX ADMIN — PANTOPRAZOLE SODIUM 40 MG: 40 INJECTION, POWDER, FOR SOLUTION INTRAVENOUS at 21:36

## 2024-03-15 RX ADMIN — SODIUM CHLORIDE, PRESERVATIVE FREE 10 ML: 5 INJECTION INTRAVENOUS at 21:12

## 2024-03-15 RX ADMIN — SODIUM CHLORIDE 2.5 MG/HR: 9 INJECTION, SOLUTION INTRAVENOUS at 11:43

## 2024-03-15 RX ADMIN — COLLAGENASE SANTYL: 250 OINTMENT TOPICAL at 09:02

## 2024-03-15 RX ADMIN — ALBUMIN (HUMAN) 25 G: 12.5 INJECTION, SOLUTION INTRAVENOUS at 21:02

## 2024-03-15 RX ADMIN — POLYETHYLENE GLYCOL (3350) 17 G: 17 POWDER, FOR SOLUTION ORAL at 08:59

## 2024-03-15 RX ADMIN — ALBUMIN (HUMAN) 25 G: 0.25 INJECTION, SOLUTION INTRAVENOUS at 04:56

## 2024-03-15 RX ADMIN — ALBUMIN (HUMAN) 25 G: 12.5 INJECTION, SOLUTION INTRAVENOUS at 01:54

## 2024-03-15 RX ADMIN — INSULIN LISPRO 4 UNITS: 100 INJECTION, SOLUTION INTRAVENOUS; SUBCUTANEOUS at 12:38

## 2024-03-15 RX ADMIN — SODIUM CHLORIDE, PRESERVATIVE FREE 10 ML: 5 INJECTION INTRAVENOUS at 08:59

## 2024-03-15 RX ADMIN — Medication 7 MG/HR: at 02:32

## 2024-03-15 RX ADMIN — Medication 0.7 MCG/KG/HR: at 13:23

## 2024-03-15 RX ADMIN — Medication 6 MMOL: at 01:35

## 2024-03-15 RX ADMIN — AMIODARONE HYDROCHLORIDE 400 MG: 200 TABLET ORAL at 09:01

## 2024-03-15 RX ADMIN — AMIODARONE HYDROCHLORIDE 400 MG: 200 TABLET ORAL at 21:13

## 2024-03-15 RX ADMIN — CHLORHEXIDINE GLUCONATE 0.12% ORAL RINSE 15 ML: 1.2 LIQUID ORAL at 21:12

## 2024-03-15 RX ADMIN — POLYETHYLENE GLYCOL 400 AND PROPYLENE GLYCOL 2 DROP: 4; 3 SOLUTION/ DROPS OPHTHALMIC at 21:13

## 2024-03-15 RX ADMIN — Medication: at 10:39

## 2024-03-15 ASSESSMENT — PULMONARY FUNCTION TESTS
PIF_VALUE: 16
PIF_VALUE: 18
PIF_VALUE: 16

## 2024-03-15 ASSESSMENT — PAIN SCALES - GENERAL: PAINLEVEL_OUTOF10: 0

## 2024-03-16 ENCOUNTER — APPOINTMENT (OUTPATIENT)
Dept: GENERAL RADIOLOGY | Age: 69
DRG: 003 | End: 2024-03-16
Attending: THORACIC SURGERY (CARDIOTHORACIC VASCULAR SURGERY)
Payer: MEDICARE

## 2024-03-16 PROBLEM — J96.01 ACUTE HYPOXIC RESPIRATORY FAILURE (HCC): Status: ACTIVE | Noted: 2024-03-16

## 2024-03-16 LAB
ALBUMIN SERPL BCG-MCNC: 3.6 G/DL (ref 3.5–5.1)
ALBUMIN SERPL BCG-MCNC: 3.7 G/DL (ref 3.5–5.1)
ALBUMIN SERPL BCG-MCNC: 3.7 G/DL (ref 3.5–5.1)
ALBUMIN SERPL BCG-MCNC: 3.9 G/DL (ref 3.5–5.1)
ALBUMIN SERPL BCG-MCNC: 3.9 G/DL (ref 3.5–5.1)
ALBUMIN SERPL BCG-MCNC: 4 G/DL (ref 3.5–5.1)
ALBUMIN SERPL BCG-MCNC: 4.1 G/DL (ref 3.5–5.1)
ALP SERPL-CCNC: 522 U/L (ref 38–126)
ALP SERPL-CCNC: 540 U/L (ref 38–126)
ALP SERPL-CCNC: 613 U/L (ref 38–126)
ALP SERPL-CCNC: 699 U/L (ref 38–126)
ALP SERPL-CCNC: 703 U/L (ref 38–126)
ALP SERPL-CCNC: 715 U/L (ref 38–126)
ALP SERPL-CCNC: 743 U/L (ref 38–126)
ALT SERPL W/O P-5'-P-CCNC: 6 U/L (ref 11–66)
ALT SERPL W/O P-5'-P-CCNC: 8 U/L (ref 11–66)
ALT SERPL W/O P-5'-P-CCNC: 9 U/L (ref 11–66)
ALT SERPL W/O P-5'-P-CCNC: < 5 U/L (ref 11–66)
ALT SERPL W/O P-5'-P-CCNC: < 5 U/L (ref 11–66)
ANION GAP SERPL CALC-SCNC: 10 MEQ/L (ref 8–16)
ANION GAP SERPL CALC-SCNC: 13 MEQ/L (ref 8–16)
ANION GAP SERPL CALC-SCNC: 7 MEQ/L (ref 8–16)
ANION GAP SERPL CALC-SCNC: 8 MEQ/L (ref 8–16)
ANION GAP SERPL CALC-SCNC: 9 MEQ/L (ref 8–16)
APTT PPP: 56.7 SECONDS (ref 22–38)
APTT PPP: 57.5 SECONDS (ref 22–38)
APTT PPP: 63.4 SECONDS (ref 22–38)
ARTERIAL PATENCY WRIST A: ABNORMAL
ARTERIAL PATENCY WRIST A: NORMAL
AST SERPL-CCNC: 54 U/L (ref 5–40)
AST SERPL-CCNC: 55 U/L (ref 5–40)
AST SERPL-CCNC: 64 U/L (ref 5–40)
AST SERPL-CCNC: 76 U/L (ref 5–40)
AST SERPL-CCNC: 79 U/L (ref 5–40)
AST SERPL-CCNC: 83 U/L (ref 5–40)
AST SERPL-CCNC: 84 U/L (ref 5–40)
BASE EXCESS BLDA CALC-SCNC: -0.1 MMOL/L (ref -2.5–2.5)
BASE EXCESS BLDA CALC-SCNC: -0.1 MMOL/L (ref -2.5–2.5)
BASE EXCESS BLDA CALC-SCNC: -0.3 MMOL/L (ref -2–3)
BASE EXCESS BLDA CALC-SCNC: -0.3 MMOL/L (ref -2–3)
BASE EXCESS BLDA CALC-SCNC: -0.5 MMOL/L (ref -2.5–2.5)
BASE EXCESS BLDA CALC-SCNC: -0.6 MMOL/L (ref -2–3)
BASE EXCESS BLDA CALC-SCNC: -0.9 MMOL/L (ref -2.5–2.5)
BASE EXCESS BLDA CALC-SCNC: -1.3 MMOL/L (ref -2.5–2.5)
BASE EXCESS BLDA CALC-SCNC: -1.4 MMOL/L (ref -2–3)
BASE EXCESS BLDA CALC-SCNC: -2 MMOL/L (ref -2.5–2.5)
BASE EXCESS BLDA CALC-SCNC: -2.4 MMOL/L (ref -2.5–2.5)
BASE EXCESS BLDA CALC-SCNC: -2.8 MMOL/L (ref -2–3)
BASE EXCESS BLDA CALC-SCNC: -3 MMOL/L (ref -2–3)
BASE EXCESS BLDA CALC-SCNC: -3.1 MMOL/L (ref -2.5–2.5)
BASE EXCESS BLDA CALC-SCNC: -3.5 MMOL/L (ref -2–3)
BASE EXCESS BLDA CALC-SCNC: -5.9 MMOL/L (ref -2–3)
BASE EXCESS BLDA CALC-SCNC: 0 MMOL/L (ref -2–3)
BASE EXCESS BLDA CALC-SCNC: 0.2 MMOL/L (ref -2.5–2.5)
BASE EXCESS BLDA CALC-SCNC: 0.5 MMOL/L (ref -2–3)
BASE EXCESS BLDA CALC-SCNC: 0.7 MMOL/L (ref -2.5–2.5)
BASE EXCESS BLDA CALC-SCNC: 1.4 MMOL/L (ref -2.5–2.5)
BDY SITE: ABNORMAL
BDY SITE: NORMAL
BILIRUB SERPL-MCNC: 3.2 MG/DL (ref 0.3–1.2)
BILIRUB SERPL-MCNC: 3.3 MG/DL (ref 0.3–1.2)
BILIRUB SERPL-MCNC: 3.5 MG/DL (ref 0.3–1.2)
BILIRUB SERPL-MCNC: 3.7 MG/DL (ref 0.3–1.2)
BILIRUB SERPL-MCNC: 3.9 MG/DL (ref 0.3–1.2)
BILIRUB SERPL-MCNC: 4 MG/DL (ref 0.3–1.2)
BILIRUB SERPL-MCNC: 4.4 MG/DL (ref 0.3–1.2)
BREATHS SETTING VENT: 12 BPM
BUN SERPL-MCNC: 28 MG/DL (ref 7–22)
BUN SERPL-MCNC: 29 MG/DL (ref 7–22)
BUN SERPL-MCNC: 29 MG/DL (ref 7–22)
BUN SERPL-MCNC: 30 MG/DL (ref 7–22)
BUN SERPL-MCNC: 30 MG/DL (ref 7–22)
BUN SERPL-MCNC: 31 MG/DL (ref 7–22)
BUN SERPL-MCNC: 33 MG/DL (ref 7–22)
CA-I BLD ISE-SCNC: 1.28 MMOL/L (ref 1.12–1.32)
CA-I BLD ISE-SCNC: 1.29 MMOL/L (ref 1.12–1.32)
CA-I BLD ISE-SCNC: 1.3 MMOL/L (ref 1.12–1.32)
CA-I BLD ISE-SCNC: 1.31 MMOL/L (ref 1.12–1.32)
CA-I BLD ISE-SCNC: 1.33 MMOL/L (ref 1.12–1.32)
CALCIUM SERPL-MCNC: 9.6 MG/DL (ref 8.5–10.5)
CALCIUM SERPL-MCNC: 9.6 MG/DL (ref 8.5–10.5)
CALCIUM SERPL-MCNC: 9.7 MG/DL (ref 8.5–10.5)
CALCIUM SERPL-MCNC: 9.7 MG/DL (ref 8.5–10.5)
CALCIUM SERPL-MCNC: 9.8 MG/DL (ref 8.5–10.5)
CALCIUM SERPL-MCNC: 9.8 MG/DL (ref 8.5–10.5)
CALCIUM SERPL-MCNC: 9.9 MG/DL (ref 8.5–10.5)
CHLORIDE SERPL-SCNC: 101 MEQ/L (ref 98–111)
CHLORIDE SERPL-SCNC: 102 MEQ/L (ref 98–111)
CHLORIDE SERPL-SCNC: 103 MEQ/L (ref 98–111)
CHLORIDE SERPL-SCNC: 103 MEQ/L (ref 98–111)
CHLORIDE SERPL-SCNC: 104 MEQ/L (ref 98–111)
CO2 SERPL-SCNC: 21 MEQ/L (ref 23–33)
CO2 SERPL-SCNC: 23 MEQ/L (ref 23–33)
CO2 SERPL-SCNC: 23 MEQ/L (ref 23–33)
CO2 SERPL-SCNC: 24 MEQ/L (ref 23–33)
CO2 SERPL-SCNC: 24 MEQ/L (ref 23–33)
CO2 SERPL-SCNC: 25 MEQ/L (ref 23–33)
CO2 SERPL-SCNC: 26 MEQ/L (ref 23–33)
COLLECTED BY:: ABNORMAL
COLLECTED BY:: NORMAL
CREAT SERPL-MCNC: 0.8 MG/DL (ref 0.4–1.2)
CREAT SERPL-MCNC: 0.9 MG/DL (ref 0.4–1.2)
CREAT SERPL-MCNC: 0.9 MG/DL (ref 0.4–1.2)
CREAT SERPL-MCNC: 1 MG/DL (ref 0.4–1.2)
CREAT SERPL-MCNC: 1.1 MG/DL (ref 0.4–1.2)
DEPRECATED RDW RBC AUTO: 56.1 FL (ref 35–45)
DEPRECATED RDW RBC AUTO: 57.2 FL (ref 35–45)
DEPRECATED RDW RBC AUTO: 57.3 FL (ref 35–45)
DEPRECATED RDW RBC AUTO: 57.5 FL (ref 35–45)
DEPRECATED RDW RBC AUTO: 58 FL (ref 35–45)
DEPRECATED RDW RBC AUTO: 60.1 FL (ref 35–45)
DEVICE: ABNORMAL
DEVICE: NORMAL
DEVICE: NORMAL
ERYTHROCYTE [DISTWIDTH] IN BLOOD BY AUTOMATED COUNT: 18.9 % (ref 11.5–14.5)
ERYTHROCYTE [DISTWIDTH] IN BLOOD BY AUTOMATED COUNT: 19 % (ref 11.5–14.5)
ERYTHROCYTE [DISTWIDTH] IN BLOOD BY AUTOMATED COUNT: 19.2 % (ref 11.5–14.5)
ERYTHROCYTE [DISTWIDTH] IN BLOOD BY AUTOMATED COUNT: 19.8 % (ref 11.5–14.5)
FIBRINOGEN PPP-MCNC: 381 MG/100ML (ref 155–475)
FIBRINOGEN PPP-MCNC: 401 MG/100ML (ref 155–475)
FIO2 ON VENT O2 ANALYZER: 30 %
FIO2 ON VENT O2 ANALYZER: 40 %
FIO2 ON VENT O2 ANALYZER: 50 %
FIO2 ON VENT O2 ANALYZER: 60 %
GFR SERPL CREATININE-BSD FRML MDRD: > 60 ML/MIN/1.73M2
GLUCOSE BLD-MCNC: 217 MG/DL (ref 70–108)
GLUCOSE BLD-MCNC: 223 MG/DL (ref 70–108)
GLUCOSE BLD-MCNC: 226 MG/DL (ref 70–108)
GLUCOSE BLD-MCNC: 231 MG/DL (ref 70–108)
GLUCOSE BLD-MCNC: 252 MG/DL (ref 70–108)
GLUCOSE BLD-MCNC: 262 MG/DL (ref 70–108)
GLUCOSE BLD-MCNC: 262 MG/DL (ref 70–108)
GLUCOSE SERPL-MCNC: 204 MG/DL (ref 70–108)
GLUCOSE SERPL-MCNC: 213 MG/DL (ref 70–108)
GLUCOSE SERPL-MCNC: 217 MG/DL (ref 70–108)
GLUCOSE SERPL-MCNC: 223 MG/DL (ref 70–108)
GLUCOSE SERPL-MCNC: 225 MG/DL (ref 70–108)
GLUCOSE SERPL-MCNC: 234 MG/DL (ref 70–108)
GLUCOSE SERPL-MCNC: 245 MG/DL (ref 70–108)
HCO3 BLDA-SCNC: 20 MMOL/L (ref 23–28)
HCO3 BLDA-SCNC: 22 MMOL/L (ref 23–28)
HCO3 BLDA-SCNC: 23 MMOL/L (ref 23–28)
HCO3 BLDA-SCNC: 24 MMOL/L (ref 23–28)
HCO3 BLDA-SCNC: 24 MMOL/L (ref 23–28)
HCO3 BLDA-SCNC: 25 MMOL/L (ref 23–28)
HCO3 BLDA-SCNC: 26 MMOL/L (ref 23–28)
HCO3 BLDA-SCNC: 27 MMOL/L (ref 23–28)
HCO3 BLDA-SCNC: 27 MMOL/L (ref 23–28)
HCT VFR BLD AUTO: 26.5 % (ref 42–52)
HCT VFR BLD AUTO: 26.5 % (ref 42–52)
HCT VFR BLD AUTO: 26.7 % (ref 42–52)
HCT VFR BLD AUTO: 26.8 % (ref 42–52)
HCT VFR BLD AUTO: 27.5 % (ref 42–52)
HCT VFR BLD AUTO: 27.7 % (ref 42–52)
HCT VFR BLD AUTO: 28.1 % (ref 42–52)
HCT VFR BLD AUTO: 29.7 % (ref 42–52)
HGB BLD-MCNC: 10.3 GM/DL (ref 14–18)
HGB BLD-MCNC: 8.7 GM/DL (ref 14–18)
HGB BLD-MCNC: 8.9 GM/DL (ref 14–18)
HGB BLD-MCNC: 9 GM/DL (ref 14–18)
HGB BLD-MCNC: 9.1 GM/DL (ref 14–18)
HGB BLD-MCNC: 9.1 GM/DL (ref 14–18)
HGB BLD-MCNC: 9.3 GM/DL (ref 14–18)
HGB BLD-MCNC: 9.4 GM/DL (ref 14–18)
LACTATE SERPL-SCNC: 0.9 MMOL/L (ref 0.5–2)
LACTATE SERPL-SCNC: 1 MMOL/L (ref 0.5–2)
MAGNESIUM SERPL-MCNC: 2.6 MG/DL (ref 1.6–2.4)
MAGNESIUM SERPL-MCNC: 2.7 MG/DL (ref 1.6–2.4)
MAGNESIUM SERPL-MCNC: 2.8 MG/DL (ref 1.6–2.4)
MAGNESIUM SERPL-MCNC: 2.8 MG/DL (ref 1.6–2.4)
MCH RBC QN AUTO: 28.9 PG (ref 26–33)
MCH RBC QN AUTO: 29.1 PG (ref 26–33)
MCH RBC QN AUTO: 29.3 PG (ref 26–33)
MCH RBC QN AUTO: 29.3 PG (ref 26–33)
MCH RBC QN AUTO: 29.4 PG (ref 26–33)
MCH RBC QN AUTO: 29.6 PG (ref 26–33)
MCHC RBC AUTO-ENTMCNC: 32.9 GM/DL (ref 32.2–35.5)
MCHC RBC AUTO-ENTMCNC: 33.5 GM/DL (ref 32.2–35.5)
MCHC RBC AUTO-ENTMCNC: 33.6 GM/DL (ref 32.2–35.5)
MCHC RBC AUTO-ENTMCNC: 33.7 GM/DL (ref 32.2–35.5)
MCHC RBC AUTO-ENTMCNC: 33.8 GM/DL (ref 32.2–35.5)
MCHC RBC AUTO-ENTMCNC: 34 GM/DL (ref 32.2–35.5)
MCV RBC AUTO: 86.2 FL (ref 80–94)
MCV RBC AUTO: 86.6 FL (ref 80–94)
MCV RBC AUTO: 87.3 FL (ref 80–94)
MCV RBC AUTO: 87.5 FL (ref 80–94)
MCV RBC AUTO: 87.6 FL (ref 80–94)
MCV RBC AUTO: 87.9 FL (ref 80–94)
PCO2 BLDA: 39 MMHG (ref 35–45)
PCO2 BLDA: 39 MMHG (ref 35–45)
PCO2 BLDA: 41 MMHG (ref 35–45)
PCO2 BLDA: 42 MMHG (ref 35–45)
PCO2 BLDA: 43 MMHG (ref 35–45)
PCO2 BLDA: 44 MMHG (ref 35–45)
PCO2 BLDA: 44 MMHG (ref 35–45)
PCO2 BLDA: 45 MMHG (ref 35–45)
PCO2 TEMP ADJ BLDMV: 37 MMHG (ref 41–51)
PCO2 TEMP ADJ BLDMV: 39 MMHG (ref 41–51)
PCO2 TEMP ADJ BLDMV: 41 MMHG (ref 41–51)
PCO2 TEMP ADJ BLDMV: 44 MMHG (ref 41–51)
PCO2 TEMP ADJ BLDMV: 46 MMHG (ref 41–51)
PCO2 TEMP ADJ BLDMV: 46 MMHG (ref 41–51)
PCO2 TEMP ADJ BLDMV: 47 MMHG (ref 41–51)
PCO2 TEMP ADJ BLDMV: 49 MMHG (ref 41–51)
PEEP SETTING VENT: 6 MMHG
PH BLDA: 7.35 [PH] (ref 7.35–7.45)
PH BLDA: 7.36 [PH] (ref 7.35–7.45)
PH BLDA: 7.36 [PH] (ref 7.35–7.45)
PH BLDA: 7.37 [PH] (ref 7.35–7.45)
PH BLDA: 7.38 [PH] (ref 7.35–7.45)
PH BLDA: 7.38 [PH] (ref 7.35–7.45)
PH BLDA: 7.39 [PH] (ref 7.35–7.45)
PH BLDA: 7.4 [PH] (ref 7.35–7.45)
PH BLDMV: 7.33 [PH] (ref 7.31–7.41)
PH BLDMV: 7.33 [PH] (ref 7.31–7.41)
PH BLDMV: 7.34 [PH] (ref 7.31–7.41)
PH BLDMV: 7.35 [PH] (ref 7.31–7.41)
PH BLDMV: 7.36 [PH] (ref 7.31–7.41)
PHOSPHATE SERPL-MCNC: 2.1 MG/DL (ref 2.4–4.7)
PHOSPHATE SERPL-MCNC: 2.1 MG/DL (ref 2.4–4.7)
PHOSPHATE SERPL-MCNC: 2.3 MG/DL (ref 2.4–4.7)
PHOSPHATE SERPL-MCNC: 2.4 MG/DL (ref 2.4–4.7)
PHOSPHATE SERPL-MCNC: 2.6 MG/DL (ref 2.4–4.7)
PIP: 18 CMH2O
PIP: 20 CMH2O
PIP: 22 CMH2O
PIP: 22 CMH2O
PLATELET # BLD AUTO: 70 THOU/MM3 (ref 130–400)
PLATELET # BLD AUTO: 74 THOU/MM3 (ref 130–400)
PLATELET # BLD AUTO: 78 THOU/MM3 (ref 130–400)
PLATELET # BLD AUTO: 78 THOU/MM3 (ref 130–400)
PLATELET # BLD AUTO: 91 THOU/MM3 (ref 130–400)
PLATELET # BLD AUTO: 96 THOU/MM3 (ref 130–400)
PMV BLD AUTO: 10.8 FL (ref 9.4–12.4)
PMV BLD AUTO: 10.8 FL (ref 9.4–12.4)
PMV BLD AUTO: 11 FL (ref 9.4–12.4)
PMV BLD AUTO: 11 FL (ref 9.4–12.4)
PMV BLD AUTO: 11.3 FL (ref 9.4–12.4)
PMV BLD AUTO: 11.5 FL (ref 9.4–12.4)
PO2 BLDA: 107 MMHG (ref 71–104)
PO2 BLDA: 119 MMHG (ref 71–104)
PO2 BLDA: 128 MMHG (ref 71–104)
PO2 BLDA: 163 MMHG (ref 71–104)
PO2 BLDA: 191 MMHG (ref 71–104)
PO2 BLDA: 207 MMHG (ref 71–104)
PO2 BLDA: 229 MMHG (ref 71–104)
PO2 BLDA: 232 MMHG (ref 71–104)
PO2 BLDA: 52 MMHG (ref 71–104)
PO2 BLDA: 85 MMHG (ref 71–104)
PO2 BLDA: 93 MMHG (ref 71–104)
PO2 BLDMV: 31 MMHG (ref 25–40)
PO2 BLDMV: 33 MMHG (ref 25–40)
PO2 BLDMV: 35 MMHG (ref 25–40)
PO2 BLDMV: 35 MMHG (ref 25–40)
PO2 BLDMV: 36 MMHG (ref 25–40)
PO2 BLDMV: 36 MMHG (ref 25–40)
PO2 BLDMV: 40 MMHG (ref 25–40)
POTASSIUM SERPL-SCNC: 4.5 MEQ/L (ref 3.5–5.2)
POTASSIUM SERPL-SCNC: 4.6 MEQ/L (ref 3.5–5.2)
POTASSIUM SERPL-SCNC: 4.7 MEQ/L (ref 3.5–5.2)
POTASSIUM SERPL-SCNC: 4.8 MEQ/L (ref 3.5–5.2)
POTASSIUM SERPL-SCNC: 4.9 MEQ/L (ref 3.5–5.2)
PRESSURE SUPPORT SETTING VENT: 12 CMH2O
PRESSURE SUPPORT SETTING VENT: 16 CMH2O
PRESSURE SUPPORT SETTING VENT: 16 CMH2O
PROT SERPL-MCNC: 5 G/DL (ref 6.1–8)
PROT SERPL-MCNC: 5.1 G/DL (ref 6.1–8)
PROT SERPL-MCNC: 5.1 G/DL (ref 6.1–8)
PROT SERPL-MCNC: 5.2 G/DL (ref 6.1–8)
PROT SERPL-MCNC: 5.2 G/DL (ref 6.1–8)
PROT SERPL-MCNC: 5.3 G/DL (ref 6.1–8)
PROT SERPL-MCNC: 5.3 G/DL (ref 6.1–8)
RBC # BLD AUTO: 3.06 MILL/MM3 (ref 4.7–6.1)
RBC # BLD AUTO: 3.06 MILL/MM3 (ref 4.7–6.1)
RBC # BLD AUTO: 3.11 MILL/MM3 (ref 4.7–6.1)
RBC # BLD AUTO: 3.14 MILL/MM3 (ref 4.7–6.1)
RBC # BLD AUTO: 3.15 MILL/MM3 (ref 4.7–6.1)
RBC # BLD AUTO: 3.21 MILL/MM3 (ref 4.7–6.1)
SAO2 % BLDA: 100 %
SAO2 % BLDA: 85 %
SAO2 % BLDA: 96 %
SAO2 % BLDA: 97 %
SAO2 % BLDA: 98 %
SAO2 % BLDA: 99 %
SAO2 % BLDMV: 55 %
SAO2 % BLDMV: 59 %
SAO2 % BLDMV: 61 %
SAO2 % BLDMV: 64 %
SAO2 % BLDMV: 65 %
SAO2 % BLDMV: 72 %
SET PEEP: 6 MMHG
SET RESPIRATORY RATE: 12 BPM
SITE: ABNORMAL
SITE: NORMAL
SODIUM SERPL-SCNC: 135 MEQ/L (ref 135–145)
SODIUM SERPL-SCNC: 136 MEQ/L (ref 135–145)
SODIUM SERPL-SCNC: 137 MEQ/L (ref 135–145)
VENTILATION MODE VENT: ABNORMAL
VENTILATION MODE VENT: NORMAL
VENTILATION MODE VENT: NORMAL
WBC # BLD AUTO: 10.1 THOU/MM3 (ref 4.8–10.8)
WBC # BLD AUTO: 10.3 THOU/MM3 (ref 4.8–10.8)
WBC # BLD AUTO: 10.6 THOU/MM3 (ref 4.8–10.8)
WBC # BLD AUTO: 10.9 THOU/MM3 (ref 4.8–10.8)
WBC # BLD AUTO: 13.4 THOU/MM3 (ref 4.8–10.8)
WBC # BLD AUTO: 13.8 THOU/MM3 (ref 4.8–10.8)

## 2024-03-16 PROCEDURE — 2000000000 HC ICU R&B

## 2024-03-16 PROCEDURE — 2580000003 HC RX 258: Performed by: PHYSICIAN ASSISTANT

## 2024-03-16 PROCEDURE — 82947 ASSAY GLUCOSE BLOOD QUANT: CPT

## 2024-03-16 PROCEDURE — 2580000003 HC RX 258: Performed by: INTERNAL MEDICINE

## 2024-03-16 PROCEDURE — 6370000000 HC RX 637 (ALT 250 FOR IP): Performed by: INTERNAL MEDICINE

## 2024-03-16 PROCEDURE — 71045 X-RAY EXAM CHEST 1 VIEW: CPT

## 2024-03-16 PROCEDURE — 99291 CRITICAL CARE FIRST HOUR: CPT | Performed by: INTERNAL MEDICINE

## 2024-03-16 PROCEDURE — 94003 VENT MGMT INPAT SUBQ DAY: CPT

## 2024-03-16 PROCEDURE — 82330 ASSAY OF CALCIUM: CPT

## 2024-03-16 PROCEDURE — 37799 UNLISTED PX VASCULAR SURGERY: CPT

## 2024-03-16 PROCEDURE — 2580000003 HC RX 258: Performed by: NURSE PRACTITIONER

## 2024-03-16 PROCEDURE — 85014 HEMATOCRIT: CPT

## 2024-03-16 PROCEDURE — 6370000000 HC RX 637 (ALT 250 FOR IP)

## 2024-03-16 PROCEDURE — 6360000002 HC RX W HCPCS: Performed by: NURSE PRACTITIONER

## 2024-03-16 PROCEDURE — 6370000000 HC RX 637 (ALT 250 FOR IP): Performed by: STUDENT IN AN ORGANIZED HEALTH CARE EDUCATION/TRAINING PROGRAM

## 2024-03-16 PROCEDURE — 85384 FIBRINOGEN ACTIVITY: CPT

## 2024-03-16 PROCEDURE — 83735 ASSAY OF MAGNESIUM: CPT

## 2024-03-16 PROCEDURE — 33949 ECMO/ECLS DAILY MGMT ARTERY: CPT | Performed by: INTERNAL MEDICINE

## 2024-03-16 PROCEDURE — 2500000003 HC RX 250 WO HCPCS: Performed by: INTERNAL MEDICINE

## 2024-03-16 PROCEDURE — 6370000000 HC RX 637 (ALT 250 FOR IP): Performed by: PHYSICIAN ASSISTANT

## 2024-03-16 PROCEDURE — 36415 COLL VENOUS BLD VENIPUNCTURE: CPT

## 2024-03-16 PROCEDURE — 80053 COMPREHEN METABOLIC PANEL: CPT

## 2024-03-16 PROCEDURE — 85018 HEMOGLOBIN: CPT

## 2024-03-16 PROCEDURE — 85730 THROMBOPLASTIN TIME PARTIAL: CPT

## 2024-03-16 PROCEDURE — 84100 ASSAY OF PHOSPHORUS: CPT

## 2024-03-16 PROCEDURE — C9113 INJ PANTOPRAZOLE SODIUM, VIA: HCPCS | Performed by: INTERNAL MEDICINE

## 2024-03-16 PROCEDURE — 6360000002 HC RX W HCPCS: Performed by: INTERNAL MEDICINE

## 2024-03-16 PROCEDURE — 36430 TRANSFUSION BLD/BLD COMPNT: CPT

## 2024-03-16 PROCEDURE — 2700000000 HC OXYGEN THERAPY PER DAY

## 2024-03-16 PROCEDURE — 99233 SBSQ HOSP IP/OBS HIGH 50: CPT | Performed by: INTERNAL MEDICINE

## 2024-03-16 PROCEDURE — 82803 BLOOD GASES ANY COMBINATION: CPT

## 2024-03-16 PROCEDURE — 94761 N-INVAS EAR/PLS OXIMETRY MLT: CPT

## 2024-03-16 PROCEDURE — 2500000003 HC RX 250 WO HCPCS: Performed by: STUDENT IN AN ORGANIZED HEALTH CARE EDUCATION/TRAINING PROGRAM

## 2024-03-16 PROCEDURE — 85027 COMPLETE CBC AUTOMATED: CPT

## 2024-03-16 PROCEDURE — 83605 ASSAY OF LACTIC ACID: CPT

## 2024-03-16 PROCEDURE — 6370000000 HC RX 637 (ALT 250 FOR IP): Performed by: NURSE PRACTITIONER

## 2024-03-16 RX ORDER — INSULIN LISPRO 100 [IU]/ML
0-16 INJECTION, SOLUTION INTRAVENOUS; SUBCUTANEOUS EVERY 4 HOURS
Status: DISCONTINUED | OUTPATIENT
Start: 2024-03-16 | End: 2024-03-31

## 2024-03-16 RX ADMIN — Medication: at 15:08

## 2024-03-16 RX ADMIN — CHLORHEXIDINE GLUCONATE 0.12% ORAL RINSE 15 ML: 1.2 LIQUID ORAL at 08:29

## 2024-03-16 RX ADMIN — PANTOPRAZOLE SODIUM 40 MG: 40 INJECTION, POWDER, FOR SOLUTION INTRAVENOUS at 08:29

## 2024-03-16 RX ADMIN — INSULIN LISPRO 8 UNITS: 100 INJECTION, SOLUTION INTRAVENOUS; SUBCUTANEOUS at 06:56

## 2024-03-16 RX ADMIN — Medication: at 09:37

## 2024-03-16 RX ADMIN — Medication: at 01:09

## 2024-03-16 RX ADMIN — Medication 1 MCG/KG/HR: at 08:03

## 2024-03-16 RX ADMIN — AMIODARONE HYDROCHLORIDE 400 MG: 200 TABLET ORAL at 20:17

## 2024-03-16 RX ADMIN — Medication 1 MCG/KG/HR: at 13:55

## 2024-03-16 RX ADMIN — Medication 150 MCG/HR: at 06:20

## 2024-03-16 RX ADMIN — Medication 0.3 MCG/KG/HR: at 01:04

## 2024-03-16 RX ADMIN — SENNOSIDES 8.8 MG: 8.8 LIQUID ORAL at 08:29

## 2024-03-16 RX ADMIN — Medication: at 04:52

## 2024-03-16 RX ADMIN — Medication: at 01:11

## 2024-03-16 RX ADMIN — POLYETHYLENE GLYCOL 400 AND PROPYLENE GLYCOL 2 DROP: 4; 3 SOLUTION/ DROPS OPHTHALMIC at 21:29

## 2024-03-16 RX ADMIN — INSULIN LISPRO 4 UNITS: 100 INJECTION, SOLUTION INTRAVENOUS; SUBCUTANEOUS at 19:01

## 2024-03-16 RX ADMIN — Medication 1 MCG/KG/HR: at 19:40

## 2024-03-16 RX ADMIN — Medication 6 MMOL: at 07:38

## 2024-03-16 RX ADMIN — Medication: at 15:05

## 2024-03-16 RX ADMIN — CHLORHEXIDINE GLUCONATE 0.12% ORAL RINSE 15 ML: 1.2 LIQUID ORAL at 20:16

## 2024-03-16 RX ADMIN — SENNOSIDES 8.8 MG: 8.8 LIQUID ORAL at 20:16

## 2024-03-16 RX ADMIN — INSULIN LISPRO 4 UNITS: 100 INJECTION, SOLUTION INTRAVENOUS; SUBCUTANEOUS at 08:29

## 2024-03-16 RX ADMIN — Medication 1 TABLET: at 09:20

## 2024-03-16 RX ADMIN — SODIUM CHLORIDE 7.5 MG/HR: 9 INJECTION, SOLUTION INTRAVENOUS at 04:20

## 2024-03-16 RX ADMIN — ATORVASTATIN CALCIUM 40 MG: 40 TABLET, FILM COATED ORAL at 20:17

## 2024-03-16 RX ADMIN — Medication 130 MCG/HR: at 13:20

## 2024-03-16 RX ADMIN — MIDAZOLAM 2 MG: 1 INJECTION INTRAMUSCULAR; INTRAVENOUS at 01:02

## 2024-03-16 RX ADMIN — POLYETHYLENE GLYCOL (3350) 17 G: 17 POWDER, FOR SOLUTION ORAL at 08:29

## 2024-03-16 RX ADMIN — AMIODARONE HYDROCHLORIDE 400 MG: 200 TABLET ORAL at 09:20

## 2024-03-16 RX ADMIN — INSULIN LISPRO 4 UNITS: 100 INJECTION, SOLUTION INTRAVENOUS; SUBCUTANEOUS at 20:16

## 2024-03-16 RX ADMIN — Medication 10 MG/HR: at 13:19

## 2024-03-16 RX ADMIN — SODIUM CHLORIDE, PRESERVATIVE FREE 10 ML: 5 INJECTION INTRAVENOUS at 08:29

## 2024-03-16 RX ADMIN — Medication 10 MG/HR: at 23:58

## 2024-03-16 RX ADMIN — INSULIN LISPRO 4 UNITS: 100 INJECTION, SOLUTION INTRAVENOUS; SUBCUTANEOUS at 13:59

## 2024-03-16 RX ADMIN — Medication: at 20:05

## 2024-03-16 RX ADMIN — Medication 130 MCG/HR: at 21:01

## 2024-03-16 RX ADMIN — INSULIN LISPRO 8 UNITS: 100 INJECTION, SOLUTION INTRAVENOUS; SUBCUTANEOUS at 00:35

## 2024-03-16 RX ADMIN — SODIUM CHLORIDE 7.5 MG/HR: 9 INJECTION, SOLUTION INTRAVENOUS at 00:38

## 2024-03-16 RX ADMIN — Medication: at 09:38

## 2024-03-16 RX ADMIN — SODIUM CHLORIDE, PRESERVATIVE FREE 10 ML: 5 INJECTION INTRAVENOUS at 20:17

## 2024-03-16 RX ADMIN — PANTOPRAZOLE SODIUM 40 MG: 40 INJECTION, POWDER, FOR SOLUTION INTRAVENOUS at 20:16

## 2024-03-16 RX ADMIN — Medication 6 MMOL: at 22:47

## 2024-03-16 ASSESSMENT — PAIN SCALES - GENERAL: PAINLEVEL_OUTOF10: 0

## 2024-03-16 ASSESSMENT — PULMONARY FUNCTION TESTS
PIF_VALUE: 18
PIF_VALUE: 16
PIF_VALUE: 14
PIF_VALUE: 15
PIF_VALUE: 15

## 2024-03-17 ENCOUNTER — APPOINTMENT (OUTPATIENT)
Dept: GENERAL RADIOLOGY | Age: 69
DRG: 003 | End: 2024-03-17
Attending: THORACIC SURGERY (CARDIOTHORACIC VASCULAR SURGERY)
Payer: MEDICARE

## 2024-03-17 LAB
ABO: NORMAL
ALBUMIN SERPL BCG-MCNC: 3.6 G/DL (ref 3.5–5.1)
ALBUMIN SERPL BCG-MCNC: 3.7 G/DL (ref 3.5–5.1)
ALBUMIN SERPL BCG-MCNC: 3.7 G/DL (ref 3.5–5.1)
ALBUMIN SERPL BCG-MCNC: 3.8 G/DL (ref 3.5–5.1)
ALBUMIN SERPL BCG-MCNC: 3.8 G/DL (ref 3.5–5.1)
ALP SERPL-CCNC: 602 U/L (ref 38–126)
ALP SERPL-CCNC: 644 U/L (ref 38–126)
ALP SERPL-CCNC: 668 U/L (ref 38–126)
ALP SERPL-CCNC: 697 U/L (ref 38–126)
ALP SERPL-CCNC: 709 U/L (ref 38–126)
ALT SERPL W/O P-5'-P-CCNC: 11 U/L (ref 11–66)
ALT SERPL W/O P-5'-P-CCNC: 11 U/L (ref 11–66)
ALT SERPL W/O P-5'-P-CCNC: 12 U/L (ref 11–66)
ANION GAP SERPL CALC-SCNC: 10 MEQ/L (ref 8–16)
ANION GAP SERPL CALC-SCNC: 11 MEQ/L (ref 8–16)
ANION GAP SERPL CALC-SCNC: 11 MEQ/L (ref 8–16)
ANION GAP SERPL CALC-SCNC: 8 MEQ/L (ref 8–16)
ANION GAP SERPL CALC-SCNC: 8 MEQ/L (ref 8–16)
ANTIBODY SCREEN: NORMAL
APTT PPP: 60.5 SECONDS (ref 22–38)
APTT PPP: 63.6 SECONDS (ref 22–38)
APTT PPP: 63.7 SECONDS (ref 22–38)
APTT PPP: 69.8 SECONDS (ref 22–38)
ARTERIAL PATENCY WRIST A: ABNORMAL
AST SERPL-CCNC: 66 U/L (ref 5–40)
AST SERPL-CCNC: 67 U/L (ref 5–40)
AST SERPL-CCNC: 80 U/L (ref 5–40)
AST SERPL-CCNC: 80 U/L (ref 5–40)
AST SERPL-CCNC: 83 U/L (ref 5–40)
BASE EXCESS BLDA CALC-SCNC: -0.2 MMOL/L (ref -2.5–2.5)
BASE EXCESS BLDA CALC-SCNC: -0.5 MMOL/L (ref -2–3)
BASE EXCESS BLDA CALC-SCNC: -0.8 MMOL/L (ref -2–3)
BASE EXCESS BLDA CALC-SCNC: -0.9 MMOL/L (ref -2.5–2.5)
BASE EXCESS BLDA CALC-SCNC: -1.1 MMOL/L (ref -2.5–2.5)
BASE EXCESS BLDA CALC-SCNC: -1.1 MMOL/L (ref -2.5–2.5)
BASE EXCESS BLDA CALC-SCNC: -1.4 MMOL/L (ref -2.5–2.5)
BASE EXCESS BLDA CALC-SCNC: -1.8 MMOL/L (ref -2.5–2.5)
BASE EXCESS BLDA CALC-SCNC: -2.1 MMOL/L (ref -2.5–2.5)
BASE EXCESS BLDA CALC-SCNC: -2.2 MMOL/L (ref -2–3)
BASE EXCESS BLDA CALC-SCNC: -2.2 MMOL/L (ref -2–3)
BASE EXCESS BLDA CALC-SCNC: -2.3 MMOL/L (ref -2–3)
BASE EXCESS BLDA CALC-SCNC: -2.9 MMOL/L (ref -2–3)
BASE EXCESS BLDA CALC-SCNC: -4.9 MMOL/L (ref -2–3)
BASE EXCESS BLDA CALC-SCNC: -5 MMOL/L (ref -2–3)
BASE EXCESS BLDA CALC-SCNC: 0.1 MMOL/L (ref -2.5–2.5)
BDY SITE: ABNORMAL
BILIRUB SERPL-MCNC: 3.2 MG/DL (ref 0.3–1.2)
BILIRUB SERPL-MCNC: 3.2 MG/DL (ref 0.3–1.2)
BILIRUB SERPL-MCNC: 4 MG/DL (ref 0.3–1.2)
BILIRUB SERPL-MCNC: 4.1 MG/DL (ref 0.3–1.2)
BILIRUB SERPL-MCNC: 4.1 MG/DL (ref 0.3–1.2)
BREATHS SETTING VENT: 12 BPM
BUN SERPL-MCNC: 27 MG/DL (ref 7–22)
BUN SERPL-MCNC: 28 MG/DL (ref 7–22)
CA-I BLD ISE-SCNC: 1.3 MMOL/L (ref 1.12–1.32)
CA-I BLD ISE-SCNC: 1.33 MMOL/L (ref 1.12–1.32)
CA-I BLD ISE-SCNC: 1.34 MMOL/L (ref 1.12–1.32)
CA-I BLD ISE-SCNC: 1.35 MMOL/L (ref 1.12–1.32)
CA-I BLD ISE-SCNC: 1.39 MMOL/L (ref 1.12–1.32)
CALCIUM SERPL-MCNC: 9.6 MG/DL (ref 8.5–10.5)
CALCIUM SERPL-MCNC: 9.6 MG/DL (ref 8.5–10.5)
CALCIUM SERPL-MCNC: 9.7 MG/DL (ref 8.5–10.5)
CALCIUM SERPL-MCNC: 9.7 MG/DL (ref 8.5–10.5)
CALCIUM SERPL-MCNC: 9.8 MG/DL (ref 8.5–10.5)
CHLORIDE SERPL-SCNC: 101 MEQ/L (ref 98–111)
CHLORIDE SERPL-SCNC: 103 MEQ/L (ref 98–111)
CHLORIDE SERPL-SCNC: 103 MEQ/L (ref 98–111)
CHLORIDE SERPL-SCNC: 105 MEQ/L (ref 98–111)
CHLORIDE SERPL-SCNC: 105 MEQ/L (ref 98–111)
CO2 SERPL-SCNC: 22 MEQ/L (ref 23–33)
CO2 SERPL-SCNC: 22 MEQ/L (ref 23–33)
CO2 SERPL-SCNC: 23 MEQ/L (ref 23–33)
CO2 SERPL-SCNC: 24 MEQ/L (ref 23–33)
CO2 SERPL-SCNC: 24 MEQ/L (ref 23–33)
COLLECTED BY:: ABNORMAL
COLLECTED BY:: NORMAL
COLLECTED BY:: NORMAL
CREAT SERPL-MCNC: 0.7 MG/DL (ref 0.4–1.2)
CREAT SERPL-MCNC: 0.8 MG/DL (ref 0.4–1.2)
DEPRECATED RDW RBC AUTO: 58.4 FL (ref 35–45)
DEPRECATED RDW RBC AUTO: 59.8 FL (ref 35–45)
DEPRECATED RDW RBC AUTO: 60.3 FL (ref 35–45)
DEPRECATED RDW RBC AUTO: 60.4 FL (ref 35–45)
DEPRECATED RDW RBC AUTO: 60.5 FL (ref 35–45)
DEPRECATED RDW RBC AUTO: 61.1 FL (ref 35–45)
DEVICE: ABNORMAL
DEVICE: NORMAL
DEVICE: NORMAL
ERYTHROCYTE [DISTWIDTH] IN BLOOD BY AUTOMATED COUNT: 19 % (ref 11.5–14.5)
ERYTHROCYTE [DISTWIDTH] IN BLOOD BY AUTOMATED COUNT: 19.1 % (ref 11.5–14.5)
ERYTHROCYTE [DISTWIDTH] IN BLOOD BY AUTOMATED COUNT: 19.1 % (ref 11.5–14.5)
ERYTHROCYTE [DISTWIDTH] IN BLOOD BY AUTOMATED COUNT: 19.2 % (ref 11.5–14.5)
ERYTHROCYTE [DISTWIDTH] IN BLOOD BY AUTOMATED COUNT: 19.2 % (ref 11.5–14.5)
ERYTHROCYTE [DISTWIDTH] IN BLOOD BY AUTOMATED COUNT: 19.4 % (ref 11.5–14.5)
FIBRINOGEN PPP-MCNC: 320 MG/100ML (ref 155–475)
FIBRINOGEN PPP-MCNC: 394 MG/100ML (ref 155–475)
FIO2 ON VENT O2 ANALYZER: 30 %
FIO2 ON VENT O2 ANALYZER: 40 %
FIO2 ON VENT O2 ANALYZER: 40 %
FIO2 ON VENT O2 ANALYZER: 45 %
FIO2 ON VENT O2 ANALYZER: 50 %
GFR SERPL CREATININE-BSD FRML MDRD: > 60 ML/MIN/1.73M2
GLUCOSE BLD-MCNC: 190 MG/DL (ref 70–108)
GLUCOSE BLD-MCNC: 209 MG/DL (ref 70–108)
GLUCOSE BLD-MCNC: 214 MG/DL (ref 70–108)
GLUCOSE BLD-MCNC: 219 MG/DL (ref 70–108)
GLUCOSE BLD-MCNC: 220 MG/DL (ref 70–108)
GLUCOSE BLD-MCNC: 221 MG/DL (ref 70–108)
GLUCOSE BLD-MCNC: 222 MG/DL (ref 70–108)
GLUCOSE BLD-MCNC: 233 MG/DL (ref 70–108)
GLUCOSE SERPL-MCNC: 185 MG/DL (ref 70–108)
GLUCOSE SERPL-MCNC: 202 MG/DL (ref 70–108)
GLUCOSE SERPL-MCNC: 213 MG/DL (ref 70–108)
GLUCOSE SERPL-MCNC: 217 MG/DL (ref 70–108)
GLUCOSE SERPL-MCNC: 226 MG/DL (ref 70–108)
HCO3 BLDA-SCNC: 21 MMOL/L (ref 23–28)
HCO3 BLDA-SCNC: 21 MMOL/L (ref 23–28)
HCO3 BLDA-SCNC: 23 MMOL/L (ref 23–28)
HCO3 BLDA-SCNC: 24 MMOL/L (ref 23–28)
HCO3 BLDA-SCNC: 25 MMOL/L (ref 23–28)
HCO3 BLDA-SCNC: 26 MMOL/L (ref 23–28)
HCT VFR BLD AUTO: 22.7 % (ref 42–52)
HCT VFR BLD AUTO: 23.3 % (ref 42–52)
HCT VFR BLD AUTO: 25.2 % (ref 42–52)
HCT VFR BLD AUTO: 25.2 % (ref 42–52)
HCT VFR BLD AUTO: 27.3 % (ref 42–52)
HCT VFR BLD AUTO: 29.5 % (ref 42–52)
HGB BLD-MCNC: 10.2 GM/DL (ref 14–18)
HGB BLD-MCNC: 7.6 GM/DL (ref 14–18)
HGB BLD-MCNC: 7.9 GM/DL (ref 14–18)
HGB BLD-MCNC: 8.4 GM/DL (ref 14–18)
HGB BLD-MCNC: 8.4 GM/DL (ref 14–18)
HGB BLD-MCNC: 9.1 GM/DL (ref 14–18)
LACTATE SERPL-SCNC: 0.8 MMOL/L (ref 0.5–2)
LACTATE SERPL-SCNC: 0.8 MMOL/L (ref 0.5–2)
MAGNESIUM SERPL-MCNC: 2.6 MG/DL (ref 1.6–2.4)
MAGNESIUM SERPL-MCNC: 2.7 MG/DL (ref 1.6–2.4)
MAGNESIUM SERPL-MCNC: 2.8 MG/DL (ref 1.6–2.4)
MCH RBC QN AUTO: 30.1 PG (ref 26–33)
MCH RBC QN AUTO: 30.1 PG (ref 26–33)
MCH RBC QN AUTO: 30.2 PG (ref 26–33)
MCH RBC QN AUTO: 30.2 PG (ref 26–33)
MCH RBC QN AUTO: 30.4 PG (ref 26–33)
MCH RBC QN AUTO: 30.4 PG (ref 26–33)
MCHC RBC AUTO-ENTMCNC: 33.3 GM/DL (ref 32.2–35.5)
MCHC RBC AUTO-ENTMCNC: 33.5 GM/DL (ref 32.2–35.5)
MCHC RBC AUTO-ENTMCNC: 33.9 GM/DL (ref 32.2–35.5)
MCHC RBC AUTO-ENTMCNC: 34.6 GM/DL (ref 32.2–35.5)
MCV RBC AUTO: 87.3 FL (ref 80–94)
MCV RBC AUTO: 89.6 FL (ref 80–94)
MCV RBC AUTO: 90.3 FL (ref 80–94)
MCV RBC AUTO: 90.4 FL (ref 80–94)
MCV RBC AUTO: 90.6 FL (ref 80–94)
MCV RBC AUTO: 90.8 FL (ref 80–94)
PCO2 BLDA: 42 MMHG (ref 35–45)
PCO2 BLDA: 44 MMHG (ref 35–45)
PCO2 BLDA: 45 MMHG (ref 35–45)
PCO2 BLDA: 46 MMHG (ref 35–45)
PCO2 TEMP ADJ BLDMV: 39 MMHG (ref 41–51)
PCO2 TEMP ADJ BLDMV: 41 MMHG (ref 41–51)
PCO2 TEMP ADJ BLDMV: 43 MMHG (ref 41–51)
PCO2 TEMP ADJ BLDMV: 45 MMHG (ref 41–51)
PCO2 TEMP ADJ BLDMV: 47 MMHG (ref 41–51)
PCO2 TEMP ADJ BLDMV: 48 MMHG (ref 41–51)
PEEP SETTING VENT: 6 MMHG
PH BLDA: 7.34 [PH] (ref 7.35–7.45)
PH BLDA: 7.36 [PH] (ref 7.35–7.45)
PH BLDA: 7.36 [PH] (ref 7.35–7.45)
PH BLDA: 7.37 [PH] (ref 7.35–7.45)
PH BLDA: 7.37 [PH] (ref 7.35–7.45)
PH BLDA: 7.38 [PH] (ref 7.35–7.45)
PH BLDMV: 7.31 [PH] (ref 7.31–7.41)
PH BLDMV: 7.31 [PH] (ref 7.31–7.41)
PH BLDMV: 7.32 [PH] (ref 7.31–7.41)
PH BLDMV: 7.33 [PH] (ref 7.31–7.41)
PH BLDMV: 7.33 [PH] (ref 7.31–7.41)
PH BLDMV: 7.34 [PH] (ref 7.31–7.41)
PH BLDMV: 7.34 [PH] (ref 7.31–7.41)
PH BLDMV: 7.35 [PH] (ref 7.31–7.41)
PHOSPHATE SERPL-MCNC: 2.1 MG/DL (ref 2.4–4.7)
PHOSPHATE SERPL-MCNC: 2.3 MG/DL (ref 2.4–4.7)
PHOSPHATE SERPL-MCNC: 2.3 MG/DL (ref 2.4–4.7)
PHOSPHATE SERPL-MCNC: 2.6 MG/DL (ref 2.4–4.7)
PHOSPHATE SERPL-MCNC: 2.7 MG/DL (ref 2.4–4.7)
PIP: 18 CMH2O
PLATELET # BLD AUTO: 60 THOU/MM3 (ref 130–400)
PLATELET # BLD AUTO: 77 THOU/MM3 (ref 130–400)
PLATELET # BLD AUTO: 79 THOU/MM3 (ref 130–400)
PLATELET # BLD AUTO: 80 THOU/MM3 (ref 130–400)
PLATELET # BLD AUTO: 81 THOU/MM3 (ref 130–400)
PLATELET # BLD AUTO: 91 THOU/MM3 (ref 130–400)
PMV BLD AUTO: 10.2 FL (ref 9.4–12.4)
PMV BLD AUTO: 10.8 FL (ref 9.4–12.4)
PMV BLD AUTO: 11 FL (ref 9.4–12.4)
PMV BLD AUTO: 11.3 FL (ref 9.4–12.4)
PO2 BLDA: 112 MMHG (ref 71–104)
PO2 BLDA: 133 MMHG (ref 71–104)
PO2 BLDA: 178 MMHG (ref 71–104)
PO2 BLDA: 211 MMHG (ref 71–104)
PO2 BLDA: 213 MMHG (ref 71–104)
PO2 BLDA: 226 MMHG (ref 71–104)
PO2 BLDA: 275 MMHG (ref 71–104)
PO2 BLDA: 300 MMHG (ref 71–104)
PO2 BLDMV: 32 MMHG (ref 25–40)
PO2 BLDMV: 34 MMHG (ref 25–40)
PO2 BLDMV: 35 MMHG (ref 25–40)
PO2 BLDMV: 36 MMHG (ref 25–40)
POTASSIUM SERPL-SCNC: 4.5 MEQ/L (ref 3.5–5.2)
POTASSIUM SERPL-SCNC: 4.7 MEQ/L (ref 3.5–5.2)
PRESSURE SUPPORT SETTING VENT: 12 CMH2O
PROT SERPL-MCNC: 4.9 G/DL (ref 6.1–8)
PROT SERPL-MCNC: 5 G/DL (ref 6.1–8)
PROT SERPL-MCNC: 5.1 G/DL (ref 6.1–8)
PROT SERPL-MCNC: 5.1 G/DL (ref 6.1–8)
PROT SERPL-MCNC: 5.2 G/DL (ref 6.1–8)
RBC # BLD AUTO: 2.5 MILL/MM3 (ref 4.7–6.1)
RBC # BLD AUTO: 2.6 MILL/MM3 (ref 4.7–6.1)
RBC # BLD AUTO: 2.78 MILL/MM3 (ref 4.7–6.1)
RBC # BLD AUTO: 2.79 MILL/MM3 (ref 4.7–6.1)
RBC # BLD AUTO: 3.02 MILL/MM3 (ref 4.7–6.1)
RBC # BLD AUTO: 3.38 MILL/MM3 (ref 4.7–6.1)
RH FACTOR: NORMAL
SAO2 % BLDA: 100 %
SAO2 % BLDA: 98 %
SAO2 % BLDA: 99 %
SAO2 % BLDMV: 59 %
SAO2 % BLDMV: 60 %
SAO2 % BLDMV: 60 %
SAO2 % BLDMV: 61 %
SAO2 % BLDMV: 61 %
SAO2 % BLDMV: 62 %
SAO2 % BLDMV: 63 %
SAO2 % BLDMV: 64 %
SET PEEP: 6 MMHG
SET PRESS SUPP: 12 CMH2O
SET RESPIRATORY RATE: 12 BPM
SITE: ABNORMAL
SITE: NORMAL
SITE: NORMAL
SODIUM SERPL-SCNC: 134 MEQ/L (ref 135–145)
SODIUM SERPL-SCNC: 135 MEQ/L (ref 135–145)
SODIUM SERPL-SCNC: 135 MEQ/L (ref 135–145)
SODIUM SERPL-SCNC: 137 MEQ/L (ref 135–145)
SODIUM SERPL-SCNC: 139 MEQ/L (ref 135–145)
VENTILATION MODE VENT: ABNORMAL
VENTILATION MODE VENT: NORMAL
VENTILATION MODE VENT: NORMAL
WBC # BLD AUTO: 10.2 THOU/MM3 (ref 4.8–10.8)
WBC # BLD AUTO: 10.6 THOU/MM3 (ref 4.8–10.8)
WBC # BLD AUTO: 12.6 THOU/MM3 (ref 4.8–10.8)
WBC # BLD AUTO: 12.9 THOU/MM3 (ref 4.8–10.8)
WBC # BLD AUTO: 5.6 THOU/MM3 (ref 4.8–10.8)
WBC # BLD AUTO: 9 THOU/MM3 (ref 4.8–10.8)

## 2024-03-17 PROCEDURE — 82803 BLOOD GASES ANY COMBINATION: CPT

## 2024-03-17 PROCEDURE — 80053 COMPREHEN METABOLIC PANEL: CPT

## 2024-03-17 PROCEDURE — 37799 UNLISTED PX VASCULAR SURGERY: CPT

## 2024-03-17 PROCEDURE — 86850 RBC ANTIBODY SCREEN: CPT

## 2024-03-17 PROCEDURE — 6370000000 HC RX 637 (ALT 250 FOR IP): Performed by: PHYSICIAN ASSISTANT

## 2024-03-17 PROCEDURE — 6360000002 HC RX W HCPCS: Performed by: NURSE PRACTITIONER

## 2024-03-17 PROCEDURE — 6370000000 HC RX 637 (ALT 250 FOR IP)

## 2024-03-17 PROCEDURE — 2700000000 HC OXYGEN THERAPY PER DAY

## 2024-03-17 PROCEDURE — 36430 TRANSFUSION BLD/BLD COMPNT: CPT

## 2024-03-17 PROCEDURE — 86901 BLOOD TYPING SEROLOGIC RH(D): CPT

## 2024-03-17 PROCEDURE — 83605 ASSAY OF LACTIC ACID: CPT

## 2024-03-17 PROCEDURE — P9016 RBC LEUKOCYTES REDUCED: HCPCS

## 2024-03-17 PROCEDURE — 82947 ASSAY GLUCOSE BLOOD QUANT: CPT

## 2024-03-17 PROCEDURE — 33949 ECMO/ECLS DAILY MGMT ARTERY: CPT | Performed by: INTERNAL MEDICINE

## 2024-03-17 PROCEDURE — P9017 PLASMA 1 DONOR FRZ W/IN 8 HR: HCPCS

## 2024-03-17 PROCEDURE — 99291 CRITICAL CARE FIRST HOUR: CPT | Performed by: INTERNAL MEDICINE

## 2024-03-17 PROCEDURE — C9113 INJ PANTOPRAZOLE SODIUM, VIA: HCPCS | Performed by: INTERNAL MEDICINE

## 2024-03-17 PROCEDURE — 82330 ASSAY OF CALCIUM: CPT

## 2024-03-17 PROCEDURE — 85027 COMPLETE CBC AUTOMATED: CPT

## 2024-03-17 PROCEDURE — 2500000003 HC RX 250 WO HCPCS: Performed by: STUDENT IN AN ORGANIZED HEALTH CARE EDUCATION/TRAINING PROGRAM

## 2024-03-17 PROCEDURE — 2580000003 HC RX 258: Performed by: INTERNAL MEDICINE

## 2024-03-17 PROCEDURE — 2580000003 HC RX 258: Performed by: PHYSICIAN ASSISTANT

## 2024-03-17 PROCEDURE — 2500000003 HC RX 250 WO HCPCS: Performed by: INTERNAL MEDICINE

## 2024-03-17 PROCEDURE — 2580000003 HC RX 258: Performed by: NURSE PRACTITIONER

## 2024-03-17 PROCEDURE — 6370000000 HC RX 637 (ALT 250 FOR IP): Performed by: STUDENT IN AN ORGANIZED HEALTH CARE EDUCATION/TRAINING PROGRAM

## 2024-03-17 PROCEDURE — 33949 ECMO/ECLS DAILY MGMT ARTERY: CPT

## 2024-03-17 PROCEDURE — 86923 COMPATIBILITY TEST ELECTRIC: CPT

## 2024-03-17 PROCEDURE — 86900 BLOOD TYPING SEROLOGIC ABO: CPT

## 2024-03-17 PROCEDURE — 6360000002 HC RX W HCPCS: Performed by: INTERNAL MEDICINE

## 2024-03-17 PROCEDURE — 83735 ASSAY OF MAGNESIUM: CPT

## 2024-03-17 PROCEDURE — 6370000000 HC RX 637 (ALT 250 FOR IP): Performed by: INTERNAL MEDICINE

## 2024-03-17 PROCEDURE — 85730 THROMBOPLASTIN TIME PARTIAL: CPT

## 2024-03-17 PROCEDURE — 2580000003 HC RX 258: Performed by: THORACIC SURGERY (CARDIOTHORACIC VASCULAR SURGERY)

## 2024-03-17 PROCEDURE — 85384 FIBRINOGEN ACTIVITY: CPT

## 2024-03-17 PROCEDURE — 71045 X-RAY EXAM CHEST 1 VIEW: CPT

## 2024-03-17 PROCEDURE — 99233 SBSQ HOSP IP/OBS HIGH 50: CPT | Performed by: INTERNAL MEDICINE

## 2024-03-17 PROCEDURE — 36415 COLL VENOUS BLD VENIPUNCTURE: CPT

## 2024-03-17 PROCEDURE — P9041 ALBUMIN (HUMAN),5%, 50ML: HCPCS | Performed by: THORACIC SURGERY (CARDIOTHORACIC VASCULAR SURGERY)

## 2024-03-17 PROCEDURE — 6370000000 HC RX 637 (ALT 250 FOR IP): Performed by: NURSE PRACTITIONER

## 2024-03-17 PROCEDURE — 84100 ASSAY OF PHOSPHORUS: CPT

## 2024-03-17 PROCEDURE — 6360000002 HC RX W HCPCS: Performed by: THORACIC SURGERY (CARDIOTHORACIC VASCULAR SURGERY)

## 2024-03-17 PROCEDURE — 2000000000 HC ICU R&B

## 2024-03-17 PROCEDURE — 94003 VENT MGMT INPAT SUBQ DAY: CPT

## 2024-03-17 RX ORDER — SODIUM CHLORIDE 9 MG/ML
INJECTION, SOLUTION INTRAVENOUS PRN
Status: DISCONTINUED | OUTPATIENT
Start: 2024-03-17 | End: 2024-03-20

## 2024-03-17 RX ADMIN — Medication 1 MCG/KG/HR: at 18:26

## 2024-03-17 RX ADMIN — POLYETHYLENE GLYCOL (3350) 17 G: 17 POWDER, FOR SOLUTION ORAL at 09:38

## 2024-03-17 RX ADMIN — INSULIN LISPRO 4 UNITS: 100 INJECTION, SOLUTION INTRAVENOUS; SUBCUTANEOUS at 12:30

## 2024-03-17 RX ADMIN — Medication 10 MG/HR: at 11:50

## 2024-03-17 RX ADMIN — BIVALIRUDIN 0.06 MG/KG/HR: 250 INJECTION, POWDER, LYOPHILIZED, FOR SOLUTION INTRAVENOUS at 21:58

## 2024-03-17 RX ADMIN — ALBUMIN (HUMAN) 25 G: 12.5 INJECTION, SOLUTION INTRAVENOUS at 01:09

## 2024-03-17 RX ADMIN — Medication 1 MCG/KG/HR: at 12:22

## 2024-03-17 RX ADMIN — Medication: at 16:38

## 2024-03-17 RX ADMIN — AMIODARONE HYDROCHLORIDE 400 MG: 200 TABLET ORAL at 09:40

## 2024-03-17 RX ADMIN — BIVALIRUDIN 0.06 MG/KG/HR: 250 INJECTION, POWDER, LYOPHILIZED, FOR SOLUTION INTRAVENOUS at 01:45

## 2024-03-17 RX ADMIN — SENNOSIDES 8.8 MG: 8.8 LIQUID ORAL at 21:23

## 2024-03-17 RX ADMIN — Medication 1 MCG/KG/HR: at 00:48

## 2024-03-17 RX ADMIN — Medication: at 10:53

## 2024-03-17 RX ADMIN — PANTOPRAZOLE SODIUM 40 MG: 40 INJECTION, POWDER, FOR SOLUTION INTRAVENOUS at 09:39

## 2024-03-17 RX ADMIN — INSULIN LISPRO 4 UNITS: 100 INJECTION, SOLUTION INTRAVENOUS; SUBCUTANEOUS at 00:18

## 2024-03-17 RX ADMIN — SENNOSIDES 8.8 MG: 8.8 LIQUID ORAL at 09:39

## 2024-03-17 RX ADMIN — INSULIN LISPRO 4 UNITS: 100 INJECTION, SOLUTION INTRAVENOUS; SUBCUTANEOUS at 17:19

## 2024-03-17 RX ADMIN — ALBUMIN (HUMAN) 25 G: 12.5 INJECTION, SOLUTION INTRAVENOUS at 13:37

## 2024-03-17 RX ADMIN — Medication: at 21:45

## 2024-03-17 RX ADMIN — ATORVASTATIN CALCIUM 40 MG: 40 TABLET, FILM COATED ORAL at 21:24

## 2024-03-17 RX ADMIN — SODIUM CHLORIDE, PRESERVATIVE FREE 10 ML: 5 INJECTION INTRAVENOUS at 21:24

## 2024-03-17 RX ADMIN — CHLORHEXIDINE GLUCONATE 0.12% ORAL RINSE 15 ML: 1.2 LIQUID ORAL at 09:41

## 2024-03-17 RX ADMIN — CHLORHEXIDINE GLUCONATE 0.12% ORAL RINSE 15 ML: 1.2 LIQUID ORAL at 21:23

## 2024-03-17 RX ADMIN — ALBUMIN (HUMAN) 25 G: 12.5 INJECTION, SOLUTION INTRAVENOUS at 05:17

## 2024-03-17 RX ADMIN — Medication 10 MG/HR: at 21:34

## 2024-03-17 RX ADMIN — Medication 150 MCG/HR: at 21:17

## 2024-03-17 RX ADMIN — Medication: at 01:11

## 2024-03-17 RX ADMIN — SODIUM CHLORIDE 5 MG/HR: 9 INJECTION, SOLUTION INTRAVENOUS at 12:10

## 2024-03-17 RX ADMIN — Medication 1 MCG/KG/HR: at 06:28

## 2024-03-17 RX ADMIN — Medication: at 05:45

## 2024-03-17 RX ADMIN — Medication 1 TABLET: at 09:40

## 2024-03-17 RX ADMIN — SODIUM CHLORIDE, PRESERVATIVE FREE 10 ML: 5 INJECTION INTRAVENOUS at 09:39

## 2024-03-17 RX ADMIN — MILRINONE LACTATE IN DEXTROSE 0.06 MCG/KG/MIN: 200 INJECTION, SOLUTION INTRAVENOUS at 04:51

## 2024-03-17 RX ADMIN — Medication 1 MCG/KG/HR: at 23:37

## 2024-03-17 RX ADMIN — Medication: at 05:42

## 2024-03-17 RX ADMIN — INSULIN LISPRO 4 UNITS: 100 INJECTION, SOLUTION INTRAVENOUS; SUBCUTANEOUS at 20:29

## 2024-03-17 RX ADMIN — Medication 6 MMOL: at 00:49

## 2024-03-17 RX ADMIN — INSULIN LISPRO 4 UNITS: 100 INJECTION, SOLUTION INTRAVENOUS; SUBCUTANEOUS at 04:46

## 2024-03-17 RX ADMIN — Medication 150 MCG/HR: at 14:17

## 2024-03-17 RX ADMIN — Medication 130 MCG/HR: at 06:16

## 2024-03-17 RX ADMIN — Medication: at 16:40

## 2024-03-17 RX ADMIN — Medication: at 10:55

## 2024-03-17 RX ADMIN — POTASSIUM & SODIUM PHOSPHATES POWDER PACK 280-160-250 MG 250 MG: 280-160-250 PACK at 17:21

## 2024-03-17 RX ADMIN — AMIODARONE HYDROCHLORIDE 400 MG: 200 TABLET ORAL at 21:24

## 2024-03-17 RX ADMIN — POTASSIUM & SODIUM PHOSPHATES POWDER PACK 280-160-250 MG 250 MG: 280-160-250 PACK at 21:23

## 2024-03-17 RX ADMIN — PANTOPRAZOLE SODIUM 40 MG: 40 INJECTION, POWDER, FOR SOLUTION INTRAVENOUS at 21:24

## 2024-03-17 RX ADMIN — Medication 6 MMOL: at 16:20

## 2024-03-17 ASSESSMENT — PULMONARY FUNCTION TESTS
PIF_VALUE: 15
PIF_VALUE: 16

## 2024-03-17 ASSESSMENT — PAIN SCALES - GENERAL
PAINLEVEL_OUTOF10: 0
PAINLEVEL_OUTOF10: 0

## 2024-03-18 ENCOUNTER — APPOINTMENT (OUTPATIENT)
Dept: GENERAL RADIOLOGY | Age: 69
DRG: 003 | End: 2024-03-18
Attending: THORACIC SURGERY (CARDIOTHORACIC VASCULAR SURGERY)
Payer: MEDICARE

## 2024-03-18 ENCOUNTER — ANESTHESIA EVENT (OUTPATIENT)
Dept: OPERATING ROOM | Age: 69
End: 2024-03-18
Payer: MEDICARE

## 2024-03-18 ENCOUNTER — APPOINTMENT (OUTPATIENT)
Age: 69
DRG: 003 | End: 2024-03-18
Attending: NURSE PRACTITIONER
Payer: MEDICARE

## 2024-03-18 LAB
ALBUMIN SERPL BCG-MCNC: 3.3 G/DL (ref 3.5–5.1)
ALBUMIN SERPL BCG-MCNC: 3.4 G/DL (ref 3.5–5.1)
ALBUMIN SERPL BCG-MCNC: 3.5 G/DL (ref 3.5–5.1)
ALBUMIN SERPL BCG-MCNC: 3.5 G/DL (ref 3.5–5.1)
ALBUMIN SERPL BCG-MCNC: 3.7 G/DL (ref 3.5–5.1)
ALBUMIN SERPL BCG-MCNC: 3.8 G/DL (ref 3.5–5.1)
ALP SERPL-CCNC: 635 U/L (ref 38–126)
ALP SERPL-CCNC: 640 U/L (ref 38–126)
ALP SERPL-CCNC: 642 U/L (ref 38–126)
ALP SERPL-CCNC: 651 U/L (ref 38–126)
ALP SERPL-CCNC: 662 U/L (ref 38–126)
ALP SERPL-CCNC: 670 U/L (ref 38–126)
ALT SERPL W/O P-5'-P-CCNC: 12 U/L (ref 11–66)
ALT SERPL W/O P-5'-P-CCNC: 13 U/L (ref 11–66)
ALT SERPL W/O P-5'-P-CCNC: 14 U/L (ref 11–66)
ALT SERPL W/O P-5'-P-CCNC: 14 U/L (ref 11–66)
ALT SERPL W/O P-5'-P-CCNC: 15 U/L (ref 11–66)
ALT SERPL W/O P-5'-P-CCNC: 17 U/L (ref 11–66)
ANION GAP SERPL CALC-SCNC: 7 MEQ/L (ref 8–16)
ANION GAP SERPL CALC-SCNC: 7 MEQ/L (ref 8–16)
ANION GAP SERPL CALC-SCNC: 8 MEQ/L (ref 8–16)
ANION GAP SERPL CALC-SCNC: 8 MEQ/L (ref 8–16)
ANION GAP SERPL CALC-SCNC: 9 MEQ/L (ref 8–16)
ANION GAP SERPL CALC-SCNC: 9 MEQ/L (ref 8–16)
APTT PPP: 56.3 SECONDS (ref 22–38)
APTT PPP: 60.8 SECONDS (ref 22–38)
ARTERIAL PATENCY WRIST A: ABNORMAL
AST SERPL-CCNC: 64 U/L (ref 5–40)
AST SERPL-CCNC: 67 U/L (ref 5–40)
AST SERPL-CCNC: 74 U/L (ref 5–40)
AST SERPL-CCNC: 74 U/L (ref 5–40)
AST SERPL-CCNC: 76 U/L (ref 5–40)
AST SERPL-CCNC: 77 U/L (ref 5–40)
BASE EXCESS BLDA CALC-SCNC: -0.3 MMOL/L (ref -2.5–2.5)
BASE EXCESS BLDA CALC-SCNC: -0.4 MMOL/L (ref -2.5–2.5)
BASE EXCESS BLDA CALC-SCNC: -0.4 MMOL/L (ref -2–3)
BASE EXCESS BLDA CALC-SCNC: -0.5 MMOL/L (ref -2–3)
BASE EXCESS BLDA CALC-SCNC: -0.6 MMOL/L (ref -2.5–2.5)
BASE EXCESS BLDA CALC-SCNC: -0.8 MMOL/L (ref -2–3)
BASE EXCESS BLDA CALC-SCNC: -0.9 MMOL/L (ref -2–3)
BASE EXCESS BLDA CALC-SCNC: -1 MMOL/L (ref -2–3)
BASE EXCESS BLDA CALC-SCNC: -1.1 MMOL/L (ref -2.5–2.5)
BASE EXCESS BLDA CALC-SCNC: -1.8 MMOL/L (ref -2–3)
BASE EXCESS BLDA CALC-SCNC: -1.9 MMOL/L (ref -2.5–2.5)
BASE EXCESS BLDA CALC-SCNC: -2.3 MMOL/L (ref -2–3)
BASE EXCESS BLDA CALC-SCNC: -2.4 MMOL/L (ref -2–3)
BASE EXCESS BLDA CALC-SCNC: -3.7 MMOL/L (ref -2–3)
BASE EXCESS BLDA CALC-SCNC: 0.1 MMOL/L (ref -2.5–2.5)
BASE EXCESS BLDA CALC-SCNC: 0.2 MMOL/L (ref -2.5–2.5)
BASE EXCESS BLDA CALC-SCNC: 0.2 MMOL/L (ref -2–3)
BASE EXCESS BLDA CALC-SCNC: 0.5 MMOL/L (ref -2.5–2.5)
BDY SITE: ABNORMAL
BILIRUB SERPL-MCNC: 2.3 MG/DL (ref 0.3–1.2)
BILIRUB SERPL-MCNC: 2.5 MG/DL (ref 0.3–1.2)
BILIRUB SERPL-MCNC: 2.6 MG/DL (ref 0.3–1.2)
BILIRUB SERPL-MCNC: 3 MG/DL (ref 0.3–1.2)
BREATHS SETTING VENT: 12 BPM
BUN SERPL-MCNC: 26 MG/DL (ref 7–22)
BUN SERPL-MCNC: 27 MG/DL (ref 7–22)
BUN SERPL-MCNC: 27 MG/DL (ref 7–22)
BUN SERPL-MCNC: 28 MG/DL (ref 7–22)
CA-I BLD ISE-SCNC: 1.32 MMOL/L (ref 1.12–1.32)
CA-I BLD ISE-SCNC: 1.33 MMOL/L (ref 1.12–1.32)
CA-I BLD ISE-SCNC: 1.35 MMOL/L (ref 1.12–1.32)
CA-I BLD ISE-SCNC: 1.38 MMOL/L (ref 1.12–1.32)
CA-I BLD ISE-SCNC: 1.39 MMOL/L (ref 1.12–1.32)
CA-I BLD ISE-SCNC: 1.43 MMOL/L (ref 1.12–1.32)
CALCIUM SERPL-MCNC: 10 MG/DL (ref 8.5–10.5)
CALCIUM SERPL-MCNC: 9.8 MG/DL (ref 8.5–10.5)
CALCIUM SERPL-MCNC: 9.8 MG/DL (ref 8.5–10.5)
CALCIUM SERPL-MCNC: 9.9 MG/DL (ref 8.5–10.5)
CHLORIDE SERPL-SCNC: 101 MEQ/L (ref 98–111)
CHLORIDE SERPL-SCNC: 102 MEQ/L (ref 98–111)
CHLORIDE SERPL-SCNC: 103 MEQ/L (ref 98–111)
CHLORIDE SERPL-SCNC: 104 MEQ/L (ref 98–111)
CO2 SERPL-SCNC: 24 MEQ/L (ref 23–33)
CO2 SERPL-SCNC: 25 MEQ/L (ref 23–33)
COLLECTED BY:: ABNORMAL
COLLECTED BY:: NORMAL
CREAT SERPL-MCNC: 0.7 MG/DL (ref 0.4–1.2)
CREAT SERPL-MCNC: 0.8 MG/DL (ref 0.4–1.2)
DEPRECATED RDW RBC AUTO: 56.4 FL (ref 35–45)
DEPRECATED RDW RBC AUTO: 56.6 FL (ref 35–45)
DEPRECATED RDW RBC AUTO: 57.1 FL (ref 35–45)
DEPRECATED RDW RBC AUTO: 57.6 FL (ref 35–45)
DEPRECATED RDW RBC AUTO: 58.4 FL (ref 35–45)
DEPRECATED RDW RBC AUTO: 60.2 FL (ref 35–45)
DEVICE: ABNORMAL
DEVICE: NORMAL
ECHO BSA: 1.69 M2
ERYTHROCYTE [DISTWIDTH] IN BLOOD BY AUTOMATED COUNT: 18.1 % (ref 11.5–14.5)
ERYTHROCYTE [DISTWIDTH] IN BLOOD BY AUTOMATED COUNT: 18.2 % (ref 11.5–14.5)
ERYTHROCYTE [DISTWIDTH] IN BLOOD BY AUTOMATED COUNT: 18.3 % (ref 11.5–14.5)
ERYTHROCYTE [DISTWIDTH] IN BLOOD BY AUTOMATED COUNT: 18.3 % (ref 11.5–14.5)
ERYTHROCYTE [DISTWIDTH] IN BLOOD BY AUTOMATED COUNT: 18.5 % (ref 11.5–14.5)
ERYTHROCYTE [DISTWIDTH] IN BLOOD BY AUTOMATED COUNT: 18.7 % (ref 11.5–14.5)
FIBRINOGEN PPP-MCNC: 374 MG/100ML (ref 155–475)
FIBRINOGEN PPP-MCNC: 433 MG/100ML (ref 155–475)
FIO2 ON VENT O2 ANALYZER: 100 %
FIO2 ON VENT O2 ANALYZER: 100 %
FIO2 ON VENT O2 ANALYZER: 30 %
FIO2 ON VENT O2 ANALYZER: 40 %
GFR SERPL CREATININE-BSD FRML MDRD: > 60 ML/MIN/1.73M2
GLUCOSE BLD STRIP.AUTO-MCNC: 211 MG/DL (ref 70–108)
GLUCOSE BLD-MCNC: 186 MG/DL (ref 70–108)
GLUCOSE BLD-MCNC: 209 MG/DL (ref 70–108)
GLUCOSE BLD-MCNC: 227 MG/DL (ref 70–108)
GLUCOSE BLD-MCNC: 229 MG/DL (ref 70–108)
GLUCOSE BLD-MCNC: 230 MG/DL (ref 70–108)
GLUCOSE BLD-MCNC: 236 MG/DL (ref 70–108)
GLUCOSE BLD-MCNC: 243 MG/DL (ref 70–108)
GLUCOSE SERPL-MCNC: 193 MG/DL (ref 70–108)
GLUCOSE SERPL-MCNC: 212 MG/DL (ref 70–108)
GLUCOSE SERPL-MCNC: 215 MG/DL (ref 70–108)
GLUCOSE SERPL-MCNC: 217 MG/DL (ref 70–108)
GLUCOSE SERPL-MCNC: 219 MG/DL (ref 70–108)
GLUCOSE SERPL-MCNC: 225 MG/DL (ref 70–108)
HCO3 BLDA-SCNC: 22 MMOL/L (ref 23–28)
HCO3 BLDA-SCNC: 23 MMOL/L (ref 23–28)
HCO3 BLDA-SCNC: 24 MMOL/L (ref 23–28)
HCO3 BLDA-SCNC: 24 MMOL/L (ref 23–28)
HCO3 BLDA-SCNC: 25 MMOL/L (ref 23–28)
HCO3 BLDA-SCNC: 26 MMOL/L (ref 23–28)
HCT VFR BLD AUTO: 23.9 % (ref 42–52)
HCT VFR BLD AUTO: 25 % (ref 42–52)
HCT VFR BLD AUTO: 25.3 % (ref 42–52)
HCT VFR BLD AUTO: 25.4 % (ref 42–52)
HCT VFR BLD AUTO: 26.2 % (ref 42–52)
HCT VFR BLD AUTO: 27.9 % (ref 42–52)
HCT VFR BLD AUTO: 28.3 % (ref 42–52)
HGB BLD-MCNC: 8.1 GM/DL (ref 14–18)
HGB BLD-MCNC: 8.3 GM/DL (ref 14–18)
HGB BLD-MCNC: 8.5 GM/DL (ref 14–18)
HGB BLD-MCNC: 8.6 GM/DL (ref 14–18)
HGB BLD-MCNC: 8.8 GM/DL (ref 14–18)
HGB BLD-MCNC: 9.3 GM/DL (ref 14–18)
HGB BLD-MCNC: 9.6 GM/DL (ref 14–18)
LACTATE SERPL-SCNC: 0.8 MMOL/L (ref 0.5–2)
LACTATE SERPL-SCNC: 0.9 MMOL/L (ref 0.5–2)
MAGNESIUM SERPL-MCNC: 2.6 MG/DL (ref 1.6–2.4)
MAGNESIUM SERPL-MCNC: 2.6 MG/DL (ref 1.6–2.4)
MAGNESIUM SERPL-MCNC: 2.7 MG/DL (ref 1.6–2.4)
MCH RBC QN AUTO: 30 PG (ref 26–33)
MCH RBC QN AUTO: 30.2 PG (ref 26–33)
MCH RBC QN AUTO: 30.4 PG (ref 26–33)
MCH RBC QN AUTO: 30.4 PG (ref 26–33)
MCH RBC QN AUTO: 30.6 PG (ref 26–33)
MCH RBC QN AUTO: 30.7 PG (ref 26–33)
MCHC RBC AUTO-ENTMCNC: 33.2 GM/DL (ref 32.2–35.5)
MCHC RBC AUTO-ENTMCNC: 33.6 GM/DL (ref 32.2–35.5)
MCHC RBC AUTO-ENTMCNC: 33.6 GM/DL (ref 32.2–35.5)
MCHC RBC AUTO-ENTMCNC: 33.9 GM/DL (ref 32.2–35.5)
MCV RBC AUTO: 89.4 FL (ref 80–94)
MCV RBC AUTO: 89.8 FL (ref 80–94)
MCV RBC AUTO: 90 FL (ref 80–94)
MCV RBC AUTO: 90.2 FL (ref 80–94)
MCV RBC AUTO: 90.4 FL (ref 80–94)
MCV RBC AUTO: 91.6 FL (ref 80–94)
PCO2 BLDA: 40 MMHG (ref 35–45)
PCO2 BLDA: 41 MMHG (ref 35–45)
PCO2 BLDA: 42 MMHG (ref 35–45)
PCO2 BLDA: 42 MMHG (ref 35–45)
PCO2 BLDA: 43 MMHG (ref 35–45)
PCO2 BLDA: 44 MMHG (ref 35–45)
PCO2 TEMP ADJ BLDMV: 41 MMHG (ref 41–51)
PCO2 TEMP ADJ BLDMV: 43 MMHG (ref 41–51)
PCO2 TEMP ADJ BLDMV: 44 MMHG (ref 41–51)
PCO2 TEMP ADJ BLDMV: 45 MMHG (ref 41–51)
PCO2 TEMP ADJ BLDMV: 46 MMHG (ref 41–51)
PCO2 TEMP ADJ BLDMV: 46 MMHG (ref 41–51)
PCO2 TEMP ADJ BLDMV: 47 MMHG (ref 41–51)
PCO2 TEMP ADJ BLDMV: 47 MMHG (ref 41–51)
PCO2 TEMP ADJ BLDMV: 48 MMHG (ref 41–51)
PCO2 TEMP ADJ BLDMV: 49 MMHG (ref 41–51)
PEEP SETTING VENT: 6 MMHG
PH BLDA: 7.37 [PH] (ref 7.35–7.45)
PH BLDA: 7.38 [PH] (ref 7.35–7.45)
PH BLDA: 7.39 [PH] (ref 7.35–7.45)
PH BLDMV: 7.32 [PH] (ref 7.31–7.41)
PH BLDMV: 7.33 [PH] (ref 7.31–7.41)
PH BLDMV: 7.34 [PH] (ref 7.31–7.41)
PH BLDMV: 7.36 [PH] (ref 7.31–7.41)
PH BLDMV: 7.37 [PH] (ref 7.31–7.41)
PHOSPHATE SERPL-MCNC: 2.2 MG/DL (ref 2.4–4.7)
PHOSPHATE SERPL-MCNC: 2.3 MG/DL (ref 2.4–4.7)
PHOSPHATE SERPL-MCNC: 2.5 MG/DL (ref 2.4–4.7)
PHOSPHATE SERPL-MCNC: 2.6 MG/DL (ref 2.4–4.7)
PHOSPHATE SERPL-MCNC: 2.6 MG/DL (ref 2.4–4.7)
PHOSPHATE SERPL-MCNC: 2.7 MG/DL (ref 2.4–4.7)
PIP: 18 CMH2O
PLATELET # BLD AUTO: 79 THOU/MM3 (ref 130–400)
PLATELET # BLD AUTO: 81 THOU/MM3 (ref 130–400)
PLATELET # BLD AUTO: 84 THOU/MM3 (ref 130–400)
PLATELET # BLD AUTO: 85 THOU/MM3 (ref 130–400)
PLATELET # BLD AUTO: 87 THOU/MM3 (ref 130–400)
PLATELET # BLD AUTO: 91 THOU/MM3 (ref 130–400)
PMV BLD AUTO: 10.6 FL (ref 9.4–12.4)
PMV BLD AUTO: 10.9 FL (ref 9.4–12.4)
PMV BLD AUTO: 11.4 FL (ref 9.4–12.4)
PMV BLD AUTO: 11.6 FL (ref 9.4–12.4)
PMV BLD AUTO: 11.6 FL (ref 9.4–12.4)
PMV BLD AUTO: 11.8 FL (ref 9.4–12.4)
PO2 BLDA: 156 MMHG (ref 71–104)
PO2 BLDA: 194 MMHG (ref 71–104)
PO2 BLDA: 216 MMHG (ref 71–104)
PO2 BLDA: 224 MMHG (ref 71–104)
PO2 BLDA: 260 MMHG (ref 71–104)
PO2 BLDA: 281 MMHG (ref 71–104)
PO2 BLDA: 399 MMHG (ref 71–104)
PO2 BLDA: 65 MMHG (ref 71–104)
PO2 BLDMV: 29 MMHG (ref 25–40)
PO2 BLDMV: 32 MMHG (ref 25–40)
PO2 BLDMV: 33 MMHG (ref 25–40)
PO2 BLDMV: 33 MMHG (ref 25–40)
PO2 BLDMV: 34 MMHG (ref 25–40)
PO2 BLDMV: 35 MMHG (ref 25–40)
PO2 BLDMV: 36 MMHG (ref 25–40)
PO2 BLDMV: 46 MMHG (ref 25–40)
POTASSIUM SERPL-SCNC: 4.5 MEQ/L (ref 3.5–5.2)
POTASSIUM SERPL-SCNC: 4.6 MEQ/L (ref 3.5–5.2)
POTASSIUM SERPL-SCNC: 4.7 MEQ/L (ref 3.5–5.2)
POTASSIUM SERPL-SCNC: 4.8 MEQ/L (ref 3.5–5.2)
PRESSURE SUPPORT SETTING VENT: 12 CMH2O
PROT SERPL-MCNC: 4.7 G/DL (ref 6.1–8)
PROT SERPL-MCNC: 4.8 G/DL (ref 6.1–8)
PROT SERPL-MCNC: 4.9 G/DL (ref 6.1–8)
PROT SERPL-MCNC: 4.9 G/DL (ref 6.1–8)
PROT SERPL-MCNC: 5 G/DL (ref 6.1–8)
PROT SERPL-MCNC: 5.1 G/DL (ref 6.1–8)
RBC # BLD AUTO: 2.65 MILL/MM3 (ref 4.7–6.1)
RBC # BLD AUTO: 2.73 MILL/MM3 (ref 4.7–6.1)
RBC # BLD AUTO: 2.81 MILL/MM3 (ref 4.7–6.1)
RBC # BLD AUTO: 2.83 MILL/MM3 (ref 4.7–6.1)
RBC # BLD AUTO: 2.93 MILL/MM3 (ref 4.7–6.1)
RBC # BLD AUTO: 3.13 MILL/MM3 (ref 4.7–6.1)
SAO2 % BLDA: 100 %
SAO2 % BLDA: 92 %
SAO2 % BLDA: 99 %
SAO2 % BLDMV: 50 %
SAO2 % BLDMV: 57 %
SAO2 % BLDMV: 57 %
SAO2 % BLDMV: 60 %
SAO2 % BLDMV: 61 %
SAO2 % BLDMV: 61 %
SAO2 % BLDMV: 62 %
SAO2 % BLDMV: 63 %
SAO2 % BLDMV: 66 %
SAO2 % BLDMV: 81 %
SET PEEP: 6 MMHG
SET PRESS SUPP: 12 CMH2O
SET RESPIRATORY RATE: 12 BPM
SITE: NORMAL
SODIUM SERPL-SCNC: 133 MEQ/L (ref 135–145)
SODIUM SERPL-SCNC: 134 MEQ/L (ref 135–145)
SODIUM SERPL-SCNC: 136 MEQ/L (ref 135–145)
VENTILATION MODE VENT: ABNORMAL
VENTILATION MODE VENT: NORMAL
WBC # BLD AUTO: 11 THOU/MM3 (ref 4.8–10.8)
WBC # BLD AUTO: 11.5 THOU/MM3 (ref 4.8–10.8)
WBC # BLD AUTO: 11.8 THOU/MM3 (ref 4.8–10.8)
WBC # BLD AUTO: 12.4 THOU/MM3 (ref 4.8–10.8)
WBC # BLD AUTO: 13.1 THOU/MM3 (ref 4.8–10.8)
WBC # BLD AUTO: 13.1 THOU/MM3 (ref 4.8–10.8)

## 2024-03-18 PROCEDURE — 83735 ASSAY OF MAGNESIUM: CPT

## 2024-03-18 PROCEDURE — 2700000000 HC OXYGEN THERAPY PER DAY

## 2024-03-18 PROCEDURE — 82803 BLOOD GASES ANY COMBINATION: CPT

## 2024-03-18 PROCEDURE — 71045 X-RAY EXAM CHEST 1 VIEW: CPT

## 2024-03-18 PROCEDURE — 2500000003 HC RX 250 WO HCPCS: Performed by: STUDENT IN AN ORGANIZED HEALTH CARE EDUCATION/TRAINING PROGRAM

## 2024-03-18 PROCEDURE — 82330 ASSAY OF CALCIUM: CPT

## 2024-03-18 PROCEDURE — 33949 ECMO/ECLS DAILY MGMT ARTERY: CPT | Performed by: THORACIC SURGERY (CARDIOTHORACIC VASCULAR SURGERY)

## 2024-03-18 PROCEDURE — 6370000000 HC RX 637 (ALT 250 FOR IP): Performed by: NURSE PRACTITIONER

## 2024-03-18 PROCEDURE — 94003 VENT MGMT INPAT SUBQ DAY: CPT

## 2024-03-18 PROCEDURE — 85014 HEMATOCRIT: CPT

## 2024-03-18 PROCEDURE — 36430 TRANSFUSION BLD/BLD COMPNT: CPT

## 2024-03-18 PROCEDURE — 80053 COMPREHEN METABOLIC PANEL: CPT

## 2024-03-18 PROCEDURE — 2000000000 HC ICU R&B

## 2024-03-18 PROCEDURE — 93312 ECHO TRANSESOPHAGEAL: CPT

## 2024-03-18 PROCEDURE — 85018 HEMOGLOBIN: CPT

## 2024-03-18 PROCEDURE — 93312 ECHO TRANSESOPHAGEAL: CPT | Performed by: NUCLEAR MEDICINE

## 2024-03-18 PROCEDURE — 93325 DOPPLER ECHO COLOR FLOW MAPG: CPT | Performed by: NUCLEAR MEDICINE

## 2024-03-18 PROCEDURE — 33949 ECMO/ECLS DAILY MGMT ARTERY: CPT

## 2024-03-18 PROCEDURE — 2500000003 HC RX 250 WO HCPCS: Performed by: INTERNAL MEDICINE

## 2024-03-18 PROCEDURE — 2580000003 HC RX 258: Performed by: PHYSICIAN ASSISTANT

## 2024-03-18 PROCEDURE — 84145 PROCALCITONIN (PCT): CPT

## 2024-03-18 PROCEDURE — C9113 INJ PANTOPRAZOLE SODIUM, VIA: HCPCS | Performed by: INTERNAL MEDICINE

## 2024-03-18 PROCEDURE — 84100 ASSAY OF PHOSPHORUS: CPT

## 2024-03-18 PROCEDURE — 85027 COMPLETE CBC AUTOMATED: CPT

## 2024-03-18 PROCEDURE — 85384 FIBRINOGEN ACTIVITY: CPT

## 2024-03-18 PROCEDURE — 2580000003 HC RX 258: Performed by: INTERNAL MEDICINE

## 2024-03-18 PROCEDURE — 6370000000 HC RX 637 (ALT 250 FOR IP): Performed by: STUDENT IN AN ORGANIZED HEALTH CARE EDUCATION/TRAINING PROGRAM

## 2024-03-18 PROCEDURE — 6370000000 HC RX 637 (ALT 250 FOR IP): Performed by: PHYSICIAN ASSISTANT

## 2024-03-18 PROCEDURE — 83605 ASSAY OF LACTIC ACID: CPT

## 2024-03-18 PROCEDURE — 99291 CRITICAL CARE FIRST HOUR: CPT | Performed by: INTERNAL MEDICINE

## 2024-03-18 PROCEDURE — 6370000000 HC RX 637 (ALT 250 FOR IP): Performed by: INTERNAL MEDICINE

## 2024-03-18 PROCEDURE — 93320 DOPPLER ECHO COMPLETE: CPT | Performed by: NUCLEAR MEDICINE

## 2024-03-18 PROCEDURE — 2500000003 HC RX 250 WO HCPCS: Performed by: THORACIC SURGERY (CARDIOTHORACIC VASCULAR SURGERY)

## 2024-03-18 PROCEDURE — 6370000000 HC RX 637 (ALT 250 FOR IP)

## 2024-03-18 PROCEDURE — 6360000002 HC RX W HCPCS: Performed by: INTERNAL MEDICINE

## 2024-03-18 PROCEDURE — 82947 ASSAY GLUCOSE BLOOD QUANT: CPT

## 2024-03-18 PROCEDURE — 37799 UNLISTED PX VASCULAR SURGERY: CPT

## 2024-03-18 PROCEDURE — 2580000003 HC RX 258: Performed by: NURSE PRACTITIONER

## 2024-03-18 PROCEDURE — 36415 COLL VENOUS BLD VENIPUNCTURE: CPT

## 2024-03-18 PROCEDURE — 99233 SBSQ HOSP IP/OBS HIGH 50: CPT | Performed by: INTERNAL MEDICINE

## 2024-03-18 PROCEDURE — 94761 N-INVAS EAR/PLS OXIMETRY MLT: CPT

## 2024-03-18 PROCEDURE — 85730 THROMBOPLASTIN TIME PARTIAL: CPT

## 2024-03-18 PROCEDURE — 33948 ECMO/ECLS DAILY MGMT-VENOUS: CPT | Performed by: INTERNAL MEDICINE

## 2024-03-18 PROCEDURE — 6360000002 HC RX W HCPCS: Performed by: NURSE PRACTITIONER

## 2024-03-18 PROCEDURE — 82948 REAGENT STRIP/BLOOD GLUCOSE: CPT

## 2024-03-18 RX ORDER — SODIUM CHLORIDE 9 MG/ML
INJECTION, SOLUTION INTRAVENOUS PRN
Status: DISCONTINUED | OUTPATIENT
Start: 2024-03-18 | End: 2024-03-18

## 2024-03-18 RX ORDER — SODIUM CHLORIDE 9 MG/ML
INJECTION, SOLUTION INTRAVENOUS PRN
Status: DISCONTINUED | OUTPATIENT
Start: 2024-03-18 | End: 2024-03-20

## 2024-03-18 RX ADMIN — Medication: at 07:58

## 2024-03-18 RX ADMIN — Medication: at 13:02

## 2024-03-18 RX ADMIN — INSULIN LISPRO 2 UNITS: 100 INJECTION, SOLUTION INTRAVENOUS; SUBCUTANEOUS at 09:41

## 2024-03-18 RX ADMIN — CHLORHEXIDINE GLUCONATE 0.12% ORAL RINSE 15 ML: 1.2 LIQUID ORAL at 20:40

## 2024-03-18 RX ADMIN — Medication 1 TABLET: at 09:40

## 2024-03-18 RX ADMIN — Medication: at 18:13

## 2024-03-18 RX ADMIN — Medication 10 MG/HR: at 09:03

## 2024-03-18 RX ADMIN — Medication 150 MCG/HR: at 04:23

## 2024-03-18 RX ADMIN — Medication: at 22:41

## 2024-03-18 RX ADMIN — SODIUM CHLORIDE, PRESERVATIVE FREE 10 ML: 5 INJECTION INTRAVENOUS at 20:40

## 2024-03-18 RX ADMIN — PANTOPRAZOLE SODIUM 40 MG: 40 INJECTION, POWDER, FOR SOLUTION INTRAVENOUS at 20:40

## 2024-03-18 RX ADMIN — POLYETHYLENE GLYCOL (3350) 17 G: 17 POWDER, FOR SOLUTION ORAL at 09:41

## 2024-03-18 RX ADMIN — Medication 1 MCG/KG/HR: at 05:51

## 2024-03-18 RX ADMIN — POTASSIUM & SODIUM PHOSPHATES POWDER PACK 280-160-250 MG 250 MG: 280-160-250 PACK at 13:52

## 2024-03-18 RX ADMIN — Medication 2 MCG/MIN: at 20:45

## 2024-03-18 RX ADMIN — POTASSIUM & SODIUM PHOSPHATES POWDER PACK 280-160-250 MG 250 MG: 280-160-250 PACK at 09:41

## 2024-03-18 RX ADMIN — Medication: at 07:55

## 2024-03-18 RX ADMIN — AMIODARONE HYDROCHLORIDE 400 MG: 200 TABLET ORAL at 20:39

## 2024-03-18 RX ADMIN — ATORVASTATIN CALCIUM 40 MG: 40 TABLET, FILM COATED ORAL at 20:39

## 2024-03-18 RX ADMIN — PANTOPRAZOLE SODIUM 40 MG: 40 INJECTION, POWDER, FOR SOLUTION INTRAVENOUS at 09:41

## 2024-03-18 RX ADMIN — Medication 6 MMOL: at 15:33

## 2024-03-18 RX ADMIN — SENNOSIDES 8.8 MG: 8.8 LIQUID ORAL at 20:39

## 2024-03-18 RX ADMIN — INSULIN LISPRO 4 UNITS: 100 INJECTION, SOLUTION INTRAVENOUS; SUBCUTANEOUS at 00:33

## 2024-03-18 RX ADMIN — Medication: at 18:12

## 2024-03-18 RX ADMIN — Medication: at 02:54

## 2024-03-18 RX ADMIN — COLLAGENASE SANTYL: 250 OINTMENT TOPICAL at 09:14

## 2024-03-18 RX ADMIN — Medication: at 02:55

## 2024-03-18 RX ADMIN — INSULIN LISPRO 4 UNITS: 100 INJECTION, SOLUTION INTRAVENOUS; SUBCUTANEOUS at 06:24

## 2024-03-18 RX ADMIN — INSULIN LISPRO 4 UNITS: 100 INJECTION, SOLUTION INTRAVENOUS; SUBCUTANEOUS at 13:07

## 2024-03-18 RX ADMIN — SENNOSIDES 8.8 MG: 8.8 LIQUID ORAL at 11:27

## 2024-03-18 RX ADMIN — Medication 150 MCG/HR: at 11:21

## 2024-03-18 RX ADMIN — Medication: at 13:07

## 2024-03-18 RX ADMIN — Medication: at 02:53

## 2024-03-18 RX ADMIN — Medication: at 07:57

## 2024-03-18 RX ADMIN — INSULIN LISPRO 4 UNITS: 100 INJECTION, SOLUTION INTRAVENOUS; SUBCUTANEOUS at 20:39

## 2024-03-18 RX ADMIN — Medication 1 MCG/KG/HR: at 22:00

## 2024-03-18 RX ADMIN — Medication 1 MCG/KG/HR: at 16:50

## 2024-03-18 RX ADMIN — AMIODARONE HYDROCHLORIDE 400 MG: 200 TABLET ORAL at 09:41

## 2024-03-18 RX ADMIN — MILRINONE LACTATE IN DEXTROSE 0.12 MCG/KG/MIN: 200 INJECTION, SOLUTION INTRAVENOUS at 15:20

## 2024-03-18 RX ADMIN — Medication: at 13:05

## 2024-03-18 RX ADMIN — CHLORHEXIDINE GLUCONATE 0.12% ORAL RINSE 15 ML: 1.2 LIQUID ORAL at 09:52

## 2024-03-18 RX ADMIN — SODIUM CHLORIDE, PRESERVATIVE FREE 10 ML: 5 INJECTION INTRAVENOUS at 11:24

## 2024-03-18 RX ADMIN — Medication 10 MG/HR: at 19:25

## 2024-03-18 RX ADMIN — Medication 1 MCG/KG/HR: at 11:20

## 2024-03-18 RX ADMIN — Medication: at 18:11

## 2024-03-18 RX ADMIN — Medication 150 MCG/HR: at 18:22

## 2024-03-18 ASSESSMENT — PULMONARY FUNCTION TESTS
PIF_VALUE: 16
PIF_VALUE: 15
PIF_VALUE: 16
PIF_VALUE: 14
PIF_VALUE: 16
PIF_VALUE: 14
PIF_VALUE: 15

## 2024-03-19 ENCOUNTER — APPOINTMENT (OUTPATIENT)
Dept: GENERAL RADIOLOGY | Age: 69
DRG: 003 | End: 2024-03-19
Attending: THORACIC SURGERY (CARDIOTHORACIC VASCULAR SURGERY)
Payer: MEDICARE

## 2024-03-19 ENCOUNTER — ANESTHESIA (OUTPATIENT)
Dept: OPERATING ROOM | Age: 69
End: 2024-03-19
Payer: MEDICARE

## 2024-03-19 LAB
ACINETOBACTER CALCOACETICUS-BAUMANNII BY PCR: NOT DETECTED
ALBUMIN SERPL BCG-MCNC: 2.7 G/DL (ref 3.5–5.1)
ALBUMIN SERPL BCG-MCNC: 3 G/DL (ref 3.5–5.1)
ALBUMIN SERPL BCG-MCNC: 3.4 G/DL (ref 3.5–5.1)
ALBUMIN SERPL BCG-MCNC: 3.5 G/DL (ref 3.5–5.1)
ALBUMIN SERPL BCG-MCNC: 3.7 G/DL (ref 3.5–5.1)
ALBUMIN SERPL BCG-MCNC: 3.8 G/DL (ref 3.5–5.1)
ALP SERPL-CCNC: 275 U/L (ref 38–126)
ALP SERPL-CCNC: 312 U/L (ref 38–126)
ALP SERPL-CCNC: 471 U/L (ref 38–126)
ALP SERPL-CCNC: 507 U/L (ref 38–126)
ALP SERPL-CCNC: 631 U/L (ref 38–126)
ALP SERPL-CCNC: 641 U/L (ref 38–126)
ALT SERPL W/O P-5'-P-CCNC: 11 U/L (ref 11–66)
ALT SERPL W/O P-5'-P-CCNC: 13 U/L (ref 11–66)
ALT SERPL W/O P-5'-P-CCNC: 14 U/L (ref 11–66)
ALT SERPL W/O P-5'-P-CCNC: 15 U/L (ref 11–66)
ALT SERPL W/O P-5'-P-CCNC: 16 U/L (ref 11–66)
ALT SERPL W/O P-5'-P-CCNC: 17 U/L (ref 11–66)
ANION GAP SERPL CALC-SCNC: 10 MEQ/L (ref 8–16)
ANION GAP SERPL CALC-SCNC: 10 MEQ/L (ref 8–16)
ANION GAP SERPL CALC-SCNC: 11 MEQ/L (ref 8–16)
ANION GAP SERPL CALC-SCNC: 11 MEQ/L (ref 8–16)
ANION GAP SERPL CALC-SCNC: 13 MEQ/L (ref 8–16)
ANION GAP SERPL CALC-SCNC: 8 MEQ/L (ref 8–16)
APTT PPP: 30.5 SECONDS (ref 22–38)
APTT PPP: 37.2 SECONDS (ref 22–38)
APTT PPP: 55.8 SECONDS (ref 22–38)
ARTERIAL PATENCY WRIST A: ABNORMAL
AST SERPL-CCNC: 43 U/L (ref 5–40)
AST SERPL-CCNC: 51 U/L (ref 5–40)
AST SERPL-CCNC: 59 U/L (ref 5–40)
AST SERPL-CCNC: 64 U/L (ref 5–40)
AST SERPL-CCNC: 66 U/L (ref 5–40)
AST SERPL-CCNC: 71 U/L (ref 5–40)
BASE EXCESS BLDA CALC-SCNC: -0.4 MMOL/L (ref -2.5–2.5)
BASE EXCESS BLDA CALC-SCNC: -0.5 MMOL/L (ref -2.5–2.5)
BASE EXCESS BLDA CALC-SCNC: -0.6 MMOL/L (ref -2.5–2.5)
BASE EXCESS BLDA CALC-SCNC: -0.7 MMOL/L (ref -2.5–2.5)
BASE EXCESS BLDA CALC-SCNC: -1.6 MMOL/L (ref -2.5–2.5)
BASE EXCESS BLDA CALC-SCNC: -1.6 MMOL/L (ref -2–3)
BASE EXCESS BLDA CALC-SCNC: -1.7 MMOL/L (ref -2.5–2.5)
BASE EXCESS BLDA CALC-SCNC: -2.8 MMOL/L (ref -2.5–2.5)
BASE EXCESS BLDA CALC-SCNC: -2.9 MMOL/L (ref -2.5–2.5)
BASE EXCESS BLDA CALC-SCNC: -3 MMOL/L (ref -2.5–2.5)
BASE EXCESS BLDA CALC-SCNC: -3.2 MMOL/L (ref -2–3)
BASE EXCESS BLDA CALC-SCNC: -3.3 MMOL/L (ref -2.5–2.5)
BASE EXCESS BLDA CALC-SCNC: -4.5 MMOL/L (ref -2–3)
BASE EXCESS BLDA CALC-SCNC: -4.8 MMOL/L (ref -2–3)
BASE EXCESS BLDA CALC-SCNC: -4.9 MMOL/L (ref -2.5–2.5)
BASE EXCESS BLDA CALC-SCNC: -5.6 MMOL/L (ref -2.5–2.5)
BASE EXCESS BLDA CALC-SCNC: 0 MMOL/L (ref -2.5–2.5)
BASE EXCESS BLDA CALC-SCNC: 0.2 MMOL/L (ref -2.5–2.5)
BDY SITE: ABNORMAL
BILIRUB SERPL-MCNC: 2.1 MG/DL (ref 0.3–1.2)
BILIRUB SERPL-MCNC: 2.2 MG/DL (ref 0.3–1.2)
BILIRUB SERPL-MCNC: 2.3 MG/DL (ref 0.3–1.2)
BILIRUB SERPL-MCNC: 2.5 MG/DL (ref 0.3–1.2)
BILIRUB SERPL-MCNC: 2.7 MG/DL (ref 0.3–1.2)
BILIRUB SERPL-MCNC: 3 MG/DL (ref 0.3–1.2)
BLACTX-M ISLT/SPM QL: NORMAL
BLAIMP ISLT/SPM QL: NORMAL
BLAKPC ISLT/SPM QL: NORMAL
BLAOXA-48-LIKE ISLT/SPM QL: NORMAL
BLAVIM ISLT/SPM QL: NORMAL
BREATHS SETTING VENT: 12 BPM
BREATHS SETTING VENT: 12 BPM
BREATHS SETTING VENT: 16 BPM
BREATHS SETTING VENT: 16 BPM
BREATHS SETTING VENT: 20 BPM
BUN SERPL-MCNC: 25 MG/DL (ref 7–22)
BUN SERPL-MCNC: 26 MG/DL (ref 7–22)
BUN SERPL-MCNC: 26 MG/DL (ref 7–22)
BUN SERPL-MCNC: 28 MG/DL (ref 7–22)
BUN SERPL-MCNC: 30 MG/DL (ref 7–22)
BUN SERPL-MCNC: 31 MG/DL (ref 7–22)
C PNEUM DNA LOWER RESP QL NAA+NON-PROBE: NOT DETECTED
CA-I BLD ISE-SCNC: 1.26 MMOL/L (ref 1.12–1.32)
CA-I BLD ISE-SCNC: 1.27 MMOL/L (ref 1.12–1.32)
CA-I BLD ISE-SCNC: 1.31 MMOL/L (ref 1.12–1.32)
CA-I BLD ISE-SCNC: 1.34 MMOL/L (ref 1.12–1.32)
CA-I BLD ISE-SCNC: 1.37 MMOL/L (ref 1.12–1.32)
CA-I BLD ISE-SCNC: 1.37 MMOL/L (ref 1.12–1.32)
CA-I BLD ISE-SCNC: 1.39 MMOL/L (ref 1.12–1.32)
CALCIUM SERPL-MCNC: 8.4 MG/DL (ref 8.5–10.5)
CALCIUM SERPL-MCNC: 9.3 MG/DL (ref 8.5–10.5)
CALCIUM SERPL-MCNC: 9.5 MG/DL (ref 8.5–10.5)
CALCIUM SERPL-MCNC: 9.7 MG/DL (ref 8.5–10.5)
CALCIUM SERPL-MCNC: 9.9 MG/DL (ref 8.5–10.5)
CALCIUM SERPL-MCNC: 9.9 MG/DL (ref 8.5–10.5)
CHLORIDE SERPL-SCNC: 101 MEQ/L (ref 98–111)
CHLORIDE SERPL-SCNC: 101 MEQ/L (ref 98–111)
CHLORIDE SERPL-SCNC: 103 MEQ/L (ref 98–111)
CHLORIDE SERPL-SCNC: 106 MEQ/L (ref 98–111)
CO2 SERPL-SCNC: 19 MEQ/L (ref 23–33)
CO2 SERPL-SCNC: 21 MEQ/L (ref 23–33)
CO2 SERPL-SCNC: 22 MEQ/L (ref 23–33)
CO2 SERPL-SCNC: 23 MEQ/L (ref 23–33)
CO2 SERPL-SCNC: 23 MEQ/L (ref 23–33)
CO2 SERPL-SCNC: 24 MEQ/L (ref 23–33)
COLLECTED BY:: ABNORMAL
COLLECTED BY:: NORMAL
CREAT SERPL-MCNC: 0.7 MG/DL (ref 0.4–1.2)
CREAT SERPL-MCNC: 0.7 MG/DL (ref 0.4–1.2)
CREAT SERPL-MCNC: 0.8 MG/DL (ref 0.4–1.2)
CREAT SERPL-MCNC: 0.8 MG/DL (ref 0.4–1.2)
CREAT SERPL-MCNC: 0.9 MG/DL (ref 0.4–1.2)
CREAT SERPL-MCNC: 0.9 MG/DL (ref 0.4–1.2)
DEPRECATED RDW RBC AUTO: 54.7 FL (ref 35–45)
DEPRECATED RDW RBC AUTO: 56.2 FL (ref 35–45)
DEPRECATED RDW RBC AUTO: 56.5 FL (ref 35–45)
DEPRECATED RDW RBC AUTO: 57.2 FL (ref 35–45)
DEPRECATED RDW RBC AUTO: 57.9 FL (ref 35–45)
DEPRECATED RDW RBC AUTO: 58.2 FL (ref 35–45)
DEVICE: ABNORMAL
DEVICE: NORMAL
EKG Q-T INTERVAL: 400 MS
EKG QRS DURATION: 140 MS
EKG QTC CALCULATION (BAZETT): 497 MS
EKG R AXIS: -18 DEGREES
EKG T AXIS: 109 DEGREES
EKG VENTRICULAR RATE: 93 BPM
ENTEROBACTER CLOACAE COMPLEX BY PCR: NOT DETECTED
ERYTHROCYTE [DISTWIDTH] IN BLOOD BY AUTOMATED COUNT: 17.3 % (ref 11.5–14.5)
ERYTHROCYTE [DISTWIDTH] IN BLOOD BY AUTOMATED COUNT: 17.5 % (ref 11.5–14.5)
ERYTHROCYTE [DISTWIDTH] IN BLOOD BY AUTOMATED COUNT: 17.5 % (ref 11.5–14.5)
ERYTHROCYTE [DISTWIDTH] IN BLOOD BY AUTOMATED COUNT: 18.2 % (ref 11.5–14.5)
ERYTHROCYTE [DISTWIDTH] IN BLOOD BY AUTOMATED COUNT: 18.3 % (ref 11.5–14.5)
ERYTHROCYTE [DISTWIDTH] IN BLOOD BY AUTOMATED COUNT: 18.4 % (ref 11.5–14.5)
ESCHERICHIA COLI BY PCR: NOT DETECTED
FIBRINOGEN PPP-MCNC: 214 MG/100ML (ref 155–475)
FIBRINOGEN PPP-MCNC: 441 MG/100ML (ref 155–475)
FIBRINOGEN PPP-MCNC: 450 MG/100ML (ref 155–475)
FIO2 ON VENT O2 ANALYZER: 100 %
FIO2 ON VENT O2 ANALYZER: 50 %
FIO2 ON VENT O2 ANALYZER: 70 %
FIO2 ON VENT O2 ANALYZER: 75 %
FLUAV RNA LOWER RESP QL NAA+NON-PROBE: NOT DETECTED
FLUBV RNA LOWER RESP QL NAA+NON-PROBE: NOT DETECTED
GFR SERPL CREATININE-BSD FRML MDRD: > 60 ML/MIN/1.73M2
GLUCOSE BLD STRIP.AUTO-MCNC: 109 MG/DL (ref 70–108)
GLUCOSE BLD STRIP.AUTO-MCNC: 109 MG/DL (ref 70–108)
GLUCOSE BLD STRIP.AUTO-MCNC: 116 MG/DL (ref 70–108)
GLUCOSE BLD STRIP.AUTO-MCNC: 170 MG/DL (ref 70–108)
GLUCOSE BLD STRIP.AUTO-MCNC: 99 MG/DL (ref 70–108)
GLUCOSE BLD-MCNC: 144 MG/DL (ref 70–108)
GLUCOSE BLD-MCNC: 191 MG/DL (ref 70–108)
GLUCOSE BLD-MCNC: 193 MG/DL (ref 70–108)
GLUCOSE BLD-MCNC: 195 MG/DL (ref 70–108)
GLUCOSE BLD-MCNC: 216 MG/DL (ref 70–108)
GLUCOSE BLD-MCNC: 230 MG/DL (ref 70–108)
GLUCOSE BLD-MCNC: 231 MG/DL (ref 70–108)
GLUCOSE BLD-MCNC: 237 MG/DL (ref 70–108)
GLUCOSE SERPL-MCNC: 106 MG/DL (ref 70–108)
GLUCOSE SERPL-MCNC: 178 MG/DL (ref 70–108)
GLUCOSE SERPL-MCNC: 195 MG/DL (ref 70–108)
GLUCOSE SERPL-MCNC: 198 MG/DL (ref 70–108)
GLUCOSE SERPL-MCNC: 221 MG/DL (ref 70–108)
GLUCOSE SERPL-MCNC: 235 MG/DL (ref 70–108)
HADV DNA LOWER RESP QL NAA+NON-PROBE: NOT DETECTED
HAEMOPHILUS INFLUENZAE BY PCR: NOT DETECTED
HCO3 BLDA-SCNC: 20 MMOL/L (ref 23–28)
HCO3 BLDA-SCNC: 20 MMOL/L (ref 23–28)
HCO3 BLDA-SCNC: 21 MMOL/L (ref 23–28)
HCO3 BLDA-SCNC: 21 MMOL/L (ref 23–28)
HCO3 BLDA-SCNC: 22 MMOL/L (ref 23–28)
HCO3 BLDA-SCNC: 22 MMOL/L (ref 23–28)
HCO3 BLDA-SCNC: 23 MMOL/L (ref 23–28)
HCO3 BLDA-SCNC: 24 MMOL/L (ref 23–28)
HCO3 BLDA-SCNC: 24 MMOL/L (ref 23–28)
HCO3 BLDA-SCNC: 25 MMOL/L (ref 23–28)
HCO3 BLDA-SCNC: 27 MMOL/L (ref 23–28)
HCO3 BLDA-SCNC: 29 MMOL/L (ref 23–28)
HCOV RNA LOWER RESP QL NAA+NON-PROBE: NOT DETECTED
HCT VFR BLD AUTO: 23.6 % (ref 42–52)
HCT VFR BLD AUTO: 27.5 % (ref 42–52)
HCT VFR BLD AUTO: 29.5 % (ref 42–52)
HCT VFR BLD AUTO: 29.9 % (ref 42–52)
HCT VFR BLD AUTO: 34.8 % (ref 42–52)
HCT VFR BLD AUTO: 43.3 % (ref 42–52)
HEMOCCULT STL QL: POSITIVE
HEMOGLOBIN FINGERSTICK, POC: 10.1 G/DL (ref 14–18)
HEMOGLOBIN FINGERSTICK, POC: 12.2 G/DL (ref 14–18)
HEMOGLOBIN FINGERSTICK, POC: 9.8 G/DL (ref 14–18)
HGB BLD-MCNC: 10 GM/DL (ref 14–18)
HGB BLD-MCNC: 10.2 GM/DL (ref 14–18)
HGB BLD-MCNC: 11.5 GM/DL (ref 14–18)
HGB BLD-MCNC: 14.6 GM/DL (ref 14–18)
HGB BLD-MCNC: 8 GM/DL (ref 14–18)
HGB BLD-MCNC: 9.3 GM/DL (ref 14–18)
HMPV RNA LOWER RESP QL NAA+NON-PROBE: NOT DETECTED
HPIV RNA LOWER RESP QL NAA+NON-PROBE: NOT DETECTED
KCT BLD-ACNC: 163 SECONDS (ref 1–150)
KCT BLD-ACNC: 168 SECONDS (ref 1–150)
KCT BLD-ACNC: 309 SECONDS (ref 1–150)
KLEBSIELLA AEROGENES BY PCR: NOT DETECTED
KLEBSIELLA OXYTOCA BY PCR: NOT DETECTED
KLEBSIELLA PNEUMONIAE GROUP BY PCR: NOT DETECTED
L PNEUMO DNA LOWER RESP QL NAA+NON-PROBE: NOT DETECTED
LACTATE SERPL-SCNC: 1 MMOL/L (ref 0.5–2)
LACTATE SERPL-SCNC: 1.1 MMOL/L (ref 0.5–2)
LACTATE SERPL-SCNC: 1.1 MMOL/L (ref 0.5–2)
LACTATE SERPL-SCNC: 2.1 MMOL/L (ref 0.5–2)
M PNEUMO DNA LOWER RESP QL NAA+NON-PROBE: NOT DETECTED
MAGNESIUM SERPL-MCNC: 2.5 MG/DL (ref 1.6–2.4)
MAGNESIUM SERPL-MCNC: 2.6 MG/DL (ref 1.6–2.4)
MAGNESIUM SERPL-MCNC: 2.6 MG/DL (ref 1.6–2.4)
MCH RBC QN AUTO: 29.7 PG (ref 26–33)
MCH RBC QN AUTO: 30 PG (ref 26–33)
MCH RBC QN AUTO: 30 PG (ref 26–33)
MCH RBC QN AUTO: 30.7 PG (ref 26–33)
MCH RBC QN AUTO: 31 PG (ref 26–33)
MCH RBC QN AUTO: 31.3 PG (ref 26–33)
MCHC RBC AUTO-ENTMCNC: 33 GM/DL (ref 32.2–35.5)
MCHC RBC AUTO-ENTMCNC: 33.7 GM/DL (ref 32.2–35.5)
MCHC RBC AUTO-ENTMCNC: 33.8 GM/DL (ref 32.2–35.5)
MCHC RBC AUTO-ENTMCNC: 33.9 GM/DL (ref 32.2–35.5)
MCHC RBC AUTO-ENTMCNC: 33.9 GM/DL (ref 32.2–35.5)
MCHC RBC AUTO-ENTMCNC: 34.1 GM/DL (ref 32.2–35.5)
MCV RBC AUTO: 88.2 FL (ref 80–94)
MCV RBC AUTO: 88.6 FL (ref 80–94)
MCV RBC AUTO: 90.8 FL (ref 80–94)
MCV RBC AUTO: 90.9 FL (ref 80–94)
MCV RBC AUTO: 91.5 FL (ref 80–94)
MCV RBC AUTO: 91.7 FL (ref 80–94)
MORAXELLA CATARRHALIS BY PCR: NOT DETECTED
PCO2 BLDA: 32 MMHG (ref 35–45)
PCO2 BLDA: 32 MMHG (ref 35–45)
PCO2 BLDA: 34 MMHG (ref 35–45)
PCO2 BLDA: 35 MMHG (ref 35–45)
PCO2 BLDA: 36 MMHG (ref 35–45)
PCO2 BLDA: 36 MMHG (ref 35–45)
PCO2 BLDA: 39 MMHG (ref 35–45)
PCO2 BLDA: 42 MMHG (ref 35–45)
PCO2 BLDA: 44 MMHG (ref 35–45)
PCO2 BLDA: 45 MMHG (ref 35–45)
PCO2 BLDA: 45 MMHG (ref 35–45)
PCO2 BLDA: 46 MMHG (ref 35–45)
PCO2 BLDA: 71 MMHG (ref 35–45)
PCO2 BLDA: 72 MMHG (ref 35–45)
PCO2 TEMP ADJ BLDMV: 35 MMHG (ref 41–51)
PCO2 TEMP ADJ BLDMV: 42 MMHG (ref 41–51)
PCO2 TEMP ADJ BLDMV: 48 MMHG (ref 41–51)
PCO2 TEMP ADJ BLDMV: 58 MMHG (ref 41–51)
PEEP SETTING VENT: 10 MMHG
PEEP SETTING VENT: 6 MMHG
PH BLDA: 7.19 [PH] (ref 7.35–7.45)
PH BLDA: 7.22 [PH] (ref 7.35–7.45)
PH BLDA: 7.27 [PH] (ref 7.35–7.45)
PH BLDA: 7.33 [PH] (ref 7.35–7.45)
PH BLDA: 7.35 [PH] (ref 7.35–7.45)
PH BLDA: 7.35 [PH] (ref 7.35–7.45)
PH BLDA: 7.36 [PH] (ref 7.35–7.45)
PH BLDA: 7.37 [PH] (ref 7.35–7.45)
PH BLDA: 7.38 [PH] (ref 7.35–7.45)
PH BLDA: 7.39 [PH] (ref 7.35–7.45)
PH BLDA: 7.41 [PH] (ref 7.35–7.45)
PH BLDA: 7.41 [PH] (ref 7.35–7.45)
PH BLDA: 7.47 [PH] (ref 7.35–7.45)
PH BLDA: 7.47 [PH] (ref 7.35–7.45)
PH BLDMV: 7.21 [PH] (ref 7.31–7.41)
PH BLDMV: 7.3 [PH] (ref 7.31–7.41)
PH BLDMV: 7.36 [PH] (ref 7.31–7.41)
PH BLDMV: 7.37 [PH] (ref 7.31–7.41)
PHOSPHATE SERPL-MCNC: 2.4 MG/DL (ref 2.4–4.7)
PHOSPHATE SERPL-MCNC: 2.5 MG/DL (ref 2.4–4.7)
PHOSPHATE SERPL-MCNC: 2.5 MG/DL (ref 2.4–4.7)
PHOSPHATE SERPL-MCNC: 2.8 MG/DL (ref 2.4–4.7)
PHOSPHATE SERPL-MCNC: 3.3 MG/DL (ref 2.4–4.7)
PHOSPHATE SERPL-MCNC: 3.5 MG/DL (ref 2.4–4.7)
PIP: 18 CMH2O
PIP: 22 CMH2O
PIP: 24 CMH2O
PIP: 28 CMH2O
PLATELET # BLD AUTO: 115 THOU/MM3 (ref 130–400)
PLATELET # BLD AUTO: 62 THOU/MM3 (ref 130–400)
PLATELET # BLD AUTO: 73 THOU/MM3 (ref 130–400)
PLATELET # BLD AUTO: 88 THOU/MM3 (ref 130–400)
PLATELET # BLD AUTO: 92 THOU/MM3 (ref 130–400)
PLATELET # BLD AUTO: 95 THOU/MM3 (ref 130–400)
PMV BLD AUTO: 10 FL (ref 9.4–12.4)
PMV BLD AUTO: 10.9 FL (ref 9.4–12.4)
PMV BLD AUTO: 11.1 FL (ref 9.4–12.4)
PMV BLD AUTO: 11.2 FL (ref 9.4–12.4)
PMV BLD AUTO: 11.3 FL (ref 9.4–12.4)
PMV BLD AUTO: 11.8 FL (ref 9.4–12.4)
PO2 BLDA: 107 MMHG (ref 71–104)
PO2 BLDA: 124 MMHG (ref 71–104)
PO2 BLDA: 401 MMHG (ref 71–104)
PO2 BLDA: 416 MMHG (ref 71–104)
PO2 BLDA: 417 MMHG (ref 71–104)
PO2 BLDA: 444 MMHG (ref 71–104)
PO2 BLDA: 45 MMHG (ref 71–104)
PO2 BLDA: 456 MMHG (ref 71–104)
PO2 BLDA: 464 MMHG (ref 71–104)
PO2 BLDA: 468 MMHG (ref 71–104)
PO2 BLDA: 483 MMHG (ref 71–104)
PO2 BLDA: 49 MMHG (ref 71–104)
PO2 BLDA: 96 MMHG (ref 71–104)
PO2 BLDA: 98 MMHG (ref 71–104)
PO2 BLDMV: 37 MMHG (ref 25–40)
PO2 BLDMV: 37 MMHG (ref 25–40)
PO2 BLDMV: 38 MMHG (ref 25–40)
PO2 BLDMV: 48 MMHG (ref 25–40)
POTASSIUM BLD-SCNC: 4.6 MEQ/L (ref 3.5–4.9)
POTASSIUM BLD-SCNC: 4.7 MEQ/L (ref 3.5–4.9)
POTASSIUM SERPL-SCNC: 4.5 MEQ/L (ref 3.5–5.2)
POTASSIUM SERPL-SCNC: 4.5 MEQ/L (ref 3.5–5.2)
POTASSIUM SERPL-SCNC: 4.7 MEQ/L (ref 3.5–5.2)
POTASSIUM SERPL-SCNC: 4.7 MEQ/L (ref 3.5–5.2)
POTASSIUM SERPL-SCNC: 4.8 MEQ/L (ref 3.5–5.2)
POTASSIUM SERPL-SCNC: 5 MEQ/L (ref 3.5–5.2)
PRESSURE SUPPORT SETTING VENT: 12 CMH2O
PROCALCITONIN SERPL IA-MCNC: 0.95 NG/ML (ref 0.01–0.09)
PROCALCITONIN SERPL IA-MCNC: 1.3 NG/ML (ref 0.01–0.09)
PROT SERPL-MCNC: 3.8 G/DL (ref 6.1–8)
PROT SERPL-MCNC: 4.4 G/DL (ref 6.1–8)
PROT SERPL-MCNC: 4.9 G/DL (ref 6.1–8)
PROT SERPL-MCNC: 5 G/DL (ref 6.1–8)
PROT SERPL-MCNC: 5.1 G/DL (ref 6.1–8)
PROT SERPL-MCNC: 5.2 G/DL (ref 6.1–8)
PROTEUS SPECIES BY PCR: NOT DETECTED
PSEUDOMONAS AERUGINOSA BY PCR: NOT DETECTED
RBC # BLD AUTO: 2.58 MILL/MM3 (ref 4.7–6.1)
RBC # BLD AUTO: 3.03 MILL/MM3 (ref 4.7–6.1)
RBC # BLD AUTO: 3.26 MILL/MM3 (ref 4.7–6.1)
RBC # BLD AUTO: 3.33 MILL/MM3 (ref 4.7–6.1)
RBC # BLD AUTO: 3.83 MILL/MM3 (ref 4.7–6.1)
RBC # BLD AUTO: 4.91 MILL/MM3 (ref 4.7–6.1)
RESISTANT GENE MECA/C & MREJ BY PCR: NORMAL
RESISTANT GENE NDM BY PCR: NORMAL
RSV RNA LOWER RESP QL NAA+NON-PROBE: NOT DETECTED
RV+EV RNA LOWER RESP QL NAA+NON-PROBE: NOT DETECTED
SAO2 % BLDA: 100 %
SAO2 % BLDA: 80 %
SAO2 % BLDA: 82 %
SAO2 % BLDA: 97 %
SAO2 % BLDA: 98 %
SAO2 % BLDMV: 64 %
SAO2 % BLDMV: 68 %
SAO2 % BLDMV: 71 %
SAO2 % BLDMV: 74 %
SCAN OF BLOOD SMEAR: NORMAL
SCAN OF BLOOD SMEAR: NORMAL
SERRATIA MARCESCENS BY PCR: NOT DETECTED
SET PEEP: 6 MMHG
SET PRESS SUPP: 12 CMH2O
SET PRESS SUPP: 12 CMH2O
SET RESPIRATORY RATE: 12 BPM
SET RESPIRATORY RATE: 20 BPM
SITE: ABNORMAL
SITE: NORMAL
SODIUM BLD-SCNC: 135 MEQ/L (ref 138–146)
SODIUM BLD-SCNC: 136 MEQ/L (ref 138–146)
SODIUM BLD-SCNC: 137 MEQ/L (ref 138–146)
SODIUM SERPL-SCNC: 132 MEQ/L (ref 135–145)
SODIUM SERPL-SCNC: 134 MEQ/L (ref 135–145)
SODIUM SERPL-SCNC: 134 MEQ/L (ref 135–145)
SODIUM SERPL-SCNC: 136 MEQ/L (ref 135–145)
SODIUM SERPL-SCNC: 138 MEQ/L (ref 135–145)
SODIUM SERPL-SCNC: 138 MEQ/L (ref 135–145)
SOURCE: NORMAL
SPECIMEN ACCEPTABILITY: NORMAL
STAPH AUREUS BY PCR: NOT DETECTED
STREP AGALACTIAE BY PCR: NOT DETECTED
STREP PNEUMONIAE BY PCR: NOT DETECTED
STREP PYOGENES BY PCR: NOT DETECTED
VENTILATION MODE VENT: ABNORMAL
VENTILATION MODE VENT: AC
VENTILATION MODE VENT: NORMAL
WBC # BLD AUTO: 14.4 THOU/MM3 (ref 4.8–10.8)
WBC # BLD AUTO: 14.7 THOU/MM3 (ref 4.8–10.8)
WBC # BLD AUTO: 16.2 THOU/MM3 (ref 4.8–10.8)
WBC # BLD AUTO: 18.4 THOU/MM3 (ref 4.8–10.8)
WBC # BLD AUTO: 19.1 THOU/MM3 (ref 4.8–10.8)
WBC # BLD AUTO: 24.8 THOU/MM3 (ref 4.8–10.8)

## 2024-03-19 PROCEDURE — 71045 X-RAY EXAM CHEST 1 VIEW: CPT

## 2024-03-19 PROCEDURE — 36415 COLL VENOUS BLD VENIPUNCTURE: CPT

## 2024-03-19 PROCEDURE — C1768 GRAFT, VASCULAR: HCPCS | Performed by: THORACIC SURGERY (CARDIOTHORACIC VASCULAR SURGERY)

## 2024-03-19 PROCEDURE — P9041 ALBUMIN (HUMAN),5%, 50ML: HCPCS | Performed by: THORACIC SURGERY (CARDIOTHORACIC VASCULAR SURGERY)

## 2024-03-19 PROCEDURE — 84100 ASSAY OF PHOSPHORUS: CPT

## 2024-03-19 PROCEDURE — 36430 TRANSFUSION BLD/BLD COMPNT: CPT

## 2024-03-19 PROCEDURE — 85018 HEMOGLOBIN: CPT

## 2024-03-19 PROCEDURE — 87486 CHLMYD PNEUM DNA AMP PROBE: CPT

## 2024-03-19 PROCEDURE — 6360000002 HC RX W HCPCS: Performed by: THORACIC SURGERY (CARDIOTHORACIC VASCULAR SURGERY)

## 2024-03-19 PROCEDURE — 2500000003 HC RX 250 WO HCPCS: Performed by: INTERNAL MEDICINE

## 2024-03-19 PROCEDURE — 33986 ECMO/ECLS RMVL CTR CANNULA: CPT | Performed by: THORACIC SURGERY (CARDIOTHORACIC VASCULAR SURGERY)

## 2024-03-19 PROCEDURE — 87070 CULTURE OTHR SPECIMN AEROBIC: CPT

## 2024-03-19 PROCEDURE — 6370000000 HC RX 637 (ALT 250 FOR IP): Performed by: NURSE ANESTHETIST, CERTIFIED REGISTERED

## 2024-03-19 PROCEDURE — 6360000002 HC RX W HCPCS: Performed by: NURSE PRACTITIONER

## 2024-03-19 PROCEDURE — 6370000000 HC RX 637 (ALT 250 FOR IP): Performed by: STUDENT IN AN ORGANIZED HEALTH CARE EDUCATION/TRAINING PROGRAM

## 2024-03-19 PROCEDURE — 6360000002 HC RX W HCPCS: Performed by: NURSE ANESTHETIST, CERTIFIED REGISTERED

## 2024-03-19 PROCEDURE — 6360000002 HC RX W HCPCS: Performed by: PHYSICIAN ASSISTANT

## 2024-03-19 PROCEDURE — 3700000001 HC ADD 15 MINUTES (ANESTHESIA): Performed by: THORACIC SURGERY (CARDIOTHORACIC VASCULAR SURGERY)

## 2024-03-19 PROCEDURE — 85384 FIBRINOGEN ACTIVITY: CPT

## 2024-03-19 PROCEDURE — 37799 UNLISTED PX VASCULAR SURGERY: CPT

## 2024-03-19 PROCEDURE — C9113 INJ PANTOPRAZOLE SODIUM, VIA: HCPCS | Performed by: THORACIC SURGERY (CARDIOTHORACIC VASCULAR SURGERY)

## 2024-03-19 PROCEDURE — 87798 DETECT AGENT NOS DNA AMP: CPT

## 2024-03-19 PROCEDURE — 2500000003 HC RX 250 WO HCPCS: Performed by: STUDENT IN AN ORGANIZED HEALTH CARE EDUCATION/TRAINING PROGRAM

## 2024-03-19 PROCEDURE — 82947 ASSAY GLUCOSE BLOOD QUANT: CPT

## 2024-03-19 PROCEDURE — 2580000003 HC RX 258: Performed by: PHYSICIAN ASSISTANT

## 2024-03-19 PROCEDURE — 83735 ASSAY OF MAGNESIUM: CPT

## 2024-03-19 PROCEDURE — C1729 CATH, DRAINAGE: HCPCS | Performed by: THORACIC SURGERY (CARDIOTHORACIC VASCULAR SURGERY)

## 2024-03-19 PROCEDURE — 6360000002 HC RX W HCPCS: Performed by: INTERNAL MEDICINE

## 2024-03-19 PROCEDURE — 87541 LEGION PNEUMO DNA AMP PROB: CPT

## 2024-03-19 PROCEDURE — 2580000003 HC RX 258: Performed by: ANESTHESIOLOGY

## 2024-03-19 PROCEDURE — 84295 ASSAY OF SERUM SODIUM: CPT

## 2024-03-19 PROCEDURE — 3700000000 HC ANESTHESIA ATTENDED CARE: Performed by: THORACIC SURGERY (CARDIOTHORACIC VASCULAR SURGERY)

## 2024-03-19 PROCEDURE — 2000000000 HC ICU R&B

## 2024-03-19 PROCEDURE — 82330 ASSAY OF CALCIUM: CPT

## 2024-03-19 PROCEDURE — 2580000003 HC RX 258: Performed by: THORACIC SURGERY (CARDIOTHORACIC VASCULAR SURGERY)

## 2024-03-19 PROCEDURE — 84145 PROCALCITONIN (PCT): CPT

## 2024-03-19 PROCEDURE — 93005 ELECTROCARDIOGRAM TRACING: CPT | Performed by: INTERNAL MEDICINE

## 2024-03-19 PROCEDURE — 85347 COAGULATION TIME ACTIVATED: CPT

## 2024-03-19 PROCEDURE — 2709999900 HC NON-CHARGEABLE SUPPLY: Performed by: THORACIC SURGERY (CARDIOTHORACIC VASCULAR SURGERY)

## 2024-03-19 PROCEDURE — 80053 COMPREHEN METABOLIC PANEL: CPT

## 2024-03-19 PROCEDURE — 2580000003 HC RX 258: Performed by: INTERNAL MEDICINE

## 2024-03-19 PROCEDURE — 2500000003 HC RX 250 WO HCPCS: Performed by: NURSE PRACTITIONER

## 2024-03-19 PROCEDURE — 93005 ELECTROCARDIOGRAM TRACING: CPT | Performed by: STUDENT IN AN ORGANIZED HEALTH CARE EDUCATION/TRAINING PROGRAM

## 2024-03-19 PROCEDURE — 2580000003 HC RX 258: Performed by: NURSE PRACTITIONER

## 2024-03-19 PROCEDURE — 99233 SBSQ HOSP IP/OBS HIGH 50: CPT | Performed by: INTERNAL MEDICINE

## 2024-03-19 PROCEDURE — 84132 ASSAY OF SERUM POTASSIUM: CPT

## 2024-03-19 PROCEDURE — 85027 COMPLETE CBC AUTOMATED: CPT

## 2024-03-19 PROCEDURE — 82948 REAGENT STRIP/BLOOD GLUCOSE: CPT

## 2024-03-19 PROCEDURE — 2500000003 HC RX 250 WO HCPCS: Performed by: THORACIC SURGERY (CARDIOTHORACIC VASCULAR SURGERY)

## 2024-03-19 PROCEDURE — 87631 RESP VIRUS 3-5 TARGETS: CPT

## 2024-03-19 PROCEDURE — 93010 ELECTROCARDIOGRAM REPORT: CPT | Performed by: INTERNAL MEDICINE

## 2024-03-19 PROCEDURE — 3600000018 HC SURGERY OHS ADDTL 15MIN: Performed by: THORACIC SURGERY (CARDIOTHORACIC VASCULAR SURGERY)

## 2024-03-19 PROCEDURE — 33948 ECMO/ECLS DAILY MGMT-VENOUS: CPT | Performed by: INTERNAL MEDICINE

## 2024-03-19 PROCEDURE — 2580000003 HC RX 258: Performed by: NURSE ANESTHETIST, CERTIFIED REGISTERED

## 2024-03-19 PROCEDURE — 6360000002 HC RX W HCPCS: Performed by: ANESTHESIOLOGY

## 2024-03-19 PROCEDURE — 2500000003 HC RX 250 WO HCPCS

## 2024-03-19 PROCEDURE — 2500000003 HC RX 250 WO HCPCS: Performed by: NURSE ANESTHETIST, CERTIFIED REGISTERED

## 2024-03-19 PROCEDURE — 6370000000 HC RX 637 (ALT 250 FOR IP)

## 2024-03-19 PROCEDURE — 6370000000 HC RX 637 (ALT 250 FOR IP): Performed by: PHYSICIAN ASSISTANT

## 2024-03-19 PROCEDURE — 99291 CRITICAL CARE FIRST HOUR: CPT | Performed by: INTERNAL MEDICINE

## 2024-03-19 PROCEDURE — 82803 BLOOD GASES ANY COMBINATION: CPT

## 2024-03-19 PROCEDURE — 82272 OCCULT BLD FECES 1-3 TESTS: CPT

## 2024-03-19 PROCEDURE — 87581 M.PNEUMON DNA AMP PROBE: CPT

## 2024-03-19 PROCEDURE — 6370000000 HC RX 637 (ALT 250 FOR IP): Performed by: INTERNAL MEDICINE

## 2024-03-19 PROCEDURE — 83605 ASSAY OF LACTIC ACID: CPT

## 2024-03-19 PROCEDURE — 87205 SMEAR GRAM STAIN: CPT

## 2024-03-19 PROCEDURE — 3600000008 HC SURGERY OHS BASE: Performed by: THORACIC SURGERY (CARDIOTHORACIC VASCULAR SURGERY)

## 2024-03-19 PROCEDURE — 85730 THROMBOPLASTIN TIME PARTIAL: CPT

## 2024-03-19 RX ORDER — AMIODARONE HYDROCHLORIDE 450 MG/9ML
INJECTION, SOLUTION INTRAVENOUS PRN
Status: DISCONTINUED | OUTPATIENT
Start: 2024-03-19 | End: 2024-03-19 | Stop reason: SDUPTHER

## 2024-03-19 RX ORDER — PROTAMINE SULFATE 10 MG/ML
50 INJECTION, SOLUTION INTRAVENOUS
Status: DISPENSED | OUTPATIENT
Start: 2024-03-19 | End: 2024-03-20

## 2024-03-19 RX ORDER — SODIUM CHLORIDE 9 MG/ML
INJECTION, SOLUTION INTRAVENOUS CONTINUOUS PRN
Status: DISCONTINUED | OUTPATIENT
Start: 2024-03-19 | End: 2024-03-19 | Stop reason: SDUPTHER

## 2024-03-19 RX ORDER — ENOXAPARIN SODIUM 100 MG/ML
40 INJECTION SUBCUTANEOUS DAILY
Status: DISCONTINUED | OUTPATIENT
Start: 2024-03-20 | End: 2024-03-20

## 2024-03-19 RX ORDER — CEFAZOLIN SODIUM 1 G/3ML
INJECTION, POWDER, FOR SOLUTION INTRAMUSCULAR; INTRAVENOUS PRN
Status: DISCONTINUED | OUTPATIENT
Start: 2024-03-19 | End: 2024-03-19 | Stop reason: SDUPTHER

## 2024-03-19 RX ORDER — ROCURONIUM BROMIDE 10 MG/ML
INJECTION, SOLUTION INTRAVENOUS PRN
Status: DISCONTINUED | OUTPATIENT
Start: 2024-03-19 | End: 2024-03-19 | Stop reason: SDUPTHER

## 2024-03-19 RX ORDER — PHENYLEPHRINE HCL IN 0.9% NACL 1 MG/10 ML
SYRINGE (ML) INTRAVENOUS PRN
Status: DISCONTINUED | OUTPATIENT
Start: 2024-03-19 | End: 2024-03-19 | Stop reason: SDUPTHER

## 2024-03-19 RX ORDER — ONDANSETRON 2 MG/ML
4 INJECTION INTRAMUSCULAR; INTRAVENOUS EVERY 6 HOURS PRN
Status: DISCONTINUED | OUTPATIENT
Start: 2024-03-19 | End: 2024-04-06

## 2024-03-19 RX ORDER — PROPOFOL 10 MG/ML
5-50 INJECTION, EMULSION INTRAVENOUS CONTINUOUS
Status: DISCONTINUED | OUTPATIENT
Start: 2024-03-19 | End: 2024-03-20

## 2024-03-19 RX ORDER — CISATRACURIUM BESYLATE 2 MG/ML
10 INJECTION, SOLUTION INTRAVENOUS ONCE
Status: COMPLETED | OUTPATIENT
Start: 2024-03-19 | End: 2024-03-19

## 2024-03-19 RX ORDER — DEXTROSE MONOHYDRATE 100 MG/ML
INJECTION, SOLUTION INTRAVENOUS CONTINUOUS PRN
Status: DISCONTINUED | OUTPATIENT
Start: 2024-03-19 | End: 2024-04-06

## 2024-03-19 RX ORDER — PROTAMINE SULFATE 10 MG/ML
INJECTION, SOLUTION INTRAVENOUS PRN
Status: DISCONTINUED | OUTPATIENT
Start: 2024-03-19 | End: 2024-03-19 | Stop reason: SDUPTHER

## 2024-03-19 RX ORDER — GLUCAGON 1 MG/ML
1 KIT INJECTION PRN
Status: DISCONTINUED | OUTPATIENT
Start: 2024-03-19 | End: 2024-04-06

## 2024-03-19 RX ORDER — ONDANSETRON 4 MG/1
4 TABLET, ORALLY DISINTEGRATING ORAL EVERY 8 HOURS PRN
Status: DISCONTINUED | OUTPATIENT
Start: 2024-03-19 | End: 2024-04-06

## 2024-03-19 RX ORDER — MIDAZOLAM HYDROCHLORIDE 1 MG/ML
INJECTION INTRAMUSCULAR; INTRAVENOUS PRN
Status: DISCONTINUED | OUTPATIENT
Start: 2024-03-19 | End: 2024-03-19 | Stop reason: SDUPTHER

## 2024-03-19 RX ORDER — VASOPRESSIN 20 U/ML
INJECTION PARENTERAL CONTINUOUS PRN
Status: DISCONTINUED | OUTPATIENT
Start: 2024-03-19 | End: 2024-03-19 | Stop reason: SDUPTHER

## 2024-03-19 RX ORDER — HEPARIN SODIUM 1000 [USP'U]/ML
INJECTION, SOLUTION INTRAVENOUS; SUBCUTANEOUS PRN
Status: DISCONTINUED | OUTPATIENT
Start: 2024-03-19 | End: 2024-03-19 | Stop reason: SDUPTHER

## 2024-03-19 RX ADMIN — Medication: at 19:24

## 2024-03-19 RX ADMIN — ATORVASTATIN CALCIUM 40 MG: 40 TABLET, FILM COATED ORAL at 20:30

## 2024-03-19 RX ADMIN — WATER 2000 MG: 1 INJECTION INTRAMUSCULAR; INTRAVENOUS; SUBCUTANEOUS at 19:42

## 2024-03-19 RX ADMIN — Medication 200 MCG: at 14:52

## 2024-03-19 RX ADMIN — NITROGLYCERIN 1 INCH: 20 OINTMENT TOPICAL at 20:22

## 2024-03-19 RX ADMIN — Medication: at 04:13

## 2024-03-19 RX ADMIN — PROPOFOL 10 MG: 10 INJECTION, EMULSION INTRAVENOUS at 00:13

## 2024-03-19 RX ADMIN — AMIODARONE HYDROCHLORIDE 400 MG: 200 TABLET ORAL at 09:01

## 2024-03-19 RX ADMIN — Medication: at 09:15

## 2024-03-19 RX ADMIN — Medication 200 MCG/HR: at 11:43

## 2024-03-19 RX ADMIN — Medication 2 UNITS/HR: at 14:48

## 2024-03-19 RX ADMIN — POLYETHYLENE GLYCOL 400 AND PROPYLENE GLYCOL 2 DROP: 4; 3 SOLUTION/ DROPS OPHTHALMIC at 06:35

## 2024-03-19 RX ADMIN — Medication 25 MCG/HR: at 19:34

## 2024-03-19 RX ADMIN — Medication: at 19:28

## 2024-03-19 RX ADMIN — CISATRACURIUM BESYLATE 10 MG: 2 INJECTION INTRAVENOUS at 01:43

## 2024-03-19 RX ADMIN — AMIODARONE HYDROCHLORIDE 150 MG: 50 INJECTION, SOLUTION INTRAVENOUS at 13:06

## 2024-03-19 RX ADMIN — CEFAZOLIN 2 G: 1 INJECTION, POWDER, FOR SOLUTION INTRAMUSCULAR; INTRAVENOUS at 12:49

## 2024-03-19 RX ADMIN — VASOPRESSIN 0.01 UNITS/HR: 20 INJECTION INTRAVENOUS at 12:43

## 2024-03-19 RX ADMIN — Medication: at 10:31

## 2024-03-19 RX ADMIN — PROTAMINE SULFATE 50 MG: 10 INJECTION, SOLUTION INTRAVENOUS at 14:30

## 2024-03-19 RX ADMIN — HEPARIN SODIUM 10000 UNITS: 1000 INJECTION INTRAVENOUS; SUBCUTANEOUS at 12:48

## 2024-03-19 RX ADMIN — CHLORHEXIDINE GLUCONATE 0.12% ORAL RINSE 15 ML: 1.2 LIQUID ORAL at 09:16

## 2024-03-19 RX ADMIN — Medication 50 MEQ: at 17:19

## 2024-03-19 RX ADMIN — SODIUM CHLORIDE, PRESERVATIVE FREE 10 ML: 5 INJECTION INTRAVENOUS at 09:01

## 2024-03-19 RX ADMIN — Medication: at 04:12

## 2024-03-19 RX ADMIN — ROCURONIUM BROMIDE 20 MG: 10 INJECTION INTRAVENOUS at 14:03

## 2024-03-19 RX ADMIN — AMIODARONE HYDROCHLORIDE 1 MG/MIN: 1.8 INJECTION, SOLUTION INTRAVENOUS at 23:10

## 2024-03-19 RX ADMIN — Medication 6 MMOL: at 09:51

## 2024-03-19 RX ADMIN — ROCURONIUM BROMIDE 30 MG: 10 INJECTION INTRAVENOUS at 13:46

## 2024-03-19 RX ADMIN — SODIUM BICARBONATE 50 MEQ: 84 INJECTION, SOLUTION INTRAVENOUS at 17:19

## 2024-03-19 RX ADMIN — PROTAMINE SULFATE 25 MG: 10 INJECTION, SOLUTION INTRAVENOUS at 19:48

## 2024-03-19 RX ADMIN — Medication 200 MCG: at 14:22

## 2024-03-19 RX ADMIN — Medication 1.5 MCG/KG/HR: at 06:41

## 2024-03-19 RX ADMIN — AMIODARONE HYDROCHLORIDE 1 MG/MIN: 1.8 INJECTION, SOLUTION INTRAVENOUS at 13:14

## 2024-03-19 RX ADMIN — Medication 200 MCG/HR: at 06:40

## 2024-03-19 RX ADMIN — SODIUM CHLORIDE: 9 INJECTION, SOLUTION INTRAVENOUS at 12:00

## 2024-03-19 RX ADMIN — SODIUM CHLORIDE 8 MG/HR: 9 INJECTION, SOLUTION INTRAVENOUS at 10:56

## 2024-03-19 RX ADMIN — SODIUM CHLORIDE, PRESERVATIVE FREE 10 ML: 5 INJECTION INTRAVENOUS at 20:27

## 2024-03-19 RX ADMIN — SENNOSIDES 8.8 MG: 8.8 LIQUID ORAL at 20:36

## 2024-03-19 RX ADMIN — WATER 2000 MG: 1 INJECTION INTRAMUSCULAR; INTRAVENOUS; SUBCUTANEOUS at 15:31

## 2024-03-19 RX ADMIN — ALBUMIN (HUMAN) 25 G: 12.5 INJECTION, SOLUTION INTRAVENOUS at 02:59

## 2024-03-19 RX ADMIN — Medication 4.93 UNITS/HR: at 17:29

## 2024-03-19 RX ADMIN — Medication: at 20:40

## 2024-03-19 RX ADMIN — PROTAMINE SULFATE 50 MG: 10 INJECTION, SOLUTION INTRAVENOUS at 15:37

## 2024-03-19 RX ADMIN — SODIUM CHLORIDE 8 MG/HR: 9 INJECTION, SOLUTION INTRAVENOUS at 01:21

## 2024-03-19 RX ADMIN — COLLAGENASE SANTYL: 250 OINTMENT TOPICAL at 09:01

## 2024-03-19 RX ADMIN — ROCURONIUM BROMIDE 50 MG: 10 INJECTION INTRAVENOUS at 12:15

## 2024-03-19 RX ADMIN — VASOPRESSIN 0.02 UNITS/MIN: 20 INJECTION INTRAVENOUS at 16:10

## 2024-03-19 RX ADMIN — SODIUM BICARBONATE 50 MEQ: 84 INJECTION, SOLUTION INTRAVENOUS at 14:51

## 2024-03-19 RX ADMIN — Medication 10 MG/HR: at 06:39

## 2024-03-19 RX ADMIN — MIDAZOLAM 2 MG: 1 INJECTION INTRAMUSCULAR; INTRAVENOUS at 15:49

## 2024-03-19 RX ADMIN — CHLORHEXIDINE GLUCONATE 0.12% ORAL RINSE 15 ML: 1.2 LIQUID ORAL at 20:19

## 2024-03-19 RX ADMIN — Medication 100 MCG: at 13:09

## 2024-03-19 RX ADMIN — MILRINONE LACTATE IN DEXTROSE 0.12 MCG/KG/MIN: 200 INJECTION, SOLUTION INTRAVENOUS at 18:22

## 2024-03-19 RX ADMIN — Medication 1.5 MCG/KG/HR: at 02:43

## 2024-03-19 RX ADMIN — PROTAMINE SULFATE 25 MG: 10 INJECTION, SOLUTION INTRAVENOUS at 14:45

## 2024-03-19 RX ADMIN — SODIUM CHLORIDE: 9 INJECTION, SOLUTION INTRAVENOUS at 14:25

## 2024-03-19 RX ADMIN — INSULIN LISPRO 4 UNITS: 100 INJECTION, SOLUTION INTRAVENOUS; SUBCUTANEOUS at 01:41

## 2024-03-19 RX ADMIN — PROPOFOL 20 MCG/KG/MIN: 10 INJECTION, EMULSION INTRAVENOUS at 01:02

## 2024-03-19 RX ADMIN — SODIUM CHLORIDE 8 MG/HR: 9 INJECTION, SOLUTION INTRAVENOUS at 21:45

## 2024-03-19 RX ADMIN — Medication 200 MCG/HR: at 01:05

## 2024-03-19 RX ADMIN — Medication 1 TABLET: at 09:01

## 2024-03-19 RX ADMIN — Medication: at 09:18

## 2024-03-19 RX ADMIN — MORPHINE SULFATE 2 MG: 2 INJECTION, SOLUTION INTRAMUSCULAR; INTRAVENOUS at 22:24

## 2024-03-19 RX ADMIN — AMIODARONE HYDROCHLORIDE 400 MG: 200 TABLET ORAL at 20:30

## 2024-03-19 ASSESSMENT — PULMONARY FUNCTION TESTS
PIF_VALUE: 15
PIF_VALUE: 20
PIF_VALUE: 16
PIF_VALUE: 23

## 2024-03-19 NOTE — ANESTHESIA PROCEDURE NOTES
Procedure Performed: LAKSHMI       Start Time:  3/19/2024 12:30 PM       End Time:   3/19/2024 3:21 PM    Preanesthesia Checklist:  Patient identified, IV assessed, risks and benefits discussed, monitors and equipment assessed, procedure being performed at surgeon's request and anesthesia consent obtained.    General Procedure Information  Diagnostic Indications for Echo:  hemodynamic monitoring, suspected pericardial effusion and assessment of valve function  Physician Requesting Echo: Manish Murry MD  CPT Code:  [id number]  Location performed:  OR  Intubated  Bite block placed  Probe Insertion:  Easy  Probe Type:  3D  Modalities:  2D, color flow mapping, continuous wave Doppler, pulse wave Doppler and M-mode        Anesthesia Information  Performed with CRNAs  Anesthesiologist:  Johann Sutherland MD      Echocardiogram Comments:       INTRA-OP LAKSHMI.  INSERTED WELL LUBRICATED 3 D LAKSHMI PROBE.  COMPREHENSIVE STUDY ACQUIRED.  LV FUNCTION INFERIOR WALL AKINESIA   EF 30 TO 35%  SERIAL  MEASUREMENT OF  STROKE VOLUME AND CARDIAC OUT PUT   AS WE WEANED PT. OFF ECMO.  WITH ECMO FLOW OFF  STROKE VOLUME 54 ML, CO = 54 X 80 =4.3L/MIN  CI = 4.3/1.89 = 2.275.  BP PRESSURE WAS HOLDING UP WITH INOTROPIC SUPPORT.  LINES WERE EXPLANTED.  TRACE TO MIL MR  RIGHT HEART STRAIN PATTERN WITH STRAIGHT INTERVENTRICULAR SEPTUM AT  START.  FINDINGS CONSTANTLY COMMUNICATED WITH DR MURRY

## 2024-03-19 NOTE — CONSENT
Informed Consent for Blood Component Transfusion Note    I have discussed with the wife the rationale for blood component transfusion; its benefits in treating or preventing fatigue, organ damage, or death; and its risk which includes mild transfusion reactions, rare risk of blood borne infection, or more serious but rare reactions. I have discussed the alternatives to transfusion, including the risk and consequences of not receiving transfusion. The wife had an opportunity to ask questions and had agreed to proceed with transfusion of blood components.    Electronically signed by Manish Bess MD on 3/19/24 at 5:04 PM EDT

## 2024-03-19 NOTE — ANESTHESIA PRE PROCEDURE
Answered      Vital Signs (Current):   Vitals:    03/19/24 0945 03/19/24 1000 03/19/24 1014 03/19/24 1015   BP:   (!) 174/124 (!) 145/95   Pulse: (!) 114 (!) 114  (!) 116   Resp: 20 21 21   Temp:  98.2 °F (36.8 °C)  98.1 °F (36.7 °C)   TempSrc:  Core     SpO2: 100% 100%  100%   Weight:       Height:                                                  BP Readings from Last 3 Encounters:   03/19/24 (!) 145/95   03/01/24 (!) 151/76   02/26/24 (!) 176/72       NPO Status: Time of last liquid consumption: 0330 (Sip of water with am medication)                        Time of last solid consumption: 2200                        Date of last liquid consumption: 03/05/24                        Date of last solid food consumption: 03/05/24    BMI:   Wt Readings from Last 3 Encounters:   03/19/24 76.9 kg (169 lb 8.5 oz)   03/01/24 61.2 kg (134 lb 14.7 oz)   02/26/24 62.7 kg (138 lb 3.2 oz)     Body mass index is 25.78 kg/m².    CBC:   Lab Results   Component Value Date/Time    WBC 14.4 03/19/2024 08:05 AM    RBC 2.58 03/19/2024 08:05 AM    HGB 8.0 03/19/2024 08:05 AM    HCT 23.6 03/19/2024 08:05 AM    MCV 91.5 03/19/2024 08:05 AM    RDW 14.8 07/28/2023 07:26 PM    PLT 92 03/19/2024 08:05 AM       CMP:   Lab Results   Component Value Date/Time     03/19/2024 08:05 AM    K 4.8 03/19/2024 08:05 AM    K 4.6 03/19/2024 06:50 AM    K 4.9 03/07/2024 12:20 PM     03/19/2024 08:05 AM    CO2 24 03/19/2024 08:05 AM    BUN 26 03/19/2024 08:05 AM    CREATININE 0.8 03/19/2024 08:05 AM    LABGLOM >60 03/19/2024 08:05 AM    GLUCOSE 195 03/19/2024 08:05 AM    PROT 5.2 03/19/2024 08:05 AM    CALCIUM 9.7 03/19/2024 08:05 AM    BILITOT 2.5 03/19/2024 08:05 AM    ALKPHOS 471 03/19/2024 08:05 AM    AST 51 03/19/2024 08:05 AM    ALT 13 03/19/2024 08:05 AM       POC Tests:   Recent Labs     03/18/24  0623   POCGLU 211*       Coags:   Lab Results   Component Value Date/Time    PROTIME 15.6 12/31/2019 09:11 AM    INR 1.02 03/07/2024 04:10 AM

## 2024-03-20 ENCOUNTER — APPOINTMENT (OUTPATIENT)
Dept: INTERVENTIONAL RADIOLOGY/VASCULAR | Age: 69
DRG: 003 | End: 2024-03-20
Attending: THORACIC SURGERY (CARDIOTHORACIC VASCULAR SURGERY)
Payer: MEDICARE

## 2024-03-20 ENCOUNTER — APPOINTMENT (OUTPATIENT)
Dept: GENERAL RADIOLOGY | Age: 69
DRG: 003 | End: 2024-03-20
Attending: THORACIC SURGERY (CARDIOTHORACIC VASCULAR SURGERY)
Payer: MEDICARE

## 2024-03-20 PROBLEM — E44.1 MILD MALNUTRITION (HCC): Status: ACTIVE | Noted: 2024-03-20

## 2024-03-20 LAB
ALBUMIN SERPL BCG-MCNC: 2.8 G/DL (ref 3.5–5.1)
ALBUMIN SERPL BCG-MCNC: 3.1 G/DL (ref 3.5–5.1)
ALBUMIN SERPL BCG-MCNC: 3.2 G/DL (ref 3.5–5.1)
ALBUMIN SERPL BCG-MCNC: 3.3 G/DL (ref 3.5–5.1)
ALP SERPL-CCNC: 279 U/L (ref 38–126)
ALP SERPL-CCNC: 281 U/L (ref 38–126)
ALP SERPL-CCNC: 318 U/L (ref 38–126)
ALP SERPL-CCNC: 392 U/L (ref 38–126)
ALP SERPL-CCNC: 445 U/L (ref 38–126)
ALP SERPL-CCNC: 450 U/L (ref 38–126)
ALT SERPL W/O P-5'-P-CCNC: 10 U/L (ref 11–66)
ALT SERPL W/O P-5'-P-CCNC: 10 U/L (ref 11–66)
ALT SERPL W/O P-5'-P-CCNC: 12 U/L (ref 11–66)
ALT SERPL W/O P-5'-P-CCNC: 13 U/L (ref 11–66)
ALT SERPL W/O P-5'-P-CCNC: 13 U/L (ref 11–66)
ALT SERPL W/O P-5'-P-CCNC: 7 U/L (ref 11–66)
ANION GAP SERPL CALC-SCNC: 10 MEQ/L (ref 8–16)
ANION GAP SERPL CALC-SCNC: 10 MEQ/L (ref 8–16)
ANION GAP SERPL CALC-SCNC: 11 MEQ/L (ref 8–16)
ANION GAP SERPL CALC-SCNC: 12 MEQ/L (ref 8–16)
ANION GAP SERPL CALC-SCNC: 15 MEQ/L (ref 8–16)
ANION GAP SERPL CALC-SCNC: 9 MEQ/L (ref 8–16)
APTT PPP: 32.2 SECONDS (ref 22–38)
APTT PPP: 32.2 SECONDS (ref 22–38)
ARTERIAL PATENCY WRIST A: ABNORMAL
ARTERIAL PATENCY WRIST A: NEGATIVE
AST SERPL-CCNC: 48 U/L (ref 5–40)
AST SERPL-CCNC: 54 U/L (ref 5–40)
AST SERPL-CCNC: 63 U/L (ref 5–40)
AST SERPL-CCNC: 69 U/L (ref 5–40)
AST SERPL-CCNC: 85 U/L (ref 5–40)
AST SERPL-CCNC: 92 U/L (ref 5–40)
BASE EXCESS BLDA CALC-SCNC: -1.3 MMOL/L (ref -2.5–2.5)
BASE EXCESS BLDA CALC-SCNC: 0.7 MMOL/L (ref -2.5–2.5)
BDY SITE: ABNORMAL
BDY SITE: ABNORMAL
BILIRUB SERPL-MCNC: 1.1 MG/DL (ref 0.3–1.2)
BILIRUB SERPL-MCNC: 1.2 MG/DL (ref 0.3–1.2)
BILIRUB SERPL-MCNC: 1.4 MG/DL (ref 0.3–1.2)
BILIRUB SERPL-MCNC: 1.5 MG/DL (ref 0.3–1.2)
BILIRUB SERPL-MCNC: 1.9 MG/DL (ref 0.3–1.2)
BILIRUB SERPL-MCNC: 2 MG/DL (ref 0.3–1.2)
BREATHS SETTING VENT: 16 BPM
BUN SERPL-MCNC: 24 MG/DL (ref 7–22)
BUN SERPL-MCNC: 25 MG/DL (ref 7–22)
BUN SERPL-MCNC: 26 MG/DL (ref 7–22)
BUN SERPL-MCNC: 28 MG/DL (ref 7–22)
CA-I BLD ISE-SCNC: 1.31 MMOL/L (ref 1.12–1.32)
CA-I BLD ISE-SCNC: 1.32 MMOL/L (ref 1.12–1.32)
CA-I BLD ISE-SCNC: 1.32 MMOL/L (ref 1.12–1.32)
CA-I BLD ISE-SCNC: 1.35 MMOL/L (ref 1.12–1.32)
CA-I BLD ISE-SCNC: 1.35 MMOL/L (ref 1.12–1.32)
CA-I BLD ISE-SCNC: 1.36 MMOL/L (ref 1.12–1.32)
CALCIUM SERPL-MCNC: 9.1 MG/DL (ref 8.5–10.5)
CALCIUM SERPL-MCNC: 9.5 MG/DL (ref 8.5–10.5)
CALCIUM SERPL-MCNC: 9.6 MG/DL (ref 8.5–10.5)
CALCIUM SERPL-MCNC: 9.7 MG/DL (ref 8.5–10.5)
CALCIUM SERPL-MCNC: 9.8 MG/DL (ref 8.5–10.5)
CALCIUM SERPL-MCNC: 9.9 MG/DL (ref 8.5–10.5)
CHLORIDE SERPL-SCNC: 100 MEQ/L (ref 98–111)
CHLORIDE SERPL-SCNC: 102 MEQ/L (ref 98–111)
CHLORIDE SERPL-SCNC: 103 MEQ/L (ref 98–111)
CHLORIDE SERPL-SCNC: 104 MEQ/L (ref 98–111)
CO2 SERPL-SCNC: 20 MEQ/L (ref 23–33)
CO2 SERPL-SCNC: 21 MEQ/L (ref 23–33)
CO2 SERPL-SCNC: 22 MEQ/L (ref 23–33)
CO2 SERPL-SCNC: 23 MEQ/L (ref 23–33)
CO2 SERPL-SCNC: 23 MEQ/L (ref 23–33)
CO2 SERPL-SCNC: 24 MEQ/L (ref 23–33)
COLLECTED BY:: ABNORMAL
COLLECTED BY:: ABNORMAL
CREAT SERPL-MCNC: 0.9 MG/DL (ref 0.4–1.2)
CREAT SERPL-MCNC: 1 MG/DL (ref 0.4–1.2)
CREAT SERPL-MCNC: 1 MG/DL (ref 0.4–1.2)
DEPRECATED RDW RBC AUTO: 55.1 FL (ref 35–45)
DEPRECATED RDW RBC AUTO: 55.7 FL (ref 35–45)
DEPRECATED RDW RBC AUTO: 56.6 FL (ref 35–45)
DEPRECATED RDW RBC AUTO: 56.8 FL (ref 35–45)
DEPRECATED RDW RBC AUTO: 57.5 FL (ref 35–45)
DEPRECATED RDW RBC AUTO: 57.7 FL (ref 35–45)
DEVICE: ABNORMAL
DEVICE: ABNORMAL
EKG Q-T INTERVAL: 378 MS
EKG QRS DURATION: 124 MS
EKG QTC CALCULATION (BAZETT): 536 MS
EKG R AXIS: 0 DEGREES
EKG T AXIS: 172 DEGREES
EKG VENTRICULAR RATE: 121 BPM
ERYTHROCYTE [DISTWIDTH] IN BLOOD BY AUTOMATED COUNT: 18.5 % (ref 11.5–14.5)
ERYTHROCYTE [DISTWIDTH] IN BLOOD BY AUTOMATED COUNT: 18.5 % (ref 11.5–14.5)
ERYTHROCYTE [DISTWIDTH] IN BLOOD BY AUTOMATED COUNT: 18.6 % (ref 11.5–14.5)
ERYTHROCYTE [DISTWIDTH] IN BLOOD BY AUTOMATED COUNT: 18.6 % (ref 11.5–14.5)
ERYTHROCYTE [DISTWIDTH] IN BLOOD BY AUTOMATED COUNT: 18.8 % (ref 11.5–14.5)
ERYTHROCYTE [DISTWIDTH] IN BLOOD BY AUTOMATED COUNT: 18.9 % (ref 11.5–14.5)
FIBRINOGEN PPP-MCNC: 280 MG/100ML (ref 155–475)
FIBRINOGEN PPP-MCNC: 417 MG/100ML (ref 155–475)
FIO2 ON VENT O2 ANALYZER: 80 %
FIO2 ON VENT O2 ANALYZER: 80 %
GFR SERPL CREATININE-BSD FRML MDRD: > 60 ML/MIN/1.73M2
GLUCOSE BLD STRIP.AUTO-MCNC: 102 MG/DL (ref 70–108)
GLUCOSE BLD STRIP.AUTO-MCNC: 107 MG/DL (ref 70–108)
GLUCOSE BLD STRIP.AUTO-MCNC: 110 MG/DL (ref 70–108)
GLUCOSE BLD STRIP.AUTO-MCNC: 111 MG/DL (ref 70–108)
GLUCOSE BLD STRIP.AUTO-MCNC: 112 MG/DL (ref 70–108)
GLUCOSE BLD STRIP.AUTO-MCNC: 113 MG/DL (ref 70–108)
GLUCOSE BLD STRIP.AUTO-MCNC: 114 MG/DL (ref 70–108)
GLUCOSE BLD STRIP.AUTO-MCNC: 117 MG/DL (ref 70–108)
GLUCOSE BLD STRIP.AUTO-MCNC: 118 MG/DL (ref 70–108)
GLUCOSE BLD STRIP.AUTO-MCNC: 120 MG/DL (ref 70–108)
GLUCOSE BLD STRIP.AUTO-MCNC: 121 MG/DL (ref 70–108)
GLUCOSE BLD STRIP.AUTO-MCNC: 123 MG/DL (ref 70–108)
GLUCOSE BLD STRIP.AUTO-MCNC: 123 MG/DL (ref 70–108)
GLUCOSE BLD STRIP.AUTO-MCNC: 125 MG/DL (ref 70–108)
GLUCOSE BLD STRIP.AUTO-MCNC: 132 MG/DL (ref 70–108)
GLUCOSE BLD STRIP.AUTO-MCNC: 134 MG/DL (ref 70–108)
GLUCOSE BLD STRIP.AUTO-MCNC: 144 MG/DL (ref 70–108)
GLUCOSE SERPL-MCNC: 113 MG/DL (ref 70–108)
GLUCOSE SERPL-MCNC: 122 MG/DL (ref 70–108)
GLUCOSE SERPL-MCNC: 126 MG/DL (ref 70–108)
GLUCOSE SERPL-MCNC: 137 MG/DL (ref 70–108)
GLUCOSE SERPL-MCNC: 146 MG/DL (ref 70–108)
GLUCOSE SERPL-MCNC: 156 MG/DL (ref 70–108)
HCO3 BLDA-SCNC: 22 MMOL/L (ref 23–28)
HCO3 BLDA-SCNC: 24 MMOL/L (ref 23–28)
HCT VFR BLD AUTO: 35.9 % (ref 42–52)
HCT VFR BLD AUTO: 36 % (ref 42–52)
HCT VFR BLD AUTO: 36.1 % (ref 42–52)
HCT VFR BLD AUTO: 36.4 % (ref 42–52)
HCT VFR BLD AUTO: 37 % (ref 42–52)
HCT VFR BLD AUTO: 42.2 % (ref 42–52)
HGB BLD-MCNC: 12.5 GM/DL (ref 14–18)
HGB BLD-MCNC: 12.6 GM/DL (ref 14–18)
HGB BLD-MCNC: 12.7 GM/DL (ref 14–18)
HGB BLD-MCNC: 14.7 GM/DL (ref 14–18)
INR PPP: 1.08 (ref 0.85–1.13)
LACTATE SERPL-SCNC: 1.6 MMOL/L (ref 0.5–2)
LACTATE SERPL-SCNC: 1.7 MMOL/L (ref 0.5–2)
LACTATE SERPL-SCNC: 2.2 MMOL/L (ref 0.5–2)
MAGNESIUM SERPL-MCNC: 2.3 MG/DL (ref 1.6–2.4)
MAGNESIUM SERPL-MCNC: 2.4 MG/DL (ref 1.6–2.4)
MAGNESIUM SERPL-MCNC: 2.5 MG/DL (ref 1.6–2.4)
MCH RBC QN AUTO: 30 PG (ref 26–33)
MCH RBC QN AUTO: 30.5 PG (ref 26–33)
MCH RBC QN AUTO: 30.5 PG (ref 26–33)
MCH RBC QN AUTO: 30.6 PG (ref 26–33)
MCH RBC QN AUTO: 30.6 PG (ref 26–33)
MCH RBC QN AUTO: 30.7 PG (ref 26–33)
MCHC RBC AUTO-ENTMCNC: 34.3 GM/DL (ref 32.2–35.5)
MCHC RBC AUTO-ENTMCNC: 34.8 GM/DL (ref 32.2–35.5)
MCHC RBC AUTO-ENTMCNC: 34.8 GM/DL (ref 32.2–35.5)
MCHC RBC AUTO-ENTMCNC: 34.9 GM/DL (ref 32.2–35.5)
MCHC RBC AUTO-ENTMCNC: 35 GM/DL (ref 32.2–35.5)
MCHC RBC AUTO-ENTMCNC: 35.2 GM/DL (ref 32.2–35.5)
MCV RBC AUTO: 87 FL (ref 80–94)
MCV RBC AUTO: 87.5 FL (ref 80–94)
MCV RBC AUTO: 87.5 FL (ref 80–94)
MCV RBC AUTO: 87.6 FL (ref 80–94)
MCV RBC AUTO: 87.6 FL (ref 80–94)
MCV RBC AUTO: 87.7 FL (ref 80–94)
PCO2 BLDA: 31 MMHG (ref 35–45)
PCO2 BLDA: 33 MMHG (ref 35–45)
PEEP SETTING VENT: 10 MMHG
PEEP SETTING VENT: 10 MMHG
PH BLDA: 7.44 [PH] (ref 7.35–7.45)
PH BLDA: 7.48 [PH] (ref 7.35–7.45)
PHOSPHATE SERPL-MCNC: 1.8 MG/DL (ref 2.4–4.7)
PHOSPHATE SERPL-MCNC: 2.2 MG/DL (ref 2.4–4.7)
PHOSPHATE SERPL-MCNC: 2.2 MG/DL (ref 2.4–4.7)
PHOSPHATE SERPL-MCNC: 2.4 MG/DL (ref 2.4–4.7)
PHOSPHATE SERPL-MCNC: 2.5 MG/DL (ref 2.4–4.7)
PHOSPHATE SERPL-MCNC: 2.6 MG/DL (ref 2.4–4.7)
PIP: 26 CMH2O
PLATELET # BLD AUTO: 101 THOU/MM3 (ref 130–400)
PLATELET # BLD AUTO: 112 THOU/MM3 (ref 130–400)
PLATELET # BLD AUTO: 149 THOU/MM3 (ref 130–400)
PLATELET # BLD AUTO: 178 THOU/MM3 (ref 130–400)
PLATELET # BLD AUTO: 195 THOU/MM3 (ref 130–400)
PLATELET # BLD AUTO: 217 THOU/MM3 (ref 130–400)
PMV BLD AUTO: 11.2 FL (ref 9.4–12.4)
PMV BLD AUTO: 11.5 FL (ref 9.4–12.4)
PMV BLD AUTO: 11.8 FL (ref 9.4–12.4)
PMV BLD AUTO: 11.9 FL (ref 9.4–12.4)
PMV BLD AUTO: 12.2 FL (ref 9.4–12.4)
PMV BLD AUTO: 12.4 FL (ref 9.4–12.4)
PO2 BLDA: 173 MMHG (ref 71–104)
PO2 BLDA: 98 MMHG (ref 71–104)
POTASSIUM SERPL-SCNC: 3.6 MEQ/L (ref 3.5–5.2)
POTASSIUM SERPL-SCNC: 4.1 MEQ/L (ref 3.5–5.2)
POTASSIUM SERPL-SCNC: 4.2 MEQ/L (ref 3.5–5.2)
POTASSIUM SERPL-SCNC: 4.4 MEQ/L (ref 3.5–5.2)
POTASSIUM SERPL-SCNC: 4.4 MEQ/L (ref 3.5–5.2)
POTASSIUM SERPL-SCNC: 4.5 MEQ/L (ref 3.5–5.2)
PRESSURE SUPPORT SETTING VENT: 16 CMH2O
PROT SERPL-MCNC: 4.3 G/DL (ref 6.1–8)
PROT SERPL-MCNC: 4.5 G/DL (ref 6.1–8)
PROT SERPL-MCNC: 4.6 G/DL (ref 6.1–8)
PROT SERPL-MCNC: 4.7 G/DL (ref 6.1–8)
RBC # BLD AUTO: 4.1 MILL/MM3 (ref 4.7–6.1)
RBC # BLD AUTO: 4.11 MILL/MM3 (ref 4.7–6.1)
RBC # BLD AUTO: 4.15 MILL/MM3 (ref 4.7–6.1)
RBC # BLD AUTO: 4.16 MILL/MM3 (ref 4.7–6.1)
RBC # BLD AUTO: 4.23 MILL/MM3 (ref 4.7–6.1)
RBC # BLD AUTO: 4.81 MILL/MM3 (ref 4.7–6.1)
SAO2 % BLDA: 100 %
SAO2 % BLDA: 98 %
SODIUM SERPL-SCNC: 133 MEQ/L (ref 135–145)
SODIUM SERPL-SCNC: 133 MEQ/L (ref 135–145)
SODIUM SERPL-SCNC: 136 MEQ/L (ref 135–145)
SODIUM SERPL-SCNC: 137 MEQ/L (ref 135–145)
SODIUM SERPL-SCNC: 137 MEQ/L (ref 135–145)
SODIUM SERPL-SCNC: 139 MEQ/L (ref 135–145)
VENTILATION MODE VENT: ABNORMAL
VENTILATION MODE VENT: ABNORMAL
WBC # BLD AUTO: 20.4 THOU/MM3 (ref 4.8–10.8)
WBC # BLD AUTO: 21.6 THOU/MM3 (ref 4.8–10.8)
WBC # BLD AUTO: 21.8 THOU/MM3 (ref 4.8–10.8)
WBC # BLD AUTO: 21.9 THOU/MM3 (ref 4.8–10.8)
WBC # BLD AUTO: 22.4 THOU/MM3 (ref 4.8–10.8)
WBC # BLD AUTO: 25.3 THOU/MM3 (ref 4.8–10.8)

## 2024-03-20 PROCEDURE — 6360000002 HC RX W HCPCS: Performed by: INTERNAL MEDICINE

## 2024-03-20 PROCEDURE — 36415 COLL VENOUS BLD VENIPUNCTURE: CPT

## 2024-03-20 PROCEDURE — 71045 X-RAY EXAM CHEST 1 VIEW: CPT

## 2024-03-20 PROCEDURE — 83735 ASSAY OF MAGNESIUM: CPT

## 2024-03-20 PROCEDURE — 80053 COMPREHEN METABOLIC PANEL: CPT

## 2024-03-20 PROCEDURE — 2580000003 HC RX 258: Performed by: NURSE PRACTITIONER

## 2024-03-20 PROCEDURE — 2500000003 HC RX 250 WO HCPCS: Performed by: PHYSICIAN ASSISTANT

## 2024-03-20 PROCEDURE — 6370000000 HC RX 637 (ALT 250 FOR IP): Performed by: NURSE PRACTITIONER

## 2024-03-20 PROCEDURE — 85027 COMPLETE CBC AUTOMATED: CPT

## 2024-03-20 PROCEDURE — C9113 INJ PANTOPRAZOLE SODIUM, VIA: HCPCS | Performed by: INTERNAL MEDICINE

## 2024-03-20 PROCEDURE — 6360000002 HC RX W HCPCS: Performed by: NURSE PRACTITIONER

## 2024-03-20 PROCEDURE — 6370000000 HC RX 637 (ALT 250 FOR IP): Performed by: PHYSICIAN ASSISTANT

## 2024-03-20 PROCEDURE — 93010 ELECTROCARDIOGRAM REPORT: CPT | Performed by: INTERNAL MEDICINE

## 2024-03-20 PROCEDURE — 85384 FIBRINOGEN ACTIVITY: CPT

## 2024-03-20 PROCEDURE — 85520 HEPARIN ASSAY: CPT

## 2024-03-20 PROCEDURE — 6360000002 HC RX W HCPCS

## 2024-03-20 PROCEDURE — 93926 LOWER EXTREMITY STUDY: CPT

## 2024-03-20 PROCEDURE — 82948 REAGENT STRIP/BLOOD GLUCOSE: CPT

## 2024-03-20 PROCEDURE — 2580000003 HC RX 258: Performed by: PHYSICIAN ASSISTANT

## 2024-03-20 PROCEDURE — 6370000000 HC RX 637 (ALT 250 FOR IP)

## 2024-03-20 PROCEDURE — 82330 ASSAY OF CALCIUM: CPT

## 2024-03-20 PROCEDURE — 94003 VENT MGMT INPAT SUBQ DAY: CPT

## 2024-03-20 PROCEDURE — 6360000002 HC RX W HCPCS: Performed by: PHYSICIAN ASSISTANT

## 2024-03-20 PROCEDURE — 2500000003 HC RX 250 WO HCPCS: Performed by: THORACIC SURGERY (CARDIOTHORACIC VASCULAR SURGERY)

## 2024-03-20 PROCEDURE — 6370000000 HC RX 637 (ALT 250 FOR IP): Performed by: INTERNAL MEDICINE

## 2024-03-20 PROCEDURE — 2580000003 HC RX 258: Performed by: THORACIC SURGERY (CARDIOTHORACIC VASCULAR SURGERY)

## 2024-03-20 PROCEDURE — 2000000000 HC ICU R&B

## 2024-03-20 PROCEDURE — 82803 BLOOD GASES ANY COMBINATION: CPT

## 2024-03-20 PROCEDURE — 2500000003 HC RX 250 WO HCPCS: Performed by: INTERNAL MEDICINE

## 2024-03-20 PROCEDURE — 84100 ASSAY OF PHOSPHORUS: CPT

## 2024-03-20 PROCEDURE — 85730 THROMBOPLASTIN TIME PARTIAL: CPT

## 2024-03-20 PROCEDURE — 99291 CRITICAL CARE FIRST HOUR: CPT | Performed by: INTERNAL MEDICINE

## 2024-03-20 PROCEDURE — 2500000003 HC RX 250 WO HCPCS

## 2024-03-20 PROCEDURE — 99233 SBSQ HOSP IP/OBS HIGH 50: CPT | Performed by: INTERNAL MEDICINE

## 2024-03-20 PROCEDURE — 85610 PROTHROMBIN TIME: CPT

## 2024-03-20 PROCEDURE — 2700000000 HC OXYGEN THERAPY PER DAY

## 2024-03-20 PROCEDURE — 2580000003 HC RX 258: Performed by: INTERNAL MEDICINE

## 2024-03-20 PROCEDURE — P9047 ALBUMIN (HUMAN), 25%, 50ML: HCPCS

## 2024-03-20 PROCEDURE — 94761 N-INVAS EAR/PLS OXIMETRY MLT: CPT

## 2024-03-20 PROCEDURE — 83605 ASSAY OF LACTIC ACID: CPT

## 2024-03-20 PROCEDURE — 37799 UNLISTED PX VASCULAR SURGERY: CPT

## 2024-03-20 RX ORDER — HEPARIN SODIUM 1000 [USP'U]/ML
40 INJECTION, SOLUTION INTRAVENOUS; SUBCUTANEOUS PRN
Status: DISCONTINUED | OUTPATIENT
Start: 2024-03-20 | End: 2024-03-30

## 2024-03-20 RX ORDER — ALBUMIN (HUMAN) 12.5 G/50ML
SOLUTION INTRAVENOUS
Status: COMPLETED
Start: 2024-03-20 | End: 2024-03-20

## 2024-03-20 RX ORDER — HEPARIN SODIUM 1000 [USP'U]/ML
80 INJECTION, SOLUTION INTRAVENOUS; SUBCUTANEOUS PRN
Status: DISCONTINUED | OUTPATIENT
Start: 2024-03-20 | End: 2024-03-30

## 2024-03-20 RX ORDER — FENTANYL CITRATE-0.9 % NACL/PF 10 MCG/ML
PLASTIC BAG, INJECTION (ML) INTRAVENOUS
Status: COMPLETED
Start: 2024-03-20 | End: 2024-03-20

## 2024-03-20 RX ORDER — ALBUMIN (HUMAN) 12.5 G/50ML
25 SOLUTION INTRAVENOUS ONCE
Status: COMPLETED | OUTPATIENT
Start: 2024-03-20 | End: 2024-03-20

## 2024-03-20 RX ORDER — HEPARIN SODIUM 10000 [USP'U]/100ML
5-30 INJECTION, SOLUTION INTRAVENOUS CONTINUOUS
Status: DISCONTINUED | OUTPATIENT
Start: 2024-03-20 | End: 2024-03-30

## 2024-03-20 RX ORDER — 0.9 % SODIUM CHLORIDE 0.9 %
500 INTRAVENOUS SOLUTION INTRAVENOUS ONCE
Status: COMPLETED | OUTPATIENT
Start: 2024-03-20 | End: 2024-03-20

## 2024-03-20 RX ADMIN — WATER 2000 MG: 1 INJECTION INTRAMUSCULAR; INTRAVENOUS; SUBCUTANEOUS at 20:58

## 2024-03-20 RX ADMIN — Medication: at 11:46

## 2024-03-20 RX ADMIN — CHLORHEXIDINE GLUCONATE 0.12% ORAL RINSE 15 ML: 1.2 LIQUID ORAL at 21:10

## 2024-03-20 RX ADMIN — METOPROLOL TARTRATE 5 MG: 5 INJECTION INTRAVENOUS at 08:34

## 2024-03-20 RX ADMIN — Medication: at 21:58

## 2024-03-20 RX ADMIN — ALBUMIN (HUMAN) 25 G: 12.5 SOLUTION INTRAVENOUS at 01:59

## 2024-03-20 RX ADMIN — Medication: at 00:31

## 2024-03-20 RX ADMIN — Medication 6 MMOL: at 15:24

## 2024-03-20 RX ADMIN — AMIODARONE HYDROCHLORIDE 1 MG/MIN: 1.8 INJECTION, SOLUTION INTRAVENOUS at 03:47

## 2024-03-20 RX ADMIN — POLYETHYLENE GLYCOL (3350) 17 G: 17 POWDER, FOR SOLUTION ORAL at 09:18

## 2024-03-20 RX ADMIN — Medication: at 16:56

## 2024-03-20 RX ADMIN — WATER 2000 MG: 1 INJECTION INTRAMUSCULAR; INTRAVENOUS; SUBCUTANEOUS at 11:38

## 2024-03-20 RX ADMIN — Medication 1 TABLET: at 09:18

## 2024-03-20 RX ADMIN — Medication: at 15:54

## 2024-03-20 RX ADMIN — Medication: at 22:00

## 2024-03-20 RX ADMIN — SENNOSIDES 8.8 MG: 8.8 LIQUID ORAL at 09:17

## 2024-03-20 RX ADMIN — ALBUMIN (HUMAN) 25 G: 0.25 INJECTION, SOLUTION INTRAVENOUS at 01:59

## 2024-03-20 RX ADMIN — NITROGLYCERIN 1 INCH: 20 OINTMENT TOPICAL at 11:37

## 2024-03-20 RX ADMIN — AMIODARONE HYDROCHLORIDE 1 MG/MIN: 1.8 INJECTION, SOLUTION INTRAVENOUS at 16:11

## 2024-03-20 RX ADMIN — ATORVASTATIN CALCIUM 40 MG: 40 TABLET, FILM COATED ORAL at 21:35

## 2024-03-20 RX ADMIN — SODIUM CHLORIDE 2.5 MG/HR: 9 INJECTION, SOLUTION INTRAVENOUS at 21:33

## 2024-03-20 RX ADMIN — ENOXAPARIN SODIUM 40 MG: 100 INJECTION SUBCUTANEOUS at 09:18

## 2024-03-20 RX ADMIN — NITROGLYCERIN 1 INCH: 20 OINTMENT TOPICAL at 18:35

## 2024-03-20 RX ADMIN — AMIODARONE HYDROCHLORIDE 1 MG/MIN: 1.8 INJECTION, SOLUTION INTRAVENOUS at 22:15

## 2024-03-20 RX ADMIN — METOPROLOL TARTRATE 2.5 MG: 5 INJECTION INTRAVENOUS at 21:15

## 2024-03-20 RX ADMIN — Medication: at 11:47

## 2024-03-20 RX ADMIN — NITROGLYCERIN 1 INCH: 20 OINTMENT TOPICAL at 06:14

## 2024-03-20 RX ADMIN — SODIUM CHLORIDE, PRESERVATIVE FREE 10 ML: 5 INJECTION INTRAVENOUS at 21:07

## 2024-03-20 RX ADMIN — PANTOPRAZOLE SODIUM 40 MG: 40 INJECTION, POWDER, FOR SOLUTION INTRAVENOUS at 21:10

## 2024-03-20 RX ADMIN — WATER 2000 MG: 1 INJECTION INTRAMUSCULAR; INTRAVENOUS; SUBCUTANEOUS at 05:08

## 2024-03-20 RX ADMIN — MILRINONE LACTATE IN DEXTROSE 0.38 MCG/KG/MIN: 200 INJECTION, SOLUTION INTRAVENOUS at 08:31

## 2024-03-20 RX ADMIN — Medication: at 06:36

## 2024-03-20 RX ADMIN — Medication: at 01:38

## 2024-03-20 RX ADMIN — Medication: at 10:44

## 2024-03-20 RX ADMIN — Medication 25 MCG/HR: at 22:14

## 2024-03-20 RX ADMIN — Medication 4.6 UNITS/HR: at 16:29

## 2024-03-20 RX ADMIN — COLLAGENASE SANTYL: 250 OINTMENT TOPICAL at 09:19

## 2024-03-20 RX ADMIN — Medication: at 21:00

## 2024-03-20 RX ADMIN — Medication: at 00:34

## 2024-03-20 RX ADMIN — AMIODARONE HYDROCHLORIDE 400 MG: 200 TABLET ORAL at 09:18

## 2024-03-20 RX ADMIN — AMIODARONE HYDROCHLORIDE 400 MG: 200 TABLET ORAL at 21:35

## 2024-03-20 RX ADMIN — Medication: at 05:34

## 2024-03-20 RX ADMIN — SODIUM CHLORIDE, PRESERVATIVE FREE 10 ML: 5 INJECTION INTRAVENOUS at 09:59

## 2024-03-20 RX ADMIN — CHLORHEXIDINE GLUCONATE 0.12% ORAL RINSE 15 ML: 1.2 LIQUID ORAL at 09:17

## 2024-03-20 RX ADMIN — AMIODARONE HYDROCHLORIDE 1 MG/MIN: 1.8 INJECTION, SOLUTION INTRAVENOUS at 09:57

## 2024-03-20 RX ADMIN — MIDAZOLAM 2 MG: 1 INJECTION INTRAMUSCULAR; INTRAVENOUS at 05:48

## 2024-03-20 RX ADMIN — SODIUM CHLORIDE 500 ML: 9 INJECTION, SOLUTION INTRAVENOUS at 01:52

## 2024-03-20 RX ADMIN — HEPARIN SODIUM 18 UNITS/KG/HR: 10000 INJECTION, SOLUTION INTRAVENOUS at 17:41

## 2024-03-20 RX ADMIN — ALTEPLASE 1 MG: 2.2 INJECTION, POWDER, LYOPHILIZED, FOR SOLUTION INTRAVENOUS at 18:34

## 2024-03-20 RX ADMIN — MIDAZOLAM 2 MG: 1 INJECTION INTRAMUSCULAR; INTRAVENOUS at 02:16

## 2024-03-20 ASSESSMENT — PULMONARY FUNCTION TESTS
PIF_VALUE: 24
PIF_VALUE: 23

## 2024-03-20 NOTE — ANESTHESIA POSTPROCEDURE EVALUATION
Department of Anesthesiology  Postprocedure Note    Patient: Mumtaz Islas  MRN: [id number]  YOB: 1955  Date of evaluation: 3/20/2024    Procedure Summary       Date: 03/19/24 [facility]    Anesthesia Start: 1200 Anesthesia Stop: 1607    Procedure: Ecmo Removal (Chest) Diagnosis:       CAD in native artery      (CAD in native artery [I25.10])    Surgeons: Manish Bess MD Responsible Provider: Johann Sutherland MD    Anesthesia Type: general ASA Status: 4            Anesthesia Type: No value filed.    Rosaline Phase I: Rosaline Score: 10    Rosaline Phase II:      Anesthesia Post Evaluation    Patient location during evaluation: ICU  Patient participation: complete - patient cannot participate  Level of consciousness: sedated and ventilated  Airway patency: patent  Nausea & Vomiting: no vomiting and no nausea  Cardiovascular status: hemodynamically stable  Respiratory status: acceptable and ventilator  Hydration status: stable  Comments: Visited pt. In ICU   TALKED TO ICU CARE GIVING NURSE.  PRESSERS WEANED OFF.  ONLY ON MILRINONE.  WAITING FOR PT. TO WAKE UP.  Pain management: adequate    No notable events documented.

## 2024-03-20 NOTE — FLOWSHEET NOTE
03/19/24 2010   Treatment Team Notification   Reason for Communication Evaluate   Name of Team Member Notified Fco Olivarez MD   Treatment Team Role Consulting Provider   Method of Communication Face to face   Response See orders   Notification Time 2011     Spoke with Dr. Olivarez and updated provider regarding inability to obtain doppler pulses on pedal or post-tibial pulse sites on left lower extremity, as well as doppler pulse on pedal pulse site on right lower extremity. Provider also informed that doppler pulse able to be obtained on right post-tibial pulse site and bilateral popliteal pulses. New orders received from provider to start Nitro paste 1 inch to bilateral feet every 6 hours, with first dose starting now, no dose at midnight, and then next dose again at 0600. Provider also gave verbal order to obtain arterial duplex doppler to left lower extremity. Orders read back to provider and verified.   
   03/19/24 2023   Treatment Team Notification   Reason for Communication Review case   Name of Team Member Notified Manish Bess MD   Treatment Team Role Attending Provider   Method of Communication Call   Response Other (Comment)   Notification Time 2023     Received call from Dr. Bess. Updated regarding on patient current condition, as well as new orders received from Dr. Olivarez for nitro paste to bilateral feet and order for arterial dupplex doppler to left lower extremity. Hemodynamic calculations performed with provider on phone. Provider updated regarding CO of 3.37, CI of 1.97 and SVR of 1495. Instructed by provider to increase primacor continuous infusion to 0.25 mcg/kg/min.  Order read back to provider and verified. Provider also updated regarding chest tube output and plan to administer ordered Protamine. Provider requests to be messaged via [employer] in a few hours to update on how patient is doing.   
   03/19/24 2305   Treatment Team Notification   Reason for Communication Review case   Name of Team Member Notified Manish Bess MD   Treatment Team Role Attending Provider   Method of Communication Call   Response Other (Comment)   Notification Time 2305     Call received from Dr. Bess. Updates given regarding most recent hemodynamic calculations, chest tube outputs and increase in HR to 120's. Provider also updated regarding restarting vasopressin and current arterial blood pressures. Discussed with provider regarding restarting epinephrine continuous infusion, if needed, due to CI of 1.92. Provider also updated regarding most recent EKG interpretation. Provider gave telephone order to restart Amiodarone continuous infusion at 1 mg/min for remainder of this shift to assist in HR control. CXR obtained to verify position of Pomeroy-Brian catheter. CXR image sent to Dr. Bess and verbal order received to advance Pomeroy-Brian catheter 2cm. Dr. Allen at bedside and advanced PA catheter. Repeat CXR obtained and viewed by Dr. Allen.   
   03/20/24 0025   Treatment Team Notification   Reason for Communication Review case   Name of Team Member Notified Manish Bess MD   Treatment Team Role Attending Provider   Method of Communication Secure Message   Response Waiting for response   Notification Time 0025     0025: Message sent to Dr. Bess and updated regarding patient's drop in CI to 1.6. Provider updated regarding current infusion rates of Vasopressin, Epinephrine, Primacor and Amiodarone. Awaiting response at present time.     0045: Spoke with Dr. Allen, ICU Resident. Updated regarding patient and current hemodynamic calculations, as well as previous hemodynamic calculations with drop in CI to 1.6. Per Dr. Allen, \"okay to titrate primacor if needed.\"  
   03/20/24 0145   Treatment Team Notification   Reason for Communication Review case   Name of Team Member Notified Manish Bess MD   Treatment Team Role Attending Provider   Method of Communication Call   Response See orders   Notification Time 0145     Called and spoke with Dr. Bess regarding patient's continued increase in HR and subsequent drop in blood pressure and tachypnea. Discussed volume status with provider. New orders received to administer 0.9% NaCl 500 mL bolus x 1, Albumin 25% 100 mL x 1 and amiodarone 150 mg bolus x 1. Orders read back to provider and verified.   
   03/20/24 0350   Treatment Team Notification   Reason for Communication Evaluate   Name of Team Member Notified Daniel Allen MD   Treatment Team Role Resident   Method of Communication Face to face   Response At bedside   Notification Time 0350     Discussed with Dr. Allen regarding continuous bubbling in pleural chest tube atrium. Dr. Allen at bedside and removed clots from chest tube. Bubbling continues following clot removal. New verbal order received to obtain CXR to evaluate chest tube placement.     0455: CXR results reviewed. CXR results state \"No significant change from recent comparison.\" Dr. Allen made aware of CXR results. No further orders received at this time.   
0800    ABG VBG  DO2 496  VO2  187  Ratio  2.6 to 1     Amina  CO 4.2  CI   2.2  SV   37               
0800 ABG /VBG  DO2 429  VO2 158  Ratio 2.7 :1  Amina CO 10          CI   5.2             Sweep 2  Flow 4.5L at 6600 rpm  
0800 Mr Islas rests in the bed without apparent distress. He is intubated since his OHS with #8 ETT and 23 cm to his lips. He has an NGT in place at 60 cm to LIWS. He has a mid sternal site with dressing in place which appears clean and dry. He has a swan ruddy catheter right IJ with slight old blood at the site. He has 2 mediastinal and 2 pleural chest tubes connected to collection devices producing 40 cm water suction. These are draining bright red blood. He has a banks patent to gravity but his urine output has decreased to about 10 cc/hr. He has SCD's right leg with a lukas hose in place. His donor site left leg has an ace wrap in place. Pedal and post tib pulses obtained by doppler. He has an art line left radial site clear. His BP has been very labile already this AM. He requires Epi and Milrinone to maintain his BP at ordered parameters. Dr Bess in to see and was given an update. Plan to get a LAKSHMI today, repeat blood work at 1200  
0800 Mr Islas rests in the bed without apparent distress. He remains intubated since his original OHS on 3/6 with #8 ETT at 23 cm to his lips. He has gone for internal ECMO placement (LAVA) on 3/7 and has access tubes right and left atria and return through the ascending aorta. His blood flow rate is about 3.9 LPM. He has an NGT in place at 60 cm with Nepro tube feedings at 300cc/hr. He seems to have little to no residuals with this. He has not had a BM yet. He has a mid sternal incision site with dressing in place which is clean and dry. He has a swan ruddy catheter right IJ with slight old blood at the site. He has 2 mediastinal and 2 pleural chest tubes connected to collection devices at 40 cm water suction. These are draining minimally dark red blood. He has a banks patent to gravity but his urine output stopped. He has SCD's right leg with a lukas hose in place. His donor site left leg has some blisters over his shin. Pedal and post tib pulses obtained by doppler. He has an art line left radial site clear. He again requires Epi to maintain his BP. He has a CRRT catheter right SC site clear and tolerates this therapy well. His heart rhythm appears more stable today and he is currently V paced at 80.     0800  ABG/VBG  DO2 487  VO2  213  Ratio  2.3 to 1     Amina  CO 4.0  CI   2.1  SV   50    0830 Dr Bess in to see. We have turned the ECMO blood flow rate down to 3.5.  
0800 Mr Islas rests in the bed without apparent distress. He remains intubated since his original OHS on 3/6 with #8 ETT at 23 cm to his lips. He has gone for internal ECMO placement (LAVA) on 3/7 and has access tubes right and left atria and return through the ascending aorta. His flows are about 3.2 LPM to 3.9 LPM. He has an NGT in place at 60 cm to LIWS secured with a bridal. He has a mid sternal site with dressing in place which has some old drainage at the site. He has a swan ruddy catheter right IJ with slight old blood at the site. He has 2 mediastinal and 2 pleural chest tubes connected to collection devices producing 40 cm water suction. These are draining bright red blood. He has a banks patent to gravity but his urine output has decreased to about 10 cc/hr. He has SCD's right leg with a lukas hose in place. His donor site left leg has an ace wrap in place. Pedal and post tib pulses obtained by doppler. He has an art line left radial site clear. His BP remains very labile. He requires Epi maintain his BP at ordered parameters. Dr Bess in to see and was given an update. Plan for dialysis catheter today for poss CRRT.    
0800 Mr Islas rests in the bed without apparent distress. He remains intubated since his original OHS on 3/6 with #8 ETT at 23 cm to his lips. He has gone for internal ECMO placement (LAVA) on 3/7 and has access tubes right and left atria and return through the ascending aorta. His flows have improved today to 4.6 LPM. He has an NGT in place at 60 cm to LIWS secured with a bridal. We will start a trickle tube feeding today. He has a mid sternal site with dressing in place which has some old drainage at the site. He has a swan ruddy catheter right IJ with slight old blood at the site. He has 2 mediastinal and 2 pleural chest tubes connected to collection devices at 40 cm water suction. These are draining dark red blood and the drainage appears to have decreased. He has a banks patent to gravity but his urine output has decreased to about 10 cc/hr. He has SCD's right leg with a lukas hose in place. His donor site left leg has a lukas hose in place. Pedal and post tib pulses obtained by doppler. He has an art line left radial site clear. His BP has improved as well and he no longer requires vasopressors or Inotrope support. He has a CRRT catheter right SC site clear and tolerates this therapy well.   
0800 Mr Islas rests in the bed without apparent distress. He remains intubated since his original OHS on 3/6 with #8 ETT at 23 cm to his lips. He has gone for internal ECMO placement (LAVA) on 3/7 and has access tubes right and left atria and return through the ascending aorta. HisBlood flows are about 3.6LPM. He has an NGT in place at 60 cm with Nepro tube feedings at 20cc/hr. He seems to have no residuals with this. He has a mid sternal site with dressing in place which has some old drainage at the site. He has a swan ruddy catheter right IJ with slight old blood at the site. He has 2 mediastinal and 2 pleural chest tubes connected to collection devices at 40 cm water suction. These are draining minimally dark red blood. He has a banks patent to gravity but his urine output stopped. He has SCD's right leg with a lukas hose in place. His donor site left leg has some blisters over his shin. Pedal and post tib pulses obtained by doppler. He has an art line left radial site clear. He again requires Epi to maintain his BP. He has a CRRT catheter right SC site clear and tolerates this therapy well. His heart rhythm fluctuates between SR 70 and . He does not tolerate this faster rate. The pacer rep was called to help manage this.        1200  ABG VBG  DO2 496  VO2  187  Ratio  2.6 to 1     Amina  CO 4.7  CI   2.4  SV   41                   1300 The pacer rep has come to see. His internal pacemaker was changed to try to over ride his tachy rhythm. Dr Olivarez in to see.             
0800 Mr Islas rests in the bed without apparent distress. He remains intubated since his original OHS on 3/6 with #8 ETT at 23 cm to his lips. He has gone for internal ECMO placement (LAVA) on 3/7 and has access tubes right and left atria and return through the ascending aorta. HisBlood flows are about 4.3 LPM. He has an NGT in place at 60 cm with Nepro tube feedings at 10cc/hr. He seems to have no residuals with this. He has a mid sternal site with dressing in place which has some old drainage at the site. He has a swan ruddy catheter right IJ with slight old blood at the site. He has 2 mediastinal and 2 pleural chest tubes connected to collection devices at 40 cm water suction. These are draining dark red blood and the drainage appears to have decreased. He has a banks patent to gravity but his urine output stopped. He has SCD's right leg with a lukas hose in place. His donor site left leg has a lukas hose in place. Pedal and post tib pulses obtained by doppler. He has an art line left radial site clear. His BP has improved as well and he no longer requires vasopressors or Inotrope support. He has a CRRT catheter right SC site clear and tolerates this therapy well.        
0800 Mr Islas rests in the bed without apparent distress. He remains intubated since his original OHS on 3/6 with #8 ETT at 25cm to his lips. He has gone for internal ECMO placement (LAVA) on 3/7 and has access tubes right and left atria and return through the ascending aorta. His blood flow rate is about 4.4 LPM this AM. He has an NGT in place at 60 cm with Nepro tube feedings at 30cc/hr. He seems to have little to no residuals with this. He has a mid sternal incision site with dressing in place which is clean and dry. He has a swan ruddy catheter right IJ with slight old blood at the site. He has 2 mediastinal and 2 pleural chest tubes connected to collection devices at 20 cm water suction. These are draining minimally dark red blood. He has a banks patent to gravity but his urine output stopped. He has bilateral SCD's and lukas hose in place. Pedal and post tib pulses obtained by doppler. He has an art line left radial site clear. He occasionally requires Epi to maintain his BP. He has a CRRT catheter right SC site clear and tolerates this therapy well. His pulsatility continues to vary depending on his rhythm as this is more consistent when his rate is slower and he is paced at 80.     0800  ABG/VBG  DO2 537  VO2  223  Ratio  2.4 : 1     Amina  CO 4.6  CI   2.4  SV   51         
0800 Mr Islas rests in the bed without apparent distress. He remains intubated since his original OHS on 3/6 with #8 ETT at 25cm to his lips. He has gone for internal ECMO placement (LAVA) on 3/7 and has access tubes right and left atria and return through the ascending aorta. His blood flow rate is about 4.5 LPM this AM. He has an NGT in place at 60 cm with Nepro tube feedings stopped at midnight. He has had a small BM last night. He has a mid sternal incision site with dressing in place which is clean and dry. He has a swan ruddy catheter right IJ with slight old blood at the site. He has 2 mediastinal and 2 pleural chest tubes connected to collection devices at 20 cm water suction. These are draining minimally dark blood. He has a banks patent to gravity but his urine output has stopped. He has bilateral SCD's and lukas hose in place. Pedal and post tib pulses obtained by doppler. He has an art line left radial site clear. He occasionally requires Epi to maintain his BP. He has a CRRT catheter right SC site clear and tolerates this therapy well. He has had low pulsatility during the night which seems to correspond to his A-Fib pattern. Dr Olivarez, Dr Bess and Dr Mello in to see and he was cardioverted into what appears to be a more stable, pulsatile, sinus tach with intermittent paced beats.       0800  ABG/VBG  DO2 525  VO2  199  Ratio  2.6 : 1     Amina  CO 6.1  CI   3.2  SV  53        
1130 Mr Islas taken to the OR for ECMO cath removal.  
1200    ABG/VBG  DO2 402  VO2  154  Ratio  2.6 to 1     Amina  CO 4.7  CI   2.5  SV   59     
1200  ABG /VBG  DO2   624  VO2   185  Ratio   3.4:1  Amina CO 8          CI   4           SV 89  Sweep 2  Flow 4.5L at 6600 rpm  
1200 Dr Fry in to see for LAKSHMI. Mr Islas tolerated this well. Dr Bess and Dr Olivarez at bedside for LAKSHMI. Plan to remove the ECMO catheters tomorrow AM.    ABG/VBG  DO2 515  VO2  195  Ratio  2.6 : 1     Amina  CO 5.6  CI   3.0  SV   61     
1600    ABG VBG  DO2 505  VO2  161  Ratio  3.1 to 1     Amina  CO 5.8  CI   3  SV   69     
1600    ABG/VBG  DO2 402  VO2  186  Ratio  2.1 :1     Amina  CO 4.1  CI   2.1  SV   51       Increase PFR to 3.5 from 3.0  Increase sweep to 2.5L at 90% from 1.5L at 90%  Decrease vent FIO2 to 40% from 50%           
1600    ABG/VBG  DO2 493  VO2  197  Ratio  2.5 : 1     Amina    CO 4.8  CI   2.5  SV   52     
1600   ABG VBG  DO2 487  VO2  168  Ratio  2.9 to 1    Amina  CO 6.2  CI   3.1  SV   69  
1600  ABG /VBG  DO2   607  VO2   167  Ratio   3.6  Amina CO 9.1          CI   4.6             Sweep 2  Flow 4.5L at 6600 rpm  
1630 Patient arrived to unit from OR via bed. Patient transferred to ICU bed and placed on continuous ICU bedside monitor. Patient admitted for Coronary artery disease, unspecified vessel or lesion type, unspecified whether angina present, unspecified whether native or transplanted heart [I25.10]  Paroxysmal atrial fibrillation (HCC) [I48.0]  CAD in native artery [I25.10]  CAD, multiple vessel [I25.10]. Vitals obtained. See flowsheets. Patient's IV access includes [place] art line and peripherals. Current infusions and rates of infusion include Epi, Milrinone, Amiodarone Vasopressin. Assessment completed by George Stinson RN .    
1930-Report from offgoing RNs    2000-Pt continues with decreased pulsatility. MAPs dropping. Will restart Epi. Pressure lines zeroed and flushed.     2015-Assessment complete    2020-ABG: pH 7.41; pCO2 40.7;  pO2 447.8; HCO3 25.7, %O2 Sat 100  VBG: pO2 43.4; %O2 Sat 76.8  Weaned FdO2 to 90%  Oxygenator calibrated  TRU calculation:   ECMO 3.7 LPM @ 5300 RPM  CO 7.3  CI 3.7  SV 81    2040-Pt continues to be hypotensive and labile. Increased ECMO flows to assist BP. Increased pump to 4LPM @ 5600 rpm    0000-ABG: pH 7.36; pCO2 46;  pO2 226; HCO3 26, %O2 Sat 100  VBG: pO2 39; %O2 Sat 70  Increased sweep to 2lpm maintaining  at 90%FdO2. Will repeat abg.   TRU calculation:   ECMO 3.8 LPM @ 5600 RPM  CO 7.3  CI 3.7  SV 81    0100. ABG: pH 7.41; pCO2 41.1;  pO2 413; HCO3 26, %O2 Sat 100.   FdO2 weaned to 80% on . Will reassess.     0100-Assessment complete.     0230-. ABG: pH 7.43; pCO2 37.1;  pO2 277; HCO3 25, %O2 Sat 100.   FdO2 weaned to 75% on , sweep turned to 1.5 lpm. Will reassess.     0400-Assessment complete. Pt more agitated. Thrashing head and extremities in bed. Sedation adjusted.     0410-ABG: pH 7.41; pCO2 39;  pO2 216; HCO3 25, %O2 Sat 100.   VBG: pO2 65; %O2 Sat 40  Weaned  to 70% sweep remains at 1.5lpm  TRU calculation:   ECMO 3.6 LPM @ 5600 RPM  CO 4.7  CI 2.4  SV 53     0510-Pt still agitated but improved. Flows more labile, chugging noted in ECMO cannulas. Flows decreased. Increased outputs from chest tubes. PRBCs ordered.     0530-Dr Olivarez updated on AM labs, ABG/VBGs CXR, CRRT, and current ECMO settings. Updated that  ECMO chugging, New orders received for total of 2 units of PRBC.     0545-PRBCs infusing, increasing ECMO pump back to goal . 3.8lpm @ 5500rpm                
1930-Report from offgoing RNs. Lupeell remains in place. Seizure burden remains zero.     2000-Assessment complete    2020- ABG: pH 7.40; pCO2 42.9;  pO2 502.7; HCO3 26.3, %O2 Sat 100.    VBG: pO2 44.6; %O2 Sat 78.5  TRU calculation:   ECMO 4.6 LPM @ 6600 RPM  CO 8.4  CI 4.2  SV 94    2050-Dr Olivarez updated with abg/vbg and lab results. No new orders.     2100-Decreased pulsatility noted in arterial waveform. Pt agitated. Sedation adjusted.     2125-ECMO pump weaned to see if pulsatility will return. Flows decreased to 4.2LPM @ 6000 RPM with pulsatility return.     00010- ABG: pH 7.41; pCO2 42.1;  pO2 516.3; HCO3 26.4, %O2 Sat 100.   VBG: pO2 44.1; %O2 Sat 78.3  Awaiting lactic acid result. Will switch to  if normal  TRU calculation:   ECMO 4.2 LPM @ 6000 RPM  CO 8.4  CI 4.2  SV 93     0100-lactic acid remains normal for 2 draws. Placed pt on  per Dr Olivarez's instructions. Remains on sweep of 1lpm and 90% FdO2 per .    0345-Pt agitated and shaking head. Pulsatility lost again at this time.     0400-ABG: pH 7.35; pCO2 48;  pO2 212; HCO3 27, %O2 Sat 100.   VBG: pO2 41; %O2 Sat 70  Increased  back to 100% FdO2. Will reassess in 30 minutes. Pt very restless at this time-likely affecting results.   TRU calculation:   ECMO 4.4 LPM @ 6000 RPM  CO 6.1  CI 3.1  SV 68    0420-Intermittent pulsatility Pt calmed down.     0445-ABG: pH 7.39; pCO2 41.7;  pO2 384.7; HCO3 25.2, %O2 Sat 100.   Decreased  to 95% FdO2      
1930-Report from offgoing Rn. Pt continues on CRRT and ECMO. Cleaned of incontinent stool.     1948-Dr Marcus messaged, updated that pt's potassium has been on high end and trending up. Request to change CRRT fluid. Phyician states no change, no new orders.     1950-Assessment complete.     2025- ABG: pH 7.37; pCO2 42;  pO2 215; HCO3 24.8, %O2 Sat 99.   VBG: pO2 34.4; %O2 Sat 62  Weaned  down to 80%, oxygenator calibrated  TRU calculation:   ECMO 4.3 LPM @ 6300 RPM  CO 4.5  CI 2.4  SV 56     2032- Minimal pulsatility noted in arterial waveform.     2038-ECMO pump weaned to 3.7LPM @ 5600 RPM to see if pulsatility will return. Will restart Epi gtt.     2041-Barby NEWELL updated about loss of pulsatility. No new orders.     2050-SvO2 dropping to 52% per oxygenator. Increased flow back to 4.5LPM @ 6200 RPM    2130-Increased drainage noted on cannula dsgs. Will send stat H&H  
1930-Report from previous shift.     2000-Assessment complete. Pt remains very labile on epi gtt-freq titrations. Minimal pulsatility.     2000- ABG: pH 7.43; pCO2 36;  pO2 424; HCO3 24, %O2 Sat 100   VBG: pO2 33; %O2 Sat 64  Weaned  to 70% FdO2  TRU calculation:   ECMO 3.4 LPM @ 5600 RPM  CO 4.1  CI 2.1  SV 58     2033-ABG: pH 7.44; pCO2 34;  pO2 332; HCO3 23, %O2 Sat 100. Weaned Sweep to 2.5LPM     2224-Flows decreasing,  increased RPMs to maintain goal flows 3.5lpm 6100rpm    2235-Chugging noted from cannulas. Will give albumin.     2245-CRRT effluent decreased to zero d/t decreased BP and pulsatility. Epi titrated    2308-ECMO pump decreased to 3LPM @ 5600rpm d/t chugging    2323-Flows able to be increased back up to 3.5LPM @ 5500 rpm. No chugging, pt pulsatile on arterial line.     2355-ABG: pH 7.53; pCO2 28;  pO2 266; HCO3 24, %O2 Sat 100.    VBG: pO2 30; %O2 Sat 62  Decreased Sweep to 2.5LPM, weaned  to 60%  TRU calculation:   ECMO 3.2 LPM @ 5500 RPM  CO 4.5  CI 2.3  SV 64    0040- ABG: pH 7.5; pCO2 31;  pO2 206; HCO3 24, %O2 Sat 100.    Weaned sweep down to 1.5LPM    0400- ABG: pH 7.36; pCO2 42;  pO2 124; HCO3 24, %O2 Sat 99   VBG: pO2 36; %O2 Sat 65  No changes made  TRU calculation:   ECMO 3.4 LPM @ 5600 RPM  CO 5.2  CI 2.7  SV 74    0600-Barby CNP updated Request for PRBC d/t low Svo2, 2gm hgb drop and chugging New order received.     0630-Dr Olivarez at bedside. ECMO flow decreased to 2L to assess function.     0637-ECMO flow returned to 3.7LPM @ 5900 per Dr Olivarez. ECHO ordered.       
1955: ABG: pH 7.39; pCO2 46;  pO2 478; HCO3 28, %O2 Sat 100. BE 2.6   VBG: pO2 36; %O2 Sat 64  TRU calculation:   ECMO 3.3 LPM @ 6000 RPM  CO 4.2  CI 2.2  SV 32    2030: Dr. Allen notified of Na+ levels, HR, and patient continuously shaking head \"no\" but no other purposeful movements/response to pain other than eye opening. Orders for D5 gtt, amio gtt, and precedex gtt.    2110: Shahriar- updated on flows of 1.5 LPM, 's, and goals for the night.    2115: Updated wife on patient status.    2122: Bleeding noted from NG drainage and mouth- Protonix gtt started    2149: Flows continue to be 1-2 LPM. Attempted to increase RPMs- RPMs at 7200 LPM and flows at 3 LPM- membrane pressures 380-400. Decreased RPM to 6100 and flows to 3.9 LPM.    2245: Dr. Bess notified of elevated BPs, patient more agitated and flows dropping with agitation. Orders to add fentanyl gtt and Cardene gtt.    2347: ABG: pH 7.47; pCO2 36;  pO2 456; HCO3 26, %O2 Sat 100. BE 2.3   VBG: pO2 45; %O2 Sat 82  TRU calculation:   ECMO 5 LPM @ 6600 RPM  CO 4.5  CI 2.3  SV 50    Changes made: Sweep decreased from 3 to 2 and PEEP from 8 to 6    0101: ABG: pH 7.43; pCO2 40;  pO2 456; HCO3 27, %O2 Sat 100. BE 2.2   ECMO 4.9 LPM @ 6600 RPM     0140: Dr. Olivarez updated on platelets and fibrinogen. Cryo and platelets ordered.    0200: Per Dr. Olivarez, okay to switch to  once lactic is less than 2 x2. Keep platelets > 30 per Dr. Olivarez and HBG > 9. Okay to decrease PEEP to 6 per Dr. Olivarez if lactic trending down.    0250: lactic 2.5, was 3. PEEP to 6.    0405: ABG: pH 7.41; pCO2 44;  pO2 447; HCO3 3, %O2 Sat 100. BE 3   VBG: pO2 44; %O2 Sat 78  TRU calculation:   ECMO 4.7 LPM @ 6600 RPM  CO 8.5  CI 4.4  SV 94    
Patient arrived to unit from OR via bed. Patient transferred to ICU bed and placed on continuous ICU bedside monitor. Patient admitted for Coronary artery disease, unspecified vessel or lesion type, unspecified whether angina present, unspecified whether native or transplanted heart [I25.10]  Paroxysmal atrial fibrillation (HCC) [I48.0]  CAD in native artery [I25.10]  CAD, multiple vessel [I25.10]. Vitals obtained. See flowsheets. Patient's IV access includes [place], Intorducer, perpheral IV. Current infusions and rates of infusion include Epinephrine and Amicar started in OR. Assessment completed by Carolyn Navarro RN. Two nurse skin assessment completed by Carolyn Navarro RN and Maco Rayo RN. See flowsheets for assessment details. Policies and procedures of ICU able to be explained to patient at this time. Family member(s)/representative(s) present at time of admission include wife. Patient rights explained to family member(s)/representatives and patient, as able. Patient/patient's family member(s)/representative(s) N/A to have physician notified of their admission. All questions posed by patient's family member(s)/representative(s) and patient answered at this time.     
HCO3 27, %O2 Sat 100. Increased Sweep gas to 3LPM. Increased pressure support on vent per RRT Versed gtt started.     0255-ABG: pH 7.29; pCO2 52;  pO2 560; HCO3 25, %O2 Sat 100. Increased sweep gas to 4LPM,  Increased PEEP to 10. Will reassess. Pt still with breath stacking. Increasing versed gtt.     0315-ABG: pH 7.33; pCO2 46.2;  pO2 567.3; HCO3 25, %O2 Sat 100. Increased respiratory rate to 20, decreased pressure support to 16. Sedation increased.      0340-ABG: pH 7.36; pCO2 42.6;  pO2 489; HCO3 24.3, %O2 Sat 100.   VBG: pO2 48.6; %O2 Sat 79.7  TRU calculation:   ECMO 3.5 LPM @ 5600 RPM  CO 7.2  CI 3.6  SV 80     0345-Assessment complete    0505-Dr Olivarez called and updated on pt condition and changes made overnight. No new orders.     0630-Barby NEWELL updated. No new orders    
gave 660 back in cell saver, 2 FFP, 4 PRBC, 1 cryo, 1 platelets.    0345: Wife updated after receiving report from OR.     0407: Back to room from OR.     0410: ABGs resulted. Sweep to 1.    0511: insulin gtt started    0530: Updated Dr. Olivarez on lactic, fibrinogen, platelets, ABGs, and sweep. Wait for next fibrinogen to come back to see if product is needed. Waiting at this time for platelets, continue to monitor. Give one more unit of PRBC, ECMO chugging at this time. Once blood is done, recheck an ABG in 15 minutes.     0622: Updated Dr. Olivarez that as soon as PRBC is done, EMCO circuit starts chattering/chugging- can't get flows over 3 consistently.    0634: albumin hung for ECMO circuit chugging    0648: Sweep to 3

## 2024-03-21 ENCOUNTER — APPOINTMENT (OUTPATIENT)
Dept: GENERAL RADIOLOGY | Age: 69
DRG: 003 | End: 2024-03-21
Attending: THORACIC SURGERY (CARDIOTHORACIC VASCULAR SURGERY)
Payer: MEDICARE

## 2024-03-21 LAB
ALBUMIN SERPL BCG-MCNC: 2.8 G/DL (ref 3.5–5.1)
ALBUMIN SERPL BCG-MCNC: 2.9 G/DL (ref 3.5–5.1)
ALBUMIN SERPL BCG-MCNC: 3 G/DL (ref 3.5–5.1)
ALBUMIN SERPL BCG-MCNC: 3 G/DL (ref 3.5–5.1)
ALBUMIN SERPL BCG-MCNC: 3.1 G/DL (ref 3.5–5.1)
ALBUMIN SERPL BCG-MCNC: 3.1 G/DL (ref 3.5–5.1)
ALP SERPL-CCNC: 473 U/L (ref 38–126)
ALP SERPL-CCNC: 490 U/L (ref 38–126)
ALP SERPL-CCNC: 492 U/L (ref 38–126)
ALP SERPL-CCNC: 522 U/L (ref 38–126)
ALP SERPL-CCNC: 576 U/L (ref 38–126)
ALP SERPL-CCNC: 586 U/L (ref 38–126)
ALT SERPL W/O P-5'-P-CCNC: 11 U/L (ref 11–66)
ALT SERPL W/O P-5'-P-CCNC: 12 U/L (ref 11–66)
ALT SERPL W/O P-5'-P-CCNC: 13 U/L (ref 11–66)
ALT SERPL W/O P-5'-P-CCNC: 15 U/L (ref 11–66)
ALT SERPL W/O P-5'-P-CCNC: 8 U/L (ref 11–66)
ALT SERPL W/O P-5'-P-CCNC: 9 U/L (ref 11–66)
ANION GAP SERPL CALC-SCNC: 12 MEQ/L (ref 8–16)
ANION GAP SERPL CALC-SCNC: 12 MEQ/L (ref 8–16)
ANION GAP SERPL CALC-SCNC: 13 MEQ/L (ref 8–16)
ANION GAP SERPL CALC-SCNC: 13 MEQ/L (ref 8–16)
ANION GAP SERPL CALC-SCNC: 14 MEQ/L (ref 8–16)
ANION GAP SERPL CALC-SCNC: 9 MEQ/L (ref 8–16)
APTT PPP: 120.5 SECONDS (ref 22–38)
ARTERIAL PATENCY WRIST A: ABNORMAL
AST SERPL-CCNC: 117 U/L (ref 5–40)
AST SERPL-CCNC: 140 U/L (ref 5–40)
AST SERPL-CCNC: 160 U/L (ref 5–40)
AST SERPL-CCNC: 86 U/L (ref 5–40)
AST SERPL-CCNC: 92 U/L (ref 5–40)
AST SERPL-CCNC: 97 U/L (ref 5–40)
BASE EXCESS BLDA CALC-SCNC: 0.6 MMOL/L (ref -2.5–2.5)
BDY SITE: ABNORMAL
BILIRUB SERPL-MCNC: 1 MG/DL (ref 0.3–1.2)
BILIRUB SERPL-MCNC: 1 MG/DL (ref 0.3–1.2)
BILIRUB SERPL-MCNC: 1.1 MG/DL (ref 0.3–1.2)
BILIRUB SERPL-MCNC: 1.3 MG/DL (ref 0.3–1.2)
BREATHS SETTING VENT: 16 BPM
BUN SERPL-MCNC: 24 MG/DL (ref 7–22)
BUN SERPL-MCNC: 24 MG/DL (ref 7–22)
BUN SERPL-MCNC: 25 MG/DL (ref 7–22)
BUN SERPL-MCNC: 26 MG/DL (ref 7–22)
BUN SERPL-MCNC: 31 MG/DL (ref 7–22)
BUN SERPL-MCNC: 33 MG/DL (ref 7–22)
CA-I BLD ISE-SCNC: 1.24 MMOL/L (ref 1.12–1.32)
CA-I BLD ISE-SCNC: 1.28 MMOL/L (ref 1.12–1.32)
CA-I BLD ISE-SCNC: 1.29 MMOL/L (ref 1.12–1.32)
CA-I BLD ISE-SCNC: 1.32 MMOL/L (ref 1.12–1.32)
CA-I BLD ISE-SCNC: 1.34 MMOL/L (ref 1.12–1.32)
CA-I BLD ISE-SCNC: 1.35 MMOL/L (ref 1.12–1.32)
CALCIUM SERPL-MCNC: 9.2 MG/DL (ref 8.5–10.5)
CALCIUM SERPL-MCNC: 9.3 MG/DL (ref 8.5–10.5)
CALCIUM SERPL-MCNC: 9.4 MG/DL (ref 8.5–10.5)
CALCIUM SERPL-MCNC: 9.5 MG/DL (ref 8.5–10.5)
CALCIUM SERPL-MCNC: 9.5 MG/DL (ref 8.5–10.5)
CALCIUM SERPL-MCNC: 9.8 MG/DL (ref 8.5–10.5)
CHLORIDE SERPL-SCNC: 100 MEQ/L (ref 98–111)
CHLORIDE SERPL-SCNC: 101 MEQ/L (ref 98–111)
CHLORIDE SERPL-SCNC: 103 MEQ/L (ref 98–111)
CHLORIDE SERPL-SCNC: 99 MEQ/L (ref 98–111)
CO2 SERPL-SCNC: 22 MEQ/L (ref 23–33)
CO2 SERPL-SCNC: 25 MEQ/L (ref 23–33)
COLLECTED BY:: ABNORMAL
CREAT SERPL-MCNC: 0.9 MG/DL (ref 0.4–1.2)
CREAT SERPL-MCNC: 1 MG/DL (ref 0.4–1.2)
CREAT SERPL-MCNC: 1.1 MG/DL (ref 0.4–1.2)
CREAT SERPL-MCNC: 1.1 MG/DL (ref 0.4–1.2)
DEPRECATED RDW RBC AUTO: 57.1 FL (ref 35–45)
DEPRECATED RDW RBC AUTO: 58.6 FL (ref 35–45)
DEPRECATED RDW RBC AUTO: 58.6 FL (ref 35–45)
DEPRECATED RDW RBC AUTO: 59.7 FL (ref 35–45)
DEPRECATED RDW RBC AUTO: 60.5 FL (ref 35–45)
DEPRECATED RDW RBC AUTO: 60.7 FL (ref 35–45)
DEVICE: ABNORMAL
ERYTHROCYTE [DISTWIDTH] IN BLOOD BY AUTOMATED COUNT: 18.7 % (ref 11.5–14.5)
ERYTHROCYTE [DISTWIDTH] IN BLOOD BY AUTOMATED COUNT: 18.9 % (ref 11.5–14.5)
ERYTHROCYTE [DISTWIDTH] IN BLOOD BY AUTOMATED COUNT: 19 % (ref 11.5–14.5)
ERYTHROCYTE [DISTWIDTH] IN BLOOD BY AUTOMATED COUNT: 19.1 % (ref 11.5–14.5)
ERYTHROCYTE [DISTWIDTH] IN BLOOD BY AUTOMATED COUNT: 19.1 % (ref 11.5–14.5)
ERYTHROCYTE [DISTWIDTH] IN BLOOD BY AUTOMATED COUNT: 19.3 % (ref 11.5–14.5)
FIBRINOGEN PPP-MCNC: 450 MG/100ML (ref 155–475)
FIO2 ON VENT O2 ANALYZER: 40 %
GFR SERPL CREATININE-BSD FRML MDRD: > 60 ML/MIN/1.73M2
GLUCOSE BLD STRIP.AUTO-MCNC: 103 MG/DL (ref 70–108)
GLUCOSE BLD STRIP.AUTO-MCNC: 106 MG/DL (ref 70–108)
GLUCOSE BLD STRIP.AUTO-MCNC: 107 MG/DL (ref 70–108)
GLUCOSE BLD STRIP.AUTO-MCNC: 117 MG/DL (ref 70–108)
GLUCOSE BLD STRIP.AUTO-MCNC: 120 MG/DL (ref 70–108)
GLUCOSE BLD STRIP.AUTO-MCNC: 121 MG/DL (ref 70–108)
GLUCOSE BLD STRIP.AUTO-MCNC: 124 MG/DL (ref 70–108)
GLUCOSE BLD STRIP.AUTO-MCNC: 127 MG/DL (ref 70–108)
GLUCOSE BLD STRIP.AUTO-MCNC: 219 MG/DL (ref 70–108)
GLUCOSE BLD STRIP.AUTO-MCNC: 245 MG/DL (ref 70–108)
GLUCOSE BLD-MCNC: 135 MG/DL (ref 70–108)
GLUCOSE SERPL-MCNC: 116 MG/DL (ref 70–108)
GLUCOSE SERPL-MCNC: 126 MG/DL (ref 70–108)
GLUCOSE SERPL-MCNC: 131 MG/DL (ref 70–108)
GLUCOSE SERPL-MCNC: 134 MG/DL (ref 70–108)
GLUCOSE SERPL-MCNC: 243 MG/DL (ref 70–108)
GLUCOSE SERPL-MCNC: 256 MG/DL (ref 70–108)
HCO3 BLDA-SCNC: 23 MMOL/L (ref 23–28)
HCT VFR BLD AUTO: 33.4 % (ref 42–52)
HCT VFR BLD AUTO: 33.8 % (ref 42–52)
HCT VFR BLD AUTO: 34.1 % (ref 42–52)
HCT VFR BLD AUTO: 34.4 % (ref 42–52)
HCT VFR BLD AUTO: 35.6 % (ref 42–52)
HCT VFR BLD AUTO: 35.6 % (ref 42–52)
HEPARIN UNFRACTIONATED: 0.58 U/ML (ref 0.3–0.7)
HEPARIN UNFRACTIONATED: 0.66 U/ML (ref 0.3–0.7)
HEPARIN UNFRACTIONATED: 0.84 U/ML (ref 0.3–0.7)
HEPARIN UNFRACTIONATED: 0.91 U/ML (ref 0.3–0.7)
HGB BLD-MCNC: 11.3 GM/DL (ref 14–18)
HGB BLD-MCNC: 11.4 GM/DL (ref 14–18)
HGB BLD-MCNC: 11.6 GM/DL (ref 14–18)
HGB BLD-MCNC: 12 GM/DL (ref 14–18)
HGB BLD-MCNC: 12.3 GM/DL (ref 14–18)
HGB BLD-MCNC: 12.4 GM/DL (ref 14–18)
LACTATE SERPL-SCNC: 1.5 MMOL/L (ref 0.5–2)
MAGNESIUM SERPL-MCNC: 2.4 MG/DL (ref 1.6–2.4)
MAGNESIUM SERPL-MCNC: 2.6 MG/DL (ref 1.6–2.4)
MAGNESIUM SERPL-MCNC: 2.7 MG/DL (ref 1.6–2.4)
MCH RBC QN AUTO: 30 PG (ref 26–33)
MCH RBC QN AUTO: 30.2 PG (ref 26–33)
MCH RBC QN AUTO: 30.3 PG (ref 26–33)
MCH RBC QN AUTO: 30.4 PG (ref 26–33)
MCH RBC QN AUTO: 30.5 PG (ref 26–33)
MCH RBC QN AUTO: 30.8 PG (ref 26–33)
MCHC RBC AUTO-ENTMCNC: 33.7 GM/DL (ref 32.2–35.5)
MCHC RBC AUTO-ENTMCNC: 33.8 GM/DL (ref 32.2–35.5)
MCHC RBC AUTO-ENTMCNC: 34 GM/DL (ref 32.2–35.5)
MCHC RBC AUTO-ENTMCNC: 34.6 GM/DL (ref 32.2–35.5)
MCHC RBC AUTO-ENTMCNC: 34.8 GM/DL (ref 32.2–35.5)
MCHC RBC AUTO-ENTMCNC: 34.9 GM/DL (ref 32.2–35.5)
MCV RBC AUTO: 87.5 FL (ref 80–94)
MCV RBC AUTO: 88.1 FL (ref 80–94)
MCV RBC AUTO: 88.1 FL (ref 80–94)
MCV RBC AUTO: 88.2 FL (ref 80–94)
MCV RBC AUTO: 89.5 FL (ref 80–94)
MCV RBC AUTO: 89.7 FL (ref 80–94)
PCO2 BLDA: 31 MMHG (ref 35–45)
PEEP SETTING VENT: 8 MMHG
PH BLDA: 7.49 [PH] (ref 7.35–7.45)
PHOSPHATE SERPL-MCNC: 1.6 MG/DL (ref 2.4–4.7)
PHOSPHATE SERPL-MCNC: 2.2 MG/DL (ref 2.4–4.7)
PHOSPHATE SERPL-MCNC: 2.4 MG/DL (ref 2.4–4.7)
PHOSPHATE SERPL-MCNC: 2.4 MG/DL (ref 2.4–4.7)
PHOSPHATE SERPL-MCNC: 2.6 MG/DL (ref 2.4–4.7)
PHOSPHATE SERPL-MCNC: 2.8 MG/DL (ref 2.4–4.7)
PIP: 22 CMH2O
PLATELET # BLD AUTO: 220 THOU/MM3 (ref 130–400)
PLATELET # BLD AUTO: 221 THOU/MM3 (ref 130–400)
PLATELET # BLD AUTO: 229 THOU/MM3 (ref 130–400)
PLATELET # BLD AUTO: 232 THOU/MM3 (ref 130–400)
PLATELET # BLD AUTO: 240 THOU/MM3 (ref 130–400)
PLATELET # BLD AUTO: 240 THOU/MM3 (ref 130–400)
PMV BLD AUTO: 10.3 FL (ref 9.4–12.4)
PMV BLD AUTO: 10.5 FL (ref 9.4–12.4)
PMV BLD AUTO: 10.8 FL (ref 9.4–12.4)
PMV BLD AUTO: 10.8 FL (ref 9.4–12.4)
PMV BLD AUTO: 10.9 FL (ref 9.4–12.4)
PMV BLD AUTO: 11.3 FL (ref 9.4–12.4)
PO2 BLDA: 129 MMHG (ref 71–104)
POTASSIUM SERPL-SCNC: 3.6 MEQ/L (ref 3.5–5.2)
POTASSIUM SERPL-SCNC: 3.7 MEQ/L (ref 3.5–5.2)
POTASSIUM SERPL-SCNC: 3.7 MEQ/L (ref 3.5–5.2)
POTASSIUM SERPL-SCNC: 3.8 MEQ/L (ref 3.5–5.2)
PRESSURE SUPPORT SETTING VENT: 14 CMH2O
PROT SERPL-MCNC: 4.6 G/DL (ref 6.1–8)
PROT SERPL-MCNC: 4.6 G/DL (ref 6.1–8)
PROT SERPL-MCNC: 4.7 G/DL (ref 6.1–8)
PROT SERPL-MCNC: 4.8 G/DL (ref 6.1–8)
RBC # BLD AUTO: 3.73 MILL/MM3 (ref 4.7–6.1)
RBC # BLD AUTO: 3.77 MILL/MM3 (ref 4.7–6.1)
RBC # BLD AUTO: 3.87 MILL/MM3 (ref 4.7–6.1)
RBC # BLD AUTO: 3.9 MILL/MM3 (ref 4.7–6.1)
RBC # BLD AUTO: 4.04 MILL/MM3 (ref 4.7–6.1)
RBC # BLD AUTO: 4.07 MILL/MM3 (ref 4.7–6.1)
SAO2 % BLDA: 99 %
SODIUM SERPL-SCNC: 135 MEQ/L (ref 135–145)
SODIUM SERPL-SCNC: 137 MEQ/L (ref 135–145)
SODIUM SERPL-SCNC: 137 MEQ/L (ref 135–145)
SODIUM SERPL-SCNC: 138 MEQ/L (ref 135–145)
VENTILATION MODE VENT: ABNORMAL
WBC # BLD AUTO: 20.5 THOU/MM3 (ref 4.8–10.8)
WBC # BLD AUTO: 21.8 THOU/MM3 (ref 4.8–10.8)
WBC # BLD AUTO: 22.1 THOU/MM3 (ref 4.8–10.8)
WBC # BLD AUTO: 22.3 THOU/MM3 (ref 4.8–10.8)
WBC # BLD AUTO: 22.5 THOU/MM3 (ref 4.8–10.8)
WBC # BLD AUTO: 24.5 THOU/MM3 (ref 4.8–10.8)

## 2024-03-21 PROCEDURE — 6360000002 HC RX W HCPCS

## 2024-03-21 PROCEDURE — 85520 HEPARIN ASSAY: CPT

## 2024-03-21 PROCEDURE — 2700000000 HC OXYGEN THERAPY PER DAY

## 2024-03-21 PROCEDURE — 83735 ASSAY OF MAGNESIUM: CPT

## 2024-03-21 PROCEDURE — 94003 VENT MGMT INPAT SUBQ DAY: CPT

## 2024-03-21 PROCEDURE — 85384 FIBRINOGEN ACTIVITY: CPT

## 2024-03-21 PROCEDURE — 71045 X-RAY EXAM CHEST 1 VIEW: CPT

## 2024-03-21 PROCEDURE — 82948 REAGENT STRIP/BLOOD GLUCOSE: CPT

## 2024-03-21 PROCEDURE — 6360000002 HC RX W HCPCS: Performed by: PHYSICIAN ASSISTANT

## 2024-03-21 PROCEDURE — 99233 SBSQ HOSP IP/OBS HIGH 50: CPT | Performed by: INTERNAL MEDICINE

## 2024-03-21 PROCEDURE — 6370000000 HC RX 637 (ALT 250 FOR IP): Performed by: STUDENT IN AN ORGANIZED HEALTH CARE EDUCATION/TRAINING PROGRAM

## 2024-03-21 PROCEDURE — 6360000002 HC RX W HCPCS: Performed by: INTERNAL MEDICINE

## 2024-03-21 PROCEDURE — 2580000003 HC RX 258: Performed by: PHYSICIAN ASSISTANT

## 2024-03-21 PROCEDURE — 6370000000 HC RX 637 (ALT 250 FOR IP)

## 2024-03-21 PROCEDURE — 82803 BLOOD GASES ANY COMBINATION: CPT

## 2024-03-21 PROCEDURE — 6370000000 HC RX 637 (ALT 250 FOR IP): Performed by: INTERNAL MEDICINE

## 2024-03-21 PROCEDURE — 6370000000 HC RX 637 (ALT 250 FOR IP): Performed by: PHYSICIAN ASSISTANT

## 2024-03-21 PROCEDURE — 99291 CRITICAL CARE FIRST HOUR: CPT | Performed by: INTERNAL MEDICINE

## 2024-03-21 PROCEDURE — 36415 COLL VENOUS BLD VENIPUNCTURE: CPT

## 2024-03-21 PROCEDURE — 80053 COMPREHEN METABOLIC PANEL: CPT

## 2024-03-21 PROCEDURE — 83605 ASSAY OF LACTIC ACID: CPT

## 2024-03-21 PROCEDURE — 2580000003 HC RX 258: Performed by: INTERNAL MEDICINE

## 2024-03-21 PROCEDURE — 6370000000 HC RX 637 (ALT 250 FOR IP): Performed by: NURSE PRACTITIONER

## 2024-03-21 PROCEDURE — 85027 COMPLETE CBC AUTOMATED: CPT

## 2024-03-21 PROCEDURE — 2000000000 HC ICU R&B

## 2024-03-21 PROCEDURE — 2500000003 HC RX 250 WO HCPCS: Performed by: THORACIC SURGERY (CARDIOTHORACIC VASCULAR SURGERY)

## 2024-03-21 PROCEDURE — 94761 N-INVAS EAR/PLS OXIMETRY MLT: CPT

## 2024-03-21 PROCEDURE — 2580000003 HC RX 258: Performed by: NURSE PRACTITIONER

## 2024-03-21 PROCEDURE — C9113 INJ PANTOPRAZOLE SODIUM, VIA: HCPCS | Performed by: INTERNAL MEDICINE

## 2024-03-21 PROCEDURE — 6360000002 HC RX W HCPCS: Performed by: NURSE PRACTITIONER

## 2024-03-21 PROCEDURE — 2500000003 HC RX 250 WO HCPCS: Performed by: INTERNAL MEDICINE

## 2024-03-21 PROCEDURE — 37799 UNLISTED PX VASCULAR SURGERY: CPT

## 2024-03-21 PROCEDURE — APPSS30 APP SPLIT SHARED TIME 16-30 MINUTES: Performed by: PHYSICIAN ASSISTANT

## 2024-03-21 PROCEDURE — 82330 ASSAY OF CALCIUM: CPT

## 2024-03-21 PROCEDURE — 94640 AIRWAY INHALATION TREATMENT: CPT

## 2024-03-21 PROCEDURE — 82947 ASSAY GLUCOSE BLOOD QUANT: CPT

## 2024-03-21 PROCEDURE — 89220 SPUTUM SPECIMEN COLLECTION: CPT

## 2024-03-21 PROCEDURE — 84100 ASSAY OF PHOSPHORUS: CPT

## 2024-03-21 RX ORDER — LORAZEPAM 2 MG/ML
4 INJECTION INTRAMUSCULAR ONCE
Status: COMPLETED | OUTPATIENT
Start: 2024-03-21 | End: 2024-03-21

## 2024-03-21 RX ORDER — MILRINONE LACTATE 0.2 MG/ML
.0625-.75 INJECTION, SOLUTION INTRAVENOUS CONTINUOUS
Status: DISCONTINUED | OUTPATIENT
Start: 2024-03-21 | End: 2024-03-22

## 2024-03-21 RX ORDER — LORAZEPAM 2 MG/ML
INJECTION INTRAMUSCULAR
Status: COMPLETED
Start: 2024-03-21 | End: 2024-03-21

## 2024-03-21 RX ORDER — INSULIN GLARGINE 100 [IU]/ML
30 INJECTION, SOLUTION SUBCUTANEOUS DAILY
Status: DISCONTINUED | OUTPATIENT
Start: 2024-03-21 | End: 2024-04-05

## 2024-03-21 RX ADMIN — OLANZAPINE 7.5 MG: 5 TABLET, FILM COATED ORAL at 20:47

## 2024-03-21 RX ADMIN — Medication 5 MCG/MIN: at 16:48

## 2024-03-21 RX ADMIN — LORAZEPAM 4 MG: 2 INJECTION INTRAMUSCULAR; INTRAVENOUS at 14:52

## 2024-03-21 RX ADMIN — HEPARIN SODIUM 14 UNITS/KG/HR: 10000 INJECTION, SOLUTION INTRAVENOUS at 12:33

## 2024-03-21 RX ADMIN — Medication: at 08:08

## 2024-03-21 RX ADMIN — MIDAZOLAM 2 MG: 1 INJECTION INTRAMUSCULAR; INTRAVENOUS at 14:42

## 2024-03-21 RX ADMIN — MORPHINE SULFATE 2 MG: 2 INJECTION, SOLUTION INTRAMUSCULAR; INTRAVENOUS at 11:43

## 2024-03-21 RX ADMIN — NITROGLYCERIN 1 INCH: 20 OINTMENT TOPICAL at 13:33

## 2024-03-21 RX ADMIN — AMIODARONE HYDROCHLORIDE 400 MG: 200 TABLET ORAL at 20:47

## 2024-03-21 RX ADMIN — SODIUM CHLORIDE, PRESERVATIVE FREE 10 ML: 5 INJECTION INTRAVENOUS at 20:48

## 2024-03-21 RX ADMIN — MORPHINE SULFATE 2 MG: 2 INJECTION, SOLUTION INTRAMUSCULAR; INTRAVENOUS at 14:22

## 2024-03-21 RX ADMIN — INSULIN GLARGINE 30 UNITS: 100 INJECTION, SOLUTION SUBCUTANEOUS at 11:54

## 2024-03-21 RX ADMIN — Medication 6 MMOL: at 06:30

## 2024-03-21 RX ADMIN — Medication: at 07:05

## 2024-03-21 RX ADMIN — AMIODARONE HYDROCHLORIDE 400 MG: 200 TABLET ORAL at 09:21

## 2024-03-21 RX ADMIN — MIDAZOLAM 2 MG: 1 INJECTION INTRAMUSCULAR; INTRAVENOUS at 11:55

## 2024-03-21 RX ADMIN — Medication: at 20:37

## 2024-03-21 RX ADMIN — INSULIN LISPRO 4 UNITS: 100 INJECTION, SOLUTION INTRAVENOUS; SUBCUTANEOUS at 16:23

## 2024-03-21 RX ADMIN — AMIODARONE HYDROCHLORIDE 1 MG/MIN: 1.8 INJECTION, SOLUTION INTRAVENOUS at 22:41

## 2024-03-21 RX ADMIN — MORPHINE SULFATE 2 MG: 2 INJECTION, SOLUTION INTRAMUSCULAR; INTRAVENOUS at 00:48

## 2024-03-21 RX ADMIN — Medication: at 03:05

## 2024-03-21 RX ADMIN — Medication: at 10:30

## 2024-03-21 RX ADMIN — POLYETHYLENE GLYCOL 400 AND PROPYLENE GLYCOL 2 DROP: 4; 3 SOLUTION/ DROPS OPHTHALMIC at 16:52

## 2024-03-21 RX ADMIN — SODIUM CHLORIDE 2.5 MG/HR: 9 INJECTION, SOLUTION INTRAVENOUS at 20:36

## 2024-03-21 RX ADMIN — Medication: at 08:12

## 2024-03-21 RX ADMIN — CHLORHEXIDINE GLUCONATE 0.12% ORAL RINSE 15 ML: 1.2 LIQUID ORAL at 09:22

## 2024-03-21 RX ADMIN — NITROGLYCERIN 1 INCH: 20 OINTMENT TOPICAL at 09:08

## 2024-03-21 RX ADMIN — PANTOPRAZOLE SODIUM 40 MG: 40 INJECTION, POWDER, FOR SOLUTION INTRAVENOUS at 20:47

## 2024-03-21 RX ADMIN — SENNOSIDES 8.8 MG: 8.8 LIQUID ORAL at 09:22

## 2024-03-21 RX ADMIN — SODIUM CHLORIDE, PRESERVATIVE FREE 10 ML: 5 INJECTION INTRAVENOUS at 09:21

## 2024-03-21 RX ADMIN — COLLAGENASE SANTYL: 250 OINTMENT TOPICAL at 09:21

## 2024-03-21 RX ADMIN — Medication 6 MMOL: at 10:56

## 2024-03-21 RX ADMIN — WATER 2000 MG: 1 INJECTION INTRAMUSCULAR; INTRAVENOUS; SUBCUTANEOUS at 04:19

## 2024-03-21 RX ADMIN — Medication: at 03:07

## 2024-03-21 RX ADMIN — Medication 75 MCG/HR: at 23:49

## 2024-03-21 RX ADMIN — Medication 1 TABLET: at 09:21

## 2024-03-21 RX ADMIN — LORAZEPAM 4 MG: 2 INJECTION INTRAMUSCULAR at 14:52

## 2024-03-21 RX ADMIN — SODIUM CHLORIDE: 9 INJECTION, SOLUTION INTRAVENOUS at 12:50

## 2024-03-21 RX ADMIN — Medication: at 15:11

## 2024-03-21 RX ADMIN — PANTOPRAZOLE SODIUM 40 MG: 40 INJECTION, POWDER, FOR SOLUTION INTRAVENOUS at 09:22

## 2024-03-21 RX ADMIN — Medication 12 MMOL: at 00:12

## 2024-03-21 RX ADMIN — INSULIN LISPRO 4 UNITS: 100 INJECTION, SOLUTION INTRAVENOUS; SUBCUTANEOUS at 21:31

## 2024-03-21 RX ADMIN — ALBUTEROL SULFATE 2.5 MG: 2.5 SOLUTION RESPIRATORY (INHALATION) at 12:05

## 2024-03-21 RX ADMIN — NITROGLYCERIN 1 INCH: 20 OINTMENT TOPICAL at 00:28

## 2024-03-21 RX ADMIN — ATORVASTATIN CALCIUM 40 MG: 40 TABLET, FILM COATED ORAL at 20:47

## 2024-03-21 RX ADMIN — CHLORHEXIDINE GLUCONATE 0.12% ORAL RINSE 15 ML: 1.2 LIQUID ORAL at 20:47

## 2024-03-21 RX ADMIN — MIDAZOLAM 2 MG: 1 INJECTION INTRAMUSCULAR; INTRAVENOUS at 22:38

## 2024-03-21 RX ADMIN — POLYETHYLENE GLYCOL (3350) 17 G: 17 POWDER, FOR SOLUTION ORAL at 09:22

## 2024-03-21 RX ADMIN — MORPHINE SULFATE 2 MG: 2 INJECTION, SOLUTION INTRAMUSCULAR; INTRAVENOUS at 23:47

## 2024-03-21 RX ADMIN — NITROGLYCERIN 1 INCH: 20 OINTMENT TOPICAL at 18:08

## 2024-03-21 RX ADMIN — AMIODARONE HYDROCHLORIDE 1 MG/MIN: 1.8 INJECTION, SOLUTION INTRAVENOUS at 04:19

## 2024-03-21 RX ADMIN — AMIODARONE HYDROCHLORIDE 1 MG/MIN: 1.8 INJECTION, SOLUTION INTRAVENOUS at 16:34

## 2024-03-21 RX ADMIN — Medication: at 17:40

## 2024-03-21 RX ADMIN — MORPHINE SULFATE 2 MG: 2 INJECTION, SOLUTION INTRAMUSCULAR; INTRAVENOUS at 12:21

## 2024-03-21 RX ADMIN — Medication: at 02:05

## 2024-03-21 RX ADMIN — AMIODARONE HYDROCHLORIDE 1 MG/MIN: 1.8 INJECTION, SOLUTION INTRAVENOUS at 10:28

## 2024-03-21 ASSESSMENT — PULMONARY FUNCTION TESTS
PIF_VALUE: 24
PIF_VALUE: 20
PIF_VALUE: 16
PIF_VALUE: 16
PIF_VALUE: 20
PIF_VALUE: 19

## 2024-03-22 ENCOUNTER — APPOINTMENT (OUTPATIENT)
Dept: GENERAL RADIOLOGY | Age: 69
DRG: 003 | End: 2024-03-22
Attending: THORACIC SURGERY (CARDIOTHORACIC VASCULAR SURGERY)
Payer: MEDICARE

## 2024-03-22 ENCOUNTER — APPOINTMENT (OUTPATIENT)
Dept: CT IMAGING | Age: 69
DRG: 003 | End: 2024-03-22
Attending: THORACIC SURGERY (CARDIOTHORACIC VASCULAR SURGERY)
Payer: MEDICARE

## 2024-03-22 LAB
ALBUMIN SERPL BCG-MCNC: 2.7 G/DL (ref 3.5–5.1)
ALBUMIN SERPL BCG-MCNC: 2.8 G/DL (ref 3.5–5.1)
ALBUMIN SERPL BCG-MCNC: 2.9 G/DL (ref 3.5–5.1)
ALBUMIN SERPL BCG-MCNC: 3 G/DL (ref 3.5–5.1)
ALP SERPL-CCNC: 546 U/L (ref 38–126)
ALP SERPL-CCNC: 547 U/L (ref 38–126)
ALP SERPL-CCNC: 551 U/L (ref 38–126)
ALP SERPL-CCNC: 560 U/L (ref 38–126)
ALP SERPL-CCNC: 569 U/L (ref 38–126)
ALP SERPL-CCNC: 577 U/L (ref 38–126)
ALT SERPL W/O P-5'-P-CCNC: 11 U/L (ref 11–66)
ALT SERPL W/O P-5'-P-CCNC: 6 U/L (ref 11–66)
ALT SERPL W/O P-5'-P-CCNC: 9 U/L (ref 11–66)
ANION GAP SERPL CALC-SCNC: 11 MEQ/L (ref 8–16)
ANION GAP SERPL CALC-SCNC: 12 MEQ/L (ref 8–16)
ANION GAP SERPL CALC-SCNC: 13 MEQ/L (ref 8–16)
ANION GAP SERPL CALC-SCNC: 14 MEQ/L (ref 8–16)
ARTERIAL PATENCY WRIST A: ABNORMAL
AST SERPL-CCNC: 106 U/L (ref 5–40)
AST SERPL-CCNC: 113 U/L (ref 5–40)
AST SERPL-CCNC: 133 U/L (ref 5–40)
AST SERPL-CCNC: 82 U/L (ref 5–40)
AST SERPL-CCNC: 90 U/L (ref 5–40)
AST SERPL-CCNC: 97 U/L (ref 5–40)
BASE EXCESS BLDA CALC-SCNC: 2.4 MMOL/L (ref -2.5–2.5)
BDY SITE: ABNORMAL
BILIRUB SERPL-MCNC: 0.8 MG/DL (ref 0.3–1.2)
BILIRUB SERPL-MCNC: 0.9 MG/DL (ref 0.3–1.2)
BILIRUB SERPL-MCNC: 1 MG/DL (ref 0.3–1.2)
BREATHS SETTING VENT: 12 BPM
BUN SERPL-MCNC: 34 MG/DL (ref 7–22)
BUN SERPL-MCNC: 34 MG/DL (ref 7–22)
BUN SERPL-MCNC: 35 MG/DL (ref 7–22)
BUN SERPL-MCNC: 37 MG/DL (ref 7–22)
BUN SERPL-MCNC: 38 MG/DL (ref 7–22)
BUN SERPL-MCNC: 43 MG/DL (ref 7–22)
CA-I BLD ISE-SCNC: 1.3 MMOL/L (ref 1.12–1.32)
CA-I BLD ISE-SCNC: 1.3 MMOL/L (ref 1.12–1.32)
CA-I BLD ISE-SCNC: 1.31 MMOL/L (ref 1.12–1.32)
CA-I BLD ISE-SCNC: 1.31 MMOL/L (ref 1.12–1.32)
CA-I BLD ISE-SCNC: 1.33 MMOL/L (ref 1.12–1.32)
CA-I BLD ISE-SCNC: 1.34 MMOL/L (ref 1.12–1.32)
CA-I BLD ISE-SCNC: 1.35 MMOL/L (ref 1.12–1.32)
CALCIUM SERPL-MCNC: 9.3 MG/DL (ref 8.5–10.5)
CALCIUM SERPL-MCNC: 9.3 MG/DL (ref 8.5–10.5)
CALCIUM SERPL-MCNC: 9.5 MG/DL (ref 8.5–10.5)
CHLORIDE BLD-SCNC: 102 MEQ/L (ref 98–109)
CHLORIDE SERPL-SCNC: 100 MEQ/L (ref 98–111)
CHLORIDE SERPL-SCNC: 100 MEQ/L (ref 98–111)
CHLORIDE SERPL-SCNC: 98 MEQ/L (ref 98–111)
CHLORIDE SERPL-SCNC: 99 MEQ/L (ref 98–111)
CO2 SERPL-SCNC: 21 MEQ/L (ref 23–33)
CO2 SERPL-SCNC: 22 MEQ/L (ref 23–33)
CO2 SERPL-SCNC: 22 MEQ/L (ref 23–33)
CO2 SERPL-SCNC: 23 MEQ/L (ref 23–33)
COLLECTED BY:: ABNORMAL
CREAT SERPL-MCNC: 1.1 MG/DL (ref 0.4–1.2)
CREAT SERPL-MCNC: 1.2 MG/DL (ref 0.4–1.2)
CREAT SERPL-MCNC: 1.3 MG/DL (ref 0.4–1.2)
CREAT SERPL-MCNC: 1.4 MG/DL (ref 0.4–1.2)
CREAT SERPL-MCNC: 1.5 MG/DL (ref 0.4–1.2)
CREAT SERPL-MCNC: 1.8 MG/DL (ref 0.4–1.2)
DEPRECATED RDW RBC AUTO: 60.5 FL (ref 35–45)
DEPRECATED RDW RBC AUTO: 61.3 FL (ref 35–45)
DEPRECATED RDW RBC AUTO: 61.4 FL (ref 35–45)
DEVICE: ABNORMAL
ERYTHROCYTE [DISTWIDTH] IN BLOOD BY AUTOMATED COUNT: 19.1 % (ref 11.5–14.5)
ERYTHROCYTE [DISTWIDTH] IN BLOOD BY AUTOMATED COUNT: 19.2 % (ref 11.5–14.5)
ERYTHROCYTE [DISTWIDTH] IN BLOOD BY AUTOMATED COUNT: 19.3 % (ref 11.5–14.5)
FIBRINOGEN PPP-MCNC: 441 MG/100ML (ref 155–475)
FIO2 ON VENT O2 ANALYZER: 40 %
GFR SERPL CREATININE-BSD FRML MDRD: 40 ML/MIN/1.73M2
GFR SERPL CREATININE-BSD FRML MDRD: 49 ML/MIN/1.73M2
GFR SERPL CREATININE-BSD FRML MDRD: 54 ML/MIN/1.73M2
GFR SERPL CREATININE-BSD FRML MDRD: 59 ML/MIN/1.73M2
GFR SERPL CREATININE-BSD FRML MDRD: > 60 ML/MIN/1.73M2
GFR SERPL CREATININE-BSD FRML MDRD: > 60 ML/MIN/1.73M2
GLUCOSE BLD STRIP.AUTO-MCNC: 106 MG/DL (ref 70–108)
GLUCOSE BLD STRIP.AUTO-MCNC: 163 MG/DL (ref 70–108)
GLUCOSE BLD STRIP.AUTO-MCNC: 179 MG/DL (ref 70–108)
GLUCOSE BLD STRIP.AUTO-MCNC: 216 MG/DL (ref 70–108)
GLUCOSE BLD STRIP.AUTO-MCNC: 233 MG/DL (ref 70–108)
GLUCOSE BLD STRIP.AUTO-MCNC: 233 MG/DL (ref 70–108)
GLUCOSE BLD STRIP.AUTO-MCNC: 243 MG/DL (ref 70–108)
GLUCOSE BLD-MCNC: 233 MG/DL (ref 70–108)
GLUCOSE SERPL-MCNC: 181 MG/DL (ref 70–108)
GLUCOSE SERPL-MCNC: 199 MG/DL (ref 70–108)
GLUCOSE SERPL-MCNC: 221 MG/DL (ref 70–108)
GLUCOSE SERPL-MCNC: 238 MG/DL (ref 70–108)
GLUCOSE SERPL-MCNC: 244 MG/DL (ref 70–108)
GLUCOSE SERPL-MCNC: 248 MG/DL (ref 70–108)
HCO3 BLDA-SCNC: 25 MMOL/L (ref 23–28)
HCT VFR BLD AUTO: 32.8 % (ref 42–52)
HCT VFR BLD AUTO: 32.9 % (ref 42–52)
HCT VFR BLD AUTO: 34.1 % (ref 42–52)
HEPARIN UNFRACTIONATED: 0.61 U/ML (ref 0.3–0.7)
HGB BLD-MCNC: 11.1 GM/DL (ref 14–18)
HGB BLD-MCNC: 11.2 GM/DL (ref 14–18)
HGB BLD-MCNC: 11.4 GM/DL (ref 14–18)
LACTATE BLD-SCNC: 1.4 MMOL/L (ref 0.5–1.9)
LACTATE SERPL-SCNC: 1.8 MMOL/L (ref 0.5–2)
LACTATE SERPL-SCNC: 1.9 MMOL/L (ref 0.5–2)
MAGNESIUM SERPL-MCNC: 2.5 MG/DL (ref 1.6–2.4)
MAGNESIUM SERPL-MCNC: 2.5 MG/DL (ref 1.6–2.4)
MAGNESIUM SERPL-MCNC: 2.6 MG/DL (ref 1.6–2.4)
MAGNESIUM SERPL-MCNC: 2.6 MG/DL (ref 1.6–2.4)
MAGNESIUM SERPL-MCNC: 2.7 MG/DL (ref 1.6–2.4)
MAGNESIUM SERPL-MCNC: 2.7 MG/DL (ref 1.6–2.4)
MCH RBC QN AUTO: 30.2 PG (ref 26–33)
MCH RBC QN AUTO: 30.4 PG (ref 26–33)
MCH RBC QN AUTO: 30.5 PG (ref 26–33)
MCHC RBC AUTO-ENTMCNC: 33.4 GM/DL (ref 32.2–35.5)
MCHC RBC AUTO-ENTMCNC: 33.7 GM/DL (ref 32.2–35.5)
MCHC RBC AUTO-ENTMCNC: 34.1 GM/DL (ref 32.2–35.5)
MCV RBC AUTO: 89.1 FL (ref 80–94)
MCV RBC AUTO: 90.4 FL (ref 80–94)
MCV RBC AUTO: 90.5 FL (ref 80–94)
PCO2 BLDA: 30 MMHG (ref 35–45)
PEEP SETTING VENT: 8 MMHG
PH BLDA: 7.53 [PH] (ref 7.35–7.45)
PHOSPHATE SERPL-MCNC: 2 MG/DL (ref 2.4–4.7)
PHOSPHATE SERPL-MCNC: 2 MG/DL (ref 2.4–4.7)
PHOSPHATE SERPL-MCNC: 2.4 MG/DL (ref 2.4–4.7)
PHOSPHATE SERPL-MCNC: 2.4 MG/DL (ref 2.4–4.7)
PHOSPHATE SERPL-MCNC: 2.5 MG/DL (ref 2.4–4.7)
PHOSPHATE SERPL-MCNC: 2.6 MG/DL (ref 2.4–4.7)
PIP: 22 CMH2O
PLATELET # BLD AUTO: 233 THOU/MM3 (ref 130–400)
PLATELET # BLD AUTO: 241 THOU/MM3 (ref 130–400)
PLATELET # BLD AUTO: 271 THOU/MM3 (ref 130–400)
PMV BLD AUTO: 10.3 FL (ref 9.4–12.4)
PMV BLD AUTO: 10.6 FL (ref 9.4–12.4)
PMV BLD AUTO: 10.9 FL (ref 9.4–12.4)
PO2 BLDA: 107 MMHG (ref 71–104)
POC CREATININE WHOLE BLOOD: 1.3 MG/DL (ref 0.5–1.2)
POTASSIUM BLD-SCNC: 3.9 MEQ/L (ref 3.5–4.9)
POTASSIUM SERPL-SCNC: 4 MEQ/L (ref 3.5–5.2)
POTASSIUM SERPL-SCNC: 4 MEQ/L (ref 3.5–5.2)
POTASSIUM SERPL-SCNC: 4.1 MEQ/L (ref 3.5–5.2)
POTASSIUM SERPL-SCNC: 4.3 MEQ/L (ref 3.5–5.2)
PRESSURE SUPPORT SETTING VENT: 14 CMH2O
PROT SERPL-MCNC: 4.6 G/DL (ref 6.1–8)
PROT SERPL-MCNC: 4.7 G/DL (ref 6.1–8)
PROT SERPL-MCNC: 4.8 G/DL (ref 6.1–8)
PROT SERPL-MCNC: 4.8 G/DL (ref 6.1–8)
PROT SERPL-MCNC: 4.9 G/DL (ref 6.1–8)
PROT SERPL-MCNC: 4.9 G/DL (ref 6.1–8)
RBC # BLD AUTO: 3.64 MILL/MM3 (ref 4.7–6.1)
RBC # BLD AUTO: 3.68 MILL/MM3 (ref 4.7–6.1)
RBC # BLD AUTO: 3.77 MILL/MM3 (ref 4.7–6.1)
SAO2 % BLDA: 99 %
SODIUM BLD-SCNC: 134 MEQ/L (ref 138–146)
SODIUM SERPL-SCNC: 131 MEQ/L (ref 135–145)
SODIUM SERPL-SCNC: 133 MEQ/L (ref 135–145)
SODIUM SERPL-SCNC: 133 MEQ/L (ref 135–145)
SODIUM SERPL-SCNC: 134 MEQ/L (ref 135–145)
VENTILATION MODE VENT: ABNORMAL
WBC # BLD AUTO: 18.4 THOU/MM3 (ref 4.8–10.8)
WBC # BLD AUTO: 18.6 THOU/MM3 (ref 4.8–10.8)
WBC # BLD AUTO: 20.5 THOU/MM3 (ref 4.8–10.8)

## 2024-03-22 PROCEDURE — 83605 ASSAY OF LACTIC ACID: CPT

## 2024-03-22 PROCEDURE — 2500000003 HC RX 250 WO HCPCS: Performed by: INTERNAL MEDICINE

## 2024-03-22 PROCEDURE — 6360000002 HC RX W HCPCS: Performed by: NURSE PRACTITIONER

## 2024-03-22 PROCEDURE — 85520 HEPARIN ASSAY: CPT

## 2024-03-22 PROCEDURE — 85384 FIBRINOGEN ACTIVITY: CPT

## 2024-03-22 PROCEDURE — 95816 EEG AWAKE AND DROWSY: CPT

## 2024-03-22 PROCEDURE — 2700000000 HC OXYGEN THERAPY PER DAY

## 2024-03-22 PROCEDURE — 93005 ELECTROCARDIOGRAM TRACING: CPT | Performed by: THORACIC SURGERY (CARDIOTHORACIC VASCULAR SURGERY)

## 2024-03-22 PROCEDURE — 6370000000 HC RX 637 (ALT 250 FOR IP): Performed by: NURSE PRACTITIONER

## 2024-03-22 PROCEDURE — 2500000003 HC RX 250 WO HCPCS

## 2024-03-22 PROCEDURE — 37799 UNLISTED PX VASCULAR SURGERY: CPT

## 2024-03-22 PROCEDURE — 6360000002 HC RX W HCPCS: Performed by: INTERNAL MEDICINE

## 2024-03-22 PROCEDURE — 94003 VENT MGMT INPAT SUBQ DAY: CPT

## 2024-03-22 PROCEDURE — 99291 CRITICAL CARE FIRST HOUR: CPT | Performed by: INTERNAL MEDICINE

## 2024-03-22 PROCEDURE — 6370000000 HC RX 637 (ALT 250 FOR IP)

## 2024-03-22 PROCEDURE — 82803 BLOOD GASES ANY COMBINATION: CPT

## 2024-03-22 PROCEDURE — 84132 ASSAY OF SERUM POTASSIUM: CPT

## 2024-03-22 PROCEDURE — 84295 ASSAY OF SERUM SODIUM: CPT

## 2024-03-22 PROCEDURE — 2500000003 HC RX 250 WO HCPCS: Performed by: THORACIC SURGERY (CARDIOTHORACIC VASCULAR SURGERY)

## 2024-03-22 PROCEDURE — 2580000003 HC RX 258: Performed by: INTERNAL MEDICINE

## 2024-03-22 PROCEDURE — 6360000002 HC RX W HCPCS: Performed by: STUDENT IN AN ORGANIZED HEALTH CARE EDUCATION/TRAINING PROGRAM

## 2024-03-22 PROCEDURE — 2580000003 HC RX 258: Performed by: PHYSICIAN ASSISTANT

## 2024-03-22 PROCEDURE — 2000000000 HC ICU R&B

## 2024-03-22 PROCEDURE — 84100 ASSAY OF PHOSPHORUS: CPT

## 2024-03-22 PROCEDURE — 82435 ASSAY OF BLOOD CHLORIDE: CPT

## 2024-03-22 PROCEDURE — 99233 SBSQ HOSP IP/OBS HIGH 50: CPT | Performed by: INTERNAL MEDICINE

## 2024-03-22 PROCEDURE — C9113 INJ PANTOPRAZOLE SODIUM, VIA: HCPCS | Performed by: INTERNAL MEDICINE

## 2024-03-22 PROCEDURE — 85027 COMPLETE CBC AUTOMATED: CPT

## 2024-03-22 PROCEDURE — 6370000000 HC RX 637 (ALT 250 FOR IP): Performed by: STUDENT IN AN ORGANIZED HEALTH CARE EDUCATION/TRAINING PROGRAM

## 2024-03-22 PROCEDURE — 82330 ASSAY OF CALCIUM: CPT

## 2024-03-22 PROCEDURE — 94761 N-INVAS EAR/PLS OXIMETRY MLT: CPT

## 2024-03-22 PROCEDURE — 95711 VEEG 2-12 HR UNMONITORED: CPT

## 2024-03-22 PROCEDURE — 83735 ASSAY OF MAGNESIUM: CPT

## 2024-03-22 PROCEDURE — 36415 COLL VENOUS BLD VENIPUNCTURE: CPT

## 2024-03-22 PROCEDURE — 82948 REAGENT STRIP/BLOOD GLUCOSE: CPT

## 2024-03-22 PROCEDURE — 70450 CT HEAD/BRAIN W/O DYE: CPT

## 2024-03-22 PROCEDURE — 93010 ELECTROCARDIOGRAM REPORT: CPT | Performed by: INTERNAL MEDICINE

## 2024-03-22 PROCEDURE — 6370000000 HC RX 637 (ALT 250 FOR IP): Performed by: PHYSICIAN ASSISTANT

## 2024-03-22 PROCEDURE — 95718 EEG PHYS/QHP 2-12 HR W/VEEG: CPT | Performed by: PSYCHIATRY & NEUROLOGY

## 2024-03-22 PROCEDURE — 80053 COMPREHEN METABOLIC PANEL: CPT

## 2024-03-22 PROCEDURE — 6370000000 HC RX 637 (ALT 250 FOR IP): Performed by: INTERNAL MEDICINE

## 2024-03-22 PROCEDURE — 82947 ASSAY GLUCOSE BLOOD QUANT: CPT

## 2024-03-22 PROCEDURE — 71045 X-RAY EXAM CHEST 1 VIEW: CPT

## 2024-03-22 PROCEDURE — 82565 ASSAY OF CREATININE: CPT

## 2024-03-22 RX ORDER — LORAZEPAM 2 MG/ML
4 INJECTION INTRAMUSCULAR ONCE
Status: COMPLETED | OUTPATIENT
Start: 2024-03-22 | End: 2024-03-22

## 2024-03-22 RX ORDER — MILRINONE LACTATE 0.2 MG/ML
.0625-.75 INJECTION, SOLUTION INTRAVENOUS CONTINUOUS
Status: DISCONTINUED | OUTPATIENT
Start: 2024-03-22 | End: 2024-03-30

## 2024-03-22 RX ORDER — FENTANYL CITRATE-0.9 % NACL/PF 10 MCG/ML
PLASTIC BAG, INJECTION (ML) INTRAVENOUS
Status: DISCONTINUED
Start: 2024-03-22 | End: 2024-03-22

## 2024-03-22 RX ADMIN — Medication 6 MMOL: at 08:32

## 2024-03-22 RX ADMIN — NITROGLYCERIN 1 INCH: 20 OINTMENT TOPICAL at 05:53

## 2024-03-22 RX ADMIN — NITROGLYCERIN 1 INCH: 20 OINTMENT TOPICAL at 17:17

## 2024-03-22 RX ADMIN — Medication: at 04:06

## 2024-03-22 RX ADMIN — INSULIN GLARGINE 30 UNITS: 100 INJECTION, SOLUTION SUBCUTANEOUS at 10:42

## 2024-03-22 RX ADMIN — INSULIN LISPRO 4 UNITS: 100 INJECTION, SOLUTION INTRAVENOUS; SUBCUTANEOUS at 00:21

## 2024-03-22 RX ADMIN — SENNOSIDES 8.8 MG: 8.8 LIQUID ORAL at 20:37

## 2024-03-22 RX ADMIN — NITROGLYCERIN 1 INCH: 20 OINTMENT TOPICAL at 23:44

## 2024-03-22 RX ADMIN — POLYETHYLENE GLYCOL (3350) 17 G: 17 POWDER, FOR SOLUTION ORAL at 08:32

## 2024-03-22 RX ADMIN — INSULIN LISPRO 4 UNITS: 100 INJECTION, SOLUTION INTRAVENOUS; SUBCUTANEOUS at 10:42

## 2024-03-22 RX ADMIN — AMIODARONE HYDROCHLORIDE 1 MG/MIN: 1.8 INJECTION, SOLUTION INTRAVENOUS at 05:00

## 2024-03-22 RX ADMIN — Medication 100 MCG/HR: at 10:44

## 2024-03-22 RX ADMIN — AMIODARONE HYDROCHLORIDE 400 MG: 200 TABLET ORAL at 08:32

## 2024-03-22 RX ADMIN — ATORVASTATIN CALCIUM 40 MG: 40 TABLET, FILM COATED ORAL at 20:37

## 2024-03-22 RX ADMIN — CHLORHEXIDINE GLUCONATE 0.12% ORAL RINSE 15 ML: 1.2 LIQUID ORAL at 08:32

## 2024-03-22 RX ADMIN — LORAZEPAM 4 MG: 2 INJECTION INTRAMUSCULAR; INTRAVENOUS at 00:27

## 2024-03-22 RX ADMIN — OLANZAPINE 7.5 MG: 5 TABLET, FILM COATED ORAL at 20:37

## 2024-03-22 RX ADMIN — AMIODARONE HYDROCHLORIDE 1 MG/MIN: 1.8 INJECTION, SOLUTION INTRAVENOUS at 17:13

## 2024-03-22 RX ADMIN — Medication 1 TABLET: at 08:32

## 2024-03-22 RX ADMIN — HEPARIN SODIUM 14 UNITS/KG/HR: 10000 INJECTION, SOLUTION INTRAVENOUS at 13:25

## 2024-03-22 RX ADMIN — COLLAGENASE SANTYL: 250 OINTMENT TOPICAL at 08:32

## 2024-03-22 RX ADMIN — INSULIN LISPRO 4 UNITS: 100 INJECTION, SOLUTION INTRAVENOUS; SUBCUTANEOUS at 21:15

## 2024-03-22 RX ADMIN — Medication 100 MCG/HR: at 21:20

## 2024-03-22 RX ADMIN — AMIODARONE HYDROCHLORIDE 400 MG: 200 TABLET ORAL at 20:37

## 2024-03-22 RX ADMIN — NITROGLYCERIN 1 INCH: 20 OINTMENT TOPICAL at 00:21

## 2024-03-22 RX ADMIN — SENNOSIDES 8.8 MG: 8.8 LIQUID ORAL at 08:32

## 2024-03-22 RX ADMIN — Medication: at 01:36

## 2024-03-22 RX ADMIN — SODIUM CHLORIDE, PRESERVATIVE FREE 10 ML: 5 INJECTION INTRAVENOUS at 20:37

## 2024-03-22 RX ADMIN — SODIUM CHLORIDE, PRESERVATIVE FREE 10 ML: 5 INJECTION INTRAVENOUS at 08:33

## 2024-03-22 RX ADMIN — INSULIN LISPRO 4 UNITS: 100 INJECTION, SOLUTION INTRAVENOUS; SUBCUTANEOUS at 04:17

## 2024-03-22 RX ADMIN — AMIODARONE HYDROCHLORIDE 1 MG/MIN: 1.8 INJECTION, SOLUTION INTRAVENOUS at 23:20

## 2024-03-22 RX ADMIN — PANTOPRAZOLE SODIUM 40 MG: 40 INJECTION, POWDER, FOR SOLUTION INTRAVENOUS at 08:32

## 2024-03-22 RX ADMIN — PANTOPRAZOLE SODIUM 40 MG: 40 INJECTION, POWDER, FOR SOLUTION INTRAVENOUS at 20:37

## 2024-03-22 RX ADMIN — AMIODARONE HYDROCHLORIDE 1 MG/MIN: 1.8 INJECTION, SOLUTION INTRAVENOUS at 10:43

## 2024-03-22 RX ADMIN — CHLORHEXIDINE GLUCONATE 0.12% ORAL RINSE 15 ML: 1.2 LIQUID ORAL at 20:38

## 2024-03-22 RX ADMIN — MILRINONE LACTATE IN DEXTROSE 0.12 MCG/KG/MIN: 200 INJECTION, SOLUTION INTRAVENOUS at 18:15

## 2024-03-22 RX ADMIN — NITROGLYCERIN 1 INCH: 20 OINTMENT TOPICAL at 11:59

## 2024-03-22 RX ADMIN — Medication: at 07:04

## 2024-03-22 ASSESSMENT — PULMONARY FUNCTION TESTS
PIF_VALUE: 18
PIF_VALUE: 22
PIF_VALUE: 18
PIF_VALUE: 18
PIF_VALUE: 17
PIF_VALUE: 18

## 2024-03-23 ENCOUNTER — APPOINTMENT (OUTPATIENT)
Dept: GENERAL RADIOLOGY | Age: 69
DRG: 003 | End: 2024-03-23
Attending: THORACIC SURGERY (CARDIOTHORACIC VASCULAR SURGERY)
Payer: MEDICARE

## 2024-03-23 PROBLEM — G93.40 ENCEPHALOPATHY: Status: ACTIVE | Noted: 2024-03-23

## 2024-03-23 LAB
ALBUMIN SERPL BCG-MCNC: 2.3 G/DL (ref 3.5–5.1)
ALBUMIN SERPL BCG-MCNC: 2.7 G/DL (ref 3.5–5.1)
ALBUMIN SERPL BCG-MCNC: 2.8 G/DL (ref 3.5–5.1)
ALP SERPL-CCNC: 471 U/L (ref 38–126)
ALP SERPL-CCNC: 524 U/L (ref 38–126)
ALP SERPL-CCNC: 537 U/L (ref 38–126)
ALT SERPL W/O P-5'-P-CCNC: 6 U/L (ref 11–66)
ALT SERPL W/O P-5'-P-CCNC: 6 U/L (ref 11–66)
ALT SERPL W/O P-5'-P-CCNC: < 5 U/L (ref 11–66)
ANION GAP SERPL CALC-SCNC: 11 MEQ/L (ref 8–16)
ANION GAP SERPL CALC-SCNC: 12 MEQ/L (ref 8–16)
ANION GAP SERPL CALC-SCNC: 18 MEQ/L (ref 8–16)
AST SERPL-CCNC: 62 U/L (ref 5–40)
AST SERPL-CCNC: 73 U/L (ref 5–40)
AST SERPL-CCNC: 76 U/L (ref 5–40)
BACTERIA SPEC RESP CULT: NORMAL
BILIRUB SERPL-MCNC: 0.8 MG/DL (ref 0.3–1.2)
BUN SERPL-MCNC: 47 MG/DL (ref 7–22)
BUN SERPL-MCNC: 49 MG/DL (ref 7–22)
BUN SERPL-MCNC: 51 MG/DL (ref 7–22)
CA-I BLD ISE-SCNC: 1.29 MMOL/L (ref 1.12–1.32)
CA-I BLD ISE-SCNC: 1.31 MMOL/L (ref 1.12–1.32)
CA-I BLD ISE-SCNC: 1.37 MMOL/L (ref 1.12–1.32)
CALCIUM SERPL-MCNC: 8.6 MG/DL (ref 8.5–10.5)
CALCIUM SERPL-MCNC: 9.3 MG/DL (ref 8.5–10.5)
CALCIUM SERPL-MCNC: 9.3 MG/DL (ref 8.5–10.5)
CHLORIDE SERPL-SCNC: 96 MEQ/L (ref 98–111)
CHLORIDE SERPL-SCNC: 97 MEQ/L (ref 98–111)
CHLORIDE SERPL-SCNC: 97 MEQ/L (ref 98–111)
CO2 SERPL-SCNC: 18 MEQ/L (ref 23–33)
CO2 SERPL-SCNC: 21 MEQ/L (ref 23–33)
CO2 SERPL-SCNC: 22 MEQ/L (ref 23–33)
CREAT SERPL-MCNC: 1.9 MG/DL (ref 0.4–1.2)
CREAT SERPL-MCNC: 1.9 MG/DL (ref 0.4–1.2)
CREAT SERPL-MCNC: 2 MG/DL (ref 0.4–1.2)
GFR SERPL CREATININE-BSD FRML MDRD: 35 ML/MIN/1.73M2
GFR SERPL CREATININE-BSD FRML MDRD: 37 ML/MIN/1.73M2
GFR SERPL CREATININE-BSD FRML MDRD: 37 ML/MIN/1.73M2
GLUCOSE BLD STRIP.AUTO-MCNC: 141 MG/DL (ref 70–108)
GLUCOSE BLD STRIP.AUTO-MCNC: 206 MG/DL (ref 70–108)
GLUCOSE BLD STRIP.AUTO-MCNC: 214 MG/DL (ref 70–108)
GLUCOSE BLD STRIP.AUTO-MCNC: 214 MG/DL (ref 70–108)
GLUCOSE BLD STRIP.AUTO-MCNC: 227 MG/DL (ref 70–108)
GLUCOSE BLD STRIP.AUTO-MCNC: 249 MG/DL (ref 70–108)
GLUCOSE SERPL-MCNC: 190 MG/DL (ref 70–108)
GLUCOSE SERPL-MCNC: 217 MG/DL (ref 70–108)
GLUCOSE SERPL-MCNC: 243 MG/DL (ref 70–108)
GRAM STN SPEC: NORMAL
HEPARIN UNFRACTIONATED: 0.48 U/ML (ref 0.3–0.7)
MAGNESIUM SERPL-MCNC: 2.5 MG/DL (ref 1.6–2.4)
MAGNESIUM SERPL-MCNC: 2.6 MG/DL (ref 1.6–2.4)
MAGNESIUM SERPL-MCNC: 2.6 MG/DL (ref 1.6–2.4)
PHOSPHATE SERPL-MCNC: 2.6 MG/DL (ref 2.4–4.7)
POTASSIUM SERPL-SCNC: 4 MEQ/L (ref 3.5–5.2)
POTASSIUM SERPL-SCNC: 4 MEQ/L (ref 3.5–5.2)
POTASSIUM SERPL-SCNC: 4.1 MEQ/L (ref 3.5–5.2)
PROT SERPL-MCNC: 4.3 G/DL (ref 6.1–8)
PROT SERPL-MCNC: 4.6 G/DL (ref 6.1–8)
PROT SERPL-MCNC: 4.6 G/DL (ref 6.1–8)
SODIUM SERPL-SCNC: 130 MEQ/L (ref 135–145)
SODIUM SERPL-SCNC: 130 MEQ/L (ref 135–145)
SODIUM SERPL-SCNC: 132 MEQ/L (ref 135–145)

## 2024-03-23 PROCEDURE — 99232 SBSQ HOSP IP/OBS MODERATE 35: CPT | Performed by: INTERNAL MEDICINE

## 2024-03-23 PROCEDURE — 6370000000 HC RX 637 (ALT 250 FOR IP): Performed by: PHYSICIAN ASSISTANT

## 2024-03-23 PROCEDURE — 2500000003 HC RX 250 WO HCPCS: Performed by: THORACIC SURGERY (CARDIOTHORACIC VASCULAR SURGERY)

## 2024-03-23 PROCEDURE — 6370000000 HC RX 637 (ALT 250 FOR IP): Performed by: NURSE PRACTITIONER

## 2024-03-23 PROCEDURE — 6360000002 HC RX W HCPCS: Performed by: INTERNAL MEDICINE

## 2024-03-23 PROCEDURE — 6370000000 HC RX 637 (ALT 250 FOR IP): Performed by: STUDENT IN AN ORGANIZED HEALTH CARE EDUCATION/TRAINING PROGRAM

## 2024-03-23 PROCEDURE — 83735 ASSAY OF MAGNESIUM: CPT

## 2024-03-23 PROCEDURE — 6370000000 HC RX 637 (ALT 250 FOR IP)

## 2024-03-23 PROCEDURE — 2500000003 HC RX 250 WO HCPCS: Performed by: INTERNAL MEDICINE

## 2024-03-23 PROCEDURE — 80053 COMPREHEN METABOLIC PANEL: CPT

## 2024-03-23 PROCEDURE — 6360000002 HC RX W HCPCS: Performed by: NURSE PRACTITIONER

## 2024-03-23 PROCEDURE — 89220 SPUTUM SPECIMEN COLLECTION: CPT

## 2024-03-23 PROCEDURE — C9113 INJ PANTOPRAZOLE SODIUM, VIA: HCPCS | Performed by: INTERNAL MEDICINE

## 2024-03-23 PROCEDURE — 99291 CRITICAL CARE FIRST HOUR: CPT | Performed by: SURGERY

## 2024-03-23 PROCEDURE — 2580000003 HC RX 258: Performed by: PHYSICIAN ASSISTANT

## 2024-03-23 PROCEDURE — 71045 X-RAY EXAM CHEST 1 VIEW: CPT

## 2024-03-23 PROCEDURE — 85520 HEPARIN ASSAY: CPT

## 2024-03-23 PROCEDURE — 94761 N-INVAS EAR/PLS OXIMETRY MLT: CPT

## 2024-03-23 PROCEDURE — 2000000000 HC ICU R&B

## 2024-03-23 PROCEDURE — 82948 REAGENT STRIP/BLOOD GLUCOSE: CPT

## 2024-03-23 PROCEDURE — 84100 ASSAY OF PHOSPHORUS: CPT

## 2024-03-23 PROCEDURE — 6370000000 HC RX 637 (ALT 250 FOR IP): Performed by: INTERNAL MEDICINE

## 2024-03-23 PROCEDURE — 82330 ASSAY OF CALCIUM: CPT

## 2024-03-23 PROCEDURE — 6360000002 HC RX W HCPCS: Performed by: STUDENT IN AN ORGANIZED HEALTH CARE EDUCATION/TRAINING PROGRAM

## 2024-03-23 PROCEDURE — 36415 COLL VENOUS BLD VENIPUNCTURE: CPT

## 2024-03-23 PROCEDURE — 2700000000 HC OXYGEN THERAPY PER DAY

## 2024-03-23 PROCEDURE — 94003 VENT MGMT INPAT SUBQ DAY: CPT

## 2024-03-23 RX ADMIN — SENNOSIDES 8.8 MG: 8.8 LIQUID ORAL at 20:07

## 2024-03-23 RX ADMIN — SODIUM CHLORIDE, PRESERVATIVE FREE 10 ML: 5 INJECTION INTRAVENOUS at 09:26

## 2024-03-23 RX ADMIN — PANTOPRAZOLE SODIUM 40 MG: 40 INJECTION, POWDER, FOR SOLUTION INTRAVENOUS at 09:26

## 2024-03-23 RX ADMIN — INSULIN LISPRO 4 UNITS: 100 INJECTION, SOLUTION INTRAVENOUS; SUBCUTANEOUS at 04:05

## 2024-03-23 RX ADMIN — Medication 1 TABLET: at 09:26

## 2024-03-23 RX ADMIN — SODIUM CHLORIDE: 9 INJECTION, SOLUTION INTRAVENOUS at 12:44

## 2024-03-23 RX ADMIN — POLYETHYLENE GLYCOL (3350) 17 G: 17 POWDER, FOR SOLUTION ORAL at 09:26

## 2024-03-23 RX ADMIN — SODIUM CHLORIDE, PRESERVATIVE FREE 10 ML: 5 INJECTION INTRAVENOUS at 20:07

## 2024-03-23 RX ADMIN — NITROGLYCERIN 1 INCH: 20 OINTMENT TOPICAL at 17:12

## 2024-03-23 RX ADMIN — AMIODARONE HYDROCHLORIDE 1 MG/MIN: 1.8 INJECTION, SOLUTION INTRAVENOUS at 04:49

## 2024-03-23 RX ADMIN — AMIODARONE HYDROCHLORIDE 1 MG/MIN: 1.8 INJECTION, SOLUTION INTRAVENOUS at 17:06

## 2024-03-23 RX ADMIN — Medication 100 MCG/HR: at 18:35

## 2024-03-23 RX ADMIN — Medication 100 MCG/HR: at 07:26

## 2024-03-23 RX ADMIN — COLLAGENASE SANTYL: 250 OINTMENT TOPICAL at 09:28

## 2024-03-23 RX ADMIN — HEPARIN SODIUM 14 UNITS/KG/HR: 10000 INJECTION, SOLUTION INTRAVENOUS at 13:14

## 2024-03-23 RX ADMIN — INSULIN LISPRO 4 UNITS: 100 INJECTION, SOLUTION INTRAVENOUS; SUBCUTANEOUS at 09:28

## 2024-03-23 RX ADMIN — ATORVASTATIN CALCIUM 40 MG: 40 TABLET, FILM COATED ORAL at 20:08

## 2024-03-23 RX ADMIN — AMIODARONE HYDROCHLORIDE 400 MG: 200 TABLET ORAL at 20:07

## 2024-03-23 RX ADMIN — AMIODARONE HYDROCHLORIDE 1 MG/MIN: 1.8 INJECTION, SOLUTION INTRAVENOUS at 22:41

## 2024-03-23 RX ADMIN — INSULIN LISPRO 4 UNITS: 100 INJECTION, SOLUTION INTRAVENOUS; SUBCUTANEOUS at 13:03

## 2024-03-23 RX ADMIN — OLANZAPINE 7.5 MG: 5 TABLET, FILM COATED ORAL at 20:07

## 2024-03-23 RX ADMIN — CHLORHEXIDINE GLUCONATE 0.12% ORAL RINSE 15 ML: 1.2 LIQUID ORAL at 09:27

## 2024-03-23 RX ADMIN — INSULIN LISPRO 4 UNITS: 100 INJECTION, SOLUTION INTRAVENOUS; SUBCUTANEOUS at 00:27

## 2024-03-23 RX ADMIN — AMIODARONE HYDROCHLORIDE 400 MG: 200 TABLET ORAL at 09:26

## 2024-03-23 RX ADMIN — PANTOPRAZOLE SODIUM 40 MG: 40 INJECTION, POWDER, FOR SOLUTION INTRAVENOUS at 20:07

## 2024-03-23 RX ADMIN — NITROGLYCERIN 1 INCH: 20 OINTMENT TOPICAL at 13:04

## 2024-03-23 RX ADMIN — INSULIN GLARGINE 30 UNITS: 100 INJECTION, SOLUTION SUBCUTANEOUS at 09:27

## 2024-03-23 RX ADMIN — CHLORHEXIDINE GLUCONATE 0.12% ORAL RINSE 15 ML: 1.2 LIQUID ORAL at 20:09

## 2024-03-23 RX ADMIN — SENNOSIDES 8.8 MG: 8.8 LIQUID ORAL at 09:26

## 2024-03-23 RX ADMIN — NITROGLYCERIN 1 INCH: 20 OINTMENT TOPICAL at 04:04

## 2024-03-23 RX ADMIN — AMIODARONE HYDROCHLORIDE 1 MG/MIN: 1.8 INJECTION, SOLUTION INTRAVENOUS at 11:02

## 2024-03-23 RX ADMIN — MILRINONE LACTATE IN DEXTROSE 0.12 MCG/KG/MIN: 200 INJECTION, SOLUTION INTRAVENOUS at 16:39

## 2024-03-23 ASSESSMENT — PULMONARY FUNCTION TESTS
PIF_VALUE: 14
PIF_VALUE: 16
PIF_VALUE: 16
PIF_VALUE: 17
PIF_VALUE: 16

## 2024-03-24 ENCOUNTER — APPOINTMENT (OUTPATIENT)
Dept: GENERAL RADIOLOGY | Age: 69
DRG: 003 | End: 2024-03-24
Attending: THORACIC SURGERY (CARDIOTHORACIC VASCULAR SURGERY)
Payer: MEDICARE

## 2024-03-24 LAB
ANION GAP SERPL CALC-SCNC: 19 MEQ/L (ref 8–16)
BASOPHILS ABSOLUTE: 0 THOU/MM3 (ref 0–0.1)
BASOPHILS NFR BLD AUTO: 0.3 %
BUN SERPL-MCNC: 65 MG/DL (ref 7–22)
CALCIUM SERPL-MCNC: 8.7 MG/DL (ref 8.5–10.5)
CHLORIDE SERPL-SCNC: 92 MEQ/L (ref 98–111)
CO2 SERPL-SCNC: 19 MEQ/L (ref 23–33)
CREAT SERPL-MCNC: 2.7 MG/DL (ref 0.4–1.2)
DEPRECATED RDW RBC AUTO: 61 FL (ref 35–45)
EOSINOPHIL NFR BLD AUTO: 0.6 %
EOSINOPHILS ABSOLUTE: 0.1 THOU/MM3 (ref 0–0.4)
ERYTHROCYTE [DISTWIDTH] IN BLOOD BY AUTOMATED COUNT: 19 % (ref 11.5–14.5)
GFR SERPL CREATININE-BSD FRML MDRD: 24 ML/MIN/1.73M2
GLUCOSE BLD STRIP.AUTO-MCNC: 107 MG/DL (ref 70–108)
GLUCOSE BLD STRIP.AUTO-MCNC: 109 MG/DL (ref 70–108)
GLUCOSE BLD STRIP.AUTO-MCNC: 120 MG/DL (ref 70–108)
GLUCOSE BLD STRIP.AUTO-MCNC: 204 MG/DL (ref 70–108)
GLUCOSE BLD STRIP.AUTO-MCNC: 233 MG/DL (ref 70–108)
GLUCOSE BLD STRIP.AUTO-MCNC: 286 MG/DL (ref 70–108)
GLUCOSE SERPL-MCNC: 337 MG/DL (ref 70–108)
HCT VFR BLD AUTO: 30 % (ref 42–52)
HEPARIN UNFRACTIONATED: 0.29 U/ML (ref 0.3–0.7)
HEPARIN UNFRACTIONATED: 0.67 U/ML (ref 0.3–0.7)
HEPARIN UNFRACTIONATED: 0.91 U/ML (ref 0.3–0.7)
HGB BLD-MCNC: 10.3 GM/DL (ref 14–18)
IMM GRANULOCYTES # BLD AUTO: 0.21 THOU/MM3 (ref 0–0.07)
IMM GRANULOCYTES NFR BLD AUTO: 1.3 %
LYMPHOCYTES ABSOLUTE: 0.5 THOU/MM3 (ref 1–4.8)
LYMPHOCYTES NFR BLD AUTO: 3.2 %
MCH RBC QN AUTO: 30.3 PG (ref 26–33)
MCHC RBC AUTO-ENTMCNC: 34.3 GM/DL (ref 32.2–35.5)
MCV RBC AUTO: 88.2 FL (ref 80–94)
MONOCYTES ABSOLUTE: 1 THOU/MM3 (ref 0.4–1.3)
MONOCYTES NFR BLD AUTO: 6.5 %
NEUTROPHILS NFR BLD AUTO: 88.1 %
NRBC BLD AUTO-RTO: 0 /100 WBC
PLATELET # BLD AUTO: 318 THOU/MM3 (ref 130–400)
PMV BLD AUTO: 10.7 FL (ref 9.4–12.4)
POTASSIUM SERPL-SCNC: 4.3 MEQ/L (ref 3.5–5.2)
RBC # BLD AUTO: 3.4 MILL/MM3 (ref 4.7–6.1)
REASON FOR REJECTION: NORMAL
REJECTED TEST: NORMAL
SEGMENTED NEUTROPHILS ABSOLUTE COUNT: 13.9 THOU/MM3 (ref 1.8–7.7)
SODIUM SERPL-SCNC: 130 MEQ/L (ref 135–145)
WBC # BLD AUTO: 15.8 THOU/MM3 (ref 4.8–10.8)

## 2024-03-24 PROCEDURE — 6370000000 HC RX 637 (ALT 250 FOR IP): Performed by: NURSE PRACTITIONER

## 2024-03-24 PROCEDURE — P9047 ALBUMIN (HUMAN), 25%, 50ML: HCPCS | Performed by: STUDENT IN AN ORGANIZED HEALTH CARE EDUCATION/TRAINING PROGRAM

## 2024-03-24 PROCEDURE — 6370000000 HC RX 637 (ALT 250 FOR IP): Performed by: STUDENT IN AN ORGANIZED HEALTH CARE EDUCATION/TRAINING PROGRAM

## 2024-03-24 PROCEDURE — 82948 REAGENT STRIP/BLOOD GLUCOSE: CPT

## 2024-03-24 PROCEDURE — 6360000002 HC RX W HCPCS: Performed by: SURGERY

## 2024-03-24 PROCEDURE — 80048 BASIC METABOLIC PNL TOTAL CA: CPT

## 2024-03-24 PROCEDURE — 94761 N-INVAS EAR/PLS OXIMETRY MLT: CPT

## 2024-03-24 PROCEDURE — 6370000000 HC RX 637 (ALT 250 FOR IP): Performed by: INTERNAL MEDICINE

## 2024-03-24 PROCEDURE — 6370000000 HC RX 637 (ALT 250 FOR IP): Performed by: SURGERY

## 2024-03-24 PROCEDURE — 71045 X-RAY EXAM CHEST 1 VIEW: CPT

## 2024-03-24 PROCEDURE — 6370000000 HC RX 637 (ALT 250 FOR IP): Performed by: PHYSICIAN ASSISTANT

## 2024-03-24 PROCEDURE — 2500000003 HC RX 250 WO HCPCS: Performed by: INTERNAL MEDICINE

## 2024-03-24 PROCEDURE — 85520 HEPARIN ASSAY: CPT

## 2024-03-24 PROCEDURE — C9113 INJ PANTOPRAZOLE SODIUM, VIA: HCPCS | Performed by: INTERNAL MEDICINE

## 2024-03-24 PROCEDURE — 2700000000 HC OXYGEN THERAPY PER DAY

## 2024-03-24 PROCEDURE — 2580000003 HC RX 258: Performed by: PHYSICIAN ASSISTANT

## 2024-03-24 PROCEDURE — 6360000002 HC RX W HCPCS: Performed by: NURSE PRACTITIONER

## 2024-03-24 PROCEDURE — 6360000002 HC RX W HCPCS: Performed by: STUDENT IN AN ORGANIZED HEALTH CARE EDUCATION/TRAINING PROGRAM

## 2024-03-24 PROCEDURE — 2500000003 HC RX 250 WO HCPCS: Performed by: THORACIC SURGERY (CARDIOTHORACIC VASCULAR SURGERY)

## 2024-03-24 PROCEDURE — 6370000000 HC RX 637 (ALT 250 FOR IP)

## 2024-03-24 PROCEDURE — 2500000003 HC RX 250 WO HCPCS

## 2024-03-24 PROCEDURE — 99291 CRITICAL CARE FIRST HOUR: CPT | Performed by: SURGERY

## 2024-03-24 PROCEDURE — 6360000002 HC RX W HCPCS: Performed by: INTERNAL MEDICINE

## 2024-03-24 PROCEDURE — 94003 VENT MGMT INPAT SUBQ DAY: CPT

## 2024-03-24 PROCEDURE — 85025 COMPLETE CBC W/AUTO DIFF WBC: CPT

## 2024-03-24 PROCEDURE — 2000000000 HC ICU R&B

## 2024-03-24 PROCEDURE — 2500000003 HC RX 250 WO HCPCS: Performed by: STUDENT IN AN ORGANIZED HEALTH CARE EDUCATION/TRAINING PROGRAM

## 2024-03-24 PROCEDURE — 36415 COLL VENOUS BLD VENIPUNCTURE: CPT

## 2024-03-24 PROCEDURE — 6360000002 HC RX W HCPCS: Performed by: PHYSICIAN ASSISTANT

## 2024-03-24 PROCEDURE — 99232 SBSQ HOSP IP/OBS MODERATE 35: CPT | Performed by: INTERNAL MEDICINE

## 2024-03-24 RX ORDER — ALBUMIN (HUMAN) 12.5 G/50ML
25 SOLUTION INTRAVENOUS EVERY 6 HOURS
Status: COMPLETED | OUTPATIENT
Start: 2024-03-24 | End: 2024-03-25

## 2024-03-24 RX ORDER — FENTANYL CITRATE 50 UG/ML
50 INJECTION, SOLUTION INTRAMUSCULAR; INTRAVENOUS EVERY 6 HOURS PRN
Status: DISCONTINUED | OUTPATIENT
Start: 2024-03-24 | End: 2024-04-04

## 2024-03-24 RX ORDER — AMIODARONE HYDROCHLORIDE 200 MG/1
200 TABLET ORAL 2 TIMES DAILY
Status: DISCONTINUED | OUTPATIENT
Start: 2024-03-24 | End: 2024-04-06

## 2024-03-24 RX ORDER — BUMETANIDE 0.25 MG/ML
2 INJECTION INTRAMUSCULAR; INTRAVENOUS ONCE
Status: COMPLETED | OUTPATIENT
Start: 2024-03-24 | End: 2024-03-24

## 2024-03-24 RX ORDER — DEXMEDETOMIDINE HYDROCHLORIDE 4 UG/ML
.1-1.5 INJECTION, SOLUTION INTRAVENOUS CONTINUOUS
Status: DISCONTINUED | OUTPATIENT
Start: 2024-03-24 | End: 2024-03-30

## 2024-03-24 RX ORDER — OXYCODONE HCL 5 MG/5 ML
5 SOLUTION, ORAL ORAL EVERY 6 HOURS PRN
Status: DISCONTINUED | OUTPATIENT
Start: 2024-03-24 | End: 2024-04-06

## 2024-03-24 RX ORDER — NOREPINEPHRINE BITARTRATE 0.06 MG/ML
INJECTION, SOLUTION INTRAVENOUS
Status: COMPLETED
Start: 2024-03-24 | End: 2024-03-24

## 2024-03-24 RX ORDER — NOREPINEPHRINE BITARTRATE 0.06 MG/ML
1-100 INJECTION, SOLUTION INTRAVENOUS CONTINUOUS
Status: DISCONTINUED | OUTPATIENT
Start: 2024-03-24 | End: 2024-04-06

## 2024-03-24 RX ADMIN — MORPHINE SULFATE 2 MG: 2 INJECTION, SOLUTION INTRAMUSCULAR; INTRAVENOUS at 19:38

## 2024-03-24 RX ADMIN — Medication 5 MCG/MIN: at 22:30

## 2024-03-24 RX ADMIN — INSULIN GLARGINE 30 UNITS: 100 INJECTION, SOLUTION SUBCUTANEOUS at 09:03

## 2024-03-24 RX ADMIN — NITROGLYCERIN 1 INCH: 20 OINTMENT TOPICAL at 12:11

## 2024-03-24 RX ADMIN — ATORVASTATIN CALCIUM 40 MG: 40 TABLET, FILM COATED ORAL at 19:59

## 2024-03-24 RX ADMIN — OXYCODONE HYDROCHLORIDE 5 MG: 5 SOLUTION ORAL at 19:59

## 2024-03-24 RX ADMIN — NITROGLYCERIN 1 INCH: 20 OINTMENT TOPICAL at 18:29

## 2024-03-24 RX ADMIN — PANTOPRAZOLE SODIUM 40 MG: 40 INJECTION, POWDER, FOR SOLUTION INTRAVENOUS at 09:03

## 2024-03-24 RX ADMIN — SODIUM CHLORIDE, PRESERVATIVE FREE 10 ML: 5 INJECTION INTRAVENOUS at 20:07

## 2024-03-24 RX ADMIN — COLLAGENASE SANTYL: 250 OINTMENT TOPICAL at 09:04

## 2024-03-24 RX ADMIN — BUMETANIDE 2 MG: 0.25 INJECTION INTRAMUSCULAR; INTRAVENOUS at 09:02

## 2024-03-24 RX ADMIN — Medication 100 MCG/HR: at 04:35

## 2024-03-24 RX ADMIN — MORPHINE SULFATE 2 MG: 2 INJECTION, SOLUTION INTRAMUSCULAR; INTRAVENOUS at 20:55

## 2024-03-24 RX ADMIN — CHLORHEXIDINE GLUCONATE 0.12% ORAL RINSE 15 ML: 1.2 LIQUID ORAL at 09:03

## 2024-03-24 RX ADMIN — FENTANYL CITRATE 50 MCG: 50 INJECTION INTRAMUSCULAR; INTRAVENOUS at 19:49

## 2024-03-24 RX ADMIN — CHLORHEXIDINE GLUCONATE 0.12% ORAL RINSE 15 ML: 1.2 LIQUID ORAL at 20:08

## 2024-03-24 RX ADMIN — Medication 1 TABLET: at 09:04

## 2024-03-24 RX ADMIN — Medication 100 MCG/HR: at 14:18

## 2024-03-24 RX ADMIN — PANTOPRAZOLE SODIUM 40 MG: 40 INJECTION, POWDER, FOR SOLUTION INTRAVENOUS at 20:07

## 2024-03-24 RX ADMIN — AMIODARONE HYDROCHLORIDE 1 MG/MIN: 1.8 INJECTION, SOLUTION INTRAVENOUS at 04:33

## 2024-03-24 RX ADMIN — SENNOSIDES 8.8 MG: 8.8 LIQUID ORAL at 19:59

## 2024-03-24 RX ADMIN — HEPARIN SODIUM 3080 UNITS: 1000 INJECTION INTRAVENOUS; SUBCUTANEOUS at 13:33

## 2024-03-24 RX ADMIN — POLYETHYLENE GLYCOL (3350) 17 G: 17 POWDER, FOR SOLUTION ORAL at 09:05

## 2024-03-24 RX ADMIN — MORPHINE SULFATE 2 MG: 2 INJECTION, SOLUTION INTRAMUSCULAR; INTRAVENOUS at 21:20

## 2024-03-24 RX ADMIN — NITROGLYCERIN 1 INCH: 20 OINTMENT TOPICAL at 00:11

## 2024-03-24 RX ADMIN — HEPARIN SODIUM 14 UNITS/KG/HR: 10000 INJECTION, SOLUTION INTRAVENOUS at 13:32

## 2024-03-24 RX ADMIN — OLANZAPINE 7.5 MG: 5 TABLET, FILM COATED ORAL at 20:07

## 2024-03-24 RX ADMIN — AMIODARONE HYDROCHLORIDE 200 MG: 200 TABLET ORAL at 19:59

## 2024-03-24 RX ADMIN — Medication 0.2 MCG/KG/HR: at 21:16

## 2024-03-24 RX ADMIN — INSULIN LISPRO 8 UNITS: 100 INJECTION, SOLUTION INTRAVENOUS; SUBCUTANEOUS at 09:03

## 2024-03-24 RX ADMIN — INSULIN LISPRO 4 UNITS: 100 INJECTION, SOLUTION INTRAVENOUS; SUBCUTANEOUS at 06:55

## 2024-03-24 RX ADMIN — NITROGLYCERIN 1 INCH: 20 OINTMENT TOPICAL at 05:59

## 2024-03-24 RX ADMIN — INSULIN LISPRO 4 UNITS: 100 INJECTION, SOLUTION INTRAVENOUS; SUBCUTANEOUS at 00:19

## 2024-03-24 RX ADMIN — ALBUMIN (HUMAN) 25 G: 0.25 INJECTION, SOLUTION INTRAVENOUS at 23:01

## 2024-03-24 RX ADMIN — AMIODARONE HYDROCHLORIDE 200 MG: 200 TABLET ORAL at 09:14

## 2024-03-24 RX ADMIN — SENNOSIDES 8.8 MG: 8.8 LIQUID ORAL at 09:03

## 2024-03-24 ASSESSMENT — PULMONARY FUNCTION TESTS
PIF_VALUE: 20
PIF_VALUE: 16
PIF_VALUE: 16
PIF_VALUE: 19
PIF_VALUE: 14
PIF_VALUE: 16
PIF_VALUE: 14

## 2024-03-25 ENCOUNTER — APPOINTMENT (OUTPATIENT)
Dept: GENERAL RADIOLOGY | Age: 69
DRG: 003 | End: 2024-03-25
Attending: THORACIC SURGERY (CARDIOTHORACIC VASCULAR SURGERY)
Payer: MEDICARE

## 2024-03-25 LAB
ANION GAP SERPL CALC-SCNC: 17 MEQ/L (ref 8–16)
BASOPHILS ABSOLUTE: 0 THOU/MM3 (ref 0–0.1)
BASOPHILS NFR BLD AUTO: 0.1 %
BUN SERPL-MCNC: 82 MG/DL (ref 7–22)
CALCIUM SERPL-MCNC: 9.5 MG/DL (ref 8.5–10.5)
CHLORIDE SERPL-SCNC: 94 MEQ/L (ref 98–111)
CO2 SERPL-SCNC: 19 MEQ/L (ref 23–33)
CREAT SERPL-MCNC: 3.4 MG/DL (ref 0.4–1.2)
DEPRECATED RDW RBC AUTO: 61 FL (ref 35–45)
EOSINOPHIL NFR BLD AUTO: 0.1 %
EOSINOPHILS ABSOLUTE: 0 THOU/MM3 (ref 0–0.4)
ERYTHROCYTE [DISTWIDTH] IN BLOOD BY AUTOMATED COUNT: 18.8 % (ref 11.5–14.5)
GFR SERPL CREATININE-BSD FRML MDRD: 19 ML/MIN/1.73M2
GLUCOSE BLD STRIP.AUTO-MCNC: 127 MG/DL (ref 70–108)
GLUCOSE BLD STRIP.AUTO-MCNC: 135 MG/DL (ref 70–108)
GLUCOSE BLD STRIP.AUTO-MCNC: 161 MG/DL (ref 70–108)
GLUCOSE BLD STRIP.AUTO-MCNC: 187 MG/DL (ref 70–108)
GLUCOSE BLD STRIP.AUTO-MCNC: 195 MG/DL (ref 70–108)
GLUCOSE BLD STRIP.AUTO-MCNC: 233 MG/DL (ref 70–108)
GLUCOSE BLD STRIP.AUTO-MCNC: 249 MG/DL (ref 70–108)
GLUCOSE SERPL-MCNC: 141 MG/DL (ref 70–108)
HCT VFR BLD AUTO: 26.1 % (ref 42–52)
HEPARIN UNFRACTIONATED: 0.35 U/ML (ref 0.3–0.7)
HGB BLD-MCNC: 8.9 GM/DL (ref 14–18)
IMM GRANULOCYTES # BLD AUTO: 0.18 THOU/MM3 (ref 0–0.07)
IMM GRANULOCYTES NFR BLD AUTO: 1.1 %
LYMPHOCYTES ABSOLUTE: 0.4 THOU/MM3 (ref 1–4.8)
LYMPHOCYTES NFR BLD AUTO: 2.1 %
MCH RBC QN AUTO: 30 PG (ref 26–33)
MCHC RBC AUTO-ENTMCNC: 34.1 GM/DL (ref 32.2–35.5)
MCV RBC AUTO: 87.9 FL (ref 80–94)
MONOCYTES ABSOLUTE: 0.5 THOU/MM3 (ref 0.4–1.3)
MONOCYTES NFR BLD AUTO: 3.2 %
NEUTROPHILS NFR BLD AUTO: 93.4 %
NRBC BLD AUTO-RTO: 0 /100 WBC
PLATELET # BLD AUTO: 341 THOU/MM3 (ref 130–400)
PMV BLD AUTO: 10.6 FL (ref 9.4–12.4)
POTASSIUM SERPL-SCNC: 4.7 MEQ/L (ref 3.5–5.2)
RBC # BLD AUTO: 2.97 MILL/MM3 (ref 4.7–6.1)
SEGMENTED NEUTROPHILS ABSOLUTE COUNT: 15.7 THOU/MM3 (ref 1.8–7.7)
SODIUM SERPL-SCNC: 130 MEQ/L (ref 135–145)
WBC # BLD AUTO: 16.8 THOU/MM3 (ref 4.8–10.8)

## 2024-03-25 PROCEDURE — 85025 COMPLETE CBC W/AUTO DIFF WBC: CPT

## 2024-03-25 PROCEDURE — 03HY32Z INSERTION OF MONITORING DEVICE INTO UPPER ARTERY, PERCUTANEOUS APPROACH: ICD-10-PCS

## 2024-03-25 PROCEDURE — 85520 HEPARIN ASSAY: CPT

## 2024-03-25 PROCEDURE — C9113 INJ PANTOPRAZOLE SODIUM, VIA: HCPCS | Performed by: INTERNAL MEDICINE

## 2024-03-25 PROCEDURE — 94761 N-INVAS EAR/PLS OXIMETRY MLT: CPT

## 2024-03-25 PROCEDURE — 6360000002 HC RX W HCPCS: Performed by: INTERNAL MEDICINE

## 2024-03-25 PROCEDURE — 99233 SBSQ HOSP IP/OBS HIGH 50: CPT | Performed by: INTERNAL MEDICINE

## 2024-03-25 PROCEDURE — 6360000002 HC RX W HCPCS: Performed by: STUDENT IN AN ORGANIZED HEALTH CARE EDUCATION/TRAINING PROGRAM

## 2024-03-25 PROCEDURE — 2000000000 HC ICU R&B

## 2024-03-25 PROCEDURE — 6370000000 HC RX 637 (ALT 250 FOR IP): Performed by: NURSE PRACTITIONER

## 2024-03-25 PROCEDURE — 6370000000 HC RX 637 (ALT 250 FOR IP): Performed by: PHYSICIAN ASSISTANT

## 2024-03-25 PROCEDURE — 31500 INSERT EMERGENCY AIRWAY: CPT

## 2024-03-25 PROCEDURE — 99291 CRITICAL CARE FIRST HOUR: CPT | Performed by: INTERNAL MEDICINE

## 2024-03-25 PROCEDURE — 94003 VENT MGMT INPAT SUBQ DAY: CPT

## 2024-03-25 PROCEDURE — 2580000003 HC RX 258: Performed by: PHYSICIAN ASSISTANT

## 2024-03-25 PROCEDURE — 2700000000 HC OXYGEN THERAPY PER DAY

## 2024-03-25 PROCEDURE — 36415 COLL VENOUS BLD VENIPUNCTURE: CPT

## 2024-03-25 PROCEDURE — 80048 BASIC METABOLIC PNL TOTAL CA: CPT

## 2024-03-25 PROCEDURE — 2500000003 HC RX 250 WO HCPCS: Performed by: STUDENT IN AN ORGANIZED HEALTH CARE EDUCATION/TRAINING PROGRAM

## 2024-03-25 PROCEDURE — 6370000000 HC RX 637 (ALT 250 FOR IP): Performed by: INTERNAL MEDICINE

## 2024-03-25 PROCEDURE — 82948 REAGENT STRIP/BLOOD GLUCOSE: CPT

## 2024-03-25 PROCEDURE — 6370000000 HC RX 637 (ALT 250 FOR IP)

## 2024-03-25 PROCEDURE — 6360000002 HC RX W HCPCS: Performed by: PHYSICIAN ASSISTANT

## 2024-03-25 PROCEDURE — 6370000000 HC RX 637 (ALT 250 FOR IP): Performed by: STUDENT IN AN ORGANIZED HEALTH CARE EDUCATION/TRAINING PROGRAM

## 2024-03-25 PROCEDURE — 6370000000 HC RX 637 (ALT 250 FOR IP): Performed by: SURGERY

## 2024-03-25 PROCEDURE — 6360000002 HC RX W HCPCS: Performed by: NURSE PRACTITIONER

## 2024-03-25 PROCEDURE — 71045 X-RAY EXAM CHEST 1 VIEW: CPT

## 2024-03-25 PROCEDURE — P9047 ALBUMIN (HUMAN), 25%, 50ML: HCPCS | Performed by: STUDENT IN AN ORGANIZED HEALTH CARE EDUCATION/TRAINING PROGRAM

## 2024-03-25 RX ORDER — FENTANYL CITRATE-0.9 % NACL/PF 10 MCG/ML
25-200 PLASTIC BAG, INJECTION (ML) INTRAVENOUS CONTINUOUS
Status: DISCONTINUED | OUTPATIENT
Start: 2024-03-25 | End: 2024-04-05

## 2024-03-25 RX ADMIN — CHLORHEXIDINE GLUCONATE 0.12% ORAL RINSE 15 ML: 1.2 LIQUID ORAL at 09:44

## 2024-03-25 RX ADMIN — CHLORHEXIDINE GLUCONATE 0.12% ORAL RINSE 15 ML: 1.2 LIQUID ORAL at 19:58

## 2024-03-25 RX ADMIN — ATORVASTATIN CALCIUM 40 MG: 40 TABLET, FILM COATED ORAL at 19:57

## 2024-03-25 RX ADMIN — NITROGLYCERIN 1 INCH: 20 OINTMENT TOPICAL at 06:29

## 2024-03-25 RX ADMIN — Medication 1 TABLET: at 09:42

## 2024-03-25 RX ADMIN — MORPHINE SULFATE 2 MG: 2 INJECTION, SOLUTION INTRAMUSCULAR; INTRAVENOUS at 02:09

## 2024-03-25 RX ADMIN — MILRINONE LACTATE IN DEXTROSE 0.12 MCG/KG/MIN: 200 INJECTION, SOLUTION INTRAVENOUS at 02:54

## 2024-03-25 RX ADMIN — PANTOPRAZOLE SODIUM 40 MG: 40 INJECTION, POWDER, FOR SOLUTION INTRAVENOUS at 09:42

## 2024-03-25 RX ADMIN — NITROGLYCERIN 1 INCH: 20 OINTMENT TOPICAL at 00:39

## 2024-03-25 RX ADMIN — INSULIN LISPRO 4 UNITS: 100 INJECTION, SOLUTION INTRAVENOUS; SUBCUTANEOUS at 20:15

## 2024-03-25 RX ADMIN — SODIUM CHLORIDE, PRESERVATIVE FREE 10 ML: 5 INJECTION INTRAVENOUS at 19:58

## 2024-03-25 RX ADMIN — INSULIN LISPRO 4 UNITS: 100 INJECTION, SOLUTION INTRAVENOUS; SUBCUTANEOUS at 16:41

## 2024-03-25 RX ADMIN — ALBUMIN (HUMAN) 25 G: 0.25 INJECTION, SOLUTION INTRAVENOUS at 04:43

## 2024-03-25 RX ADMIN — SENNOSIDES 8.8 MG: 8.8 LIQUID ORAL at 09:42

## 2024-03-25 RX ADMIN — AMIODARONE HYDROCHLORIDE 200 MG: 200 TABLET ORAL at 09:42

## 2024-03-25 RX ADMIN — COLLAGENASE SANTYL: 250 OINTMENT TOPICAL at 09:43

## 2024-03-25 RX ADMIN — AMIODARONE HYDROCHLORIDE 200 MG: 200 TABLET ORAL at 19:58

## 2024-03-25 RX ADMIN — INSULIN GLARGINE 30 UNITS: 100 INJECTION, SOLUTION SUBCUTANEOUS at 09:42

## 2024-03-25 RX ADMIN — PANTOPRAZOLE SODIUM 40 MG: 40 INJECTION, POWDER, FOR SOLUTION INTRAVENOUS at 19:57

## 2024-03-25 RX ADMIN — SODIUM CHLORIDE, PRESERVATIVE FREE 10 ML: 5 INJECTION INTRAVENOUS at 09:42

## 2024-03-25 RX ADMIN — Medication 150 MCG/HR: at 23:36

## 2024-03-25 RX ADMIN — Medication 100 MCG/HR: at 03:56

## 2024-03-25 RX ADMIN — NITROGLYCERIN 1 INCH: 20 OINTMENT TOPICAL at 17:55

## 2024-03-25 RX ADMIN — POLYETHYLENE GLYCOL (3350) 17 G: 17 POWDER, FOR SOLUTION ORAL at 12:33

## 2024-03-25 RX ADMIN — MORPHINE SULFATE 2 MG: 2 INJECTION, SOLUTION INTRAMUSCULAR; INTRAVENOUS at 05:05

## 2024-03-25 RX ADMIN — MORPHINE SULFATE 2 MG: 2 INJECTION, SOLUTION INTRAMUSCULAR; INTRAVENOUS at 23:40

## 2024-03-25 RX ADMIN — HEPARIN SODIUM 14 UNITS/KG/HR: 10000 INJECTION, SOLUTION INTRAVENOUS at 14:05

## 2024-03-25 RX ADMIN — NITROGLYCERIN 1 INCH: 20 OINTMENT TOPICAL at 12:33

## 2024-03-25 RX ADMIN — NITROGLYCERIN 1 INCH: 20 OINTMENT TOPICAL at 23:40

## 2024-03-25 RX ADMIN — Medication 150 MCG/HR: at 11:09

## 2024-03-25 RX ADMIN — OLANZAPINE 7.5 MG: 5 TABLET, FILM COATED ORAL at 19:58

## 2024-03-25 RX ADMIN — ACETAMINOPHEN 650 MG: 325 TABLET ORAL at 01:20

## 2024-03-25 RX ADMIN — Medication 150 MCG/HR: at 16:43

## 2024-03-25 ASSESSMENT — PULMONARY FUNCTION TESTS
PIF_VALUE: 21
PIF_VALUE: 15
PIF_VALUE: 18
PIF_VALUE: 15
PIF_VALUE: 20

## 2024-03-26 ENCOUNTER — APPOINTMENT (OUTPATIENT)
Dept: GENERAL RADIOLOGY | Age: 69
DRG: 003 | End: 2024-03-26
Attending: THORACIC SURGERY (CARDIOTHORACIC VASCULAR SURGERY)
Payer: MEDICARE

## 2024-03-26 LAB
ALBUMIN SERPL BCG-MCNC: 2.3 G/DL (ref 3.5–5.1)
ALBUMIN SERPL BCG-MCNC: 2.3 G/DL (ref 3.5–5.1)
ALBUMIN SERPL BCG-MCNC: 2.6 G/DL (ref 3.5–5.1)
ALP SERPL-CCNC: 292 U/L (ref 38–126)
ALP SERPL-CCNC: 299 U/L (ref 38–126)
ALP SERPL-CCNC: 308 U/L (ref 38–126)
ALT SERPL W/O P-5'-P-CCNC: < 5 U/L (ref 11–66)
ANION GAP SERPL CALC-SCNC: 14 MEQ/L (ref 8–16)
ANION GAP SERPL CALC-SCNC: 17 MEQ/L (ref 8–16)
ANION GAP SERPL CALC-SCNC: 18 MEQ/L (ref 8–16)
AST SERPL-CCNC: 28 U/L (ref 5–40)
AST SERPL-CCNC: 30 U/L (ref 5–40)
AST SERPL-CCNC: 31 U/L (ref 5–40)
BASOPHILS ABSOLUTE: 0 THOU/MM3 (ref 0–0.1)
BASOPHILS NFR BLD AUTO: 0.2 %
BILIRUB CONJ SERPL-MCNC: 0.4 MG/DL (ref 0–0.3)
BILIRUB SERPL-MCNC: 0.7 MG/DL (ref 0.3–1.2)
BILIRUB SERPL-MCNC: 0.8 MG/DL (ref 0.3–1.2)
BILIRUB SERPL-MCNC: 0.8 MG/DL (ref 0.3–1.2)
BUN SERPL-MCNC: 102 MG/DL (ref 7–22)
BUN SERPL-MCNC: 95 MG/DL (ref 7–22)
BUN SERPL-MCNC: 95 MG/DL (ref 7–22)
CA-I BLD ISE-SCNC: 1.34 MMOL/L (ref 1.12–1.32)
CA-I BLD ISE-SCNC: 1.38 MMOL/L (ref 1.12–1.32)
CALCIUM SERPL-MCNC: 9.3 MG/DL (ref 8.5–10.5)
CALCIUM SERPL-MCNC: 9.8 MG/DL (ref 8.5–10.5)
CALCIUM SERPL-MCNC: 9.8 MG/DL (ref 8.5–10.5)
CHLORIDE SERPL-SCNC: 94 MEQ/L (ref 98–111)
CO2 SERPL-SCNC: 17 MEQ/L (ref 23–33)
CO2 SERPL-SCNC: 17 MEQ/L (ref 23–33)
CO2 SERPL-SCNC: 20 MEQ/L (ref 23–33)
CREAT SERPL-MCNC: 3.8 MG/DL (ref 0.4–1.2)
CREAT SERPL-MCNC: 4.1 MG/DL (ref 0.4–1.2)
CREAT SERPL-MCNC: 4.4 MG/DL (ref 0.4–1.2)
DEPRECATED RDW RBC AUTO: 60.3 FL (ref 35–45)
EOSINOPHIL NFR BLD AUTO: 0.8 %
EOSINOPHILS ABSOLUTE: 0.1 THOU/MM3 (ref 0–0.4)
ERYTHROCYTE [DISTWIDTH] IN BLOOD BY AUTOMATED COUNT: 18.7 % (ref 11.5–14.5)
GFR SERPL CREATININE-BSD FRML MDRD: 14 ML/MIN/1.73M2
GFR SERPL CREATININE-BSD FRML MDRD: 15 ML/MIN/1.73M2
GFR SERPL CREATININE-BSD FRML MDRD: 16 ML/MIN/1.73M2
GLUCOSE BLD STRIP.AUTO-MCNC: 145 MG/DL (ref 70–108)
GLUCOSE BLD STRIP.AUTO-MCNC: 157 MG/DL (ref 70–108)
GLUCOSE BLD STRIP.AUTO-MCNC: 161 MG/DL (ref 70–108)
GLUCOSE BLD STRIP.AUTO-MCNC: 180 MG/DL (ref 70–108)
GLUCOSE BLD STRIP.AUTO-MCNC: 182 MG/DL (ref 70–108)
GLUCOSE BLD STRIP.AUTO-MCNC: 238 MG/DL (ref 70–108)
GLUCOSE SERPL-MCNC: 151 MG/DL (ref 70–108)
GLUCOSE SERPL-MCNC: 175 MG/DL (ref 70–108)
GLUCOSE SERPL-MCNC: 182 MG/DL (ref 70–108)
HCT VFR BLD AUTO: 26.6 % (ref 42–52)
HEPARIN UNFRACTIONATED: 0.2 U/ML (ref 0.3–0.7)
HEPARIN UNFRACTIONATED: 0.28 U/ML (ref 0.3–0.7)
HEPARIN UNFRACTIONATED: 0.31 U/ML (ref 0.3–0.7)
HGB BLD-MCNC: 9.1 GM/DL (ref 14–18)
IMM GRANULOCYTES # BLD AUTO: 0.29 THOU/MM3 (ref 0–0.07)
IMM GRANULOCYTES NFR BLD AUTO: 1.9 %
LYMPHOCYTES ABSOLUTE: 0.5 THOU/MM3 (ref 1–4.8)
LYMPHOCYTES NFR BLD AUTO: 3.6 %
MAGNESIUM SERPL-MCNC: 2.6 MG/DL (ref 1.6–2.4)
MAGNESIUM SERPL-MCNC: 2.7 MG/DL (ref 1.6–2.4)
MAGNESIUM SERPL-MCNC: 2.7 MG/DL (ref 1.6–2.4)
MCH RBC QN AUTO: 30.1 PG (ref 26–33)
MCHC RBC AUTO-ENTMCNC: 34.2 GM/DL (ref 32.2–35.5)
MCV RBC AUTO: 88.1 FL (ref 80–94)
MONOCYTES ABSOLUTE: 1.1 THOU/MM3 (ref 0.4–1.3)
MONOCYTES NFR BLD AUTO: 7.2 %
NEUTROPHILS NFR BLD AUTO: 86.3 %
NRBC BLD AUTO-RTO: 0 /100 WBC
PHOSPHATE SERPL-MCNC: 2.9 MG/DL (ref 2.4–4.7)
PHOSPHATE SERPL-MCNC: 3.6 MG/DL (ref 2.4–4.7)
PLATELET # BLD AUTO: 438 THOU/MM3 (ref 130–400)
PMV BLD AUTO: 10.2 FL (ref 9.4–12.4)
POTASSIUM SERPL-SCNC: 4.4 MEQ/L (ref 3.5–5.2)
POTASSIUM SERPL-SCNC: 4.9 MEQ/L (ref 3.5–5.2)
POTASSIUM SERPL-SCNC: 4.9 MEQ/L (ref 3.5–5.2)
PROT SERPL-MCNC: 4.5 G/DL (ref 6.1–8)
PROT SERPL-MCNC: 4.7 G/DL (ref 6.1–8)
PROT SERPL-MCNC: 4.8 G/DL (ref 6.1–8)
RBC # BLD AUTO: 3.02 MILL/MM3 (ref 4.7–6.1)
SEGMENTED NEUTROPHILS ABSOLUTE COUNT: 13 THOU/MM3 (ref 1.8–7.7)
SODIUM SERPL-SCNC: 128 MEQ/L (ref 135–145)
SODIUM SERPL-SCNC: 128 MEQ/L (ref 135–145)
SODIUM SERPL-SCNC: 129 MEQ/L (ref 135–145)
WBC # BLD AUTO: 15.1 THOU/MM3 (ref 4.8–10.8)

## 2024-03-26 PROCEDURE — 99232 SBSQ HOSP IP/OBS MODERATE 35: CPT | Performed by: INTERNAL MEDICINE

## 2024-03-26 PROCEDURE — 85520 HEPARIN ASSAY: CPT

## 2024-03-26 PROCEDURE — 2000000000 HC ICU R&B

## 2024-03-26 PROCEDURE — 6360000002 HC RX W HCPCS: Performed by: NURSE PRACTITIONER

## 2024-03-26 PROCEDURE — 82948 REAGENT STRIP/BLOOD GLUCOSE: CPT

## 2024-03-26 PROCEDURE — 82330 ASSAY OF CALCIUM: CPT

## 2024-03-26 PROCEDURE — 6370000000 HC RX 637 (ALT 250 FOR IP): Performed by: SURGERY

## 2024-03-26 PROCEDURE — 2700000000 HC OXYGEN THERAPY PER DAY

## 2024-03-26 PROCEDURE — 99291 CRITICAL CARE FIRST HOUR: CPT | Performed by: INTERNAL MEDICINE

## 2024-03-26 PROCEDURE — 85025 COMPLETE CBC W/AUTO DIFF WBC: CPT

## 2024-03-26 PROCEDURE — 80053 COMPREHEN METABOLIC PANEL: CPT

## 2024-03-26 PROCEDURE — 2500000003 HC RX 250 WO HCPCS: Performed by: STUDENT IN AN ORGANIZED HEALTH CARE EDUCATION/TRAINING PROGRAM

## 2024-03-26 PROCEDURE — 89220 SPUTUM SPECIMEN COLLECTION: CPT

## 2024-03-26 PROCEDURE — 36415 COLL VENOUS BLD VENIPUNCTURE: CPT

## 2024-03-26 PROCEDURE — 6370000000 HC RX 637 (ALT 250 FOR IP)

## 2024-03-26 PROCEDURE — 94761 N-INVAS EAR/PLS OXIMETRY MLT: CPT

## 2024-03-26 PROCEDURE — 6360000002 HC RX W HCPCS: Performed by: INTERNAL MEDICINE

## 2024-03-26 PROCEDURE — 6370000000 HC RX 637 (ALT 250 FOR IP): Performed by: PHYSICIAN ASSISTANT

## 2024-03-26 PROCEDURE — 2580000003 HC RX 258: Performed by: INTERNAL MEDICINE

## 2024-03-26 PROCEDURE — 83735 ASSAY OF MAGNESIUM: CPT

## 2024-03-26 PROCEDURE — 94003 VENT MGMT INPAT SUBQ DAY: CPT

## 2024-03-26 PROCEDURE — 6370000000 HC RX 637 (ALT 250 FOR IP): Performed by: STUDENT IN AN ORGANIZED HEALTH CARE EDUCATION/TRAINING PROGRAM

## 2024-03-26 PROCEDURE — 6360000002 HC RX W HCPCS: Performed by: STUDENT IN AN ORGANIZED HEALTH CARE EDUCATION/TRAINING PROGRAM

## 2024-03-26 PROCEDURE — 6370000000 HC RX 637 (ALT 250 FOR IP): Performed by: INTERNAL MEDICINE

## 2024-03-26 PROCEDURE — 84100 ASSAY OF PHOSPHORUS: CPT

## 2024-03-26 PROCEDURE — 82248 BILIRUBIN DIRECT: CPT

## 2024-03-26 PROCEDURE — 6370000000 HC RX 637 (ALT 250 FOR IP): Performed by: NURSE PRACTITIONER

## 2024-03-26 PROCEDURE — 71045 X-RAY EXAM CHEST 1 VIEW: CPT

## 2024-03-26 PROCEDURE — C9113 INJ PANTOPRAZOLE SODIUM, VIA: HCPCS | Performed by: INTERNAL MEDICINE

## 2024-03-26 PROCEDURE — 2580000003 HC RX 258: Performed by: PHYSICIAN ASSISTANT

## 2024-03-26 RX ORDER — MIDAZOLAM HYDROCHLORIDE 1 MG/ML
4 INJECTION INTRAMUSCULAR; INTRAVENOUS ONCE
Status: COMPLETED | OUTPATIENT
Start: 2024-03-26 | End: 2024-03-26

## 2024-03-26 RX ADMIN — COLLAGENASE SANTYL: 250 OINTMENT TOPICAL at 08:52

## 2024-03-26 RX ADMIN — HEPARIN SODIUM 16 UNITS/KG/HR: 10000 INJECTION, SOLUTION INTRAVENOUS at 12:22

## 2024-03-26 RX ADMIN — SODIUM CHLORIDE: 9 INJECTION, SOLUTION INTRAVENOUS at 09:36

## 2024-03-26 RX ADMIN — MIDAZOLAM 4 MG: 1 INJECTION INTRAMUSCULAR; INTRAVENOUS at 00:52

## 2024-03-26 RX ADMIN — INSULIN GLARGINE 30 UNITS: 100 INJECTION, SOLUTION SUBCUTANEOUS at 08:52

## 2024-03-26 RX ADMIN — Medication: at 22:50

## 2024-03-26 RX ADMIN — Medication 150 MCG/HR: at 13:18

## 2024-03-26 RX ADMIN — SODIUM CHLORIDE, PRESERVATIVE FREE 10 ML: 5 INJECTION INTRAVENOUS at 09:44

## 2024-03-26 RX ADMIN — Medication 150 MCG/HR: at 21:08

## 2024-03-26 RX ADMIN — MILRINONE LACTATE IN DEXTROSE 0.12 MCG/KG/MIN: 200 INJECTION, SOLUTION INTRAVENOUS at 14:22

## 2024-03-26 RX ADMIN — PANTOPRAZOLE SODIUM 40 MG: 40 INJECTION, POWDER, FOR SOLUTION INTRAVENOUS at 08:51

## 2024-03-26 RX ADMIN — PANTOPRAZOLE SODIUM 40 MG: 40 INJECTION, POWDER, FOR SOLUTION INTRAVENOUS at 20:43

## 2024-03-26 RX ADMIN — AMIODARONE HYDROCHLORIDE 200 MG: 200 TABLET ORAL at 20:41

## 2024-03-26 RX ADMIN — Medication: at 22:46

## 2024-03-26 RX ADMIN — HEPARIN SODIUM 3080 UNITS: 1000 INJECTION INTRAVENOUS; SUBCUTANEOUS at 06:42

## 2024-03-26 RX ADMIN — NITROGLYCERIN 1 INCH: 20 OINTMENT TOPICAL at 18:37

## 2024-03-26 RX ADMIN — Medication 9 MCG/MIN: at 12:31

## 2024-03-26 RX ADMIN — INSULIN LISPRO 4 UNITS: 100 INJECTION, SOLUTION INTRAVENOUS; SUBCUTANEOUS at 09:08

## 2024-03-26 RX ADMIN — SENNOSIDES 8.8 MG: 8.8 LIQUID ORAL at 20:43

## 2024-03-26 RX ADMIN — SODIUM CHLORIDE, PRESERVATIVE FREE 10 ML: 5 INJECTION INTRAVENOUS at 20:43

## 2024-03-26 RX ADMIN — Medication: at 22:45

## 2024-03-26 RX ADMIN — CHLORHEXIDINE GLUCONATE 0.12% ORAL RINSE 15 ML: 1.2 LIQUID ORAL at 20:44

## 2024-03-26 RX ADMIN — OLANZAPINE 7.5 MG: 5 TABLET, FILM COATED ORAL at 20:43

## 2024-03-26 RX ADMIN — NITROGLYCERIN 1 INCH: 20 OINTMENT TOPICAL at 04:22

## 2024-03-26 RX ADMIN — HEPARIN SODIUM 3080 UNITS: 1000 INJECTION INTRAVENOUS; SUBCUTANEOUS at 19:51

## 2024-03-26 RX ADMIN — AMIODARONE HYDROCHLORIDE 200 MG: 200 TABLET ORAL at 08:51

## 2024-03-26 RX ADMIN — Medication 1 TABLET: at 08:51

## 2024-03-26 RX ADMIN — CHLORHEXIDINE GLUCONATE 0.12% ORAL RINSE 15 ML: 1.2 LIQUID ORAL at 08:16

## 2024-03-26 RX ADMIN — ATORVASTATIN CALCIUM 40 MG: 40 TABLET, FILM COATED ORAL at 20:42

## 2024-03-26 RX ADMIN — Medication 150 MCG/HR: at 06:55

## 2024-03-26 RX ADMIN — NITROGLYCERIN 1 INCH: 20 OINTMENT TOPICAL at 13:12

## 2024-03-26 ASSESSMENT — PULMONARY FUNCTION TESTS
PIF_VALUE: 21
PIF_VALUE: 26
PIF_VALUE: 19
PIF_VALUE: 25
PIF_VALUE: 23
PIF_VALUE: 19

## 2024-03-27 ENCOUNTER — APPOINTMENT (OUTPATIENT)
Dept: GENERAL RADIOLOGY | Age: 69
DRG: 003 | End: 2024-03-27
Attending: THORACIC SURGERY (CARDIOTHORACIC VASCULAR SURGERY)
Payer: MEDICARE

## 2024-03-27 LAB
ACINETOBACTER CALCOACETICUS-BAUMANNII BY PCR: NOT DETECTED
ALBUMIN SERPL BCG-MCNC: 2.1 G/DL (ref 3.5–5.1)
ALBUMIN SERPL BCG-MCNC: 2.3 G/DL (ref 3.5–5.1)
ALBUMIN SERPL BCG-MCNC: 2.4 G/DL (ref 3.5–5.1)
ALBUMIN SERPL BCG-MCNC: 2.4 G/DL (ref 3.5–5.1)
ALP SERPL-CCNC: 279 U/L (ref 38–126)
ALP SERPL-CCNC: 298 U/L (ref 38–126)
ALP SERPL-CCNC: 302 U/L (ref 38–126)
ALP SERPL-CCNC: 304 U/L (ref 38–126)
ALT SERPL W/O P-5'-P-CCNC: < 5 U/L (ref 11–66)
ANION GAP SERPL CALC-SCNC: 11 MEQ/L (ref 8–16)
ANION GAP SERPL CALC-SCNC: 12 MEQ/L (ref 8–16)
ANION GAP SERPL CALC-SCNC: 13 MEQ/L (ref 8–16)
ANION GAP SERPL CALC-SCNC: 14 MEQ/L (ref 8–16)
AST SERPL-CCNC: 29 U/L (ref 5–40)
AST SERPL-CCNC: 29 U/L (ref 5–40)
AST SERPL-CCNC: 30 U/L (ref 5–40)
AST SERPL-CCNC: 31 U/L (ref 5–40)
BASOPHILS ABSOLUTE: 0.1 THOU/MM3 (ref 0–0.1)
BASOPHILS NFR BLD AUTO: 0.5 %
BILIRUB CONJ SERPL-MCNC: 0.4 MG/DL (ref 0–0.3)
BILIRUB SERPL-MCNC: 0.8 MG/DL (ref 0.3–1.2)
BILIRUB SERPL-MCNC: 0.8 MG/DL (ref 0.3–1.2)
BILIRUB SERPL-MCNC: 0.9 MG/DL (ref 0.3–1.2)
BILIRUB SERPL-MCNC: 1 MG/DL (ref 0.3–1.2)
BLACTX-M ISLT/SPM QL: NORMAL
BLAIMP ISLT/SPM QL: NORMAL
BLAKPC ISLT/SPM QL: NORMAL
BLAOXA-48-LIKE ISLT/SPM QL: NORMAL
BLAVIM ISLT/SPM QL: NORMAL
BUN SERPL-MCNC: 49 MG/DL (ref 7–22)
BUN SERPL-MCNC: 56 MG/DL (ref 7–22)
BUN SERPL-MCNC: 66 MG/DL (ref 7–22)
BUN SERPL-MCNC: 79 MG/DL (ref 7–22)
C PNEUM DNA LOWER RESP QL NAA+NON-PROBE: NOT DETECTED
CA-I BLD ISE-SCNC: 1.3 MMOL/L (ref 1.12–1.32)
CA-I BLD ISE-SCNC: 1.31 MMOL/L (ref 1.12–1.32)
CA-I BLD ISE-SCNC: 1.33 MMOL/L (ref 1.12–1.32)
CA-I BLD ISE-SCNC: 1.34 MMOL/L (ref 1.12–1.32)
CALCIUM SERPL-MCNC: 8.9 MG/DL (ref 8.5–10.5)
CALCIUM SERPL-MCNC: 9.1 MG/DL (ref 8.5–10.5)
CALCIUM SERPL-MCNC: 9.1 MG/DL (ref 8.5–10.5)
CALCIUM SERPL-MCNC: 9.3 MG/DL (ref 8.5–10.5)
CHLORIDE SERPL-SCNC: 100 MEQ/L (ref 98–111)
CHLORIDE SERPL-SCNC: 95 MEQ/L (ref 98–111)
CHLORIDE SERPL-SCNC: 98 MEQ/L (ref 98–111)
CHLORIDE SERPL-SCNC: 98 MEQ/L (ref 98–111)
CO2 SERPL-SCNC: 20 MEQ/L (ref 23–33)
CO2 SERPL-SCNC: 21 MEQ/L (ref 23–33)
CO2 SERPL-SCNC: 21 MEQ/L (ref 23–33)
CO2 SERPL-SCNC: 22 MEQ/L (ref 23–33)
CREAT SERPL-MCNC: 2 MG/DL (ref 0.4–1.2)
CREAT SERPL-MCNC: 2.4 MG/DL (ref 0.4–1.2)
CREAT SERPL-MCNC: 2.6 MG/DL (ref 0.4–1.2)
CREAT SERPL-MCNC: 3 MG/DL (ref 0.4–1.2)
DEPRECATED RDW RBC AUTO: 57.6 FL (ref 35–45)
ENTEROBACTER CLOACAE COMPLEX BY PCR: NOT DETECTED
EOSINOPHIL NFR BLD AUTO: 1.3 %
EOSINOPHILS ABSOLUTE: 0.1 THOU/MM3 (ref 0–0.4)
ERYTHROCYTE [DISTWIDTH] IN BLOOD BY AUTOMATED COUNT: 18.7 % (ref 11.5–14.5)
ESCHERICHIA COLI BY PCR: NOT DETECTED
FLUAV RNA LOWER RESP QL NAA+NON-PROBE: NOT DETECTED
FLUBV RNA LOWER RESP QL NAA+NON-PROBE: NOT DETECTED
GFR SERPL CREATININE-BSD FRML MDRD: 22 ML/MIN/1.73M2
GFR SERPL CREATININE-BSD FRML MDRD: 26 ML/MIN/1.73M2
GFR SERPL CREATININE-BSD FRML MDRD: 28 ML/MIN/1.73M2
GFR SERPL CREATININE-BSD FRML MDRD: 35 ML/MIN/1.73M2
GLUCOSE BLD STRIP.AUTO-MCNC: 188 MG/DL (ref 70–108)
GLUCOSE BLD STRIP.AUTO-MCNC: 92 MG/DL (ref 70–108)
GLUCOSE SERPL-MCNC: 105 MG/DL (ref 70–108)
GLUCOSE SERPL-MCNC: 109 MG/DL (ref 70–108)
GLUCOSE SERPL-MCNC: 143 MG/DL (ref 70–108)
GLUCOSE SERPL-MCNC: 170 MG/DL (ref 70–108)
HADV DNA LOWER RESP QL NAA+NON-PROBE: NOT DETECTED
HAEMOPHILUS INFLUENZAE BY PCR: NOT DETECTED
HCOV RNA LOWER RESP QL NAA+NON-PROBE: NOT DETECTED
HCT VFR BLD AUTO: 23.3 % (ref 42–52)
HEPARIN UNFRACTIONATED: 0.37 U/ML (ref 0.3–0.7)
HEPARIN UNFRACTIONATED: 0.45 U/ML (ref 0.3–0.7)
HGB BLD-MCNC: 8.2 GM/DL (ref 14–18)
HMPV RNA LOWER RESP QL NAA+NON-PROBE: NOT DETECTED
HPIV RNA LOWER RESP QL NAA+NON-PROBE: NOT DETECTED
IMM GRANULOCYTES # BLD AUTO: 0.32 THOU/MM3 (ref 0–0.07)
IMM GRANULOCYTES NFR BLD AUTO: 3.1 %
KLEBSIELLA AEROGENES BY PCR: NOT DETECTED
KLEBSIELLA OXYTOCA BY PCR: NOT DETECTED
KLEBSIELLA PNEUMONIAE GROUP BY PCR: NOT DETECTED
L PNEUMO DNA LOWER RESP QL NAA+NON-PROBE: NOT DETECTED
LACTATE SERPL-SCNC: 1.5 MMOL/L (ref 0.5–2)
LYMPHOCYTES ABSOLUTE: 0.5 THOU/MM3 (ref 1–4.8)
LYMPHOCYTES NFR BLD AUTO: 5.1 %
M PNEUMO DNA LOWER RESP QL NAA+NON-PROBE: NOT DETECTED
MAGNESIUM SERPL-MCNC: 2.5 MG/DL (ref 1.6–2.4)
MCH RBC QN AUTO: 29.6 PG (ref 26–33)
MCHC RBC AUTO-ENTMCNC: 35.2 GM/DL (ref 32.2–35.5)
MCV RBC AUTO: 84.1 FL (ref 80–94)
MONOCYTES ABSOLUTE: 1.1 THOU/MM3 (ref 0.4–1.3)
MONOCYTES NFR BLD AUTO: 10.3 %
MORAXELLA CATARRHALIS BY PCR: NOT DETECTED
NEUTROPHILS NFR BLD AUTO: 79.7 %
NRBC BLD AUTO-RTO: 0 /100 WBC
PHOSPHATE SERPL-MCNC: 2.1 MG/DL (ref 2.4–4.7)
PHOSPHATE SERPL-MCNC: 2.2 MG/DL (ref 2.4–4.7)
PHOSPHATE SERPL-MCNC: 2.4 MG/DL (ref 2.4–4.7)
PHOSPHATE SERPL-MCNC: 2.4 MG/DL (ref 2.4–4.7)
PLATELET # BLD AUTO: 399 THOU/MM3 (ref 130–400)
PMV BLD AUTO: 9.9 FL (ref 9.4–12.4)
POTASSIUM SERPL-SCNC: 4.4 MEQ/L (ref 3.5–5.2)
POTASSIUM SERPL-SCNC: 4.4 MEQ/L (ref 3.5–5.2)
POTASSIUM SERPL-SCNC: 4.5 MEQ/L (ref 3.5–5.2)
POTASSIUM SERPL-SCNC: 4.6 MEQ/L (ref 3.5–5.2)
PROT SERPL-MCNC: 4.6 G/DL (ref 6.1–8)
PROT SERPL-MCNC: 4.7 G/DL (ref 6.1–8)
PROT SERPL-MCNC: 4.8 G/DL (ref 6.1–8)
PROT SERPL-MCNC: 4.9 G/DL (ref 6.1–8)
PROTEUS SPECIES BY PCR: NOT DETECTED
PSEUDOMONAS AERUGINOSA BY PCR: NOT DETECTED
RBC # BLD AUTO: 2.77 MILL/MM3 (ref 4.7–6.1)
RESISTANT GENE MECA/C & MREJ BY PCR: NORMAL
RESISTANT GENE NDM BY PCR: NORMAL
RSV RNA LOWER RESP QL NAA+NON-PROBE: NOT DETECTED
RV+EV RNA LOWER RESP QL NAA+NON-PROBE: NOT DETECTED
SEGMENTED NEUTROPHILS ABSOLUTE COUNT: 8.4 THOU/MM3 (ref 1.8–7.7)
SERRATIA MARCESCENS BY PCR: NOT DETECTED
SODIUM SERPL-SCNC: 129 MEQ/L (ref 135–145)
SODIUM SERPL-SCNC: 130 MEQ/L (ref 135–145)
SODIUM SERPL-SCNC: 132 MEQ/L (ref 135–145)
SODIUM SERPL-SCNC: 134 MEQ/L (ref 135–145)
SOURCE: NORMAL
SPECIMEN ACCEPTABILITY: NORMAL
STAPH AUREUS BY PCR: NOT DETECTED
STREP AGALACTIAE BY PCR: NOT DETECTED
STREP PNEUMONIAE BY PCR: NOT DETECTED
STREP PYOGENES BY PCR: NOT DETECTED
WBC # BLD AUTO: 10.5 THOU/MM3 (ref 4.8–10.8)

## 2024-03-27 PROCEDURE — 36415 COLL VENOUS BLD VENIPUNCTURE: CPT

## 2024-03-27 PROCEDURE — 87070 CULTURE OTHR SPECIMN AEROBIC: CPT

## 2024-03-27 PROCEDURE — 71045 X-RAY EXAM CHEST 1 VIEW: CPT

## 2024-03-27 PROCEDURE — 83605 ASSAY OF LACTIC ACID: CPT

## 2024-03-27 PROCEDURE — 2700000000 HC OXYGEN THERAPY PER DAY

## 2024-03-27 PROCEDURE — 85025 COMPLETE CBC W/AUTO DIFF WBC: CPT

## 2024-03-27 PROCEDURE — 6360000002 HC RX W HCPCS: Performed by: INTERNAL MEDICINE

## 2024-03-27 PROCEDURE — 94003 VENT MGMT INPAT SUBQ DAY: CPT

## 2024-03-27 PROCEDURE — 83735 ASSAY OF MAGNESIUM: CPT

## 2024-03-27 PROCEDURE — 87486 CHLMYD PNEUM DNA AMP PROBE: CPT

## 2024-03-27 PROCEDURE — 6360000002 HC RX W HCPCS

## 2024-03-27 PROCEDURE — 6370000000 HC RX 637 (ALT 250 FOR IP)

## 2024-03-27 PROCEDURE — 80053 COMPREHEN METABOLIC PANEL: CPT

## 2024-03-27 PROCEDURE — 2580000003 HC RX 258: Performed by: PHYSICIAN ASSISTANT

## 2024-03-27 PROCEDURE — 87581 M.PNEUMON DNA AMP PROBE: CPT

## 2024-03-27 PROCEDURE — 36556 INSERT NON-TUNNEL CV CATH: CPT

## 2024-03-27 PROCEDURE — 6360000002 HC RX W HCPCS: Performed by: STUDENT IN AN ORGANIZED HEALTH CARE EDUCATION/TRAINING PROGRAM

## 2024-03-27 PROCEDURE — 82248 BILIRUBIN DIRECT: CPT

## 2024-03-27 PROCEDURE — 87631 RESP VIRUS 3-5 TARGETS: CPT

## 2024-03-27 PROCEDURE — 2580000003 HC RX 258: Performed by: INTERNAL MEDICINE

## 2024-03-27 PROCEDURE — 94761 N-INVAS EAR/PLS OXIMETRY MLT: CPT

## 2024-03-27 PROCEDURE — 6370000000 HC RX 637 (ALT 250 FOR IP): Performed by: INTERNAL MEDICINE

## 2024-03-27 PROCEDURE — 2500000003 HC RX 250 WO HCPCS: Performed by: STUDENT IN AN ORGANIZED HEALTH CARE EDUCATION/TRAINING PROGRAM

## 2024-03-27 PROCEDURE — 6370000000 HC RX 637 (ALT 250 FOR IP): Performed by: NURSE PRACTITIONER

## 2024-03-27 PROCEDURE — 6360000002 HC RX W HCPCS: Performed by: SURGERY

## 2024-03-27 PROCEDURE — 87040 BLOOD CULTURE FOR BACTERIA: CPT

## 2024-03-27 PROCEDURE — 6360000002 HC RX W HCPCS: Performed by: NURSE PRACTITIONER

## 2024-03-27 PROCEDURE — 99233 SBSQ HOSP IP/OBS HIGH 50: CPT | Performed by: INTERNAL MEDICINE

## 2024-03-27 PROCEDURE — 82948 REAGENT STRIP/BLOOD GLUCOSE: CPT

## 2024-03-27 PROCEDURE — 87205 SMEAR GRAM STAIN: CPT

## 2024-03-27 PROCEDURE — 87541 LEGION PNEUMO DNA AMP PROB: CPT

## 2024-03-27 PROCEDURE — 87798 DETECT AGENT NOS DNA AMP: CPT

## 2024-03-27 PROCEDURE — 2000000000 HC ICU R&B

## 2024-03-27 PROCEDURE — 99291 CRITICAL CARE FIRST HOUR: CPT | Performed by: INTERNAL MEDICINE

## 2024-03-27 PROCEDURE — APPSS30 APP SPLIT SHARED TIME 16-30 MINUTES: Performed by: PHYSICIAN ASSISTANT

## 2024-03-27 PROCEDURE — 85520 HEPARIN ASSAY: CPT

## 2024-03-27 PROCEDURE — 6370000000 HC RX 637 (ALT 250 FOR IP): Performed by: SURGERY

## 2024-03-27 PROCEDURE — 82330 ASSAY OF CALCIUM: CPT

## 2024-03-27 PROCEDURE — 84100 ASSAY OF PHOSPHORUS: CPT

## 2024-03-27 PROCEDURE — 2500000003 HC RX 250 WO HCPCS: Performed by: INTERNAL MEDICINE

## 2024-03-27 PROCEDURE — 6370000000 HC RX 637 (ALT 250 FOR IP): Performed by: PHYSICIAN ASSISTANT

## 2024-03-27 PROCEDURE — 89220 SPUTUM SPECIMEN COLLECTION: CPT

## 2024-03-27 PROCEDURE — C9113 INJ PANTOPRAZOLE SODIUM, VIA: HCPCS | Performed by: INTERNAL MEDICINE

## 2024-03-27 RX ORDER — MIDAZOLAM HYDROCHLORIDE 1 MG/ML
1 INJECTION INTRAMUSCULAR; INTRAVENOUS ONCE
Status: COMPLETED | OUTPATIENT
Start: 2024-03-27 | End: 2024-03-27

## 2024-03-27 RX ORDER — MIDAZOLAM HYDROCHLORIDE 1 MG/ML
INJECTION INTRAMUSCULAR; INTRAVENOUS
Status: COMPLETED
Start: 2024-03-27 | End: 2024-03-27

## 2024-03-27 RX ADMIN — PANTOPRAZOLE SODIUM 40 MG: 40 INJECTION, POWDER, FOR SOLUTION INTRAVENOUS at 21:13

## 2024-03-27 RX ADMIN — AMIODARONE HYDROCHLORIDE 200 MG: 200 TABLET ORAL at 08:15

## 2024-03-27 RX ADMIN — Medication: at 11:26

## 2024-03-27 RX ADMIN — Medication 6 MMOL: at 14:21

## 2024-03-27 RX ADMIN — NITROGLYCERIN 1 INCH: 20 OINTMENT TOPICAL at 00:52

## 2024-03-27 RX ADMIN — Medication 1 TABLET: at 08:15

## 2024-03-27 RX ADMIN — SODIUM CHLORIDE, PRESERVATIVE FREE 10 ML: 5 INJECTION INTRAVENOUS at 08:14

## 2024-03-27 RX ADMIN — Medication 175 MCG/HR: at 22:56

## 2024-03-27 RX ADMIN — Medication 6 MMOL: at 20:50

## 2024-03-27 RX ADMIN — MIDAZOLAM 1 MG: 1 INJECTION INTRAMUSCULAR; INTRAVENOUS at 14:17

## 2024-03-27 RX ADMIN — NITROGLYCERIN 1 INCH: 20 OINTMENT TOPICAL at 12:30

## 2024-03-27 RX ADMIN — Medication: at 17:58

## 2024-03-27 RX ADMIN — MIDAZOLAM 1 MG: 1 INJECTION INTRAMUSCULAR; INTRAVENOUS at 14:15

## 2024-03-27 RX ADMIN — ATORVASTATIN CALCIUM 40 MG: 40 TABLET, FILM COATED ORAL at 21:13

## 2024-03-27 RX ADMIN — Medication 150 MCG/HR: at 16:35

## 2024-03-27 RX ADMIN — Medication: at 05:13

## 2024-03-27 RX ADMIN — Medication 150 MCG/HR: at 09:50

## 2024-03-27 RX ADMIN — MIDAZOLAM HYDROCHLORIDE 1 MG: 1 INJECTION INTRAMUSCULAR; INTRAVENOUS at 14:15

## 2024-03-27 RX ADMIN — Medication 16 MCG/MIN: at 14:27

## 2024-03-27 RX ADMIN — PANTOPRAZOLE SODIUM 40 MG: 40 INJECTION, POWDER, FOR SOLUTION INTRAVENOUS at 08:15

## 2024-03-27 RX ADMIN — Medication: at 05:09

## 2024-03-27 RX ADMIN — OLANZAPINE 7.5 MG: 5 TABLET, FILM COATED ORAL at 21:13

## 2024-03-27 RX ADMIN — FENTANYL CITRATE 50 MCG: 50 INJECTION INTRAMUSCULAR; INTRAVENOUS at 12:25

## 2024-03-27 RX ADMIN — COLLAGENASE SANTYL: 250 OINTMENT TOPICAL at 10:02

## 2024-03-27 RX ADMIN — CHLORHEXIDINE GLUCONATE 0.12% ORAL RINSE 15 ML: 1.2 LIQUID ORAL at 21:14

## 2024-03-27 RX ADMIN — SENNOSIDES 8.8 MG: 8.8 LIQUID ORAL at 21:13

## 2024-03-27 RX ADMIN — Medication 6 MMOL: at 11:13

## 2024-03-27 RX ADMIN — NITROGLYCERIN 1 INCH: 20 OINTMENT TOPICAL at 05:24

## 2024-03-27 RX ADMIN — AMIODARONE HYDROCHLORIDE 200 MG: 200 TABLET ORAL at 21:13

## 2024-03-27 RX ADMIN — SENNOSIDES 8.8 MG: 8.8 LIQUID ORAL at 08:15

## 2024-03-27 RX ADMIN — Medication 6 MMOL: at 05:28

## 2024-03-27 RX ADMIN — NITROGLYCERIN 1 INCH: 20 OINTMENT TOPICAL at 19:02

## 2024-03-27 RX ADMIN — SODIUM CHLORIDE, PRESERVATIVE FREE 10 ML: 5 INJECTION INTRAVENOUS at 21:14

## 2024-03-27 RX ADMIN — POLYETHYLENE GLYCOL (3350) 17 G: 17 POWDER, FOR SOLUTION ORAL at 08:15

## 2024-03-27 RX ADMIN — Medication 150 MCG/HR: at 03:12

## 2024-03-27 RX ADMIN — CHLORHEXIDINE GLUCONATE 0.12% ORAL RINSE 15 ML: 1.2 LIQUID ORAL at 08:15

## 2024-03-27 RX ADMIN — Medication: at 05:12

## 2024-03-27 RX ADMIN — HEPARIN SODIUM 18 UNITS/KG/HR: 10000 INJECTION, SOLUTION INTRAVENOUS at 07:53

## 2024-03-27 ASSESSMENT — PULMONARY FUNCTION TESTS
PIF_VALUE: 17
PIF_VALUE: 18
PIF_VALUE: 21
PIF_VALUE: 19
PIF_VALUE: 17
PIF_VALUE: 16

## 2024-03-28 ENCOUNTER — APPOINTMENT (OUTPATIENT)
Dept: GENERAL RADIOLOGY | Age: 69
DRG: 003 | End: 2024-03-28
Attending: THORACIC SURGERY (CARDIOTHORACIC VASCULAR SURGERY)
Payer: MEDICARE

## 2024-03-28 ENCOUNTER — APPOINTMENT (OUTPATIENT)
Dept: MRI IMAGING | Age: 69
DRG: 003 | End: 2024-03-28
Attending: THORACIC SURGERY (CARDIOTHORACIC VASCULAR SURGERY)
Payer: MEDICARE

## 2024-03-28 LAB
ABO: NORMAL
ALBUMIN SERPL BCG-MCNC: 2.1 G/DL (ref 3.5–5.1)
ALBUMIN SERPL BCG-MCNC: 2.2 G/DL (ref 3.5–5.1)
ALP SERPL-CCNC: 295 U/L (ref 38–126)
ALP SERPL-CCNC: 311 U/L (ref 38–126)
ALP SERPL-CCNC: 331 U/L (ref 38–126)
ALP SERPL-CCNC: 351 U/L (ref 38–126)
ALT SERPL W/O P-5'-P-CCNC: 6 U/L (ref 11–66)
ALT SERPL W/O P-5'-P-CCNC: < 5 U/L (ref 11–66)
ANION GAP SERPL CALC-SCNC: 10 MEQ/L (ref 8–16)
ANION GAP SERPL CALC-SCNC: 10 MEQ/L (ref 8–16)
ANION GAP SERPL CALC-SCNC: 11 MEQ/L (ref 8–16)
ANION GAP SERPL CALC-SCNC: 9 MEQ/L (ref 8–16)
ANTIBODY SCREEN: NORMAL
AST SERPL-CCNC: 28 U/L (ref 5–40)
AST SERPL-CCNC: 29 U/L (ref 5–40)
AST SERPL-CCNC: 37 U/L (ref 5–40)
AST SERPL-CCNC: 47 U/L (ref 5–40)
BASOPHILS ABSOLUTE: 0.1 THOU/MM3 (ref 0–0.1)
BASOPHILS NFR BLD AUTO: 0.8 %
BILIRUB CONJ SERPL-MCNC: 0.6 MG/DL (ref 0–0.3)
BILIRUB SERPL-MCNC: 1.1 MG/DL (ref 0.3–1.2)
BILIRUB SERPL-MCNC: 1.2 MG/DL (ref 0.3–1.2)
BILIRUB SERPL-MCNC: 1.3 MG/DL (ref 0.3–1.2)
BILIRUB SERPL-MCNC: 1.7 MG/DL (ref 0.3–1.2)
BUN SERPL-MCNC: 30 MG/DL (ref 7–22)
BUN SERPL-MCNC: 32 MG/DL (ref 7–22)
BUN SERPL-MCNC: 36 MG/DL (ref 7–22)
BUN SERPL-MCNC: 42 MG/DL (ref 7–22)
CA-I BLD ISE-SCNC: 1.31 MMOL/L (ref 1.12–1.32)
CA-I BLD ISE-SCNC: 1.34 MMOL/L (ref 1.12–1.32)
CA-I BLD ISE-SCNC: 1.36 MMOL/L (ref 1.12–1.32)
CA-I BLD ISE-SCNC: 1.42 MMOL/L (ref 1.12–1.32)
CALCIUM SERPL-MCNC: 9.1 MG/DL (ref 8.5–10.5)
CALCIUM SERPL-MCNC: 9.3 MG/DL (ref 8.5–10.5)
CALCIUM SERPL-MCNC: 9.4 MG/DL (ref 8.5–10.5)
CALCIUM SERPL-MCNC: 9.4 MG/DL (ref 8.5–10.5)
CHLORIDE SERPL-SCNC: 100 MEQ/L (ref 98–111)
CHLORIDE SERPL-SCNC: 101 MEQ/L (ref 98–111)
CHLORIDE SERPL-SCNC: 99 MEQ/L (ref 98–111)
CHLORIDE SERPL-SCNC: 99 MEQ/L (ref 98–111)
CO2 SERPL-SCNC: 21 MEQ/L (ref 23–33)
CO2 SERPL-SCNC: 23 MEQ/L (ref 23–33)
CREAT SERPL-MCNC: 1.3 MG/DL (ref 0.4–1.2)
CREAT SERPL-MCNC: 1.4 MG/DL (ref 0.4–1.2)
CREAT SERPL-MCNC: 1.5 MG/DL (ref 0.4–1.2)
CREAT SERPL-MCNC: 1.8 MG/DL (ref 0.4–1.2)
DEPRECATED RDW RBC AUTO: 60 FL (ref 35–45)
EKG Q-T INTERVAL: 428 MS
EKG QRS DURATION: 96 MS
EKG QTC CALCULATION (BAZETT): 557 MS
EKG R AXIS: -13 DEGREES
EKG T AXIS: 133 DEGREES
EKG VENTRICULAR RATE: 102 BPM
EOSINOPHIL NFR BLD AUTO: 0.9 %
EOSINOPHILS ABSOLUTE: 0.1 THOU/MM3 (ref 0–0.4)
ERYTHROCYTE [DISTWIDTH] IN BLOOD BY AUTOMATED COUNT: 18.9 % (ref 11.5–14.5)
GFR SERPL CREATININE-BSD FRML MDRD: 40 ML/MIN/1.73M2
GFR SERPL CREATININE-BSD FRML MDRD: 49 ML/MIN/1.73M2
GFR SERPL CREATININE-BSD FRML MDRD: 54 ML/MIN/1.73M2
GFR SERPL CREATININE-BSD FRML MDRD: 59 ML/MIN/1.73M2
GLUCOSE BLD STRIP.AUTO-MCNC: 109 MG/DL (ref 70–108)
GLUCOSE BLD STRIP.AUTO-MCNC: 215 MG/DL (ref 70–108)
GLUCOSE SERPL-MCNC: 114 MG/DL (ref 70–108)
GLUCOSE SERPL-MCNC: 197 MG/DL (ref 70–108)
GLUCOSE SERPL-MCNC: 213 MG/DL (ref 70–108)
GLUCOSE SERPL-MCNC: 214 MG/DL (ref 70–108)
HCT VFR BLD AUTO: 24.5 % (ref 42–52)
HCT VFR BLD AUTO: 29 % (ref 42–52)
HEPARIN UNFRACTIONATED: 0.28 U/ML (ref 0.3–0.7)
HEPARIN UNFRACTIONATED: 0.48 U/ML (ref 0.3–0.7)
HGB BLD-MCNC: 8.3 GM/DL (ref 14–18)
HGB BLD-MCNC: 9.9 GM/DL (ref 14–18)
IMM GRANULOCYTES # BLD AUTO: 0.69 THOU/MM3 (ref 0–0.07)
IMM GRANULOCYTES NFR BLD AUTO: 6.2 %
LACTATE SERPL-SCNC: 1.8 MMOL/L (ref 0.5–2)
LACTATE SERPL-SCNC: 2.4 MMOL/L (ref 0.5–2)
LYMPHOCYTES ABSOLUTE: 0.5 THOU/MM3 (ref 1–4.8)
LYMPHOCYTES NFR BLD AUTO: 4.8 %
MAGNESIUM SERPL-MCNC: 2.4 MG/DL (ref 1.6–2.4)
MAGNESIUM SERPL-MCNC: 2.5 MG/DL (ref 1.6–2.4)
MAGNESIUM SERPL-MCNC: 2.5 MG/DL (ref 1.6–2.4)
MAGNESIUM SERPL-MCNC: 2.6 MG/DL (ref 1.6–2.4)
MCH RBC QN AUTO: 29.4 PG (ref 26–33)
MCHC RBC AUTO-ENTMCNC: 33.9 GM/DL (ref 32.2–35.5)
MCV RBC AUTO: 86.9 FL (ref 80–94)
MONOCYTES ABSOLUTE: 1.1 THOU/MM3 (ref 0.4–1.3)
MONOCYTES NFR BLD AUTO: 9.6 %
NEUTROPHILS NFR BLD AUTO: 77.7 %
NRBC BLD AUTO-RTO: 0 /100 WBC
PHOSPHATE SERPL-MCNC: 1.7 MG/DL (ref 2.4–4.7)
PHOSPHATE SERPL-MCNC: 2.3 MG/DL (ref 2.4–4.7)
PHOSPHATE SERPL-MCNC: 2.6 MG/DL (ref 2.4–4.7)
PHOSPHATE SERPL-MCNC: 2.8 MG/DL (ref 2.4–4.7)
PLATELET # BLD AUTO: 437 THOU/MM3 (ref 130–400)
PMV BLD AUTO: 9.6 FL (ref 9.4–12.4)
POTASSIUM SERPL-SCNC: 3.9 MEQ/L (ref 3.5–5.2)
POTASSIUM SERPL-SCNC: 4.3 MEQ/L (ref 3.5–5.2)
POTASSIUM SERPL-SCNC: 4.5 MEQ/L (ref 3.5–5.2)
POTASSIUM SERPL-SCNC: 4.6 MEQ/L (ref 3.5–5.2)
PROT SERPL-MCNC: 4.9 G/DL (ref 6.1–8)
PROT SERPL-MCNC: 5 G/DL (ref 6.1–8)
RBC # BLD AUTO: 2.82 MILL/MM3 (ref 4.7–6.1)
RH FACTOR: NORMAL
SCAN OF BLOOD SMEAR: NORMAL
SEGMENTED NEUTROPHILS ABSOLUTE COUNT: 8.7 THOU/MM3 (ref 1.8–7.7)
SODIUM SERPL-SCNC: 131 MEQ/L (ref 135–145)
SODIUM SERPL-SCNC: 132 MEQ/L (ref 135–145)
WBC # BLD AUTO: 11.2 THOU/MM3 (ref 4.8–10.8)

## 2024-03-28 PROCEDURE — 85025 COMPLETE CBC W/AUTO DIFF WBC: CPT

## 2024-03-28 PROCEDURE — 83735 ASSAY OF MAGNESIUM: CPT

## 2024-03-28 PROCEDURE — C9113 INJ PANTOPRAZOLE SODIUM, VIA: HCPCS | Performed by: INTERNAL MEDICINE

## 2024-03-28 PROCEDURE — 99291 CRITICAL CARE FIRST HOUR: CPT | Performed by: INTERNAL MEDICINE

## 2024-03-28 PROCEDURE — 6370000000 HC RX 637 (ALT 250 FOR IP): Performed by: PHYSICIAN ASSISTANT

## 2024-03-28 PROCEDURE — 36430 TRANSFUSION BLD/BLD COMPNT: CPT

## 2024-03-28 PROCEDURE — P9016 RBC LEUKOCYTES REDUCED: HCPCS

## 2024-03-28 PROCEDURE — 80053 COMPREHEN METABOLIC PANEL: CPT

## 2024-03-28 PROCEDURE — 2000000000 HC ICU R&B

## 2024-03-28 PROCEDURE — 86850 RBC ANTIBODY SCREEN: CPT

## 2024-03-28 PROCEDURE — 2580000003 HC RX 258

## 2024-03-28 PROCEDURE — 82248 BILIRUBIN DIRECT: CPT

## 2024-03-28 PROCEDURE — 6370000000 HC RX 637 (ALT 250 FOR IP): Performed by: NURSE PRACTITIONER

## 2024-03-28 PROCEDURE — 82330 ASSAY OF CALCIUM: CPT

## 2024-03-28 PROCEDURE — 2580000003 HC RX 258: Performed by: PHYSICIAN ASSISTANT

## 2024-03-28 PROCEDURE — 82948 REAGENT STRIP/BLOOD GLUCOSE: CPT

## 2024-03-28 PROCEDURE — 2500000003 HC RX 250 WO HCPCS: Performed by: STUDENT IN AN ORGANIZED HEALTH CARE EDUCATION/TRAINING PROGRAM

## 2024-03-28 PROCEDURE — 6370000000 HC RX 637 (ALT 250 FOR IP)

## 2024-03-28 PROCEDURE — 86901 BLOOD TYPING SEROLOGIC RH(D): CPT

## 2024-03-28 PROCEDURE — P9040 RBC LEUKOREDUCED IRRADIATED: HCPCS

## 2024-03-28 PROCEDURE — 71045 X-RAY EXAM CHEST 1 VIEW: CPT

## 2024-03-28 PROCEDURE — 6360000002 HC RX W HCPCS: Performed by: NURSE PRACTITIONER

## 2024-03-28 PROCEDURE — 6370000000 HC RX 637 (ALT 250 FOR IP): Performed by: INTERNAL MEDICINE

## 2024-03-28 PROCEDURE — 94761 N-INVAS EAR/PLS OXIMETRY MLT: CPT

## 2024-03-28 PROCEDURE — 2580000003 HC RX 258: Performed by: INTERNAL MEDICINE

## 2024-03-28 PROCEDURE — 86900 BLOOD TYPING SEROLOGIC ABO: CPT

## 2024-03-28 PROCEDURE — 85520 HEPARIN ASSAY: CPT

## 2024-03-28 PROCEDURE — 2700000000 HC OXYGEN THERAPY PER DAY

## 2024-03-28 PROCEDURE — 6370000000 HC RX 637 (ALT 250 FOR IP): Performed by: SURGERY

## 2024-03-28 PROCEDURE — 99223 1ST HOSP IP/OBS HIGH 75: CPT | Performed by: INTERNAL MEDICINE

## 2024-03-28 PROCEDURE — 70551 MRI BRAIN STEM W/O DYE: CPT

## 2024-03-28 PROCEDURE — 6360000002 HC RX W HCPCS: Performed by: INTERNAL MEDICINE

## 2024-03-28 PROCEDURE — 83605 ASSAY OF LACTIC ACID: CPT

## 2024-03-28 PROCEDURE — 86923 COMPATIBILITY TEST ELECTRIC: CPT

## 2024-03-28 PROCEDURE — 85018 HEMOGLOBIN: CPT

## 2024-03-28 PROCEDURE — 84100 ASSAY OF PHOSPHORUS: CPT

## 2024-03-28 PROCEDURE — 2500000003 HC RX 250 WO HCPCS: Performed by: INTERNAL MEDICINE

## 2024-03-28 PROCEDURE — 94003 VENT MGMT INPAT SUBQ DAY: CPT

## 2024-03-28 PROCEDURE — APPSS30 APP SPLIT SHARED TIME 16-30 MINUTES: Performed by: PHYSICIAN ASSISTANT

## 2024-03-28 PROCEDURE — 36415 COLL VENOUS BLD VENIPUNCTURE: CPT

## 2024-03-28 PROCEDURE — 85014 HEMATOCRIT: CPT

## 2024-03-28 PROCEDURE — 6360000002 HC RX W HCPCS

## 2024-03-28 PROCEDURE — 6370000000 HC RX 637 (ALT 250 FOR IP): Performed by: STUDENT IN AN ORGANIZED HEALTH CARE EDUCATION/TRAINING PROGRAM

## 2024-03-28 RX ORDER — MIDAZOLAM HYDROCHLORIDE 1 MG/ML
1 INJECTION INTRAMUSCULAR; INTRAVENOUS PRN
Status: DISCONTINUED | OUTPATIENT
Start: 2024-03-28 | End: 2024-03-28

## 2024-03-28 RX ORDER — LORAZEPAM 2 MG/ML
4 INJECTION INTRAMUSCULAR EVERY 4 HOURS
Status: DISCONTINUED | OUTPATIENT
Start: 2024-03-28 | End: 2024-03-28

## 2024-03-28 RX ORDER — MIDAZOLAM HYDROCHLORIDE 1 MG/ML
1 INJECTION INTRAMUSCULAR; INTRAVENOUS EVERY 4 HOURS PRN
Status: DISCONTINUED | OUTPATIENT
Start: 2024-03-28 | End: 2024-03-28

## 2024-03-28 RX ORDER — LORAZEPAM 2 MG/ML
4 INJECTION INTRAMUSCULAR EVERY 4 HOURS PRN
Status: DISCONTINUED | OUTPATIENT
Start: 2024-03-28 | End: 2024-04-04

## 2024-03-28 RX ADMIN — NITROGLYCERIN 1 INCH: 20 OINTMENT TOPICAL at 13:34

## 2024-03-28 RX ADMIN — POLYETHYLENE GLYCOL (3350) 17 G: 17 POWDER, FOR SOLUTION ORAL at 08:39

## 2024-03-28 RX ADMIN — COLLAGENASE SANTYL: 250 OINTMENT TOPICAL at 08:40

## 2024-03-28 RX ADMIN — Medication 1 TABLET: at 08:38

## 2024-03-28 RX ADMIN — Medication: at 00:22

## 2024-03-28 RX ADMIN — AMIODARONE HYDROCHLORIDE 200 MG: 200 TABLET ORAL at 20:42

## 2024-03-28 RX ADMIN — NITROGLYCERIN 1 INCH: 20 OINTMENT TOPICAL at 18:10

## 2024-03-28 RX ADMIN — Medication: at 14:57

## 2024-03-28 RX ADMIN — Medication: at 00:23

## 2024-03-28 RX ADMIN — INSULIN GLARGINE 30 UNITS: 100 INJECTION, SOLUTION SUBCUTANEOUS at 07:47

## 2024-03-28 RX ADMIN — SODIUM PHOSPHATE, MONOBASIC, MONOHYDRATE AND SODIUM PHOSPHATE, DIBASIC, ANHYDROUS 12 MMOL: 142; 276 INJECTION, SOLUTION INTRAVENOUS at 21:03

## 2024-03-28 RX ADMIN — HEPARIN SODIUM 18 UNITS/KG/HR: 10000 INJECTION, SOLUTION INTRAVENOUS at 05:03

## 2024-03-28 RX ADMIN — MIDAZOLAM 1 MG: 1 INJECTION INTRAMUSCULAR; INTRAVENOUS at 11:45

## 2024-03-28 RX ADMIN — Medication: at 00:25

## 2024-03-28 RX ADMIN — Medication: at 14:56

## 2024-03-28 RX ADMIN — Medication: at 21:06

## 2024-03-28 RX ADMIN — ATORVASTATIN CALCIUM 40 MG: 40 TABLET, FILM COATED ORAL at 20:42

## 2024-03-28 RX ADMIN — Medication 150 MCG/HR: at 10:46

## 2024-03-28 RX ADMIN — Medication: at 21:07

## 2024-03-28 RX ADMIN — Medication: at 06:53

## 2024-03-28 RX ADMIN — MIDAZOLAM 1 MG: 1 INJECTION INTRAMUSCULAR; INTRAVENOUS at 07:41

## 2024-03-28 RX ADMIN — INSULIN LISPRO 4 UNITS: 100 INJECTION, SOLUTION INTRAVENOUS; SUBCUTANEOUS at 07:46

## 2024-03-28 RX ADMIN — SENNOSIDES 8.8 MG: 8.8 LIQUID ORAL at 08:39

## 2024-03-28 RX ADMIN — Medication 125 MCG/HR: at 18:30

## 2024-03-28 RX ADMIN — INSULIN LISPRO 4 UNITS: 100 INJECTION, SOLUTION INTRAVENOUS; SUBCUTANEOUS at 02:00

## 2024-03-28 RX ADMIN — PANTOPRAZOLE SODIUM 40 MG: 40 INJECTION, POWDER, FOR SOLUTION INTRAVENOUS at 20:42

## 2024-03-28 RX ADMIN — OLANZAPINE 7.5 MG: 5 TABLET, FILM COATED ORAL at 20:41

## 2024-03-28 RX ADMIN — CHLORHEXIDINE GLUCONATE 0.12% ORAL RINSE 15 ML: 1.2 LIQUID ORAL at 08:39

## 2024-03-28 RX ADMIN — HEPARIN SODIUM 20 UNITS/KG/HR: 10000 INJECTION, SOLUTION INTRAVENOUS at 21:00

## 2024-03-28 RX ADMIN — ACETAMINOPHEN 650 MG: 325 TABLET ORAL at 08:38

## 2024-03-28 RX ADMIN — AMIODARONE HYDROCHLORIDE 200 MG: 200 TABLET ORAL at 08:38

## 2024-03-28 RX ADMIN — CHLORHEXIDINE GLUCONATE 0.12% ORAL RINSE 15 ML: 1.2 LIQUID ORAL at 20:42

## 2024-03-28 RX ADMIN — PIPERACILLIN AND TAZOBACTAM 4500 MG: 4; .5 INJECTION, POWDER, FOR SOLUTION INTRAVENOUS at 17:56

## 2024-03-28 RX ADMIN — Medication: at 06:52

## 2024-03-28 RX ADMIN — LORAZEPAM 4 MG: 2 INJECTION INTRAMUSCULAR; INTRAVENOUS at 16:56

## 2024-03-28 RX ADMIN — HEPARIN SODIUM 3080 UNITS: 1000 INJECTION INTRAVENOUS; SUBCUTANEOUS at 13:39

## 2024-03-28 RX ADMIN — NITROGLYCERIN 1 INCH: 20 OINTMENT TOPICAL at 07:27

## 2024-03-28 RX ADMIN — PANTOPRAZOLE SODIUM 40 MG: 40 INJECTION, POWDER, FOR SOLUTION INTRAVENOUS at 08:39

## 2024-03-28 RX ADMIN — PIPERACILLIN AND TAZOBACTAM 3375 MG: 3; .375 INJECTION, POWDER, LYOPHILIZED, FOR SOLUTION INTRAVENOUS at 23:13

## 2024-03-28 RX ADMIN — SENNOSIDES 8.8 MG: 8.8 LIQUID ORAL at 20:42

## 2024-03-28 RX ADMIN — Medication 175 MCG/HR: at 05:05

## 2024-03-28 RX ADMIN — Medication: at 14:58

## 2024-03-28 RX ADMIN — Medication 20 MCG/MIN: at 10:37

## 2024-03-28 RX ADMIN — SODIUM CHLORIDE, PRESERVATIVE FREE 10 ML: 5 INJECTION INTRAVENOUS at 08:39

## 2024-03-28 RX ADMIN — NITROGLYCERIN 1 INCH: 20 OINTMENT TOPICAL at 00:29

## 2024-03-28 RX ADMIN — SODIUM PHOSPHATE, MONOBASIC, MONOHYDRATE AND SODIUM PHOSPHATE, DIBASIC, ANHYDROUS 6 MMOL: 142; 276 INJECTION, SOLUTION INTRAVENOUS at 09:11

## 2024-03-28 ASSESSMENT — PULMONARY FUNCTION TESTS
PIF_VALUE: 16
PIF_VALUE: 17

## 2024-03-28 NOTE — CONSULTS
CRITICAL CARE PROGRESS NOTE      Patient:  Mumtaz Islas    Unit/Bed:4D-12/012-A  YOB: 1955  MRN: [id number]   PCP: Tom Villanueva DO  Date of Admission: 3/6/2024  Chief Complaint:- CAD sp CABG     Assessment and Plan:    CAD S/P CABG, Mitral Valve Repair and Maze procedure:  PO day 0 surgery with Dr. Bess.  Patient was given 4 units of blood in the OR, 1 To precipitate, 3 FFP, 1 platelets.  Hemoglobin improved from 6-13.  Repeat H&H every 4, goal greater than 10.  F less than 10 plan for 1 unit of transfusion if needed more [employer].  Monitor vitals, I/O, daily CBC.   Intubation for airway protection: patient intubated on 3/6 for surgery.  Unable to wean given instability of the patient.  Continue with lung protection strategies.  LVF with low CI precludes extubation.   Acute anemia - Hgb 6.2 improved to 13.4 with 4 units of blood in the OR. Given additional unite in ICU.   Goal hemoglobin >10, Q6 H:H checks   Hyperkalemia: Initial potassium 5.2  Repeat BMP daily   Diabetics mellitus type II:  A1c 9.2. On Novolog TID and Lantus BID at home. Prior hx of hypoglycemia secondary to gastroparesis / poor appetite 10/2023.    Continue insulin drip till PO tolerable    Paroxysmal A-fib: [id number] 3, HAS-BLEED 3.  Has pacemaker.   Hold Eliquis   Primary HTN: held home medications   GERD: home Protonix held     INITIAL H AND P AND ICU COURSE:  Mumtaz Islas 70 yo male presented to for elective surgery of CABG, MV repair and Maze procedure. Patient has history of PAF and atrial flutter was Sotalol and apixaban.  Cardiac cath on on 1/18/24 showed severe restenosis of the RCA stents and stenosis of LCX with normal LV function.       Past Medical History:  Paroxysmal atrial fibrillation, primary hypertension. CAD, PVD, Esophageal cancer, GERD, DM2     Review of Systems   Unable to perform ROS: Intubated          Scheduled Meds:   sodium chloride flush  5-40 mL IntraVENous 2 times per day    
[facility]          [address]        [address]        [place], [place] [address] [phone]           Inpatient Initial consult note         3/8/2024 9:09 AM      Patient Name:   Mumtaz Islas  YOB: 1955  Primary Care Physician:  Tom Villanueva DO   Admit Date:    3/6/2024  5:24 AM    Consultation requested by : Manish Bess MD    Reason for Consult : Nephrology following for acute kidney injury fluid overload and need for CVVH.    History of presenting illness :Mumtaz Islas is a 69 y.o.   male with Past Medical History as stated below presented with a chief complaint of No chief complaint on file.   on 3/6/2024 .  Patient currently intubated cannot accurately assess history or review of systems    Patient presented for elective , CABG, mitral valve repair with maze procedure which was done on 3/6/2024.  This was subsequently complicated by postop hemorrhage with cardiac tamponade and has undergone repair of the right ventricular bleeding.  Subsequently he was placed on ECMO on 3/7/2024.  During the entire process patient has required multiple units of blood products, his creatinine prior to procedure was around 0.9 which has consistently gotten worse.  This morning it was 3.1 and the patient is not making much urine output.  He was also started on bicarbonate infusion.  He is almost +19 L since admission.  He is currently on 40% FiO2    Nephrology has been consulted for ultrafiltration and possible need for renal replacement therapy    Past History:  Past Medical History:   Diagnosis Date    Alcoholism (HCC)      quit in the 90    Alopecia     Angina at rest     CAD (coronary artery disease)      self , stents     Depression     Esophageal cancer (HCC) 06/06/2012    took esophageal cancer out    Gall stone     out as of 6-12    Gastroparesis     GERD (gastroesophageal reflux disease)     Hyperlipidemia     self and family    Hypertension     self and 
Seen during dialysis  Tolerating ok.  Still on epinephrine  UF - 50 ml.hr  Stop bicarb drip  See full consult for details    Ravin Marcus MD    
catheterization; Diagnostic Cardiac Cath Lab Procedure (2024); Coronary angioplasty (10/08/2015); Upper gastrointestinal endoscopy (N/A, 11/24/2023); Cardiac procedure (N/A, 01/18/2024); transesophageal echocardiogram (N/A, 02/15/2024); Peripheral arterial stent graft (1997); Pancreas surgery (2012); Cardiac surgery (N/A, 3/6/2024); Cardiac surgery (N/A, 3/6/2024); Coronary artery bypass graft (N/A, 3/7/2024); Coronary artery bypass graft (N/A, 3/8/2024); and Coronary artery bypass graft (N/A, 3/19/2024).  Medications  Prior to Admission medications    Medication Sig Start Date End Date Taking? Authorizing Provider   collagenase 250 UNIT/GM ointment Apply 1 g topically daily Apply topically daily to open area left foot   Yes ProviderMalachi MD   albuterol (PROVENTIL) (5 MG/ML) 0.5% nebulizer solution Take 0.5 mLs by nebulization every 6 hours as needed for Wheezing Pt wife states takes every 4 days    ProviderMalachi MD   NOVOLOG FLEXPEN 100 UNIT/ML injection pen Inject 8 units before breakfast, 7 units before lunch, and 8 units before supper plus group 2 Sliding scale into the skin 3x daily before meals. Inject 2 units + scale before snacks as needed. Max daily dose 55 units 2/26/24   Ignacio Blackman MD   Injection Device for Insulin (CEQUR SIMPLICITY ) MISC Use to insert Cequr Simplicity device  Patient not taking: Reported on 3/6/2024 2/26/24   Ignacio Blackman MD   Injection Device for Insulin (CEQUR SIMPLICITY 2U) VERONICA Use to administer rapid-acting insulin. Change every 3 days  Patient not taking: Reported on 3/6/2024 2/26/24   Ignacio Blackman MD   insulin glargine (LANTUS SOLOSTAR) 100 UNIT/ML injection pen Inject 15 units twice daily  Patient not taking: Reported on 3/1/2024 2/1/24   Ignacio Blackman MD   sotalol (BETAPACE) 120 MG tablet TAKE 1 TABLET TWICE DAILY 1/11/24   Cinthya Villalobos MD   Continuous Blood Gluc Sensor (FREESTYLE SHILPA 3 SENSOR) MISC by Does not apply route every 14

## 2024-03-28 NOTE — SIGNIFICANT EVENT
I have discussed the case with cardiothoracic surgery including Dr. Baker and Kevin Lang.  They called the [employer] company and discussed the case with the representative.  They do not have a contra indication to MRI with the implanted pacer.  They can program remotely.  The risk to the patient is low for MRI.  Benefits outweigh the risk of MRI.  Electronically signed by Fco Olivarez MD.

## 2024-03-29 ENCOUNTER — APPOINTMENT (OUTPATIENT)
Dept: GENERAL RADIOLOGY | Age: 69
DRG: 003 | End: 2024-03-29
Attending: THORACIC SURGERY (CARDIOTHORACIC VASCULAR SURGERY)
Payer: MEDICARE

## 2024-03-29 LAB
ALBUMIN SERPL BCG-MCNC: 2.2 G/DL (ref 3.5–5.1)
ALBUMIN SERPL BCG-MCNC: 2.2 G/DL (ref 3.5–5.1)
ALBUMIN SERPL BCG-MCNC: 2.3 G/DL (ref 3.5–5.1)
ALBUMIN SERPL BCG-MCNC: 2.5 G/DL (ref 3.5–5.1)
ALP SERPL-CCNC: 399 U/L (ref 38–126)
ALP SERPL-CCNC: 458 U/L (ref 38–126)
ALP SERPL-CCNC: 473 U/L (ref 38–126)
ALP SERPL-CCNC: 510 U/L (ref 38–126)
ALT SERPL W/O P-5'-P-CCNC: 10 U/L (ref 11–66)
ALT SERPL W/O P-5'-P-CCNC: 8 U/L (ref 11–66)
ALT SERPL W/O P-5'-P-CCNC: 9 U/L (ref 11–66)
ALT SERPL W/O P-5'-P-CCNC: 9 U/L (ref 11–66)
ANION GAP SERPL CALC-SCNC: 11 MEQ/L (ref 8–16)
ANION GAP SERPL CALC-SCNC: 15 MEQ/L (ref 8–16)
ANION GAP SERPL CALC-SCNC: 8 MEQ/L (ref 8–16)
ANION GAP SERPL CALC-SCNC: 8 MEQ/L (ref 8–16)
AST SERPL-CCNC: 49 U/L (ref 5–40)
AST SERPL-CCNC: 50 U/L (ref 5–40)
AST SERPL-CCNC: 52 U/L (ref 5–40)
AST SERPL-CCNC: 55 U/L (ref 5–40)
BASOPHILS ABSOLUTE: 0.1 THOU/MM3 (ref 0–0.1)
BASOPHILS NFR BLD AUTO: 0.3 %
BILIRUB SERPL-MCNC: 1.7 MG/DL (ref 0.3–1.2)
BILIRUB SERPL-MCNC: 2 MG/DL (ref 0.3–1.2)
BILIRUB SERPL-MCNC: 2.2 MG/DL (ref 0.3–1.2)
BILIRUB SERPL-MCNC: 2.2 MG/DL (ref 0.3–1.2)
BUN SERPL-MCNC: 26 MG/DL (ref 7–22)
BUN SERPL-MCNC: 26 MG/DL (ref 7–22)
BUN SERPL-MCNC: 27 MG/DL (ref 7–22)
BUN SERPL-MCNC: 27 MG/DL (ref 7–22)
CA-I BLD ISE-SCNC: 1.38 MMOL/L (ref 1.12–1.32)
CA-I BLD ISE-SCNC: 1.39 MMOL/L (ref 1.12–1.32)
CA-I BLD ISE-SCNC: 1.4 MMOL/L (ref 1.12–1.32)
CA-I BLD ISE-SCNC: 1.4 MMOL/L (ref 1.12–1.32)
CALCIUM SERPL-MCNC: 8.9 MG/DL (ref 8.5–10.5)
CALCIUM SERPL-MCNC: 9.3 MG/DL (ref 8.5–10.5)
CALCIUM SERPL-MCNC: 9.4 MG/DL (ref 8.5–10.5)
CALCIUM SERPL-MCNC: 9.5 MG/DL (ref 8.5–10.5)
CHLORIDE SERPL-SCNC: 100 MEQ/L (ref 98–111)
CHLORIDE SERPL-SCNC: 102 MEQ/L (ref 98–111)
CHLORIDE SERPL-SCNC: 102 MEQ/L (ref 98–111)
CHLORIDE SERPL-SCNC: 105 MEQ/L (ref 98–111)
CO2 SERPL-SCNC: 20 MEQ/L (ref 23–33)
CO2 SERPL-SCNC: 23 MEQ/L (ref 23–33)
CO2 SERPL-SCNC: 24 MEQ/L (ref 23–33)
CO2 SERPL-SCNC: 24 MEQ/L (ref 23–33)
CREAT SERPL-MCNC: 1.1 MG/DL (ref 0.4–1.2)
CREAT SERPL-MCNC: 1.2 MG/DL (ref 0.4–1.2)
DEPRECATED RDW RBC AUTO: 57.2 FL (ref 35–45)
EOSINOPHIL NFR BLD AUTO: 1.9 %
EOSINOPHILS ABSOLUTE: 0.3 THOU/MM3 (ref 0–0.4)
ERYTHROCYTE [DISTWIDTH] IN BLOOD BY AUTOMATED COUNT: 18.3 % (ref 11.5–14.5)
GFR SERPL CREATININE-BSD FRML MDRD: 65 ML/MIN/1.73M2
GFR SERPL CREATININE-BSD FRML MDRD: 72 ML/MIN/1.73M2
GLUCOSE BLD STRIP.AUTO-MCNC: 198 MG/DL (ref 70–108)
GLUCOSE SERPL-MCNC: 143 MG/DL (ref 70–108)
GLUCOSE SERPL-MCNC: 149 MG/DL (ref 70–108)
GLUCOSE SERPL-MCNC: 158 MG/DL (ref 70–108)
GLUCOSE SERPL-MCNC: 173 MG/DL (ref 70–108)
HCT VFR BLD AUTO: 27.7 % (ref 42–52)
HGB BLD-MCNC: 9.4 GM/DL (ref 14–18)
IMM GRANULOCYTES # BLD AUTO: 2.21 THOU/MM3 (ref 0–0.07)
IMM GRANULOCYTES NFR BLD AUTO: 12.3 %
LYMPHOCYTES ABSOLUTE: 1 THOU/MM3 (ref 1–4.8)
LYMPHOCYTES NFR BLD AUTO: 5.4 %
MAGNESIUM SERPL-MCNC: 2.4 MG/DL (ref 1.6–2.4)
MAGNESIUM SERPL-MCNC: 2.5 MG/DL (ref 1.6–2.4)
MCH RBC QN AUTO: 29.5 PG (ref 26–33)
MCHC RBC AUTO-ENTMCNC: 33.9 GM/DL (ref 32.2–35.5)
MCV RBC AUTO: 86.8 FL (ref 80–94)
MONOCYTES ABSOLUTE: 1.3 THOU/MM3 (ref 0.4–1.3)
MONOCYTES NFR BLD AUTO: 7.2 %
NEUTROPHILS NFR BLD AUTO: 72.9 %
NRBC BLD AUTO-RTO: 0 /100 WBC
PHOSPHATE SERPL-MCNC: 2 MG/DL (ref 2.4–4.7)
PHOSPHATE SERPL-MCNC: 2.1 MG/DL (ref 2.4–4.7)
PHOSPHATE SERPL-MCNC: 2.2 MG/DL (ref 2.4–4.7)
PHOSPHATE SERPL-MCNC: 2.6 MG/DL (ref 2.4–4.7)
PLATELET # BLD AUTO: 456 THOU/MM3 (ref 130–400)
PMV BLD AUTO: 9.4 FL (ref 9.4–12.4)
POTASSIUM SERPL-SCNC: 3.9 MEQ/L (ref 3.5–5.2)
POTASSIUM SERPL-SCNC: 4.3 MEQ/L (ref 3.5–5.2)
POTASSIUM SERPL-SCNC: 4.3 MEQ/L (ref 3.5–5.2)
POTASSIUM SERPL-SCNC: 4.4 MEQ/L (ref 3.5–5.2)
PROT SERPL-MCNC: 3.9 G/DL (ref 6.1–8)
PROT SERPL-MCNC: 4.8 G/DL (ref 6.1–8)
PROT SERPL-MCNC: 5 G/DL (ref 6.1–8)
PROT SERPL-MCNC: 5 G/DL (ref 6.1–8)
RBC # BLD AUTO: 3.19 MILL/MM3 (ref 4.7–6.1)
SCAN OF BLOOD SMEAR: NORMAL
SEGMENTED NEUTROPHILS ABSOLUTE COUNT: 13.1 THOU/MM3 (ref 1.8–7.7)
SODIUM SERPL-SCNC: 132 MEQ/L (ref 135–145)
SODIUM SERPL-SCNC: 136 MEQ/L (ref 135–145)
SODIUM SERPL-SCNC: 137 MEQ/L (ref 135–145)
SODIUM SERPL-SCNC: 137 MEQ/L (ref 135–145)
WBC # BLD AUTO: 18 THOU/MM3 (ref 4.8–10.8)

## 2024-03-29 PROCEDURE — 36415 COLL VENOUS BLD VENIPUNCTURE: CPT

## 2024-03-29 PROCEDURE — 6370000000 HC RX 637 (ALT 250 FOR IP): Performed by: INTERNAL MEDICINE

## 2024-03-29 PROCEDURE — 2500000003 HC RX 250 WO HCPCS: Performed by: STUDENT IN AN ORGANIZED HEALTH CARE EDUCATION/TRAINING PROGRAM

## 2024-03-29 PROCEDURE — 31600 PLANNED TRACHEOSTOMY: CPT | Performed by: INTERNAL MEDICINE

## 2024-03-29 PROCEDURE — 80053 COMPREHEN METABOLIC PANEL: CPT

## 2024-03-29 PROCEDURE — 5A1221Z PERFORMANCE OF CARDIAC OUTPUT, CONTINUOUS: ICD-10-PCS | Performed by: THORACIC SURGERY (CARDIOTHORACIC VASCULAR SURGERY)

## 2024-03-29 PROCEDURE — C9113 INJ PANTOPRAZOLE SODIUM, VIA: HCPCS | Performed by: INTERNAL MEDICINE

## 2024-03-29 PROCEDURE — 84100 ASSAY OF PHOSPHORUS: CPT

## 2024-03-29 PROCEDURE — 6370000000 HC RX 637 (ALT 250 FOR IP): Performed by: PHYSICIAN ASSISTANT

## 2024-03-29 PROCEDURE — 82330 ASSAY OF CALCIUM: CPT

## 2024-03-29 PROCEDURE — 2709999900 HC NON-CHARGEABLE SUPPLY

## 2024-03-29 PROCEDURE — 2580000003 HC RX 258: Performed by: INTERNAL MEDICINE

## 2024-03-29 PROCEDURE — APPSS30 APP SPLIT SHARED TIME 16-30 MINUTES: Performed by: PHYSICIAN ASSISTANT

## 2024-03-29 PROCEDURE — 6360000002 HC RX W HCPCS: Performed by: NURSE PRACTITIONER

## 2024-03-29 PROCEDURE — 6360000002 HC RX W HCPCS

## 2024-03-29 PROCEDURE — 99233 SBSQ HOSP IP/OBS HIGH 50: CPT | Performed by: INTERNAL MEDICINE

## 2024-03-29 PROCEDURE — 82948 REAGENT STRIP/BLOOD GLUCOSE: CPT

## 2024-03-29 PROCEDURE — 85025 COMPLETE CBC W/AUTO DIFF WBC: CPT

## 2024-03-29 PROCEDURE — 6370000000 HC RX 637 (ALT 250 FOR IP)

## 2024-03-29 PROCEDURE — 3609027000 HC BRONCHOSCOPY

## 2024-03-29 PROCEDURE — 2580000003 HC RX 258: Performed by: PHYSICIAN ASSISTANT

## 2024-03-29 PROCEDURE — 6360000002 HC RX W HCPCS: Performed by: INTERNAL MEDICINE

## 2024-03-29 PROCEDURE — 94761 N-INVAS EAR/PLS OXIMETRY MLT: CPT

## 2024-03-29 PROCEDURE — 31600 PLANNED TRACHEOSTOMY: CPT

## 2024-03-29 PROCEDURE — 2500000003 HC RX 250 WO HCPCS: Performed by: INTERNAL MEDICINE

## 2024-03-29 PROCEDURE — 0B113F4 BYPASS TRACHEA TO CUTANEOUS WITH TRACHEOSTOMY DEVICE, PERCUTANEOUS APPROACH: ICD-10-PCS | Performed by: THORACIC SURGERY (CARDIOTHORACIC VASCULAR SURGERY)

## 2024-03-29 PROCEDURE — 71045 X-RAY EXAM CHEST 1 VIEW: CPT

## 2024-03-29 PROCEDURE — 94003 VENT MGMT INPAT SUBQ DAY: CPT

## 2024-03-29 PROCEDURE — C1769 GUIDE WIRE: HCPCS

## 2024-03-29 PROCEDURE — 99291 CRITICAL CARE FIRST HOUR: CPT | Performed by: INTERNAL MEDICINE

## 2024-03-29 PROCEDURE — 0BJ08ZZ INSPECTION OF TRACHEOBRONCHIAL TREE, VIA NATURAL OR ARTIFICIAL OPENING ENDOSCOPIC: ICD-10-PCS | Performed by: INTERNAL MEDICINE

## 2024-03-29 PROCEDURE — 6370000000 HC RX 637 (ALT 250 FOR IP): Performed by: NURSE PRACTITIONER

## 2024-03-29 PROCEDURE — 2500000003 HC RX 250 WO HCPCS: Performed by: NURSE PRACTITIONER

## 2024-03-29 PROCEDURE — 83735 ASSAY OF MAGNESIUM: CPT

## 2024-03-29 PROCEDURE — 2580000003 HC RX 258

## 2024-03-29 PROCEDURE — 2000000000 HC ICU R&B

## 2024-03-29 PROCEDURE — 2700000000 HC OXYGEN THERAPY PER DAY

## 2024-03-29 PROCEDURE — 6360000002 HC RX W HCPCS: Performed by: SURGERY

## 2024-03-29 PROCEDURE — 6370000000 HC RX 637 (ALT 250 FOR IP): Performed by: SURGERY

## 2024-03-29 RX ORDER — MORPHINE SULFATE 4 MG/ML
4 INJECTION, SOLUTION INTRAMUSCULAR; INTRAVENOUS ONCE
Status: COMPLETED | OUTPATIENT
Start: 2024-03-29 | End: 2024-03-29

## 2024-03-29 RX ORDER — MORPHINE SULFATE 4 MG/ML
INJECTION, SOLUTION INTRAMUSCULAR; INTRAVENOUS
Status: COMPLETED
Start: 2024-03-29 | End: 2024-03-29

## 2024-03-29 RX ORDER — KETAMINE HCL IN NACL, ISO-OSM 100MG/10ML
100 SYRINGE (ML) INJECTION ONCE
Status: COMPLETED | OUTPATIENT
Start: 2024-03-29 | End: 2024-03-29

## 2024-03-29 RX ORDER — LORAZEPAM 2 MG/ML
4 INJECTION INTRAMUSCULAR ONCE
Status: COMPLETED | OUTPATIENT
Start: 2024-03-29 | End: 2024-03-29

## 2024-03-29 RX ORDER — LIDOCAINE HYDROCHLORIDE AND EPINEPHRINE 10; 10 MG/ML; UG/ML
20 INJECTION, SOLUTION INFILTRATION; PERINEURAL ONCE
Status: COMPLETED | OUTPATIENT
Start: 2024-03-29 | End: 2024-03-29

## 2024-03-29 RX ORDER — FENTANYL CITRATE 50 UG/ML
100 INJECTION, SOLUTION INTRAMUSCULAR; INTRAVENOUS ONCE
Status: COMPLETED | OUTPATIENT
Start: 2024-03-29 | End: 2024-03-29

## 2024-03-29 RX ORDER — CISATRACURIUM BESYLATE 2 MG/ML
10 INJECTION, SOLUTION INTRAVENOUS ONCE
Status: COMPLETED | OUTPATIENT
Start: 2024-03-29 | End: 2024-03-29

## 2024-03-29 RX ADMIN — LORAZEPAM 4 MG: 2 INJECTION INTRAMUSCULAR; INTRAVENOUS at 10:37

## 2024-03-29 RX ADMIN — LORAZEPAM 4 MG: 2 INJECTION INTRAMUSCULAR; INTRAVENOUS at 15:46

## 2024-03-29 RX ADMIN — SENNOSIDES 8.8 MG: 8.8 LIQUID ORAL at 20:28

## 2024-03-29 RX ADMIN — Medication: at 16:45

## 2024-03-29 RX ADMIN — POLYETHYLENE GLYCOL (3350) 17 G: 17 POWDER, FOR SOLUTION ORAL at 08:13

## 2024-03-29 RX ADMIN — Medication: at 03:42

## 2024-03-29 RX ADMIN — Medication 9 MCG/MIN: at 20:22

## 2024-03-29 RX ADMIN — FENTANYL CITRATE 100 MCG: 50 INJECTION INTRAMUSCULAR; INTRAVENOUS at 15:45

## 2024-03-29 RX ADMIN — CISATRACURIUM BESYLATE 10 MG: 2 INJECTION INTRAVENOUS at 15:45

## 2024-03-29 RX ADMIN — PANTOPRAZOLE SODIUM 40 MG: 40 INJECTION, POWDER, FOR SOLUTION INTRAVENOUS at 20:28

## 2024-03-29 RX ADMIN — NITROGLYCERIN 1 INCH: 20 OINTMENT TOPICAL at 17:42

## 2024-03-29 RX ADMIN — Medication 150 MCG/HR: at 10:56

## 2024-03-29 RX ADMIN — SENNOSIDES 8.8 MG: 8.8 LIQUID ORAL at 08:13

## 2024-03-29 RX ADMIN — LORAZEPAM 4 MG: 2 INJECTION INTRAMUSCULAR; INTRAVENOUS at 16:02

## 2024-03-29 RX ADMIN — Medication: at 03:43

## 2024-03-29 RX ADMIN — CHLORHEXIDINE GLUCONATE 0.12% ORAL RINSE 15 ML: 1.2 LIQUID ORAL at 20:28

## 2024-03-29 RX ADMIN — Medication: at 22:58

## 2024-03-29 RX ADMIN — Medication 100 MG: at 15:44

## 2024-03-29 RX ADMIN — Medication 1 TABLET: at 08:13

## 2024-03-29 RX ADMIN — PIPERACILLIN AND TAZOBACTAM 3375 MG: 3; .375 INJECTION, POWDER, LYOPHILIZED, FOR SOLUTION INTRAVENOUS at 11:52

## 2024-03-29 RX ADMIN — Medication: at 16:44

## 2024-03-29 RX ADMIN — Medication 6 MMOL: at 09:24

## 2024-03-29 RX ADMIN — NITROGLYCERIN 1 INCH: 20 OINTMENT TOPICAL at 06:48

## 2024-03-29 RX ADMIN — MORPHINE SULFATE 4 MG: 4 INJECTION, SOLUTION INTRAMUSCULAR; INTRAVENOUS at 16:02

## 2024-03-29 RX ADMIN — HEPARIN SODIUM 20 UNITS/KG/HR: 10000 INJECTION, SOLUTION INTRAVENOUS at 20:15

## 2024-03-29 RX ADMIN — AMIODARONE HYDROCHLORIDE 200 MG: 200 TABLET ORAL at 20:29

## 2024-03-29 RX ADMIN — LIDOCAINE HYDROCHLORIDE,EPINEPHRINE BITARTRATE 20 ML: 10; .01 INJECTION, SOLUTION INFILTRATION; PERINEURAL at 16:27

## 2024-03-29 RX ADMIN — PANTOPRAZOLE SODIUM 40 MG: 40 INJECTION, POWDER, FOR SOLUTION INTRAVENOUS at 08:12

## 2024-03-29 RX ADMIN — CHLORHEXIDINE GLUCONATE 0.12% ORAL RINSE 15 ML: 1.2 LIQUID ORAL at 08:12

## 2024-03-29 RX ADMIN — AMIODARONE HYDROCHLORIDE 200 MG: 200 TABLET ORAL at 08:13

## 2024-03-29 RX ADMIN — Medication: at 10:07

## 2024-03-29 RX ADMIN — OLANZAPINE 7.5 MG: 5 TABLET, FILM COATED ORAL at 20:29

## 2024-03-29 RX ADMIN — Medication 150 MCG/HR: at 17:40

## 2024-03-29 RX ADMIN — SODIUM CHLORIDE, PRESERVATIVE FREE 10 ML: 5 INJECTION INTRAVENOUS at 08:13

## 2024-03-29 RX ADMIN — NITROGLYCERIN 1 INCH: 20 OINTMENT TOPICAL at 11:45

## 2024-03-29 RX ADMIN — Medication: at 10:04

## 2024-03-29 RX ADMIN — Medication 125 MCG/HR: at 03:05

## 2024-03-29 RX ADMIN — ATORVASTATIN CALCIUM 40 MG: 40 TABLET, FILM COATED ORAL at 20:28

## 2024-03-29 RX ADMIN — Medication 6 MMOL: at 22:50

## 2024-03-29 RX ADMIN — NITROGLYCERIN 1 INCH: 20 OINTMENT TOPICAL at 00:06

## 2024-03-29 RX ADMIN — COLLAGENASE SANTYL: 250 OINTMENT TOPICAL at 08:12

## 2024-03-29 RX ADMIN — LORAZEPAM 4 MG: 2 INJECTION INTRAMUSCULAR; INTRAVENOUS at 00:33

## 2024-03-29 ASSESSMENT — PULMONARY FUNCTION TESTS
PIF_VALUE: 18
PIF_VALUE: 14
PIF_VALUE: 15
PIF_VALUE: 16
PIF_VALUE: 18
PIF_VALUE: 18
PIF_VALUE: 14
PIF_VALUE: 18
PIF_VALUE: 16
PIF_VALUE: 14
PIF_VALUE: 14
PIF_VALUE: 15
PIF_VALUE: 18
PIF_VALUE: 14
PIF_VALUE: 18
PIF_VALUE: 18

## 2024-03-30 ENCOUNTER — APPOINTMENT (OUTPATIENT)
Dept: GENERAL RADIOLOGY | Age: 69
DRG: 003 | End: 2024-03-30
Attending: THORACIC SURGERY (CARDIOTHORACIC VASCULAR SURGERY)
Payer: MEDICARE

## 2024-03-30 LAB
ALBUMIN SERPL BCG-MCNC: 2.2 G/DL (ref 3.5–5.1)
ALBUMIN SERPL BCG-MCNC: 2.3 G/DL (ref 3.5–5.1)
ALP SERPL-CCNC: 520 U/L (ref 38–126)
ALP SERPL-CCNC: 526 U/L (ref 38–126)
ALP SERPL-CCNC: 575 U/L (ref 38–126)
ALP SERPL-CCNC: 609 U/L (ref 38–126)
ALT SERPL W/O P-5'-P-CCNC: 10 U/L (ref 11–66)
ALT SERPL W/O P-5'-P-CCNC: 10 U/L (ref 11–66)
ALT SERPL W/O P-5'-P-CCNC: 13 U/L (ref 11–66)
ALT SERPL W/O P-5'-P-CCNC: 13 U/L (ref 11–66)
ANION GAP SERPL CALC-SCNC: 10 MEQ/L (ref 8–16)
ANION GAP SERPL CALC-SCNC: 12 MEQ/L (ref 8–16)
ANION GAP SERPL CALC-SCNC: 8 MEQ/L (ref 8–16)
ANION GAP SERPL CALC-SCNC: 8 MEQ/L (ref 8–16)
ANISOCYTOSIS BLD QL SMEAR: PRESENT
APTT PPP: 104.2 SECONDS (ref 22–38)
AST SERPL-CCNC: 45 U/L (ref 5–40)
AST SERPL-CCNC: 47 U/L (ref 5–40)
AST SERPL-CCNC: 62 U/L (ref 5–40)
AST SERPL-CCNC: 64 U/L (ref 5–40)
AUTO DIFF PNL BLD: ABNORMAL
BASO STIPL BLD QL SMEAR: ABNORMAL
BASOPHILS ABSOLUTE: 0.1 THOU/MM3 (ref 0–0.1)
BASOPHILS NFR BLD AUTO: 0.3 %
BILIRUB SERPL-MCNC: 1.5 MG/DL (ref 0.3–1.2)
BILIRUB SERPL-MCNC: 1.6 MG/DL (ref 0.3–1.2)
BILIRUB SERPL-MCNC: 1.8 MG/DL (ref 0.3–1.2)
BILIRUB SERPL-MCNC: 1.8 MG/DL (ref 0.3–1.2)
BUN SERPL-MCNC: 23 MG/DL (ref 7–22)
BUN SERPL-MCNC: 24 MG/DL (ref 7–22)
BUN SERPL-MCNC: 26 MG/DL (ref 7–22)
BUN SERPL-MCNC: 27 MG/DL (ref 7–22)
CA-I BLD ISE-SCNC: 1.37 MMOL/L (ref 1.12–1.32)
CA-I BLD ISE-SCNC: 1.37 MMOL/L (ref 1.12–1.32)
CA-I BLD ISE-SCNC: 1.38 MMOL/L (ref 1.12–1.32)
CA-I BLD ISE-SCNC: 1.39 MMOL/L (ref 1.12–1.32)
CALCIUM SERPL-MCNC: 9.1 MG/DL (ref 8.5–10.5)
CALCIUM SERPL-MCNC: 9.2 MG/DL (ref 8.5–10.5)
CHLORIDE SERPL-SCNC: 100 MEQ/L (ref 98–111)
CHLORIDE SERPL-SCNC: 101 MEQ/L (ref 98–111)
CHLORIDE SERPL-SCNC: 102 MEQ/L (ref 98–111)
CHLORIDE SERPL-SCNC: 103 MEQ/L (ref 98–111)
CO2 SERPL-SCNC: 21 MEQ/L (ref 23–33)
CO2 SERPL-SCNC: 23 MEQ/L (ref 23–33)
CO2 SERPL-SCNC: 24 MEQ/L (ref 23–33)
CO2 SERPL-SCNC: 24 MEQ/L (ref 23–33)
CREAT SERPL-MCNC: 1 MG/DL (ref 0.4–1.2)
CREAT SERPL-MCNC: 1.1 MG/DL (ref 0.4–1.2)
DEPRECATED RDW RBC AUTO: 59.6 FL (ref 35–45)
EOSINOPHIL NFR BLD AUTO: 1.1 %
EOSINOPHILS ABSOLUTE: 0.2 THOU/MM3 (ref 0–0.4)
ERYTHROCYTE [DISTWIDTH] IN BLOOD BY AUTOMATED COUNT: 18.6 % (ref 11.5–14.5)
GFR SERPL CREATININE-BSD FRML MDRD: 72 ML/MIN/1.73M2
GFR SERPL CREATININE-BSD FRML MDRD: 80 ML/MIN/1.73M2
GLUCOSE BLD STRIP.AUTO-MCNC: 141 MG/DL (ref 70–108)
GLUCOSE BLD STRIP.AUTO-MCNC: 153 MG/DL (ref 70–108)
GLUCOSE BLD STRIP.AUTO-MCNC: 167 MG/DL (ref 70–108)
GLUCOSE BLD STRIP.AUTO-MCNC: 209 MG/DL (ref 70–108)
GLUCOSE BLD STRIP.AUTO-MCNC: 226 MG/DL (ref 70–108)
GLUCOSE BLD STRIP.AUTO-MCNC: 235 MG/DL (ref 70–108)
GLUCOSE SERPL-MCNC: 152 MG/DL (ref 70–108)
GLUCOSE SERPL-MCNC: 164 MG/DL (ref 70–108)
GLUCOSE SERPL-MCNC: 215 MG/DL (ref 70–108)
GLUCOSE SERPL-MCNC: 229 MG/DL (ref 70–108)
HCT VFR BLD AUTO: 24.6 % (ref 42–52)
HCT VFR BLD AUTO: 25.6 % (ref 42–52)
HEMOCCULT STL QL: POSITIVE
HEPARIN UNFRACTIONATED: 0.58 U/ML (ref 0.3–0.7)
HEPARIN UNFRACTIONATED: 0.68 U/ML (ref 0.3–0.7)
HGB BLD-MCNC: 8.1 GM/DL (ref 14–18)
HGB BLD-MCNC: 8.5 GM/DL (ref 14–18)
IMM GRANULOCYTES # BLD AUTO: 3.09 THOU/MM3 (ref 0–0.07)
IMM GRANULOCYTES NFR BLD AUTO: 16.1 %
LYMPHOCYTES ABSOLUTE: 1 THOU/MM3 (ref 1–4.8)
LYMPHOCYTES NFR BLD AUTO: 5.3 %
MAGNESIUM SERPL-MCNC: 2.4 MG/DL (ref 1.6–2.4)
MAGNESIUM SERPL-MCNC: 2.5 MG/DL (ref 1.6–2.4)
MCH RBC QN AUTO: 29.8 PG (ref 26–33)
MCHC RBC AUTO-ENTMCNC: 33.2 GM/DL (ref 32.2–35.5)
MCV RBC AUTO: 89.8 FL (ref 80–94)
MONOCYTES ABSOLUTE: 1.4 THOU/MM3 (ref 0.4–1.3)
MONOCYTES NFR BLD AUTO: 7.4 %
NEUTROPHILS NFR BLD AUTO: 69.8 %
NRBC BLD AUTO-RTO: 0 /100 WBC
PATHOLOGIST REVIEW: ABNORMAL
PHOSPHATE SERPL-MCNC: 2 MG/DL (ref 2.4–4.7)
PHOSPHATE SERPL-MCNC: 2.2 MG/DL (ref 2.4–4.7)
PHOSPHATE SERPL-MCNC: 2.5 MG/DL (ref 2.4–4.7)
PHOSPHATE SERPL-MCNC: 2.9 MG/DL (ref 2.4–4.7)
PLATELET # BLD AUTO: 406 THOU/MM3 (ref 130–400)
PLATELET BLD QL SMEAR: ABNORMAL
PMV BLD AUTO: 9.4 FL (ref 9.4–12.4)
POIKILOCYTES: ABNORMAL
POLYCHROMASIA BLD QL SMEAR: ABNORMAL
POTASSIUM SERPL-SCNC: 4.1 MEQ/L (ref 3.5–5.2)
POTASSIUM SERPL-SCNC: 4.2 MEQ/L (ref 3.5–5.2)
POTASSIUM SERPL-SCNC: 4.3 MEQ/L (ref 3.5–5.2)
POTASSIUM SERPL-SCNC: 4.5 MEQ/L (ref 3.5–5.2)
PROT SERPL-MCNC: 4.8 G/DL (ref 6.1–8)
PROT SERPL-MCNC: 4.9 G/DL (ref 6.1–8)
PROT SERPL-MCNC: 5 G/DL (ref 6.1–8)
PROT SERPL-MCNC: 5 G/DL (ref 6.1–8)
RBC # BLD AUTO: 2.85 MILL/MM3 (ref 4.7–6.1)
SCAN OF BLOOD SMEAR: NORMAL
SCHISTOCYTES BLD QL SMEAR: ABNORMAL
SEGMENTED NEUTROPHILS ABSOLUTE COUNT: 13.4 THOU/MM3 (ref 1.8–7.7)
SODIUM SERPL-SCNC: 132 MEQ/L (ref 135–145)
SODIUM SERPL-SCNC: 133 MEQ/L (ref 135–145)
SODIUM SERPL-SCNC: 135 MEQ/L (ref 135–145)
SODIUM SERPL-SCNC: 136 MEQ/L (ref 135–145)
STOMATOCYTES: ABNORMAL
WBC # BLD AUTO: 19.2 THOU/MM3 (ref 4.8–10.8)

## 2024-03-30 PROCEDURE — 85014 HEMATOCRIT: CPT

## 2024-03-30 PROCEDURE — 71045 X-RAY EXAM CHEST 1 VIEW: CPT

## 2024-03-30 PROCEDURE — 0BJ08ZZ INSPECTION OF TRACHEOBRONCHIAL TREE, VIA NATURAL OR ARTIFICIAL OPENING ENDOSCOPIC: ICD-10-PCS | Performed by: THORACIC SURGERY (CARDIOTHORACIC VASCULAR SURGERY)

## 2024-03-30 PROCEDURE — 83735 ASSAY OF MAGNESIUM: CPT

## 2024-03-30 PROCEDURE — 99233 SBSQ HOSP IP/OBS HIGH 50: CPT | Performed by: INTERNAL MEDICINE

## 2024-03-30 PROCEDURE — 6370000000 HC RX 637 (ALT 250 FOR IP): Performed by: SURGERY

## 2024-03-30 PROCEDURE — 6360000002 HC RX W HCPCS: Performed by: INTERNAL MEDICINE

## 2024-03-30 PROCEDURE — 85025 COMPLETE CBC W/AUTO DIFF WBC: CPT

## 2024-03-30 PROCEDURE — C9113 INJ PANTOPRAZOLE SODIUM, VIA: HCPCS | Performed by: INTERNAL MEDICINE

## 2024-03-30 PROCEDURE — 2700000000 HC OXYGEN THERAPY PER DAY

## 2024-03-30 PROCEDURE — 84100 ASSAY OF PHOSPHORUS: CPT

## 2024-03-30 PROCEDURE — 82330 ASSAY OF CALCIUM: CPT

## 2024-03-30 PROCEDURE — 2000000000 HC ICU R&B

## 2024-03-30 PROCEDURE — 82948 REAGENT STRIP/BLOOD GLUCOSE: CPT

## 2024-03-30 PROCEDURE — 6370000000 HC RX 637 (ALT 250 FOR IP): Performed by: STUDENT IN AN ORGANIZED HEALTH CARE EDUCATION/TRAINING PROGRAM

## 2024-03-30 PROCEDURE — 2500000003 HC RX 250 WO HCPCS: Performed by: INTERNAL MEDICINE

## 2024-03-30 PROCEDURE — 6370000000 HC RX 637 (ALT 250 FOR IP): Performed by: PHYSICIAN ASSISTANT

## 2024-03-30 PROCEDURE — 2580000003 HC RX 258: Performed by: INTERNAL MEDICINE

## 2024-03-30 PROCEDURE — 36415 COLL VENOUS BLD VENIPUNCTURE: CPT

## 2024-03-30 PROCEDURE — 94761 N-INVAS EAR/PLS OXIMETRY MLT: CPT

## 2024-03-30 PROCEDURE — 85520 HEPARIN ASSAY: CPT

## 2024-03-30 PROCEDURE — 80053 COMPREHEN METABOLIC PANEL: CPT

## 2024-03-30 PROCEDURE — 85018 HEMOGLOBIN: CPT

## 2024-03-30 PROCEDURE — 6370000000 HC RX 637 (ALT 250 FOR IP): Performed by: INTERNAL MEDICINE

## 2024-03-30 PROCEDURE — 2580000003 HC RX 258: Performed by: PHYSICIAN ASSISTANT

## 2024-03-30 PROCEDURE — 6360000002 HC RX W HCPCS

## 2024-03-30 PROCEDURE — 99291 CRITICAL CARE FIRST HOUR: CPT | Performed by: INTERNAL MEDICINE

## 2024-03-30 PROCEDURE — 2580000003 HC RX 258

## 2024-03-30 PROCEDURE — 2500000003 HC RX 250 WO HCPCS: Performed by: STUDENT IN AN ORGANIZED HEALTH CARE EDUCATION/TRAINING PROGRAM

## 2024-03-30 PROCEDURE — 82272 OCCULT BLD FECES 1-3 TESTS: CPT

## 2024-03-30 PROCEDURE — 94003 VENT MGMT INPAT SUBQ DAY: CPT

## 2024-03-30 PROCEDURE — 6370000000 HC RX 637 (ALT 250 FOR IP): Performed by: NURSE PRACTITIONER

## 2024-03-30 PROCEDURE — 85730 THROMBOPLASTIN TIME PARTIAL: CPT

## 2024-03-30 PROCEDURE — 6370000000 HC RX 637 (ALT 250 FOR IP)

## 2024-03-30 RX ADMIN — PANTOPRAZOLE SODIUM 40 MG: 40 INJECTION, POWDER, FOR SOLUTION INTRAVENOUS at 21:19

## 2024-03-30 RX ADMIN — NITROGLYCERIN 1 INCH: 20 OINTMENT TOPICAL at 18:04

## 2024-03-30 RX ADMIN — Medication: at 11:41

## 2024-03-30 RX ADMIN — SODIUM CHLORIDE, PRESERVATIVE FREE 10 ML: 5 INJECTION INTRAVENOUS at 08:28

## 2024-03-30 RX ADMIN — Medication 1 TABLET: at 08:30

## 2024-03-30 RX ADMIN — PIPERACILLIN AND TAZOBACTAM 3375 MG: 3; .375 INJECTION, POWDER, LYOPHILIZED, FOR SOLUTION INTRAVENOUS at 00:17

## 2024-03-30 RX ADMIN — AMIODARONE HYDROCHLORIDE 200 MG: 200 TABLET ORAL at 21:19

## 2024-03-30 RX ADMIN — Medication: at 17:50

## 2024-03-30 RX ADMIN — NITROGLYCERIN 1 INCH: 20 OINTMENT TOPICAL at 06:26

## 2024-03-30 RX ADMIN — Medication 150 MCG/HR: at 01:06

## 2024-03-30 RX ADMIN — SODIUM CHLORIDE, PRESERVATIVE FREE 10 ML: 5 INJECTION INTRAVENOUS at 21:48

## 2024-03-30 RX ADMIN — LORAZEPAM 4 MG: 2 INJECTION INTRAMUSCULAR; INTRAVENOUS at 18:05

## 2024-03-30 RX ADMIN — Medication: at 06:18

## 2024-03-30 RX ADMIN — CHLORHEXIDINE GLUCONATE 0.12% ORAL RINSE 15 ML: 1.2 LIQUID ORAL at 08:28

## 2024-03-30 RX ADMIN — AMIODARONE HYDROCHLORIDE 200 MG: 200 TABLET ORAL at 08:30

## 2024-03-30 RX ADMIN — Medication 6 MMOL: at 16:27

## 2024-03-30 RX ADMIN — INSULIN LISPRO 4 UNITS: 100 INJECTION, SOLUTION INTRAVENOUS; SUBCUTANEOUS at 08:34

## 2024-03-30 RX ADMIN — ATORVASTATIN CALCIUM 40 MG: 40 TABLET, FILM COATED ORAL at 21:19

## 2024-03-30 RX ADMIN — COLLAGENASE SANTYL: 250 OINTMENT TOPICAL at 08:30

## 2024-03-30 RX ADMIN — INSULIN LISPRO 4 UNITS: 100 INJECTION, SOLUTION INTRAVENOUS; SUBCUTANEOUS at 00:15

## 2024-03-30 RX ADMIN — INSULIN LISPRO 4 UNITS: 100 INJECTION, SOLUTION INTRAVENOUS; SUBCUTANEOUS at 06:27

## 2024-03-30 RX ADMIN — PIPERACILLIN AND TAZOBACTAM 3375 MG: 3; .375 INJECTION, POWDER, LYOPHILIZED, FOR SOLUTION INTRAVENOUS at 11:43

## 2024-03-30 RX ADMIN — PIPERACILLIN AND TAZOBACTAM 3375 MG: 3; .375 INJECTION, POWDER, LYOPHILIZED, FOR SOLUTION INTRAVENOUS at 23:47

## 2024-03-30 RX ADMIN — Medication 6 MMOL: at 20:22

## 2024-03-30 RX ADMIN — CHLORHEXIDINE GLUCONATE 0.12% ORAL RINSE 15 ML: 1.2 LIQUID ORAL at 21:20

## 2024-03-30 RX ADMIN — Medication: at 17:48

## 2024-03-30 RX ADMIN — PANTOPRAZOLE SODIUM 40 MG: 40 INJECTION, POWDER, FOR SOLUTION INTRAVENOUS at 08:28

## 2024-03-30 RX ADMIN — Medication 12 MCG/MIN: at 22:57

## 2024-03-30 RX ADMIN — Medication 150 MCG/HR: at 08:08

## 2024-03-30 RX ADMIN — NITROGLYCERIN 1 INCH: 20 OINTMENT TOPICAL at 00:06

## 2024-03-30 RX ADMIN — INSULIN GLARGINE 30 UNITS: 100 INJECTION, SOLUTION SUBCUTANEOUS at 08:34

## 2024-03-30 RX ADMIN — Medication 150 MCG/HR: at 15:06

## 2024-03-30 RX ADMIN — Medication 150 MCG/HR: at 22:56

## 2024-03-30 RX ADMIN — OLANZAPINE 7.5 MG: 5 TABLET, FILM COATED ORAL at 21:19

## 2024-03-30 RX ADMIN — NITROGLYCERIN 1 INCH: 20 OINTMENT TOPICAL at 11:43

## 2024-03-30 ASSESSMENT — PULMONARY FUNCTION TESTS
PIF_VALUE: 15
PIF_VALUE: 14
PIF_VALUE: 15
PIF_VALUE: 14
PIF_VALUE: 15
PIF_VALUE: 18
PIF_VALUE: 15
PIF_VALUE: 18
PIF_VALUE: 15
PIF_VALUE: 14
PIF_VALUE: 15
PIF_VALUE: 18
PIF_VALUE: 15
PIF_VALUE: 15
PIF_VALUE: 17
PIF_VALUE: 18
PIF_VALUE: 16
PIF_VALUE: 14
PIF_VALUE: 15
PIF_VALUE: 16
PIF_VALUE: 15
PIF_VALUE: 14
PIF_VALUE: 15

## 2024-03-31 ENCOUNTER — APPOINTMENT (OUTPATIENT)
Dept: GENERAL RADIOLOGY | Age: 69
DRG: 003 | End: 2024-03-31
Attending: THORACIC SURGERY (CARDIOTHORACIC VASCULAR SURGERY)
Payer: MEDICARE

## 2024-03-31 LAB
ALBUMIN SERPL BCG-MCNC: 2.2 G/DL (ref 3.5–5.1)
ALBUMIN SERPL BCG-MCNC: 2.3 G/DL (ref 3.5–5.1)
ALBUMIN SERPL BCG-MCNC: 2.4 G/DL (ref 3.5–5.1)
ALP SERPL-CCNC: 658 U/L (ref 38–126)
ALP SERPL-CCNC: 663 U/L (ref 38–126)
ALP SERPL-CCNC: 682 U/L (ref 38–126)
ALT SERPL W/O P-5'-P-CCNC: 14 U/L (ref 11–66)
ALT SERPL W/O P-5'-P-CCNC: 15 U/L (ref 11–66)
ALT SERPL W/O P-5'-P-CCNC: 16 U/L (ref 11–66)
ANION GAP SERPL CALC-SCNC: 11 MEQ/L (ref 8–16)
ANION GAP SERPL CALC-SCNC: 11 MEQ/L (ref 8–16)
ANION GAP SERPL CALC-SCNC: 7 MEQ/L (ref 8–16)
AST SERPL-CCNC: 56 U/L (ref 5–40)
AST SERPL-CCNC: 58 U/L (ref 5–40)
AST SERPL-CCNC: 63 U/L (ref 5–40)
BACTERIA SPEC RESP CULT: NORMAL
BASO STIPL BLD QL SMEAR: ABNORMAL
BASOPHILS ABSOLUTE: 0.1 THOU/MM3 (ref 0–0.1)
BASOPHILS NFR BLD AUTO: 0.3 %
BILIRUB SERPL-MCNC: 1.2 MG/DL (ref 0.3–1.2)
BILIRUB SERPL-MCNC: 1.3 MG/DL (ref 0.3–1.2)
BILIRUB SERPL-MCNC: 1.4 MG/DL (ref 0.3–1.2)
BUN SERPL-MCNC: 20 MG/DL (ref 7–22)
BUN SERPL-MCNC: 20 MG/DL (ref 7–22)
BUN SERPL-MCNC: 22 MG/DL (ref 7–22)
CA-I BLD ISE-SCNC: 1.35 MMOL/L (ref 1.12–1.32)
CA-I BLD ISE-SCNC: 1.39 MMOL/L (ref 1.12–1.32)
CA-I BLD ISE-SCNC: 1.4 MMOL/L (ref 1.12–1.32)
CALCIUM SERPL-MCNC: 9 MG/DL (ref 8.5–10.5)
CALCIUM SERPL-MCNC: 9 MG/DL (ref 8.5–10.5)
CALCIUM SERPL-MCNC: 9.1 MG/DL (ref 8.5–10.5)
CHLORIDE SERPL-SCNC: 100 MEQ/L (ref 98–111)
CHLORIDE SERPL-SCNC: 101 MEQ/L (ref 98–111)
CHLORIDE SERPL-SCNC: 103 MEQ/L (ref 98–111)
CO2 SERPL-SCNC: 23 MEQ/L (ref 23–33)
CO2 SERPL-SCNC: 23 MEQ/L (ref 23–33)
CO2 SERPL-SCNC: 25 MEQ/L (ref 23–33)
CREAT SERPL-MCNC: 0.9 MG/DL (ref 0.4–1.2)
CREAT SERPL-MCNC: 0.9 MG/DL (ref 0.4–1.2)
CREAT SERPL-MCNC: 1 MG/DL (ref 0.4–1.2)
DEPRECATED RDW RBC AUTO: 61 FL (ref 35–45)
EOSINOPHIL NFR BLD AUTO: 1.3 %
EOSINOPHILS ABSOLUTE: 0.3 THOU/MM3 (ref 0–0.4)
ERYTHROCYTE [DISTWIDTH] IN BLOOD BY AUTOMATED COUNT: 19.1 % (ref 11.5–14.5)
GFR SERPL CREATININE-BSD FRML MDRD: 80 ML/MIN/1.73M2
GFR SERPL CREATININE-BSD FRML MDRD: > 90 ML/MIN/1.73M2
GFR SERPL CREATININE-BSD FRML MDRD: > 90 ML/MIN/1.73M2
GLUCOSE BLD STRIP.AUTO-MCNC: 141 MG/DL (ref 70–108)
GLUCOSE BLD STRIP.AUTO-MCNC: 160 MG/DL (ref 70–108)
GLUCOSE BLD STRIP.AUTO-MCNC: 184 MG/DL (ref 70–108)
GLUCOSE SERPL-MCNC: 146 MG/DL (ref 70–108)
GLUCOSE SERPL-MCNC: 155 MG/DL (ref 70–108)
GLUCOSE SERPL-MCNC: 159 MG/DL (ref 70–108)
GRAM STN SPEC: NORMAL
HCT VFR BLD AUTO: 23.9 % (ref 42–52)
HCT VFR BLD AUTO: 24.4 % (ref 42–52)
HCT VFR BLD AUTO: 24.4 % (ref 42–52)
HCT VFR BLD AUTO: 25.6 % (ref 42–52)
HGB BLD-MCNC: 7.7 GM/DL (ref 14–18)
HGB BLD-MCNC: 7.9 GM/DL (ref 14–18)
HGB BLD-MCNC: 7.9 GM/DL (ref 14–18)
HGB BLD-MCNC: 8.5 GM/DL (ref 14–18)
IMM GRANULOCYTES # BLD AUTO: 3.33 THOU/MM3 (ref 0–0.07)
IMM GRANULOCYTES NFR BLD AUTO: 14.1 %
LYMPHOCYTES ABSOLUTE: 1.2 THOU/MM3 (ref 1–4.8)
LYMPHOCYTES NFR BLD AUTO: 5.3 %
MAGNESIUM SERPL-MCNC: 2.4 MG/DL (ref 1.6–2.4)
MCH RBC QN AUTO: 29.7 PG (ref 26–33)
MCHC RBC AUTO-ENTMCNC: 32.4 GM/DL (ref 32.2–35.5)
MCV RBC AUTO: 91.7 FL (ref 80–94)
MONOCYTES ABSOLUTE: 1.6 THOU/MM3 (ref 0.4–1.3)
MONOCYTES NFR BLD AUTO: 6.9 %
NEUTROPHILS NFR BLD AUTO: 72.1 %
NRBC BLD AUTO-RTO: 0 /100 WBC
PHOSPHATE SERPL-MCNC: 2.1 MG/DL (ref 2.4–4.7)
PHOSPHATE SERPL-MCNC: 2.4 MG/DL (ref 2.4–4.7)
PHOSPHATE SERPL-MCNC: 2.5 MG/DL (ref 2.4–4.7)
PLATELET # BLD AUTO: 362 THOU/MM3 (ref 130–400)
PLATELET BLD QL SMEAR: ADEQUATE
PMV BLD AUTO: 9.4 FL (ref 9.4–12.4)
POLYCHROMASIA BLD QL SMEAR: ABNORMAL
POTASSIUM SERPL-SCNC: 4.1 MEQ/L (ref 3.5–5.2)
POTASSIUM SERPL-SCNC: 4.2 MEQ/L (ref 3.5–5.2)
POTASSIUM SERPL-SCNC: 4.3 MEQ/L (ref 3.5–5.2)
PROT SERPL-MCNC: 4.9 G/DL (ref 6.1–8)
PROT SERPL-MCNC: 5 G/DL (ref 6.1–8)
PROT SERPL-MCNC: 5.1 G/DL (ref 6.1–8)
RBC # BLD AUTO: 2.66 MILL/MM3 (ref 4.7–6.1)
SCAN OF BLOOD SMEAR: NORMAL
SEGMENTED NEUTROPHILS ABSOLUTE COUNT: 16.9 THOU/MM3 (ref 1.8–7.7)
SODIUM SERPL-SCNC: 133 MEQ/L (ref 135–145)
SODIUM SERPL-SCNC: 134 MEQ/L (ref 135–145)
SODIUM SERPL-SCNC: 137 MEQ/L (ref 135–145)
STOMATOCYTES: ABNORMAL
TARGETS BLD QL SMEAR: ABNORMAL
WBC # BLD AUTO: 23.5 THOU/MM3 (ref 4.8–10.8)

## 2024-03-31 PROCEDURE — 84100 ASSAY OF PHOSPHORUS: CPT

## 2024-03-31 PROCEDURE — 6360000002 HC RX W HCPCS

## 2024-03-31 PROCEDURE — 94003 VENT MGMT INPAT SUBQ DAY: CPT

## 2024-03-31 PROCEDURE — 71045 X-RAY EXAM CHEST 1 VIEW: CPT

## 2024-03-31 PROCEDURE — 6360000002 HC RX W HCPCS: Performed by: INTERNAL MEDICINE

## 2024-03-31 PROCEDURE — 99233 SBSQ HOSP IP/OBS HIGH 50: CPT | Performed by: INTERNAL MEDICINE

## 2024-03-31 PROCEDURE — 80053 COMPREHEN METABOLIC PANEL: CPT

## 2024-03-31 PROCEDURE — 6370000000 HC RX 637 (ALT 250 FOR IP)

## 2024-03-31 PROCEDURE — 82330 ASSAY OF CALCIUM: CPT

## 2024-03-31 PROCEDURE — 85018 HEMOGLOBIN: CPT

## 2024-03-31 PROCEDURE — 85025 COMPLETE CBC W/AUTO DIFF WBC: CPT

## 2024-03-31 PROCEDURE — 6370000000 HC RX 637 (ALT 250 FOR IP): Performed by: INTERNAL MEDICINE

## 2024-03-31 PROCEDURE — C9113 INJ PANTOPRAZOLE SODIUM, VIA: HCPCS | Performed by: INTERNAL MEDICINE

## 2024-03-31 PROCEDURE — 2580000003 HC RX 258

## 2024-03-31 PROCEDURE — 94761 N-INVAS EAR/PLS OXIMETRY MLT: CPT

## 2024-03-31 PROCEDURE — 2000000000 HC ICU R&B

## 2024-03-31 PROCEDURE — 99231 SBSQ HOSP IP/OBS SF/LOW 25: CPT | Performed by: NURSE PRACTITIONER

## 2024-03-31 PROCEDURE — 2580000003 HC RX 258: Performed by: PHYSICIAN ASSISTANT

## 2024-03-31 PROCEDURE — 83735 ASSAY OF MAGNESIUM: CPT

## 2024-03-31 PROCEDURE — 6370000000 HC RX 637 (ALT 250 FOR IP): Performed by: NURSE PRACTITIONER

## 2024-03-31 PROCEDURE — 6360000002 HC RX W HCPCS: Performed by: STUDENT IN AN ORGANIZED HEALTH CARE EDUCATION/TRAINING PROGRAM

## 2024-03-31 PROCEDURE — 2500000003 HC RX 250 WO HCPCS: Performed by: STUDENT IN AN ORGANIZED HEALTH CARE EDUCATION/TRAINING PROGRAM

## 2024-03-31 PROCEDURE — 2500000003 HC RX 250 WO HCPCS: Performed by: INTERNAL MEDICINE

## 2024-03-31 PROCEDURE — 6370000000 HC RX 637 (ALT 250 FOR IP): Performed by: PHYSICIAN ASSISTANT

## 2024-03-31 PROCEDURE — 36415 COLL VENOUS BLD VENIPUNCTURE: CPT

## 2024-03-31 PROCEDURE — 82948 REAGENT STRIP/BLOOD GLUCOSE: CPT

## 2024-03-31 PROCEDURE — 6370000000 HC RX 637 (ALT 250 FOR IP): Performed by: SURGERY

## 2024-03-31 PROCEDURE — 2580000003 HC RX 258: Performed by: STUDENT IN AN ORGANIZED HEALTH CARE EDUCATION/TRAINING PROGRAM

## 2024-03-31 PROCEDURE — 2580000003 HC RX 258: Performed by: INTERNAL MEDICINE

## 2024-03-31 PROCEDURE — 85014 HEMATOCRIT: CPT

## 2024-03-31 PROCEDURE — 2700000000 HC OXYGEN THERAPY PER DAY

## 2024-03-31 RX ORDER — INSULIN LISPRO 100 [IU]/ML
0-16 INJECTION, SOLUTION INTRAVENOUS; SUBCUTANEOUS EVERY 6 HOURS
Status: DISCONTINUED | OUTPATIENT
Start: 2024-03-31 | End: 2024-04-05

## 2024-03-31 RX ADMIN — Medication: at 00:13

## 2024-03-31 RX ADMIN — NITROGLYCERIN 1 INCH: 20 OINTMENT TOPICAL at 23:45

## 2024-03-31 RX ADMIN — CHLORHEXIDINE GLUCONATE 0.12% ORAL RINSE 15 ML: 1.2 LIQUID ORAL at 08:46

## 2024-03-31 RX ADMIN — NITROGLYCERIN 1 INCH: 20 OINTMENT TOPICAL at 17:09

## 2024-03-31 RX ADMIN — PIPERACILLIN AND TAZOBACTAM 3375 MG: 3; .375 INJECTION, POWDER, LYOPHILIZED, FOR SOLUTION INTRAVENOUS at 23:31

## 2024-03-31 RX ADMIN — INSULIN GLARGINE 30 UNITS: 100 INJECTION, SOLUTION SUBCUTANEOUS at 08:30

## 2024-03-31 RX ADMIN — OLANZAPINE 7.5 MG: 5 TABLET, FILM COATED ORAL at 21:02

## 2024-03-31 RX ADMIN — Medication 150 MCG/HR: at 13:44

## 2024-03-31 RX ADMIN — PIPERACILLIN AND TAZOBACTAM 3375 MG: 3; .375 INJECTION, POWDER, LYOPHILIZED, FOR SOLUTION INTRAVENOUS at 12:19

## 2024-03-31 RX ADMIN — LORAZEPAM 4 MG: 2 INJECTION INTRAMUSCULAR; INTRAVENOUS at 01:38

## 2024-03-31 RX ADMIN — NITROGLYCERIN 1 INCH: 20 OINTMENT TOPICAL at 12:17

## 2024-03-31 RX ADMIN — AMIODARONE HYDROCHLORIDE 200 MG: 200 TABLET ORAL at 08:28

## 2024-03-31 RX ADMIN — AMIODARONE HYDROCHLORIDE 200 MG: 200 TABLET ORAL at 21:02

## 2024-03-31 RX ADMIN — Medication 1 TABLET: at 08:28

## 2024-03-31 RX ADMIN — NITROGLYCERIN 1 INCH: 20 OINTMENT TOPICAL at 01:18

## 2024-03-31 RX ADMIN — Medication: at 00:14

## 2024-03-31 RX ADMIN — CHLORHEXIDINE GLUCONATE 0.12% ORAL RINSE 15 ML: 1.2 LIQUID ORAL at 21:02

## 2024-03-31 RX ADMIN — ATORVASTATIN CALCIUM 40 MG: 40 TABLET, FILM COATED ORAL at 21:02

## 2024-03-31 RX ADMIN — SODIUM CHLORIDE, PRESERVATIVE FREE 10 ML: 5 INJECTION INTRAVENOUS at 08:00

## 2024-03-31 RX ADMIN — LORAZEPAM 4 MG: 2 INJECTION INTRAMUSCULAR; INTRAVENOUS at 08:25

## 2024-03-31 RX ADMIN — Medication: at 07:02

## 2024-03-31 RX ADMIN — PANTOPRAZOLE SODIUM 40 MG: 40 INJECTION, POWDER, FOR SOLUTION INTRAVENOUS at 08:27

## 2024-03-31 RX ADMIN — NITROGLYCERIN 1 INCH: 20 OINTMENT TOPICAL at 06:51

## 2024-03-31 RX ADMIN — Medication 6 MMOL: at 02:20

## 2024-03-31 RX ADMIN — PANTOPRAZOLE SODIUM 40 MG: 40 INJECTION, POWDER, FOR SOLUTION INTRAVENOUS at 21:02

## 2024-03-31 RX ADMIN — Medication: at 07:03

## 2024-03-31 RX ADMIN — Medication 150 MCG/HR: at 20:32

## 2024-03-31 RX ADMIN — Medication 150 MCG/HR: at 06:05

## 2024-03-31 RX ADMIN — SODIUM CHLORIDE, PRESERVATIVE FREE 10 ML: 5 INJECTION INTRAVENOUS at 21:02

## 2024-03-31 RX ADMIN — LORAZEPAM 4 MG: 2 INJECTION INTRAMUSCULAR; INTRAVENOUS at 21:02

## 2024-03-31 ASSESSMENT — PULMONARY FUNCTION TESTS
PIF_VALUE: 15
PIF_VALUE: 14
PIF_VALUE: 15
PIF_VALUE: 15
PIF_VALUE: 16
PIF_VALUE: 15
PIF_VALUE: 15
PIF_VALUE: 14
PIF_VALUE: 15
PIF_VALUE: 16
PIF_VALUE: 15
PIF_VALUE: 16
PIF_VALUE: 15
PIF_VALUE: 15
PIF_VALUE: 14
PIF_VALUE: 15
PIF_VALUE: 15
PIF_VALUE: 14
PIF_VALUE: 15

## 2024-04-01 ENCOUNTER — ANESTHESIA EVENT (OUTPATIENT)
Dept: OPERATING ROOM | Age: 69
DRG: 003 | End: 2024-04-01
Payer: MEDICARE

## 2024-04-01 ENCOUNTER — APPOINTMENT (OUTPATIENT)
Dept: GENERAL RADIOLOGY | Age: 69
DRG: 003 | End: 2024-04-01
Attending: THORACIC SURGERY (CARDIOTHORACIC VASCULAR SURGERY)
Payer: MEDICARE

## 2024-04-01 ENCOUNTER — ANESTHESIA (OUTPATIENT)
Dept: OPERATING ROOM | Age: 69
DRG: 003 | End: 2024-04-01
Payer: MEDICARE

## 2024-04-01 LAB
ALBUMIN SERPL BCG-MCNC: 2.2 G/DL (ref 3.5–5.1)
ALBUMIN SERPL BCG-MCNC: 2.3 G/DL (ref 3.5–5.1)
ALP SERPL-CCNC: 648 U/L (ref 38–126)
ALP SERPL-CCNC: 653 U/L (ref 38–126)
ALP SERPL-CCNC: 829 U/L (ref 38–126)
ALP SERPL-CCNC: 929 U/L (ref 38–126)
ALT SERPL W/O P-5'-P-CCNC: 12 U/L (ref 11–66)
ALT SERPL W/O P-5'-P-CCNC: 12 U/L (ref 11–66)
ALT SERPL W/O P-5'-P-CCNC: 13 U/L (ref 11–66)
ALT SERPL W/O P-5'-P-CCNC: 15 U/L (ref 11–66)
ANION GAP SERPL CALC-SCNC: 15 MEQ/L (ref 8–16)
ANION GAP SERPL CALC-SCNC: 15 MEQ/L (ref 8–16)
ANION GAP SERPL CALC-SCNC: 8 MEQ/L (ref 8–16)
ANION GAP SERPL CALC-SCNC: 9 MEQ/L (ref 8–16)
AST SERPL-CCNC: 45 U/L (ref 5–40)
AST SERPL-CCNC: 48 U/L (ref 5–40)
AST SERPL-CCNC: 49 U/L (ref 5–40)
AST SERPL-CCNC: 60 U/L (ref 5–40)
BACTERIA BLD AEROBE CULT: NORMAL
BACTERIA BLD AEROBE CULT: NORMAL
BASOPHILS ABSOLUTE: 0.2 THOU/MM3 (ref 0–0.1)
BASOPHILS NFR BLD AUTO: 0.8 %
BILIRUB SERPL-MCNC: 1.1 MG/DL (ref 0.3–1.2)
BILIRUB SERPL-MCNC: 1.1 MG/DL (ref 0.3–1.2)
BILIRUB SERPL-MCNC: 1.2 MG/DL (ref 0.3–1.2)
BILIRUB SERPL-MCNC: 1.9 MG/DL (ref 0.3–1.2)
BUN SERPL-MCNC: 28 MG/DL (ref 7–22)
BUN SERPL-MCNC: 31 MG/DL (ref 7–22)
BUN SERPL-MCNC: 34 MG/DL (ref 7–22)
BUN SERPL-MCNC: 38 MG/DL (ref 7–22)
CA-I BLD ISE-SCNC: 1.34 MMOL/L (ref 1.12–1.32)
CA-I BLD ISE-SCNC: 1.39 MMOL/L (ref 1.12–1.32)
CA-I BLD ISE-SCNC: 1.44 MMOL/L (ref 1.12–1.32)
CA-I BLD ISE-SCNC: 1.49 MMOL/L (ref 1.12–1.32)
CALCIUM SERPL-MCNC: 9.4 MG/DL (ref 8.5–10.5)
CALCIUM SERPL-MCNC: 9.5 MG/DL (ref 8.5–10.5)
CHLORIDE SERPL-SCNC: 100 MEQ/L (ref 98–111)
CHLORIDE SERPL-SCNC: 102 MEQ/L (ref 98–111)
CHLORIDE SERPL-SCNC: 103 MEQ/L (ref 98–111)
CHLORIDE SERPL-SCNC: 103 MEQ/L (ref 98–111)
CO2 SERPL-SCNC: 19 MEQ/L (ref 23–33)
CO2 SERPL-SCNC: 19 MEQ/L (ref 23–33)
CO2 SERPL-SCNC: 24 MEQ/L (ref 23–33)
CO2 SERPL-SCNC: 24 MEQ/L (ref 23–33)
CREAT SERPL-MCNC: 1.4 MG/DL (ref 0.4–1.2)
CREAT SERPL-MCNC: 1.5 MG/DL (ref 0.4–1.2)
CREAT SERPL-MCNC: 1.7 MG/DL (ref 0.4–1.2)
CREAT SERPL-MCNC: 2 MG/DL (ref 0.4–1.2)
DEPRECATED RDW RBC AUTO: 62 FL (ref 35–45)
EOSINOPHIL NFR BLD AUTO: 1.9 %
EOSINOPHILS ABSOLUTE: 0.5 THOU/MM3 (ref 0–0.4)
ERYTHROCYTE [DISTWIDTH] IN BLOOD BY AUTOMATED COUNT: 19.7 % (ref 11.5–14.5)
GFR SERPL CREATININE-BSD FRML MDRD: 35 ML/MIN/1.73M2
GFR SERPL CREATININE-BSD FRML MDRD: 43 ML/MIN/1.73M2
GFR SERPL CREATININE-BSD FRML MDRD: 49 ML/MIN/1.73M2
GFR SERPL CREATININE-BSD FRML MDRD: 54 ML/MIN/1.73M2
GLUCOSE BLD STRIP.AUTO-MCNC: 126 MG/DL (ref 70–108)
GLUCOSE BLD STRIP.AUTO-MCNC: 128 MG/DL (ref 70–108)
GLUCOSE BLD STRIP.AUTO-MCNC: 155 MG/DL (ref 70–108)
GLUCOSE BLD STRIP.AUTO-MCNC: 160 MG/DL (ref 70–108)
GLUCOSE BLD STRIP.AUTO-MCNC: 179 MG/DL (ref 70–108)
GLUCOSE BLD STRIP.AUTO-MCNC: 183 MG/DL (ref 70–108)
GLUCOSE SERPL-MCNC: 124 MG/DL (ref 70–108)
GLUCOSE SERPL-MCNC: 142 MG/DL (ref 70–108)
GLUCOSE SERPL-MCNC: 155 MG/DL (ref 70–108)
GLUCOSE SERPL-MCNC: 176 MG/DL (ref 70–108)
HCT VFR BLD AUTO: 24.5 % (ref 42–52)
HGB BLD-MCNC: 7.8 GM/DL (ref 14–18)
IMM GRANULOCYTES # BLD AUTO: 2.92 THOU/MM3 (ref 0–0.07)
IMM GRANULOCYTES NFR BLD AUTO: 12.1 %
LYMPHOCYTES ABSOLUTE: 1.2 THOU/MM3 (ref 1–4.8)
LYMPHOCYTES NFR BLD AUTO: 5 %
MAGNESIUM SERPL-MCNC: 2.5 MG/DL (ref 1.6–2.4)
MAGNESIUM SERPL-MCNC: 2.6 MG/DL (ref 1.6–2.4)
MAGNESIUM SERPL-MCNC: 2.6 MG/DL (ref 1.6–2.4)
MAGNESIUM SERPL-MCNC: 2.8 MG/DL (ref 1.6–2.4)
MCH RBC QN AUTO: 29.8 PG (ref 26–33)
MCHC RBC AUTO-ENTMCNC: 31.8 GM/DL (ref 32.2–35.5)
MCV RBC AUTO: 93.5 FL (ref 80–94)
MONOCYTES ABSOLUTE: 1.2 THOU/MM3 (ref 0.4–1.3)
MONOCYTES NFR BLD AUTO: 5.1 %
NEUTROPHILS NFR BLD AUTO: 75.1 %
NRBC BLD AUTO-RTO: 0 /100 WBC
PHOSPHATE SERPL-MCNC: 2.6 MG/DL (ref 2.4–4.7)
PHOSPHATE SERPL-MCNC: 3 MG/DL (ref 2.4–4.7)
PHOSPHATE SERPL-MCNC: 3.4 MG/DL (ref 2.4–4.7)
PHOSPHATE SERPL-MCNC: 4.1 MG/DL (ref 2.4–4.7)
PLATELET # BLD AUTO: 313 THOU/MM3 (ref 130–400)
PLATELET BLD QL SMEAR: ADEQUATE
PMV BLD AUTO: 9.4 FL (ref 9.4–12.4)
POIKILOCYTES: ABNORMAL
POLYCHROMASIA BLD QL SMEAR: ABNORMAL
POTASSIUM SERPL-SCNC: 4.4 MEQ/L (ref 3.5–5.2)
POTASSIUM SERPL-SCNC: 4.6 MEQ/L (ref 3.5–5.2)
POTASSIUM SERPL-SCNC: 4.7 MEQ/L (ref 3.5–5.2)
POTASSIUM SERPL-SCNC: 5.1 MEQ/L (ref 3.5–5.2)
PROT SERPL-MCNC: 4.8 G/DL (ref 6.1–8)
PROT SERPL-MCNC: 4.9 G/DL (ref 6.1–8)
RBC # BLD AUTO: 2.62 MILL/MM3 (ref 4.7–6.1)
SCAN OF BLOOD SMEAR: NORMAL
SEGMENTED NEUTROPHILS ABSOLUTE COUNT: 18.2 THOU/MM3 (ref 1.8–7.7)
SODIUM SERPL-SCNC: 134 MEQ/L (ref 135–145)
SODIUM SERPL-SCNC: 135 MEQ/L (ref 135–145)
SODIUM SERPL-SCNC: 136 MEQ/L (ref 135–145)
SODIUM SERPL-SCNC: 136 MEQ/L (ref 135–145)
STOMATOCYTES: ABNORMAL
TOXIC GRANULES BLD QL SMEAR: PRESENT
WBC # BLD AUTO: 24.2 THOU/MM3 (ref 4.8–10.8)

## 2024-04-01 PROCEDURE — 6370000000 HC RX 637 (ALT 250 FOR IP): Performed by: NURSE PRACTITIONER

## 2024-04-01 PROCEDURE — 6370000000 HC RX 637 (ALT 250 FOR IP): Performed by: PHYSICIAN ASSISTANT

## 2024-04-01 PROCEDURE — APPSS30 APP SPLIT SHARED TIME 16-30 MINUTES: Performed by: PHYSICIAN ASSISTANT

## 2024-04-01 PROCEDURE — 2500000003 HC RX 250 WO HCPCS: Performed by: NURSE ANESTHETIST, CERTIFIED REGISTERED

## 2024-04-01 PROCEDURE — 3600000002 HC SURGERY LEVEL 2 BASE: Performed by: SURGERY

## 2024-04-01 PROCEDURE — 2500000003 HC RX 250 WO HCPCS: Performed by: PHYSICIAN ASSISTANT

## 2024-04-01 PROCEDURE — 2700000000 HC OXYGEN THERAPY PER DAY

## 2024-04-01 PROCEDURE — 6360000002 HC RX W HCPCS

## 2024-04-01 PROCEDURE — 6360000002 HC RX W HCPCS: Performed by: NURSE PRACTITIONER

## 2024-04-01 PROCEDURE — 93005 ELECTROCARDIOGRAM TRACING: CPT

## 2024-04-01 PROCEDURE — 83735 ASSAY OF MAGNESIUM: CPT

## 2024-04-01 PROCEDURE — 36415 COLL VENOUS BLD VENIPUNCTURE: CPT

## 2024-04-01 PROCEDURE — 94761 N-INVAS EAR/PLS OXIMETRY MLT: CPT

## 2024-04-01 PROCEDURE — 84100 ASSAY OF PHOSPHORUS: CPT

## 2024-04-01 PROCEDURE — 2580000003 HC RX 258

## 2024-04-01 PROCEDURE — 82948 REAGENT STRIP/BLOOD GLUCOSE: CPT

## 2024-04-01 PROCEDURE — 3700000001 HC ADD 15 MINUTES (ANESTHESIA): Performed by: SURGERY

## 2024-04-01 PROCEDURE — 6360000002 HC RX W HCPCS: Performed by: INTERNAL MEDICINE

## 2024-04-01 PROCEDURE — 2580000003 HC RX 258: Performed by: NURSE PRACTITIONER

## 2024-04-01 PROCEDURE — 94003 VENT MGMT INPAT SUBQ DAY: CPT

## 2024-04-01 PROCEDURE — 6370000000 HC RX 637 (ALT 250 FOR IP)

## 2024-04-01 PROCEDURE — 2580000003 HC RX 258: Performed by: NURSE ANESTHETIST, CERTIFIED REGISTERED

## 2024-04-01 PROCEDURE — 6370000000 HC RX 637 (ALT 250 FOR IP): Performed by: SURGERY

## 2024-04-01 PROCEDURE — 71045 X-RAY EXAM CHEST 1 VIEW: CPT

## 2024-04-01 PROCEDURE — 99232 SBSQ HOSP IP/OBS MODERATE 35: CPT | Performed by: INTERNAL MEDICINE

## 2024-04-01 PROCEDURE — 3700000000 HC ANESTHESIA ATTENDED CARE: Performed by: SURGERY

## 2024-04-01 PROCEDURE — 2580000003 HC RX 258: Performed by: PHYSICIAN ASSISTANT

## 2024-04-01 PROCEDURE — 2500000003 HC RX 250 WO HCPCS: Performed by: STUDENT IN AN ORGANIZED HEALTH CARE EDUCATION/TRAINING PROGRAM

## 2024-04-01 PROCEDURE — 82330 ASSAY OF CALCIUM: CPT

## 2024-04-01 PROCEDURE — 0DHA0UZ INSERTION OF FEEDING DEVICE INTO JEJUNUM, OPEN APPROACH: ICD-10-PCS | Performed by: SURGERY

## 2024-04-01 PROCEDURE — 3600000012 HC SURGERY LEVEL 2 ADDTL 15MIN: Performed by: SURGERY

## 2024-04-01 PROCEDURE — 85025 COMPLETE CBC W/AUTO DIFF WBC: CPT

## 2024-04-01 PROCEDURE — 44300 OPEN BOWEL TO SKIN: CPT | Performed by: SURGERY

## 2024-04-01 PROCEDURE — 6370000000 HC RX 637 (ALT 250 FOR IP): Performed by: INTERNAL MEDICINE

## 2024-04-01 PROCEDURE — 2000000000 HC ICU R&B

## 2024-04-01 PROCEDURE — 99291 CRITICAL CARE FIRST HOUR: CPT | Performed by: INTERNAL MEDICINE

## 2024-04-01 PROCEDURE — 80053 COMPREHEN METABOLIC PANEL: CPT

## 2024-04-01 PROCEDURE — C9113 INJ PANTOPRAZOLE SODIUM, VIA: HCPCS | Performed by: INTERNAL MEDICINE

## 2024-04-01 PROCEDURE — 6360000002 HC RX W HCPCS: Performed by: NURSE ANESTHETIST, CERTIFIED REGISTERED

## 2024-04-01 PROCEDURE — 2709999900 HC NON-CHARGEABLE SUPPLY: Performed by: SURGERY

## 2024-04-01 RX ORDER — SODIUM CHLORIDE 9 MG/ML
INJECTION, SOLUTION INTRAVENOUS CONTINUOUS PRN
Status: DISCONTINUED | OUTPATIENT
Start: 2024-04-01 | End: 2024-04-01 | Stop reason: SDUPTHER

## 2024-04-01 RX ORDER — ROCURONIUM BROMIDE 10 MG/ML
INJECTION, SOLUTION INTRAVENOUS PRN
Status: DISCONTINUED | OUTPATIENT
Start: 2024-04-01 | End: 2024-04-01 | Stop reason: SDUPTHER

## 2024-04-01 RX ADMIN — NITROGLYCERIN 1 INCH: 20 OINTMENT TOPICAL at 17:17

## 2024-04-01 RX ADMIN — Medication 150 MCG/HR: at 03:15

## 2024-04-01 RX ADMIN — CEFOXITIN 2000 MG: 2 INJECTION, POWDER, FOR SOLUTION INTRAVENOUS at 10:18

## 2024-04-01 RX ADMIN — NITROGLYCERIN 1 INCH: 20 OINTMENT TOPICAL at 06:08

## 2024-04-01 RX ADMIN — PHENYLEPHRINE HYDROCHLORIDE 200 MCG: 10 INJECTION INTRAVENOUS at 10:41

## 2024-04-01 RX ADMIN — AMIODARONE HYDROCHLORIDE 0.5 MG/MIN: 1.8 INJECTION, SOLUTION INTRAVENOUS at 14:30

## 2024-04-01 RX ADMIN — SODIUM CHLORIDE, PRESERVATIVE FREE 10 ML: 5 INJECTION INTRAVENOUS at 08:46

## 2024-04-01 RX ADMIN — ATORVASTATIN CALCIUM 40 MG: 40 TABLET, FILM COATED ORAL at 21:28

## 2024-04-01 RX ADMIN — OXYCODONE HYDROCHLORIDE 5 MG: 5 SOLUTION ORAL at 14:40

## 2024-04-01 RX ADMIN — SODIUM CHLORIDE, PRESERVATIVE FREE 10 ML: 5 INJECTION INTRAVENOUS at 21:28

## 2024-04-01 RX ADMIN — OLANZAPINE 7.5 MG: 5 TABLET, FILM COATED ORAL at 21:28

## 2024-04-01 RX ADMIN — AMIODARONE HYDROCHLORIDE 1 MG/MIN: 1.8 INJECTION, SOLUTION INTRAVENOUS at 09:01

## 2024-04-01 RX ADMIN — LORAZEPAM 4 MG: 2 INJECTION INTRAMUSCULAR; INTRAVENOUS at 21:44

## 2024-04-01 RX ADMIN — PHENYLEPHRINE HYDROCHLORIDE 200 MCG: 10 INJECTION INTRAVENOUS at 10:21

## 2024-04-01 RX ADMIN — AMIODARONE HYDROCHLORIDE 150 MG: 1.5 INJECTION, SOLUTION INTRAVENOUS at 08:45

## 2024-04-01 RX ADMIN — AMIODARONE HYDROCHLORIDE 200 MG: 200 TABLET ORAL at 08:28

## 2024-04-01 RX ADMIN — CHLORHEXIDINE GLUCONATE 0.12% ORAL RINSE 15 ML: 1.2 LIQUID ORAL at 08:32

## 2024-04-01 RX ADMIN — PIPERACILLIN AND TAZOBACTAM 3375 MG: 3; .375 INJECTION, POWDER, LYOPHILIZED, FOR SOLUTION INTRAVENOUS at 14:38

## 2024-04-01 RX ADMIN — SODIUM CHLORIDE: 9 INJECTION, SOLUTION INTRAVENOUS at 10:25

## 2024-04-01 RX ADMIN — ROCURONIUM BROMIDE 50 MG: 10 INJECTION INTRAVENOUS at 10:13

## 2024-04-01 RX ADMIN — Medication 8 MCG/MIN: at 04:45

## 2024-04-01 RX ADMIN — Medication 150 MCG/HR: at 11:37

## 2024-04-01 RX ADMIN — PHENYLEPHRINE HYDROCHLORIDE 100 MCG: 10 INJECTION INTRAVENOUS at 10:36

## 2024-04-01 RX ADMIN — Medication 1 TABLET: at 08:28

## 2024-04-01 RX ADMIN — CHLORHEXIDINE GLUCONATE 0.12% ORAL RINSE 15 ML: 1.2 LIQUID ORAL at 21:29

## 2024-04-01 RX ADMIN — LORAZEPAM 4 MG: 2 INJECTION INTRAMUSCULAR; INTRAVENOUS at 01:58

## 2024-04-01 RX ADMIN — PHENYLEPHRINE HYDROCHLORIDE 200 MCG: 10 INJECTION INTRAVENOUS at 10:38

## 2024-04-01 RX ADMIN — NITROGLYCERIN 1 INCH: 20 OINTMENT TOPICAL at 12:07

## 2024-04-01 RX ADMIN — PANTOPRAZOLE SODIUM 40 MG: 40 INJECTION, POWDER, FOR SOLUTION INTRAVENOUS at 21:28

## 2024-04-01 RX ADMIN — LORAZEPAM 4 MG: 2 INJECTION INTRAMUSCULAR; INTRAVENOUS at 17:02

## 2024-04-01 RX ADMIN — SENNOSIDES 8.8 MG: 8.8 LIQUID ORAL at 21:28

## 2024-04-01 RX ADMIN — COLLAGENASE SANTYL: 250 OINTMENT TOPICAL at 08:47

## 2024-04-01 RX ADMIN — AMIODARONE HYDROCHLORIDE 200 MG: 200 TABLET ORAL at 21:28

## 2024-04-01 RX ADMIN — PANTOPRAZOLE SODIUM 40 MG: 40 INJECTION, POWDER, FOR SOLUTION INTRAVENOUS at 08:40

## 2024-04-01 RX ADMIN — Medication 150 MCG/HR: at 18:30

## 2024-04-01 ASSESSMENT — PULMONARY FUNCTION TESTS
PIF_VALUE: 15
PIF_VALUE: 16
PIF_VALUE: 14
PIF_VALUE: 15
PIF_VALUE: 16
PIF_VALUE: 15
PIF_VALUE: 18
PIF_VALUE: 15
PIF_VALUE: 15
PIF_VALUE: 16

## 2024-04-01 ASSESSMENT — PAIN SCALES - GENERAL
PAINLEVEL_OUTOF10: 0
PAINLEVEL_OUTOF10: 10

## 2024-04-01 ASSESSMENT — PAIN DESCRIPTION - LOCATION: LOCATION: ABDOMEN

## 2024-04-01 NOTE — OP NOTE
[address] [address]                            OPERATIVE REPORT      PATIENT NAME: MELISSA GUTIERREZ            : 1955  MED REC NO: [id number]                       ROOM: [facility]  ACCOUNT NO: [id number]                       ADMIT DATE: 2024  PROVIDER: Michael Harrison DO      DATE OF PROCEDURE:  2024    SURGEON:  Michael Harrison DO    PREOPERATIVE DIAGNOSES:  Respiratory failure, dysphagia, aspiration.    POSTOPERATIVE DIAGNOSES:  Respiratory failure, dysphagia, aspiration with apparent chyloperitoneum.    PROCEDURE PERFORMED:  Open jejunal feeding tube insertion.    ANESTHESIA:  General.    ESTIMATED BLOOD LOSS:  10 mL.    SPECIMENS:  None.    COMPLICATIONS:  None immediately appreciated.    DISCUSSION:  The patient is a 69-year-old male who had undergone multiple cardiovascular procedures with respiratory failure, aspiration, vent dependency, and subsequently had a tracheostomy placed.  We were consulted for feeding tube placement.  After history and physical examination performed, potential diagnostic and therapeutic modalities were discussed.  Informed consent was obtained. The patient was brought to the operating room on 2024 for procedure.    OPERATIVE FINDINGS:  At the time of exploration, the patient had had previous esophagectomy with his stomach located in an intrathoracic position.  He had 350 mL of milky white fluid, which had no scent to it, did not appear to be of bacterial origin, and was not the consistency of the tube feedings which he had been receiving, and furthermore, was not consistent with the fluid color-texture that was in his small bowel.    DESCRIPTION OF PROCEDURE:  The patient was brought to the operating room and placed in supine position, placed on continuous cardiac telemetry, blood pressure, and pulse oximeter monitoring, and placed under general anesthesia by the Anesthesia 
   Brief Op Note  Patient name: Mumtaz Islas  Medical Record Number: [id number]  Primary Care Physician: Tom Villanueva DO    Date of Procedure: 4/1/2024  Pre-operative Diagnosis: Respiratory failure, dysphagia, aspiration  Post-operative Diagnosis: Same, with apparent chyloperitoneum  Procedure: open jejunal feeding tube insertion  Anesthesia: General  Surgeons/Assistants: Dr. Michael Harrison  Estimated Blood Loss: 10 ml  Complications: none immediately appreciated      Electronically signed by Michael Harrison DO on 4/1/2024 at 11:42 AM    
Operative Note      Patient: Mumtaz Islas  YOB: 1955  MRN: [id number]    Date of Procedure: 3/6/2024    Pre-Op Diagnosis Codes:     * Coronary artery disease, unspecified vessel or lesion type, unspecified whether angina present, unspecified whether native or transplanted heart [I25.10]     * Paroxysmal atrial fibrillation (HCC) [I48.0]  Moderately severe mitral regurgitation    Post-Op Diagnosis: Same       Procedure(s):  Cabg, Mitral Valve Repair, Maze  Coronary artery bypass grafting x 3 using LIMA to LAD, vein graft to obtuse marginal, vein graft to posterior lateral branch of the right; endoscopic vein harvesting; transesophageal echocardiography; mitral valve repair with a #28 Future band ring annuloplasty, closure of left atrial appendage, left-sided Maze procedure    Surgeon(s):  Manish Bess MD    Assistant:   Physician Assistant: Sunshine Garcia physician assistant was necessary due to the complexity of this procedure    Anesthesia: General    Estimated Blood Loss (mL): Minimal    Complications: None    Specimens:   * No specimens in log *    Implants:  Implant Name Type Inv. Item Serial No.  Lot No. LRB No. Used Action   BAND ANNULPLSTY [id number] 17.48X18.73X28.0X32.9MM ORIFICE - [id number] Annuloplasty rings BAND ANNULPLSTY [id number] 17.48X18.73X28.0X32.9MM ORIFICE [id number] [employer]-WD  N/A 1 Implanted   PLATE BONE L 4 H BX FOR STERNALOCK CHIP SYS - [id number]  PLATE BONE L 4 H BX FOR STERNALOCK CHIP SYS  GEO BIOMET MICROFIXATION-WD  N/A 1 Implanted   PLATE BNE 8 H X SHP STERNALOCK CHIP MARIBETH [employer] SYS - [id number]  PLATE BNE 8 H X SHP STERNALOCK CHIP MARIBETH [employer]SR SYS  GEO BIOMET MICROFIXATION-WD  N/A 1 Implanted   SCREW BNE L12MM DIA2.4MM G CANC TI SELF DRL JACKY FULL THRD - [id number]  SCREW BNE L12MM DIA2.4MM G CANC TI SELF DRL AJCKY FULL THRD  GEO BIOMET MICROFIXATION-WD  N/A 8 Implanted   SCREW BNE L16MM DIA2.4MM G CANC TI SELF DRL JACKY FULL THRD - [id number]  SCREW BNE 
Operative Note      Patient: Mumtaz Islas  YOB: 1955  MRN: [id number]    Date of Procedure: 3/8/2024    Pre-Op Diagnosis Codes:     * History of extracorporeal membrane oxygenation [Z92.81] status post MVR CABG maze with cardiogenic shock requiring LAD VA ECMO; now with increased drainage from chest tubes and bleeding along with chugging from ECMO circuit    Post-Op Diagnosis: Same       Procedure(s):  BRING BACK OPEN HEART - ECMO; median sternotomy  Reposition of the venous cannula in the right atrium and management of ECMO; additional sutures placed to reinforce all cannulation sites including right atrium aorta and right superior pulmonary vein; additional bleeding sites found in the periosteum and diaphragmatic surface of the pericardium    Surgeon(s):  Manish Bess MD    Assistant:   Physician Assistant: Denver Lang PA-C physician assistant was necessary due to the complexity of the operation    Anesthesia: General    Estimated Blood Loss (mL): 1000    Complications: None    Specimens:   * No specimens in log *    Implants:  * No implants in log *      Drains:   Chest Tube Left;Right Pleural 1 (Active)   Chest Tube Airleak No 03/07/24 2000   Status Continuous Suction 03/07/24 2000   Suction -20 cm H2O 03/07/24 2000   Y Connector Used Yes 03/07/24 2000   Drainage Description Bright red 03/07/24 2000   Dressing Status Clean, dry & intact 03/07/24 2000   Chest Tube Dressing Split Gauze 03/07/24 2000   Site Assessment Not assessed 03/07/24 2000   Surrounding Skin Unable to view 03/07/24 2000   Output (ml) 0 ml 03/08/24 0115       Chest Tube Mediastinal 3 (Active)   Chest Tube Airleak Yes 03/07/24 2000   Status Continuous Suction 03/07/24 2000   Suction -20 cm H2O 03/07/24 2000   Y Connector Used Yes 03/07/24 2000   Drainage Description Bright red 03/07/24 2000   Dressing Status Clean, dry & intact 03/07/24 2000   Chest Tube Dressing Split Gauze 03/07/24 2000   Site Assessment Not assessed 
Operative Note      Patient: Mumtaz Islas  YOB: 1955  MRN: [id number]    Date of Procedure: 3/19/2024    Pre-Op Diagnosis Codes:     * CAD in native artery [I25.10] patient on ECMO support after mitral valve repair and CABG    Post-Op Diagnosis: Same       Procedure(s):  Ecmo Removal    Surgeon(s):  Manish Bess MD    Assistant:   Physician Assistant: Ernst Lang PA-C physician assistant was necessary due to the complexity of this procedure  Anesthesia: General    Estimated Blood Loss (mL): 400    Complications: None    Specimens:   * No specimens in log *    Implants:  * No implants in log *      Drains:   Chest Tube Left;Right Pleural 1 (Active)   Chest Tube Airleak No 03/19/24 1124   Status Continuous Suction 03/19/24 0800   Suction -20 cm H2O 03/19/24 0800   Y Connector Used Yes 03/19/24 1124   Drainage Description Dark red 03/19/24 1124   Dressing Status Clean, dry & intact 03/19/24 1124   Chest Tube Dressing Dry 03/19/24 0800   Site Assessment Not assessed 03/19/24 1124   Surrounding Skin Unable to view 03/19/24 0800   Output (ml) 20 ml 03/19/24 0900       Chest Tube Mediastinal 3 (Active)   Chest Tube Airleak No 03/19/24 1124   Status Continuous Suction 03/19/24 0800   Suction -20 cm H2O 03/19/24 0800   Y Connector Used Yes 03/19/24 1124   Drainage Description Serosanguinous 03/19/24 1124   Dressing Status Clean, dry & intact 03/19/24 1124   Chest Tube Dressing Dry 03/19/24 0800   Site Assessment Not assessed 03/19/24 1124   Surrounding Skin Unable to view 03/19/24 1124   Output (ml) 10 ml 03/19/24 0800       Chest Tube Fourth intercostal space;Mediastinal;Pleural 3 (Active)       NG/OG/NJ/NE Tube Nasogastric 16 fr Right nostril (Active)   Surrounding Skin Clean, dry & intact 03/19/24 1124   Securement device Adhesive based sage 03/19/24 1124   Status Continuous feeding 03/19/24 1124   Placement Verified External Catheter Length;X-Ray (repeat);Other (comment) 03/19/24 1124 
Operative Note      Patient: Mumtaz Islas  YOB: 1955  MRN: [id number]    Date of Procedure: 3/7/2024    Pre-Op Diagnosis Codes:     * CAD in native artery [I25.10] cardiogenic shock status post MVR CABG maze postop day #1    Post-Op Diagnosis: Same       Procedure(s):  ECMO central cannulation with left atrial VA ECMO via median sternotomy    Surgeon(s):  Manish Bess MD    Assistant:   Physician Assistant: Ernst Lang PA-C physician assistant was necessary due to the complexity of this procedure    Anesthesia: General    Estimated Blood Loss (mL): Minimal    Complications: None    Specimens:   * No specimens in log *    Implants:  * No implants in log *      Drains:   Chest Tube Left;Right Pleural 1 (Active)   Chest Tube Airleak No 03/07/24 1612   Status Continuous Suction 03/07/24 0115   Suction -20 cm H2O 03/07/24 0115   Y Connector Used Yes 03/07/24 1612   Drainage Description Bright red 03/07/24 1612   Dressing Status Clean, dry & intact 03/07/24 1612   Chest Tube Dressing Split Gauze 03/07/24 0115   Site Assessment Not assessed 03/07/24 1612   Surrounding Skin Unable to view 03/07/24 1612   Output (ml) 20 ml 03/07/24 1100       Chest Tube Mediastinal 3 (Active)   Chest Tube Airleak Yes 03/07/24 1612   Status Continuous Suction 03/07/24 0115   Suction -20 cm H2O 03/07/24 0115   Y Connector Used Yes 03/07/24 1612   Drainage Description Bright red 03/07/24 1612   Dressing Status Clean, dry & intact 03/07/24 1612   Chest Tube Dressing Split Gauze 03/07/24 0115   Site Assessment Not assessed 03/07/24 1612   Surrounding Skin Unable to view 03/07/24 1612   Output (ml) 10 ml 03/07/24 1100       NG/OG/NJ/NE Tube Nasogastric 18 fr Right mouth (Active)   Surrounding Skin Clean, dry & intact 03/07/24 1612   Securement device Bridle 03/07/24 1612   Status Suction-low intermittent 03/07/24 1612   Placement Verified External Catheter Length 03/07/24 1612   NG/OG/NJ/NE External Measurement (cm) 60 cm 
in the pericardium.  It appeared to be an isolated left ventricular tamponade.  There was bleeding from the diaphragmatic surface of the right ventricle.  This appeared to be an abrasion type of lesion.  Multiple pledgeted 4-0 Prolene sutures were placed control this area of bleeding.  Upon active evacuating the pericardium there was improvement in ventricular function.  I suspect that manipulation of the heart put some tension on the left internal mammary artery and cause bleeding from the anastomosis.  I was able to place 7-0 Prolene sutures at the heel of the LIMA to LAD anastomosis to control this bleeding.  Coagulopathy was present and the patient was transfused FFP, platelets and cryoprecipitate.  Factor VII was administered.  Spent a fair amount of time in the operating room drying up.  Topical hemostatic agents were used including Surgiflo and Nu-Knit.  Felt comfortable with the hemostasis and improved hemodynamic and to close the chest.  Sternum was reapproximated with wires.  The remainder of the wound closed in layers.  Patient taken back to the ICU in stable condition.    Electronically signed by Manish Bess MD on 3/7/2024 at 9:00 AM

## 2024-04-01 NOTE — ANESTHESIA POSTPROCEDURE EVALUATION
Department of Anesthesiology  Postprocedure Note    Patient: Mumtaz Islas  MRN: [id number]  YOB: 1955  Date of evaluation: 4/1/2024    Procedure Summary       Date: 04/01/24 Room / Location: [facility] OR 01 / [facility] OR    Anesthesia Start: 1003 Anesthesia Stop: 1119    Procedure: OPEN JEJUNOSTOMY TUBE INSERTION Diagnosis:       PAD (peripheral artery disease) (HCC)      (PAD (peripheral artery disease) (HCC) [I73.9])    Surgeons: Michael Harrison DO Responsible Provider: Clifton Cadet DO    Anesthesia Type: general ASA Status: 4            Anesthesia Type: No value filed.    Rosaline Phase I: Rosaline Score: 10    Rosaline Phase II:      Anesthesia Post Evaluation    Patient location during evaluation: PACU  Patient participation: complete - patient participated  Level of consciousness: awake and alert  Pain score: 3  Airway patency: patent  Nausea & Vomiting: no nausea and no vomiting  Cardiovascular status: hemodynamically stable and blood pressure returned to baseline  Respiratory status: spontaneous ventilation, room air and acceptable  Hydration status: stable  Pain management: adequate and satisfactory to patient    No notable events documented.

## 2024-04-01 NOTE — ANESTHESIA PRE PROCEDURE
3,375 mg IntraVENous Q12H Enrique Castro DO   Stopped at 04/01/24 0333    fentaNYL (SUBLIMAZE) 1,000 mcg in sodium chloride 0.9% 100 mL infusion   mcg/hr IntraVENous Continuous Daniel Allen MD 15 mL/hr at 04/01/24 1003 150 mcg/hr at 04/01/24 1003    amiodarone (CORDARONE) tablet 200 mg  200 mg Oral BID Rebecca Cates MD   200 mg at 04/01/24 0828    oxyCODONE (ROXICODONE) 5 MG/5ML solution 5 mg  5 mg Oral Q6H PRN Rebecca Cates MD   5 mg at 03/24/24 1959    fentaNYL (SUBLIMAZE) injection 50 mcg  50 mcg IntraVENous Q6H PRN Rebecca Cates MD   50 mcg at 03/27/24 1225    norepinephrine (LEVOPHED) 16 mg in sodium chloride 0.9 % 250 mL infusion  1-100 mcg/min IntraVENous Continuous Daniel Allen MD 9.375 mL/hr at 04/01/24 1040 10 mcg/min at 04/01/24 1040    prismaSATE BGK 4/2.5 dialysis solution   Dialysis Continuous Ravin Marcus  mL/hr at 03/31/24 0703 New Bag at 03/31/24 0703    insulin glargine (LANTUS) injection vial 30 Units  30 Units SubCUTAneous Daily Fco Olivarez MD   30 Units at 03/31/24 0830    OLANZapine (ZYPREXA) tablet 7.5 mg  7.5 mg Oral Nightly Barby Berkowitz APRN - CNP   7.5 mg at 03/31/24 2102    ondansetron (ZOFRAN-ODT) disintegrating tablet 4 mg  4 mg Oral Q8H PRN Ernst Lang PA-C        Or    ondansetron (ZOFRAN) injection 4 mg  4 mg IntraVENous Q6H PRN Ernst Lang PA-C        glucose chewable tablet 16 g  4 tablet Oral PRN Ernst Lang PA-C        dextrose bolus 10% 125 mL  125 mL IntraVENous PRN Ernst Lang PA-C        Or    dextrose bolus 10% 250 mL  250 mL IntraVENous PRN Ernst Lang PA-C        glucagon injection 1 mg  1 mg SubCUTAneous PRN Ernst Lang PA-C        dextrose 10 % infusion   IntraVENous Continuous PRN Ernst Lang PA-C        nitroglycerin (NITRO-BID) 2 % ointment 1 inch  1 inch Topical Q6H Fco Olivarez MD   1 inch at 04/01/24 0608    prismaSol BGK 4/2.5 dialysis solution   Dialysis Continuous Ravin Marcus,

## 2024-04-02 LAB
ALBUMIN SERPL BCG-MCNC: 2.1 G/DL (ref 3.5–5.1)
ALBUMIN SERPL BCG-MCNC: 2.1 G/DL (ref 3.5–5.1)
ALBUMIN SERPL BCG-MCNC: 2.2 G/DL (ref 3.5–5.1)
ALBUMIN SERPL BCG-MCNC: 2.3 G/DL (ref 3.5–5.1)
ALP SERPL-CCNC: 725 U/L (ref 38–126)
ALP SERPL-CCNC: 758 U/L (ref 38–126)
ALP SERPL-CCNC: 786 U/L (ref 38–126)
ALP SERPL-CCNC: 839 U/L (ref 38–126)
ALT SERPL W/O P-5'-P-CCNC: 12 U/L (ref 11–66)
ALT SERPL W/O P-5'-P-CCNC: 13 U/L (ref 11–66)
ALT SERPL W/O P-5'-P-CCNC: 15 U/L (ref 11–66)
ALT SERPL W/O P-5'-P-CCNC: 17 U/L (ref 11–66)
ANION GAP SERPL CALC-SCNC: 14 MEQ/L (ref 8–16)
ANION GAP SERPL CALC-SCNC: 14 MEQ/L (ref 8–16)
ANION GAP SERPL CALC-SCNC: 15 MEQ/L (ref 8–16)
ANION GAP SERPL CALC-SCNC: 15 MEQ/L (ref 8–16)
ANISOCYTOSIS BLD QL SMEAR: PRESENT
AST SERPL-CCNC: 34 U/L (ref 5–40)
AST SERPL-CCNC: 37 U/L (ref 5–40)
AST SERPL-CCNC: 39 U/L (ref 5–40)
AST SERPL-CCNC: 40 U/L (ref 5–40)
BASOPHILS ABSOLUTE: 0.1 THOU/MM3 (ref 0–0.1)
BASOPHILS NFR BLD AUTO: 0.5 %
BILIRUB SERPL-MCNC: 1 MG/DL (ref 0.3–1.2)
BILIRUB SERPL-MCNC: 1.1 MG/DL (ref 0.3–1.2)
BUN SERPL-MCNC: 40 MG/DL (ref 7–22)
BUN SERPL-MCNC: 42 MG/DL (ref 7–22)
BUN SERPL-MCNC: 46 MG/DL (ref 7–22)
BUN SERPL-MCNC: 48 MG/DL (ref 7–22)
CA-I BLD ISE-SCNC: 1.39 MMOL/L (ref 1.12–1.32)
CALCIUM SERPL-MCNC: 9.5 MG/DL (ref 8.5–10.5)
CALCIUM SERPL-MCNC: 9.6 MG/DL (ref 8.5–10.5)
CALCIUM SERPL-MCNC: 9.6 MG/DL (ref 8.5–10.5)
CALCIUM SERPL-MCNC: 9.7 MG/DL (ref 8.5–10.5)
CHLORIDE SERPL-SCNC: 100 MEQ/L (ref 98–111)
CHLORIDE SERPL-SCNC: 98 MEQ/L (ref 98–111)
CHLORIDE SERPL-SCNC: 99 MEQ/L (ref 98–111)
CHLORIDE SERPL-SCNC: 99 MEQ/L (ref 98–111)
CO2 SERPL-SCNC: 19 MEQ/L (ref 23–33)
CO2 SERPL-SCNC: 19 MEQ/L (ref 23–33)
CO2 SERPL-SCNC: 20 MEQ/L (ref 23–33)
CO2 SERPL-SCNC: 21 MEQ/L (ref 23–33)
CREAT SERPL-MCNC: 2.2 MG/DL (ref 0.4–1.2)
CREAT SERPL-MCNC: 2.4 MG/DL (ref 0.4–1.2)
CREAT SERPL-MCNC: 2.5 MG/DL (ref 0.4–1.2)
CREAT SERPL-MCNC: 2.6 MG/DL (ref 0.4–1.2)
DEPRECATED RDW RBC AUTO: 65.8 FL (ref 35–45)
EOSINOPHIL NFR BLD AUTO: 0.3 %
EOSINOPHILS ABSOLUTE: 0.1 THOU/MM3 (ref 0–0.4)
ERYTHROCYTE [DISTWIDTH] IN BLOOD BY AUTOMATED COUNT: 20.3 % (ref 11.5–14.5)
GFR SERPL CREATININE-BSD FRML MDRD: 26 ML/MIN/1.73M2
GFR SERPL CREATININE-BSD FRML MDRD: 27 ML/MIN/1.73M2
GFR SERPL CREATININE-BSD FRML MDRD: 28 ML/MIN/1.73M2
GFR SERPL CREATININE-BSD FRML MDRD: 31 ML/MIN/1.73M2
GLUCOSE BLD STRIP.AUTO-MCNC: 178 MG/DL (ref 70–108)
GLUCOSE BLD STRIP.AUTO-MCNC: 189 MG/DL (ref 70–108)
GLUCOSE BLD STRIP.AUTO-MCNC: 190 MG/DL (ref 70–108)
GLUCOSE BLD STRIP.AUTO-MCNC: 194 MG/DL (ref 70–108)
GLUCOSE SERPL-MCNC: 181 MG/DL (ref 70–108)
GLUCOSE SERPL-MCNC: 192 MG/DL (ref 70–108)
GLUCOSE SERPL-MCNC: 192 MG/DL (ref 70–108)
GLUCOSE SERPL-MCNC: 197 MG/DL (ref 70–108)
HCT VFR BLD AUTO: 26.3 % (ref 42–52)
HGB BLD-MCNC: 8.4 GM/DL (ref 14–18)
IMM GRANULOCYTES # BLD AUTO: 1.43 THOU/MM3 (ref 0–0.07)
IMM GRANULOCYTES NFR BLD AUTO: 5.6 %
LYMPHOCYTES ABSOLUTE: 0.9 THOU/MM3 (ref 1–4.8)
LYMPHOCYTES NFR BLD AUTO: 3.6 %
MAGNESIUM SERPL-MCNC: 2.6 MG/DL (ref 1.6–2.4)
MCH RBC QN AUTO: 30.3 PG (ref 26–33)
MCHC RBC AUTO-ENTMCNC: 31.9 GM/DL (ref 32.2–35.5)
MCV RBC AUTO: 94.9 FL (ref 80–94)
MONOCYTES ABSOLUTE: 0.9 THOU/MM3 (ref 0.4–1.3)
MONOCYTES NFR BLD AUTO: 3.5 %
NEUTROPHILS NFR BLD AUTO: 86.5 %
NRBC BLD AUTO-RTO: 0 /100 WBC
PHOSPHATE SERPL-MCNC: 4.9 MG/DL (ref 2.4–4.7)
PLATELET # BLD AUTO: 298 THOU/MM3 (ref 130–400)
PMV BLD AUTO: 9.6 FL (ref 9.4–12.4)
POTASSIUM SERPL-SCNC: 4.8 MEQ/L (ref 3.5–5.2)
POTASSIUM SERPL-SCNC: 5.1 MEQ/L (ref 3.5–5.2)
PROT SERPL-MCNC: 4.6 G/DL (ref 6.1–8)
PROT SERPL-MCNC: 4.7 G/DL (ref 6.1–8)
PROT SERPL-MCNC: 4.8 G/DL (ref 6.1–8)
PROT SERPL-MCNC: 4.9 G/DL (ref 6.1–8)
RBC # BLD AUTO: 2.77 MILL/MM3 (ref 4.7–6.1)
SEGMENTED NEUTROPHILS ABSOLUTE COUNT: 22.1 THOU/MM3 (ref 1.8–7.7)
SODIUM SERPL-SCNC: 132 MEQ/L (ref 135–145)
SODIUM SERPL-SCNC: 133 MEQ/L (ref 135–145)
SODIUM SERPL-SCNC: 133 MEQ/L (ref 135–145)
SODIUM SERPL-SCNC: 135 MEQ/L (ref 135–145)
WBC # BLD AUTO: 25.5 THOU/MM3 (ref 4.8–10.8)

## 2024-04-02 PROCEDURE — 6370000000 HC RX 637 (ALT 250 FOR IP): Performed by: SURGERY

## 2024-04-02 PROCEDURE — 2700000000 HC OXYGEN THERAPY PER DAY

## 2024-04-02 PROCEDURE — 6360000002 HC RX W HCPCS: Performed by: INTERNAL MEDICINE

## 2024-04-02 PROCEDURE — 6370000000 HC RX 637 (ALT 250 FOR IP): Performed by: INTERNAL MEDICINE

## 2024-04-02 PROCEDURE — 82948 REAGENT STRIP/BLOOD GLUCOSE: CPT

## 2024-04-02 PROCEDURE — 6370000000 HC RX 637 (ALT 250 FOR IP): Performed by: PHYSICIAN ASSISTANT

## 2024-04-02 PROCEDURE — 82330 ASSAY OF CALCIUM: CPT

## 2024-04-02 PROCEDURE — 2580000003 HC RX 258

## 2024-04-02 PROCEDURE — 93010 ELECTROCARDIOGRAM REPORT: CPT | Performed by: INTERNAL MEDICINE

## 2024-04-02 PROCEDURE — 99232 SBSQ HOSP IP/OBS MODERATE 35: CPT | Performed by: INTERNAL MEDICINE

## 2024-04-02 PROCEDURE — C9113 INJ PANTOPRAZOLE SODIUM, VIA: HCPCS | Performed by: INTERNAL MEDICINE

## 2024-04-02 PROCEDURE — 6370000000 HC RX 637 (ALT 250 FOR IP)

## 2024-04-02 PROCEDURE — 2500000003 HC RX 250 WO HCPCS: Performed by: STUDENT IN AN ORGANIZED HEALTH CARE EDUCATION/TRAINING PROGRAM

## 2024-04-02 PROCEDURE — 84100 ASSAY OF PHOSPHORUS: CPT

## 2024-04-02 PROCEDURE — 2500000003 HC RX 250 WO HCPCS: Performed by: PHYSICIAN ASSISTANT

## 2024-04-02 PROCEDURE — 6370000000 HC RX 637 (ALT 250 FOR IP): Performed by: NURSE PRACTITIONER

## 2024-04-02 PROCEDURE — 99024 POSTOP FOLLOW-UP VISIT: CPT | Performed by: SURGERY

## 2024-04-02 PROCEDURE — 36415 COLL VENOUS BLD VENIPUNCTURE: CPT

## 2024-04-02 PROCEDURE — 94761 N-INVAS EAR/PLS OXIMETRY MLT: CPT

## 2024-04-02 PROCEDURE — 80053 COMPREHEN METABOLIC PANEL: CPT

## 2024-04-02 PROCEDURE — 83735 ASSAY OF MAGNESIUM: CPT

## 2024-04-02 PROCEDURE — 94003 VENT MGMT INPAT SUBQ DAY: CPT

## 2024-04-02 PROCEDURE — 85025 COMPLETE CBC W/AUTO DIFF WBC: CPT

## 2024-04-02 PROCEDURE — 2580000003 HC RX 258: Performed by: PHYSICIAN ASSISTANT

## 2024-04-02 PROCEDURE — 6360000002 HC RX W HCPCS

## 2024-04-02 PROCEDURE — 99291 CRITICAL CARE FIRST HOUR: CPT | Performed by: INTERNAL MEDICINE

## 2024-04-02 PROCEDURE — 2000000000 HC ICU R&B

## 2024-04-02 RX ADMIN — NITROGLYCERIN 1 INCH: 20 OINTMENT TOPICAL at 11:47

## 2024-04-02 RX ADMIN — PIPERACILLIN AND TAZOBACTAM 3375 MG: 3; .375 INJECTION, POWDER, LYOPHILIZED, FOR SOLUTION INTRAVENOUS at 10:44

## 2024-04-02 RX ADMIN — OLANZAPINE 7.5 MG: 5 TABLET, FILM COATED ORAL at 20:24

## 2024-04-02 RX ADMIN — SODIUM CHLORIDE, PRESERVATIVE FREE 10 ML: 5 INJECTION INTRAVENOUS at 20:24

## 2024-04-02 RX ADMIN — COLLAGENASE SANTYL: 250 OINTMENT TOPICAL at 08:48

## 2024-04-02 RX ADMIN — LORAZEPAM 4 MG: 2 INJECTION INTRAMUSCULAR; INTRAVENOUS at 16:02

## 2024-04-02 RX ADMIN — Medication 150 MCG/HR: at 00:51

## 2024-04-02 RX ADMIN — NITROGLYCERIN 1 INCH: 20 OINTMENT TOPICAL at 17:47

## 2024-04-02 RX ADMIN — CHLORHEXIDINE GLUCONATE 0.12% ORAL RINSE 15 ML: 1.2 LIQUID ORAL at 08:47

## 2024-04-02 RX ADMIN — SODIUM CHLORIDE, PRESERVATIVE FREE 10 ML: 5 INJECTION INTRAVENOUS at 09:20

## 2024-04-02 RX ADMIN — ATORVASTATIN CALCIUM 40 MG: 40 TABLET, FILM COATED ORAL at 20:24

## 2024-04-02 RX ADMIN — AMIODARONE HYDROCHLORIDE 200 MG: 200 TABLET ORAL at 08:48

## 2024-04-02 RX ADMIN — INSULIN GLARGINE 30 UNITS: 100 INJECTION, SOLUTION SUBCUTANEOUS at 08:47

## 2024-04-02 RX ADMIN — Medication 1 TABLET: at 08:48

## 2024-04-02 RX ADMIN — LORAZEPAM 4 MG: 2 INJECTION INTRAMUSCULAR; INTRAVENOUS at 08:40

## 2024-04-02 RX ADMIN — Medication 150 MCG/HR: at 16:37

## 2024-04-02 RX ADMIN — NITROGLYCERIN 1 INCH: 20 OINTMENT TOPICAL at 23:31

## 2024-04-02 RX ADMIN — CHLORHEXIDINE GLUCONATE 0.12% ORAL RINSE 15 ML: 1.2 LIQUID ORAL at 20:37

## 2024-04-02 RX ADMIN — PANTOPRAZOLE SODIUM 40 MG: 40 INJECTION, POWDER, FOR SOLUTION INTRAVENOUS at 20:24

## 2024-04-02 RX ADMIN — NITROGLYCERIN 1 INCH: 20 OINTMENT TOPICAL at 00:09

## 2024-04-02 RX ADMIN — SENNOSIDES 8.8 MG: 8.8 LIQUID ORAL at 08:47

## 2024-04-02 RX ADMIN — AMIODARONE HYDROCHLORIDE 200 MG: 200 TABLET ORAL at 20:24

## 2024-04-02 RX ADMIN — AMIODARONE HYDROCHLORIDE 0.5 MG/MIN: 1.8 INJECTION, SOLUTION INTRAVENOUS at 12:33

## 2024-04-02 RX ADMIN — PIPERACILLIN AND TAZOBACTAM 3375 MG: 3; .375 INJECTION, POWDER, LYOPHILIZED, FOR SOLUTION INTRAVENOUS at 00:04

## 2024-04-02 RX ADMIN — PANTOPRAZOLE SODIUM 40 MG: 40 INJECTION, POWDER, FOR SOLUTION INTRAVENOUS at 08:46

## 2024-04-02 RX ADMIN — SENNOSIDES 8.8 MG: 8.8 LIQUID ORAL at 20:24

## 2024-04-02 RX ADMIN — NITROGLYCERIN 1 INCH: 20 OINTMENT TOPICAL at 05:21

## 2024-04-02 RX ADMIN — PIPERACILLIN AND TAZOBACTAM 3375 MG: 3; .375 INJECTION, POWDER, LYOPHILIZED, FOR SOLUTION INTRAVENOUS at 23:22

## 2024-04-02 RX ADMIN — LORAZEPAM 4 MG: 2 INJECTION INTRAMUSCULAR; INTRAVENOUS at 20:27

## 2024-04-02 RX ADMIN — AMIODARONE HYDROCHLORIDE 0.5 MG/MIN: 1.8 INJECTION, SOLUTION INTRAVENOUS at 01:39

## 2024-04-02 RX ADMIN — Medication 150 MCG/HR: at 08:39

## 2024-04-02 ASSESSMENT — PULMONARY FUNCTION TESTS
PIF_VALUE: 14
PIF_VALUE: 16
PIF_VALUE: 14
PIF_VALUE: 18
PIF_VALUE: 16
PIF_VALUE: 16

## 2024-04-03 ENCOUNTER — APPOINTMENT (OUTPATIENT)
Dept: GENERAL RADIOLOGY | Age: 69
DRG: 003 | End: 2024-04-03
Attending: THORACIC SURGERY (CARDIOTHORACIC VASCULAR SURGERY)
Payer: MEDICARE

## 2024-04-03 LAB
ALBUMIN SERPL BCG-MCNC: 2.1 G/DL (ref 3.5–5.1)
ALBUMIN SERPL BCG-MCNC: 2.4 G/DL (ref 3.5–5.1)
ALP SERPL-CCNC: 759 U/L (ref 38–126)
ALP SERPL-CCNC: 769 U/L (ref 38–126)
ALT SERPL W/O P-5'-P-CCNC: 16 U/L (ref 11–66)
ALT SERPL W/O P-5'-P-CCNC: 17 U/L (ref 11–66)
ANION GAP SERPL CALC-SCNC: 14 MEQ/L (ref 8–16)
ANION GAP SERPL CALC-SCNC: 15 MEQ/L (ref 8–16)
ANISOCYTOSIS BLD QL SMEAR: PRESENT
AST SERPL-CCNC: 32 U/L (ref 5–40)
AST SERPL-CCNC: 34 U/L (ref 5–40)
BASOPHILS ABSOLUTE: 0.1 THOU/MM3 (ref 0–0.1)
BASOPHILS NFR BLD AUTO: 0.5 %
BILIRUB SERPL-MCNC: 0.9 MG/DL (ref 0.3–1.2)
BILIRUB SERPL-MCNC: 1 MG/DL (ref 0.3–1.2)
BUN SERPL-MCNC: 49 MG/DL (ref 7–22)
BUN SERPL-MCNC: 51 MG/DL (ref 7–22)
CA-I BLD ISE-SCNC: 1.36 MMOL/L (ref 1.12–1.32)
CALCIUM SERPL-MCNC: 9.5 MG/DL (ref 8.5–10.5)
CALCIUM SERPL-MCNC: 9.8 MG/DL (ref 8.5–10.5)
CHLORIDE SERPL-SCNC: 95 MEQ/L (ref 98–111)
CHLORIDE SERPL-SCNC: 99 MEQ/L (ref 98–111)
CO2 SERPL-SCNC: 18 MEQ/L (ref 23–33)
CO2 SERPL-SCNC: 20 MEQ/L (ref 23–33)
CREAT SERPL-MCNC: 2.9 MG/DL (ref 0.4–1.2)
CREAT SERPL-MCNC: 2.9 MG/DL (ref 0.4–1.2)
DEPRECATED RDW RBC AUTO: 68 FL (ref 35–45)
EOSINOPHIL NFR BLD AUTO: 2.2 %
EOSINOPHILS ABSOLUTE: 0.5 THOU/MM3 (ref 0–0.4)
ERYTHROCYTE [DISTWIDTH] IN BLOOD BY AUTOMATED COUNT: 20.8 % (ref 11.5–14.5)
GFR SERPL CREATININE-BSD FRML MDRD: 22 ML/MIN/1.73M2
GFR SERPL CREATININE-BSD FRML MDRD: 22 ML/MIN/1.73M2
GLUCOSE BLD STRIP.AUTO-MCNC: 150 MG/DL (ref 70–108)
GLUCOSE BLD STRIP.AUTO-MCNC: 152 MG/DL (ref 70–108)
GLUCOSE BLD STRIP.AUTO-MCNC: 157 MG/DL (ref 70–108)
GLUCOSE BLD STRIP.AUTO-MCNC: 159 MG/DL (ref 70–108)
GLUCOSE BLD STRIP.AUTO-MCNC: 173 MG/DL (ref 70–108)
GLUCOSE BLD STRIP.AUTO-MCNC: 173 MG/DL (ref 70–108)
GLUCOSE SERPL-MCNC: 155 MG/DL (ref 70–108)
GLUCOSE SERPL-MCNC: 167 MG/DL (ref 70–108)
HCT VFR BLD AUTO: 26.9 % (ref 42–52)
HGB BLD-MCNC: 8.7 GM/DL (ref 14–18)
IMM GRANULOCYTES # BLD AUTO: 0.94 THOU/MM3 (ref 0–0.07)
IMM GRANULOCYTES NFR BLD AUTO: 4.1 %
LYMPHOCYTES ABSOLUTE: 1 THOU/MM3 (ref 1–4.8)
LYMPHOCYTES NFR BLD AUTO: 4.4 %
MAGNESIUM SERPL-MCNC: 2.7 MG/DL (ref 1.6–2.4)
MCH RBC QN AUTO: 30.3 PG (ref 26–33)
MCHC RBC AUTO-ENTMCNC: 32.3 GM/DL (ref 32.2–35.5)
MCV RBC AUTO: 93.7 FL (ref 80–94)
MONOCYTES ABSOLUTE: 0.8 THOU/MM3 (ref 0.4–1.3)
MONOCYTES NFR BLD AUTO: 3.5 %
NEUTROPHILS NFR BLD AUTO: 85.3 %
NRBC BLD AUTO-RTO: 0 /100 WBC
PHOSPHATE SERPL-MCNC: 5.2 MG/DL (ref 2.4–4.7)
PLATELET # BLD AUTO: 296 THOU/MM3 (ref 130–400)
PLATELET BLD QL SMEAR: ADEQUATE
PMV BLD AUTO: 9.6 FL (ref 9.4–12.4)
POLYCHROMASIA BLD QL SMEAR: ABNORMAL
POTASSIUM SERPL-SCNC: 4.7 MEQ/L (ref 3.5–5.2)
POTASSIUM SERPL-SCNC: 4.8 MEQ/L (ref 3.5–5.2)
PROT SERPL-MCNC: 4.8 G/DL (ref 6.1–8)
PROT SERPL-MCNC: 4.8 G/DL (ref 6.1–8)
RBC # BLD AUTO: 2.87 MILL/MM3 (ref 4.7–6.1)
SCAN OF BLOOD SMEAR: NORMAL
SEGMENTED NEUTROPHILS ABSOLUTE COUNT: 19.4 THOU/MM3 (ref 1.8–7.7)
SODIUM SERPL-SCNC: 129 MEQ/L (ref 135–145)
SODIUM SERPL-SCNC: 132 MEQ/L (ref 135–145)
WBC # BLD AUTO: 22.7 THOU/MM3 (ref 4.8–10.8)

## 2024-04-03 PROCEDURE — 6360000002 HC RX W HCPCS: Performed by: SURGERY

## 2024-04-03 PROCEDURE — 6370000000 HC RX 637 (ALT 250 FOR IP): Performed by: PHYSICIAN ASSISTANT

## 2024-04-03 PROCEDURE — 84100 ASSAY OF PHOSPHORUS: CPT

## 2024-04-03 PROCEDURE — 6370000000 HC RX 637 (ALT 250 FOR IP): Performed by: SURGERY

## 2024-04-03 PROCEDURE — 2000000000 HC ICU R&B

## 2024-04-03 PROCEDURE — 6360000002 HC RX W HCPCS

## 2024-04-03 PROCEDURE — 2580000003 HC RX 258: Performed by: PHYSICIAN ASSISTANT

## 2024-04-03 PROCEDURE — 6370000000 HC RX 637 (ALT 250 FOR IP): Performed by: NURSE PRACTITIONER

## 2024-04-03 PROCEDURE — 93010 ELECTROCARDIOGRAM REPORT: CPT | Performed by: INTERNAL MEDICINE

## 2024-04-03 PROCEDURE — 71045 X-RAY EXAM CHEST 1 VIEW: CPT

## 2024-04-03 PROCEDURE — 2500000003 HC RX 250 WO HCPCS: Performed by: STUDENT IN AN ORGANIZED HEALTH CARE EDUCATION/TRAINING PROGRAM

## 2024-04-03 PROCEDURE — 99291 CRITICAL CARE FIRST HOUR: CPT | Performed by: INTERNAL MEDICINE

## 2024-04-03 PROCEDURE — 93005 ELECTROCARDIOGRAM TRACING: CPT | Performed by: PHYSICIAN ASSISTANT

## 2024-04-03 PROCEDURE — 36415 COLL VENOUS BLD VENIPUNCTURE: CPT

## 2024-04-03 PROCEDURE — 82948 REAGENT STRIP/BLOOD GLUCOSE: CPT

## 2024-04-03 PROCEDURE — 6370000000 HC RX 637 (ALT 250 FOR IP): Performed by: INTERNAL MEDICINE

## 2024-04-03 PROCEDURE — 2700000000 HC OXYGEN THERAPY PER DAY

## 2024-04-03 PROCEDURE — C9113 INJ PANTOPRAZOLE SODIUM, VIA: HCPCS | Performed by: INTERNAL MEDICINE

## 2024-04-03 PROCEDURE — 94761 N-INVAS EAR/PLS OXIMETRY MLT: CPT

## 2024-04-03 PROCEDURE — 94003 VENT MGMT INPAT SUBQ DAY: CPT

## 2024-04-03 PROCEDURE — 2500000003 HC RX 250 WO HCPCS: Performed by: PHYSICIAN ASSISTANT

## 2024-04-03 PROCEDURE — 6360000002 HC RX W HCPCS: Performed by: INTERNAL MEDICINE

## 2024-04-03 PROCEDURE — 99232 SBSQ HOSP IP/OBS MODERATE 35: CPT | Performed by: INTERNAL MEDICINE

## 2024-04-03 PROCEDURE — 80053 COMPREHEN METABOLIC PANEL: CPT

## 2024-04-03 PROCEDURE — 6370000000 HC RX 637 (ALT 250 FOR IP)

## 2024-04-03 PROCEDURE — 2580000003 HC RX 258

## 2024-04-03 PROCEDURE — APPSS30 APP SPLIT SHARED TIME 16-30 MINUTES: Performed by: PHYSICIAN ASSISTANT

## 2024-04-03 PROCEDURE — 83735 ASSAY OF MAGNESIUM: CPT

## 2024-04-03 PROCEDURE — 93005 ELECTROCARDIOGRAM TRACING: CPT | Performed by: STUDENT IN AN ORGANIZED HEALTH CARE EDUCATION/TRAINING PROGRAM

## 2024-04-03 PROCEDURE — 85025 COMPLETE CBC W/AUTO DIFF WBC: CPT

## 2024-04-03 PROCEDURE — 82330 ASSAY OF CALCIUM: CPT

## 2024-04-03 RX ORDER — MODAFINIL 100 MG/1
200 TABLET ORAL DAILY
Status: DISCONTINUED | OUTPATIENT
Start: 2024-04-04 | End: 2024-04-05

## 2024-04-03 RX ADMIN — ATORVASTATIN CALCIUM 40 MG: 40 TABLET, FILM COATED ORAL at 20:55

## 2024-04-03 RX ADMIN — CHLORHEXIDINE GLUCONATE 0.12% ORAL RINSE 15 ML: 1.2 LIQUID ORAL at 08:23

## 2024-04-03 RX ADMIN — AMIODARONE HYDROCHLORIDE 0.5 MG/MIN: 1.8 INJECTION, SOLUTION INTRAVENOUS at 10:04

## 2024-04-03 RX ADMIN — AMIODARONE HYDROCHLORIDE 0.5 MG/MIN: 1.8 INJECTION, SOLUTION INTRAVENOUS at 00:07

## 2024-04-03 RX ADMIN — Medication 125 MCG/HR: at 00:07

## 2024-04-03 RX ADMIN — INSULIN GLARGINE 30 UNITS: 100 INJECTION, SOLUTION SUBCUTANEOUS at 09:00

## 2024-04-03 RX ADMIN — POLYETHYLENE GLYCOL (3350) 17 G: 17 POWDER, FOR SOLUTION ORAL at 09:35

## 2024-04-03 RX ADMIN — Medication 1 TABLET: at 08:23

## 2024-04-03 RX ADMIN — COLLAGENASE SANTYL 1 G: 250 OINTMENT TOPICAL at 08:23

## 2024-04-03 RX ADMIN — Medication 100 MCG/HR: at 22:34

## 2024-04-03 RX ADMIN — NITROGLYCERIN 1 INCH: 20 OINTMENT TOPICAL at 04:39

## 2024-04-03 RX ADMIN — Medication 7 MCG/MIN: at 06:33

## 2024-04-03 RX ADMIN — SENNOSIDES 8.8 MG: 8.8 LIQUID ORAL at 08:24

## 2024-04-03 RX ADMIN — AMIODARONE HYDROCHLORIDE 200 MG: 200 TABLET ORAL at 08:23

## 2024-04-03 RX ADMIN — SODIUM CHLORIDE, PRESERVATIVE FREE 10 ML: 5 INJECTION INTRAVENOUS at 08:24

## 2024-04-03 RX ADMIN — FENTANYL CITRATE 50 MCG: 50 INJECTION INTRAMUSCULAR; INTRAVENOUS at 19:00

## 2024-04-03 RX ADMIN — LORAZEPAM 4 MG: 2 INJECTION INTRAMUSCULAR; INTRAVENOUS at 12:08

## 2024-04-03 RX ADMIN — OXYCODONE HYDROCHLORIDE 5 MG: 5 SOLUTION ORAL at 16:43

## 2024-04-03 RX ADMIN — LORAZEPAM 4 MG: 2 INJECTION INTRAMUSCULAR; INTRAVENOUS at 22:10

## 2024-04-03 RX ADMIN — CHLORHEXIDINE GLUCONATE 0.12% ORAL RINSE 15 ML: 1.2 LIQUID ORAL at 20:55

## 2024-04-03 RX ADMIN — Medication 125 MCG/HR: at 06:38

## 2024-04-03 RX ADMIN — NITROGLYCERIN 1 INCH: 20 OINTMENT TOPICAL at 11:20

## 2024-04-03 RX ADMIN — PANTOPRAZOLE SODIUM 40 MG: 40 INJECTION, POWDER, FOR SOLUTION INTRAVENOUS at 08:23

## 2024-04-03 RX ADMIN — SODIUM CHLORIDE, PRESERVATIVE FREE 10 ML: 5 INJECTION INTRAVENOUS at 20:55

## 2024-04-03 RX ADMIN — OLANZAPINE 7.5 MG: 5 TABLET, FILM COATED ORAL at 20:55

## 2024-04-03 RX ADMIN — AMIODARONE HYDROCHLORIDE 200 MG: 200 TABLET ORAL at 20:55

## 2024-04-03 RX ADMIN — PIPERACILLIN AND TAZOBACTAM 3375 MG: 3; .375 INJECTION, POWDER, LYOPHILIZED, FOR SOLUTION INTRAVENOUS at 11:19

## 2024-04-03 RX ADMIN — LORAZEPAM 4 MG: 2 INJECTION INTRAMUSCULAR; INTRAVENOUS at 05:29

## 2024-04-03 RX ADMIN — NITROGLYCERIN 1 INCH: 20 OINTMENT TOPICAL at 18:48

## 2024-04-03 RX ADMIN — PANTOPRAZOLE SODIUM 40 MG: 40 INJECTION, POWDER, FOR SOLUTION INTRAVENOUS at 20:55

## 2024-04-03 ASSESSMENT — PULMONARY FUNCTION TESTS
PIF_VALUE: 18
PIF_VALUE: 14
PIF_VALUE: 15

## 2024-04-04 ENCOUNTER — APPOINTMENT (OUTPATIENT)
Dept: GENERAL RADIOLOGY | Age: 69
DRG: 003 | End: 2024-04-04
Attending: THORACIC SURGERY (CARDIOTHORACIC VASCULAR SURGERY)
Payer: MEDICARE

## 2024-04-04 LAB
ANION GAP SERPL CALC-SCNC: 16 MEQ/L (ref 8–16)
ANISOCYTOSIS BLD QL SMEAR: PRESENT
BASOPHILS ABSOLUTE: 0.1 THOU/MM3 (ref 0–0.1)
BASOPHILS NFR BLD AUTO: 0.3 %
BUN SERPL-MCNC: 61 MG/DL (ref 7–22)
CALCIUM SERPL-MCNC: 10 MG/DL (ref 8.5–10.5)
CHLORIDE SERPL-SCNC: 98 MEQ/L (ref 98–111)
CO2 SERPL-SCNC: 19 MEQ/L (ref 23–33)
CREAT SERPL-MCNC: 3.6 MG/DL (ref 0.4–1.2)
DEPRECATED RDW RBC AUTO: 71.8 FL (ref 35–45)
EKG Q-T INTERVAL: 402 MS
EKG Q-T INTERVAL: 416 MS
EKG Q-T INTERVAL: 422 MS
EKG QRS DURATION: 122 MS
EKG QRS DURATION: 138 MS
EKG QRS DURATION: 154 MS
EKG QTC CALCULATION (BAZETT): 497 MS
EKG QTC CALCULATION (BAZETT): 510 MS
EKG QTC CALCULATION (BAZETT): 536 MS
EKG R AXIS: -159 DEGREES
EKG R AXIS: -47 DEGREES
EKG R AXIS: -56 DEGREES
EKG T AXIS: -72 DEGREES
EKG T AXIS: 16 DEGREES
EKG T AXIS: 29 DEGREES
EKG VENTRICULAR RATE: 107 BPM
EKG VENTRICULAR RATE: 86 BPM
EKG VENTRICULAR RATE: 88 BPM
EOSINOPHIL NFR BLD AUTO: 0.1 %
EOSINOPHILS ABSOLUTE: 0 THOU/MM3 (ref 0–0.4)
ERYTHROCYTE [DISTWIDTH] IN BLOOD BY AUTOMATED COUNT: 21.3 % (ref 11.5–14.5)
GFR SERPL CREATININE-BSD FRML MDRD: 17 ML/MIN/1.73M2
GLUCOSE BLD STRIP.AUTO-MCNC: 106 MG/DL (ref 70–108)
GLUCOSE BLD STRIP.AUTO-MCNC: 109 MG/DL (ref 70–108)
GLUCOSE BLD STRIP.AUTO-MCNC: 112 MG/DL (ref 70–108)
GLUCOSE BLD STRIP.AUTO-MCNC: 57 MG/DL (ref 70–108)
GLUCOSE BLD STRIP.AUTO-MCNC: 64 MG/DL (ref 70–108)
GLUCOSE BLD STRIP.AUTO-MCNC: 72 MG/DL (ref 70–108)
GLUCOSE BLD STRIP.AUTO-MCNC: 79 MG/DL (ref 70–108)
GLUCOSE BLD STRIP.AUTO-MCNC: 83 MG/DL (ref 70–108)
GLUCOSE BLD STRIP.AUTO-MCNC: 85 MG/DL (ref 70–108)
GLUCOSE BLD STRIP.AUTO-MCNC: 87 MG/DL (ref 70–108)
GLUCOSE BLD STRIP.AUTO-MCNC: 88 MG/DL (ref 70–108)
GLUCOSE BLD STRIP.AUTO-MCNC: 93 MG/DL (ref 70–108)
GLUCOSE SERPL-MCNC: 120 MG/DL (ref 70–108)
HCT VFR BLD AUTO: 29.1 % (ref 42–52)
HGB BLD-MCNC: 9.3 GM/DL (ref 14–18)
IMM GRANULOCYTES # BLD AUTO: 0.39 THOU/MM3 (ref 0–0.07)
IMM GRANULOCYTES NFR BLD AUTO: 1.4 %
LYMPHOCYTES ABSOLUTE: 0.7 THOU/MM3 (ref 1–4.8)
LYMPHOCYTES NFR BLD AUTO: 2.4 %
MAGNESIUM SERPL-MCNC: 3 MG/DL (ref 1.6–2.4)
MCH RBC QN AUTO: 30.3 PG (ref 26–33)
MCHC RBC AUTO-ENTMCNC: 32 GM/DL (ref 32.2–35.5)
MCV RBC AUTO: 94.8 FL (ref 80–94)
MONOCYTES ABSOLUTE: 0.3 THOU/MM3 (ref 0.4–1.3)
MONOCYTES NFR BLD AUTO: 1.1 %
NEUTROPHILS NFR BLD AUTO: 94.7 %
NRBC BLD AUTO-RTO: 0 /100 WBC
PHOSPHATE SERPL-MCNC: 5.4 MG/DL (ref 2.4–4.7)
PLATELET # BLD AUTO: 314 THOU/MM3 (ref 130–400)
PMV BLD AUTO: 9.7 FL (ref 9.4–12.4)
POTASSIUM SERPL-SCNC: 5.3 MEQ/L (ref 3.5–5.2)
RBC # BLD AUTO: 3.07 MILL/MM3 (ref 4.7–6.1)
SEGMENTED NEUTROPHILS ABSOLUTE COUNT: 26.4 THOU/MM3 (ref 1.8–7.7)
SODIUM SERPL-SCNC: 133 MEQ/L (ref 135–145)
WBC # BLD AUTO: 27.9 THOU/MM3 (ref 4.8–10.8)

## 2024-04-04 PROCEDURE — 2700000000 HC OXYGEN THERAPY PER DAY

## 2024-04-04 PROCEDURE — 2580000003 HC RX 258

## 2024-04-04 PROCEDURE — 2580000003 HC RX 258: Performed by: PHYSICIAN ASSISTANT

## 2024-04-04 PROCEDURE — 6360000002 HC RX W HCPCS: Performed by: NURSE PRACTITIONER

## 2024-04-04 PROCEDURE — 51798 US URINE CAPACITY MEASURE: CPT

## 2024-04-04 PROCEDURE — 6370000000 HC RX 637 (ALT 250 FOR IP): Performed by: SURGERY

## 2024-04-04 PROCEDURE — 6370000000 HC RX 637 (ALT 250 FOR IP)

## 2024-04-04 PROCEDURE — 85025 COMPLETE CBC W/AUTO DIFF WBC: CPT

## 2024-04-04 PROCEDURE — 89220 SPUTUM SPECIMEN COLLECTION: CPT

## 2024-04-04 PROCEDURE — C9113 INJ PANTOPRAZOLE SODIUM, VIA: HCPCS | Performed by: INTERNAL MEDICINE

## 2024-04-04 PROCEDURE — 94761 N-INVAS EAR/PLS OXIMETRY MLT: CPT

## 2024-04-04 PROCEDURE — 2580000003 HC RX 258: Performed by: INTERNAL MEDICINE

## 2024-04-04 PROCEDURE — 6360000002 HC RX W HCPCS

## 2024-04-04 PROCEDURE — 36415 COLL VENOUS BLD VENIPUNCTURE: CPT

## 2024-04-04 PROCEDURE — 94003 VENT MGMT INPAT SUBQ DAY: CPT

## 2024-04-04 PROCEDURE — 80048 BASIC METABOLIC PNL TOTAL CA: CPT

## 2024-04-04 PROCEDURE — 6370000000 HC RX 637 (ALT 250 FOR IP): Performed by: STUDENT IN AN ORGANIZED HEALTH CARE EDUCATION/TRAINING PROGRAM

## 2024-04-04 PROCEDURE — 2000000000 HC ICU R&B

## 2024-04-04 PROCEDURE — 82948 REAGENT STRIP/BLOOD GLUCOSE: CPT

## 2024-04-04 PROCEDURE — 6370000000 HC RX 637 (ALT 250 FOR IP): Performed by: PHYSICIAN ASSISTANT

## 2024-04-04 PROCEDURE — 99291 CRITICAL CARE FIRST HOUR: CPT | Performed by: INTERNAL MEDICINE

## 2024-04-04 PROCEDURE — 71045 X-RAY EXAM CHEST 1 VIEW: CPT

## 2024-04-04 PROCEDURE — 84100 ASSAY OF PHOSPHORUS: CPT

## 2024-04-04 PROCEDURE — 83735 ASSAY OF MAGNESIUM: CPT

## 2024-04-04 PROCEDURE — 6370000000 HC RX 637 (ALT 250 FOR IP): Performed by: NURSE PRACTITIONER

## 2024-04-04 PROCEDURE — 6370000000 HC RX 637 (ALT 250 FOR IP): Performed by: INTERNAL MEDICINE

## 2024-04-04 PROCEDURE — 2500000003 HC RX 250 WO HCPCS: Performed by: STUDENT IN AN ORGANIZED HEALTH CARE EDUCATION/TRAINING PROGRAM

## 2024-04-04 PROCEDURE — 6360000002 HC RX W HCPCS: Performed by: INTERNAL MEDICINE

## 2024-04-04 PROCEDURE — 99233 SBSQ HOSP IP/OBS HIGH 50: CPT | Performed by: INTERNAL MEDICINE

## 2024-04-04 RX ORDER — FENTANYL CITRATE 50 UG/ML
25 INJECTION, SOLUTION INTRAMUSCULAR; INTRAVENOUS EVERY 6 HOURS PRN
Status: DISCONTINUED | OUTPATIENT
Start: 2024-04-04 | End: 2024-04-06

## 2024-04-04 RX ORDER — MIDODRINE HYDROCHLORIDE 10 MG/1
10 TABLET ORAL
Status: DISCONTINUED | OUTPATIENT
Start: 2024-04-04 | End: 2024-04-06

## 2024-04-04 RX ADMIN — Medication 75 MCG/HR: at 10:48

## 2024-04-04 RX ADMIN — SODIUM CHLORIDE, PRESERVATIVE FREE 10 ML: 5 INJECTION INTRAVENOUS at 09:14

## 2024-04-04 RX ADMIN — OXYCODONE HYDROCHLORIDE 5 MG: 5 SOLUTION ORAL at 09:10

## 2024-04-04 RX ADMIN — NITROGLYCERIN 1 INCH: 20 OINTMENT TOPICAL at 12:22

## 2024-04-04 RX ADMIN — TRAZODONE HYDROCHLORIDE 150 MG: 100 TABLET ORAL at 20:49

## 2024-04-04 RX ADMIN — Medication 1 TABLET: at 09:10

## 2024-04-04 RX ADMIN — MIDODRINE HYDROCHLORIDE 10 MG: 10 TABLET ORAL at 09:10

## 2024-04-04 RX ADMIN — PIPERACILLIN AND TAZOBACTAM 3375 MG: 3; .375 INJECTION, POWDER, LYOPHILIZED, FOR SOLUTION INTRAVENOUS at 02:02

## 2024-04-04 RX ADMIN — DEXTROSE MONOHYDRATE 125 ML: 100 INJECTION, SOLUTION INTRAVENOUS at 12:50

## 2024-04-04 RX ADMIN — AMIODARONE HYDROCHLORIDE 200 MG: 200 TABLET ORAL at 20:49

## 2024-04-04 RX ADMIN — COLLAGENASE SANTYL: 250 OINTMENT TOPICAL at 09:09

## 2024-04-04 RX ADMIN — MIDODRINE HYDROCHLORIDE 10 MG: 10 TABLET ORAL at 12:22

## 2024-04-04 RX ADMIN — OLANZAPINE 7.5 MG: 5 TABLET, FILM COATED ORAL at 20:49

## 2024-04-04 RX ADMIN — DEXTROSE MONOHYDRATE 125 ML: 100 INJECTION, SOLUTION INTRAVENOUS at 12:21

## 2024-04-04 RX ADMIN — FENTANYL CITRATE 25 MCG: 50 INJECTION INTRAMUSCULAR; INTRAVENOUS at 12:59

## 2024-04-04 RX ADMIN — PIPERACILLIN AND TAZOBACTAM 3375 MG: 3; .375 INJECTION, POWDER, LYOPHILIZED, FOR SOLUTION INTRAVENOUS at 12:29

## 2024-04-04 RX ADMIN — ATORVASTATIN CALCIUM 40 MG: 40 TABLET, FILM COATED ORAL at 20:49

## 2024-04-04 RX ADMIN — Medication 1 MG: at 13:23

## 2024-04-04 RX ADMIN — CHLORHEXIDINE GLUCONATE 0.12% ORAL RINSE 15 ML: 1.2 LIQUID ORAL at 09:10

## 2024-04-04 RX ADMIN — OXYCODONE HYDROCHLORIDE 5 MG: 5 SOLUTION ORAL at 17:54

## 2024-04-04 RX ADMIN — NITROGLYCERIN 1 INCH: 20 OINTMENT TOPICAL at 05:50

## 2024-04-04 RX ADMIN — NITROGLYCERIN 1 INCH: 20 OINTMENT TOPICAL at 01:30

## 2024-04-04 RX ADMIN — AMIODARONE HYDROCHLORIDE 200 MG: 200 TABLET ORAL at 09:10

## 2024-04-04 RX ADMIN — PANTOPRAZOLE SODIUM 40 MG: 40 INJECTION, POWDER, FOR SOLUTION INTRAVENOUS at 20:53

## 2024-04-04 RX ADMIN — MODAFINIL 200 MG: 100 TABLET ORAL at 09:10

## 2024-04-04 RX ADMIN — ACETAMINOPHEN 650 MG: 325 TABLET ORAL at 20:49

## 2024-04-04 RX ADMIN — MIDODRINE HYDROCHLORIDE 10 MG: 10 TABLET ORAL at 17:54

## 2024-04-04 RX ADMIN — CHLORHEXIDINE GLUCONATE 0.12% ORAL RINSE 15 ML: 1.2 LIQUID ORAL at 21:09

## 2024-04-04 RX ADMIN — PANTOPRAZOLE SODIUM 40 MG: 40 INJECTION, POWDER, FOR SOLUTION INTRAVENOUS at 09:10

## 2024-04-04 RX ADMIN — SODIUM CHLORIDE, PRESERVATIVE FREE 10 ML: 5 INJECTION INTRAVENOUS at 20:53

## 2024-04-04 RX ADMIN — NITROGLYCERIN 1 INCH: 20 OINTMENT TOPICAL at 17:54

## 2024-04-04 ASSESSMENT — PULMONARY FUNCTION TESTS
PIF_VALUE: 20
PIF_VALUE: 23
PIF_VALUE: 20
PIF_VALUE: 23
PIF_VALUE: 18
PIF_VALUE: 19

## 2024-04-04 NOTE — CARE COORDINATION
3/13/24, 3:33 PM EDT    DISCHARGE ON GOING EVALUATION    Mumtaz Islas       Hospital day: 7  Location: -12/012-A Reason for admit: Coronary artery disease, unspecified vessel or lesion type, unspecified whether angina present, unspecified whether native or transplanted heart [I25.10]  Paroxysmal atrial fibrillation (HCC) [I48.0]  CAD in native artery [I25.10]  CAD, multiple vessel [I25.10]   Procedure:   3/6 Cabg, Mitral Valve Repair, Maze   3/6 CARDIAC RE-ENTRY reexploration for bleeding and cardiac tamponade, repair of right ventricular bleeding and LIMA to LAD anastomosis.   3/7 CXR: Support tubes and lines are similar to previous.  Progressive perihilar and bibasilar atelectasis. A component of pulmonary edema centrally is not excluded.  3/7 LAKSHMI w/EF 55-60%  3/7 ECMO central cannulation with left atrial VA ECMO via median sternotomy   3/8 TESSIO right subclavian  3/8 CRRT initiated  3/8 OR: Reposition of the venous cannula in the right atrium and management of ECMO; additional sutures placed to reinforce all cannulation sites including right atrium aorta and right superior pulmonary vein; additional bleeding sites found in the periosteum and diaphragmatic surface of the pericardium   3/9 5-hr EEG: Neg for seizures; suggests moderate encephalopathy  3/13 CXR: No significant interval change.     Barriers to Discharge: Possible seizure activity noted on 3/9; EEG was negative for seizures. On 3/11 Intensivist attempted to wean pump and found patient to still be in right heart failure. Received 2 PRBC. Yesterday morning RN reported oxygenation issues overnight. Continues to have intermittent pulsatility issues. Still has oxygenation issues. Rhythm problems noted by RN and he states pacer rep is coming to evaluate/assist. +flatus, no BM.     Continues on VA ECMO. Continue on CRRT with net zero UF. Remains on vent w/ETT on AC/PC, peep 6, FIO2 50%, sats 100%. Afebrile. Afib 80's. Unable to follow commands; no 
3/15/24, 2:41 PM EDT    DISCHARGE ON GOING EVALUATION    Mumtaz Islas       Hospital day: 9  Location: -12/012-A Reason for admit: Coronary artery disease, unspecified vessel or lesion type, unspecified whether angina present, unspecified whether native or transplanted heart [I25.10]  Paroxysmal atrial fibrillation (HCC) [I48.0]  CAD in native artery [I25.10]  CAD, multiple vessel [I25.10]   Procedure:   3/6 Cabg, Mitral Valve Repair, Maze   3/6 CARDIAC RE-ENTRY reexploration for bleeding and cardiac tamponade, repair of right ventricular bleeding and LIMA to LAD anastomosis.   3/7 CXR: Support tubes and lines are similar to previous.  Progressive perihilar and bibasilar atelectasis. A component of pulmonary edema centrally is not excluded.  3/7 LAKSHMI w/EF 55-60%  3/7 ECMO central cannulation with left atrial VA ECMO via median sternotomy   3/8 TESSIO right subclavian  3/8 CRRT initiated  3/8 OR: Reposition of the venous cannula in the right atrium and management of ECMO; additional sutures placed to reinforce all cannulation sites including right atrium aorta and right superior pulmonary vein; additional bleeding sites found in the periosteum and diaphragmatic surface of the pericardium   3/9 5-hr EEG: Neg for seizures; suggests moderate encephalopathy  3/15 CXR: No significant change     Barriers to Discharge: Yesterday started weaning ECMO flows. Overnight had low TV, RT bagged patient, placed back on vent and then no signs of distress noted. Started on cardene drip early this morning; now back off. Had to increase flow on ECMO back up to 4L LPM today. Received 1 PRBC today. No BM; meds added to bowel regimen. Weaning sedation. Plan for possible LAKSHMI Monday.     Continues on VA ECMO. Continue on CRRT with net zero UF. Remains on vent w/ETT on AC/PC, peep 6, FIO2 40%, sats 100%. Afebrile. Paced 80. Unable to follow commands; no movement to painful stim x4. Chest tubes x4 with 210 ml out in 24 hours. Dietitian 
3/20/24, 3:43 PM EDT    DISCHARGE ON GOING EVALUATION    Mumtaz Islas       Hospital day: 14  Location: 4D-12/012-A Reason for admit: Coronary artery disease, unspecified vessel or lesion type, unspecified whether angina present, unspecified whether native or transplanted heart [I25.10]  Paroxysmal atrial fibrillation (HCC) [I48.0]  CAD in native artery [I25.10]  CAD, multiple vessel [I25.10]   Procedure:   3/6 Cabg, Mitral Valve Repair, Maze   3/6 CARDIAC RE-ENTRY reexploration for bleeding and cardiac tamponade, repair of right ventricular bleeding and LIMA to LAD anastomosis.   3/7 CXR: Support tubes and lines are similar to previous.  Progressive perihilar and bibasilar atelectasis. A component of pulmonary edema centrally is not excluded.  3/7 LAKSHMI w/EF 55-60%  3/7 ECMO central cannulation with left atrial VA ECMO via median sternotomy   3/8 TESSIO right subclavian  3/8 CRRT initiated  3/8 OR: Reposition of the venous cannula in the right atrium and management of ECMO; additional sutures placed to reinforce all cannulation sites including right atrium aorta and right superior pulmonary vein; additional bleeding sites found in the periosteum and diaphragmatic surface of the pericardium   3/9 5-hr EEG: Neg for seizures; suggests moderate encephalopathy  3/18 LAKSHMI with EF 30-35%; mod hypokinesis of basal inferior and mid inferior; mild mitral regurg  3/19 Explant of ECMO  3/20 LLE Arterial doppler: Partial recanalization of the left mid SFA since prior study; Moderate amount of thrombus in the distal left SFA which almost totally exclude the SFA; Good pulsatile flow in the distal popliteal artery, improved from prior; Dampened pulsatile flow in the trifurcation vessels with improved flow in the posterior tibial artery was occluded previously; Patient may benefit from intervention and TPA infusion for the left leg.  3/20 CXR: 1. Borderline heart size. Permanent pacemaker. Prosthetic heart valve. Multiple skin 
3/25/24, 2:11 PM EDT    DISCHARGE ON GOING EVALUATION    Mumtaz Islas       Hospital day: 19  Location: 4D-12/012-A Reason for admit: Coronary artery disease, unspecified vessel or lesion type, unspecified whether angina present, unspecified whether native or transplanted heart [I25.10]  Paroxysmal atrial fibrillation (HCC) [I48.0]  CAD in native artery [I25.10]  CAD, multiple vessel [I25.10]   Procedure:   3/6 Cabg, Mitral Valve Repair, Maze   3/6 CARDIAC RE-ENTRY reexploration for bleeding and cardiac tamponade, repair of right ventricular bleeding and LIMA to LAD anastomosis.   3/7 CXR: Support tubes and lines are similar to previous.  Progressive perihilar and bibasilar atelectasis. A component of pulmonary edema centrally is not excluded.  3/7 LAKSHMI w/EF 55-60%  3/7 ECMO central cannulation with left atrial VA ECMO via median sternotomy   3/8 TESSIO right subclavian  3/8 CRRT initiated  3/8 OR: Reposition of the venous cannula in the right atrium and management of ECMO; additional sutures placed to reinforce all cannulation sites including right atrium aorta and right superior pulmonary vein; additional bleeding sites found in the periosteum and diaphragmatic surface of the pericardium   3/9 5-hr EEG: Neg for seizures; suggests moderate encephalopathy  3/18 LAKSHMI with EF 30-35%; mod hypokinesis of basal inferior and mid inferior; mild mitral regurg  3/19 Explant of ECMO  3/20 LLE Arterial doppler: Partial recanalization of the left mid SFA since prior study; Moderate amount of thrombus in the distal left SFA which almost totally exclude the SFA; Good pulsatile flow in the distal popliteal artery, improved from prior; Dampened pulsatile flow in the trifurcation vessels with improved flow in the posterior tibial artery was occluded previously; Patient may benefit from intervention and TPA infusion for the left leg.  3/22 4hr 30 min EEG: No seizures noted; suggests severe encephalopathy  3/22 CRRT stopped  3/22 
3/28/24, 3:26 PM EDT    DISCHARGE ON GOING EVALUATION    Mumtaz Islas       Hospital day: 22  Location: -12/012-A Reason for admit: Coronary artery disease, unspecified vessel or lesion type, unspecified whether angina present, unspecified whether native or transplanted heart [I25.10]  Paroxysmal atrial fibrillation (HCC) [I48.0]  CAD in native artery [I25.10]  CAD, multiple vessel [I25.10]   Procedure:   3/6 Cabg, Mitral Valve Repair, Maze   3/6 CARDIAC RE-ENTRY reexploration for bleeding and cardiac tamponade, repair of right ventricular bleeding and LIMA to LAD anastomosis.   3/7 CXR: Support tubes and lines are similar to previous.  Progressive perihilar and bibasilar atelectasis. A component of pulmonary edema centrally is not excluded.  3/7 LAKSHMI w/EF 55-60%  3/7 ECMO central cannulation with left atrial VA ECMO via median sternotomy   3/8 TESSIO right subclavian  3/8 CRRT initiated  3/8 OR: Reposition of the venous cannula in the right atrium and management of ECMO; additional sutures placed to reinforce all cannulation sites including right atrium aorta and right superior pulmonary vein; additional bleeding sites found in the periosteum and diaphragmatic surface of the pericardium   3/9 5-hr EEG: Neg for seizures; suggests moderate encephalopathy  3/18 LAKSHMI with EF 30-35%; mod hypokinesis of basal inferior and mid inferior; mild mitral regurg  3/19 Explant of ECMO  3/20 LLE Arterial doppler: Partial recanalization of the left mid SFA since prior study; Moderate amount of thrombus in the distal left SFA which almost totally exclude the SFA; Good pulsatile flow in the distal popliteal artery, improved from prior; Dampened pulsatile flow in the trifurcation vessels with improved flow in the posterior tibial artery was occluded previously; Patient may benefit from intervention and TPA infusion for the left leg.  3/22 4hr 30 min EEG: No seizures noted; suggests severe encephalopathy  3/22 CRRT stopped  3/22 
3/8/24, 2:17 PM EST    DISCHARGE ON GOING EVALUATION    Mumtaz Islas       Hospital day: 2  Location: -12/012-A Reason for admit: Coronary artery disease, unspecified vessel or lesion type, unspecified whether angina present, unspecified whether native or transplanted heart [I25.10]  Paroxysmal atrial fibrillation (HCC) [I48.0]  CAD in native artery [I25.10]  CAD, multiple vessel [I25.10]   Procedure:   3/6 Cabg, Mitral Valve Repair, Maze   3/6 CARDIAC RE-ENTRY reexploration for bleeding and cardiac tamponade, repair of right ventricular bleeding and LIMA to LAD anastomosis.   3/7 CXR: Support tubes and lines are similar to previous.  Progressive perihilar and bibasilar atelectasis. A component of pulmonary edema centrally is not excluded.  3/7 LAKSHMI   3/7 ECMO central cannulation with left atrial VA ECMO via median sternotomy   3/8 CRRT initiated  3/8 CXR: 1. Mild hepatomegaly. Permanent dual-chamber pacemaker. ET tube, NG tube, and Marble-Brian catheter present in good position. Right jugular dialysis catheter has been inserted with tip in SVC. Large bore ECMO catheters are present.2. Small pleural effusion right side. Moderate bibasilar atelectasis/pneumonia.3. Overall appearance of chest has worsened since prior.  3/8 central line     Barriers to Discharge:  Presented for planned surgery. CTS and intensivist following. Taken back to surgery last night for repair of right ventricular bleeding. POD 2. CTS, nephro, Intensivist following. Remains on vent with ETT. ECMO. CRRT. Chest tube care. Epeprostenol continuous nebs. Bicarb gtt. Insulin gtt. Epi gtt. Angiomax. IV albumin q 6hrs.     PCP: Tom Villanueva DO  Readmission Risk Score: 21.7%  Patient Goals/Plan/Treatment Preferences: Plans pending progress, from home with wife prior.                
4/1/24, 1:58 PM EDT    DISCHARGE PLANNING EVALUATION    This SW did speak with patient's wife Adriana and she is agreeable to Denis transitioning to [facility] when appropriate. Updated CM Jhoana.     Wife did ask about an update on patient's condition, spoke with RN and she states that she will call Adriana as soon as she can. Made Adriana aware of this.   
4/2/24, 12:38 PM EDT    DISCHARGE ON GOING EVALUATION    Mumtaz Islas       Hospital day: 27  Location: 4D-12/012-A Reason for admit: Coronary artery disease, unspecified vessel or lesion type, unspecified whether angina present, unspecified whether native or transplanted heart [I25.10]  Paroxysmal atrial fibrillation (HCC) [I48.0]  CAD in native artery [I25.10]  CAD, multiple vessel [I25.10]   Procedure:   3/6 Cabg, Mitral Valve Repair, Maze   3/6 CARDIAC RE-ENTRY reexploration for bleeding and cardiac tamponade, repair of right ventricular bleeding and LIMA to LAD anastomosis.   3/7 CXR: Support tubes and lines are similar to previous.  Progressive perihilar and bibasilar atelectasis. A component of pulmonary edema centrally is not excluded.  3/7 LAKSHMI w/EF 55-60%  3/7 ECMO central cannulation with left atrial VA ECMO via median sternotomy   3/8 TESSIO right subclavian  3/8 CRRT initiated  3/8 OR: Reposition of the venous cannula in the right atrium and management of ECMO; additional sutures placed to reinforce all cannulation sites including right atrium aorta and right superior pulmonary vein; additional bleeding sites found in the periosteum and diaphragmatic surface of the pericardium   3/9 5-hr EEG: Neg for seizures; suggests moderate encephalopathy  3/18 LAKSHMI with EF 30-35%; mod hypokinesis of basal inferior and mid inferior; mild mitral regurg  3/19 Explant of ECMO  3/20 LLE Arterial doppler: Partial recanalization of the left mid SFA since prior study; Moderate amount of thrombus in the distal left SFA which almost totally exclude the SFA; Good pulsatile flow in the distal popliteal artery, improved from prior; Dampened pulsatile flow in the trifurcation vessels with improved flow in the posterior tibial artery was occluded previously; Patient may benefit from intervention and TPA infusion for the left leg.  3/22 4hr 30 min EEG: No seizures noted; suggests severe encephalopathy  3/22 CRRT stopped  3/22 
4/4/24, 11:29 AM EDT    DISCHARGE ON GOING EVALUATION    Mumtaz Islas       Hospital day: 29  Location: -12/012-A Reason for admit: Coronary artery disease, unspecified vessel or lesion type, unspecified whether angina present, unspecified whether native or transplanted heart [I25.10]  Paroxysmal atrial fibrillation (HCC) [I48.0]  CAD in native artery [I25.10]  CAD, multiple vessel [I25.10]   Procedure:   3/6 Cabg, Mitral Valve Repair, Maze   3/6 CARDIAC RE-ENTRY reexploration for bleeding and cardiac tamponade, repair of right ventricular bleeding and LIMA to LAD anastomosis.   3/7 CXR: Support tubes and lines are similar to previous.  Progressive perihilar and bibasilar atelectasis. A component of pulmonary edema centrally is not excluded.  3/7 LAKSHMI w/EF 55-60%  3/7 ECMO central cannulation with left atrial VA ECMO via median sternotomy   3/8 TESSIO right subclavian  3/8 CRRT initiated  3/8 OR: Reposition of the venous cannula in the right atrium and management of ECMO; additional sutures placed to reinforce all cannulation sites including right atrium aorta and right superior pulmonary vein; additional bleeding sites found in the periosteum and diaphragmatic surface of the pericardium   3/9 5-hr EEG: Neg for seizures; suggests moderate encephalopathy  3/18 LAKSHMI with EF 30-35%; mod hypokinesis of basal inferior and mid inferior; mild mitral regurg  3/19 Explant of ECMO  3/20 LLE Arterial doppler: Partial recanalization of the left mid SFA since prior study; Moderate amount of thrombus in the distal left SFA which almost totally exclude the SFA; Good pulsatile flow in the distal popliteal artery, improved from prior; Dampened pulsatile flow in the trifurcation vessels with improved flow in the posterior tibial artery was occluded previously; Patient may benefit from intervention and TPA infusion for the left leg.  3/22 4hr 30 min EEG: No seizures noted; suggests severe encephalopathy  3/22 CRRT stopped  3/22 
DISCHARGE PLANNING EVALUATION  3/7/24, 11:17 AM EST    Reason for Referral: discharge planning post OHS.  Mental Status: Alert and oriented.   Decision Making: makes own decisions.  Family/Social/Home Environment: Assessment completed with pts wife while in ICU. Wife states they reside in a one story home and she is responsible for most household chores. Pt had a stroke in 2019 and uses a cane around the house. Wife states she is unsure of discharge plan at this time, depends on how pt does with therapy. Wife is very interested in SR IPR if pt is a candidate. Wife has also requested a tour of rehab if pt is appropriate for rehab.  Current Services including food security, transportation and housekeeping: Wife does the driving and manages shopping and household tasks.  Current Equipment:Rollator, w/c, cane  Payment Source: [employer] Medicare  Concerns or Barriers to Discharge: none reported.  Post-acute (PAC) provider list was provided to patient. Patient was informed of their freedom to choose PAC provider. Discussed and offered to show the patient the relevant PAC Providers quality and resource use measures on Medicare Compare web site via computer based on patient's goals of care and treatment preferences. Questions regarding selection process were answered.      Teach Back Method used with pts wife regarding care plan and discharge planning.  Patients wife verbalized understanding of the plan of care and contribute to goal setting.       Patient goals, treatment preferences and discharge plan: wife prefers pt to return home with new HH if pt is able. Back up plan SR IPR and last would be [facility] in [place].    Electronically signed by SAMANTHA Arias on 3/7/2024 at 11:17 AM          
(pending needs)  Would you like Case Management to discuss the discharge plan with any other family members/significant others, and if so, who? Yes (wife)  Plans to Return to Present Housing: Unknown at present  Other Identified Issues/Barriers to RETURNING to current housing: following  Potential Assistance needed at discharge: Home Care, Skilled Nursing Facility            Potential DME:    Patient expects to discharge to: Unknown  Plan for transportation at discharge: Family    Financial    Payor: [employer] MEDICARE / Plan: [employer] GOLD PLUS HMO / Product Type: *No Product type* /     Does insurance require precert for SNF: Yes    Potential assistance Purchasing Medications: No  Meds-to-Beds request: Yes      [facility] [address] [facility] [address] [place] [address] - P [phone] - F [phone]  [address] [facility] [address]  Phone: [phone] Fax: [phone]      Notes:    Factors facilitating achievement of predicted outcomes: Family support    Barriers to discharge: Medical complications    Additional Case Management Notes: Presented for planned surgery. CTS and intensivist following. POD 1. Taken back to surgery last night for repair of right ventricular bleeding. Remains on vent with ETT. Decreased urine OP. IVF. Epi @ 5mcg. Insulin gtt. Milrinone gtt. Cardene gtt. Continuous nebs. Amio. Ancef. IV pepcid. Pain control, senokot, senna prn, electrolyte replacement protocols. Chest tube.  Cardiac rehab. Will consult PT/OT when appropriate. Dietitian consulted. Dietary ileus prevention protocol. Telemetry, I&O, daily weight, IS, sternal precautions, CT care, wound care, SCDs, banks care, ambulate.   Creatinine 1.9.     Procedure:   3/6 Cabg, Mitral Valve Repair, Maze   3/6 CARDIAC RE-ENTRY reexploration for bleeding and cardiac tamponade, repair of right ventricular bleeding and LIMA to LAD anastomosis.   3/7 CXR: Support tubes and lines are similar to previous.  Progressive perihilar and

## 2024-04-05 ENCOUNTER — APPOINTMENT (OUTPATIENT)
Dept: GENERAL RADIOLOGY | Age: 69
DRG: 003 | End: 2024-04-05
Attending: THORACIC SURGERY (CARDIOTHORACIC VASCULAR SURGERY)
Payer: MEDICARE

## 2024-04-05 LAB
ALBUMIN SERPL BCG-MCNC: 2.3 G/DL (ref 3.5–5.1)
ALBUMIN SERPL BCG-MCNC: 2.4 G/DL (ref 3.5–5.1)
ALP SERPL-CCNC: 413 U/L (ref 38–126)
ALP SERPL-CCNC: 447 U/L (ref 38–126)
ALT SERPL W/O P-5'-P-CCNC: 11 U/L (ref 11–66)
ALT SERPL W/O P-5'-P-CCNC: 9 U/L (ref 11–66)
ANION GAP SERPL CALC-SCNC: 18 MEQ/L (ref 8–16)
ANION GAP SERPL CALC-SCNC: 20 MEQ/L (ref 8–16)
ANION GAP SERPL CALC-SCNC: 21 MEQ/L (ref 8–16)
ANISOCYTOSIS BLD QL SMEAR: PRESENT
AST SERPL-CCNC: 21 U/L (ref 5–40)
AST SERPL-CCNC: 24 U/L (ref 5–40)
BASOPHILS ABSOLUTE: 0 THOU/MM3 (ref 0–0.1)
BASOPHILS NFR BLD AUTO: 0.2 %
BILIRUB SERPL-MCNC: 1 MG/DL (ref 0.3–1.2)
BILIRUB SERPL-MCNC: 1.1 MG/DL (ref 0.3–1.2)
BUN SERPL-MCNC: 56 MG/DL (ref 7–22)
BUN SERPL-MCNC: 72 MG/DL (ref 7–22)
BUN SERPL-MCNC: 73 MG/DL (ref 7–22)
CA-I BLD ISE-SCNC: 1.61 MMOL/L (ref 1.12–1.32)
CALCIUM SERPL-MCNC: 11.6 MG/DL (ref 8.5–10.5)
CALCIUM SERPL-MCNC: 11.7 MG/DL (ref 8.5–10.5)
CALCIUM SERPL-MCNC: 12.2 MG/DL (ref 8.5–10.5)
CHLORIDE SERPL-SCNC: 95 MEQ/L (ref 98–111)
CHLORIDE SERPL-SCNC: 96 MEQ/L (ref 98–111)
CHLORIDE SERPL-SCNC: 96 MEQ/L (ref 98–111)
CO2 SERPL-SCNC: 14 MEQ/L (ref 23–33)
CO2 SERPL-SCNC: 16 MEQ/L (ref 23–33)
CO2 SERPL-SCNC: 16 MEQ/L (ref 23–33)
CREAT SERPL-MCNC: 3.1 MG/DL (ref 0.4–1.2)
CREAT SERPL-MCNC: 4.1 MG/DL (ref 0.4–1.2)
CREAT SERPL-MCNC: 4.3 MG/DL (ref 0.4–1.2)
DEPRECATED RDW RBC AUTO: 70.4 FL (ref 35–45)
DEPRECATED RDW RBC AUTO: 71.5 FL (ref 35–45)
DEPRECATED RDW RBC AUTO: 74 FL (ref 35–45)
EOSINOPHIL NFR BLD AUTO: 0.7 %
EOSINOPHILS ABSOLUTE: 0.1 THOU/MM3 (ref 0–0.4)
ERYTHROCYTE [DISTWIDTH] IN BLOOD BY AUTOMATED COUNT: 21 % (ref 11.5–14.5)
ERYTHROCYTE [DISTWIDTH] IN BLOOD BY AUTOMATED COUNT: 21.4 % (ref 11.5–14.5)
ERYTHROCYTE [DISTWIDTH] IN BLOOD BY AUTOMATED COUNT: 21.5 % (ref 11.5–14.5)
GFR SERPL CREATININE-BSD FRML MDRD: 14 ML/MIN/1.73M2
GFR SERPL CREATININE-BSD FRML MDRD: 15 ML/MIN/1.73M2
GFR SERPL CREATININE-BSD FRML MDRD: 21 ML/MIN/1.73M2
GLUCOSE BLD STRIP.AUTO-MCNC: 107 MG/DL (ref 70–108)
GLUCOSE BLD STRIP.AUTO-MCNC: 118 MG/DL (ref 70–108)
GLUCOSE BLD STRIP.AUTO-MCNC: 90 MG/DL (ref 70–108)
GLUCOSE BLD STRIP.AUTO-MCNC: 95 MG/DL (ref 70–108)
GLUCOSE SERPL-MCNC: 113 MG/DL (ref 70–108)
GLUCOSE SERPL-MCNC: 120 MG/DL (ref 70–108)
GLUCOSE SERPL-MCNC: 137 MG/DL (ref 70–108)
HCT VFR BLD AUTO: 27.8 % (ref 42–52)
HCT VFR BLD AUTO: 27.9 % (ref 42–52)
HCT VFR BLD AUTO: 29.5 % (ref 42–52)
HGB BLD-MCNC: 8.8 GM/DL (ref 14–18)
HGB BLD-MCNC: 9 GM/DL (ref 14–18)
HGB BLD-MCNC: 9.3 GM/DL (ref 14–18)
IMM GRANULOCYTES # BLD AUTO: 0.11 THOU/MM3 (ref 0–0.07)
IMM GRANULOCYTES NFR BLD AUTO: 0.6 %
LACTATE SERPL-SCNC: 5.4 MMOL/L (ref 0.5–2)
LYMPHOCYTES ABSOLUTE: 0.7 THOU/MM3 (ref 1–4.8)
LYMPHOCYTES NFR BLD AUTO: 4.2 %
MAGNESIUM SERPL-MCNC: 3.1 MG/DL (ref 1.6–2.4)
MAGNESIUM SERPL-MCNC: 3.3 MG/DL (ref 1.6–2.4)
MAGNESIUM SERPL-MCNC: 3.3 MG/DL (ref 1.6–2.4)
MCH RBC QN AUTO: 29.9 PG (ref 26–33)
MCH RBC QN AUTO: 29.9 PG (ref 26–33)
MCH RBC QN AUTO: 30.2 PG (ref 26–33)
MCHC RBC AUTO-ENTMCNC: 31.5 GM/DL (ref 32.2–35.5)
MCHC RBC AUTO-ENTMCNC: 31.7 GM/DL (ref 32.2–35.5)
MCHC RBC AUTO-ENTMCNC: 32.3 GM/DL (ref 32.2–35.5)
MCV RBC AUTO: 92.7 FL (ref 80–94)
MCV RBC AUTO: 94.6 FL (ref 80–94)
MCV RBC AUTO: 95.8 FL (ref 80–94)
MONOCYTES ABSOLUTE: 0.4 THOU/MM3 (ref 0.4–1.3)
MONOCYTES NFR BLD AUTO: 2 %
NEUTROPHILS NFR BLD AUTO: 92.3 %
NRBC BLD AUTO-RTO: 0 /100 WBC
PHOSPHATE SERPL-MCNC: 5.8 MG/DL (ref 2.4–4.7)
PHOSPHATE SERPL-MCNC: 7.2 MG/DL (ref 2.4–4.7)
PLATELET # BLD AUTO: 338 THOU/MM3 (ref 130–400)
PLATELET # BLD AUTO: 345 THOU/MM3 (ref 130–400)
PLATELET # BLD AUTO: 423 THOU/MM3 (ref 130–400)
PMV BLD AUTO: 10 FL (ref 9.4–12.4)
PMV BLD AUTO: 10.2 FL (ref 9.4–12.4)
PMV BLD AUTO: 10.3 FL (ref 9.4–12.4)
POTASSIUM SERPL-SCNC: 4.9 MEQ/L (ref 3.5–5.2)
POTASSIUM SERPL-SCNC: 4.9 MEQ/L (ref 3.5–5.2)
POTASSIUM SERPL-SCNC: 5 MEQ/L (ref 3.5–5.2)
PROT SERPL-MCNC: 5.1 G/DL (ref 6.1–8)
PROT SERPL-MCNC: 5.4 G/DL (ref 6.1–8)
RBC # BLD AUTO: 2.94 MILL/MM3 (ref 4.7–6.1)
RBC # BLD AUTO: 3.01 MILL/MM3 (ref 4.7–6.1)
RBC # BLD AUTO: 3.08 MILL/MM3 (ref 4.7–6.1)
SCAN OF BLOOD SMEAR: NORMAL
SEGMENTED NEUTROPHILS ABSOLUTE COUNT: 16.4 THOU/MM3 (ref 1.8–7.7)
SODIUM SERPL-SCNC: 130 MEQ/L (ref 135–145)
SODIUM SERPL-SCNC: 130 MEQ/L (ref 135–145)
SODIUM SERPL-SCNC: 132 MEQ/L (ref 135–145)
WBC # BLD AUTO: 12.6 THOU/MM3 (ref 4.8–10.8)
WBC # BLD AUTO: 16.2 THOU/MM3 (ref 4.8–10.8)
WBC # BLD AUTO: 17.8 THOU/MM3 (ref 4.8–10.8)

## 2024-04-05 PROCEDURE — 2700000000 HC OXYGEN THERAPY PER DAY

## 2024-04-05 PROCEDURE — 2580000003 HC RX 258: Performed by: INTERNAL MEDICINE

## 2024-04-05 PROCEDURE — 2000000000 HC ICU R&B

## 2024-04-05 PROCEDURE — 82948 REAGENT STRIP/BLOOD GLUCOSE: CPT

## 2024-04-05 PROCEDURE — 82330 ASSAY OF CALCIUM: CPT

## 2024-04-05 PROCEDURE — 71045 X-RAY EXAM CHEST 1 VIEW: CPT

## 2024-04-05 PROCEDURE — 80053 COMPREHEN METABOLIC PANEL: CPT

## 2024-04-05 PROCEDURE — 99233 SBSQ HOSP IP/OBS HIGH 50: CPT | Performed by: INTERNAL MEDICINE

## 2024-04-05 PROCEDURE — 2500000003 HC RX 250 WO HCPCS: Performed by: INTERNAL MEDICINE

## 2024-04-05 PROCEDURE — 6370000000 HC RX 637 (ALT 250 FOR IP)

## 2024-04-05 PROCEDURE — 6360000002 HC RX W HCPCS: Performed by: INTERNAL MEDICINE

## 2024-04-05 PROCEDURE — 93005 ELECTROCARDIOGRAM TRACING: CPT

## 2024-04-05 PROCEDURE — 6360000002 HC RX W HCPCS

## 2024-04-05 PROCEDURE — 36569 INSJ PICC 5 YR+ W/O IMAGING: CPT

## 2024-04-05 PROCEDURE — 76937 US GUIDE VASCULAR ACCESS: CPT

## 2024-04-05 PROCEDURE — 6360000002 HC RX W HCPCS: Performed by: NURSE PRACTITIONER

## 2024-04-05 PROCEDURE — 6370000000 HC RX 637 (ALT 250 FOR IP): Performed by: INTERNAL MEDICINE

## 2024-04-05 PROCEDURE — 94003 VENT MGMT INPAT SUBQ DAY: CPT

## 2024-04-05 PROCEDURE — 83735 ASSAY OF MAGNESIUM: CPT

## 2024-04-05 PROCEDURE — 2580000003 HC RX 258: Performed by: PHYSICIAN ASSISTANT

## 2024-04-05 PROCEDURE — 6370000000 HC RX 637 (ALT 250 FOR IP): Performed by: NURSE PRACTITIONER

## 2024-04-05 PROCEDURE — 83605 ASSAY OF LACTIC ACID: CPT

## 2024-04-05 PROCEDURE — C9113 INJ PANTOPRAZOLE SODIUM, VIA: HCPCS | Performed by: INTERNAL MEDICINE

## 2024-04-05 PROCEDURE — 85027 COMPLETE CBC AUTOMATED: CPT

## 2024-04-05 PROCEDURE — APPSS30 APP SPLIT SHARED TIME 16-30 MINUTES: Performed by: PHYSICIAN ASSISTANT

## 2024-04-05 PROCEDURE — 6360000004 HC RX CONTRAST MEDICATION: Performed by: SURGERY

## 2024-04-05 PROCEDURE — 36415 COLL VENOUS BLD VENIPUNCTURE: CPT

## 2024-04-05 PROCEDURE — P9047 ALBUMIN (HUMAN), 25%, 50ML: HCPCS

## 2024-04-05 PROCEDURE — 49465 FLUORO EXAM OF G/COLON TUBE: CPT

## 2024-04-05 PROCEDURE — 2500000003 HC RX 250 WO HCPCS: Performed by: STUDENT IN AN ORGANIZED HEALTH CARE EDUCATION/TRAINING PROGRAM

## 2024-04-05 PROCEDURE — 94761 N-INVAS EAR/PLS OXIMETRY MLT: CPT

## 2024-04-05 PROCEDURE — 6370000000 HC RX 637 (ALT 250 FOR IP): Performed by: SURGERY

## 2024-04-05 PROCEDURE — 6370000000 HC RX 637 (ALT 250 FOR IP): Performed by: PHYSICIAN ASSISTANT

## 2024-04-05 PROCEDURE — 02HV33Z INSERTION OF INFUSION DEVICE INTO SUPERIOR VENA CAVA, PERCUTANEOUS APPROACH: ICD-10-PCS | Performed by: THORACIC SURGERY (CARDIOTHORACIC VASCULAR SURGERY)

## 2024-04-05 PROCEDURE — 84100 ASSAY OF PHOSPHORUS: CPT

## 2024-04-05 PROCEDURE — 85025 COMPLETE CBC W/AUTO DIFF WBC: CPT

## 2024-04-05 PROCEDURE — C1751 CATH, INF, PER/CENT/MIDLINE: HCPCS

## 2024-04-05 RX ORDER — MODAFINIL 100 MG/1
100 TABLET ORAL DAILY
Status: DISCONTINUED | OUTPATIENT
Start: 2024-04-06 | End: 2024-04-06

## 2024-04-05 RX ORDER — INSULIN LISPRO 100 [IU]/ML
0-4 INJECTION, SOLUTION INTRAVENOUS; SUBCUTANEOUS EVERY 6 HOURS
Status: DISCONTINUED | OUTPATIENT
Start: 2024-04-05 | End: 2024-04-06

## 2024-04-05 RX ORDER — ALBUMIN (HUMAN) 12.5 G/50ML
25 SOLUTION INTRAVENOUS ONCE
Status: COMPLETED | OUTPATIENT
Start: 2024-04-05 | End: 2024-04-05

## 2024-04-05 RX ORDER — OXYCODONE HCL 5 MG/5 ML
5 SOLUTION, ORAL ORAL EVERY 6 HOURS
Status: CANCELLED | OUTPATIENT
Start: 2024-04-05

## 2024-04-05 RX ORDER — MAGNESIUM SULFATE IN WATER 40 MG/ML
2000 INJECTION, SOLUTION INTRAVENOUS ONCE
Status: DISCONTINUED | OUTPATIENT
Start: 2024-04-05 | End: 2024-04-06

## 2024-04-05 RX ORDER — OXYCODONE AND ACETAMINOPHEN 7.5; 325 MG/1; MG/1
1 TABLET ORAL EVERY 6 HOURS
Status: DISCONTINUED | OUTPATIENT
Start: 2024-04-05 | End: 2024-04-06

## 2024-04-05 RX ORDER — POTASSIUM CHLORIDE 29.8 MG/ML
20 INJECTION INTRAVENOUS PRN
Status: DISCONTINUED | OUTPATIENT
Start: 2024-04-05 | End: 2024-04-06

## 2024-04-05 RX ORDER — MORPHINE SULFATE 2 MG/ML
2 INJECTION, SOLUTION INTRAMUSCULAR; INTRAVENOUS
Status: DISCONTINUED | OUTPATIENT
Start: 2024-04-05 | End: 2024-04-06

## 2024-04-05 RX ORDER — MAGNESIUM SULFATE 1 G/100ML
1000 INJECTION INTRAVENOUS PRN
Status: DISCONTINUED | OUTPATIENT
Start: 2024-04-05 | End: 2024-04-06

## 2024-04-05 RX ADMIN — OXYCODONE HYDROCHLORIDE 5 MG: 5 SOLUTION ORAL at 08:31

## 2024-04-05 RX ADMIN — FENTANYL CITRATE 25 MCG: 50 INJECTION INTRAMUSCULAR; INTRAVENOUS at 20:45

## 2024-04-05 RX ADMIN — PANTOPRAZOLE SODIUM 40 MG: 40 INJECTION, POWDER, FOR SOLUTION INTRAVENOUS at 08:32

## 2024-04-05 RX ADMIN — Medication: at 14:24

## 2024-04-05 RX ADMIN — MORPHINE SULFATE 2 MG: 2 INJECTION, SOLUTION INTRAMUSCULAR; INTRAVENOUS at 21:27

## 2024-04-05 RX ADMIN — Medication: at 14:22

## 2024-04-05 RX ADMIN — OXYCODONE AND ACETAMINOPHEN 1 TABLET: 7.5; 325 TABLET ORAL at 12:34

## 2024-04-05 RX ADMIN — MIDODRINE HYDROCHLORIDE 10 MG: 10 TABLET ORAL at 16:44

## 2024-04-05 RX ADMIN — SODIUM CHLORIDE, PRESERVATIVE FREE 10 ML: 5 INJECTION INTRAVENOUS at 21:34

## 2024-04-05 RX ADMIN — TRAZODONE HYDROCHLORIDE 150 MG: 100 TABLET ORAL at 21:34

## 2024-04-05 RX ADMIN — AMIODARONE HYDROCHLORIDE 200 MG: 200 TABLET ORAL at 21:35

## 2024-04-05 RX ADMIN — NITROGLYCERIN 1 INCH: 20 OINTMENT TOPICAL at 04:11

## 2024-04-05 RX ADMIN — ATORVASTATIN CALCIUM 40 MG: 40 TABLET, FILM COATED ORAL at 21:35

## 2024-04-05 RX ADMIN — MODAFINIL 200 MG: 100 TABLET ORAL at 08:32

## 2024-04-05 RX ADMIN — POLYETHYLENE GLYCOL (3350) 17 G: 17 POWDER, FOR SOLUTION ORAL at 08:32

## 2024-04-05 RX ADMIN — MIDODRINE HYDROCHLORIDE 10 MG: 10 TABLET ORAL at 12:34

## 2024-04-05 RX ADMIN — Medication 125 MCG/HR: at 03:58

## 2024-04-05 RX ADMIN — DIATRIZOATE MEGLUMINE AND DIATRIZOATE SODIUM 30 ML: 660; 100 LIQUID ORAL; RECTAL at 11:52

## 2024-04-05 RX ADMIN — Medication: at 14:23

## 2024-04-05 RX ADMIN — MIDODRINE HYDROCHLORIDE 10 MG: 10 TABLET ORAL at 08:31

## 2024-04-05 RX ADMIN — ALBUMIN (HUMAN) 25 G: 0.25 INJECTION, SOLUTION INTRAVENOUS at 21:33

## 2024-04-05 RX ADMIN — SODIUM BICARBONATE: 84 INJECTION, SOLUTION INTRAVENOUS at 22:06

## 2024-04-05 RX ADMIN — PANTOPRAZOLE SODIUM 40 MG: 40 INJECTION, POWDER, FOR SOLUTION INTRAVENOUS at 21:33

## 2024-04-05 RX ADMIN — OXYCODONE AND ACETAMINOPHEN 1 TABLET: 7.5; 325 TABLET ORAL at 16:44

## 2024-04-05 RX ADMIN — CHLORHEXIDINE GLUCONATE 0.12% ORAL RINSE 15 ML: 1.2 LIQUID ORAL at 22:58

## 2024-04-05 RX ADMIN — CHLORHEXIDINE GLUCONATE 0.12% ORAL RINSE 15 ML: 1.2 LIQUID ORAL at 08:37

## 2024-04-05 RX ADMIN — Medication: at 21:52

## 2024-04-05 RX ADMIN — AMIODARONE HYDROCHLORIDE 200 MG: 200 TABLET ORAL at 08:31

## 2024-04-05 RX ADMIN — SODIUM CHLORIDE, PRESERVATIVE FREE 10 ML: 5 INJECTION INTRAVENOUS at 08:35

## 2024-04-05 RX ADMIN — NITROGLYCERIN 1 INCH: 20 OINTMENT TOPICAL at 00:24

## 2024-04-05 RX ADMIN — METHYLENE BLUE 5 MG: 5 INJECTION INTRAVENOUS at 15:49

## 2024-04-05 RX ADMIN — COLLAGENASE SANTYL: 250 OINTMENT TOPICAL at 08:32

## 2024-04-05 RX ADMIN — Medication 1 TABLET: at 08:31

## 2024-04-05 RX ADMIN — OLANZAPINE 7.5 MG: 5 TABLET, FILM COATED ORAL at 21:36

## 2024-04-05 RX ADMIN — Medication: at 22:51

## 2024-04-05 RX ADMIN — Medication: at 22:54

## 2024-04-05 RX ADMIN — SENNOSIDES 8.8 MG: 8.8 LIQUID ORAL at 08:32

## 2024-04-05 ASSESSMENT — PULMONARY FUNCTION TESTS
PIF_VALUE: 20
PIF_VALUE: 20
PIF_VALUE: 19
PIF_VALUE: 21
PIF_VALUE: 20
PIF_VALUE: 27
PIF_VALUE: 21

## 2024-04-05 NOTE — PROCEDURES
Date: 3/9/2024  Referring physician: Dr. Olivarez    Indication  Patient aged 69 y with encephalopathy. EEG done to assess for epileptiform activity.    Introduction  This 5 hrs EEG was recorded using the [employer]EM[employer] one band system. Automated seizure detection algorithms were applied.    Description  The background consistent of diffuse none to minimally  reactive polymorphic delta and theta slowing.  . No consistent focal slowing or interhemispheric asymmetry was noted. Stage I and stage II sleep were  not observed. There were no interictal epileptiform discharges or electrographic seizures.    Activations  Hyperventilation was not performed. Intermittent photic stimulation was performed and demonstrated no posterior driving response.    Impression  Abnormal awake EEG. The slowing mentioned above suggests moderate non specific encephalopathy.     No epileptiform discharges were identified. Please note the absence of such activity on this record cannot conclusively rule out an epileptic disorder. If such is still clinically suspected, a repeat study with sleep deprivation and/or prolonged sampling may be helpful.    Please note this EEG was meant to screen for emergent condition and is prone to artifact and with some limitations. The interpretation of this EEG result should be taken only with clinical correlation. Ideally regular EEG with full leads should be considered when possible.     Adelita Solis MD  Epilepsy Board Certified.  Neurology Board Certified.    Electronically Signed    
12 lead EKG completed. Results handed to Fred RAYA.   
12 lead EKG completed. Results handed to J CARLOS Paez  
12 lead EKG completed. Results handed to Priscilla RAYA.   
Central Line Placement: Risks and benefits to the procedure were discussed.  Alternatives and their risks were discussed as well.  Patient was placed in the attention position.  Patient was placed in Trendelenburg position.  Patient was prepped using Chlorhexidene prep.  Patient was draped utilizing sterile gloves, hair net, mask, sterile gown and sterile OR towels.  Maximum barrier precautions were utilized.  Utilizing the right subclavian approach, patient received 5 cc of 1% lidocaine.  Utilizing an introducer needle, it was placed subcutaneously advanced under the clavicle and leveled flat.  It was subsequently advanced toward the thoracic inlet, until venous return was obtained.  A guide wire was placed through the introducer needle.  The introducer needle was subsequently withdrawn leaving the guide wire in place.  Utilizing a scalpel, a small incision was made in the skin.  A punch dilator was placed over the guide wire and subsequently withdrawn leaving the guide wire in place.  A dialysis catheter was subsequently placed over the guide wire.  The guide wire was subsequently withdrawn leaving this catheter in place.  All ports had good venous return and were subsequently flushed with saline.  The catheter was secured using 2.0 silk.  Secondary cleansing with chlorhexidine prep was subsequently placed. Tegaderm with bio- patch dressing was utilized for secondary securing and protection.  There were no complications.    EBL:   Less than 5 mL      Indication:  acute renal failure    Electronically signed by Fco Olivarez MD  
ECMO started at 1808  
EKG was completed and handed to Inna RAYA  
EKG was completed and handed to Inna RAYA  
EKG was completed and handed to Jhony RAYA  
EKG was completed and handed to Sue RAYA  
Mumtaz Islas is a 69 y.o. male patient.  1. S/P mitral valve repair    2. Other hypotension    3. CAD, multiple vessel    4. CAD in native artery    5. Coronary artery disease involving native coronary artery of native heart without angina pectoris    6. PAD (peripheral artery disease) ([facility])      Past Medical History:   Diagnosis Date    Alcoholism ([facility])      quit in the 90    Alopecia     Angina at rest     CAD (coronary artery disease)      self , stents     Depression     Esophageal cancer ([facility]) 06/06/2012    took esophageal cancer out    Gall stone     out as of 6-12    Gastroparesis     GERD (gastroesophageal reflux disease)     Hyperlipidemia     self and family    Hypertension     self and family    Hypothyroidism     family    Kidney stones     pt wife states never had any kidney stone.  he had a gall stone    medtronic dual pacer  04/20/2021    Migraines     self and family    Osteoarthritis     self and family (in back)    PVD (peripheral vascular disease) ([facility])     Type II diabetes mellitus ([facility])     self and family     Blood pressure (!) 125/106, pulse 80, temperature 99.5 °F (37.5 °C), temperature source Core, resp. rate 24, height 1.727 m (5' 8\"), weight 79.9 kg (176 lb 2.4 oz), SpO2 99 %.    Insert Arterial Line    Date/Time: 3/25/2024 2:19 PM    Performed by: Enrique Castro DO  Authorized by: Fco Olivarez MD  Consent: Written consent obtained.  Risks and benefits: risks, benefits and alternatives were discussed  Consent given by: spouse  Required items: required blood products, implants, devices, and special equipment available  Patient identity confirmed: hospital-assigned identification number  Time out: Immediately prior to procedure a \"time out\" was called to verify the correct patient, procedure, equipment, support staff and site/side marked as required.  Preparation: Patient was prepped and draped in the usual sterile fashion.  Indications: multiple ABGs and hemodynamic 
PERCUTANEOUS TRACHEOSTOMY PLACEMENT    Risks and benefits to the procedure were discussed.  Alternatives and their risks were discussed as well.    INDICATION:respiratory failure    SEDATION:  100 mg Fentanyl, 100 mg Ketamine, 4 mg Ativan, 10 mg Nimbex.    EBL:less than 5 ml    COMPLICATIONS:none    PROCEDURE:   After informed consent was obtained, patient received sedation as detailed above.  Utilizing contact precautions, patient was prepped using chlorhexidine prep, and draped utilizing sterile gloves, sterile gown, hair neck, and mask.   Procedure was performed under direct bronchoscopy.   Endotracheal tube was pulled back using blotting technique and area secured. Patient received a total of 8 cc of lidocaine with epinephrine.  Utilizing a scalpel, a 1/2  inch incision was made in the skin vertically. Introducer needle was placed into the trachea under direct bronchoscopic visualization. Guidewire was placed through the introducer needle.  Introducer needle was withdrawn leaving the guidewire in place. Punch dilator was placed over the guidewire and subsequently removed. Guiding sheath was placed over the guidewire.  Expansion dilator was placed onto the guiding sheath.   The unit was subsequently advanced into the trachea under direct bronchoscopic visualization until 32 [place] was visualized within the airway.  Dilator was removed leaving the guiding sheath and guidewire in position.  A number 7.5 mm Bivona was loaded onto dilator and placed on the guiding sheath and guidewire. The unit was advanced into the trachea under direct supervision via bronchoscopy.  Tracheostomy tube was successfully placed.  Bronchoscopy was placed into tracheostomy and verified position.   Tracheostomy cuff was inflated, and then hooked up to the tracheostomy tube. Protective gauze was placed at the insertion site. Tracheostomy was secured to the skin utilizing  2.0 silk. There were no immediate issue, and the procedure was 
PROCEDURE NOTE  Date: 3/27/2024   Name: Mumtaz Islas  YOB: 1955    Central Line    Date/Time: 3/27/2024 3:04 PM    Performed by: Enrique Castro DO  Authorized by: Fco Olivarez MD  Consent: The procedure was performed in an emergent situation.  Relevant documents: relevant documents present and verified  Test results: test results available and properly labeled  Site marked: the operative site was marked  Imaging studies: imaging studies available  Required items: required blood products, implants, devices, and special equipment available  Patient identity confirmed: arm band and hospital-assigned identification number  Time out: Immediately prior to procedure a \"time out\" was called to verify the correct patient, procedure, equipment, support staff and site/side marked as required.  Indications: vascular access  Preparation: skin prepped with ChloraPrep  Skin prep agent dried: skin prep agent completely dried prior to procedure  Sterile barriers: all five maximum sterile barriers used - cap, mask, sterile gown, sterile gloves, and large sterile sheet  Hand hygiene: hand hygiene performed prior to central venous catheter insertion  Location details: left internal jugular  Patient position: flat  Catheter type: triple lumen  Catheter size: 7 Fr  Pre-procedure: landmarks identified  Ultrasound guidance: yes  Sterile ultrasound techniques: sterile gel and sterile probe covers were used  Number of attempts: 3  Successful placement: yes  Post-procedure: line sutured and dressing applied  Assessment: blood return through all ports, free fluid flow, placement verified by x-ray and no pneumothorax on x-ray  Patient tolerance: patient tolerated the procedure well with no immediate complications        Supervised by Dr. Sher JASSO attending was available if assistance needed   Supervised by Dr. Kenny MCKAY PGY 3   Performed by Enrique Castro DO PGY1       
ROUTINE 12-LEAD EKG completed and given to Nelly RAYA and Prem RAYA.  
STAT 12-LEAD EKG completed and given to Felipe RAYA.  
STAT 12-LEAD EKG completed and given to Nelly RAYA.  
PA   8.6 mg at 03/09/24 0850    [Held by provider] metoprolol tartrate (LOPRESSOR) tablet 25 mg  25 mg Oral BID Toshia Garcia PA        atorvastatin (LIPITOR) tablet 40 mg  40 mg Oral Nightly Toshia Garcia PA   40 mg at 03/21/24 2047    sodium chloride 0.9 % bolus 500 mL  500 mL IntraVENous Continuous PRN Toshia Garcia PA   Stopped at 03/06/24 1433     Technical Description: This is a 21 channel digital EEG recording with time-locked video. Electrodes were placed in accordance with the 10-20 International System of Electrode Placement. Single lead EKG monitoring as well as temporal electrodes were included.    The patient was not sleep deprived. This recording was obtained during wakefulness.  EEG Description: The dominant background activity during maximal recorded wakefulness and sleep during intubated state consisted of a suppressed background admixed with beta activity.  The background was symmetric and continuous. There was poor anterior posterior amplitude gradient.  Sleep architecture was seen with sleep spindles.    Activation procedures were not performed.     The EKG channel demonstrated a normal sinus rhythm.    Interpretation  This EEG was abnormal due to disorganized and suppressed background with beta  frequencies.      Clinical correlation  This EEG was abnormal. Disorganized and suppressed background suggested severe encephalopathy. The beta frequencies can be seen due to medication use.     Chemo Rod MD

## 2024-04-06 ENCOUNTER — APPOINTMENT (OUTPATIENT)
Dept: CT IMAGING | Age: 69
DRG: 003 | End: 2024-04-06
Attending: THORACIC SURGERY (CARDIOTHORACIC VASCULAR SURGERY)
Payer: MEDICARE

## 2024-04-06 ENCOUNTER — APPOINTMENT (OUTPATIENT)
Dept: GENERAL RADIOLOGY | Age: 69
DRG: 003 | End: 2024-04-06
Attending: THORACIC SURGERY (CARDIOTHORACIC VASCULAR SURGERY)
Payer: MEDICARE

## 2024-04-06 VITALS
HEIGHT: 68 IN | OXYGEN SATURATION: 92 % | DIASTOLIC BLOOD PRESSURE: 64 MMHG | WEIGHT: 178.79 LBS | SYSTOLIC BLOOD PRESSURE: 84 MMHG | TEMPERATURE: 98.6 F | RESPIRATION RATE: 22 BRPM | HEART RATE: 85 BPM | BODY MASS INDEX: 27.1 KG/M2

## 2024-04-06 PROBLEM — R34 ANURIA: Status: ACTIVE | Noted: 2024-04-06

## 2024-04-06 PROBLEM — A41.9 SEPTIC SHOCK (HCC): Status: ACTIVE | Noted: 2024-04-06

## 2024-04-06 PROBLEM — E87.5 HYPERKALEMIA: Status: ACTIVE | Noted: 2024-04-06

## 2024-04-06 PROBLEM — N17.9 ENCOUNTER FOR CONTINUOUS RENAL REPLACEMENT THERAPY (CRRT) FOR ACUTE RENAL FAILURE (HCC): Status: ACTIVE | Noted: 2024-04-06

## 2024-04-06 PROBLEM — R65.21 SEPTIC SHOCK (HCC): Status: ACTIVE | Noted: 2024-04-06

## 2024-04-06 PROBLEM — N17.9 AKI (ACUTE KIDNEY INJURY) (HCC): Status: ACTIVE | Noted: 2024-04-06

## 2024-04-06 LAB
ABO: NORMAL
ACINETOBACTER CALCOACETICUS-BAUMANNII BY PCR: NOT DETECTED
ALBUMIN SERPL BCG-MCNC: 2.2 G/DL (ref 3.5–5.1)
ALBUMIN SERPL BCG-MCNC: 2.5 G/DL (ref 3.5–5.1)
ALBUMIN SERPL BCG-MCNC: 2.7 G/DL (ref 3.5–5.1)
ALBUMIN SERPL BCG-MCNC: 2.7 G/DL (ref 3.5–5.1)
ALP SERPL-CCNC: 397 U/L (ref 38–126)
ALP SERPL-CCNC: 406 U/L (ref 38–126)
ALP SERPL-CCNC: 441 U/L (ref 38–126)
ALP SERPL-CCNC: 454 U/L (ref 38–126)
ALT SERPL W/O P-5'-P-CCNC: 10 U/L (ref 11–66)
ALT SERPL W/O P-5'-P-CCNC: 11 U/L (ref 11–66)
ALT SERPL W/O P-5'-P-CCNC: 12 U/L (ref 11–66)
ALT SERPL W/O P-5'-P-CCNC: 12 U/L (ref 11–66)
ANION GAP SERPL CALC-SCNC: 21 MEQ/L (ref 8–16)
ANION GAP SERPL CALC-SCNC: 21 MEQ/L (ref 8–16)
ANION GAP SERPL CALC-SCNC: 22 MEQ/L (ref 8–16)
ANION GAP SERPL CALC-SCNC: 23 MEQ/L (ref 8–16)
ANTIBODY SCREEN: NORMAL
AST SERPL-CCNC: 27 U/L (ref 5–40)
AST SERPL-CCNC: 27 U/L (ref 5–40)
AST SERPL-CCNC: 34 U/L (ref 5–40)
AST SERPL-CCNC: 34 U/L (ref 5–40)
BASE EXCESS BLDA CALC-SCNC: -9.1 MMOL/L (ref -2–3)
BILIRUB CONJ SERPL-MCNC: 1.2 MG/DL (ref 0–0.3)
BILIRUB SERPL-MCNC: 1.6 MG/DL (ref 0.3–1.2)
BILIRUB SERPL-MCNC: 1.7 MG/DL (ref 0.3–1.2)
BILIRUB SERPL-MCNC: 1.8 MG/DL (ref 0.3–1.2)
BILIRUB SERPL-MCNC: 1.9 MG/DL (ref 0.3–1.2)
BLACTX-M ISLT/SPM QL: NORMAL
BLAIMP ISLT/SPM QL: NORMAL
BLAKPC ISLT/SPM QL: NORMAL
BLAOXA-48-LIKE ISLT/SPM QL: NORMAL
BLAVIM ISLT/SPM QL: NORMAL
BUN SERPL-MCNC: 28 MG/DL (ref 7–22)
BUN SERPL-MCNC: 37 MG/DL (ref 7–22)
BUN SERPL-MCNC: 43 MG/DL (ref 7–22)
BUN SERPL-MCNC: 47 MG/DL (ref 7–22)
C PNEUM DNA LOWER RESP QL NAA+NON-PROBE: NOT DETECTED
CA-I BLD ISE-SCNC: 1.38 MMOL/L (ref 1.12–1.32)
CA-I BLD ISE-SCNC: 1.39 MMOL/L (ref 1.12–1.32)
CA-I BLD ISE-SCNC: 1.4 MMOL/L (ref 1.12–1.32)
CA-I BLD ISE-SCNC: 1.51 MMOL/L (ref 1.12–1.32)
CALCIUM SERPL-MCNC: 10.6 MG/DL (ref 8.5–10.5)
CALCIUM SERPL-MCNC: 10.9 MG/DL (ref 8.5–10.5)
CALCIUM SERPL-MCNC: 11 MG/DL (ref 8.5–10.5)
CALCIUM SERPL-MCNC: 9.7 MG/DL (ref 8.5–10.5)
CHLORIDE SERPL-SCNC: 98 MEQ/L (ref 98–111)
CHLORIDE SERPL-SCNC: 99 MEQ/L (ref 98–111)
CO2 SERPL-SCNC: 12 MEQ/L (ref 23–33)
CO2 SERPL-SCNC: 15 MEQ/L (ref 23–33)
CO2 SERPL-SCNC: 15 MEQ/L (ref 23–33)
CO2 SERPL-SCNC: 16 MEQ/L (ref 23–33)
COLLECTED BY:: ABNORMAL
CREAT SERPL-MCNC: 1.7 MG/DL (ref 0.4–1.2)
CREAT SERPL-MCNC: 2.1 MG/DL (ref 0.4–1.2)
CREAT SERPL-MCNC: 2.4 MG/DL (ref 0.4–1.2)
CREAT SERPL-MCNC: 2.8 MG/DL (ref 0.4–1.2)
DEPRECATED RDW RBC AUTO: 73.9 FL (ref 35–45)
DEPRECATED RDW RBC AUTO: 74.4 FL (ref 35–45)
DEPRECATED RDW RBC AUTO: 75 FL (ref 35–45)
DEPRECATED RDW RBC AUTO: 75.1 FL (ref 35–45)
EKG ATRIAL RATE: 202 BPM
EKG Q-T INTERVAL: 432 MS
EKG QRS DURATION: 140 MS
EKG QTC CALCULATION (BAZETT): 531 MS
EKG R AXIS: -29 DEGREES
EKG T AXIS: 152 DEGREES
EKG VENTRICULAR RATE: 91 BPM
ENTEROBACTER CLOACAE COMPLEX BY PCR: NOT DETECTED
ERYTHROCYTE [DISTWIDTH] IN BLOOD BY AUTOMATED COUNT: 21.5 % (ref 11.5–14.5)
ERYTHROCYTE [DISTWIDTH] IN BLOOD BY AUTOMATED COUNT: 21.5 % (ref 11.5–14.5)
ERYTHROCYTE [DISTWIDTH] IN BLOOD BY AUTOMATED COUNT: 21.6 % (ref 11.5–14.5)
ERYTHROCYTE [DISTWIDTH] IN BLOOD BY AUTOMATED COUNT: 21.7 % (ref 11.5–14.5)
ESCHERICHIA COLI BY PCR: NOT DETECTED
FIO2 ON VENT O2 ANALYZER: 100 %
FLUAV RNA LOWER RESP QL NAA+NON-PROBE: NOT DETECTED
FLUBV RNA LOWER RESP QL NAA+NON-PROBE: NOT DETECTED
GFR SERPL CREATININE-BSD FRML MDRD: 23 ML/MIN/1.73M2
GFR SERPL CREATININE-BSD FRML MDRD: 28 ML/MIN/1.73M2
GFR SERPL CREATININE-BSD FRML MDRD: 33 ML/MIN/1.73M2
GFR SERPL CREATININE-BSD FRML MDRD: 43 ML/MIN/1.73M2
GLUCOSE BLD STRIP.AUTO-MCNC: 103 MG/DL (ref 70–108)
GLUCOSE SERPL-MCNC: 113 MG/DL (ref 70–108)
GLUCOSE SERPL-MCNC: 127 MG/DL (ref 70–108)
GLUCOSE SERPL-MCNC: 131 MG/DL (ref 70–108)
GLUCOSE SERPL-MCNC: 97 MG/DL (ref 70–108)
HADV DNA LOWER RESP QL NAA+NON-PROBE: NOT DETECTED
HAEMOPHILUS INFLUENZAE BY PCR: NOT DETECTED
HCO3 BLDA-SCNC: 16 MMOL/L (ref 23–28)
HCOV RNA LOWER RESP QL NAA+NON-PROBE: NOT DETECTED
HCT VFR BLD AUTO: 23.6 % (ref 42–52)
HCT VFR BLD AUTO: 24.8 % (ref 42–52)
HCT VFR BLD AUTO: 26.2 % (ref 42–52)
HCT VFR BLD AUTO: 26.3 % (ref 42–52)
HGB BLD-MCNC: 7.4 GM/DL (ref 14–18)
HGB BLD-MCNC: 7.6 GM/DL (ref 14–18)
HGB BLD-MCNC: 8.1 GM/DL (ref 14–18)
HGB BLD-MCNC: 8.3 GM/DL (ref 14–18)
HMPV RNA LOWER RESP QL NAA+NON-PROBE: NOT DETECTED
HPIV RNA LOWER RESP QL NAA+NON-PROBE: NOT DETECTED
KLEBSIELLA AEROGENES BY PCR: NOT DETECTED
KLEBSIELLA OXYTOCA BY PCR: NOT DETECTED
KLEBSIELLA PNEUMONIAE GROUP BY PCR: NOT DETECTED
L PNEUMO DNA LOWER RESP QL NAA+NON-PROBE: NOT DETECTED
LACTATE SERPL-SCNC: 10.9 MMOL/L (ref 0.5–2)
LACTATE SERPL-SCNC: 5.9 MMOL/L (ref 0.5–2)
LACTATE SERPL-SCNC: 6.9 MMOL/L (ref 0.5–2)
LACTATE SERPL-SCNC: 7.7 MMOL/L (ref 0.5–2)
M PNEUMO DNA LOWER RESP QL NAA+NON-PROBE: NOT DETECTED
MAGNESIUM SERPL-MCNC: 2.6 MG/DL (ref 1.6–2.4)
MAGNESIUM SERPL-MCNC: 2.8 MG/DL (ref 1.6–2.4)
MAGNESIUM SERPL-MCNC: 2.8 MG/DL (ref 1.6–2.4)
MAGNESIUM SERPL-MCNC: 3 MG/DL (ref 1.6–2.4)
MCH RBC QN AUTO: 29.8 PG (ref 26–33)
MCH RBC QN AUTO: 29.9 PG (ref 26–33)
MCH RBC QN AUTO: 30.1 PG (ref 26–33)
MCH RBC QN AUTO: 30.4 PG (ref 26–33)
MCHC RBC AUTO-ENTMCNC: 30.6 GM/DL (ref 32.2–35.5)
MCHC RBC AUTO-ENTMCNC: 30.9 GM/DL (ref 32.2–35.5)
MCHC RBC AUTO-ENTMCNC: 31.4 GM/DL (ref 32.2–35.5)
MCHC RBC AUTO-ENTMCNC: 31.6 GM/DL (ref 32.2–35.5)
MCV RBC AUTO: 95.9 FL (ref 80–94)
MCV RBC AUTO: 96.3 FL (ref 80–94)
MCV RBC AUTO: 96.7 FL (ref 80–94)
MCV RBC AUTO: 97.3 FL (ref 80–94)
MORAXELLA CATARRHALIS BY PCR: NOT DETECTED
PCO2 TEMP ADJ BLDMV: 28 MMHG (ref 41–51)
PH BLDMV: 7.36 [PH] (ref 7.31–7.41)
PHOSPHATE SERPL-MCNC: 3.6 MG/DL (ref 2.4–4.7)
PHOSPHATE SERPL-MCNC: 4.1 MG/DL (ref 2.4–4.7)
PHOSPHATE SERPL-MCNC: 4.5 MG/DL (ref 2.4–4.7)
PHOSPHATE SERPL-MCNC: 5.5 MG/DL (ref 2.4–4.7)
PLATELET # BLD AUTO: 327 THOU/MM3 (ref 130–400)
PLATELET # BLD AUTO: 337 THOU/MM3 (ref 130–400)
PLATELET # BLD AUTO: 363 THOU/MM3 (ref 130–400)
PLATELET # BLD AUTO: 385 THOU/MM3 (ref 130–400)
PMV BLD AUTO: 10 FL (ref 9.4–12.4)
PMV BLD AUTO: 10.1 FL (ref 9.4–12.4)
PMV BLD AUTO: 9.9 FL (ref 9.4–12.4)
PMV BLD AUTO: 9.9 FL (ref 9.4–12.4)
PO2 BLDMV: 28 MMHG (ref 25–40)
POTASSIUM SERPL-SCNC: 4.5 MEQ/L (ref 3.5–5.2)
POTASSIUM SERPL-SCNC: 4.7 MEQ/L (ref 3.5–5.2)
POTASSIUM SERPL-SCNC: 4.8 MEQ/L (ref 3.5–5.2)
POTASSIUM SERPL-SCNC: 4.9 MEQ/L (ref 3.5–5.2)
PROT SERPL-MCNC: 4.8 G/DL (ref 6.1–8)
PROT SERPL-MCNC: 4.9 G/DL (ref 6.1–8)
PROT SERPL-MCNC: 5.5 G/DL (ref 6.1–8)
PROT SERPL-MCNC: 5.5 G/DL (ref 6.1–8)
PROTEUS SPECIES BY PCR: NOT DETECTED
PSEUDOMONAS AERUGINOSA BY PCR: NOT DETECTED
PTH-INTACT SERPL-MCNC: 159 PG/ML (ref 15–65)
RBC # BLD AUTO: 2.46 MILL/MM3 (ref 4.7–6.1)
RBC # BLD AUTO: 2.55 MILL/MM3 (ref 4.7–6.1)
RBC # BLD AUTO: 2.71 MILL/MM3 (ref 4.7–6.1)
RBC # BLD AUTO: 2.73 MILL/MM3 (ref 4.7–6.1)
RESISTANT GENE MECA/C & MREJ BY PCR: NORMAL
RESISTANT GENE NDM BY PCR: NORMAL
RH FACTOR: NORMAL
RSV RNA LOWER RESP QL NAA+NON-PROBE: NOT DETECTED
RV+EV RNA LOWER RESP QL NAA+NON-PROBE: NOT DETECTED
SAO2 % BLDMV: 51 %
SERRATIA MARCESCENS BY PCR: NOT DETECTED
SODIUM SERPL-SCNC: 134 MEQ/L (ref 135–145)
SODIUM SERPL-SCNC: 135 MEQ/L (ref 135–145)
SODIUM SERPL-SCNC: 135 MEQ/L (ref 135–145)
SODIUM SERPL-SCNC: 136 MEQ/L (ref 135–145)
SOURCE: NORMAL
SPECIMEN ACCEPTABILITY: NORMAL
STAPH AUREUS BY PCR: NOT DETECTED
STREP AGALACTIAE BY PCR: NOT DETECTED
STREP PNEUMONIAE BY PCR: NOT DETECTED
STREP PYOGENES BY PCR: NOT DETECTED
VENTILATION MODE VENT: AC
WBC # BLD AUTO: 10.3 THOU/MM3 (ref 4.8–10.8)
WBC # BLD AUTO: 11.9 THOU/MM3 (ref 4.8–10.8)
WBC # BLD AUTO: 12.1 THOU/MM3 (ref 4.8–10.8)
WBC # BLD AUTO: 13.8 THOU/MM3 (ref 4.8–10.8)

## 2024-04-06 PROCEDURE — 6370000000 HC RX 637 (ALT 250 FOR IP): Performed by: INTERNAL MEDICINE

## 2024-04-06 PROCEDURE — 2580000003 HC RX 258: Performed by: INTERNAL MEDICINE

## 2024-04-06 PROCEDURE — 87541 LEGION PNEUMO DNA AMP PROB: CPT

## 2024-04-06 PROCEDURE — 82248 BILIRUBIN DIRECT: CPT

## 2024-04-06 PROCEDURE — 83735 ASSAY OF MAGNESIUM: CPT

## 2024-04-06 PROCEDURE — 6360000002 HC RX W HCPCS: Performed by: INTERNAL MEDICINE

## 2024-04-06 PROCEDURE — 83970 ASSAY OF PARATHORMONE: CPT

## 2024-04-06 PROCEDURE — 87631 RESP VIRUS 3-5 TARGETS: CPT

## 2024-04-06 PROCEDURE — 6360000002 HC RX W HCPCS: Performed by: STUDENT IN AN ORGANIZED HEALTH CARE EDUCATION/TRAINING PROGRAM

## 2024-04-06 PROCEDURE — 99233 SBSQ HOSP IP/OBS HIGH 50: CPT | Performed by: INTERNAL MEDICINE

## 2024-04-06 PROCEDURE — 87486 CHLMYD PNEUM DNA AMP PROBE: CPT

## 2024-04-06 PROCEDURE — 6360000002 HC RX W HCPCS

## 2024-04-06 PROCEDURE — 99291 CRITICAL CARE FIRST HOUR: CPT | Performed by: INTERNAL MEDICINE

## 2024-04-06 PROCEDURE — 74176 CT ABD & PELVIS W/O CONTRAST: CPT

## 2024-04-06 PROCEDURE — 6370000000 HC RX 637 (ALT 250 FOR IP)

## 2024-04-06 PROCEDURE — 82330 ASSAY OF CALCIUM: CPT

## 2024-04-06 PROCEDURE — 36415 COLL VENOUS BLD VENIPUNCTURE: CPT

## 2024-04-06 PROCEDURE — 85027 COMPLETE CBC AUTOMATED: CPT

## 2024-04-06 PROCEDURE — 2500000003 HC RX 250 WO HCPCS: Performed by: INTERNAL MEDICINE

## 2024-04-06 PROCEDURE — 2500000003 HC RX 250 WO HCPCS: Performed by: STUDENT IN AN ORGANIZED HEALTH CARE EDUCATION/TRAINING PROGRAM

## 2024-04-06 PROCEDURE — 94761 N-INVAS EAR/PLS OXIMETRY MLT: CPT

## 2024-04-06 PROCEDURE — 6360000002 HC RX W HCPCS: Performed by: THORACIC SURGERY (CARDIOTHORACIC VASCULAR SURGERY)

## 2024-04-06 PROCEDURE — 2700000000 HC OXYGEN THERAPY PER DAY

## 2024-04-06 PROCEDURE — 6370000000 HC RX 637 (ALT 250 FOR IP): Performed by: PHYSICIAN ASSISTANT

## 2024-04-06 PROCEDURE — 83605 ASSAY OF LACTIC ACID: CPT

## 2024-04-06 PROCEDURE — 87040 BLOOD CULTURE FOR BACTERIA: CPT

## 2024-04-06 PROCEDURE — 87581 M.PNEUMON DNA AMP PROBE: CPT

## 2024-04-06 PROCEDURE — C9113 INJ PANTOPRAZOLE SODIUM, VIA: HCPCS | Performed by: INTERNAL MEDICINE

## 2024-04-06 PROCEDURE — 71045 X-RAY EXAM CHEST 1 VIEW: CPT

## 2024-04-06 PROCEDURE — 82652 VIT D 1 25-DIHYDROXY: CPT

## 2024-04-06 PROCEDURE — 84100 ASSAY OF PHOSPHORUS: CPT

## 2024-04-06 PROCEDURE — 87798 DETECT AGENT NOS DNA AMP: CPT

## 2024-04-06 PROCEDURE — 2580000003 HC RX 258

## 2024-04-06 PROCEDURE — 86901 BLOOD TYPING SEROLOGIC RH(D): CPT

## 2024-04-06 PROCEDURE — 6370000000 HC RX 637 (ALT 250 FOR IP): Performed by: SURGERY

## 2024-04-06 PROCEDURE — 82948 REAGENT STRIP/BLOOD GLUCOSE: CPT

## 2024-04-06 PROCEDURE — 82803 BLOOD GASES ANY COMBINATION: CPT

## 2024-04-06 PROCEDURE — 87205 SMEAR GRAM STAIN: CPT

## 2024-04-06 PROCEDURE — 86850 RBC ANTIBODY SCREEN: CPT

## 2024-04-06 PROCEDURE — 80053 COMPREHEN METABOLIC PANEL: CPT

## 2024-04-06 PROCEDURE — 87070 CULTURE OTHR SPECIMN AEROBIC: CPT

## 2024-04-06 PROCEDURE — 2580000003 HC RX 258: Performed by: PHYSICIAN ASSISTANT

## 2024-04-06 PROCEDURE — 86900 BLOOD TYPING SEROLOGIC ABO: CPT

## 2024-04-06 PROCEDURE — 94003 VENT MGMT INPAT SUBQ DAY: CPT

## 2024-04-06 PROCEDURE — 2580000003 HC RX 258: Performed by: THORACIC SURGERY (CARDIOTHORACIC VASCULAR SURGERY)

## 2024-04-06 RX ORDER — FENTANYL CITRATE-0.9 % NACL/PF 10 MCG/ML
25-200 PLASTIC BAG, INJECTION (ML) INTRAVENOUS CONTINUOUS
Status: DISCONTINUED | OUTPATIENT
Start: 2024-04-06 | End: 2024-04-06

## 2024-04-06 RX ORDER — 0.9 % SODIUM CHLORIDE 0.9 %
500 INTRAVENOUS SOLUTION INTRAVENOUS ONCE
Status: COMPLETED | OUTPATIENT
Start: 2024-04-06 | End: 2024-04-06

## 2024-04-06 RX ORDER — LORAZEPAM 2 MG/ML
1 CONCENTRATE ORAL
Status: DISCONTINUED | OUTPATIENT
Start: 2024-04-06 | End: 2024-04-06 | Stop reason: HOSPADM

## 2024-04-06 RX ORDER — LORAZEPAM 2 MG/ML
4 INJECTION INTRAMUSCULAR ONCE
Status: COMPLETED | OUTPATIENT
Start: 2024-04-06 | End: 2024-04-06

## 2024-04-06 RX ORDER — LORAZEPAM 2 MG/ML
1 INJECTION INTRAMUSCULAR
Status: DISCONTINUED | OUTPATIENT
Start: 2024-04-06 | End: 2024-04-06 | Stop reason: HOSPADM

## 2024-04-06 RX ORDER — MORPHINE SULFATE 4 MG/ML
4 INJECTION, SOLUTION INTRAMUSCULAR; INTRAVENOUS
Status: DISCONTINUED | OUTPATIENT
Start: 2024-04-06 | End: 2024-04-06 | Stop reason: HOSPADM

## 2024-04-06 RX ORDER — MORPHINE SULFATE 2 MG/ML
1 INJECTION, SOLUTION INTRAMUSCULAR; INTRAVENOUS CONTINUOUS
Status: DISCONTINUED | OUTPATIENT
Start: 2024-04-06 | End: 2024-04-06 | Stop reason: HOSPADM

## 2024-04-06 RX ORDER — MORPHINE SULFATE 2 MG/ML
2 INJECTION, SOLUTION INTRAMUSCULAR; INTRAVENOUS
Status: DISCONTINUED | OUTPATIENT
Start: 2024-04-06 | End: 2024-04-06 | Stop reason: HOSPADM

## 2024-04-06 RX ORDER — GLYCOPYRROLATE 0.2 MG/ML
0.1 INJECTION INTRAMUSCULAR; INTRAVENOUS EVERY 4 HOURS PRN
Status: DISCONTINUED | OUTPATIENT
Start: 2024-04-06 | End: 2024-04-06

## 2024-04-06 RX ORDER — GLYCOPYRROLATE 0.2 MG/ML
0.4 INJECTION INTRAMUSCULAR; INTRAVENOUS EVERY 4 HOURS PRN
Status: DISCONTINUED | OUTPATIENT
Start: 2024-04-06 | End: 2024-04-06 | Stop reason: HOSPADM

## 2024-04-06 RX ADMIN — GLYCOPYRROLATE 0.4 MG: 0.2 INJECTION INTRAMUSCULAR; INTRAVENOUS at 14:31

## 2024-04-06 RX ADMIN — Medication: at 05:01

## 2024-04-06 RX ADMIN — CHLORHEXIDINE GLUCONATE 0.12% ORAL RINSE 15 ML: 1.2 LIQUID ORAL at 08:53

## 2024-04-06 RX ADMIN — Medication: at 12:01

## 2024-04-06 RX ADMIN — Medication 50 MCG/MIN: at 07:49

## 2024-04-06 RX ADMIN — COLLAGENASE SANTYL: 250 OINTMENT TOPICAL at 08:54

## 2024-04-06 RX ADMIN — Medication 45 MCG/MIN: at 02:09

## 2024-04-06 RX ADMIN — PIPERACILLIN AND TAZOBACTAM 4500 MG: 4; .5 INJECTION, POWDER, FOR SOLUTION INTRAVENOUS at 09:08

## 2024-04-06 RX ADMIN — NITROGLYCERIN 1 INCH: 20 OINTMENT TOPICAL at 13:04

## 2024-04-06 RX ADMIN — VASOPRESSIN 0.02 UNITS/MIN: 20 INJECTION INTRAVENOUS at 01:29

## 2024-04-06 RX ADMIN — Medication 1 TABLET: at 08:53

## 2024-04-06 RX ADMIN — SODIUM CHLORIDE, PRESERVATIVE FREE 10 ML: 5 INJECTION INTRAVENOUS at 08:54

## 2024-04-06 RX ADMIN — VANCOMYCIN HYDROCHLORIDE 2000 MG: 1 INJECTION, POWDER, LYOPHILIZED, FOR SOLUTION INTRAVENOUS at 12:10

## 2024-04-06 RX ADMIN — NITROGLYCERIN 1 INCH: 20 OINTMENT TOPICAL at 01:21

## 2024-04-06 RX ADMIN — HEPARIN SODIUM: 1000 INJECTION INTRAVENOUS; SUBCUTANEOUS at 11:08

## 2024-04-06 RX ADMIN — SODIUM CHLORIDE 500 ML: 9 INJECTION, SOLUTION INTRAVENOUS at 02:03

## 2024-04-06 RX ADMIN — SODIUM CHLORIDE 500 ML: 9 INJECTION, SOLUTION INTRAVENOUS at 02:40

## 2024-04-06 RX ADMIN — Medication: at 04:59

## 2024-04-06 RX ADMIN — PIPERACILLIN AND TAZOBACTAM 4500 MG: 4; .5 INJECTION, POWDER, FOR SOLUTION INTRAVENOUS at 03:32

## 2024-04-06 RX ADMIN — Medication: at 08:41

## 2024-04-06 RX ADMIN — MEROPENEM 1000 MG: 1 INJECTION, POWDER, FOR SOLUTION INTRAVENOUS at 11:29

## 2024-04-06 RX ADMIN — Medication 50 MCG/HR: at 08:38

## 2024-04-06 RX ADMIN — PANTOPRAZOLE SODIUM 40 MG: 40 INJECTION, POWDER, FOR SOLUTION INTRAVENOUS at 08:53

## 2024-04-06 RX ADMIN — Medication: at 09:41

## 2024-04-06 RX ADMIN — OXYCODONE AND ACETAMINOPHEN 1 TABLET: 7.5; 325 TABLET ORAL at 00:04

## 2024-04-06 RX ADMIN — AMIODARONE HYDROCHLORIDE 200 MG: 200 TABLET ORAL at 08:54

## 2024-04-06 RX ADMIN — LORAZEPAM 4 MG: 2 INJECTION INTRAMUSCULAR; INTRAVENOUS at 14:31

## 2024-04-06 RX ADMIN — VASOPRESSIN 0.04 UNITS/MIN: 20 INJECTION INTRAVENOUS at 12:11

## 2024-04-06 RX ADMIN — MORPHINE SULFATE 2 MG: 2 INJECTION, SOLUTION INTRAMUSCULAR; INTRAVENOUS at 01:21

## 2024-04-06 RX ADMIN — MORPHINE SULFATE 4 MG: 4 INJECTION, SOLUTION INTRAMUSCULAR; INTRAVENOUS at 14:31

## 2024-04-06 RX ADMIN — Medication: at 01:16

## 2024-04-06 RX ADMIN — Medication: at 07:02

## 2024-04-06 RX ADMIN — MIDODRINE HYDROCHLORIDE 10 MG: 10 TABLET ORAL at 08:54

## 2024-04-06 RX ADMIN — NITROGLYCERIN 1 INCH: 20 OINTMENT TOPICAL at 05:56

## 2024-04-06 ASSESSMENT — PULMONARY FUNCTION TESTS
PIF_VALUE: 26
PIF_VALUE: 22
PIF_VALUE: 20
PIF_VALUE: 28

## 2024-04-06 NOTE — SIGNIFICANT EVENT
I was called to the bedside by RN to discuss with the patient family regarding CODE STATUS and deteriorating clinical status.  I participated in the family meeting at bedside along with Dr. Harley Coreas MD-resident family medicine and RN taking care of the patient.  I spoke with patient wife Ms. Adriana Islas, his daughter, son-in-law and son at bedside.  I explained patient's deteriorating clinical status.  I also informed them regarding Dr. Michael Harrison MD general surgeon's recommendation.    At the end of the discussion, patient wife Ms. Adriana Islas along with the rest of the family members request me to change the patient CODE STATUS to DNR comfort care in view of patient's long-term wishes and initiate comfort measures.    I informed Dr. Thomas Florian MD-on-call cardiothoracic surgeon regarding patient's family decision of changing CODE STATUS to DNR comfort care and initiate comfort measures.  Dr. Thomas Florian MD agree with the patient family decision in changing patient's CODE STATUS to DNR comfort care.  However, he requested me to inform Dr. Manish Bess MD-patient's primary cardiothoracic surgeon.    I spoke with Dr. Manish Bess MD by calling him on his mobile.  Dr. Manish Bess MD agrees with the patient family decision of changing CODE STATUS to DNR CC.  He requested me to place palliative care/hospice consult to continue comfort care management.    Will consider transferring patient to hospice service.    I spoke with RN taking care of the patient during the entire above process.      Critical Care time: 27 minutes.  Time was discontiguous. Time does not include procedure. Time does include my direct assessment of the patient and coordination of care.

## 2024-04-06 NOTE — PLAN OF CARE
Patient Weaning Progress    The patient's vent settings was able to be weaned this shift.      Ventilator settings that were weaned              [] Mode   [] Pressure support weaned   [x] Fio2 weaned   [] Peep weaned      Spontaneous weaning trial  was not attempted.     due to defined parameters for SBT (spontaneous breathing trial) not being met.     *Results of SBT documented under SBTOUTCOME note.    Reason that defined ventilator parameters for SBT was not met              [x] Patient condition requires increased ventilator settings  [] Requires increased sedation   [] Settings not within weaning range   [] SAT not completed   [] Physician orders         Evac tube was  hooked up with continuous low suction(20-30mmHg)      Cuff was  deflated to determine cuff leak. A leak  was not detected.       Unable to get agreement for goals because no family is present and patient cannot respond.     
                                             Patient Weaning Progress    The patient's vent settings was not able to be weaned this shift.      Ventilator settings that were weaned              [] Mode   [] Pressure support weaned   [] Fio2 weaned   [] Peep weaned      Spontaneous weaning trial  was attempted.     due to defined parameters for SBT (spontaneous breathing trial) not being met.     *Results of SBT documented under SBTOUTCOME note.    Reason that defined ventilator parameters for SBT was not met              [] Patient condition requires increased ventilator settings  [] Requires increased sedation   [] Settings not within weaning range   [] SAT not completed   [] Physician orders        Evac tube was  hooked up with continuous low suction(20-30mmHg)      Unable to get agreement for goals because no family is present and patient cannot respond.     
                                             Patient Weaning Progress    The patient's vent settings was not able to be weaned this shift.      Ventilator settings that were weaned              [] Mode   [] Pressure support weaned   [] Fio2 weaned   [] Peep weaned      Spontaneous weaning trial  was attempted.     due to defined parameters for SBT (spontaneous breathing trial) not being met.     *Results of SBT documented under SBTOUTCOME note.    Reason that defined ventilator parameters for SBT was not met              [] Patient condition requires increased ventilator settings  [] Requires increased sedation   [] Settings not within weaning range   [] SAT not completed   [] Physician orders    The patient was not able to tolerate SBT.    Evac tube was  hooked up with continuous low suction(20-30mmHg)      Cuff was  deflated to determine cuff leak. A leak  was detected.     Unable to get agreement for goals because no family is present and patient cannot respond.     
                                             Patient Weaning Progress    The patient's vent settings was not able to be weaned this shift.      Ventilator settings that were weaned              [] Mode   [] Pressure support weaned   [] Fio2 weaned   [] Peep weaned      Spontaneous weaning trial  was not attempted.     due to defined parameters for SBT (spontaneous breathing trial) not being met.     *Results of SBT documented under SBTOUTCOME note.    Reason that defined ventilator parameters for SBT was not met              [] Patient condition requires increased ventilator settings  [] Requires increased sedation   [] Settings not within weaning range   [] SAT not completed   [] Physician orders         Evac tube was  hooked up with continuous low suction(20-30mmHg)      Cuff was  deflated to determine cuff leak. A leak  was detected.       Family mutually agreed on goals.        
                                             Patient Weaning Progress    The patient's vent settings was not able to be weaned this shift.      Ventilator settings that were weaned              [] Mode   [] Pressure support weaned   [] Fio2 weaned   [] Peep weaned      Spontaneous weaning trial  was not attempted.     due to defined parameters for SBT (spontaneous breathing trial) not being met.     *Results of SBT documented under SBTOUTCOME note.    Reason that defined ventilator parameters for SBT was not met              [] Patient condition requires increased ventilator settings  [] Requires increased sedation   [] Settings not within weaning range   [] SAT not completed   [] Physician orders         Evac tube was  hooked up with continuous low suction(20-30mmHg)      Cuff was  deflated to determine cuff leak. A leak  was detected.       Unable to get agreement for goals because no family is present and patient cannot respond.     
                                             Patient Weaning Progress    The patient's vent settings was not able to be weaned this shift.      Ventilator settings that were weaned              [] Mode   [] Pressure support weaned   [] Fio2 weaned   [] Peep weaned      Spontaneous weaning trial  was not attempted.     due to defined parameters for SBT (spontaneous breathing trial) not being met.     *Results of SBT documented under SBTOUTCOME note.    Reason that defined ventilator parameters for SBT was not met              [] Patient condition requires increased ventilator settings  [] Requires increased sedation   [] Settings not within weaning range   [] SAT not completed   [] Physician orders        Evac tube was  hooked up with continuous low suction(20-30mmHg)      Cuff was  deflated to determine cuff leak. A leak  was detected.       Family mutually agreed on goals.      
                                             Patient Weaning Progress    The patient's vent settings was not able to be weaned this shift.      Ventilator settings that were weaned              [] Mode   [] Pressure support weaned   [] Fio2 weaned   [] Peep weaned      Spontaneous weaning trial  was not attempted.     due to defined parameters for SBT (spontaneous breathing trial) not being met.     *Results of SBT documented under SBTOUTCOME note.    Reason that defined ventilator parameters for SBT was not met              [x] Patient condition requires increased ventilator settings  [] Requires increased sedation   [x] Settings not within weaning range   [] SAT not completed   [] Physician orders    The patient was not able to tolerate SBT.      Evac tube was  hooked up with continuous low suction(20-30mmHg)      Unable to get agreement for goals because no family is present and patient cannot respond.     
  Problem: Cardiovascular - Adult  Goal: Maintains optimal cardiac output and hemodynamic stability  Outcome: Not Progressing  Flowsheets (Taken 3/8/2024 1341)  Maintains optimal cardiac output and hemodynamic stability:   Monitor blood pressure and heart rate   Monitor urine output and notify Licensed Independent Practitioner for values outside of normal range   Assess for signs of decreased cardiac output   Administer fluid and/or volume expanders as ordered   Administer vasoactive medications as ordered     Problem: Cardiovascular - Adult  Goal: Absence of cardiac dysrhythmias or at baseline  Outcome: Progressing  Flowsheets (Taken 3/8/2024 1341)  Absence of cardiac dysrhythmias or at baseline: Monitor cardiac rate and rhythm     Problem: Gastrointestinal - Adult  Goal: Maintains or returns to baseline bowel function  Outcome: Progressing  Flowsheets (Taken 3/8/2024 1341)  Maintains or returns to baseline bowel function:   Assess bowel function   Administer IV fluids as ordered to ensure adequate hydration   Nutrition consult to assist patient with appropriate food choices     Problem: Gastrointestinal - Adult  Goal: Maintains adequate nutritional intake  Outcome: Progressing  Flowsheets (Taken 3/8/2024 1341)  Maintains adequate nutritional intake:   Obtain nutritional consult as needed   Monitor intake and output, weight and lab values     Problem: Metabolic/Fluid and Electrolytes - Adult  Goal: Electrolytes maintained within normal limits  Outcome: Progressing  Flowsheets (Taken 3/8/2024 1341)  Electrolytes maintained within normal limits:   Monitor labs and assess patient for signs and symptoms of electrolyte imbalances   Monitor response to electrolyte replacements, including repeat lab results as appropriate   Administer electrolyte replacement as ordered     Problem: Metabolic/Fluid and Electrolytes - Adult  Goal: Hemodynamic stability and optimal renal function maintained  Outcome: Not 
  Problem: Discharge Planning  Goal: Discharge to home or other facility with appropriate resources  3/10/2024 1356 by Susie Claire RN  Outcome: Not Progressing  Flowsheets (Taken 3/10/2024 1356)  Discharge to home or other facility with appropriate resources:   Identify barriers to discharge with patient and caregiver   Identify discharge learning needs (meds, wound care, etc)   Refer to discharge planning if patient needs post-hospital services based on physician order or complex needs related to functional status, cognitive ability or social support system     Problem: Chronic Conditions and Co-morbidities  Goal: Patient's chronic conditions and co-morbidity symptoms are monitored and maintained or improved  3/10/2024 1356 by Susie Claire RN  Outcome: Progressing  Flowsheets (Taken 3/10/2024 1356)  Care Plan - Patient's Chronic Conditions and Co-Morbidity Symptoms are Monitored and Maintained or Improved:   Monitor and assess patient's chronic conditions and comorbid symptoms for stability, deterioration, or improvement   Collaborate with multidisciplinary team to address chronic and comorbid conditions and prevent exacerbation or deterioration     Problem: Pain  Goal: Verbalizes/displays adequate comfort level or baseline comfort level  3/10/2024 1356 by Susie Claire RN  Outcome: Not Progressing  Flowsheets (Taken 3/10/2024 1356)  Verbalizes/displays adequate comfort level or baseline comfort level:   Administer analgesics based on type and severity of pain and evaluate response   Assess pain using appropriate pain scale   Implement non-pharmacological measures as appropriate and evaluate response   Encourage patient to monitor pain and request assistance     Problem: Safety - Adult  Goal: Free from fall injury  3/10/2024 1356 by Susie Claire RN  Outcome: Progressing  Flowsheets (Taken 3/10/2024 1356)  Free From Fall Injury: Instruct family/caregiver on patient safety     Problem: ABCDS Injury 
  Problem: Discharge Planning  Goal: Discharge to home or other facility with appropriate resources  3/12/2024 0412 by Timothy Hilliard RN  Outcome: Progressing  Flowsheets (Taken 3/12/2024 0412)  Discharge to home or other facility with appropriate resources:   Identify barriers to discharge with patient and caregiver   Identify discharge learning needs (meds, wound care, etc)   Refer to discharge planning if patient needs post-hospital services based on physician order or complex needs related to functional status, cognitive ability or social support system   Arrange for needed discharge resources and transportation as appropriate  3/11/2024 1823 by Carolyn Navarro RN  Outcome: Not Progressing  Flowsheets (Taken 3/11/2024 0730)  Discharge to home or other facility with appropriate resources: Identify barriers to discharge with patient and caregiver     Problem: Chronic Conditions and Co-morbidities  Goal: Patient's chronic conditions and co-morbidity symptoms are monitored and maintained or improved  3/12/2024 0412 by Timothy Hilliard RN  Outcome: Progressing  Flowsheets (Taken 3/12/2024 0412)  Care Plan - Patient's Chronic Conditions and Co-Morbidity Symptoms are Monitored and Maintained or Improved:   Monitor and assess patient's chronic conditions and comorbid symptoms for stability, deterioration, or improvement   Collaborate with multidisciplinary team to address chronic and comorbid conditions and prevent exacerbation or deterioration   Update acute care plan with appropriate goals if chronic or comorbid symptoms are exacerbated and prevent overall improvement and discharge  3/11/2024 1823 by Carolyn Navarro, RN  Outcome: Not Progressing  Flowsheets (Taken 3/11/2024 0730)  Care Plan - Patient's Chronic Conditions and Co-Morbidity Symptoms are Monitored and Maintained or Improved: Monitor and assess patient's chronic conditions and comorbid symptoms for stability, deterioration, or improvement     Problem: 
  Problem: Discharge Planning  Goal: Discharge to home or other facility with appropriate resources  3/12/2024 1133 by Susie Claire RN  Outcome: Not Progressing  Flowsheets (Taken 3/12/2024 1133)  Discharge to home or other facility with appropriate resources: Identify barriers to discharge with patient and caregiver     Problem: Chronic Conditions and Co-morbidities  Goal: Patient's chronic conditions and co-morbidity symptoms are monitored and maintained or improved  3/12/2024 1133 by Susie Claire RN  Outcome: Progressing  Flowsheets (Taken 3/12/2024 1133)  Care Plan - Patient's Chronic Conditions and Co-Morbidity Symptoms are Monitored and Maintained or Improved:   Monitor and assess patient's chronic conditions and comorbid symptoms for stability, deterioration, or improvement   Collaborate with multidisciplinary team to address chronic and comorbid conditions and prevent exacerbation or deterioration   Update acute care plan with appropriate goals if chronic or comorbid symptoms are exacerbated and prevent overall improvement and discharge     Problem: Pain  Goal: Verbalizes/displays adequate comfort level or baseline comfort level  3/12/2024 1133 by Susie Claire RN  Outcome: Progressing  Flowsheets (Taken 3/12/2024 1133)  Verbalizes/displays adequate comfort level or baseline comfort level:   Encourage patient to monitor pain and request assistance   Administer analgesics based on type and severity of pain and evaluate response   Assess pain using appropriate pain scale   Implement non-pharmacological measures as appropriate and evaluate response     Problem: Safety - Adult  Goal: Free from fall injury  3/12/2024 1133 by Susie Claire RN  Outcome: Progressing  Flowsheets (Taken 3/12/2024 1133)  Free From Fall Injury: Instruct family/caregiver on patient safety     Problem: ABCDS Injury Assessment  Goal: Absence of physical injury  3/12/2024 1133 by Susie Calire RN  Outcome: Progressing   
  Problem: Discharge Planning  Goal: Discharge to home or other facility with appropriate resources  3/13/2024 1651 by George Stinson, RN  Outcome: Progressing  Flowsheets (Taken 3/13/2024 1651)  Discharge to home or other facility with appropriate resources:   Identify barriers to discharge with patient and caregiver   Arrange for needed discharge resources and transportation as appropriate   Identify discharge learning needs (meds, wound care, etc)   Arrange for interpreters to assist at discharge as needed     Problem: Chronic Conditions and Co-morbidities  Goal: Patient's chronic conditions and co-morbidity symptoms are monitored and maintained or improved  3/13/2024 1651 by George Stinson, RN  Outcome: Progressing  Flowsheets (Taken 3/13/2024 1651)  Care Plan - Patient's Chronic Conditions and Co-Morbidity Symptoms are Monitored and Maintained or Improved:   Monitor and assess patient's chronic conditions and comorbid symptoms for stability, deterioration, or improvement   Collaborate with multidisciplinary team to address chronic and comorbid conditions and prevent exacerbation or deterioration   Update acute care plan with appropriate goals if chronic or comorbid symptoms are exacerbated and prevent overall improvement and discharge     Problem: Pain  Goal: Verbalizes/displays adequate comfort level or baseline comfort level  3/13/2024 1651 by George Stinson RN  Outcome: Progressing  Flowsheets (Taken 3/13/2024 1651)  Verbalizes/displays adequate comfort level or baseline comfort level:   Encourage patient to monitor pain and request assistance   Assess pain using appropriate pain scale   Administer analgesics based on type and severity of pain and evaluate response   Implement non-pharmacological measures as appropriate and evaluate response     Problem: Safety - Adult  Goal: Free from fall injury  3/13/2024 1651 by George Stinson, RN  Outcome: Progressing  Flowsheets (Taken 3/13/2024 1651)  Free From 
  Problem: Discharge Planning  Goal: Discharge to home or other facility with appropriate resources  3/19/2024 1847 by George Stinson RN  Outcome: Progressing  Flowsheets (Taken 3/19/2024 1847)  Discharge to home or other facility with appropriate resources:   Identify barriers to discharge with patient and caregiver   Arrange for needed discharge resources and transportation as appropriate   Identify discharge learning needs (meds, wound care, etc)     Problem: Chronic Conditions and Co-morbidities  Goal: Patient's chronic conditions and co-morbidity symptoms are monitored and maintained or improved  3/19/2024 1847 by George Stinson, RN  Outcome: Progressing  Flowsheets (Taken 3/19/2024 1847)  Care Plan - Patient's Chronic Conditions and Co-Morbidity Symptoms are Monitored and Maintained or Improved:   Monitor and assess patient's chronic conditions and comorbid symptoms for stability, deterioration, or improvement   Collaborate with multidisciplinary team to address chronic and comorbid conditions and prevent exacerbation or deterioration   Update acute care plan with appropriate goals if chronic or comorbid symptoms are exacerbated and prevent overall improvement and discharge     Problem: Pain  Goal: Verbalizes/displays adequate comfort level or baseline comfort level  3/19/2024 1847 by George Stinson RN  Outcome: Progressing     Problem: Safety - Adult  Goal: Free from fall injury  3/19/2024 1847 by George Stinson RN  Outcome: Progressing  Flowsheets (Taken 3/19/2024 1847)  Free From Fall Injury:   Instruct family/caregiver on patient safety   Based on caregiver fall risk screen, instruct family/caregiver to ask for assistance with transferring infant if caregiver noted to have fall risk factors     Problem: ABCDS Injury Assessment  Goal: Absence of physical injury  3/19/2024 1847 by George Stinson RN  Outcome: Progressing  Flowsheets (Taken 3/19/2024 1847)  Absence of Physical Injury: Implement safety 
  Problem: Discharge Planning  Goal: Discharge to home or other facility with appropriate resources  3/22/2024 2247 by Qian Ramos, RN  Outcome: Progressing  Flowsheets (Taken 3/22/2024 2000)  Discharge to home or other facility with appropriate resources:   Identify barriers to discharge with patient and caregiver   Arrange for needed discharge resources and transportation as appropriate   Identify discharge learning needs (meds, wound care, etc)    Outcome: Not Progressing     Problem: Chronic Conditions and Co-morbidities  Goal: Patient's chronic conditions and co-morbidity symptoms are monitored and maintained or improved  3/22/2024 2247 by Qian Ramos, RN  Outcome: Progressing  Flowsheets (Taken 3/22/2024 2000)  Care Plan - Patient's Chronic Conditions and Co-Morbidity Symptoms are Monitored and Maintained or Improved:   Monitor and assess patient's chronic conditions and comorbid symptoms for stability, deterioration, or improvement   Collaborate with multidisciplinary team to address chronic and comorbid conditions and prevent exacerbation or deterioration   Update acute care plan with appropriate goals if chronic or comorbid symptoms are exacerbated and prevent overall improvement and discharge  3/22/2024   Outcome: Progressing     Problem: Pain  Goal: Verbalizes/displays adequate comfort level or baseline comfort level  3/22/2024 2247 by Qian Ramos, RN  Outcome: Progressing  Flowsheets (Taken 3/22/2024 2000)  Verbalizes/displays adequate comfort level or baseline comfort level:   Assess pain using appropriate pain scale   Administer analgesics based on type and severity of pain and evaluate response   Implement non-pharmacological measures as appropriate and evaluate response  3/22/2024 1006  Outcome: Progressing     Problem: Safety - Adult  Goal: Free from fall injury  3/22/2024 2247 by Qian Ramos, RN  Outcome: Progressing  Flowsheets (Taken 3/22/2024 0250  Free From Fall 
  Problem: Discharge Planning  Goal: Discharge to home or other facility with appropriate resources  3/25/2024 2155 by Qian Ramos, RN  Outcome: Progressing  Flowsheets (Taken 3/25/2024 2000)  Discharge to home or other facility with appropriate resources:   Identify barriers to discharge with patient and caregiver   Arrange for needed discharge resources and transportation as appropriate   Identify discharge learning needs (meds, wound care, etc)  3/25/2024 1719   Outcome: Progressing  Flowsheets (Taken 3/25/2024 0903)  Discharge to home or other facility with appropriate resources:   Arrange for needed discharge resources and transportation as appropriate   Identify barriers to discharge with patient and caregiver   Identify discharge learning needs (meds, wound care, etc)   Refer to discharge planning if patient needs post-hospital services based on physician order or complex needs related to functional status, cognitive ability or social support system     Problem: Chronic Conditions and Co-morbidities  Goal: Patient's chronic conditions and co-morbidity symptoms are monitored and maintained or improved  3/25/2024 2155 by Qian Ramos, RN  Outcome: Progressing  Flowsheets (Taken 3/25/2024 2000)  Care Plan - Patient's Chronic Conditions and Co-Morbidity Symptoms are Monitored and Maintained or Improved:   Monitor and assess patient's chronic conditions and comorbid symptoms for stability, deterioration, or improvement   Collaborate with multidisciplinary team to address chronic and comorbid conditions and prevent exacerbation or deterioration   Update acute care plan with appropriate goals if chronic or comorbid symptoms are exacerbated and prevent overall improvement and discharge  3/25/2024 1719   Outcome: Progressing  Flowsheets (Taken 3/22/2024 2000 by Qian Ramos, RN)  Care Plan - Patient's Chronic Conditions and Co-Morbidity Symptoms are Monitored and Maintained or Improved:   Monitor and 
  Problem: Discharge Planning  Goal: Discharge to home or other facility with appropriate resources  3/26/2024 2231 by Magan Iglesias RN  Outcome: Progressing  Flowsheets (Taken 3/26/2024 2231)  Discharge to home or other facility with appropriate resources:   Identify barriers to discharge with patient and caregiver   Arrange for needed discharge resources and transportation as appropriate     Problem: Chronic Conditions and Co-morbidities  Goal: Patient's chronic conditions and co-morbidity symptoms are monitored and maintained or improved  3/26/2024 2231 by Magan Iglesias RN  Outcome: Progressing  Flowsheets (Taken 3/26/2024 2231)  Care Plan - Patient's Chronic Conditions and Co-Morbidity Symptoms are Monitored and Maintained or Improved:   Monitor and assess patient's chronic conditions and comorbid symptoms for stability, deterioration, or improvement   Collaborate with multidisciplinary team to address chronic and comorbid conditions and prevent exacerbation or deterioration   Update acute care plan with appropriate goals if chronic or comorbid symptoms are exacerbated and prevent overall improvement and discharge     Problem: Pain  Goal: Verbalizes/displays adequate comfort level or baseline comfort level  3/26/2024 2231 by Magan Iglesias RN  Outcome: Progressing  Flowsheets (Taken 3/26/2024 2231)  Verbalizes/displays adequate comfort level or baseline comfort level:   Assess pain using appropriate pain scale   Administer analgesics based on type and severity of pain and evaluate response     Problem: Safety - Adult  Goal: Free from fall injury  3/26/2024 2231 by Magan Iglesias RN  Outcome: Progressing  Flowsheets (Taken 3/26/2024 2231)  Free From Fall Injury: Instruct family/caregiver on patient safety     Problem: ABCDS Injury Assessment  Goal: Absence of physical injury  3/26/2024 2231 by Magan Iglesias RN  Outcome: Progressing  Flowsheets (Taken 3/26/2024 2231)  Absence of Physical Injury: Implement safety 
  Problem: Discharge Planning  Goal: Discharge to home or other facility with appropriate resources  4/2/2024 2221 by Alpa Ruiz RN  Outcome: Progressing  Flowsheets (Taken 3/30/2024 0400 by Kelvin Aguirre, RN)  Discharge to home or other facility with appropriate resources: Identify barriers to discharge with patient and caregiver     Problem: Chronic Conditions and Co-morbidities  Goal: Patient's chronic conditions and co-morbidity symptoms are monitored and maintained or improved  Outcome: Progressing  Flowsheets (Taken 3/30/2024 0400 by Kelvin Aguirre, RN)  Care Plan - Patient's Chronic Conditions and Co-Morbidity Symptoms are Monitored and Maintained or Improved: Collaborate with multidisciplinary team to address chronic and comorbid conditions and prevent exacerbation or deterioration     Problem: Respiratory - Adult  Goal: Achieves optimal ventilation and oxygenation  4/2/2024 2221 by Alpa Ruiz RN  Outcome: Progressing  Flowsheets (Taken 4/2/2024 1333 by Edilma Aguayo, RN)  Achieves optimal ventilation and oxygenation: Assess for changes in respiratory status     Problem: Cardiovascular - Adult  Goal: Maintains optimal cardiac output and hemodynamic stability  Outcome: Progressing  Flowsheets (Taken 3/28/2024 0230 by Magan Iglesias, RN)  Maintains optimal cardiac output and hemodynamic stability:   Monitor blood pressure and heart rate   Monitor urine output and notify Licensed Independent Practitioner for values outside of normal range   Assess for signs of decreased cardiac output     Problem: Cardiovascular - Adult  Goal: Absence of cardiac dysrhythmias or at baseline  Outcome: Progressing  Flowsheets (Taken 3/28/2024 0230 by Magan Iglesias, RN)  Absence of cardiac dysrhythmias or at baseline: Monitor cardiac rate and rhythm     Problem: Metabolic/Fluid and Electrolytes - Adult  Goal: Electrolytes maintained within normal limits  Outcome: Progressing  Flowsheets (Taken 3/30/2024 0400 by 
  Problem: Discharge Planning  Goal: Discharge to home or other facility with appropriate resources  4/5/2024 0739 by Jhony Cole RN  Outcome: Progressing     Problem: Chronic Conditions and Co-morbidities  Goal: Patient's chronic conditions and co-morbidity symptoms are monitored and maintained or improved  4/5/2024 0739 by Jhony Cole RN  Outcome: Progressing     Problem: Pain  Goal: Verbalizes/displays adequate comfort level or baseline comfort level  4/5/2024 0739 by Jhony Cole RN  Outcome: Progressing     Problem: Safety - Adult  Goal: Free from fall injury  4/5/2024 0739 by Jhony Cole RN  Outcome: Progressing     Problem: ABCDS Injury Assessment  Goal: Absence of physical injury  4/5/2024 0739 by Jhony Cole RN  Outcome: Progressing     Problem: Skin/Tissue Integrity  Goal: Absence of new skin breakdown  Description: 1.  Monitor for areas of redness and/or skin breakdown  2.  Assess vascular access sites hourly  3.  Every 4-6 hours minimum:  Change oxygen saturation probe site  4.  Every 4-6 hours:  If on nasal continuous positive airway pressure, respiratory therapy assess nares and determine need for appliance change or resting period.  4/5/2024 0739 by Jhony Cole RN  Outcome: Progressing     Problem: Respiratory - Adult  Goal: Achieves optimal ventilation and oxygenation  4/5/2024 0739 by Jhony Cole RN  Outcome: Progressing     Problem: Respiratory - Adult  Goal: Will be able to breathe spontaneously, without ventilator support  Description: Will be able to breathe spontaneously, without ventilator support  4/5/2024 0739 by Jhony Cole RN  Outcome: Progressing     Problem: Cardiovascular - Adult  Goal: Maintains optimal cardiac output and hemodynamic stability  4/5/2024 0739 by Jhony Cole RN  Outcome: Progressing     Problem: Cardiovascular - Adult  Goal: Absence of cardiac dysrhythmias or at baseline  4/5/2024 0739 by Jhony Cole RN  Outcome: 
  Problem: Discharge Planning  Goal: Discharge to home or other facility with appropriate resources  Outcome: Not Progressing     Problem: Chronic Conditions and Co-morbidities  Goal: Patient's chronic conditions and co-morbidity symptoms are monitored and maintained or improved  Outcome: Not Progressing     Problem: Pain  Goal: Verbalizes/displays adequate comfort level or baseline comfort level  Outcome: Progressing     Problem: Safety - Adult  Goal: Free from fall injury  Outcome: Progressing     Problem: ABCDS Injury Assessment  Goal: Absence of physical injury  Outcome: Progressing     Problem: Skin/Tissue Integrity  Goal: Absence of new skin breakdown  Description: 1.  Monitor for areas of redness and/or skin breakdown  2.  Assess vascular access sites hourly  3.  Every 4-6 hours minimum:  Change oxygen saturation probe site  4.  Every 4-6 hours:  If on nasal continuous positive airway pressure, respiratory therapy assess nares and determine need for appliance change or resting period.  Outcome: Progressing     Problem: Respiratory - Adult  Goal: Achieves optimal ventilation and oxygenation  Outcome: Progressing  Goal: Will be able to breathe spontaneously, without ventilator support  Description: Will be able to breathe spontaneously, without ventilator support  Outcome: Not Progressing     Problem: Cardiovascular - Adult  Goal: Maintains optimal cardiac output and hemodynamic stability  Outcome: Not Progressing  Goal: Absence of cardiac dysrhythmias or at baseline  Outcome: Progressing     Problem: Metabolic/Fluid and Electrolytes - Adult  Goal: Electrolytes maintained within normal limits  Outcome: Not Progressing  Goal: Hemodynamic stability and optimal renal function maintained  Outcome: Not Progressing  Goal: Glucose maintained within prescribed range  Outcome: Progressing     Problem: Skin/Tissue Integrity - Adult  Goal: Skin integrity remains intact  Outcome: Progressing     Problem: Neurosensory - 
  Problem: Discharge Planning  Goal: Discharge to home or other facility with appropriate resources  Outcome: Not Progressing     Problem: Chronic Conditions and Co-morbidities  Goal: Patient's chronic conditions and co-morbidity symptoms are monitored and maintained or improved  Outcome: Progressing     Problem: Pain  Goal: Verbalizes/displays adequate comfort level or baseline comfort level  Outcome: Progressing     Problem: Safety - Adult  Goal: Free from fall injury  Outcome: Progressing     Problem: ABCDS Injury Assessment  Goal: Absence of physical injury  Outcome: Progressing     Problem: Skin/Tissue Integrity - Adult  Goal: Skin integrity remains intact  Outcome: Progressing     Problem: Neurosensory - Adult  Goal: Achieves stable or improved neurological status  Outcome: Not Progressing     Problem: Musculoskeletal - Adult  Goal: Return mobility to safest level of function  Outcome: Not Progressing     Problem: Nutrition Deficit:  Goal: Optimize nutritional status  Outcome: Progressing     Problem: Gastrointestinal - Adult  Goal: Maintains or returns to baseline bowel function  Outcome: Progressing  Goal: Maintains adequate nutritional intake  Outcome: Progressing     Problem: Hematologic - Adult  Goal: Maintains hematologic stability  Outcome: Progressing     
  Problem: Discharge Planning  Goal: Discharge to home or other facility with appropriate resources  Outcome: Not Progressing  Flowsheets (Taken 3/10/2024 0400)  Discharge to home or other facility with appropriate resources:   Identify barriers to discharge with patient and caregiver   Refer to discharge planning if patient needs post-hospital services based on physician order or complex needs related to functional status, cognitive ability or social support system     Problem: Chronic Conditions and Co-morbidities  Goal: Patient's chronic conditions and co-morbidity symptoms are monitored and maintained or improved  Outcome: Not Progressing  Flowsheets (Taken 3/10/2024 0400)  Care Plan - Patient's Chronic Conditions and Co-Morbidity Symptoms are Monitored and Maintained or Improved:   Monitor and assess patient's chronic conditions and comorbid symptoms for stability, deterioration, or improvement   Collaborate with multidisciplinary team to address chronic and comorbid conditions and prevent exacerbation or deterioration     Problem: Skin/Tissue Integrity - Adult  Goal: Skin integrity remains intact  Outcome: Not Progressing  Flowsheets (Taken 3/10/2024 0400)  Skin Integrity Remains Intact:   Monitor for areas of redness and/or skin breakdown   Assess vascular access sites hourly   Every 4-6 hours minimum: Change oxygen saturation probe site     Problem: Hematologic - Adult  Goal: Maintains hematologic stability  Outcome: Not Progressing  Flowsheets (Taken 3/10/2024 0400)  Maintains hematologic stability:   Assess for signs and symptoms of bleeding or hemorrhage   Monitor labs for bleeding or clotting disorders   Administer blood products/factors as ordered     Problem: Discharge Planning  Goal: Discharge to home or other facility with appropriate resources  Outcome: Not Progressing  Flowsheets (Taken 3/10/2024 0400)  Discharge to home or other facility with appropriate resources:   Identify barriers to discharge 
  Problem: Discharge Planning  Goal: Discharge to home or other facility with appropriate resources  Outcome: Not Progressing  Flowsheets (Taken 3/30/2024 0400 by Kelvin Aguirre, RN)  Discharge to home or other facility with appropriate resources: Identify barriers to discharge with patient and caregiver     Problem: Chronic Conditions and Co-morbidities  Goal: Patient's chronic conditions and co-morbidity symptoms are monitored and maintained or improved  Outcome: Progressing  Flowsheets (Taken 3/30/2024 0400 by Kelvin Aguirre, RN)  Care Plan - Patient's Chronic Conditions and Co-Morbidity Symptoms are Monitored and Maintained or Improved: Collaborate with multidisciplinary team to address chronic and comorbid conditions and prevent exacerbation or deterioration     Problem: Pain  Goal: Verbalizes/displays adequate comfort level or baseline comfort level  Outcome: Progressing     Problem: Safety - Adult  Goal: Free from fall injury  Outcome: Progressing     Problem: ABCDS Injury Assessment  Goal: Absence of physical injury  Outcome: Progressing     Problem: Skin/Tissue Integrity  Goal: Absence of new skin breakdown  Description: 1.  Monitor for areas of redness and/or skin breakdown  2.  Assess vascular access sites hourly  3.  Every 4-6 hours minimum:  Change oxygen saturation probe site  4.  Every 4-6 hours:  If on nasal continuous positive airway pressure, respiratory therapy assess nares and determine need for appliance change or resting period.  Outcome: Progressing     Problem: Respiratory - Adult  Goal: Achieves optimal ventilation and oxygenation  3/30/2024 0944 by Jhony Cole RN  Outcome: Progressing  3/30/2024 0620 by Hilaria Clifton VIKAS  Outcome: Progressing  Goal: Will be able to breathe spontaneously, without ventilator support  Description: Will be able to breathe spontaneously, without ventilator support  3/30/2024 0944 by Jhony Cole RN  Outcome: Progressing  3/30/2024 0620 by 
  Problem: Discharge Planning  Goal: Discharge to home or other facility with appropriate resources  Outcome: Progressing  Flowsheets (Taken 3/14/2024 1057)  Discharge to home or other facility with appropriate resources: Identify barriers to discharge with patient and caregiver     Problem: Chronic Conditions and Co-morbidities  Goal: Patient's chronic conditions and co-morbidity symptoms are monitored and maintained or improved  Outcome: Progressing  Flowsheets (Taken 3/14/2024 1057)  Care Plan - Patient's Chronic Conditions and Co-Morbidity Symptoms are Monitored and Maintained or Improved:   Monitor and assess patient's chronic conditions and comorbid symptoms for stability, deterioration, or improvement   Collaborate with multidisciplinary team to address chronic and comorbid conditions and prevent exacerbation or deterioration   Update acute care plan with appropriate goals if chronic or comorbid symptoms are exacerbated and prevent overall improvement and discharge     Problem: Pain  Goal: Verbalizes/displays adequate comfort level or baseline comfort level  Outcome: Progressing  Flowsheets (Taken 3/14/2024 1057)  Verbalizes/displays adequate comfort level or baseline comfort level:   Encourage patient to monitor pain and request assistance   Assess pain using appropriate pain scale   Administer analgesics based on type and severity of pain and evaluate response   Implement non-pharmacological measures as appropriate and evaluate response     Problem: Safety - Adult  Goal: Free from fall injury  Outcome: Progressing  Flowsheets (Taken 3/14/2024 1057)  Free From Fall Injury: Instruct family/caregiver on patient safety     Problem: ABCDS Injury Assessment  Goal: Absence of physical injury  Outcome: Progressing  Flowsheets (Taken 3/14/2024 1057)  Absence of Physical Injury: Implement safety measures based on patient assessment     Problem: Skin/Tissue Integrity  Goal: Absence of new skin breakdown  Description: 
  Problem: Discharge Planning  Goal: Discharge to home or other facility with appropriate resources  Outcome: Progressing  Flowsheets (Taken 3/15/2024 1253)  Discharge to home or other facility with appropriate resources:   Identify barriers to discharge with patient and caregiver   Arrange for needed discharge resources and transportation as appropriate   Identify discharge learning needs (meds, wound care, etc)   Arrange for interpreters to assist at discharge as needed  Note: No plans for discharge at this time. Patient continues to meet ICU criteria. Patient is intubated and sedated. Patient continues with ECMO and CRRT.      Problem: Chronic Conditions and Co-morbidities  Goal: Patient's chronic conditions and co-morbidity symptoms are monitored and maintained or improved  Outcome: Progressing  Flowsheets (Taken 3/15/2024 1253)  Care Plan - Patient's Chronic Conditions and Co-Morbidity Symptoms are Monitored and Maintained or Improved:   Monitor and assess patient's chronic conditions and comorbid symptoms for stability, deterioration, or improvement   Collaborate with multidisciplinary team to address chronic and comorbid conditions and prevent exacerbation or deterioration   Update acute care plan with appropriate goals if chronic or comorbid symptoms are exacerbated and prevent overall improvement and discharge  Note: Patient's chronic and acute condition being managed by ICU team.       Problem: Nutrition Deficit:  Goal: Optimize nutritional status  Outcome: Progressing  Flowsheets (Taken 3/15/2024 1253)  Nutrient intake appropriate for improving, restoring, or maintaining nutritional needs:   Assess nutritional status and recommend course of action   Order, calculate, and assess calorie counts as needed   Recommend appropriate diets, oral nutritional supplements, and vitamin/mineral supplements       Problem: Cardiovascular - Adult  Goal: Maintains optimal cardiac output and hemodynamic stability  Outcome: 
  Problem: Discharge Planning  Goal: Discharge to home or other facility with appropriate resources  Outcome: Progressing  Flowsheets (Taken 3/21/2024 1948)  Discharge to home or other facility with appropriate resources:   Identify barriers to discharge with patient and caregiver   Arrange for needed discharge resources and transportation as appropriate   Identify discharge learning needs (meds, wound care, etc)     Problem: Chronic Conditions and Co-morbidities  Goal: Patient's chronic conditions and co-morbidity symptoms are monitored and maintained or improved  Outcome: Progressing  Flowsheets (Taken 3/21/2024 1948)  Care Plan - Patient's Chronic Conditions and Co-Morbidity Symptoms are Monitored and Maintained or Improved:   Monitor and assess patient's chronic conditions and comorbid symptoms for stability, deterioration, or improvement   Collaborate with multidisciplinary team to address chronic and comorbid conditions and prevent exacerbation or deterioration     Problem: Pain  Goal: Verbalizes/displays adequate comfort level or baseline comfort level  Outcome: Progressing  Verbalizes/displays adequate comfort level or baseline comfort level:   Encourage patient to monitor pain and request assistance   Assess pain using appropriate pain scale   Administer analgesics based on type and severity of pain and evaluate response   Implement non-pharmacological measures as appropriate and evaluate response       Problem: ABCDS Injury Assessment  Goal: Absence of physical injury  Outcome: Progressing  Flowsheets (Taken 3/22/2024 0250)  Absence of Physical Injury: Implement safety measures based on patient assessment     Problem: Nutrition Deficit:  Goal: Optimize nutritional status  Outcome: Progressing  Nutrient intake appropriate for improving, restoring, or maintaining nutritional needs:   Assess nutritional status and recommend course of action   Monitor oral intake, labs, and treatment plans   Recommend, 
  Problem: Discharge Planning  Goal: Discharge to home or other facility with appropriate resources  Outcome: Progressing  Flowsheets (Taken 3/28/2024 0230)  Discharge to home or other facility with appropriate resources: Identify barriers to discharge with patient and caregiver     Problem: Chronic Conditions and Co-morbidities  Goal: Patient's chronic conditions and co-morbidity symptoms are monitored and maintained or improved  Outcome: Progressing  Flowsheets (Taken 3/28/2024 0230)  Care Plan - Patient's Chronic Conditions and Co-Morbidity Symptoms are Monitored and Maintained or Improved:   Collaborate with multidisciplinary team to address chronic and comorbid conditions and prevent exacerbation or deterioration   Monitor and assess patient's chronic conditions and comorbid symptoms for stability, deterioration, or improvement   Update acute care plan with appropriate goals if chronic or comorbid symptoms are exacerbated and prevent overall improvement and discharge     Problem: Pain  Goal: Verbalizes/displays adequate comfort level or baseline comfort level  Outcome: Progressing  Flowsheets (Taken 3/28/2024 0230)  Verbalizes/displays adequate comfort level or baseline comfort level:   Encourage patient to monitor pain and request assistance   Administer analgesics based on type and severity of pain and evaluate response   Assess pain using appropriate pain scale     Problem: Safety - Adult  Goal: Free from fall injury  Outcome: Progressing  Flowsheets (Taken 3/28/2024 0230)  Free From Fall Injury: Instruct family/caregiver on patient safety     Problem: ABCDS Injury Assessment  Goal: Absence of physical injury  Outcome: Progressing  Flowsheets (Taken 3/28/2024 0230)  Absence of Physical Injury: Implement safety measures based on patient assessment     Problem: Skin/Tissue Integrity  Goal: Absence of new skin breakdown  Description: 1.  Monitor for areas of redness and/or skin breakdown  2.  Assess vascular 
  Problem: Discharge Planning  Goal: Discharge to home or other facility with appropriate resources  Outcome: Progressing  Flowsheets (Taken 3/30/2024 0400 by Kelvin Aguirre, RN)  Discharge to home or other facility with appropriate resources: Identify barriers to discharge with patient and caregiver  Note: Pending eloisa     Problem: Pain  Goal: Verbalizes/displays adequate comfort level or baseline comfort level  Outcome: Progressing  Flowsheets (Taken 4/2/2024 1333)  Verbalizes/displays adequate comfort level or baseline comfort level: Encourage patient to monitor pain and request assistance     Problem: Safety - Adult  Goal: Free from fall injury  Outcome: Progressing  Flowsheets (Taken 4/2/2024 1332)  Free From Fall Injury: Instruct family/caregiver on patient safety     Problem: ABCDS Injury Assessment  Goal: Absence of physical injury  Outcome: Progressing  Flowsheets (Taken 3/28/2024 0230 by Magan Iglesias, RN)  Absence of Physical Injury: Implement safety measures based on patient assessment     Problem: Respiratory - Adult  Goal: Achieves optimal ventilation and oxygenation  Outcome: Progressing  Flowsheets (Taken 4/2/2024 1333)  Achieves optimal ventilation and oxygenation: Assess for changes in respiratory status   Patient unable to verbalize understanding of the care plan.  
  Problem: Discharge Planning  Goal: Discharge to home or other facility with appropriate resources  Outcome: Progressing  Flowsheets (Taken 3/7/2024 1732)  Discharge to home or other facility with appropriate resources:   Identify barriers to discharge with patient and caregiver   Arrange for needed discharge resources and transportation as appropriate     Problem: Chronic Conditions and Co-morbidities  Goal: Patient's chronic conditions and co-morbidity symptoms are monitored and maintained or improved  Outcome: Progressing  Flowsheets (Taken 3/7/2024 1732)  Care Plan - Patient's Chronic Conditions and Co-Morbidity Symptoms are Monitored and Maintained or Improved:   Monitor and assess patient's chronic conditions and comorbid symptoms for stability, deterioration, or improvement   Collaborate with multidisciplinary team to address chronic and comorbid conditions and prevent exacerbation or deterioration   Update acute care plan with appropriate goals if chronic or comorbid symptoms are exacerbated and prevent overall improvement and discharge     Problem: Pain  Goal: Verbalizes/displays adequate comfort level or baseline comfort level  Outcome: Progressing  Flowsheets (Taken 3/7/2024 1732)  Verbalizes/displays adequate comfort level or baseline comfort level:   Encourage patient to monitor pain and request assistance   Assess pain using appropriate pain scale   Administer analgesics based on type and severity of pain and evaluate response     Problem: Safety - Adult  Goal: Free from fall injury  Outcome: Progressing  Flowsheets (Taken 3/7/2024 1732)  Free From Fall Injury:   Instruct family/caregiver on patient safety   Based on caregiver fall risk screen, instruct family/caregiver to ask for assistance with transferring infant if caregiver noted to have fall risk factors     Problem: ABCDS Injury Assessment  Goal: Absence of physical injury  Outcome: Progressing  Flowsheets (Taken 3/6/2024 1716 by Monica 
  Problem: Discharge Planning  Goal: Discharge to home or other facility with appropriate resources  Outcome: Progressing  Flowsheets (Taken 4/4/2024 1358)  Discharge to home or other facility with appropriate resources:   Identify barriers to discharge with patient and caregiver   Arrange for needed discharge resources and transportation as appropriate   Identify discharge learning needs (meds, wound care, etc)   Arrange for interpreters to assist at discharge as needed     Problem: Chronic Conditions and Co-morbidities  Goal: Patient's chronic conditions and co-morbidity symptoms are monitored and maintained or improved  Outcome: Progressing  Flowsheets (Taken 4/4/2024 1358)  Care Plan - Patient's Chronic Conditions and Co-Morbidity Symptoms are Monitored and Maintained or Improved:   Monitor and assess patient's chronic conditions and comorbid symptoms for stability, deterioration, or improvement   Collaborate with multidisciplinary team to address chronic and comorbid conditions and prevent exacerbation or deterioration   Update acute care plan with appropriate goals if chronic or comorbid symptoms are exacerbated and prevent overall improvement and discharge     Problem: Pain  Goal: Verbalizes/displays adequate comfort level or baseline comfort level  Outcome: Progressing  Flowsheets (Taken 4/4/2024 1358)  Verbalizes/displays adequate comfort level or baseline comfort level:   Assess pain using appropriate pain scale   Administer analgesics based on type and severity of pain and evaluate response   Implement non-pharmacological measures as appropriate and evaluate response   Consider cultural and social influences on pain and pain management   Notify Licensed Independent Practitioner if interventions unsuccessful or patient reports new pain     Problem: Safety - Adult  Goal: Free from fall injury  Outcome: Progressing  Flowsheets (Taken 4/4/2024 1358)  Free From Fall Injury:   Instruct family/caregiver on 
  Problem: Respiratory - Adult  Goal: Achieves optimal ventilation and oxygenation  3/10/2024 0636 by Tala Navarro RCP  Outcome: Progressing                                                Patient Weaning Progress    The patient's vent settings was not able to be weaned this shift.      Ventilator settings that were weaned              [] Mode   [] Pressure support weaned   [] Fio2 weaned   [] Peep weaned      Spontaneous weaning trial  was not attempted.     due to defined parameters for SBT (spontaneous breathing trial) not being met.     Reason that defined ventilator parameters for SBT was not met              [x] Patient condition requires increased ventilator settings  [x] Requires increased sedation   [x] Settings not within weaning range   [x] SAT not completed   [x] Physician orders    Evac tube was  hooked up with continuous low suction(20-30mmHg)      Cuff was not  deflated to determine cuff leak.   Unable to get agreement for goals because no family is present and patient cannot respond.     
  Problem: Respiratory - Adult  Goal: Achieves optimal ventilation and oxygenation  3/10/2024 1031 by Veronika Jensen RCP  Outcome: Progressing  Flowsheets (Taken 3/10/2024 0808)  Achieves optimal ventilation and oxygenation:   Assess for changes in respiratory status   Oxygen supplementation based on oxygen saturation or arterial blood gases   Respiratory therapy support as indicated                                                Patient Weaning Progress    The patient's vent settings was not able to be weaned this shift.      Ventilator settings that were weaned              [] Mode   [] Pressure support weaned   [] Fio2 weaned   [] Peep weaned      Spontaneous weaning trial  was not attempted.     due to defined parameters for SBT (spontaneous breathing trial) not being met.     *Results of SBT documented under SBTOUTCOME note.    Reason that defined ventilator parameters for SBT was not met              [] Patient condition requires increased ventilator settings  [] Requires increased sedation   [] Settings not within weaning range   [] SAT not completed   [x] Physician orders      Unable to get agreement for goals because no family is present and patient cannot respond.     
  Problem: Respiratory - Adult  Goal: Achieves optimal ventilation and oxygenation  3/12/2024 0521 by Gunjan Davies, [facility]  Outcome: Progressing   Vent setting optimized to achieve target tidal volume, respiratory rate and ideal oxygen saturations. SBT will be performed when appropriate.                                                    Patient Weaning Progress    The patient's vent settings was not able to be weaned this shift.      Ventilator settings that were weaned              [] Mode   [] Pressure support weaned   [] Fio2 weaned   [] Peep weaned      Spontaneous weaning trial  was not attempted.     Reason that defined ventilator parameters for SBT was not met              [x] Patient condition requires increased ventilator settings  [x] Requires increased sedation   [x] Settings not within weaning range   [x] SAT not completed   [x] Physician orders    Evac tube was  hooked up with continuous low suction(20-30mmHg)      Cuff was not  deflated to determine cuff leak. A leak  was not detected.     Unable to get agreement for goals because no family is present and patient cannot respond.     
  Problem: Respiratory - Adult  Goal: Achieves optimal ventilation and oxygenation  3/14/2024 0153 by Durga Esteban RCP  Outcome: Progressing                                                Patient Weaning Progress    The patient's vent settings was not able to be weaned this shift.      Ventilator settings that were weaned              [] Mode   [] Pressure support weaned   [] Fio2 weaned   [] Peep weaned      Spontaneous weaning trial  was not attempted.     due to defined parameters for SBT (spontaneous breathing trial) not being met.     *Results of SBT documented under SBTOUTCOME note.    Reason that defined ventilator parameters for SBT was not met. ECMO PATIENT               [] Patient condition requires increased ventilator settings  [] Requires increased sedation   [] Settings not within weaning range   [] SAT not completed   [x] Physician orders       Cuff was not  deflated to determine cuff leak.     Unable to get agreement for goals because no family is present and patient cannot respond.     
  Problem: Respiratory - Adult  Goal: Achieves optimal ventilation and oxygenation  3/14/2024 1658 by Cyndie Gonzalez RCP  Outcome: Progressing                                                Patient Weaning Progress    The patient's vent settings was not able to be weaned this shift. (Patient is on [facility], unable to wean at this time)      Ventilator settings that were weaned              [] Mode   [] Pressure support weaned   [] Fio2 weaned   [] Peep weaned      Spontaneous weaning trial  was not attempted.     due to defined parameters for SBT (spontaneous breathing trial) not being met.     *Results of SBT documented under SBTOUTCOME note.    Reason that defined ventilator parameters for SBT was not met              [x] Patient condition requires increased ventilator settings  [] Requires increased sedation   [x] Settings not within weaning range   [] SAT not completed   [x] Physician orders       Cuff was not  deflated to determine cuff leak. A leak  was not detected.   Patient mutually agreed on goals.        
  Problem: Respiratory - Adult  Goal: Achieves optimal ventilation and oxygenation  3/15/2024 0223 by Durga Esteban RCP  Outcome: Progressing                                                Patient Weaning Progress    The patient's vent settings was not able to be weaned this shift.      Ventilator settings that were weaned              [] Mode   [] Pressure support weaned   [] Fio2 weaned   [] Peep weaned      Spontaneous weaning trial  was not attempted.     due to defined parameters for SBT (spontaneous breathing trial) not being met.     *Results of SBT documented under SBTOUTCOME note.    Reason that defined ventilator parameters for SBT was not met              [] Patient condition requires increased ventilator settings  [x] Requires increased sedation   [x] Settings not within weaning range   [] SAT not completed   [x] Physician orders    Evac tube was not  hooked up with continuous low suction(20-30mmHg)      Cuff was not  deflated to determine cuff leak.     Unable to get agreement for goals because no family is present and patient cannot respond.     
  Problem: Respiratory - Adult  Goal: Achieves optimal ventilation and oxygenation  3/18/2024 0145 by Blanca Nicholson RN  Flowsheets (Taken 3/17/2024 1945)  Achieves optimal ventilation and oxygenation:   Assess for changes in respiratory status   Position to facilitate oxygenation and minimize respiratory effort   Assess the need for suctioning and aspirate as needed   Respiratory therapy support as indicated   Oxygen supplementation based on oxygen saturation or arterial blood gases     Problem: Discharge Planning  Goal: Discharge to home or other facility with appropriate resources  Flowsheets (Taken 3/17/2024 1945)  Discharge to home or other facility with appropriate resources:   Identify barriers to discharge with patient and caregiver   Arrange for needed discharge resources and transportation as appropriate   Identify discharge learning needs (meds, wound care, etc)   Refer to discharge planning if patient needs post-hospital services based on physician order or complex needs related to functional status, cognitive ability or social support system     Problem: Chronic Conditions and Co-morbidities  Goal: Patient's chronic conditions and co-morbidity symptoms are monitored and maintained or improved  Flowsheets (Taken 3/17/2024 1945)  Care Plan - Patient's Chronic Conditions and Co-Morbidity Symptoms are Monitored and Maintained or Improved:   Monitor and assess patient's chronic conditions and comorbid symptoms for stability, deterioration, or improvement   Collaborate with multidisciplinary team to address chronic and comorbid conditions and prevent exacerbation or deterioration   Update acute care plan with appropriate goals if chronic or comorbid symptoms are exacerbated and prevent overall improvement and discharge     Problem: Pain  Goal: Verbalizes/displays adequate comfort level or baseline comfort level  Flowsheets (Taken 3/18/2024 0145)  Verbalizes/displays adequate comfort level or baseline comfort 
  Problem: Respiratory - Adult  Goal: Achieves optimal ventilation and oxygenation  3/19/2024 1720 by Jessy Morataya [facility]  Outcome: Progressing                                                Patient Weaning Progress    The patient's vent settings was not able to be weaned this shift.      Ventilator settings that were weaned              [] Mode   [] Pressure support weaned   [] Fio2 weaned   [] Peep weaned      Spontaneous weaning trial  was not attempted.     due to defined parameters for SBT (spontaneous breathing trial) not being met.     *Results of SBT documented under SBTOUTCOME note.    Reason that defined ventilator parameters for SBT was not met              [x] Patient condition requires increased ventilator settings  [] Requires increased sedation   [x] Settings not within weaning range   [x] SAT not completed   [] Physician orders      Unable to get agreement for goals because no family is present and patient cannot respond.     
  Problem: Respiratory - Adult  Goal: Achieves optimal ventilation and oxygenation  3/23/2024 0606 by Gunjan Davies, [facility]  Outcome: Progressing                                                Patient Weaning Progress    The patient's vent settings was able to be weaned this shift.      Ventilator settings that were weaned              [x] Mode   [x] Pressure support weaned   [] Fio2 weaned   [] Peep weaned      Spontaneous weaning trial  was attempted.       The patient was able to tolerate SBT.   RSBI  was 62.78 with EtCO2 of 28 and SpO2 of 100 on 30% FiO2.  Spontanteous VT was 446 and RR 28 breaths/min.  The patient tolerating SBT well at this time.    Evac tube was not  hooked up with continuous low suction(20-30mmHg)      Cuff was not  deflated to determine cuff leak. A leak  was not detected.     Unable to get agreement for goals because no family is present and patient cannot respond.     
  Problem: Respiratory - Adult  Goal: Achieves optimal ventilation and oxygenation  3/28/2024 0306 by Durga Esteban RCP  Outcome: Progressing                                                Patient Weaning Progress    The patient's vent settings was able to be weaned this shift.      Ventilator settings that were weaned              [x] Mode   [] Pressure support weaned   [] Fio2 weaned   [] Peep weaned      Spontaneous weaning trial  was attempted.     due to defined parameters for SBT (spontaneous breathing trial) not being met.     *Results of SBT documented under SBTOUTCOME note.    Reason that defined ventilator parameters for SBT was not met              [] Patient condition requires increased ventilator settings  [] Requires increased sedation   [] Settings not within weaning range   [] SAT not completed   [] Physician orders    The patient was able to tolerate SBT.   RSBI  was 32 with EtCO2 of 32 and SpO2 of 99 on 30% FiO2.  Spontanteous VT was 465 and RR 15 breaths/min.        Cuff was not  deflated to determine cuff leak.     Unable to get agreement for goals because no family is present and patient cannot respond.     
  Problem: Respiratory - Adult  Goal: Achieves optimal ventilation and oxygenation  3/7/2024 0445 by Durga Esteabn RCP  Outcome: Progressing                                                Patient Weaning Progress    The patient's vent settings was able to be weaned this shift.      Ventilator settings that were weaned              [] Mode   [x] Pressure support weaned   [] Fio2 weaned   [] Peep weaned      Spontaneous weaning trial  was not attempted.     due to defined parameters for SBT (spontaneous breathing trial) not being met.     *Results of SBT documented under SBTOUTCOME note.    Reason that defined ventilator parameters for SBT was not met              [] Patient condition requires increased ventilator settings  [] Requires increased sedation   [] Settings not within weaning range   [] SAT not completed   [] Physician orders      Cuff was not  deflated to determine cuff leak.     Unable to get agreement for goals because no family is present and patient cannot respond.     
  Problem: Respiratory - Adult  Goal: Achieves optimal ventilation and oxygenation  3/9/2024 0520 by Tala Navarro RCP  Outcome: Progressing                                                   Patient Weaning Progress    The patient's vent settings was able to be weaned this shift.      Ventilator settings that were weaned              [] Mode   [] Pressure support weaned   [] Fio2 weaned   [x] Peep weaned      Spontaneous weaning trial  was not attempted.     due to defined parameters for SBT (spontaneous breathing trial) not being met.     Reason that defined ventilator parameters for SBT was not met              [] Patient condition requires increased ventilator settings  [x] Requires increased sedation   [] Settings not within weaning range   [x] SAT not completed   [x] Physician orders    Evac tube was  hooked up with continuous low suction(20-30mmHg)      Cuff was not  deflated to determine cuff leak.  Unable to get agreement for goals because no family is present and patient cannot respond.     
  Problem: Respiratory - Adult  Goal: Achieves optimal ventilation and oxygenation  3/9/2024 1751 by Veronika Jensen [facility]  Outcome: Progressing                                               Patient Weaning Progress    The patient's vent settings was not able to be weaned this shift.      Ventilator settings that were weaned              [] Mode   [] Pressure support weaned   [] Fio2 weaned   [] Peep weaned      Spontaneous weaning trial  was not attempted.     due to defined parameters for SBT (spontaneous breathing trial) not being met.     *Results of SBT documented under SBTOUTCOME note.    Reason that defined ventilator parameters for SBT was not met              [] Patient condition requires increased ventilator settings  [x] Requires increased sedation   [] Settings not within weaning range   [] SAT not completed   [] Physician orders      Unable to get agreement for goals because no family is present and patient cannot respond.     
  Problem: Respiratory - Adult  Goal: Achieves optimal ventilation and oxygenation  4/5/2024 0620 by Gunjan Davies, [facility]  Outcome: Progressing     Problem: Respiratory - Adult  Goal: Will be able to breathe spontaneously, without ventilator support  Description: Will be able to breathe spontaneously, without ventilator support  4/5/2024 0620 by Gunjan Davies, [facility]  Outcome: Progressing           Vent setting optimized to achieve target tidal volume, respiratory rate and ideal oxygen saturations. SBT will be performed when appropriate.                                      Patient Weaning Progress    The patient's vent settings was able to be weaned this shift.      Ventilator settings that were weaned              [] Mode   [x] Pressure support weaned   [x] Fio2 weaned   [x] Peep weaned      Spontaneous weaning trial  was not attempted.       Reason that defined ventilator parameters for SBT was not met              [x] Patient condition requires increased ventilator settings  [] Requires increased sedation   [x] Settings not within weaning range   [] SAT not completed   [] Physician orders      Evac tube was not  hooked up with continuous low suction(20-30mmHg)      Cuff was not  deflated to determine cuff leak. A leak  was not detected.       Unable to get agreement for goals because no family is present and patient cannot respond.     
  Problem: Respiratory - Adult  Goal: Achieves optimal ventilation and oxygenation  Outcome: Not Progressing  Flowsheets (Taken 3/6/2024 3487)  Achieves optimal ventilation and oxygenation:   Assess for changes in respiratory status   Assess the need for suctioning and aspirate as needed   Oxygen supplementation based on oxygen saturation or arterial blood gases   Assess and instruct to report shortness of breath or any respiratory difficulty     
  Problem: Respiratory - Adult  Goal: Achieves optimal ventilation and oxygenation  Outcome: Progressing                                                Patient Weaning Progress    The patient's vent settings was able to be weaned this shift.      Ventilator settings that were weaned              [] Mode   [x] Pressure support weaned   [x] Fio2 weaned   [x] Peep weaned      Spontaneous weaning trial  was not attempted.     due to defined parameters for SBT (spontaneous breathing trial) not being met.     *Results of SBT documented under SBTOUTCOME note.    Reason that defined ventilator parameters for SBT was not met              [] Patient condition requires increased ventilator settings  [] Requires increased sedation   [x] Settings not within weaning range   [] SAT not completed   [] Physician orders        Evac tube was not  hooked up with continuous low suction(20-30mmHg)        Unable to get agreement for goals because no family is present and patient cannot respond.     
  Problem: Respiratory - Adult  Goal: Achieves optimal ventilation and oxygenation  Outcome: Progressing                                                Patient Weaning Progress    The patient's vent settings was able to be weaned this shift.      Ventilator settings that were weaned              [x] Mode   [] Pressure support weaned   [] Fio2 weaned   [] Peep weaned    Spontaneous weaning trial  was not attempted.     The patient was not able to tolerate SBT.   RSBI  was 37.62 with EtCO2 of 27 and SpO2 of 98 on 30% FiO2.  Spontanteous VT was 50 and RR 19 breaths/min.  The patient tolerating SBT well at this time.     Evac tube was  hooked up with continuous low suction(20-30mmHg)      Cuff was not  deflated to determine cuff leak. A leak  was not detected.     Unable to get agreement for goals because no family is present and patient cannot respond.     
  Problem: Respiratory - Adult  Goal: Achieves optimal ventilation and oxygenation  Outcome: Progressing     Problem: Respiratory - Adult  Goal: Will be able to breathe spontaneously, without ventilator support  Description: Will be able to breathe spontaneously, without ventilator support  Outcome: Progressing                                                  Patient Weaning Progress    The patient's vent settings was able to be weaned this shift.      Ventilator settings that were weaned              [x] Mode   [] Pressure support weaned   [x] Fio2 weaned   [] Peep weaned      Spontaneous weaning trial  was attempted.     *Results of SBT documented under SBTOUTCOME note.    Reason that defined ventilator parameters for SBT was not met              [] Patient condition requires increased ventilator settings  [] Requires increased sedation   [] Settings not within weaning range   [] SAT not completed   [] Physician orders    The patient was able to tolerate SBT.   RSBI  was 31 with EtCO2 of 35 and SpO2 of 100 on 35% FiO2.  Spontanteous VT was 513 and RR 16 breaths/min.  The patient was placed on SBT @ 0616    Evac tube was not  hooked up with continuous low suction(20-30mmHg)      Cuff was not  deflated to determine cuff leak.     Unable to get agreement for goals because no family is present and patient cannot respond.     
  Problem: Respiratory - Adult  Goal: Achieves optimal ventilation and oxygenation  Outcome: Progressing     Problem: Respiratory - Adult  Goal: Will be able to breathe spontaneously, without ventilator support  Description: Will be able to breathe spontaneously, without ventilator support  Outcome: Progressing                                                Patient Weaning Progress    The patient's vent settings was not able to be weaned this shift.      Ventilator settings that were weaned              [] Mode   [] Pressure support weaned   [] Fio2 weaned   [] Peep weaned      Spontaneous weaning trial  was not attempted.     due to defined parameters for SBT (spontaneous breathing trial) not being met.     *Results of SBT documented under SBTOUTCOME note.    Reason that defined ventilator parameters for SBT was not met              [] Patient condition requires increased ventilator settings  [] Requires increased sedation   [] Settings not within weaning range   [] SAT not completed   [] Physician orders    Evac tube was not  hooked up with continuous low suction(20-30mmHg)      Unable to get agreement for goals because no family is present and patient cannot respond.     
  Problem: Respiratory - Adult  Goal: Achieves optimal ventilation and oxygenation  Outcome: Progressing     Problem: Respiratory - Adult  Goal: Will be able to breathe spontaneously, without ventilator support  Description: Will be able to breathe spontaneously, without ventilator support  Outcome: Progressing   Vent setting optimized to achieve target tidal volume, respiratory rate and ideal oxygen saturations. SBT will be performed when appropriate.                                                    Patient Weaning Progress    The patient's vent settings was not able to be weaned this shift.      Ventilator settings that were weaned              [] Mode   [] Pressure support weaned   [] Fio2 weaned   [] Peep weaned      Spontaneous weaning trial  was not attempted.       Reason that defined ventilator parameters for SBT was not met              [x] Patient condition requires increased ventilator settings  [] Requires increased sedation   [x] Settings not within weaning range   [] SAT not completed   [] Physician orders    Evac tube was not  hooked up with continuous low suction(20-30mmHg)      Cuff was not  deflated to determine cuff leak. A leak  was not detected.     Unable to get agreement for goals because no family is present and patient cannot respond.     
  Problem: Respiratory - Adult  Goal: Achieves optimal ventilation and oxygenation  Outcome: Progressing  Flowsheets (Taken 3/12/2024 2000 by Timothy Hilliard RN)  Achieves optimal ventilation and oxygenation:   Assess for changes in respiratory status   Assess for changes in mentation and behavior   Position to facilitate oxygenation and minimize respiratory effort   Oxygen supplementation based on oxygen saturation or arterial blood gases   Assess the need for suctioning and aspirate as needed   Respiratory therapy support as indicated   Vent setting optimized to achieve target tidal volume, respiratory rate and ideal oxygen saturations. SBT will be performed when appropriate.                                                    Patient Weaning Progress    The patient's vent settings was able to be weaned this shift.      Ventilator settings that were weaned              [] Mode   [] Pressure support weaned   [] Fio2 weaned   [] Peep weaned      Spontaneous weaning trial  was not attempted.       Reason that defined ventilator parameters for SBT was not met              [] Patient condition requires increased ventilator settings  [x] Requires increased sedation   [] Settings not within weaning range   [] SAT not completed   [] Physician orders      Evac tube was not  hooked up with continuous low suction(20-30mmHg)      Cuff was not  deflated to determine cuff leak. A leak  was not detected.     Unable to get agreement for goals because no family is present and patient cannot respond.     
  Problem: Respiratory - Adult  Goal: Achieves optimal ventilation and oxygenation  Outcome: Progressing  Flowsheets (Taken 3/17/2024 3248)  Achieves optimal ventilation and oxygenation:   Assess for changes in respiratory status   Oxygen supplementation based on oxygen saturation or arterial blood gases   Assess the need for suctioning and aspirate as needed   Respiratory therapy support as indicated                                                Patient Weaning Progress    The patient's vent settings was not able to be weaned this shift.      Ventilator settings that were weaned              [] Mode   [] Pressure support weaned   [] Fio2 weaned   [] Peep weaned      Spontaneous weaning trial  was not attempted.     due to defined parameters for SBT (spontaneous breathing trial) not being met.     *Results of SBT documented under SBTOUTCOME note.    Reason that defined ventilator parameters for SBT was not met              [x] Patient condition requires increased ventilator settings  [] Requires increased sedation   [] Settings not within weaning range   [] SAT not completed   [x] Physician orders        Unable to get agreement for goals because no family is present and patient cannot respond.     
  Problem: Respiratory - Adult  Goal: Will be able to breathe spontaneously, without ventilator support  Description: Will be able to breathe spontaneously, without ventilator support  Outcome: Progressing     Problem: Respiratory - Adult  Goal: Achieves optimal ventilation and oxygenation  Outcome: Progressing                                                Patient Weaning Progress    The patient's vent settings was able to be weaned this shift.      Ventilator settings that were weaned              [x] Mode   [] Pressure support weaned   [] Fio2 weaned   [] Peep weaned      Spontaneous weaning trial  was attempted.     due to defined parameters for SBT (spontaneous breathing trial) not being met.     *Results of SBT documented under SBTOUTCOME note.    Reason that defined ventilator parameters for SBT was not met              [] Patient condition requires increased ventilator settings  [] Requires increased sedation   [] Settings not within weaning range   [] SAT not completed   [] Physician orders    The patient was able to tolerate SBT.   RSBI  was 23 with  SpO2 of 96 on 30% FiO2.  Spontanteous VT was 466 and RR 11 breaths/min.  The patient remains on sbt    Evac tube was  hooked up with continuous low suction(20-30mmHg)      Cuff was not  deflated to determine cuff leak.     Unable to get agreement for goals because no family is present and patient cannot respond.     
  Problem: Respiratory - Adult  Goal: Will be able to breathe spontaneously, without ventilator support  Description: Will be able to breathe spontaneously, without ventilator support  Outcome: Progressing  Note:                                              Patient Weaning Progress    The patient's vent settings was not able to be weaned this shift. Pt was on spont from 5am-9am. Pt had increased respiratory rate and low vt. Pt was placed back on vent      Ventilator settings that were weaned              [] Mode   [] Pressure support weaned   [] Fio2 weaned   [] Peep weaned      Spontaneous weaning trial  was attempted.     due to defined parameters for SBT (spontaneous breathing trial) not being met.     *Results of SBT documented under SBTOUTCOME note.    Reason that defined ventilator parameters for SBT was not met              [] Patient condition requires increased ventilator settings  [x] Requires increased sedation   [] Settings not within weaning range   [] SAT not completed   [] Physician orders    Evac tube was not  hooked up with continuous low suction(20-30mmHg)      Cuff was not  deflated to determine cuff leak.     Unable to get agreement for goals because no family is present and patient cannot respond.        
Based on caregiver fall risk screen, instruct family/caregiver to ask for assistance with transferring infant if caregiver noted to have fall risk factors   Instruct family/caregiver on patient safety     Problem: ABCDS Injury Assessment  Goal: Absence of physical injury  Outcome: Progressing  Flowsheets (Taken 3/22/2024 0250 by Sue Pate, RN)  Absence of Physical Injury: Implement safety measures based on patient assessment     Problem: Skin/Tissue Integrity  Goal: Absence of new skin breakdown  Description: 1.  Monitor for areas of redness and/or skin breakdown  2.  Assess vascular access sites hourly  3.  Every 4-6 hours minimum:  Change oxygen saturation probe site  4.  Every 4-6 hours:  If on nasal continuous positive airway pressure, respiratory therapy assess nares and determine need for appliance change or resting period.  Outcome: Progressing     Problem: Respiratory - Adult  Goal: Achieves optimal ventilation and oxygenation  3/25/2024 1719 by Thomas Slade, RN  Outcome: Progressing  Flowsheets (Taken 3/22/2024 2000 by Qian Ramos, RN)  Achieves optimal ventilation and oxygenation:   Assess for changes in respiratory status   Assess for changes in mentation and behavior   Position to facilitate oxygenation and minimize respiratory effort   Oxygen supplementation based on oxygen saturation or arterial blood gases   Initiate smoking cessation protocol as indicated   Assess the need for suctioning and aspirate as needed   Assess and instruct to report shortness of breath or any respiratory difficulty   Respiratory therapy support as indicated     Problem: Respiratory - Adult  Goal: Will be able to breathe spontaneously, without ventilator support  Description: Will be able to breathe spontaneously, without ventilator support  Outcome: Progressing     Problem: Nutrition Deficit:  Goal: Optimize nutritional status  Outcome: Progressing  Flowsheets (Taken 3/21/2024 0953 by Karen Staton, RN)  Nutrient 
Injury:   Based on caregiver fall risk screen, instruct family/caregiver to ask for assistance with transferring infant if caregiver noted to have fall risk factors   Instruct family/caregiver on patient safety     Problem: Metabolic/Fluid and Electrolytes - Adult  Goal: Electrolytes maintained within normal limits  3/22/2024 1006 by Jhony Cole RN  Outcome: Progressing  3/22/2024 0251 by Sue Pate RN  Outcome: Progressing  Flowsheets (Taken 3/21/2024 1948)  Electrolytes maintained within normal limits:   Monitor labs and assess patient for signs and symptoms of electrolyte imbalances   Administer electrolyte replacement as ordered   Monitor response to electrolyte replacements, including repeat lab results as appropriate  Goal: Hemodynamic stability and optimal renal function maintained  3/22/2024 1006 by Jhony Cole RN  Outcome: Not Progressing  3/22/2024 0251 by Sue Pate RN  Outcome: Progressing  Flowsheets (Taken 3/21/2024 1948)  Hemodynamic stability and optimal renal function maintained:   Monitor labs and assess for signs and symptoms of volume excess or deficit   Monitor intake, output and patient weight   Monitor urine specific gravity, serum osmolarity and serum sodium as indicated or ordered  Goal: Glucose maintained within prescribed range  3/22/2024 1006 by Jhony Cole RN  Outcome: Progressing  3/22/2024 0251 by Sue Pate RN  Outcome: Progressing  Flowsheets (Taken 3/21/2024 1948)  Glucose maintained within prescribed range:   Monitor blood glucose as ordered   Assess for signs and symptoms of hyperglycemia and hypoglycemia   Administer ordered medications to maintain glucose within target range     Problem: Skin/Tissue Integrity - Adult  Goal: Skin integrity remains intact  3/22/2024 1006 by Jhony Cole RN  Outcome: Progressing  3/22/2024 0251 by Sue Pate RN  Outcome: Progressing  Flowsheets (Taken 3/21/2024 1948)  Skin Integrity Remains Intact:   
stability  Outcome: Not Progressing  Flowsheets (Taken 3/11/2024 0730)  Maintains optimal cardiac output and hemodynamic stability:   Monitor blood pressure and heart rate   Monitor urine output and notify Licensed Independent Practitioner for values outside of normal range     Problem: Gastrointestinal - Adult  Goal: Maintains or returns to baseline bowel function  Outcome: Not Progressing  Flowsheets (Taken 3/11/2024 0730)  Maintains or returns to baseline bowel function:   Assess bowel function   Encourage oral fluids to ensure adequate hydration  Goal: Maintains adequate nutritional intake  Outcome: Not Progressing     Problem: Metabolic/Fluid and Electrolytes - Adult  Goal: Hemodynamic stability and optimal renal function maintained  Outcome: Not Progressing  Flowsheets (Taken 3/11/2024 0730)  Hemodynamic stability and optimal renal function maintained:   Monitor labs and assess for signs and symptoms of volume excess or deficit   Monitor intake, output and patient weight  Goal: Glucose maintained within prescribed range  Outcome: Not Progressing  Flowsheets (Taken 3/11/2024 0730)  Glucose maintained within prescribed range: Monitor blood glucose as ordered     Problem: Skin/Tissue Integrity - Adult  Goal: Skin integrity remains intact  Outcome: Not Progressing     
2221 by Alpa Ruiz RN  Outcome: Not Progressing  Flowsheets (Taken 3/28/2024 0230 by Magan Iglesias, RN)  Achieves stable or improved neurological status:   Initiate measures to prevent increased intracranial pressure   Maintain blood pressure and fluid volume within ordered parameters to optimize cerebral perfusion and minimize risk of hemorrhage   Monitor temperature, glucose, and sodium. Initiate appropriate interventions as ordered     Problem: Musculoskeletal - Adult  Goal: Return mobility to safest level of function  4/3/2024 0924 by Fred Sears RN  Outcome: Progressing  4/2/2024 2221 by Alpa Ruiz RN  Outcome: Not Progressing  Flowsheets (Taken 3/26/2024 2231 by Magan Iglesias, RN)  Return Mobility to Safest Level of Function: Assess patient stability and activity tolerance for standing, transferring and ambulating with or without assistive devices     Problem: Nutrition Deficit:  Goal: Optimize nutritional status  Outcome: Progressing     Problem: Gastrointestinal - Adult  Goal: Maintains or returns to baseline bowel function  Outcome: Progressing  Goal: Maintains adequate nutritional intake  Outcome: Progressing     Problem: Hematologic - Adult  Goal: Maintains hematologic stability  Outcome: Progressing     Problem: Neurosensory - Adult  Goal: Achieves stable or improved neurological status  4/3/2024 0924 by Fred Sears RN  Outcome: Progressing  4/2/2024 2221 by Alpa Ruiz RN  Outcome: Not Progressing  Flowsheets (Taken 3/28/2024 0230 by Magan Iglesias, RN)  Achieves stable or improved neurological status:   Initiate measures to prevent increased intracranial pressure   Maintain blood pressure and fluid volume within ordered parameters to optimize cerebral perfusion and minimize risk of hemorrhage   Monitor temperature, glucose, and sodium. Initiate appropriate interventions as ordered     Problem: Musculoskeletal - Adult  Goal: Return mobility to safest level of function  4/3/2024 
Adult  Goal: Maintains optimal cardiac output and hemodynamic stability  3/20/2024 0704 by Felipe Kong RN  Outcome: Progressing  Flowsheets (Taken 3/19/2024 2000)  Maintains optimal cardiac output and hemodynamic stability:   Monitor blood pressure and heart rate   Monitor urine output and notify Licensed Independent Practitioner for values outside of normal range   Assess for signs of decreased cardiac output   Administer vasoactive medications as ordered     Problem: Cardiovascular - Adult  Goal: Absence of cardiac dysrhythmias or at baseline  3/20/2024 0704 by Felipe Kogn RN  Outcome: Progressing  Flowsheets (Taken 3/19/2024 2000)  Absence of cardiac dysrhythmias or at baseline:   Monitor cardiac rate and rhythm   Assess for signs of decreased cardiac output   Administer antiarrhythmia medication and electrolyte replacement as ordered     Problem: Gastrointestinal - Adult  Goal: Maintains or returns to baseline bowel function  3/20/2024 0704 by Felipe Kong RN  Outcome: Progressing  Flowsheets (Taken 3/19/2024 2000)  Maintains or returns to baseline bowel function:   Assess bowel function   Administer IV fluids as ordered to ensure adequate hydration   Administer ordered medications as needed     Problem: Gastrointestinal - Adult  Goal: Maintains adequate nutritional intake  3/20/2024 0704 by Felipe Kong RN  Outcome: Progressing  Flowsheets (Taken 3/19/2024 2000)  Maintains adequate nutritional intake: Identify factors contributing to decreased intake, treat as appropriate     Problem: Metabolic/Fluid and Electrolytes - Adult  Goal: Electrolytes maintained within normal limits  3/20/2024 0704 by Felipe Kong RN  Outcome: Progressing  Flowsheets (Taken 3/19/2024 2000)  Electrolytes maintained within normal limits:   Monitor labs and assess patient for signs and symptoms of electrolyte imbalances   Administer electrolyte replacement as ordered   Monitor response to electrolyte 
Licensed Independent Practitioner for values outside of normal range   Assess for signs of decreased cardiac output   Administer fluid and/or volume expanders as ordered   Administer vasoactive medications as ordered     Problem: Cardiovascular - Adult  Goal: Absence of cardiac dysrhythmias or at baseline  Outcome: Progressing  Flowsheets (Taken 3/20/2024 2000)  Absence of cardiac dysrhythmias or at baseline:   Monitor cardiac rate and rhythm   Assess for signs of decreased cardiac output   Administer antiarrhythmia medication and electrolyte replacement as ordered     Problem: Gastrointestinal - Adult  Goal: Maintains or returns to baseline bowel function  Outcome: Progressing  Flowsheets (Taken 3/20/2024 2000)  Maintains or returns to baseline bowel function:   Assess bowel function   Administer IV fluids as ordered to ensure adequate hydration   Administer ordered medications as needed   Encourage mobilization and activity     Problem: Gastrointestinal - Adult  Goal: Maintains adequate nutritional intake  Outcome: Progressing  Flowsheets (Taken 3/20/2024 2000)  Maintains adequate nutritional intake: Identify factors contributing to decreased intake, treat as appropriate     Problem: Metabolic/Fluid and Electrolytes - Adult  Goal: Electrolytes maintained within normal limits  Outcome: Progressing  Flowsheets (Taken 3/20/2024 2000)  Electrolytes maintained within normal limits:   Monitor labs and assess patient for signs and symptoms of electrolyte imbalances   Administer electrolyte replacement as ordered   Monitor response to electrolyte replacements, including repeat lab results as appropriate     Problem: Metabolic/Fluid and Electrolytes - Adult  Goal: Hemodynamic stability and optimal renal function maintained  Outcome: Progressing  Flowsheets (Taken 3/20/2024 2000)  Hemodynamic stability and optimal renal function maintained:   Monitor labs and assess for signs and symptoms of volume excess or deficit   
provided and reinforced during every shift change.  
limits:   Monitor labs and assess patient for signs and symptoms of electrolyte imbalances   Administer electrolyte replacement as ordered   Monitor response to electrolyte replacements, including repeat lab results as appropriate  Goal: Hemodynamic stability and optimal renal function maintained  Outcome: Progressing  Flowsheets (Taken 3/12/2024 2000)  Hemodynamic stability and optimal renal function maintained:   Monitor labs and assess for signs and symptoms of volume excess or deficit   Monitor intake, output and patient weight   Monitor response to interventions for patient's volume status, including labs, urine output, blood pressure (other measures as available)   Encourage oral intake as appropriate  Goal: Glucose maintained within prescribed range  Outcome: Progressing  Flowsheets (Taken 3/12/2024 2000)  Glucose maintained within prescribed range:   Monitor blood glucose as ordered   Assess for signs and symptoms of hyperglycemia and hypoglycemia   Administer ordered medications to maintain glucose within target range     Problem: Hematologic - Adult  Goal: Maintains hematologic stability  Outcome: Progressing  Flowsheets (Taken 3/12/2024 2000)  Maintains hematologic stability:   Assess for signs and symptoms of bleeding or hemorrhage   Monitor labs for bleeding or clotting disorders   Administer blood products/factors as ordered     Care plan reviewed with patient.  Patient can not verbalize understanding of the plan of care and contribute to goal setting due to being intubated and sedated.      
maintained within normal limits  3/26/2024 1030 by Thomas Slade RN  Outcome: Progressing  Flowsheets (Taken 3/25/2024 2000 by Qian Ramos RN)  Electrolytes maintained within normal limits:   Monitor labs and assess patient for signs and symptoms of electrolyte imbalances   Administer electrolyte replacement as ordered   Monitor response to electrolyte replacements, including repeat lab results as appropriate     Problem: Metabolic/Fluid and Electrolytes - Adult  Goal: Hemodynamic stability and optimal renal function maintained  3/26/2024 1030 by Thomas Slade RN  Outcome: Progressing  Flowsheets (Taken 3/25/2024 2000 by Qian Ramos RN)  Hemodynamic stability and optimal renal function maintained:   Monitor labs and assess for signs and symptoms of volume excess or deficit   Monitor intake, output and patient weight   Monitor response to interventions for patient's volume status, including labs, urine output, blood pressure (other measures as available)     Problem: Metabolic/Fluid and Electrolytes - Adult  Goal: Glucose maintained within prescribed range  3/26/2024 1030 by Thomas Slade RN  Outcome: Progressing  Flowsheets (Taken 3/25/2024 2000 by Qian Ramos RN)  Glucose maintained within prescribed range:   Monitor blood glucose as ordered   Assess for signs and symptoms of hyperglycemia and hypoglycemia   Administer ordered medications to maintain glucose within target range     Problem: Skin/Tissue Integrity - Adult  Goal: Skin integrity remains intact  3/26/2024 1030 by Thomas Slade RN  Outcome: Progressing  Flowsheets (Taken 3/25/2024 2000 by Qian Ramos RN)  Skin Integrity Remains Intact:   Monitor for areas of redness and/or skin breakdown   Every 4-6 hours minimum: Change oxygen saturation probe site     Problem: Hematologic - Adult  Goal: Maintains hematologic stability  3/26/2024 1030 by Thomas Slade RN  Outcome: Progressing  Flowsheets (Taken 3/25/2024 2000 by Qian Ramos 
resources:   Identify barriers to discharge with patient and caregiver   Refer to discharge planning if patient needs post-hospital services based on physician order or complex needs related to functional status, cognitive ability or social support system     Problem: Chronic Conditions and Co-morbidities  Goal: Patient's chronic conditions and co-morbidity symptoms are monitored and maintained or improved  Outcome: Not Progressing  Flowsheets (Taken 3/11/2024 0200)  Care Plan - Patient's Chronic Conditions and Co-Morbidity Symptoms are Monitored and Maintained or Improved: Monitor and assess patient's chronic conditions and comorbid symptoms for stability, deterioration, or improvement     Problem: Pain  Goal: Verbalizes/displays adequate comfort level or baseline comfort level  Outcome: Not Progressing     Problem: Safety - Adult  Goal: Free from fall injury  Outcome: Not Progressing     Problem: ABCDS Injury Assessment  Goal: Absence of physical injury  Outcome: Not Progressing     Problem: Skin/Tissue Integrity  Goal: Absence of new skin breakdown  Description: 1.  Monitor for areas of redness and/or skin breakdown  2.  Assess vascular access sites hourly  3.  Every 4-6 hours minimum:  Change oxygen saturation probe site  4.  Every 4-6 hours:  If on nasal continuous positive airway pressure, respiratory therapy assess nares and determine need for appliance change or resting period.  Outcome: Not Progressing     Problem: Respiratory - Adult  Goal: Achieves optimal ventilation and oxygenation  Outcome: Not Progressing  Flowsheets (Taken 3/11/2024 0200)  Achieves optimal ventilation and oxygenation:   Assess for changes in respiratory status   Position to facilitate oxygenation and minimize respiratory effort   Assess for changes in mentation and behavior   Assess the need for suctioning and aspirate as needed   Oxygen supplementation based on oxygen saturation or arterial blood gases   Respiratory therapy support 
Karen Staton, RN  Outcome: Progressing  Flowsheets (Taken 3/21/2024 0953)  Glucose maintained within prescribed range:   Monitor blood glucose as ordered   Assess for signs and symptoms of hyperglycemia and hypoglycemia   Administer ordered medications to maintain glucose within target range   Assess barriers to adequate nutritional intake and initiate nutrition consult as needed     Problem: Skin/Tissue Integrity - Adult  Goal: Skin integrity remains intact  3/21/2024 0953 by Karen Staton, RN  Outcome: Progressing  Flowsheets (Taken 3/21/2024 0953)  Skin Integrity Remains Intact:   Monitor for areas of redness and/or skin breakdown   Assess vascular access sites hourly   Every 4-6 hours minimum: Change oxygen saturation probe site   Every 4-6 hours: If on nasal continuous positive airway pressure, respiratory therapy assesses nares and determine need for appliance change or resting period     Problem: Hematologic - Adult  Goal: Maintains hematologic stability  3/21/2024 0953 by Karen Staton, RN  Outcome: Progressing  Flowsheets (Taken 3/21/2024 0953)  Maintains hematologic stability:   Assess for signs and symptoms of bleeding or hemorrhage   Monitor labs for bleeding or clotting disorders   Administer blood products/factors as ordered   Care plan reviewed with patient and family.  Patient unable to verbalize understanding of the plan of care and contribute to goal setting.      
RN  Outcome: Progressing  Flowsheets (Taken 3/9/2024 1153)  Glucose maintained within prescribed range:   Monitor blood glucose as ordered   Assess for signs and symptoms of hyperglycemia and hypoglycemia   Administer ordered medications to maintain glucose within target range   Assess barriers to adequate nutritional intake and initiate nutrition consult as needed  3/9/2024 0506 by Lalitha Burdick RN  Outcome: Not Progressing  Flowsheets (Taken 3/9/2024 0100)  Glucose maintained within prescribed range:   Monitor blood glucose as ordered   Assess for signs and symptoms of hyperglycemia and hypoglycemia     Problem: Skin/Tissue Integrity - Adult  Goal: Skin integrity remains intact  3/9/2024 1153 by George Stinson RN  Outcome: Progressing  Flowsheets (Taken 3/9/2024 1153)  Skin Integrity Remains Intact:   Monitor for areas of redness and/or skin breakdown   Assess vascular access sites hourly   Every 4-6 hours minimum: Change oxygen saturation probe site  3/9/2024 0506 by Lalitha Burdick, RN  Outcome: Not Progressing  Flowsheets (Taken 3/9/2024 0100)  Skin Integrity Remains Intact:   Monitor for areas of redness and/or skin breakdown   Assess vascular access sites hourly   Every 4-6 hours minimum: Change oxygen saturation probe site     Problem: Hematologic - Adult  Goal: Maintains hematologic stability  3/9/2024 1153 by George Stinson RN  Outcome: Progressing  Flowsheets (Taken 3/9/2024 1153)  Maintains hematologic stability:   Assess for signs and symptoms of bleeding or hemorrhage   Monitor labs for bleeding or clotting disorders   Administer blood products/factors as ordered  3/9/2024 0506 by Lalitha Burdick, RN  Outcome: Not Progressing  Flowsheets (Taken 3/9/2024 0100)  Maintains hematologic stability:   Assess for signs and symptoms of bleeding or hemorrhage   Monitor labs for bleeding or clotting disorders   Administer blood products/factors as ordered   Care plan reviewed with patient and family.  
minimize respiratory effort   Respiratory therapy support as indicated   Assess the need for suctioning and aspirate as needed   Oxygen supplementation based on oxygen saturation or arterial blood gases     Problem: Nutrition Deficit:  Goal: Optimize nutritional status  Outcome: Not Progressing     Problem: Cardiovascular - Adult  Goal: Maintains optimal cardiac output and hemodynamic stability  Outcome: Not Progressing  Flowsheets (Taken 3/9/2024 0100)  Maintains optimal cardiac output and hemodynamic stability:   Monitor blood pressure and heart rate   Monitor urine output and notify Licensed Independent Practitioner for values outside of normal range   Assess for signs of decreased cardiac output   Administer vasoactive medications as ordered  Goal: Absence of cardiac dysrhythmias or at baseline  Outcome: Not Progressing  Flowsheets (Taken 3/9/2024 0100)  Absence of cardiac dysrhythmias or at baseline:   Monitor cardiac rate and rhythm   Administer antiarrhythmia medication and electrolyte replacement as ordered     Problem: Gastrointestinal - Adult  Goal: Maintains or returns to baseline bowel function  Outcome: Not Progressing  Flowsheets (Taken 3/9/2024 0100)  Maintains or returns to baseline bowel function:   Encourage oral fluids to ensure adequate hydration   Encourage mobilization and activity   Administer ordered medications as needed  Goal: Maintains adequate nutritional intake  Outcome: Not Progressing  Flowsheets (Taken 3/9/2024 0100)  Maintains adequate nutritional intake: Monitor intake and output, weight and lab values     Problem: Metabolic/Fluid and Electrolytes - Adult  Goal: Electrolytes maintained within normal limits  Outcome: Not Progressing  Flowsheets (Taken 3/9/2024 0100)  Electrolytes maintained within normal limits:   Monitor labs and assess patient for signs and symptoms of electrolyte imbalances   Administer electrolyte replacement as ordered   Instruct patient on fluid and nutrition 
Glucose maintained within prescribed range  Outcome: Not Progressing  Flowsheets (Taken 3/18/2024 1824)  Glucose maintained within prescribed range:   Monitor blood glucose as ordered   Assess for signs and symptoms of hyperglycemia and hypoglycemia     Problem: Skin/Tissue Integrity - Adult  Goal: Skin integrity remains intact  Outcome: Not Progressing  Flowsheets (Taken 3/18/2024 1824)  Skin Integrity Remains Intact:   Monitor for areas of redness and/or skin breakdown   Assess vascular access sites hourly     Problem: Hematologic - Adult  Goal: Maintains hematologic stability  Outcome: Not Progressing  Flowsheets (Taken 3/18/2024 1824)  Maintains hematologic stability: Assess for signs and symptoms of bleeding or hemorrhage   Care plan reviewed with patient and family.  Patient and family verbalize understanding of the plan of care and contribute to goal setting.     
Assess for changes in respiratory status   Oxygen supplementation based on oxygen saturation or arterial blood gases   Assess the need for suctioning and aspirate as needed   Respiratory therapy support as indicated     Problem: Nutrition Deficit:  Goal: Optimize nutritional status  3/19/2024 0529 by Stas Griggs RN  Outcome: Progressing  Flowsheets (Taken 3/19/2024 0529)  Nutrient intake appropriate for improving, restoring, or maintaining nutritional needs:   Assess nutritional status and recommend course of action   Monitor oral intake, labs, and treatment plans   Recommend, monitor, and adjust tube feedings and TPN/PPN based on assessed needs  3/18/2024 1824 by George Stinson RN  Outcome: Not Progressing  Flowsheets (Taken 3/18/2024 1824)  Nutrient intake appropriate for improving, restoring, or maintaining nutritional needs: Recommend, monitor, and adjust tube feedings and TPN/PPN based on assessed needs     Problem: Cardiovascular - Adult  Goal: Maintains optimal cardiac output and hemodynamic stability  3/19/2024 0529 by Stas Griggs RN  Outcome: Progressing  Flowsheets (Taken 3/19/2024 0529)  Maintains optimal cardiac output and hemodynamic stability:   Monitor blood pressure and heart rate   Monitor urine output and notify Licensed Independent Practitioner for values outside of normal range   Assess for signs of decreased cardiac output  3/18/2024 1824 by George Stinson RN  Outcome: Not Progressing  Flowsheets (Taken 3/18/2024 1824)  Maintains optimal cardiac output and hemodynamic stability:   Monitor blood pressure and heart rate   Assess for signs of decreased cardiac output   Administer fluid and/or volume expanders as ordered   Administer vasoactive medications as ordered  Goal: Absence of cardiac dysrhythmias or at baseline  3/19/2024 0529 by Stas Griggs RN  Outcome: Progressing  Flowsheets (Taken 3/19/2024 0529)  Absence of cardiac dysrhythmias or at baseline:   Monitor cardiac rate and 
Initiate measures to prevent increased intracranial pressure   Maintain blood pressure and fluid volume within ordered parameters to optimize cerebral perfusion and minimize risk of hemorrhage   Monitor temperature, glucose, and sodium. Initiate appropriate interventions as ordered  4/4/2024 1358   Outcome: Progressing  Flowsheets (Taken 4/4/2024 1358)  Achieves stable or improved neurological status:   Assess for and report changes in neurological status   Maintain blood pressure and fluid volume within ordered parameters to optimize cerebral perfusion and minimize risk of hemorrhage   Initiate measures to prevent increased intracranial pressure   Monitor temperature, glucose, and sodium. Initiate appropriate interventions as ordered     Problem: Musculoskeletal - Adult  Goal: Return mobility to safest level of function  4/4/2024 2318 by Qian Ramos, RN  Outcome: Progressing  Flowsheets (Taken 4/4/2024 2000)  Return Mobility to Safest Level of Function: Assess patient stability and activity tolerance for standing, transferring and ambulating with or without assistive devices  4/4/2024 1358   Outcome: Progressing  Flowsheets (Taken 4/4/2024 1358)  Return Mobility to Safest Level of Function:   Assess patient stability and activity tolerance for standing, transferring and ambulating with or without assistive devices   Assist with transfers and ambulation using safe patient handling equipment as needed   Ensure adequate protection for wounds/incisions during mobilization   Obtain physical therapy/occupational therapy consults as needed

## 2024-04-06 NOTE — PROGRESS NOTES
Patient Weaning Progress    The patient's vent settings was able to be weaned this shift.      Ventilator settings that were weaned              [] Mode   [] Pressure support weaned   [] Fio2 weaned   [] Peep weaned      Spontaneous weaning trial  was attempted.       *Results of SBT documented under SBTOUTCOME note.      The patient was able to tolerate SBT.   RSBI  was 44 with EtCO2 of 37 and SpO2 of 100 on 30% FiO2.  Spontanteous VT was 6710 and RR 22 breaths/min.  The patient was on SBT for 60 minutes     Evac tube was  hooked up with continuous low suction(20-30mmHg)      Unable to get agreement for goals because no family is present and patient cannot respond.     
                                             Patient Weaning Progress    The patient's vent settings was able to be weaned this shift.      Ventilator settings that were weaned              [] Mode   [] Pressure support weaned   [x] Fio2 weaned   [x] Peep weaned      Spontaneous weaning trial  was not attempted.     due to defined parameters for SBT (spontaneous breathing trial) not being met.     *Results of SBT documented under SBTOUTCOME note.    Reason that defined ventilator parameters for SBT was not met              [] Patient condition requires increased ventilator settings  [] Requires increased sedation   [] Settings not within weaning range   [] SAT not completed   [] Physician orders      Cuff was not  deflated to determine cuff leak.       Unable to get agreement for goals because no family is present and patient cannot respond.     
                                             Patient Weaning Progress    The patient's vent settings was able to be weaned this shift.      Ventilator settings that were weaned              [x] Mode   [] Pressure support weaned   [] Fio2 weaned   [] Peep weaned      Spontaneous weaning trial  was attempted.       The patient was able to tolerate SBT.   RSBI  was 30 with EtCO2 of 30 and SpO2 of 99 on 40% FiO2.  Spontanteous VT was 553 and RR 17 breaths/min.  The patient continues on SBT at this time. Apnea setting on.     Evac tube was  hooked up with continuous low suction(20-30mmHg)      Cuff was not  deflated to determine cuff leak.     Unable to get agreement for goals because no family is present and patient cannot respond.     
                                             Patient Weaning Progress    The patient's vent settings was able to be weaned this shift.      Ventilator settings that were weaned              [x] Mode   [] Pressure support weaned   [x] Fio2 weaned   [] Peep weaned      Spontaneous weaning trial  was attempted.     due to defined parameters for SBT (spontaneous breathing trial) not being met.     *Results of SBT documented under SBTOUTCOME note.    Reason that defined ventilator parameters for SBT was not met              [] Patient condition requires increased ventilator settings  [] Requires increased sedation   [] Settings not within weaning range   [] SAT not completed   [] Physician orders    The patient was able to tolerate SBT.   RSBI  29was  with EtCO2 of 22 and SpO2 of 100 on 50% FiO2.  Spontanteous VT was 520 and RR 15 breaths/min.  The patient was on SBT for ongoing minutes     Evac tube was not  hooked up with continuous low suction(20-30mmHg)      Cuff was  deflated to determine cuff leak. A leak  was detected.     Unable to get agreement for goals because no family is present and patient cannot respond.     
                                             Patient Weaning Progress    The patient's vent settings was not able to be weaned this shift.      Ventilator settings that were weaned              [] Mode   [] Pressure support weaned   [] Fio2 weaned   [] Peep weaned      Spontaneous weaning trial  was not attempted.     due to defined parameters for SBT (spontaneous breathing trial) not being met.     *Results of SBT documented under SBTOUTCOME note.    Reason that defined ventilator parameters for SBT was not met              [] Patient condition requires increased ventilator settings  [] Requires increased sedation   [x] Settings not within weaning range   [] SAT not completed   [] Physician orders      Evac tube was not  hooked up with continuous low suction(20-30mmHg)      Cuff was not  deflated to determine cuff leak. Unable to get agreement for goals because no family is present and patient cannot respond.     
                                             Patient Weaning Progress    The patient's vent settings was not able to be weaned this shift.      Ventilator settings that were weaned              [] Mode   [] Pressure support weaned   [] Fio2 weaned   [] Peep weaned      Spontaneous weaning trial  was not attempted.     due to defined parameters for SBT (spontaneous breathing trial) not being met.     *Results of SBT documented under SBTOUTCOME note.    Reason that defined ventilator parameters for SBT was not met              [] Patient condition requires increased ventilator settings  [] Requires increased sedation   [x] Settings not within weaning range.      Patient has apneic and periods of  increased WOB in the 30s      [] SAT not completed   [] Physician orders      Unable to get agreement for goals because no family is present and patient cannot respond.     
                                             Patient Weaning Progress    The patient's vent settings was not able to be weaned this shift.      Ventilator settings that were weaned              [] Mode   [] Pressure support weaned   [] Fio2 weaned   [] Peep weaned      due to defined parameters for SBT (spontaneous breathing trial) not being met.     *Results of SBT documented under SBTOUTCOME note.    Reason that defined ventilator parameters for SBT was not met              [] Patient condition requires increased ventilator settings  [] Requires increased sedation   [] Settings not within weaning range   [] SAT not completed   [] Physician orders    The patient was able to tolerate SBT.   RSBI  was 28 with EtCO2 of N/A and SpO2 of 98 on 30% FiO2.  Spontanteous VT was 597 and RR 17 breaths/min.      Unable to get agreement for goals because no family is present and patient cannot respond.     
                                             Patient Weaning Progress    The patient's vent settings was not able to be weaned this shift.    Ventilator settings that were weaned              [] Mode   [] Pressure support weaned   [] Fio2 weaned   [] Peep weaned    Spontaneous weaning trial was not attempted due to defined parameters for SBT (spontaneous breathing trial) not being met.    Reason that defined ventilator parameters for SBT was not met              [x] Patient condition requires increased ventilator settings  [] Requires increased sedation   [x] Settings not within weaning range   [] SAT not completed   [] Physician orders    Unable to get agreement for goals because no family is present and patient cannot respond.  
      Pharmacy Renal Adjustment    Pharmacy renally adjusted the following medication(s) per P&T approved policy: cefazolin    Recent Labs     03/09/24  0410 03/09/24  0800   BUN 32* 32*   CREATININE 2.5* 2.5*     Estimated Creatinine Clearance: 28 mL/min (A) (based on SCr of 2.5 mg/dL (H)).    Assessment:  CRRT    Plan:   Adjust  cefazolin from 1 gram IV every 8 hours  to 2 grams IV every 12 hours    Barby Mims PharmD, BCPS  3/9/2024  11:46 AM      
      Pharmacy Renal Adjustment    Pharmacy renally adjusted the following medication(s) per P&T approved policy: famotidine    Recent Labs     03/07/24  0115 03/07/24  0410   BUN 21 25*   CREATININE 1.7* 1.9*     Estimated Creatinine Clearance: 33 mL/min (A) (based on SCr of 1.9 mg/dL (H)).    Assessment:  MAGDI    Plan:   Decrease famotidine from 20 mg BID to 20 mg daily    Please call pharmacy with any questions.    Jhony Motta, PharmD  3/7/2024 10:07 AM      
    [facility]  Progress Note       Mumtaz Islas  AGE: 69 y.o.   GENDER: male  : 1955  UNIT: 4D-12/012-A  TODAY'S DATE:  3/20/2024  ADMISSION DATE: 3/6/2024  5:24 AM    Subjective   Reason for C Evaluation and Assessment: buttocks wound      Mumtaz Islas is a 69 y.o. male referred by:   [] Physician/PA/APRN  [] Nursing  [x] Other: following for ECMO    Wound Identification:  Wound Type:pressure  Wound Location: left buttock  Modifying factors:chronic pressure and decreased mobility    Objective     Fernando Risk Score: Fernando Scale Score: 12      Assessment     Encounter: Present to pt room for assessment of buttocks. Pt rolled to left side to assess area. Wound photo and assessment below. Pt has evolving DTPI. Areas are non blanching. Cleansed wound with normal saline and gauze. Pat dry with clean gauze. Applied triad to areas. Pt is on ECMO. Primary nurse states pt went a period of time without being able to turn. Changed chux pads. Applied wedges to under both sides to offload. Bed in low, call light in reach.    3/20/24    Wound type: left buttock evolving DTPI  Wound size: 9cm x 6cm x 0.05cm  Undermining or Tunneling: none   Wound assessment/color: purple, pink, yellow  Drainage amount: scant  Drainage description: serosanguinous  Odor: none  Margins: attached  Zeenat wound: intact  Exposed structure: none    Plan     Treatment Recommendations:   Left buttock: Cleanse wound with normal saline or wound cleanser and gauze. Pat dry with clean gauze. Apply Triad barrier cream to wounds twice daily and PRN. If cream becomes soiled, wipe off top layer and reapply.       Specialty Bed Required :   [x] Low Air Loss   [x] Pressure Redistribution  [] Fluid Immersion- Dolphin  [] Bariatric  [] RotoProne   [] Other:     Discharge Plan:  Placement for patient upon discharge: unknown  [] Home  [] Inpatient Rehab  [] SNF  [] ECF  [] [facility]/ [facility]  Patient appropriate for Outpatient 
    [facility]  Progress Note       Mumtaz Islas  AGE: 69 y.o.   GENDER: male  : 1955  UNIT: 4D-12/012-A  TODAY'S DATE:  3/27/2024  ADMISSION DATE: 3/6/2024  5:24 AM    Subjective   Reason for C Evaluation and Assessment: buttocks wound reevaluation       Mumtaz Islas is a 69 y.o. male referred by:   [] Physician/PA/APRN  [] Nursing  [x] Other: following for ECMO    Wound Identification:  Wound Type:pressure  Wound Location: left buttock  Modifying factors:chronic pressure and decreased mobility    Objective     Fernando Risk Score: Fernando Scale Score: 12      Assessment     Encounter: Present to pt room for reevaluation of buttocks. Pt rolled to left side to assess area. Wound photo and assessment below. Patient has evolving DTPI- sacrum opening. Areas are non blanching. Cleansed wound with normal saline and gauze. Pat dry with clean gauze. Applied triad to sacrum, covered with silicone bordered foam dressing. Triad applied to remaining discoloration and open skin. Pillow placed under right side, extremities offloaded. Bed in low, call light in reach. Will continue to follow and assess wound. Call with concerns and for wound evolution.       3/27/24    Wound type: left buttock evolving DTPI  Wound size: 3.5cm x 1.5cm x 0.05cm; left buttock (blanchable) 2cm x 2cm  Undermining or Tunneling: none   Wound assessment/color: purple, pink, yellow  Drainage amount: moderate  Drainage description: serosanguinous  Odor: none  Margins: attached  Zeenat wound: intact  Exposed structure: none    3/20/24    Wound type: left buttock evolving DTPI  Wound size: 9cm x 6cm x 0.05cm  Undermining or Tunneling: none   Wound assessment/color: purple, pink, yellow  Drainage amount: scant  Drainage description: serosanguinous  Odor: none  Margins: attached  Zeenat wound: intact  Exposed structure: none    Plan     Treatment Recommendations:   Left buttock: Cleanse wound with normal saline or wound 
    [facility] Nurse  Progress Note       Mumtaz Islas  AGE: 69 y.o.   GENDER: male  : 1955  UNIT: 4D-12/012-A  TODAY'S DATE:  4/3/2024  ADMISSION DATE: 3/6/2024  5:24 AM    Subjective   Reason for [facility] Evaluation and Assessment: buttocks wound reevaluation       Mumtaz Islas is a 69 y.o. male referred by:   [] Physician/PA/APRN  [] Nursing  [x] Other: following for ECMO    Wound Identification:  Wound Type:pressure  Wound Location: left buttock  Modifying factors:chronic pressure and decreased mobility    Objective     Fernando Risk Score: Fernando Scale Score: 10      Assessment     Encounter: Present to pt room for reevaluation of buttocks wounds. Kelsy RN in room. States patient has order for Santyl, with unknown location to apply. Patient has left lateral leg full thickness wound. Cleansed wound with normal saline and gauze. Pat dry with clean gauze. Santyl applied to wound with cotton tipped applicator, nickel thickness. NS wet to dry gauze applied over top, covered with silicone bordered foam. Staff to change as per eMAR. Assisted patient onto left side to assess area. Wound photo and assessment below. Evolving DTPI to left buttock and sacrum. New unstageable extending onto right ischium. Left buttock now blanches, sacrum remains non-blanchable. Cleansed wounds with normal saline and gauze. Pat dry with clean gauze. Applied Triad to all wounds. Pillow placed under right side x2, extremities offloaded. Would recommend use of wedges for offloading. Bed in low, call light in reach. Will continue to follow and assess wound. Call with concerns and for wound evolution.     4/3/24    Wound type: left buttock evolving DTPI  Wound size: 9cm x 1.5cm x 0.4cm (sacrum); left buttock (blanchable); right ischium 0.5cm x 0.5cm x 0.05cm  Undermining or Tunneling: none   Wound assessment/color: purple, pink, yellow; right isch black, yellow  Drainage amount: moderate  Drainage description: 
    [facility]     Neurodiagnostic Laboratory Technician Worksheet      EEG Date: 3/22/2024    Name: Mumtaz Islas  : 1955  Age: 69 y.o.  Sex: male  MRN: [id number]  CSN: [id number]    [facility]  Ordering Provider: Sher    EEG Number: [id number]    Time In: 27  Time Out: 1309  Total Treatment Time: 4hr 30min    Clinical History: post elective CABG, valve repair, maze procedure, post ecmo, morris,   Ct pending   ct  IMPRESSION:     1. No acute findings.  2. Mild global volume loss.       Hand Dominance: unknown   Sedation: Yes fentanyl, ativan at midnight,  versed at 2230   H.V. Completed: No, not done   Photic: Yes   Sleep: Yes   Drowsy: No   Sleep Deprived: No   Seizures Observed: No   Mentality: unresponsive     Technician: Reina Horta 3/22/2024             
  0800  4.2 L/min  Cardiac output  2.0 L/min/m²  Cardiac index  44 mL/beat  Stroke volume  [id number], [id number]  
  1245 pt repositioned,linens changed.posterior surface intact.no lukas some redness on coccyx but blanches. With turn pt has old bloody oral secretions.sx large amt thick pale yellow secretions.pt does not follow commands but is restless raising arms up, kicking legs thrashing head side to side. Increased fentanyl.bed placed on auto-rotation.  1300 decreasing epi for increased bp while restless.  1400 pt continues with restlessness, does not follow commands, increased fentanyl and precedex. Epi off for increased bp.  1415 svo2 reading 47.on nautilus oxygenator.  1440 pt is  a little calmer.svo2 56.1 on nautilus oxygenator.  1450 Increased flow back to 3.5 for hypotension and low Svo2 on nautalis.  1500 Barby Berkowitz APNP in to see pt updated. Order for repeat ABG AND VBG.  Pt calm.bp up to 175/101 decreased epi.  1515 Gases drawn. Decreased sweep to 1 and fio2 on  to 50 %.    
  CRITICAL CARE  PROGRESS NOTE       Patient:  Mumtaz Islas    Unit/Bed:4D-12/012-A  YOB: 1955  MRN: [id number]   PCP: Tom Villanueva DO  Date of Admission: 3/6/2024  Chief Complaint:-Shortness of breath    Assessment and Plan:    Acute hypoxic respiratory failure, s/p recent [facility] ECMO, s/p chest tube placement x7 : Intubated/sedated on vent.  Weaning off from sedation and vent.  Currently spontaneous pressure control 12, PEEP 6, FiO2 40%.  O2 sat 96%.  Minimal drainage on chest tubes. Plan for tracheostomy on 3/28/24   Acute cerebral Infarct: MRI head showed small acute infarct in the left parietal cortex. Plan for PEG tube on 3/28. Evaluate patient mentation once weaned off sedation. Consider  antiplatelet therapy post PEG placement.   Pneumonia like VAP : respiratory culture gram stain result show many segmented neutrophils observed. Rase epithelial cells preserved.  Moderate gram-negative bacilli.  Moderate gram-positive cocci curing singly in pairs.  Few gram-positive cocci.  Start Zosyn on 3/28. Monitor vitals and CBC. Repeat CXR daily.   CAD, s/p CABG, MV R, Maze procedure: CTS primary team.  Patient is taken to CABG, MVR and maze procedure and after procedure, developed acute blood loss and cardiogenic shock which required VA ECMO.   CHF, HFrEF.  ICMP, EF 30-35%:  Requiring milrinone 0.125.  GDMT as able however currently unable to start GDMT.  Hypotension, with labile blood pressure requiring low-dose Levophed.  Art line placed, monitoring blood pressure.  Levophed.  Will wean off as able vasopressor. On Milrinone wean off as tolerated.    PAF S/p cardioversion: Anticoagulation per cardiothoracic surgery.  On amiodarone 400 mg twice daily.  S/p VA ECMO, recent resolved cardiogenic shock: ECMO stopped at 3/19/2024.   Altered mental status.  Significant complicated ICU course, including multiple surgeries including CABG, maze, MVR, bleeding controlled, being on ECMO.  Patient is on 
  CRITICAL CARE  PROGRESS NOTE       Patient:  Mumtaz Islas    Unit/Bed:4D-12/012-A  YOB: 1955  MRN: [id number]   PCP: Tom Villanueva DO  Date of Admission: 3/6/2024  Chief Complaint:-Shortness of breath    Assessment and Plan:    Acute hypoxic respiratory failure, s/p recent VA ECMO, s/p chest tube placement x7. : Intubated/sedated on vent.  Weaning off from sedation and vent.  Currently pressure support 16, PEEP 10, FiO2 40%.  O2 sat 96%.  Minimal drainage on chest tubes.  CAD, s s/p CABG, MV R, Maze procedure: CTS primary team.  Patient is taken to CABG, MVR and maze procedure and after procedure, developed acute blood loss and cardiogenic shock which required VA ECMO.  VA ECMO started at March 19, 2024.  CHF, HFrEF.  ICMP, EF 30-35%.:  Requiring milrinone 0.125.  GDMT as able however currently unable to start GDMT.  Hypotension, with labile blood pressure requiring low-dose Levophed.  Art line placed, monitoring blood pressure.  Levophed.  Will wean off as able vasopressor.  PAF S/p cardioversion: Anticoagulation per cardiothoracic surgery.  On amiodarone 400 mg twice daily.  S/p VA ECMO, recent resolved cardiogenic shock: ECMO stopped at 3/19/2024.   Altered mental status.  Significant complicated ICU course, including multiple surgeries including CABG, maze, MVR, bleeding controlled, being on ECMO.  Patient is on minimal vent setting, sedation trying to being weaned off however patient cannot tolerate weaning off fentanyl at this point.  CT head negative for acute pathology on without contrast study.  MRI brain cannot be obtained due to epicardial pacer which is MRI incompatible.  Weaning off from sedation as able.  Careful monitoring.  Daily CBC, BMP, magnesium, and replacing electrolytes as needed.  PT OT.  Speech.  Deconditioning debility: Due to multiple disease including CHF, CAD, respiratory failure, PAF.  PT, OT following.  Dietitian on the board.  On tube feeding.  MAGDI, ATN from 
  CRITICAL CARE  PROGRESS NOTE       Patient:  Mumtaz Islas    Unit/Bed:4D-12/012-A  YOB: 1955  MRN: [id number]   PCP: Tom Villanueva DO  Date of Admission: 3/6/2024  Chief Complaint:-Shortness of breath    Assessment and Plan:    Acute hypoxic respiratory failure, s/p recent VA ECMO, s/p chest tube placement x7. : Intubated/sedated on vent.  Weaning off from sedation and vent.  Currently spontaneous pressure control 12, PEEP 6, FiO2 40%.  O2 sat 96%.  Minimal drainage on chest tubes.  CAD, s s/p CABG, MV R, Maze procedure: CTS primary team.  Patient is taken to CABG, MVR and maze procedure and after procedure, developed acute blood loss and cardiogenic shock which required VA ECMO.  VA ECMO started at March 19, 2024.  CHF, HFrEF.  ICMP, EF 30-35%:  Requiring milrinone 0.125.  GDMT as able however currently unable to start GDMT.  Hypotension, with labile blood pressure requiring low-dose Levophed.  Art line placed, monitoring blood pressure.  Levophed.  Will wean off as able vasopressor. On Milrinone wean off as tolerated.    PAF S/p cardioversion: Anticoagulation per cardiothoracic surgery.  On amiodarone 400 mg twice daily.  S/p VA ECMO, recent resolved cardiogenic shock: ECMO stopped at 3/19/2024.   Altered mental status.  Significant complicated ICU course, including multiple surgeries including CABG, maze, MVR, bleeding controlled, being on ECMO.  Patient is on minimal vent setting, sedation trying to being weaned off however patient cannot tolerate weaning off fentanyl at this point.  CT head negative for acute pathology on without contrast study.  MRI brain cannot be obtained due to epicardial pacer which is MRI incompatible.  Weaning off from sedation as able.  Careful monitoring.  Daily CBC, BMP, magnesium, and replacing electrolytes as needed.  PT OT.  Speech.  Deconditioning debility: Due to multiple disease including CHF, CAD, respiratory failure, PAF.  PT, OT following.  Dietitian 
  CRITICAL CARE PROGRESS NOTE      Patient:  Mumtaz Islas    Unit/Bed:4D-12/012-A  YOB: 1955  MRN: [id number]   PCP: Tom Villanueva DO  Date of Admission: 3/6/2024  Chief Complaint:- CAD sp CABG     Assessment and Plan:    CAD S/P CABG, Mitral Valve Repair and Maze procedure:  PO2 day  surgery with Dr. Shahriar daniels. Patient had Cardiac re-entry exploration on 3/7 for bleeding and cardiac tamponade, repaired the right ventricular bleeding and LIMA to LAD anastomosis. 3/8 patient taken to OR for open heart on ECMO due to cannulation site bleeding.   Undergoing VA ECMO    Monitor vitals, I/O, daily CBC.   Acute anemia 2/2 surgery on ECMO: initially given 4 units of blood in the OR. He received 7 units of blood, 2 platelets, 2 cryoprecipitate, and 6 FFP. Most recent hemoglobin 8.5  Undergoing VA ECMO    Intubation for airway protection: patient intubated on 3/6 for airway protection for CABG procedure.   Acute kidney injury secondary to ATN form hypotension and acute blood loss -   Currently on patent positive fluid balance of almost 20 L positive currently on ECMO not making much urine   Patient started on CVVHat 50mL negative ultrafiltration per hour by nephrology   Labs Q4 replacement protocol for electrolytes in place  Metabolic acidosis on bicarb drip will discontinue with dialysis repeat labs  Hypotension 2/2 shock: on pressors wean to MAP greater than 65  Diabetics mellitus type II:  A1c 9.2. On Novolog TID and Lantus BID at home. Prior hx of hypoglycemia secondary to gastroparesis / poor appetite 10/2023.    Continue insulin drip till PO tolerable    Paroxysmal A-fib: [id number] 3, HAS-BLEED 3.  Has pacemaker.   Hold Eliquis   Primary HTN: held home medications   GERD: home Protonix held     INITIAL H AND P AND ICU COURSE:  Mumtaz Islas 68 yo male presented to for elective surgery of CABG, MV repair and Maze procedure. Patient has history of PAF and atrial flutter was Sotalol and 
  CRITICAL CARE PROGRESS NOTE      Patient:  Mumtaz Islas    Unit/Bed:4D-12/012-A  YOB: 1955  MRN: [id number]   PCP: Tom Villanueva DO  Date of Admission: 3/6/2024  Chief Complaint:-Cardiogenic shock    Assessment and Plan:      Acute hypoxic respiratory failure: Intubated at time of surgery.  Maintain ventilator status at this time.  CAD status post CABG, mitral valve repair, maze procedure: Status post cardiac surgery 3/6 with Dr. Bess.  Cannulated for VA ECMO on 3/7/2024.  Currently attempting to wean flows are unsuccessful.  Gurmeet 0.6.  Continues to require vasopressors.  On milrinone.  Cardiogenic shock, biventricular failure: On VA ECMO.  Continues to require epi and milrinone.  Did not tolerate weaning trial of flow today.  Loss of pulsatility with lower flows.  Continue VA flow of 3.5 L.  Continue anticoagulation.  Non-anion gap metabolic acidosis: In the setting of renal failure  MAGDI: Secondary to ATN from hypotension and acute blood loss.  +20 L.  Continue seeing RRT.  Nephrology following.  Shock liver: Secondary to cardiogenic shock.  Liver enzymes trending down.  Normocytic anemia: Secondary to acute blood loss secondary to cardiac surgery.  Multiple units of blood have been transfused.  Monitor chest tube output.  Diabetes mellitus:  on sliding scale insulin  Thrombocytopenia: Secondary to bleeding postoperatively.  Monitor bilirubin for signs of hemolysis with ECMO. Keep platelets greater then 30,000    INITIAL H AND P AND ICU COURSE:    Mumtaz Islas is a 69-year-old white male who presented to [facility] on 3/6/2023 for elective CABG, mitral valve repair and maze procedure with Dr. Bess.  He has a past medical history of reformed smoker, paroxysmal atrial fibs on apixaban, reformed alcoholism, CAD, esophageal cancer, GERD, hyperlipidemia, hypertension, hypothyroidism, PVD, diabetes mellitus type 2.  Per report patient had a cardiac cath on 1/18/2024 that 
  CRITICAL CARE PROGRESS NOTE      Patient:  Mumtaz Islas    Unit/Bed:4D-12/012-A  YOB: 1955  MRN: [id number]   PCP: Tom Villanueva DO  Date of Admission: 3/6/2024  Chief Complaint:-Cardiogenic shock     Assessment and Plan:       Acute hypoxic respiratory failure: Intubated at time of surgery.  Maintain ventilator status at this time.  CAD status post CABG, mitral valve repair, maze procedure: Status post cardiac surgery 3/6 with Dr. Bess.  Cannulated for VA ECMO on 3/7/2024.  Currently attempting to wean flows are unsuccessful.  Gurmeet 1.0.  Continues to require low-dose vasopressors.  s/p milrinone.  Cardiogenic shock, biventricular failure: On VA ECMO.  Continues to require epi and milrinone.  Did not tolerate weaning trial of flow yesterday loss of pulsatility with lower flows.  Weaning flow goal of 3 if patient maintains pulsatility continue anticoagulation.  Plan for LAKSHMI on Monday.  Attempted transthoracic with inability to obtain windows for ultrasound.  Non-anion gap metabolic acidosis: In the setting of renal failure  MAGDI: Secondary to ATN from hypotension and acute blood loss.  +18.5 L.  Continue seeing RRT.  Nephrology following.  Shock liver: Secondary to cardiogenic shock.  Liver enzymes trending down.  Bilirubin trending up.  Direct bilirubin pending from the morning  Normocytic anemia: Secondary to acute blood loss secondary to cardiac surgery.  Multiple units of blood have been transfused.  Monitor chest tube output.  Diabetes mellitus:  on sliding scale insulin  Thrombocytopenia: Secondary to bleeding postoperatively.  Monitor bilirubin for signs of hemolysis with ECMO. Keep platelets greater then 30,000     INITIAL H AND P AND ICU COURSE:     Mumtaz Islas is a 69-year-old white male who presented to [facility] on 3/6/2023 for elective CABG, mitral valve repair and maze procedure with Dr. Bess.  He has a past medical history of reformed smoker, paroxysmal 
  CRITICAL CARE PROGRESS NOTE      Patient:  Mumtaz Islas    Unit/Bed:4D-12/012-A  YOB: 1955  MRN: [id number]   PCP: Tom Villanueva DO  Date of Admission: 3/6/2024  Chief Complaint:- No chief complaint on file.      Assessment and Plan:    Acute hypoxic respiratory failure s/p recent VA ECMO, s/p chest tube placement x 7: Sedated, trach on vent.  Chest tube drainage.  Wean as able. Wean Fentanyl. Scheduled percocet.  Patient had increased breathing, vent settings increased to allow patient to rest breathing end overall reducing his dose.   Acute cerebral infarct: 3/28 MRI small acute infarct left parietal cortex.  Evaluate mentation once off sedation.Weaning fentanyl.   Pneumonia likely VAP: Respiratory culture with many segmented neutrophils, moderate gram-negative bacilli, moderate gram-positive cocci singly and pairs, few gram-positive bacilli.  BC, pneumonia panel negative.  Continue Zosyn start 3/28.  Daily chest x-rays  CAD, s/p CABG, MVR, maze procedure: CTS is primary.  Patient developed acute blood loss and cardiogenic shock which required VA ECMO after undergoing CABG, MVR, maze procedure.  HFrEF: ICMP, echo 3/18/2024 LV EF 30 to 35% moderate reduced systolic function, moderate hypokinesis.  Patient had previously required milrinone 0.125.  GDMT as able  Hypotension: On levo, wean as able. Midodrine for blood pressure support.  Paroxysmal A-fib s/p cardioversion: Anticoagulation per cardiothoracic surgery.  On amiodarone 400 mg 2x daily.  EKG 4/4 poor quality, repeat ordered.  S/p VA ECMO, recent resolved cardiogenic shock: Off ECMO 3/19/2024  Altered mental status: CT head WO negative for acute pathology, no MRI due to incompatible epicardial pacer.  Patient has had a significantly complicated ICU course: Multiple surgeries including CABG, maze, MVR bleeding control, abdominal. Delirium will likely take weeks to clear.   Sleep-wake cycle disturbed, did well 4/4 night with trazodone. 
  CRITICAL CARE PROGRESS NOTE      Patient:  Mumtaz Islas    Unit/Bed:4D-12/012-A  YOB: 1955  MRN: [id number]   PCP: Tom Villanueva DO  Date of Admission: 3/6/2024  Chief Complaint:-Cardiogenic shock     Assessment and Plan:       Acute hypoxic respiratory failure: Intubated at time of surgery.  Maintain ventilator status at this time.  CAD status post CABG, mitral valve repair, maze procedure: Status post cardiac surgery 3/6 with Dr. Bess.  Cannulated for VA ECMO on 3/7/2024. S/P low-dose vasopressors.   s/p milrinone.  LAKSHMI 3/18 improved function. Explanted 3/19.   Encephalopathy: Continues to have altered mental status.  CV surgery plans for imaging over the next couple days if not improved.  Partial occulusion of SFA: Spoke with Dr. Bess.  Will start IV anticoagulation.  No plans for intervention at this time. Improved pulses 3/21  Cardiogenic shock, biventricular failure: s/p VA ECMO.  s/p milrinone   Atrial fibrillation: On Amio 400 mg BID. Cardioversion 3/19, on anticoagulation. Did have GRICELDA clipping   Non-anion gap metabolic acidosis: In the setting of renal failure  MAGDI: Secondary to ATN from hypotension and acute blood loss.  +13.1L.  Continue CRRT.  Nephrology following.  Shock liver: Secondary to cardiogenic shock.  Liver enzymes trending down.  Bilirubin stable   Normocytic anemia: Secondary to acute blood loss secondary to cardiac surgery.  Multiple units of blood have been transfused.  Monitor chest tube output.  Leukocytosis: WBC 22.5 , no fever. Stable. Possible retrocardiac infiltrate on chest xray.  On Ancef   Diabetes mellitus:  on sliding scale insulin  Thrombocytopenia: resolved; Secondary to bleeding postoperatively.  Monitor bilirubin for signs of hemolysis with ECMO. Keep platelets greater then 30,000     INITIAL H AND P AND ICU COURSE:     Mumtaz Islas is a 69-year-old white male who presented to [facility] on 3/6/2023 for elective CABG, mitral 
  CRITICAL CARE PROGRESS NOTE      Patient:  Mumtaz Islas    Unit/Bed:4D-12/012-A  YOB: 1955  MRN: [id number]   PCP: Tom Villanueva DO  Date of Admission: 3/6/2024  Chief Complaint:-Cardiogenic shock     Assessment and Plan:       Acute hypoxic respiratory failure: Intubated at time of surgery.  Maintain ventilator status at this time.  CAD status post CABG, mitral valve repair, maze procedure: Status post cardiac surgery 3/6 with Dr. Bess.  Cannulated for VA ECMO on 3/7/2024.  Currently attempting to wean flows more success 3/15 Gurmeet 1.0.  S/P low-dose vasopressors.   milrinone.  LAKSHMI 3/18 improved function, plans for weaning trial in the OR today with Dr. Bess and possible explantation.   Partial occulusion of SFA: Spoke with Dr. Bess.  Will start IV anticoagulation.  No plans for intervention at this time.  Cardiogenic shock, biventricular failure: s/p VA ECMO.  Continues to require milrinone.   Atrial fibrillation: On Amio 400 mg BID. Cardioversion 3/19, on anticoagulation. Did have GRICELDA clipping   Non-anion gap metabolic acidosis: In the setting of renal failure  MAGDI: Secondary to ATN from hypotension and acute blood loss.  +19.7L.  Continue CRRT.  Nephrology following.  Shock liver: Secondary to cardiogenic shock.  Liver enzymes trending down.  Bilirubin stable   Normocytic anemia: Secondary to acute blood loss secondary to cardiac surgery.  Multiple units of blood have been transfused.  Monitor chest tube output.  Leukocytosis: WBC 21.6 , no fever. Stable. Possible retrocardiac infiltrate on chest xray.  On Ancef   Diabetes mellitus:  on sliding scale insulin  Thrombocytopenia: resolved; Secondary to bleeding postoperatively.  Monitor bilirubin for signs of hemolysis with ECMO. Keep platelets greater then 30,000     INITIAL H AND P AND ICU COURSE:     Mumtaz Islas is a 69-year-old white male who presented to [facility] on 3/6/2023 for elective CABG, mitral valve repair 
  CRITICAL CARE PROGRESS NOTE      Patient:  Mumtaz Islas    Unit/Bed:4D-12/012-A  YOB: 1955  MRN: [id number]   PCP: Tom Villanueva DO  Date of Admission: 3/6/2024  Chief Complaint:-Shortness of breath    Assessment and Plan:    Septic shock 2/2 unknown etiology: 4/6 patient became hypotensive likely 2/2 septic shock, levo requirements went from 2 to 44 mcg/min.  Sepsis IVF bolus.  Initially zosyn restarted 4/6. Switched to meropenem and vancomycin 4/6 for broad-spectrum coverage.  BC x 2, respiratory culture, pneumonia panel, UA/C pending. Spoke with family with Dr. Nichols regarding patient's condition and goals of care. Family to discuss and decide.  Acute hypoxic respiratory failure s/p recent VA ECMO, s/p chest tube placement x 7: Sedated, trach on vent.  Chest tube drainage.  Wean as able. Wean Fentanyl. Scheduled percocet.  Patient had increased breathing, vent settings increased to allow patient to rest breathing end overall reducing his dose.   Acute cerebral infarct: 3/28 MRI small acute infarct left parietal cortex.  Evaluate mentation once off sedation.weaned off of all, Precedex, fentanyl.  No change in neurologic status - no meaningful response  Pneumoperitoneum: Unclear etiology, seen on CT abdomen 4/6. Possibly bowel ischemia vs left chest tube vs less likely from J tube as placement was verified with CT 4/6.  Pneumonia likely VAP: Respiratory culture with many segmented neutrophils, moderate gram-negative bacilli, moderate gram-positive cocci singly and pairs, few gram-positive bacilli.  BC, pneumonia panel negative.  Zosyn start 3/28 end 4/4.  Daily chest x-rays  CAD, s/p CABG, MVR, maze procedure: CTS is primary.  Patient developed acute blood loss and cardiogenic shock which required VA ECMO after undergoing CABG, MVR, maze procedure.  HFrEF: ICMP, echo 3/18/2024 LV EF 30 to 35% moderate reduced systolic function, moderate hypokinesis.  Patient had previously required 
  CRITICAL CARE PROGRESS NOTE      Patient:  Mumtaz Islas    Unit/Bed:4D-12/012-A  YOB: 1955  MRN: [id number]   PCP: Tom Villanueva DO  Date of Admission: 3/6/2024  Chief Complaint:- No chief complaint on file.      Assessment and Plan:    Acute hypoxic respiratory failure s/p recent VA ECMO, s/p chest tube placement x 7: Sedated, trach on vent.  Chest tube drainage.  Wean as able.  Acute cerebral infarct: 3/28 MRI small acute infarct left parietal cortex.  Evaluate mentation once off sedation.  Pneumonia likely VAP: Respiratory culture with many segmented neutrophils, moderate gram-negative bacilli, moderate gram-positive cocci singly and pairs, few gram-positive bacilli.  BC, pneumonia panel negative.  Continue Zosyn start 3/28.  Daily chest x-rays  CAD, s/p CABG, MVR, maze procedure: CTS is primary.  Patient developed acute blood loss and cardiogenic shock which required VA ECMO after undergoing CABG, MVR, maze procedure.  HFrEF: ICMP, echo 3/18/2024 LV EF 30 to 35% moderate reduced systolic function, moderate hypokinesis.  Patient had previously required milrinone 0.125.  GDMT as able  Hypotension: On levo, wean as able  Paroxysmal A-fib s/p cardioversion: Anticoagulation per cardiothoracic surgery.  On amiodarone 400 mg 2x daily  S/p VA ECMO, recent resolved cardiogenic shock: Off ECMO 3/19/2024  Altered mental status: CT head WO negative for acute pathology, no MRI due to incompatible epicardial pacer.  Patient has had a significantly complicated ICU course: Multiple surgeries including CABG, maze, MVR bleeding control, abdominal.  Deconditioning debility: 2/2 multiple conditions: CHF, CAD, respiratory failure, PAF.  PT/OT/dietitian.  J-tube with tube feeds  MAGDI, ATN from hypotension due to cardiogenic shock/blood loss: Baseline creatinine 0.9-1.1.  Creatinine trended up 4/4 3.6 from 2.9 4/3.  Had CRRT 3/26 to 3/31, urine output decreased when discontinued.  Total urine output 4/1-4/3 
  CRITICAL CARE PROGRESS NOTE      Patient:  Mumtaz Islas    Unit/Bed:4D-12/012-A  YOB: 1955  MRN: [id number]   PCP: Tom Villanueva DO  Date of Admission: 3/6/2024  Chief Complaint:-Cardiogenic shock     Assessment and Plan:       Acute hypoxic respiratory failure: Intubated at time of surgery.  Maintain ventilator status at this time.  CAD status post CABG, mitral valve repair, maze procedure: Status post cardiac surgery 3/6 with Dr. Bess.  Cannulated for VA ECMO on 3/7/2024.  Currently attempting to wean flows more success 3/15 Gurmeet 1.0.  S/P low-dose vasopressors.   milrinone.  LAKSHMI 3/18 improved function, plans for weaning trial in the OR today with Dr. Bess and possible explantation.   Cardiogenic shock, biventricular failure: On VA ECMO.  Continues to require epi and milrinone.  Did not tolerate weaning trial of flow 3/14 loss of pulsatility with lower flows.  Weaning flow goal of 3 if patient maintains pulsatility continue anticoagulation.    Atrial fibrillation: On Amio 400 mg BID. Cardioversion 3/19, on anticoagulation. Did have GRICELDA clipping   Non-anion gap metabolic acidosis: In the setting of renal failure  MAGDI: Secondary to ATN from hypotension and acute blood loss.  +19.7L.  Continue CRRT.  Nephrology following.  Shock liver: Secondary to cardiogenic shock.  Liver enzymes trending down.  Bilirubin stable   Normocytic anemia: Secondary to acute blood loss secondary to cardiac surgery.  Multiple units of blood have been transfused.  Monitor chest tube output.  Mild leukocytosis: WBC 14.7 , no fever. Stable. Possible retrocardiac infiltrate on chest xray. Procal and culture pending.  Diabetes mellitus:  on sliding scale insulin  Thrombocytopenia: Secondary to bleeding postoperatively.  Monitor bilirubin for signs of hemolysis with ECMO. Keep platelets greater then 30,000     INITIAL H AND P AND ICU COURSE:     Mumtaz Islas is a 69-year-old white male who presented to [facility] 
  CRITICAL CARE PROGRESS NOTE      Patient:  Mumtaz Islas    Unit/Bed:4D-12/012-A  YOB: 1955  MRN: [id number]   PCP: Tom Villanueva DO  Date of Admission: 3/6/2024  Chief Complaint:-Cardiogenic shock     Assessment and Plan:       Acute hypoxic respiratory failure: Intubated at time of surgery.  Maintain ventilator status at this time.  CAD status post CABG, mitral valve repair, maze procedure: Status post cardiac surgery 3/6 with Dr. Bess.  Cannulated for VA ECMO on 3/7/2024.  Currently attempting to wean flows are unsuccessful.  Gurmeet 0.6.  Continues to require vasopressors.  On milrinone.  Cardiogenic shock, biventricular failure: On VA ECMO.  Continues to require epi and milrinone.  Did not tolerate weaning trial of flow today.  Loss of pulsatility with lower flows.  Continue VA flow of 3.5 L.  Continue anticoagulation.  Non-anion gap metabolic acidosis: In the setting of renal failure  MAGDI: Secondary to ATN from hypotension and acute blood loss.  +20 L.  Continue seeing RRT.  Nephrology following.  Shock liver: Secondary to cardiogenic shock.  Liver enzymes trending down.  Normocytic anemia: Secondary to acute blood loss secondary to cardiac surgery.  Multiple units of blood have been transfused.  Monitor chest tube output.  Diabetes mellitus:  on sliding scale insulin  Thrombocytopenia: Secondary to bleeding postoperatively.  Monitor bilirubin for signs of hemolysis with ECMO. Keep platelets greater then 30,000     INITIAL H AND P AND ICU COURSE:     Mumtaz Islas is a 69-year-old white male who presented to [facility] on 3/6/2023 for elective CABG, mitral valve repair and maze procedure with Dr. Bess.  He has a past medical history of reformed smoker, paroxysmal atrial fibs on apixaban, reformed alcoholism, CAD, esophageal cancer, GERD, hyperlipidemia, hypertension, hypothyroidism, PVD, diabetes mellitus type 2.  Per report patient had a cardiac cath on 1/18/2024 that 
  CRITICAL CARE PROGRESS NOTE      Patient:  Mumtaz Islas    Unit/Bed:4D-12/012-A  YOB: 1955  MRN: [id number]   PCP: Tom Villanueva DO  Date of Admission: 3/6/2024  Chief Complaint:- CAD sp CABG     Assessment and Plan:    CAD S/P CABG, Mitral Valve Repair and Maze procedure:  PO2 day  surgery with Dr. Shahriar daniels. Patient had Cardiac re-entry exploration on 3/7 for bleeding and cardiac tamponade, repaired the right ventricular bleeding and LIMA to LAD anastomosis. 3/8 patient taken to OR for open heart on ECMO due to cannulation site bleeding. Remains on low dose epinephrine.  Undergoing VA ECMO, settings per Dr Olivarez   Flow is adequate to hold anticoagulation   Monitor vitals, I/O, daily CBC.   Acute anemia 2/2 surgery on ECMO: initially given 4 units of blood in the OR. He received 7 units of blood, 2 platelets, 2 cryoprecipitate, and 6 FFP. Most recent hemoglobin 8.5. Keep Plt count > 29 K while actively bleeding.  Keep Hgb >8.9 while actively bleeding.  Trending fibrinogen with goals > 150.  Undergoing VA ECMO    Intubation for airway protection: patient intubated on 3/6 for airway protection for CABG procedure.   Acute kidney injury secondary to ATN form hypotension and acute blood loss -   Currently on patent positive fluid balance of almost 20 L positive currently on ECMO not making much urine   Patient started on CVVHat 50mL negative ultrafiltration per hour by nephrology   Labs Q4 replacement protocol for electrolytes in place  Metabolic acidosis resolved after bicarb drip and dialysis  Hypotension 2/2 shock: weaned off of pressors  Diabetics mellitus type II:  A1c 9.2. On Novolog TID and Lantus BID at home. Prior hx of hypoglycemia secondary to gastroparesis / poor appetite 10/2023.    Continue insulin drip till PO tolerable    Paroxysmal A-fib: [id number] 3, HAS-BLEED 3.  Has pacemaker.   Hold Eliquis   Amiodarone loaded, now on 0.5 mg/hr  Home sotolol on hold  Primary HTN: held 
  CRITICAL CARE PROGRESS NOTE      Patient:  Mumtaz Islas    Unit/Bed:4D-12/012-A  YOB: 1955  MRN: [id number]   PCP: Tom Villanueva DO  Date of Admission: 3/6/2024  Chief Complaint:-Cardiogenic shock     Assessment and Plan:       Acute hypoxic respiratory failure: Intubated at time of surgery.  Maintain ventilator status at this time.  CAD status post CABG, mitral valve repair, maze procedure: Status post cardiac surgery 3/6 with Dr. Bess.  Cannulated for VA ECMO on 3/7/2024.  Currently attempting to wean flows more success 3/15 Gurmeet 1.0.  S/P low-dose vasopressors.  s/p milrinone.  Cardiogenic shock, biventricular failure: On VA ECMO.  Continues to require epi and milrinone.  Did not tolerate weaning trial of flow 3/14 loss of pulsatility with lower flows.  Weaning flow goal of 3 if patient maintains pulsatility continue anticoagulation.  Plan for LAKSHMI on Monday.  Attempted transthoracic with inability to obtain windows for ultrasound.  Non-anion gap metabolic acidosis: In the setting of renal failure  MAGDI: Secondary to ATN from hypotension and acute blood loss.  +18.6 L.  Continue CRRT.  Nephrology following.  Shock liver: Secondary to cardiogenic shock.  Liver enzymes trending down.  Bilirubin trending up.  Direct bilirubin pending from the morning  Normocytic anemia: Secondary to acute blood loss secondary to cardiac surgery.  Multiple units of blood have been transfused.  Monitor chest tube output.  Mild leukocytosis: WBC 10.9, no fever. Stable.  Diabetes mellitus:  on sliding scale insulin  Thrombocytopenia: Secondary to bleeding postoperatively.  Monitor bilirubin for signs of hemolysis with ECMO. Keep platelets greater then 30,000     INITIAL H AND P AND ICU COURSE:     Mumtaz Islas is a 69-year-old white male who presented to [facility] on 3/6/2023 for elective CABG, mitral valve repair and maze procedure with Dr. Bess.  He has a past medical history of reformed 
  CRITICAL CARE PROGRESS NOTE      Patient:  Mumtza Islas    Unit/Bed:4D-12/012-A  YOB: 1955  MRN: [id number]   PCP: Tom Villanueva DO  Date of Admission: 3/6/2024  Chief Complaint:- CAD sp CABG     Assessment and Plan:    CAD S/P CABG, Mitral Valve Repair and Maze procedure:  PO2 day  surgery with Dr. Bess primary. Patient had Cardiac re-entry exploration on 3/7 for bleeding and cardiac tamponade, repaired the right ventricular bleeding and LIMA to LAD anastomosis. 3/8 patient taken to OR for open heart on ECMO due to cannulation site bleeding. Weaned off of epinephrine.  Undergoing VA ECMO, settings per Dr Bess  Anticoagulation to resume when clear by primary team  May start to reduce flow after anticoagulated, defer to Dr Bess  Monitor vitals, I/O, daily CBC.   Cefazolin x 3 days started 3/9  Acute anemia 2/2 surgery on ECMO: initially given 4 units of blood in the OR. He received 7 units of blood, 2 platelets, 2 cryoprecipitate, and 6 FFP. Most recent hemoglobin 9.0. Keep Plt count > 29 K while actively bleeding.  Keep Hgb >8.9 while actively bleeding.  Trending fibrinogen with goals > 150.  Undergoing VA ECMO    Intubation for airway protection: patient intubated on 3/6 for airway protection for CABG procedure.   Roving eye movements with whole body jerking movements: Elevated prolactin to 72.4 but Ceribell negative.  Acute kidney injury secondary to ATN form hypotension and acute blood loss -   Positive fluid balance of almost 22 L positive currently on ECMO not making much urine   Patient started on CVVH with negative ultrafiltration   Labs Q4 replacement protocol for electrolytes in place  Metabolic acidosis resolved after bicarb drip and dialysis  Hypotension 2/2 shock: weaned off of pressors  Elevated liver enzymes: overall improving, shock liver vs hepatic congestion  Diabetics mellitus type II:  A1c 9.2. On Novolog TID and Lantus BID at home. Prior hx of hypoglycemia secondary to 
  CT/CV Surgery Progress Note    3/27/2024 9:15 AM  Surgeon:  Dr. Bess     Procedures:   3/6/24 Cabg, Mitral Valve Repair, Maze  Coronary artery bypass grafting x 3 using LIMA to LAD, vein graft to obtuse marginal, vein graft to posterior lateral branch of the right; endoscopic vein harvesting; transesophageal echocardiography; mitral valve repair with a #28 Future band ring annuloplasty, closure of left atrial appendage, left-sided Maze procedure  3/6/24 CARDIAC RE-ENTRY reexploration for bleeding and cardiac tamponade, repair of right ventricular bleeding and LIMA to LAD anastomosis   3/7/24 ECMO central cannulation with left atrial VA ECMO via median sternotomy   3/8/24 BRING BACK OPEN HEART - ECMO; median sternotomy  Reposition of the venous cannula in the right atrium and management of ECMO; additional sutures placed to reinforce all cannulation sites including right atrium aorta and right superior pulmonary vein; additional bleeding sites found in the periosteum and diaphragmatic surface of the pericardium  3/19/24 ECMO Removal     Subjective:  Mr. Islas is sedated on the Vent with no purposeful movements.  On IV Levo and Fentanyl drips.  CRTT as well.    Intake/Output Summary (Last 24 hours) at 3/27/2024 0915  Last data filed at 3/27/2024 0900  Gross per 24 hour   Intake 2593.79 ml   Output 3073 ml   Net -479.21 ml     Vital Signs: BP (!) 107/37   Pulse 83   Temp 100.4 °F (38 °C) (Core)   Resp 18   Ht 1.727 m (5' 8\")   Wt 83.3 kg (183 lb 10.3 oz)   SpO2 98%   BMI 27.93 kg/m²    Temp (24hrs), Av °F (37.8 °C), Min:99.7 °F (37.6 °C), Max:100.4 °F (38 °C)    Labs:   CBC:  Recent Labs     24  0315 24  0428 24  0650   WBC 16.8* 15.1* 10.5   HGB 8.9* 9.1* 8.2*   HCT 26.1* 26.6* 23.3*   MCV 87.9 88.1 84.1    438* 399     BMP:   Recent Labs     24  1845 24  1943 24  0048 24  0650 24  0657   *  --  129* 132*  --    K 4.4  --  4.6 4.5  --    CL 
  CT/CV Surgery Progress Note    3/30/2024 10:45 AM  Surgeon:  Dr. Bess     Procedures:   3/6/24 Cabg, Mitral Valve Repair, Maze  Coronary artery bypass grafting x 3 using LIMA to LAD, vein graft to obtuse marginal, vein graft to posterior lateral branch of the right; endoscopic vein harvesting; transesophageal echocardiography; mitral valve repair with a #28 Future band ring annuloplasty, closure of left atrial appendage, left-sided Maze procedure  3/6/24 CARDIAC RE-ENTRY reexploration for bleeding and cardiac tamponade, repair of right ventricular bleeding and LIMA to LAD anastomosis   3/7/24 ECMO central cannulation with left atrial VA ECMO via median sternotomy   3/8/24 BRING BACK OPEN HEART - ECMO; median sternotomy  Reposition of the venous cannula in the right atrium and management of ECMO; additional sutures placed to reinforce all cannulation sites including right atrium aorta and right superior pulmonary vein; additional bleeding sites found in the periosteum and diaphragmatic surface of the pericardium  3/19/24 ECMO Removal     Subjective:  Mr. Islas is sedated on the Vent with no purposeful movements.  Pt still on IV Levo and Fentanyl drips along with CRTT as well.  MRI has been done which shows a small left parietal infarct.  Patient is undergone percutaneous tracheostomy yesterday by Dr. Olivarez.    3/28/24 MRI Brain  1. Small acute infarct in the left parietal cortex.  2. Moderate atrophy.  3. Probable ischemic changes in the white matter.  4. Inflammatory changes the mastoid air cells bilaterally and to lesser extent in the ethmoid air cells, maxillary and sphenoid sinuses bilaterally.  5. Moderate amount of fluid in the nasopharynx.      Intake/Output Summary (Last 24 hours) at 3/30/2024 1045  Last data filed at 3/30/2024 1007  Gross per 24 hour   Intake 1722.61 ml   Output 2935 ml   Net -1212.39 ml       Vital Signs: BP (!) 110/53   Pulse 66   Temp 98.6 °F (37 °C) (Oral)   Resp 17   Ht 1.727 m 
  CT/CV Surgery Progress Note    3/31/2024 10:02 AM  Surgeon:  Dr. Bess     Procedures:   3/6/24 Cabg, Mitral Valve Repair, Maze  Coronary artery bypass grafting x 3 using LIMA to LAD, vein graft to obtuse marginal, vein graft to posterior lateral branch of the right; endoscopic vein harvesting; transesophageal echocardiography; mitral valve repair with a #28 Future band ring annuloplasty, closure of left atrial appendage, left-sided Maze procedure  3/6/24 CARDIAC RE-ENTRY reexploration for bleeding and cardiac tamponade, repair of right ventricular bleeding and LIMA to LAD anastomosis   3/7/24 ECMO central cannulation with left atrial VA ECMO via median sternotomy   3/8/24 BRING BACK OPEN HEART - ECMO; median sternotomy  Reposition of the venous cannula in the right atrium and management of ECMO; additional sutures placed to reinforce all cannulation sites including right atrium aorta and right superior pulmonary vein; additional bleeding sites found in the periosteum and diaphragmatic surface of the pericardium  3/19/24 ECMO Removal     Subjective:  Mr. Islas is sedated on the Vent with no purposeful movements.  Pt still on IV Levo and Fentanyl drips along with CRTT as well.  MRI has been done which shows a small left parietal infarct.  Patient is undergone percutaneous tracheostomy  by Dr. Olivarez.    3/28/24 MRI Brain  1. Small acute infarct in the left parietal cortex.  2. Moderate atrophy.  3. Probable ischemic changes in the white matter.  4. Inflammatory changes the mastoid air cells bilaterally and to lesser extent in the ethmoid air cells, maxillary and sphenoid sinuses bilaterally.  5. Moderate amount of fluid in the nasopharynx.      Intake/Output Summary (Last 24 hours) at 3/31/2024 1002  Last data filed at 3/31/2024 0900  Gross per 24 hour   Intake 880.89 ml   Output 3654 ml   Net -2773.11 ml       Vital Signs: BP (!) 110/53   Pulse 71   Temp 99.5 °F (37.5 °C) (Rectal)   Resp 18   Ht 1.727 m (5' 8\") 
  CT/CV Surgery Progress Note    4/3/2024 9:09 AM  Surgeon:  Dr. Bess     Procedures:   3/6/24 Cabg, Mitral Valve Repair, Maze  Coronary artery bypass grafting x 3 using LIMA to LAD, vein graft to obtuse marginal, vein graft to posterior lateral branch of the right; endoscopic vein harvesting; transesophageal echocardiography; mitral valve repair with a #28 Future band ring annuloplasty, closure of left atrial appendage, left-sided Maze procedure  3/6/24 CARDIAC RE-ENTRY reexploration for bleeding and cardiac tamponade, repair of right ventricular bleeding and LIMA to LAD anastomosis   3/7/24 ECMO central cannulation with left atrial VA ECMO via median sternotomy   3/8/24 BRING BACK OPEN HEART - ECMO; median sternotomy  Reposition of the venous cannula in the right atrium and management of ECMO; additional sutures placed to reinforce all cannulation sites including right atrium aorta and right superior pulmonary vein; additional bleeding sites found in the periosteum and diaphragmatic surface of the pericardium  3/19/24 ECMO Removal   3/29/24 Trach placed    Subjective:  Mr. Islas is sedated on the Vent via Trach (placed on 3/29/24) with no purposeful movements.  Pt still on IV Levo and Fentanyl drips.  IV Amio converted pt to SR.      3/28/24 MRI Brain  1. Small acute infarct in the left parietal cortex.  2. Moderate atrophy.  3. Probable ischemic changes in the white matter.  4. Inflammatory changes the mastoid air cells bilaterally and to lesser extent in the ethmoid air cells, maxillary and sphenoid sinuses bilaterally.  5. Moderate amount of fluid in the nasopharynx.      Intake/Output Summary (Last 24 hours) at 4/3/2024 0909  Last data filed at 4/3/2024 0638  Gross per 24 hour   Intake 1117.82 ml   Output 20 ml   Net 1097.82 ml       Vital Signs: BP (!) 103/45   Pulse 85   Temp 98 °F (36.7 °C) (Oral)   Resp 28   Ht 1.727 m (5' 8\")   Wt 77.9 kg (171 lb 11.8 oz)   SpO2 98%   BMI 26.12 kg/m²    Temp 
  CT/CV Surgery Progress Note    4/5/2024 8:38 AM  Surgeon:  Dr. Bess     Procedures:   3/6/24 Cabg, Mitral Valve Repair, Maze  Coronary artery bypass grafting x 3 using LIMA to LAD, vein graft to obtuse marginal, vein graft to posterior lateral branch of the right; endoscopic vein harvesting; transesophageal echocardiography; mitral valve repair with a #28 Future band ring annuloplasty, closure of left atrial appendage, left-sided Maze procedure  3/6/24 CARDIAC RE-ENTRY reexploration for bleeding and cardiac tamponade, repair of right ventricular bleeding and LIMA to LAD anastomosis   3/7/24 ECMO central cannulation with left atrial VA ECMO via median sternotomy   3/8/24 BRING BACK OPEN HEART - ECMO; median sternotomy  Reposition of the venous cannula in the right atrium and management of ECMO; additional sutures placed to reinforce all cannulation sites including right atrium aorta and right superior pulmonary vein; additional bleeding sites found in the periosteum and diaphragmatic surface of the pericardium  3/19/24 ECMO Removal   3/29/24 Trach placed    Subjective:  Mr. Islas is sedated on the Vent via Trach (placed on 3/29/24) with no purposeful movements.  Pt still on IV Levo and Fentanyl drips.  Plan for HD today.  Chest tube 100 cc of discolored drainage past 24 hrs.  On IV Zosyn.      3/28/24 MRI Brain  1. Small acute infarct in the left parietal cortex.  2. Moderate atrophy.  3. Probable ischemic changes in the white matter.  4. Inflammatory changes the mastoid air cells bilaterally and to lesser extent in the ethmoid air cells, maxillary and sphenoid sinuses bilaterally.  5. Moderate amount of fluid in the nasopharynx.      Intake/Output Summary (Last 24 hours) at 4/5/2024 0838  Last data filed at 4/5/2024 0735  Gross per 24 hour   Intake 2085 ml   Output 100 ml   Net 1985 ml       Vital Signs: BP (!) 111/56   Pulse 93   Temp 98.6 °F (37 °C) (Oral)   Resp 23   Ht 1.727 m (5' 8\")   Wt 80.4 kg (177 
  Cardiovascular Surgery Progress Note    Care discussed at length with ICU staff  Patient sedated, intubated, ECMO flows 3.5  Off pressors  Biologic parameters essentially stable  WANDA improved to 1.2  Plan continue ECMO, probable LAKSHMI Monday  
  Cardiovascular Surgery Progress Note    Care discussed at length with ICU staff  Patient sedated, intubated, ECMO flows 3.5  Off pressors  Biologic parameters essentially stable  WANDA remains at 1.0  Plan continue ECMO, probable LAKSHMI Monday  
  Cardiovascular Surgery Progress Note    Care discussed at length with ICU staff  Patient sedated, intubated, ECMO flows 3.5  Off pressors, on low dose vasodilator  Biologic parameters essentially stable  WANDA improved to 1.0  Plan continue ECMO, probable LAKSHMI Monday  
  Cardiovascular Surgery Progress Note    Intubated, sedated  Becomes agitated off sedation; not following commands  Minimal vent settings  CI 2 on milrinone 0.125  Labs noted  CXR clear  Agree with plan for brain MRI  Vent per ICU  
  Cardiovascular Surgery Progress Note    Intubated, sedated  Becomes agitated off sedation; not following commands  Minimal vent settings  CI 2.3 on milrinone 0.125  Labs noted  CXR clear  Agree with plan for brain MRI  Vent per ICU  
  Cardiovascular Surgery Progress Note    Neurologically no change  Abrupt hemodynamic decompensation overnight  On high dose pressors  CT abdomen shows pneumoperitoneum, possibly consistent with recent J-tube insertion; otherwise, no clear acute pathology  Patient already on broad-spectrum Abx  Clinical picture of mesenteric ischemia, especially given history of mesenteric arterial stenosis  Per General Surgery, exploratory laparotomy not survivable  Prognosis poor  
  Critical Care Progress Note      Patient:  Mumtaz Islas    Unit/Bed:4D-12/012-A  MRN: [id number]   PCP: Tom Villanueva DO  Date of Admission: 3/6/2024    Chief Complaint: Shortness of breath    Assessment and Plan (All pulmonary edema, renal failure, PE, and respiratory failure diagnoses are acute in nature unless otherwise specified):        Acute hypoxic respiratory failure, s/p recent VA ECMO, s/p chest tube placement x 7: Sedated on trach.  Weaning off from sedation and vent as tolerated.  Minimal drainage on chest tube.  Acute cerebral infarct: 3/28/2024 MRI head showed small acute infarct in left parietal cortex.  Evaluated patient mentation once weaned off sedation.  Pneumonia like VAP: Respiratory culture Gram stain showed many segmented neutrophils observed.  Rare epithelial cells observed.  Moderate gram-negative bacilli.  Moderate gram-positive cocci singly and in pairs.  Few gram-positive cocci.  Blood cultures 2 of 2 negative pneumonia panel negative.  Continue Zosyn on 3/28.  Daily chest x-ray.  CAD, s/p CABG, MVR, maze procedure: CTS primary team.  Patient underwent CABG, MVR and maze procedure and after procedure, developed acute blood loss and cardiogenic shock which required VA ECMO.  CHF, HFrEF.  ICMP, EF 30-35%: Patient was requiring milrinone 0.125 previously.  GDMT as able however currently unable to start GDMT.  Hypotension: Labile blood pressure requiring low-dose Levophed.  Art line placed, monitoring BP, continue to wean as tolerated.  PAF s/p cardioversion: Anticoagulation per cardiothoracic surgery.  On amiodarone 400 mg twice daily.  S/P VA ECMO, recent resolved cardiogenic shock: ECMO removed 3/19/2024.  Altered mental status: Significant complicated ICU course, including multiple surgeries including CABG, maze, MVR, bleeding controlled, being on ECMO.  Patient is on minimal vent setting, sedation trying to be weaned off however patient cannot tolerate weaning off fentanyl at 
  DO MARIBETH Byrd DR GENERAL SURGERY   General Surgery Daily Progress Note  Pt Name: Mumtaz Islas  Medical Record Number: [id number]  Date of Birth 1955   Today's Date: 4/6/2024  ASSESSMENT   Hospital day # 31  Multiorgan system failure - max pressors  Metabolic acidosis with lactic acidosis  No meaningful neurologic response for days.    has a past medical history of Alcoholism (HCC), Alopecia, Angina at rest (HCC), CAD (coronary artery disease), Depression, Esophageal cancer (HCC), Gall stone, Gastroparesis, GERD (gastroesophageal reflux disease), Hyperlipidemia, Hypertension, Hypothyroidism, Kidney stones, medtronic dual pacer , Migraines, Osteoarthritis, PVD (peripheral vascular disease) ([facility]), and Type II diabetes mellitus ([facility]).  PLAN   Discussed with nursing, CVT and ICU staff at bedside. If pt does have intestinal ischemia, this is either due to embolic/thrombotic etiology or global hypoperfusion (more likely). Surgical intervention would likely be diagnostic only. Outcomes not altered by laparotomy. CVT and ICU in agreement.   Will re-evaluate as needed.   SUBJECTIVE   Mumtaz decompensated significantly overnight. Requiring max dose pressors not tolerating CRRT. No change in neurologic status (no meaningful response).  CURRENT MEDICATIONS   Scheduled Meds:   piperacillin-tazobactam  4,500 mg IntraVENous Q8H    insulin lispro  0-4 Units SubCUTAneous Q6H    modafinil  100 mg Oral Daily    traZODone  150 mg Oral Nightly    magnesium sulfate  2,000 mg IntraVENous Once    midodrine  10 mg Oral TID WC    amiodarone  200 mg Oral BID    OLANZapine  7.5 mg Oral Nightly    nitroglycerin  1 inch Topical Q6H    polyethylene glycol  17 g Oral Daily    pantoprazole  40 mg IntraVENous BID    senna  5 mL Per G Tube BID    collagenase   Topical Daily    chlorhexidine  15 mL Mouth/Throat BID    sodium chloride flush  5-40 mL IntraVENous 2 times per day    multivitamin  1 tablet Oral Daily with breakfast 
  Oxygen Delivery and Consumption     Patient's Hgb 9.9 g/dl   PaO2 535 mmHg   PvO2 54.8 mmHg   SaO2 100%   SvO2 79%   Flow (C.O.)  3.5LPM   DO2  506 mL/min   VO2 144 mL/min     Ratio: 3.5 to 1    Amina Calculation: ECMO 3.5 LPM @ 5200 RPM  CO- 9.9  CI-5.1  SV- 110    
  Pharmacy Note - Renal Dosing and Extended Infusion Beta-Lactam Adjustment    Piperacillin/Tazobactam 3375mg Q8h for treatment of Community acquired pneumonia. Per [facility] Renal Dose Adjustment Policy and Extended Infusion Beta-Lactam Policy, piperacillin/tazobactam will be changed to 4500mg loading dose followed by 3375mg Q12h extended infusionEstimated Creatinine Clearance: Estimated Creatinine Clearance: 51 mL/min (A) (based on SCr of 1.4 mg/dL (H)).    Dialysis Status, MAGDI, CKD: CRRT    BMI: Body mass index is 27.93 kg/m².    Rationale for Adjustment: Agent is renally eliminated and demonstrates time-dependent effect on bacterial eradication. Extended-infusion dosing strategy aims to enhance microbiologic and clinical efficacy.    Pharmacy will continue to monitor renal function, cultures and sensitivities (where available) and adjust dose as necessary.      Please call with any questions.    Thank you,  Mary Marshall, PharmD   Pharmacy Resident  3/28/2024 4:51 PM      
 1215 Cardiac Output (Amina’s Formula) from [employer]  on 3/11/2024  ** All calculations should be rechecked by clinician prior to use **    RESULT SUMMARY:  3.9 L/min  Cardiac output    2.0 L/min/m²  Cardiac index    43 mL/beat  Stroke volume      INPUTS:  Weight --> 80.8 kg  Height --> 68 in  Lorri? --> 98.2 %  SvO? --> 61.8%  Hemoglobin --> 11.5 g/dL  Heart rate --> 90 beats/min  Age --> 1 = ?70 years    
.[facility]   PROGRESS NOTE      Patient: Mumtaz Islas  Room #: 4D-12/012-A            YOB: 1955  Age: 69 y.o.  Gender: male            Admit Date & Time: 3/6/2024  5:24 AM    Situation:    This is a spiritual health encounter as a part of rounds.     Assessment:  Patient, Mumtaz, was unresponsive. No visitors present at this time.    Intervention:   provided words of peace and encouragement/blessing for patient.    Plan:   team will remain available to support patient/family, PRN.         Electronically signed by Chaplain Kristy Phan M.Div, on 3/24/2024 at 9:57 PM.     Spiritual Care Department  [address] [address]  [phone]    
0140- Lactic 5.9. Previous Lactic 5.4. Dr. Kovacs notified.     0150- Dr. Alcaraz at bedside. Surgery contacted by Dr. Alcaraz. Stat CT abd ordered.     0156- Okay to give fluid bolus per . Dr. Alcaraz notified.     0200- Fluids running at 999ml/hr per verbal orders from Dr. Alcaraz. CRRT paused for Transport to CT.     0340- Critical CT abdomen results reported to Dr. Alcaraz.     0400- CRRT not restarted at this time per verbal order from Dr. Alcaraz.     0445- Lactic 7.7, Ical 1.51. Dr. Alcaraz notified.     0510- VBG results reviewed by .     0610- Vaso rate decreased per verbal order from Dr. Alcaraz. Levo requirements increasing. Dr. Coreas notified.   
0715 Report from current nurse.Reviewed pt course.  Assessment per epic .   0725 Blood completed. Pt having head shaking lt to rt.  0740 Fentanyl bolus given of 50 mcq.  Pt coughing noted some \"burp\" like sounds. Ett at 22cm at lip. Resp Raad in evaluated , viewed xray. Advanced ett to 23cm. Spoke with   0825 ETT advanced to 25 cm.  0830 cxr taken.  0850 pt restless ,shaking head, bolus of fentanyl given and decreased epi to .5mcq.  0930  in updated. Aware of noted fibrin in ecmo cucuit.No changed made at this time.  0932 Increased epi to 1 mcq.  0950  in updated.continue with CRRT.  1024 Wife called in states will be in later today.  1040 increased epi to2.  1120 pt shaking head side to side,raising arms.increased fentanyl and 50mg bolus.  1140 wife in.  1210 DDr.Shahriar in, spoke to wife.  decreased flow to rpm to 5200 to get flow of 3.0.  
0728: patient's HR flipped from sinus tach 112 to atrial paced.   0800:CVP 6 - orders to keep even for CRRT per Amrit.  1138: patient noted to by tachypnic and hypoxic. RR 31, SpO2 86%. FIO2 increased, suctioned out. PRN morphine and versed given. Fentanyl drip increased. Respiratory at bedside to adjust ventilator.   1200: Dr. Aranda at bedside. Stat CXR obtained.   1221: PRN morphine given. Patient's work of breathing improved and respiratory rate improved.   CVP 12, orders to incease UF to 50.   1430: breathing noted to be labored again with tachypnea. Barby Berkowitz notified, orders for 4 Ativan  
0730 Bedside shift report received from ELVER Wang.    0830 Dr Olivarez updated on patient status including CO/CI, hr 120s, and patient neuro status. Verbal order for one time dose 5mg lopressor. OK to restart tube feeds.    0900 Dr Bess at bedside to assess patient and updated on status. Milrinone decreased to 0.25 mcg/kg/min. Air leak in right pleural chest tube, chest tube clamped per Dr Bess, CXR in 2 hrs to assess.    1245 Barby Berkowitz CNP updated on patient status including CO/CI and CXR showing small new right pneumothorax. Chest tube unclamped, milrinone decreased to 0.125 mcg/kg/hr.    1720 SGC difficult to flush. Barby Berkowitz CNP and Dr Olivarez notified. 1mg TPA ordered for SGC.    1930 SGC poor waveform and still difficult to flush after TPA instilled, Barby notified, no new orders.    1935 Bedside shift report given to ELVER Wang.  
0730- Report received from ELVER Villegas. All  lines, cannulas, labs, gtts, vent settings, ECMO, CRRT and assessment reviewed at bedside.     0800- Assessment- Patient is intubated and mechanically Ventilated. No S/SX of active bleeding. Lung sounds clear on the right and rhonchi on the left.   No air leaks in chest tubes.  No Gag. Weak cough.   Does not respond to pain. Does not open eyes.   Pupils reactive.   Gtts= Versed at 6, Fentanyl at 100, Precedex at 1, Angiomax and IVF KVO.   As TF running at 30 mL which is goal.   ECMO= 3.5 LPM at 5400 RPM's. Sweep at 1 Liter and  at 60%.   CRRT running. UF of 50 at this time.   Vent- FiO2 40%, PC 12 and PEEP 6. RR 15 (set at 12)   ABP-115/74 (83), HR-80 (V-paced)   SPO2-100%  CVP-12  ABGs=pH-7.36, CO2-42,PaO2-155,HCO3-24, BE- (-1.5), SaO2- 99%  VBGs=PvO2-32, SVO2-57  HGB-9.0, HCT-27.1    Cardiac Output (Amina’s Formula)   4.1 L/min  Cardiac output  2.2 L/min/m²  Cardiac index  52 mL/beat  Stroke volume    Oxygen Delivery and Consumption             Patient Hgb 9 g/dl     PaO2 155 mmHg     PvO2 32 mmHg     Sa02 99 sat.     SvO2 57 sat.     Flow (C.O.) 3.5 Lpm               DO2 434 ml/min.  Ratio=  2.28:1   VO2 190 ml/min.       0840-Called and updated Barby Berkowitz CNP with recent gasses and labs. No changes/orders at this time.     0910-Cardene off. IRI=666/70 (Map 82)    1015- Increased flows to 4 LPM and 5900 RPM's d/t decrease in pulsatility. CVP=11, ABP=92/70     1034- SVO2- 53.2. ABP-83/65 with decreased pulsatility. Increased flows to 4.5 LPM and 6400 RPM's. Sent STAT H&H sent. Called Barby Berkowitz and updated her.     1043-Epi gtt restarted at 2 mcg/min d/t ABP at 79/64 (map 67).  UF of 0 at this time.     1054- Hgb still 9.0. SOO=556/86. Pulsatility better.     1129-Epi off.     1145-Got gasses and labs a little early.   ABG's= pH 7.35, CO2 45, PaO2 74, HCO3-25, BE (-1.1), SaO2- 94%   VBG's= PvO2-31, SvO2-55%  ECMO FR-4.5 LMP at 6400 RPM's.   RT turned FIO2 up to 
0730- Report received from ELVER Villegas. All  lines, cannulas, labs, gtts, vent settings, ECMO, CRRT and assessment reviewed at bedside.     0800- Assessment- Patient is intubated and mechanically Ventilated. No S/SX of active bleeding. Lung sounds clear on the right and rhonchi on the left.   No air leaks in chest tubes.  Very weak gag. Weak cough.   Does not respond to pain. Does open eyes slightly. Does not track.  Pupils reactive.   Gtts= Versed at 10, Fentanyl at 150, Precedex at 1, Angiomax and IVF KVO.   As TF running at 30 mL which is goal.   ECMO= 4.4 LPM at 6400 RPM's. Sweep at 1 Liter and  at 85%.   CRRT running. UF of 75 at this time.   Vent- FiO2 60%, PC 12 and PEEP 6. RR 13 (set at 12)   ABP-134/88 (103), HR-80 (V-paced)   Pulsatility is weak.   Some PVC's.   Replacing Na+Phos.   SPO2-100%  CVP-13  ABGs=pH-7.39, CO2-43,PaO2-191,HCO3-26, BE- (0.7), SaO2- 100%  VBGs=PvO2-36, SVO2-65  HGB-9.3, HCT-27.5, PLT-78    Cardiac Output (Amina’s Formula)   4.8 L/min  Cardiac output  2.5 L/min/m²  Cardiac index  60 mL/beat  Stroke volume    Oxygen Delivery and Consumption             Patient Hgb 9.3 g/dl     PaO2 191 mmHg     PvO2 36 mmHg     Sa02 100 sat.     SvO2 65 sat.     Flow (C.O.) 4.4 Lpm               DO2 574 ml/min.  Ratio=  2.7:1   VO2 212 ml/min.       0830-On SPONT.    1030- Low RR. RT placed patient back on pressure control settings on the vent.     1100-Very weak pulsatility. Messaged Barby Berkowitz CNP.    1115-Barby Berkowitz at bedside. Will place one 1 mcg of Epi to see if that helps.     1200- Assessment unchanged except for lung sounds.   Lung sounds bilateral rhonchi.   Vent= FiO2 50%, PEEP-6 and PC-12  ECMO= FR-4.3 at 6400 RPM.   CRRT= UF of 75 ml/hr   Sweep at 1 and  85%   ABG's= ph 7.37, PaO2 231.7, CO2 43.9, HCO3-25, BE (-0.1), SaO2-99.8  VBGS's= PvO2-40.3, Svo2- 72.2    Cardiac Output (Amina’s Formula)   6.2 L/min  Cardiac output  3.2 L/min/m²  Cardiac index  77 mL/beat  Stroke 
0730- Report received from ELVER Villegas. All  lines, cannulas, labs, gtts, vent settings, ECMO, CRRT and assessment reviewed at bedside.     0800- Assessment- Patient is intubated and mechanically ventilated.  Lung sounds clear.  No air leaks in chest tubes.  Very weak gag. Weak cough.   Does not respond to pain. Does open eyes slightly. Does not track.  Pupils reactive.   Gtts= Versed at 10, Fentanyl at 130, Precedex at 1, Angiomax and IVF KVO.   As TF running at 30 mL which is goal.   ECMO= 3.8 LPM at 5900 RPM's. Sweep at 1 Liter and  at 85%.   CRRT running. UF of net even at this time.   Vent- FiO2 45%, PC 12 and PEEP 6. RR 14 (set at 12)   ABP-134/88 (103), HR-80 (V-paced) with PVC's at times.   Pulsatility is good.  Epicardial pacer wires grounded.   Doppler pulses present in all extremities.   PAP=27/13 (16), CVP-9, SPO2-100%, NPN=562/93  ABGs=pH-7.37, CO2-42 ,PaO2-226, HCO3-24.3, BE- (-1.1), SaO2- 100%  VBGs=PvO2-34, SVO2-60  HGB-8.4, HCT-25.2, PLT-77     Cardiac Output (Amina’s Formula)   5.0 L/min  Cardiac output  2.4 L/min/m²  Cardiac index  63 mL/beat  Stroke volume    Oxygen Delivery and Consumption             Patient Hgb 8.4 g/dl     PaO2 226 mmHg     PvO2 34 mmHg     Sa02 100 sat.     SvO2 60 sat.     Flow (C.O.) 3.8 Lpm               DO2 453 ml/min.  Ratio=  2.34:1   VO2 193 ml/min.       Attempted to wean  to 75% from 85%. SVO2 on oxygenator started alarmed <59 so turned back up to 85%.     0840-Updated Barby Berkowitz.     1100- Barby Berkowitz and Dr. Mello at bedside. No orders.              1200-Assessment is unchanged.   ECMO= 3.9 LPM at 5900 RPM's.  at 85% and sweep of 1L.   Vent= FiO2- 50%, PC 12, PEEP-6  XVV=219/74, CVP-7, PAP-23/11, GkK9=288% ]  ABGs=pH-7.34, CO2-44 ,PaO2-275, HCO3-24, BE- (-1.8), SaO2- 100%  VBGs=PvO2-35, SVO2-61  HGB-8.4, HCT-25.2, PLT-79    Cardiac Output (Amina’s Formula)   4.7 L/min  Cardiac output  2.5 L/min/m²  Cardiac index  59 mL/beat  Stroke 
0745 Dr Olivarez, Dr Mello, and Barby Berkowitz, CNP at patient bedside. Epicardial pacer stopped, EKG ordered.    0803 Dr Olivarez at bedside, cardioversion at 360.    0810 Dr Bess and Barby at bedside, plans to explant ECMO later today.    0823 Dr Marcus at bedside, verbal orders to change prismasate to 0/3.5 with 10meq Kcl.    1050 Dr Bess to patient bedside with patient wife to explain procedure and risks. All questions answered.    1755 Dr Olivarez at bedside, updated on abg, vent changes made by Sher, cell saver given back to patient per Sher.  
0830 - Cardiac Output (Amina’s Formula) from [employer]  on 3/12/2024  ** All calculations should be rechecked by clinician prior to use **    RESULT SUMMARY:  5.6 L/min  Cardiac output    2.9 L/min/m²  Cardiac index    62 mL/beat  Stroke volume      INPUTS:  Weight --> 76.9 kg  Height --> 68 in  Lorri? --> 100 %  SvO? --> 73.9 %  Hemoglobin --> 10.8 g/dL  Heart rate --> 90 beats/min  Age --> 1 = ?70 years    DO2 - 568  VO2 - 190  Ratio - 3:1    1230 - Cardiac Output (Amina’s Formula) from [employer]  on 3/12/2024  ** All calculations should be rechecked by clinician prior to use **    RESULT SUMMARY:  4.7 L/min  Cardiac output    2.4 L/min/m²  Cardiac index    52 mL/beat  Stroke volume      INPUTS:  Weight --> 76.9 kg  Height --> 173 cm  Lorri? --> 100 %  SvO? --> 68.5 %  Hemoglobin --> 10.7 g/dL  Heart rate --> 90 beats/min  Age --> 1 = ?70 years    DO2 - 568  VO2 - 218  Ratio - 2.6:1    1615 - Cardiac Output (Amina’s Formula) from [employer]  on 3/12/2024  ** All calculations should be rechecked by clinician prior to use **    RESULT SUMMARY:  6.0 L/min  Cardiac output    3.1 L/min/m²  Cardiac index    195 mL/beat  Stroke volume      INPUTS:  Weight --> 76.9 kg  Height --> 173 cm  Lorri? --> 99.9 %  SvO? --> 74.8 %  Hemoglobin --> 10.5 g/dL  Heart rate --> 30.7 beats/min  Age --> 1 = ?70 years    DO2 - 546   VO2 - 161  Ratio - 3.4:1    
1300: Wife states she wants to pursue comfort measures and to stop treatment. Family members bedside. ICU team notified.     1400: Wife, Janette, declines hospice consult  
1400 Hemodynamics obtained. Reviewed with [facility] pac.discussed plan for fluid and warming pt.  1437 Repeated hemodynamics. Notified  of results.Orders received for nicardipine drip.and ok for morphine.   1450 Family in updated, pt remains unresponsive after surgery, wife states he takes awhile to wake up after surgery.  1500 Noted an increase in chest tube output.  1515  at bedside. Orders received for protamine, teg and 4 ffp,1 plt,1 cryo.blood bank notified.  1530 teg drawn and sent.  1553 protamine given ivpb over 30 minutes.  See blood flowsheet for transfusions.  1622 pt remains unresponsive.pupils with minimal response.  1700  in chest tube drainage decreasing, continuing blood products.  1800 products completed.  1805  making rounds with residents.Updated on pt vital signs. SVO2 drawn from distal lumen swan, results to .received order for PRBC, and milrinone drip. Pt pupils more responsive.  1850 pt attempting to open eyes. Does not follow commands .  1915 report to oncoming nurse.      
1820 Per MRI technician patient unable to get MRI completed because per medtronic guidelines patient must be alert awake or following commands.  Barby Berkowitz CNP notified.   
1940: Discussed w/ Dr Allen-pt non-purposefully shaking head and feet back and forth, breathing 30bpm over ventilator, fentanyl gtt recently dc'd. Prn morphine 2mg already given and awaiting dieudonne from pharmacy. Stated to continue to utilize prns (morphine and fentanyl).     2103: Pt spO2 86% pcontrol 12 peep 6 fiO2 30%- placed on 100% and called respiratory to bedside.     2110: Dr. Allen at bedside. Pt continues to be dyssynchronous w/ vent resp 30bpm despite use of prns. Order given for precedex gtt    2116 started precedex at 0.2 mcg/kg/hr    2207:Pt still breathing 30bpm on ventilator- increased precedex to 0.4     2215: BP 94/41(59) HR 73 ABP 90/40 (55).    2216: ABP 84/36(46) w/ adequate waveform. Turned off precedex and charge nurse adán at bedside calling Dr. Allen.     2219: Dr. Allen at bedside. Levo ordered.     2221: Levo at 5 started     2222: Albumin 25 % x2 ordered.     2225 Levo at 5 /52, weaning as appropriate.     2229: stat cxr ordered    2230: Injectate syringe on swan was very difficult to push per dayshift report and at 2030 assessment. Tubing and syringe changed at this time. Continues to be very difficult to push.     2232:Ordered to restart fentanyl gtt for vent synchrony    2240: hemodynamics completed: C.O 4.59 C.I. 2.68 on levo @ 1 and milrinone @ 0.125.    2256 Dr. Florian updated on events and hemodynamics. No new orders.     
1949: ABG: pH 7.37; pCO2 44.7;  pO2 229; HCO3 25.7, %O2 Sat 99. BE 0.2   VBG: pO2 31; %O2 Sat 55  TRU calculation:   ECMO 3.3 LPM @ 5400 RPM  CO 4.3  CI 2.1  SV 48     decreased from 85% to 80%, ECMO flow increased to 3.5 LPM @ 5700 RPM    Pulsatility decreased after increasing ECMO pump, but still present    2019: HBG 8.9- I unit PRBC ordered    2022: Updated Barby Berkowitz NP on SvO2 and interventions. Clarified on previous conversation on dayshift concerning pulsality, okay to go up on pump with intentions of decreasing when SvO2 within range.  -Will give blood and recheck gases and H/H      2230: ABG: pH 7.37; pCO2 43;  pO2 128; HCO3 24.5, %O2 Sat 98.8. BE -0.9   VBG: pO2 33.4; %O2 Sat 59.4    ECMO 3.6 LPM @ 5700 RPM      ECMO pump increased to 3.8 LPM and 5800 RPM    0000: ABG: pH 7.38; pCO2 43.5;  pO2 112.4; HCO3 25.4, %O2 Sat 98.3. BE 0.1   VBG: pO2 32.3; %O2 Sat 59  TRU calculation:   ECMO 3.8 LPM @ 5900 RPM  CO 4.2  CI 2  SV 47    ECMO pump increased to 4LPM @ 6200 RPM. FiO2 on vent to 45% and  to 85%    0129: ABG: pH 7.37; pCO2 41.6;  pO2 213.1; HCO3 24.2, %O2 Sat 99.7. BE -1.1   VBG: pO2 34; %O2 Sat 61  TRU calculation:   ECMO 4 LPM @ 6200 RPM  CO 4.3  CI 2.1  SV 48    0420: ABG: pH 7.36; pCO2 44.5;  pO2 211.4; HCO3 25.3, %O2 Sat 99. BE -0.2   VBG: pO2 35.8; %O2 Sat 63.5  TRU calculation:   ECMO 4 LPM @ 6200 RPM  CO 5.2  CI 2.5  SV 65    0451: Patient BPs elevated with epi, not pulsatile without it. Restarted milrinone.     0631: Patient remains pulsatile with stable BP at this time.   
1951: restarted Cardene for SBO> 150    2040: ABG: pH 7.35; pCO2 43;  pO2 160; HCO3 23.5, %O2 Sat 99. BE -2.1   VBG: pO2 35.7; %O2 Sat 36.2  TRU calculation:   ECMO 4.5 LPM @ 6600 RPM  CO 4.7  CI 2.4  SV 58    No changes at this time    2100: CVP 9- 1x 5% albumin. Ecmo flows 4.1-4.6. Slight chattering noted      0000: ABG: pH 7.36; pCO2 39.2;  pO2 92.6; HCO3 22.1, %O2 Sat 97. BE -3.1   VBG: pO2 92.6; %O2 Sat 97  TRU calculation:   ECMO 4 LPM @ 6100 RPM  CO 5.2  CI 2.7  SV 59      increased from 60% to 65%    0058: Dr. Kovacs messaged for bicarb on ABG and K+ of 4.9    0120: Per Dr. Kovacs- increase sate and sol fluids to 1000 ml/hr    0145: Patient became tachypnic, increase WOB, SVO2 on oxygentor reading in 40's, O2 on oxygenator reading 88. ECMO flows increased to 4.5 at this time. Increased precedex, increased versed, and increased fentanyl. Spoke with Dr. Allen and bolused with fentanyl.    0221: ABG: pH 7.35; pCO2 42;  pO2 52; HCO3 23, %O2 Sat 85. BE -2.4   VBG: pO2 33; %O2 Sat 61  TRU calculation:   ECMO 4.5 LPM @ 6500 RPM  CO 7.2   CI 3.8  SV 79     increased to 75% from 65% and  vent increased to 50% from 40%. Increasing sedation/giving bolus per provider.    0245: Patient appears more restful, decreased WOB, and RR 13. Oxygenator giving WNL values.    0306: Noted Hbg to be 8.7- 1 unit PRBC ordered    0322: ABG: pH 7.38; pCO2 39;  pO2 107; HCO3 23, %O2 Sat 98. BE -2     0343: Updated Barby Berkowitz NP concerning patient RR > 23, increased WOB, oxygenator at these times alarming for low O2 and SvO2, when patient not in this state- oxygenator numbers look good, have been increasing sedation/sedation bolus works for a short time and patient starts to become tachypneic and have increased WOB. Went over ABGs. Per Barby Berkowitz NP- go up on sedation- see eMAR for exact titrations and boluses given per NP instructions    0446: ABG: pH 7.36; pCO2 43;  pO2 85; HCO3 24, %O2 Sat 96. BE -1.3   VBG: 
1955: ABG: pH 7.43; pCO2 34.9;  pO2 390.6; HCO3 23.7, %O2 Sat 100. BE -0.3   VBG: pO2 35.7; %O2 Sat 68.2  TRU calculation:   ECMO 3.8 LPM @ 5800 RPM  CO 5.3  CI 2.8  SV 65    Sweep to 1 from 1.5 and  turned down to 70% from 80%      0008: ABG: pH 7.38; pCO2 41.5;  pO2 233.9; HCO3 24.5, %O2 Sat 99.8. BE -0.6   VBG: pO2 34.9; %O2 Sat 64.6  TRU calculation:   ECMO 3.8 LPM @ 5800 RPM  CO 4.9  CI 2.6  SV 59     down to 60% from 70%    0335: Hbg 8.5- 1 unit PRBC ordered- waiting for new type and screen that was sent with AM labs to result.    0402: ABG: pH 7.36; pCO2 43.6;  pO2 64.9; HCO3 24.8, %O2 Sat 91.5. BE -0.6   VBG: pO2 31.4; %O2 Sat 56.6  TRU calculation:   ECMO 4 LPM @ 5800 RPM  CO 5.3  CI 2.7  SV 66     increased from 60% to 70%    0420: Spoke to blood bank concerning unit of blood and type and screen results. Will be resulting type and screen and sending blood up shortly    0439: ABG: pH 7.36; pCO2 42;  pO2 109.2; HCO3 24, %O2 Sat 98.1. BE -1.3     0454: Blood arrived to unit and started    0617: Blood completed    ABG: pH 7.32; pCO2 46.3;  pO2 89.5; HCO3 24.2, %O2 Sat 96.1. BE -1.8   VBG: pO2 33.5; %O2 Sat 58.9  TRU calculation:   ECMO 3.9 LPM @ 5800 RPM  CO 4.9  CI 2.6  SV 61     increased from 70% to 75% and sweep increased from 1 to 1.5 Pump increased to 6000 RMP and 4 LPM from 5800 RMP and 3.8-3.9 LPM    0640: Updated Barby Berkowitz NP on ABGs/HBG and 1 unit of blood in. CVP 15-16- more blood or recheck first? Orders to recheck HBG now.    0648: Dr. Olivarez at bedside. Updated on night and changes made. No new orders at this time.    0713: Reported off to Adalid RAYA and Susie RAYA  
2000: ABG: pH 7.43; pCO2 37.8;  pO2 364.7; HCO3 25.5, %O2 Sat 100. BE 1.3   VBG: pO2 35.7; SvO2 68.5  TRU calculation:   ECMO 3.6 LPM @ 5600 RPM  CO 5.7  CI 3  SV 71     decreased from 90% to 80%. Sweep to 2 from 2.5  CVP 8-9- UF to net zero at this time    Hbg 8.7-  1 unit PRBC for goal > 9 per Dr. Olivarez    0000: ABG: pH 7.37; pCO2 42.6;  pO2 291.2; HCO3 24.8, %O2 Sat 99.9. BE -0.5   VBG: pO2 35; SvO2 63.3  TRU calculation:   ECMO 3.6 LPM @ 5600 RPM  CO 4.3  CI 2.3  SV 54     decreased from 80% to 70%  Sweep decreased from 2.5 to 2    0300: Barby Berokwitz NP updated on 's- Start a Cardene gtt if SBP > 150    0400: .ABG: pH 7.46; pCO2 31.9;  pO2 251.3; HCO3 23.2, %O2 Sat 99.9. BE -0.2   VBG: pO2 34.2; SvO2 64.2  TRU calculation:   ECMO 3.5 LPM @ 5800 RPM  CO 4.6  CI 2.4  SV 58     decreased from 70% to 65%  Sweep decreased from 2 to 1    0401: Vent started alarming low TV. Alerted RT patient has no chest rise and fall. RT stated bagging patient.     0405: Patient placed back on vent. 's, no signs of distress noted.    0406: Cardene gtt started for SBP> 150    0500: Net goal of 50 on UF started back up. CVP 13 > goal    0530: ABG: pH 7.36; pCO2 41.3;  pO2 209.4; HCO3 23.8, %O2 Sat 99.7. BE -1.5      ECMO 3.6 LPM @ 5700 RPM  Sweep @ 1    decreased from 65% to 60%    0700: Report to Tala RAYA  
2015: Shahriar called- ran hemodynamic numbers and reported to Dr. Bess. Wants to repeat in 30 minutes. Went over I/O from CTs and urine output.  2038: ABG  2045: repeart numbers- Dr. Bess to come in  2103: K 6- Dr. Allen updated.  2111: Levophed gtt started at 5mcg/min  2110: Noted chem of 58. 2 amps of D50 ordered by Dr. Allen. Administered at this time. Insulin gtt stopped. Dr. Bess at bedside, requests to call in OR. Updated on recent I/O- almost no urine output still  2114: Patient hypertensive, levophed gtt stopped  2154: Repeating ABG  2201: Per Dr. Bess- give bicarb. Dr. Allen at bedside- give 2nd amp of bicarb. Dr. Bess updated on all labs.   2215: Taken to OR    0106: Patient returned to room at this time  0115: Fentanyl IVP by CRNA at bedside  0335: CI 1.44- Dr. Allen updated.   0345: Milrinone to be restarted at previous dose  0402: milrinone restarted  0500:  and CI 1.6- Alejandro updated. Milrinone to 0.375 per Alejandro- repeat hemodynamics at 0600.  0600: ci 1.47- called Dr. Bess for update. Orders to discontinue levo and start epi. Updated on 's, still start epi. Give 500 cc bolus of NS x1. Recheck hemodynamics in 1 hour post interventions.  0648: Dr. Olivarez at bedside and updated on events of night. Dr. Olivarez to reach out to Dr. Bess   0655: Wife updated on patient condition  0656: Hemodynamics done- Dr. Olivarez updated. CI 1.29  
20:00 pulsatility present but intermittently decreased. All lines zeroed, CVP 8. ECMO pump flow 3.5 @5600 rpm. BLE pulses confirmed with doppler, palpable BUE pulses.     20:07 ABG 7.34/46.1/177.6/24.9/-0.9/99.5  20:17 PvO2 34.2/SVO2 60.4    Cardiac Output (Amina's Formula) Calculation:  Time: 20:20  Cardiac Output: 5  Cardiac Index: 2.7  Stroke Volume: 57    Oxygen Delivery and Consumption  DO2: 423  VO2: 177  Ratio: 2.4:1    WANDA: 1.9  : 0.98    20:35 This RN provided telephone update to Barby Berkowitz- hemoglobin 7.9 and CVP 8. Order to transfuse 1 unit pRBC.     00:14 ABG 7.37/43/194/25/-0.3/100  00:15 PvO2 32.3/SVO2 57.1    Time: 00:25  Cardiac Output: 4.1  Cardiac Index: 2.2  Stroke Volume: 45    00:45  increased from 85% to 90%  01:53 PvO2 35.3/SVO2 62.9    Oxygen Delivery and Consumption  DO2: 495  VO2: 222  Ratio: 2.2:1    WNADA: 1  : 0.83    03:30 Epi gtt off x1hr, still pulsatile. Flow weaned to 3.5    04:00 ABG 7.39/41/260/25/0.1/100  04:02 PvO2 32.6/SvO2 57.4    Time: 04:00  Cardiac Output: 4.2  Cardiac Index: 2.2  Stroke Volume: 44    Oxygen Delivery and Consumption  DO2: 492  VO2: 226  Ratio: 2.2:1    WANDA: 1  : 0.82     04:05 Flow increased back to 4L d/t SvO2 drop, Ventilator FiO2 increased to 40%. Remains pulsatile.   05:38: Epi gtt restarted for decreased pulsatility, see MAR.  06:05 Flow increased to 4.3 at 6,300 to maintain SVO2 60 or greater.  06:30 Patient pulsatile at 4.2lpm/6,300 rpm on pump, 1mcg/min epi and  still sweep 1/FiO2 90%. SVO2 61% on nautilus.              
[facility]  PHYSICAL THERAPY MISSED TREATMENT NOTE  [facility] ICU 4D    Date: 3/27/2024  Patient Name: Mumtaz Islas        MRN: [id number]   : 1955  (69 y.o.)  Gender: male                REASON FOR MISSED TREATMENT:  Hold treatment per nursing request. Pt not currently following commands. MRI of brain later today. Will f/u tomorrow.         
Actually patient had his pacemaker checked here at [facility] on 1/10/24- Medtronic dual. Milady Palmer [employer] Rep aware of Open Heart Surgery tomorrow.  
Aerogen nebulizer function checked. Nebulizer is in upright position and aerosolization present. Rate of administration of Epoprostenol is 6.84 ml/hour. No issues seen with administration. Back-up Aerogen Solo nebulizer remains at bedside.  
[facility]   Pharmacy Pharmacokinetic Monitoring Service - Vancomycin     Mumtaz Islas is a 69 y.o. male starting on vancomycin therapy for sepsis. Pharmacy consulted by Dr. Coreas for monitoring and adjustment.    Target Concentration: Dosing based on anticipated concentration < 15 mg/L due to renal impairment/insufficiency    Additional Antimicrobials: merrem    Pertinent Laboratory Values:   Wt Readings from Last 1 Encounters:   04/06/24 81.1 kg (178 lb 12.7 oz)     Temp Readings from Last 1 Encounters:   04/06/24 98.6 °F (37 °C) (Axillary)     Estimated Creatinine Clearance: 31 mL/min (A) (based on SCr of 2.1 mg/dL (H)).  Recent Labs     04/06/24  0358 04/06/24  0800   CREATININE 2.8* 2.1*   BUN 47* 37*   WBC 12.1* 11.9*     Pertinent Cultures:  Date Source Results   4/6/24 BCx2    MRSA Nasal Swab: N/A. Non-respiratory infection.    Plan:  Concentration-guided dosing due to renal impairment/insufficiency  Start vancomycin 2000mg x 1  Renal labs as indicated   Vancomycin concentration ordered for 4/7/24 @ 0600   Pharmacy will continue to monitor patient and adjust therapy as indicated    Thank you for the consult,  Teresa SOLOMON.Ph., BCPS., 4/6/2024,10:12 AM      
B/p increased 203/70 abp - levophed had been weaned to 3 mcg then stopped with no change in b/p m,190/64 with levophed stopped -   provider aware   
Back charting for previous nurse     ECMO Start Time 1808  ECMO Type VA (LAVA)  Arterial / Outflow Site Aorta Irish Size 18  Venous / Inflow Site R Atria Irish Size  26  Venous/ Inflow Site L Atria     Irish Size 36  Distal Limb Perfusion   No   Inserting Physician Dr. Bess    
Called for PAT reminder could not leave msg.  
Cardiac Output (Amina’s Formula) from [employer]  on 3/11/2024  ** All calculations should be rechecked by clinician prior to use **    RESULT SUMMARY:  3.9 L/min  Cardiac output    2.0 L/min/m²  Cardiac index    44 mL/beat  Stroke volume      INPUTS:  Weight --> 80.8 kg  Height --> 68 in  Lorri? --> 99.9 %  SvO? --> 64.2 %  Hemoglobin --> 11.5 g/dL  Heart rate --> 90 beats/min  Age --> 1 = ?70 years      Do2  498 vo2  190    
Care withdrawn per family request. Support given    1511. GRZEGORZ Garner RN as second witness  
Case discussed with Dr. Olivarez from critical care and Dr. Mello from cardiology.  Patient with increasing pressor requirements throughout the day.  Maxed out on epi and Primacor.  Cardiac index still low with lactate over 7.  Mixed venous saturation 46%.  Peripheral perfusion compromised; patient has palpable femoral pulses and Doppler pedal pulses.  Repeat LAKSHMI is negative for any shunting, mitral valve repair competent, left and right ventricular function preserved.  Patient acting like he has right heart failure.  With fluid administration hemodynamics improve however the effect is transient.  Even though echo shows adequate left ventricular function I believe mechanical support is indicated for both the left and right ventricle.  Cardiac parameters indicate cardiogenic shock.  I believe mechanical support is indicated.  I do not believe the balloon pump will provide enough flow and may compromise peripheral circulation even more.  I plan for central ECMO and have called the operating room and cardiac OR team in.  
CathFlo aspirated from distal lumen, 8 ml blood aspirated, distal port draws, flushes with ease.   
[facility]   PROGRESS NOTE      Patient: uMmtaz Islas  Room #: 4D-12/012-A            YOB: 1955  Age: 69 y.o.  Gender: male            Admit Date & Time: 3/6/2024  5:24 AM    Assessment:    The patient is a 69 y.o. male .The patient was intubated and did not respond to the chaplains presence at the time of this attempted visit.     Interventions:  The patient was provided silent prayer.    Outcomes:  The patient remains intubated at the end of the visit.     Plan:  1.Spiritual care will continue to follow the patient according to [facility] spiritual care SOP.       Electronically signed by Chaplain Beckie, on 3/20/2024 at 10:44 AM.  Spiritual Care Department  [facility]  [phone]     03/20/24 1043   Encounter Summary   Encounter Overview/Reason  Follow-up   Service Provided For: Patient   Referral/Consult From: Nasreen   Last Encounter  03/20/24  (NR)   Complexity of Encounter Low   Begin Time 0835   End Time  0836   Total Time Calculated 1 min   Spiritual/Emotional needs   Type Spiritual Support   Assessment/Intervention/Outcome   Assessment Unable to assess   Intervention Prayer (assurance of)/[place]   Outcome Did not respond       
Comprehensive Nutrition Assessment    Type and Reason for Visit:  Initial, Consult (TF recommendations); nutrition ileus protocol and Heart Healthy education    Nutrition Recommendations/Plan:   If unable to extubate in next 24h then recommend starting EN - Nepro 10ml/hour increase 10ml every 4h as tolerated to goal of 40ml/hour  Additional free H20 per Physician  Once extubated will provide nutrition ileus protocol and education when appropriate as per original RD consult and start ONS as needed     Malnutrition Assessment:  Malnutrition Status:  Insufficient data (03/07/24 5527)    Context:  Acute Illness     Findings of the 6 clinical characteristics of malnutrition:  Energy Intake:  Unable to assess (intubated)  Weight Loss:   (11# or 8% in 3 weeks)     Body Fat Loss:  Unable to assess (pt. busy with providers on RD rounds)     Muscle Mass Loss:  Unable to assess    Fluid Accumulation:  Mild     Strength:  Not Performed    Nutrition Assessment:     Pt. nutritionally compromised AEB NPO d/t intubation 3/6, increased needs for surgical healing process.  At risk for further nutrition compromise r/t admit for OHS 3/6 with return to OR for re-exploration, hyperkalemia, LOS day 2 and underlying medical condition CVA, DM - A1c 9.2%, CAD, PVD, pacemaker, GERD, gastroparesis, Hx esophageal CA with surgery in 2012 - has had dilations    Nutrition Related Findings:    Pt. Report/Treatments/Miscellaneous: pt. Discussed on interdisciplinary rounds this pm - Physician requests TF recommendations - not planning to start today but possibly in the am; UO 1175ml; CT x4; OHS 3/6 then returned to OR  GI Status: BM 0  Pertinent Labs: glucose 85  BUN 31  creatinine 2.6  Na 155  K+ 4.9 (was 5.2)  triglycerides 1/18/24 75 (no diprivan currently)  Pertinent Meds: fentanyl, MVI, senokot, epinephrine, insulin gtt    Wound Type: Surgical Incision (3/6 CABG,MVR,MAZE; 3/6 return to OR - re- exploration for bleeding and cardiac tamponade, 
Comprehensive Nutrition Assessment    Type and Reason for Visit:  Reassess    Nutrition Recommendations/Plan:   Continue Nepro at goal rate of 30 ml/hr as tolerated.  Flush one 2.5 oz - [id number] daily.  Additional free water flushes per provider.  No BM since admit (8 days) - additional bowel meds added today per Provider.     Malnutrition Assessment:  Malnutrition Status:  Insufficient data (03/07/24 9445)    Context:  Acute Illness     Findings of the 6 clinical characteristics of malnutrition:  Energy Intake:  Unable to assess (intubated)  Weight Loss:   (11# or 8% in 3 weeks)     Body Fat Loss:  Unable to assess (pt. busy with providers on RD rounds)     Muscle Mass Loss:  Unable to assess    Fluid Accumulation:  Mild     Strength:  Not Performed    Nutrition Assessment: Pt improving from a nutritional standpoint AEB pt is tolerating Nepro tube feed at goal rate of 30 ml/hr. Remains at risk for further nutritional compromise r/t admit for OHS 3/6 with return to OR for re-exploration, hyperkalemia, MAGDI, ECMO started 3/8, CRRT, LOS day 8 and underlying medical condition CVA, DM - A1c 9.2%, CAD, PVD, pacemaker, GERD, gastroparesis, Hx esophageal CA with surgery in 2012 - has had dilations.    Nutrition Related Findings:    Pt. Report/Treatments/Miscellaneous: Pt seen- intubated and sedated at present; ECMO, pt started on CRRT on 3/8; pt tolerating Nepro at goal rate this morning; RN reports pt is tolerating TF well. Discussed plan to start Protein Modular daily now that patient is tolerating tube feed at goal.   GI Status: No BM in 8 days since admit. RN reports active bowel sounds this morning.  Pertinent Labs: Sodium 134,  BUN 30, Mg 2.8, Glucose 220.  Pertinent Meds: Lipitor, Humalog, Multivitamin, Protonix, Glycolax, Senokot, Precedex, Eprinephrine, Fentanyl, Versed, MOM   Wound Type: Surgical Incision (3/6 CABG,MVR,MAZE; 3/6 return to OR - re- exploration for bleeding and cardiac tamponade, repair of 
Comprehensive Nutrition Assessment    Type and Reason for Visit:  Reassess    Nutrition Recommendations/Plan:   Should patient require EN, recommend Nepro at 10 ml/hr and increasing by 10 ml q4hr to goal rate of 30 ml/hr with 1 protein modular/ day.   Additional water per MD.   Once extubated will provide nutrition ileus protocol and education when appropriate as per original RD consult and start ONS as needed.     Malnutrition Assessment:  Malnutrition Status:  Insufficient data (03/07/24 1458)    Context:  Acute Illness     Findings of the 6 clinical characteristics of malnutrition:  Energy Intake:  Unable to assess (intubated)  Weight Loss:   (11# or 8% in 3 weeks)     Body Fat Loss:  Unable to assess (pt. busy with providers on RD rounds)     Muscle Mass Loss:  Unable to assess    Fluid Accumulation:  Mild     Strength:  Not Performed    Nutrition Assessment:    Pt. nutritionally compromised AEB NPO d/t intubation 3/6, increased needs for surgical healing process.  At risk for further nutrition compromise r/t admit for OHS 3/6 with return to OR for re-exploration, hyperkalemia, LOS day 2 and underlying medical condition CVA, DM - A1c 9.2%, CAD, PVD, pacemaker, GERD, gastroparesis, Hx esophageal CA with surgery in 2012 - has had dilations.   Central line was placed and ECMO was initiated today. Patient is intubated and on Epi at 5 mg. Nephrology has been consulted, likely plan for CRRT.     Nutrition Related Findings:    Wound Type: Surgical Incision (3/6 CABG,MVR,MAZE; 3/6 return to OR - re- exploration for bleeding and cardiac tamponade, repair of right ventricular bleeding and LIMA to LAD anastomosis)       Pt. Report: Patient is intubated and sedated. Patient seen by 4D RD during MDR.   GI Status: Last BM PTA  Labs:   Recent Labs     03/07/24  0115 03/07/24  0410 03/08/24  1145   *   < > 154*   K 4.5   < > 4.0      < > 109   GLUCOSE 144*   < > 208*   BUN 21   < > 40*   CREATININE 1.7*   < > 
Comprehensive Nutrition Assessment    Type and Reason for Visit:  Reassess (TF monitoring)    Nutrition Recommendations/Plan:   Continue trophic TF - Nepro 10ml/hour  Additional free H20 per Physician  If EN tolerated recommend goal of Nepro 30ml/hour with 1 protein modular daily - increasing gradually 10ml every 12h to goal then adding the protein modular  No BM recorded since admit - 5d- may benefit from bowel regimen     Malnutrition Assessment:  Malnutrition Status:  Insufficient data (03/07/24 1058)    Context:  Acute Illness     Findings of the 6 clinical characteristics of malnutrition:  Energy Intake:  Unable to assess (intubated)  Weight Loss:   (11# or 8% in 3 weeks)     Body Fat Loss:  Unable to assess (pt. busy with providers on RD rounds)     Muscle Mass Loss:  Unable to assess    Fluid Accumulation:  Mild     Strength:  Not Performed    Nutrition Assessment:     Pt. nutritionally compromised AEB NPO d/t intubation 3/6, increased needs for surgical healing process.  At risk for further nutrition compromise r/t admit for OHS 3/6 with return to OR for re-exploration, hyperkalemia, MAGDI, ECMO started 3/8, CRRT, LOS day 5 and underlying medical condition CVA, DM - A1c 9.2%, CAD, PVD, pacemaker, GERD, gastroparesis, Hx esophageal CA with surgery in 2012 - has had dilations.       Nutrition Related Findings:    Pt. Report/Treatments/Miscellaneous: pt. Seen; spoke with 2 RNs; tolerating trophic TF this am - started 3/9; intubated, CRRT; [facility] ECMO; UO 39ml; CT output 760ml  GI Status: (-) BS; BM 0  Pertinent Labs: glucose 208  BUN 34  creatinine 1.9  Na 138  K+ 4.5  phosphorus 2.1  Pertinent Meds: humalog, MVI, senokot, precedex, epinephrine, fentanyl, protonix    Wound Type: Surgical Incision (3/6 CABG,MVR,MAZE; 3/6 return to OR - re- exploration for bleeding and cardiac tamponade, repair of right ventricular bleeding and LIMA to LAD anastomosis;small open areas on heel, pretibial)       Current Nutrition 
Comprehensive Nutrition Assessment    Type and Reason for Visit:  Reassess (tube feed monitor)    Nutrition Recommendations/Plan:   As off ECMO TF goal adjusted to Nepro 40ml/hour  Bolus 1 2.5ounce liquid protein bottle daily  Additional free H20 per Physician     Malnutrition Assessment:  Malnutrition Status:  Mild malnutrition (03/20/24 1487)    Context:  Acute Illness     Findings of the 6 clinical characteristics of malnutrition:  Energy Intake:  Unable to assess (intubated)  Weight Loss:  Unable to assess (fluctuations as on dialysis)     Body Fat Loss:  No significant body fat loss     Muscle Mass Loss:  Mild muscle mass loss Temples (temporalis)  Fluid Accumulation:  Moderate to Severe     Strength:  Not Performed    Nutrition Assessment:    Pt. improving from a nutritional standpoint AEB tolerating TF. Remains at risk for further nutritional compromise r/t admit for OHS 3/6 with return to OR for re-exploration, hyperkalemia, MAGDI, ECMO 3/8-3/19, CRRT started 3/8, LOS day 14 and underlying medical condition CVA, DM - A1c 9.2%, CAD, PVD, pacemaker, GERD, gastroparesis, Hx esophageal CA with surgery in 2012 - has had dilations     Nutrition Related Findings:    Pt. Report/Treatments/Miscellaneous: pt. Seen; intubated; tolerating TF at 30ml/hour; ECMO explanted; UO 15ml; CRRT - may change to HD;  CTs with bloody output; spoke with RN re: TF goal adjustments  GI Status: 3 loose BMs past 24h  Pertinent Labs: glucose 134  BUN 24  creatinine 0.9  Na 137  K+ 4.2  phosphorus 2.2  Pertinent Meds: MVI, protonix, glycolax, senokot, fentanyl, insulin gtt    Wound Type: Surgical Incision (3/6 CABG,MVR,MAZE; 3/6 return to OR - re- exploration for bleeding and cardiac tamponade, repair of right ventricular bleeding and LIMA to LAD anastomosis;small open areas on heel, pretibial; pressure ulcer stage 2 sacrum; DTI coccyx)       Current Nutrition Intake & Therapies:    Average Meal Intake: NPO  Average Supplements Intake: 
Comprehensive Nutrition Assessment    Type and Reason for Visit:  Reassess (tube feed monitor)    Nutrition Recommendations/Plan:   Continue Nepro at 40ml/ hour as goal   Bolus one (2.5 ounce) Proteinex daily   Additional free water flush as per Physician      Malnutrition Assessment:  Malnutrition Status:  Mild malnutrition (03/20/24 2237)    Context:  Acute Illness     Findings of the 6 clinical characteristics of malnutrition:  Energy Intake:  Unable to assess (intubated)  Weight Loss:  Unable to assess (fluctuations as on dialysis)     Body Fat Loss:  No significant body fat loss     Muscle Mass Loss:  Mild muscle mass loss Temples (temporalis)  Fluid Accumulation:  Moderate to Severe     Strength:  Not Performed    Nutrition Assessment:     Pt. improving from a nutritional standpoint AEB tolerating TF. Remains at risk for further nutritional compromise r/t admit for OHS 3/6 with return to OR for re-exploration, hyperkalemia, MAGDI, ECMO 3/8-3/19, CRRT started 3/8, LOS day 16 and underlying medical condition CVA, DM - A1c 9.2%, CAD, PVD, pacemaker, GERD, gastroparesis, Hx esophageal CA with surgery in 2012 - has had dilations      Nutrition Related Findings:    Pt. Report/Treatments/Miscellaneous: Patient seen intubated, Nepro infusing at goal and RN reports receives protein modular once daily, tolerating tube feed  GI Status: 3 stools last night per RN  Pertinent Labs: Sodium 134, Potassium 4, BUN 34, Creatinine 1.2, glucose 106  Pertinent Meds: lantus, humalog, MVI, glycolax, senokot, fentanyl, cardene     Wound Type: Surgical Incision (3/6 CABG,MVR,MAZE; 3/6 return to OR - re- exploration for bleeding and cardiac tamponade, repair of right ventricular bleeding and LIMA to LAD anastomosis;small open areas on heel, pretibial; pressure ulcer stage 2 sacrum; DTI coccyx; DTI mouth)       Current Nutrition Intake & Therapies:    Average Meal Intake: NPO  Average Supplements Intake: NPO  Diet NPO  ADULT TUBE 
Comprehensive Nutrition Assessment    Type and Reason for Visit:  Reassess (tube feed monitor)    Nutrition Recommendations/Plan:   Continue Nepro at 40ml/ hour as goal   Bolus one (2.5 ounce) Proteinex daily   Additional free water flush as per Physician   Monitoring dialysis plans for EN adjustments as appropriate     Malnutrition Assessment:  Malnutrition Status:  Mild malnutrition (03/20/24 0607)    Context:  Acute Illness     Findings of the 6 clinical characteristics of malnutrition:  Energy Intake:  Unable to assess (intubated)  Weight Loss:  Unable to assess (fluctuations as on dialysis)     Body Fat Loss:  No significant body fat loss     Muscle Mass Loss:  Mild muscle mass loss Temples (temporalis)  Fluid Accumulation:  Moderate to Severe     Strength:  Not Performed    Nutrition Assessment:      Pt. improving from a nutritional standpoint AEB tolerating TF. Remains at risk for further nutritional compromise r/t admit for OHS 3/6 with return to OR for re-exploration, hyperkalemia, MAGDI, ECMO 3/8-3/19, CRRT 3/8 - 3/22, LOS day 20 and underlying medical condition CVA, DM - A1c 9.2%, CAD, PVD, pacemaker, GERD, gastroparesis, Hx esophageal CA with surgery in 2012 - has had dilations       Nutrition Related Findings:    Pt. Report/Treatments/Miscellaneous: pt. Seen; intubated; spoke with RN; family to decide this pm re: trach and PEG; plan to resume CRRT today per RN; CT with 520ml output; UO 82ml; toleraing TF at goal and protein modular given this am  GI Status: BM x1 3/26  Pertinent Labs: glucose 238  BUN 95  creatinine 4.1  Na 128  K+ 4.9    Pertinent Meds: lantus, humalog, MVI, protonix, glycolax, senokot, fentanyl, levophed    Wound Type: Surgical Incision (3/6 CABG,MVR,MAZE; 3/6 return to OR - re- exploration for bleeding and cardiac tamponade, repair of right ventricular bleeding and LIMA to LAD anastomosis;small open areas on heel, pretibial; pressure ulcer stage 2 sacrum; DTI coccyx; DTI mouth)   
Comprehensive Nutrition Assessment    Type and Reason for Visit:  Reassess (tube feed monitor)    Nutrition Recommendations/Plan:   Continue Nepro at 40ml/ hour as goal   Bolus one (2.5 ounce) Proteinex daily   Additional free water flush as per Physician   Monitoring dialysis plans for EN adjustments as appropriate     Malnutrition Assessment:  Malnutrition Status:  Mild malnutrition (03/20/24 4997)    Context:  Acute Illness     Findings of the 6 clinical characteristics of malnutrition:  Energy Intake:  Unable to assess (intubated)  Weight Loss:  Unable to assess (fluctuations as on dialysis)     Body Fat Loss:  No significant body fat loss     Muscle Mass Loss:  Mild muscle mass loss Temples (temporalis)  Fluid Accumulation:  Moderate to Severe     Strength:  Not Performed    Nutrition Assessment:     Pt. improving from a nutritional standpoint AEB tolerating TF. Remains at risk for further nutritional compromise r/t admit for OHS 3/6 with return to OR for re-exploration, hyperkalemia, MAGDI, ECMO 3/8-3/19, CRRT 3/8 - 3/22 - restarted 3/26, LOS day 22 and underlying medical condition CVA, DM - A1c 9.2%, CAD, PVD, pacemaker, GERD, gastroparesis, Hx esophageal CA with surgery in 2012 - has had dilations        Nutrition Related Findings:    Pt. Report/Treatments/Miscellaneous: pt. Seen; intubated; spoke with RN; plan MRI today to determine further POC and family's decision re: trach and PEG; CRRT; CT x2; UO 70ml, not following commands  GI Status: BM x1 3/26  Pertinent Labs: glucose 197  BUN 32  creatinine 1.4  Na 132  K+ 4.3  phosphorus 2.6  Hgb 9.9  Pertinent Meds: lantus, humalog, MVI, protonix, glycolax, senokot, NaPHos, fentanyl, levophed    Wound Type: Surgical Incision (3/6 CABG,MVR,MAZE; 3/6 return to OR - re- exploration for bleeding and cardiac tamponade, repair of right ventricular bleeding and LIMA to LAD anastomosis;small open areas on heel, pretibial; pressure ulcer stage 2 sacrum and nose; DTI 
Comprehensive Nutrition Assessment    Type and Reason for Visit:  Reassess (tube feed monitor)    Nutrition Recommendations/Plan:   Continue Nepro at goal of 40ml/hour  Additional free H20 per Physician  Monitoring dialysis status/need for protein modular and EN adjustments.      Malnutrition Assessment:  Malnutrition Status:  Mild malnutrition (04/04/24 1508)    Context:  Acute Illness     Findings of the 6 clinical characteristics of malnutrition:  Energy Intake:  Mild decrease in energy intake (Comment) (tolerating TF at goal now - was not tolerating early part of admission)  Weight Loss:  Unable to assess (fluctuations as on dialysis)     Body Fat Loss:  No significant body fat loss     Muscle Mass Loss:  Mild muscle mass loss Temples (temporalis)  Fluid Accumulation:  Moderate to Severe     Strength:  Not Performed    Nutrition Assessment:     Pt. improving from a nutritional standpoint AEB TF tolerated at goal via J tube. Remains at risk for further nutritional compromise r/t admit for OHS 3/6 with return to OR for re-exploration, hyperkalemia, MAGDI, ECMO 3/8-3/19, CRRT 3/8 - 3/22 and 3/26 -3/31, trach placement 3/29, LOS day 29 and underlying medical condition CVA, DM - A1c 9.2%, CAD, PVD, pacemaker, GERD, gastroparesis, Hx esophageal CA with surgery in 2012 - has had dilations     Nutrition Related Findings:    Pt. Report/Treatments/Miscellaneous: pt. Seen; intubated; tolerating TF at 40ml/hour; vent via trach; UO x1; CT x1; plan either HD or CRRT 4/5 per renal service; not following commands  GI Status: BM x1 4/4  Pertinent Labs: glucose 85  BUN 61  creatinine 3.6  na 133  K+ 5.3  phosphorus 5.4  Pertinent Meds: MVI, protonix, glycolax, senokot, fentanyl, levophed    Wound Type: Surgical Incision (3/6 CABG,MVR,MAZE; 3/6 return to OR - re- exploration for bleeding and cardiac tamponade, repair of right ventricular bleeding and LIMA to LAD anastomosis;small open areas on heel, pretibial; pressure ulcer 
Comprehensive Nutrition Assessment    Type and Reason for Visit:  Reassess (tube feed monitor)    Nutrition Recommendations/Plan:   When appropriate to resume tube feed, recommend Nepro with goal rate 30ml/ hour, bolus one (2.5 ounce) Proteinex daily; additional free water flush as per Provider   Bowel meds as per Provider      Malnutrition Assessment:  Malnutrition Status:  Insufficient data (03/07/24 4946)    Context:  Acute Illness     Findings of the 6 clinical characteristics of malnutrition:  Energy Intake:  Unable to assess (intubated)  Weight Loss:   (11# or 8% in 3 weeks)     Body Fat Loss:  Unable to assess (pt. busy with providers on RD rounds)     Muscle Mass Loss:  Unable to assess    Fluid Accumulation:  Mild     Strength:  Not Performed    Nutrition Assessment:     Pt improving from a nutritional standpoint AEB patient had been tolerating Nepro tube feed at goal prior to stopped at midnight for procedure today. Remains at risk for further nutritional compromise r/t admit for OHS 3/6 with return to OR for re-exploration, hyperkalemia, MAGDI, ECMO started 3/8, CRRT, LOS day 13 and underlying medical condition CVA, DM - A1c 9.2%, CAD, PVD, pacemaker, GERD, gastroparesis, Hx esophageal CA with surgery in 2012 - has had dilations.     Nutrition Related Findings:    Pt. Report/Treatments/Miscellaneous: intubated, ECMO, CRRT; Patient off unit for procedure by Dr Bess, note Nepro tube feed had been infusing at goal 30ml/ hour prior to discontinued at midnight for procedure, TF had been held 3/18 for LAKSHMI  GI Status: +BM yesterday and today  Pertinent Labs: Sodium 138, Potassium 4.8, BUN 26, Creatinine 0.8, Glucose 195, phosphorus 2.4  Pertinent Meds: humalog, MVI, senokot, glycolax, procedex, epinephrine, fentanyl, versed, primacor, protonix      Wound Type: Surgical Incision (3/6 CABG,MVR,MAZE; 3/6 return to OR - re- exploration for bleeding and cardiac tamponade, repair of right ventricular bleeding and 
[facility]   PROGRESS NOTE      Patient: Mumtaz Islas  Room #: [facility] OR (General) POOL R*            YOB: 1955  Age: 69 y.o.  Gender: male            Admit Date & Time: 3/6/2024  5:24 AM    Assessment:    The patient was encountered prior to open heart. The family was solemn in preporation and the granddaughter had eyes red from emotion.     Interventions:  The  offered prayer which the patient shared had been provided by the pediatrist. Thus the  shared some jokes related to foot washing and Sunil and provided a quick blessing.     Outcomes:  The family and patient seemed in lighter spirits and smiles and hope were expressed.     Plan:  1.Spiritual care will continue to follow the patient according to [facility] spiritual care SOP.       Electronically signed by Chaplain Beckie, on 3/6/2024 at 8:02 AM.  Spiritual Care Department  [facility]  [phone]     03/06/24 0800   Encounter Summary   Encounter Overview/Reason  Initial Encounter   Service Provided For: Patient;Family;Patient and family together;Significant other   Referral/Consult From: Nurse   Support System Family members   Last Encounter  03/06/24   Complexity of Encounter Low   Begin Time 0715   End Time  0720   Total Time Calculated 5 min   Spiritual/Emotional needs   Type Spiritual Support   Assessment/Intervention/Outcome   Assessment Coping;Tearful   Intervention Active listening;Confronted/Challenged;Sustaining Presence/Ministry of presence;Other (Comment)  (Humor)   Outcome Acceptance;Comfort;Other (comment)  (laughter)       
Dr. Nichols bedside to eval pt. RN expressed several concerns on the condition of the pt. Pt showing s/s of sepsis. Dr. Nichols to speak with general surgery to discuss case. 0845 Dr. Harrison and Dr. Florian bedside to eval pt. No new orders  
ECMO DAILY ROUNDING NOTE:    Team Present at bedside.  Family Present at bebside:  NO  Condition:  Critical     RE for ECMO initiation: Cardiogenic shock   Sweep Gas:  1 90% fd02  Pump Flow:  4.3L   SvO2:  61  SaO2:  100  pH:  7.37  pCO2:  43  PaO2:  224  PTT Goal:  60-80  PTT:  69.8  Plt:  84  Hgb:  8.3  Lactic Acid:  0.8  CXR:  stable and unchanged   Telemetry:  paced  Paralytic:  no  Sedation Goals:  -2/-3  Pupils:  equal and reactive   PC: 12  PEEP: 6  RATE: 14  FiO2:  40  Generated TV: [id number]    Electronically signed by JAKY Lang CNP on 3/18/2024 at 9:22 AM  Electronically signed by Fco Olivarez MD.  
ECMO DAILY ROUNDING NOTE:    Team Present at bedside.  Family Present at bebside:  NO  Condition:  Critical     RE for ECMO initiation: Cardiogenic shock  Sweep Gas: 1, FdO2 85%  Pump Flow: 4.0  SvO2: 61  SaO2: 100  pH: 7.3  pCO2: 44  PaO2:275  PTT Goal: 60-80  PTT: 63.7  Plt: 79  Hgb: 8.4  Lactic Acid: 0.9  CXR: Stable chest x-ray  Telemetry: Paced rhythm  Paralytic: No  Sedation Goals: -2/-3  Pupils: Equal reactive  PC :12  PEEP: 6  RATE: 13  FiO2: 30  Generated TV: 480      Electronically signed by JAKY Lang CNP on 3/17/2024 at 2:47 PM    Addendum by Dr. Simone MD:  Patient seen by me independently including key components of medical care. Face to face evaluation and examination was performed. Case discussed with Ms. JAKY Lang CNP. Agree with Certified nurse practitioner's findings and plan as documented in the Certified nurse practitioner's note.   Italicized font, if present,  represents changes to the note made by me.   More than 50% of the encounter time involved with patient care by the Pulmonary & Critical care service team spent by me.      Electronically signed by   Jimmy Nichols MD on 3/17/2024 at 3:09 PM              
ECMO DAILY ROUNDING NOTE:    Team Present at bedside.  Family Present at bebside:  NO  Condition:  Stable, critical     RE for ECMO initiation: Cardiogenic shock  Sweep Gas: 1, FdO2 85%  Pump Flow: 3.5  SvO2: 59  SaO2: 100  pH: 7.4  pCO2: 43  PaO2: 207  PTT Goal: 60-80  PTT: 63.4  Plt: 74  Hgb: 9.1  Lactic Acid: 1.0  CXR: Stable chest x-ray  Telemetry: Paced rhythm  Paralytic: No  Sedation Goals: -2/-3  Pupils: Equal reactive  PC 12:  PEEP 6:  RATE 15:  FiO2:  40  Generated TV: 604    Electronically signed by JAKY Lang CNP on 3/16/2024 at 4:32 PM    Addendum by Dr. Simone MD:  Patient seen by me independently including key components of medical care. Face to face evaluation and examination was performed. Case discussed with Ms. JAKY Lang CNP. Agree with Certified nurse practitioner's findings and plan as documented in the Certified nurse practitioner's note.   Italicized font, if present,  represents changes to the note made by me.   More than 50% of the encounter time involved with patient care by the Pulmonary & Critical care service team spent by me.    Electronically signed by   Jimmy Nichols MD on 3/16/2024 at 11:00 PM                
ECMO DAILY ROUNDING NOTE:    Team Present at bedside.  Family Present at bebside:  NO  Condition:  stable, critical     RE for ECMO initiation: Cardiogenic shock   Sweep Gas:  1.5  Pump Flow:  3.5  SvO2:  61  SaO2:  100  pH:  7.35  pCO2:  45  PaO2:  100  PTT Goal:  60-80  PTT:  70.7  Plt:  47  Hgb:  9.2  Lactic Acid:  1.2  CXR:  stable  Telemetry:  paced  Paralytic:  NO  Sedation Goals:  -2/-3  Pupils:  equal reactive   PC 12:  PEEP 6:  RATE 13:  FiO2:  50  Generated :    Electronically signed by JAKY Lang CNP on 3/14/2024 at 11:19 AM    
ECMO DAILY ROUNDING NOTE:    Team Present at bedside.  Family Present at bebside:  YES  Condition:  Stable, critical     RE for ECMO initiation: Cardiogenic shock   Sweep Gas:  1 Fd02 100%  Pump Flow:  5.0  SvO2:  68  SaO2:  100  pH:  7.41  pCO2:  36  PaO2:  401  PTT Goal:  Normal   PTT:  37.2  Plt:  88  Hgb:  10.0  Lactic Acid:  1.1  CXR:  stable   Telemetry:  Paced   Paralytic:  No   Sedation Goals:  -2/-3  Pupils:  equal and reactive   PC: 12 PEEP: 6  RATE: 16  FiO2:  50  Generated TV: 415      Electronically signed by JAKY Lang CNP on 3/19/2024 at 12:01 PM    
ECMO DAILY ROUNDING NOTE:    Team Present at bedside.  Family Present at bebside:  YES  Condition:  Stable, critical     RE for ECMO initiation: Cardiogenic shock  Sweep Gas:  3  70%  Pump Flow:  3.5L   SvO2:  75  SaO2:  100  pH:  7.38  pCO2:  41  PaO2 361  PTT Goal:  60-80  PTT:  57.1  Plt:  43  Hgb:  10.5  Lactic Acid:  1.5  CXR:  stable  Telemetry:  paced  Paralytic:  NO  Sedation Goals:  -2/-3  Pupils:  equal and reactive   PC 12:  PEEP 6:  RATE 16:  FiO2:  50  Generated     Electronically signed by JAKY Lang CNP on 3/12/2024 at 4:39 PM  Patient seen by me including key components of medical care.  Case discussed with NP. Case discussed with Dr. Bess.  Italicized font, if present,  represents changes to the note made by me.  Time 15 minutes.  Time was discontiguous. Time does not include procedure. Time does include my direct assessment of the patient and coordination of care.  Time represents more than 50% of the time involved with patient care by the CC team.  Electronically signed by Fco Olivarez MD.     
ECMO DAILY ROUNDING NOTE:    Team Present at bedside.  Family Present at bebside:  YES  Condition:  critical    RE for ECMO initiation: Cardiogenic shock  Sweep Gas:  1.5L 80%    Pump Flow:  3.9  SvO2:  60  SaO2:  96  pH:  7.37  pCO2:  41  PaO2:  88  PTT Goal:  60-80  PTT:  65.1  Plt:  47  Hgb:  9.4  Lactic Acid:  1.5  CXR:  stable   Telemetry:  Atrial fibrillation, underlying paced rhythm   Paralytic:  NO  Sedation Goals:  -3/-4  Pupils:  equal reactive   PC 12:  PEEP 6:  RATE 12:  FiO2:  50  Generated TV: 597    Electronically signed by JAKY Lang CNP on 3/13/2024 at 1:14 PM    
ECMO DAILY ROUNDING NOTE:    Team Present at bedside.  Family Present at bside:  NO  Condition:  Stable, Critical     RE for ECMO initiation: Cardiogenic shock   Sweep Gas:  1  Pump Flow:  3.5  SvO2:  57  SaO2:  99  pH:  7.36  pCO2:  42  PaO2:  155  PTT Goal:  60-80  PTT:  70.0  Plt:  39  Hgb:  9.0  Lactic Acid:  1.2  CXR:  stable, worsening right pleural effusion   Telemetry:  Paced  Paralytic:  NO  Sedation Goals:  -2/-3  Pupils:  equal and reactive   PC 16:  PEEP 6:  RATE 16:  FiO2:  40  Generated TV: 418    Electronically signed by JAKY Lang CNP on 3/15/2024 at 10:20 AM  Patient seen by me including key components of medical care.  Case discussed with NP.  Case discussed with Dr. Florian.    Electronically signed by Fco Olivarez MD.     
ECMO management    Patient with small laminated thrombus found in the arterial inflow line at the area of connector.  Does not appear to be mobile.  Will restart anticoagulation.  Chest tube output has been about 50 cc an hour.  Flow and valve decrease to 3.5 L/min with intermittent native cardiac ejection.  This indicates some recovery of cardiac function but not fully recovered yet.  Will likely continue to need ECMO.  Current sternal dressing saturated with blood and clots.  Dressing removed and changed.  Wound is seeping between staples as expected and anticipated.  New sterile dressing placed.  Continue to monitor the wound and chest tubes for bleeding especially since we are not restarting anticoagulation.  
Epoprostenol Initial Assessment    Patient on heated humidification via ventilator. Pre-assessment parameters were obtained before the start of Epoprostenol.    Initial Assessment Hemodynamic Measurement if available   Heart Rate 130 beats per minute Mean Pulmonary Artery Pressure  25 mm Hg   Blood Pressure 78/58 mm Hg Cardiac Output  2.37 l/min   PaO2/ FiO2 ratio N/A ratio Cardiac Index 1.39 l/min/m2    AutoPeep 0   cmH2O Central Venous Pressure 11 mm Hg     Patient's ideal body weight was calculated to be 68.4 kg. Calculated rate of administration is 6.84 ml/hour.  Epoprostenol administration initiated via Alaris syringe pump. Rate of medication verified by ELVIN Bajwa, RRT and GALA Prakash, RRT. Medication syringe and tubing are protected from light. Aerogen nebulizer is plugged into an electrical outlet and on continuous mode. Nebulizer functioning properly. Extra Solo nebulizer at bedside for back-up. Two filters placed on the exhalation side of the ventilator circuit. Parameters were re- assessed in 30 minutes.     30 Minute Assessment Hemodynamic Measurement if available   Heart Rate 131 beats per minute Mean Pulmonary Artery Pressure  20 mm Hg   Blood Pressure 82/60 mm Hg Cardiac Output  2.37 l/min   PaO2/ FiO2 ratio N/a ratio Cardiac Index 1.39 l/min/m2    AutoPeep 0 cmH2O Central Venous Pressure 9 mm Hg         Did patient have any clinically meaningful response to medication?  Yes No N/A  []  []  [x]  10% improvement in PaO2/FiO2 ratio within 4 hours of therapy   [x]  [] [] 15% decrease in mean pulmonary artery pressure or CVP  []  [x] [] 15% increase in cardiac output or index    Physician was notnotified of assessment. Plan is to Continue with treatment.    Notify pharmacy for new syringe on Date: 03/07/2024 Time: 1530, 2 hour prior to medication running out or room air expiration. Syringe will need changed on Date: 03/07/2024 Time: 1730    Aerogen Solo Nebulizer will need changed on Date: 03/14/2024 Time: 
Family notified of progress by mell [email or web address] in waiting room @630  
Formerly named [facility]  SPEECH THERAPY MISSED TREATMENT NOTE  [facility] ICU 4D      Date: 2024  Patient Name: Mumtaz Islas        MRN: [id number]    : 1955  (69 y.o.)    REASON FOR MISSED TREATMENT:  Attempted to see patient at 0925 for completion of clinical swallow evaluation s/t tracheostomy. ELVER Jay reporting planned J tube placement, needs to defer assessment at this time. Will f/u with evaluations as medical status permits.       Kalyn Choudhary M.A., CCC-SLP [id number]            
[facility]  SPEECH THERAPY MISSED TREATMENT NOTE  [facility] ICU 4D      Date: 3/30/2024  Patient Name: Mumtaz Islas        MRN: [id number]    : 1955  (69 y.o.)    REASON FOR MISSED TREATMENT:  Attempted to see patient for completion of CSE s/p trach placement on 3/29. ELVER Booker reports patient is not appropriate for interventions at this time given limited alertness levels. Will follow up as patient is available/appropriate.     Ana Bynum M.A., CCC-SLP [id number]          
[facility]  SPEECH THERAPY MISSED TREATMENT NOTE  [facility] ICU 4D      Date: 3/27/2024  Patient Name: Mumtaz Islas        MRN: [id number]    : 1955  (69 y.o.)    REASON FOR MISSED TREATMENT:  Novel order received for establishment of multi-modal communication via early mobility program d/t ongoing oral intubation. ELVER Torres reporting needs to defer assessment s/t patient not following commands. Will f/u on subsequent date as indicated.     Kalyn Choudhary M.A., CCC-SLP [id number]            
Hemodynamics settled out overnight; cardiac parameters good  Patient required boluses of amiodarone to control rate and rhythm which lead to the most hemodynamic instability.  Continue 400 mg p.o. twice daily and IV amiodarone drip at 1 mg/min.  Required IV Lopressor this morning and heart rate in the 100s  Off all vasopressors but remains on 0.375 Primacor; was hypertensive this morning before above; will back off Primacor down to 2.25  Continue CRRT; making small amount of urine may be 5 cc an hour  Neurologically not much change; will wait another couple of days before getting any radiologic studies    
Hemodynamics stable with IV Primacor and Amio drips; cardiac parameters good  Pt also on IV Fentanyl at 25, Heparin and Insulin  Continue CRRT  Neurologically - grimace to sternal rub; will wait another day or two before getting any radiologic studies    
[facility]  SPEECH THERAPY MISSED TREATMENT NOTE  [facility] ICU 4D      Date: 4/3/2024  Patient Name: Mumtaz Islas        MRN: [id number]    : 1955  (69 y.o.)    REASON FOR MISSED TREATMENT:  Patient attempted to be seen on this date, however, ELVER Ibarra reports that patient is not following any directions and is not appropriate for ST intervention. ST to follow up as schedule allows and patient becomes appropriate.     GITA Butler., Speech-Language Pathologist Intern          
I MY for Pacer Clinic at [facility] to please fax last Pacer check from 1/10/24.  
IV team to room to assess for central line dressing change need.  Pt currently down at surgery.  IV team to follow up tomorrow.   
IV team to room to assess need for dressing change on hemodialysis catheter.  Primary RN in room and states she will change dressing this shift.    
IV team to room to change dressing on HD catheter.  Per primary RN - dressing changed this shift already.   
Inpatient Cardiac Rehabilitation Consult    Received consult for Phase II Cardiac Rehabilitation.  Patient needs cardiac rehab due to CABG on 3/6/24.  Not a good candidate due to co-morbidities, plan to transfer to [place].   
Kevin Lang in room. Removed chest tube causing air leak. Remaining y connected chest tube remains connected to atrium. No air leak visible.  
[facility]   Nephrology progress note  3/10/2024, 10:10 AM      Pt Name:    Mumtaz Islas  MRN:     [id number]     YOB: 1955  Admit Date:    3/6/2024  5:24 AM    Chief Complaint: Nephrology following for Sandi/CKD .    Subjective:  Patient seen and examined  No chest pain or shortness of breath  Intubated and cannot assess any history or ROS  On ECMO  Tolerating CVVH well.  Continue ultrafiltration    Objective:  24HR INTAKE/OUTPUT:    Intake/Output Summary (Last 24 hours) at 3/10/2024 1010  Last data filed at 3/10/2024 0900  Gross per 24 hour   Intake 3849.03 ml   Output 5247 ml   Net -1397.97 ml        Admission weight: 59.7 kg (131 lb 9.6 oz)  Wt Readings from Last 3 Encounters:   03/10/24 80.8 kg (178 lb 2.1 oz)   03/01/24 61.2 kg (134 lb 14.7 oz)   02/26/24 62.7 kg (138 lb 3.2 oz)        Vitals :   Vitals:    03/10/24 0915 03/10/24 0930 03/10/24 0945 03/10/24 1000   BP:       Pulse: 90 90 90 89   Resp: 18 15 15 13   Temp:       TempSrc:       SpO2: 98% 97% 99% 97%   Weight:       Height:           Physical examination  General Appearance:  Well developed. No distress  Mouth/Throat:  Oral mucosa moist  Neck:  Supple, no JVD  Lungs:  Breath sounds: clear  Heart::  S1,S2 heard  Abdomen:  Soft, non - tender  Musculoskeletal:  Edema - minimal    Medications:  Infusion:    fentaNYL 75 mcg/hr (03/10/24 0658)    EPINEPHrine      prismaSATE BGK 4/2.5 500 mL/hr at 03/10/24 0614    prismaSol BGK 4/2.5 500 mL/hr at 03/10/24 0614    prismaSol BGK 4/2.5 500 mL/hr at 03/10/24 0614    sodium chloride      dexmedeTOMIDine 1.4 mcg/kg/hr (03/10/24 1006)    dextrose Stopped (03/09/24 0957)    amiodarone 0.5 mg/min (03/10/24 0658)    pantoprazole (PROTONIX) 80 mg in sodium chloride 0.9 % 100 mL infusion 8 mg/hr (03/10/24 0658)    milrinone 0.375 mcg/kg/min (03/07/24 1900)    sodium chloride Stopped (03/06/24 1251)    sodium chloride 20 mL/hr at 03/10/24 0658    sodium chloride Stopped (03/06/24 
[facility]   Nephrology progress note  3/11/2024, 11:11 AM      Pt Name:    Mumtaz Islas  MRN:     [id number]     YOB: 1955  Admit Date:    3/6/2024  5:24 AM    Chief Complaint: Nephrology following for Sandi/CKD .    Subjective:  Patient seen and examined  No chest pain or shortness of breath  Intubated and cannot assess any history or ROS  On ECMO  Tolerating CVVH well.  Continue ultrafiltration  Apparently did not tolerate decreased flow on the ECMO this morning    Objective:  24HR INTAKE/OUTPUT:    Intake/Output Summary (Last 24 hours) at 3/11/2024 1111  Last data filed at 3/11/2024 1100  Gross per 24 hour   Intake 3887.74 ml   Output 4916 ml   Net -1028.26 ml        Admission weight: 59.7 kg (131 lb 9.6 oz)  Wt Readings from Last 3 Encounters:   03/11/24 75.4 kg (166 lb 3.6 oz)   03/01/24 61.2 kg (134 lb 14.7 oz)   02/26/24 62.7 kg (138 lb 3.2 oz)        Vitals :   Vitals:    03/11/24 0800 03/11/24 0819 03/11/24 0900 03/11/24 1000   BP:   123/86 100/76   Pulse: 98  89 90   Resp: 13 13 13 12   Temp:       TempSrc:       SpO2: 99% 100% 100% 100%   Weight:       Height:           Physical examination  General Appearance:  Well developed. No distress  Mouth/Throat:  Oral mucosa moist  Neck:  Supple, no JVD  Lungs:  Breath sounds: clear  Heart::  S1,S2 heard  Abdomen:  Soft, non - tender  Musculoskeletal:  Edema - minimal    Medications:  Infusion:    sodium chloride      bivalirudin trifluoroacetate (ANGIOMAX) 250 mg in sodium chloride 0.9 % 50 mL infusion 0.04 mg/kg/hr (03/11/24 1100)    milrinone 0.25 mcg/kg/min (03/11/24 1100)    fentaNYL 100 mcg/hr (03/11/24 1100)    EPINEPHrine 2.5 mcg/min (03/11/24 1100)    prismaSATE BGK 4/2.5 500 mL/hr at 03/11/24 0220    prismaSol BGK 4/2.5 500 mL/hr at 03/11/24 0219    prismaSol BGK 4/2.5 500 mL/hr at 03/11/24 0219    sodium chloride      dexmedeTOMIDine 1 mcg/kg/hr (03/11/24 1100)    dextrose Stopped (03/09/24 0957)    amiodarone 0.5 
[facility]   Nephrology progress note  3/12/2024, 11:29 AM      Pt Name:    Mumtaz Islas  MRN:     [id number]     YOB: 1955  Admit Date:    3/6/2024  5:24 AM    Chief Complaint: Nephrology following for Sandi/CKD .    Subjective:  Patient seen and examined  Intubated and cannot assess any history or ROS  On ECMO  Tolerating CVVH well.  Continue ultrafiltration tolerating well as well  Still on epinephrine and milrinone    Objective:  24HR INTAKE/OUTPUT:    Intake/Output Summary (Last 24 hours) at 3/12/2024 1129  Last data filed at 3/12/2024 1100  Gross per 24 hour   Intake 2935.17 ml   Output 5171 ml   Net -2235.83 ml        Admission weight: 59.7 kg (131 lb 9.6 oz)  Wt Readings from Last 3 Encounters:   03/11/24 75.4 kg (166 lb 3.6 oz)   03/01/24 61.2 kg (134 lb 14.7 oz)   02/26/24 62.7 kg (138 lb 3.2 oz)        Vitals :   Vitals:    03/12/24 1030 03/12/24 1045 03/12/24 1100 03/12/24 1115   BP:       Pulse: 90 90 90 90   Resp: 20 20 20 20   Temp:       TempSrc:       SpO2:       Weight:       Height:           Physical examination  General Appearance:  Well developed. No distress  Mouth/Throat:  Oral mucosa moist  Neck:  Supple, no JVD  Lungs:  Breath sounds: clear  Heart::  S1,S2 heard  Abdomen:  Soft, non - tender  Musculoskeletal:  Edema - minimal    Medications:  Infusion:    midazolam 9 mg/hr (03/12/24 1100)    milrinone 0.125 mcg/kg/min (03/12/24 1100)    sodium chloride      bivalirudin trifluoroacetate (ANGIOMAX) 250 mg in sodium chloride 0.9 % 50 mL infusion 0.0484 mg/kg/hr (03/12/24 1100)    fentaNYL 175 mcg/hr (03/12/24 1123)    EPINEPHrine 5 mcg/min (03/12/24 1100)    prismaSATE BGK 4/2.5 750 mL/hr at 03/12/24 0855    prismaSol BGK 4/2.5 750 mL/hr at 03/12/24 0857    prismaSol BGK 4/2.5 750 mL/hr at 03/12/24 0857    sodium chloride      dexmedeTOMIDine 1 mcg/kg/hr (03/12/24 1121)    amiodarone 0.5 mg/min (03/12/24 1100)    pantoprazole (PROTONIX) 80 mg in sodium 
[facility]   Nephrology progress note  3/13/2024, 11:09 AM      Pt Name:    Mumtaz Islas  MRN:     [id number]     YOB: 1955  Admit Date:    3/6/2024  5:24 AM    Chief Complaint: Nephrology following for Sandi/CKD .    Subjective:  Patient seen and examined this morning  Intubated and cannot assess any history or ROS  On ECMO having some blood pressure issues  Tolerating CVVH well continues to have 100 mL ultrafiltration this morning    Objective:  24HR INTAKE/OUTPUT:    Intake/Output Summary (Last 24 hours) at 3/13/2024 1109  Last data filed at 3/13/2024 1100  Gross per 24 hour   Intake 4205.54 ml   Output 5754 ml   Net -1548.46 ml        Admission weight: 59.7 kg (131 lb 9.6 oz)  Wt Readings from Last 3 Encounters:   03/12/24 76.9 kg (169 lb 8.5 oz)   03/01/24 61.2 kg (134 lb 14.7 oz)   02/26/24 62.7 kg (138 lb 3.2 oz)        Vitals :   Vitals:    03/13/24 1015 03/13/24 1030 03/13/24 1045 03/13/24 1100   BP:       Pulse: (!) 111 (!) 111 (!) 111 (!) 110   Resp: 12 12 12 12   Temp:       TempSrc:       SpO2: 100% 100% 99%    Weight:       Height:           Physical examination  General Appearance:  Well developed. No distress  Mouth/Throat:  Oral mucosa moist  Neck:  Supple, no JVD  Lungs:  Breath sounds: clear  Heart::  S1,S2 heard  Abdomen:  Soft, non - tender  Musculoskeletal:  Edema - minimal    Medications:  Infusion:    milrinone 0.25 mcg/kg/min (03/13/24 0722)    midazolam 7 mg/hr (03/13/24 0440)    sodium chloride      bivalirudin trifluoroacetate (ANGIOMAX) 250 mg in sodium chloride 0.9 % 50 mL infusion 0.0532 mg/kg/hr (03/13/24 0440)    fentaNYL 150 mcg/hr (03/13/24 0637)    EPINEPHrine 11 mcg/min (03/13/24 0547)    prismaSATE BGK 4/2.5 750 mL/hr at 03/13/24 0438    prismaSol BGK 4/2.5 750 mL/hr at 03/13/24 1106    prismaSol BGK 4/2.5 750 mL/hr at 03/13/24 0436    sodium chloride      dexmedeTOMIDine 1 mcg/kg/hr (03/13/24 1025)    amiodarone 0.5 mg/min (03/13/24 3040) 
[facility]   Nephrology progress note  3/14/2024, 11:11 AM      Pt Name:    Mumtaz Islas  MRN:     [id number]     YOB: 1955  Admit Date:    3/6/2024  5:24 AM    Chief Complaint: Nephrology following for AKiICKD .    Subjective:  Patient seen and examined this morning  Intubated and cannot assess any history or ROS  On ECMO   Tolerating CVVH well continues to have 100 mL ultrafiltration , started this morning    Objective:  24HR INTAKE/OUTPUT:    Intake/Output Summary (Last 24 hours) at 3/14/2024 1111  Last data filed at 3/14/2024 1100  Gross per 24 hour   Intake 6273.5 ml   Output 4793 ml   Net 1480.5 ml        Admission weight: 59.7 kg (131 lb 9.6 oz)  Wt Readings from Last 3 Encounters:   03/14/24 75.6 kg (166 lb 10.7 oz)   03/01/24 61.2 kg (134 lb 14.7 oz)   02/26/24 62.7 kg (138 lb 3.2 oz)        Vitals :   Vitals:    03/14/24 1015 03/14/24 1030 03/14/24 1045 03/14/24 1100   BP:       Pulse: 80 80 80 80   Resp: 12 12 12 12   Temp:       TempSrc:       SpO2: 100% 100% 100% 100%   Weight:       Height:           Physical examination  General Appearance:  Well developed. No distress  Mouth/Throat:  Oral mucosa moist  Neck:  Supple, no JVD  Lungs:  Breath sounds: clear  Heart::  S1,S2 heard  Abdomen:  Soft, non - tender  Musculoskeletal:  Edema -no significant edema    Medications:  Infusion:    sodium chloride      milrinone Stopped (03/13/24 0941)    midazolam 7 mg/hr (03/14/24 1032)    sodium chloride      bivalirudin trifluoroacetate (ANGIOMAX) 250 mg in sodium chloride 0.9 % 50 mL infusion 0.0532 mg/kg/hr (03/14/24 0705)    fentaNYL 100 mcg/hr (03/14/24 0705)    EPINEPHrine 1.5 mcg/min (03/14/24 0705)    prismaSATE BGK 4/2.5 750 mL/hr at 03/14/24 0737    prismaSol BGK 4/2.5 750 mL/hr at 03/14/24 0739    prismaSol BGK 4/2.5 750 mL/hr at 03/14/24 0741    sodium chloride      dexmedeTOMIDine 1 mcg/kg/hr (03/14/24 0826)    amiodarone Stopped (03/13/24 1150)    sodium chloride 
[facility]   Nephrology progress note  3/17/2024, 1:32 PM      Pt Name:    Mumtaz Islas  MRN:     [id number]     YOB: 1955  Admit Date:    3/6/2024  5:24 AM    Chief Complaint: Nephrology following for MAGDI/CRRT    Subjective:  Patient seen and examined this morning  Intubated and cannot assess any history or review of system  On ECMO   CRRT data noted  Discussed with ICU  Hemodynamics noted  Drips noted  Ultrafiltration being adjusted depending on hemodynamics    Objective:  24HR INTAKE/OUTPUT:    Intake/Output Summary (Last 24 hours) at 3/17/2024 1332  Last data filed at 3/17/2024 1320  Gross per 24 hour   Intake 4068.5 ml   Output 4012 ml   Net 56.5 ml        Admission weight: 59.7 kg (131 lb 9.6 oz)  Wt Readings from Last 3 Encounters:   03/17/24 74.6 kg (164 lb 7.4 oz)   03/01/24 61.2 kg (134 lb 14.7 oz)   02/26/24 62.7 kg (138 lb 3.2 oz)        Vitals :   Vitals:    03/17/24 1032 03/17/24 1100 03/17/24 1200 03/17/24 1201   BP:   127/81    Pulse: 80 85 92 90   Resp: 13  16 13   Temp:   97.9 °F (36.6 °C)    TempSrc:   Core    SpO2: 100%  100% 100%   Weight:       Height:           Physical examination  General Appearance: Ill-appearing, intubated  Mouth/Throat: ET tube present  Neck: Multiple lines  Lungs: +ventilator  Abdomen: + Distended  Musculoskeletal:  + Edema  Skin: Warm to touch  Neuro: Sedated    Medications:  Infusion:    niCARdipine Stopped (03/17/24 1318)    sodium chloride      milrinone 0.125 mcg/kg/min (03/17/24 1320)    midazolam 10 mg/hr (03/17/24 1320)    sodium chloride      bivalirudin trifluoroacetate (ANGIOMAX) 250 mg in sodium chloride 0.9 % 50 mL infusion 0.0644 mg/kg/hr (03/17/24 1320)    fentaNYL 150 mcg/hr (03/17/24 1320)    EPINEPHrine Stopped (03/17/24 0531)    prismaSATE BGK 4/2.5 750 mL/hr at 03/17/24 1053    prismaSol BGK 4/2.5 750 mL/hr at 03/17/24 1055    prismaSol BGK 4/2.5 750 mL/hr at 03/17/24 1055    dexmedeTOMIDine 1 mcg/kg/hr 
[facility]   Nephrology progress note  3/20/2024, 9:23 AM      Pt Name:    Mumtaz Islas  MRN:     [id number]     YOB: 1955  Admit Date:    3/6/2024  5:24 AM    Chief Complaint: Nephrology following for AKiICKD .    Subjective:  Patient seen and examined this morning  Intubated and cannot assess any history or ROS  Currently off ECMO  Tolerating CVVH well currently maintaining 50 mL negative  Not requiring any pressors    Objective:  24HR INTAKE/OUTPUT:    Intake/Output Summary (Last 24 hours) at 3/20/2024 0923  Last data filed at 3/20/2024 0900  Gross per 24 hour   Intake 7191.52 ml   Output 2948 ml   Net 4243.52 ml        Admission weight: 59.7 kg (131 lb 9.6 oz)  Wt Readings from Last 3 Encounters:   03/19/24 76.9 kg (169 lb 8.5 oz)   03/01/24 61.2 kg (134 lb 14.7 oz)   02/26/24 62.7 kg (138 lb 3.2 oz)        Vitals :   Vitals:    03/20/24 0733 03/20/24 0738 03/20/24 0745 03/20/24 0800   BP:    (!) 161/65   Pulse: (!) 118 (!) 118 (!) 118 (!) 118   Resp: 17 18 10 17   Temp:    99 °F (37.2 °C)   TempSrc:    Core   SpO2: 100% 100% 100% 100%   Weight:       Height:           Physical examination  General Appearance:  Well developed. No distress  Mouth/Throat:  Oral mucosa moist  Neck:  Supple, no JVD  Lungs:  Breath sounds: clear  Heart::  S1,S2 heard  Abdomen:  Soft, non - tender  Musculoskeletal:  Edema -some dependent edema noted    Medications:  Infusion:    pantoprazole (PROTONIX) 80 mg in sodium chloride 0.9 % 100 mL infusion 8 mg/hr (03/20/24 0346)    prismaSATE BK 0/3.5 5,000 mL with potassium chloride 10 mEq solution 1,000 mL/hr at 03/20/24 0636    dextrose      insulin 1.88 Units/hr (03/20/24 0812)    prismaSol BGK 4/2.5 1,000 mL/hr at 03/20/24 0636    prismaSol BGK 4/2.5 1,000 mL/hr at 03/20/24 0534    niCARdipine Stopped (03/19/24 1213)    milrinone 0.375 mcg/kg/min (03/20/24 0831)    sodium chloride      fentaNYL 50 mcg/hr (03/20/24 0616)    EPINEPHrine 7 mcg/min 
[facility]   Nephrology progress note  3/21/2024, 10:02 AM      Pt Name:    Mumtaz Islas  MRN:     [id number]     YOB: 1955  Admit Date:    3/6/2024  5:24 AM    Chief Complaint: Nephrology following for AKiICKD .    Subjective:  Patient seen and examined this morning  Intubated and cannot assess any history or ROS  Tolerating CVVH well currently maintaining 50 mL negative  Was on Cardene drip and her blood pressure has dropped CVP is 4    Objective:  24HR INTAKE/OUTPUT:    Intake/Output Summary (Last 24 hours) at 3/21/2024 1002  Last data filed at 3/21/2024 0900  Gross per 24 hour   Intake 3187.33 ml   Output 4183 ml   Net -995.67 ml        Admission weight: 59.7 kg (131 lb 9.6 oz)  Wt Readings from Last 3 Encounters:   03/21/24 76.7 kg (169 lb 1.5 oz)   03/01/24 61.2 kg (134 lb 14.7 oz)   02/26/24 62.7 kg (138 lb 3.2 oz)        Vitals :   Vitals:    03/21/24 0920 03/21/24 0925 03/21/24 0930 03/21/24 0935   BP:       Pulse: 71 73 69 75   Resp: 25 13  (!) 9   Temp:       TempSrc:       SpO2: 100% 100% 100% 100%   Weight:       Height:           Physical examination  General Appearance:  Well developed. No distress  Mouth/Throat:  Oral mucosa moist  Neck:  Supple, no JVD  Lungs:  Breath sounds: clear  Heart::  S1,S2 heard  Abdomen:  Soft, non - tender  Musculoskeletal:  Edema -some dependent edema noted    Medications:  Infusion:    prismaSATE BGK 4/2.5      amiodarone 1 mg/min (03/21/24 0440)    heparin (PORCINE) Infusion 14 Units/kg/hr (03/21/24 0644)    dextrose      insulin 4.15 Units/hr (03/21/24 0902)    prismaSol BGK 4/2.5 500 mL/hr at 03/21/24 0903    prismaSol BGK 4/2.5 500 mL/hr at 03/21/24 0903    niCARdipine Stopped (03/21/24 0129)    milrinone 0.125 mcg/kg/min (03/21/24 0440)    sodium chloride      fentaNYL 25 mcg/hr (03/21/24 0440)    EPINEPHrine 7 mcg/min (03/19/24 7814)    sodium chloride 20 mL/hr at 03/21/24 0440    sodium chloride Stopped (03/06/24 1433) 
[facility]   Nephrology progress note  3/22/2024, 9:54 AM      Pt Name:    Mumtaz Islas  MRN:     [id number]     YOB: 1955  Admit Date:    3/6/2024  5:24 AM    Chief Complaint: Nephrology following for MAGDI/CKD .    Subjective:  Patient seen and examined this morning  Intubated and cannot assess any history or ROS  Tolerating CVVH well   CVP has slightly rising so currently on ultrafiltration     Objective:  24HR INTAKE/OUTPUT:    Intake/Output Summary (Last 24 hours) at 3/22/2024 0954  Last data filed at 3/22/2024 0939  Gross per 24 hour   Intake 5657.4 ml   Output 4808 ml   Net 849.4 ml        Admission weight: 59.7 kg (131 lb 9.6 oz)  Wt Readings from Last 3 Encounters:   03/22/24 75.3 kg (166 lb 0.1 oz)   03/01/24 61.2 kg (134 lb 14.7 oz)   02/26/24 62.7 kg (138 lb 3.2 oz)        Vitals :   Vitals:    03/22/24 0915 03/22/24 0930 03/22/24 0936 03/22/24 0944   BP:       Pulse: 100 (!) 101  (!) 101   Resp: 18 19 18 17   Temp:       TempSrc:       SpO2: 100% 100%  100%   Weight:       Height:           Physical examination  General Appearance:  Well developed. No distress  Mouth/Throat:  Oral mucosa moist  Neck:  Supple, no JVD  Lungs:  Breath sounds: clear  Heart::  S1,S2 heard  Abdomen:  Soft, non - tender  Musculoskeletal:  Edema -some dependent edema noted    Medications:  Infusion:    prismaSATE BGK 4/2.5 400 mL/hr at 03/22/24 0704    amiodarone 1 mg/min (03/22/24 0939)    heparin (PORCINE) Infusion 14 Units/kg/hr (03/22/24 0939)    dextrose      prismaSol BGK 4/2.5 400 mL/hr at 03/22/24 0247    prismaSol BGK 4/2.5 400 mL/hr at 03/22/24 0406    niCARdipine Stopped (03/22/24 0059)    sodium chloride      fentaNYL 100 mcg/hr (03/22/24 0939)    sodium chloride 20 mL/hr at 03/22/24 0939    sodium chloride Stopped (03/06/24 1464)     Meds:    insulin glargine  30 Units SubCUTAneous Daily    OLANZapine  7.5 mg Oral Nightly    amiodarone  150 mg IntraVENous Once    nitroglycerin  
[facility]   Nephrology progress note  3/23/2024, 11:04 AM      Pt Name:    Mumtaz Islas  MRN:     [id number]     YOB: 1955  Admit Date:    3/6/2024  5:24 AM    Chief Complaint: Nephrology following for MAGDI/CKD .    Subjective:  Patient seen and examined this morning.   Has been off CRRT since yesterday afternoon.   On milrinone.   30% FiO2 on vent.   Anuric.   CVP 12.   Getting MRI brain due to altered mental status.     Objective:  24HR INTAKE/OUTPUT:    Intake/Output Summary (Last 24 hours) at 3/23/2024 1104  Last data filed at 3/23/2024 0630  Gross per 24 hour   Intake 2708.39 ml   Output 563 ml   Net 2145.39 ml      Admission weight: 59.7 kg (131 lb 9.6 oz)  Wt Readings from Last 3 Encounters:   03/23/24 77.9 kg (171 lb 11.8 oz)   03/01/24 61.2 kg (134 lb 14.7 oz)   02/26/24 62.7 kg (138 lb 3.2 oz)        Vitals :   Vitals:    03/23/24 0645 03/23/24 0700 03/23/24 0815 03/23/24 0834   BP:       Pulse: (!) 106 (!) 105 (!) 104 (!) 104   Resp: 22 24 26 (!) 0   Temp:    99.3 °F (37.4 °C)   TempSrc:    Core   SpO2: 100% 100% 94%    Weight:       Height:           Physical examination  General Appearance:  Well developed. No distress  Mouth/Throat:  Oral mucosa moist  Neck:  Supple, no JVD  Lungs:  Breath sounds: clear  Heart::  S1,S2 heard  Abdomen:  Soft, non - tender  Musculoskeletal:  edema noted    Medications:  Infusion:    milrinone 0.125 mcg/kg/min (03/23/24 0630)    prismaSATE BGK 4/2.5 Stopped (03/22/24 1300)    amiodarone 1 mg/min (03/23/24 1102)    heparin (PORCINE) Infusion 14 Units/kg/hr (03/23/24 0630)    dextrose      prismaSol BGK 4/2.5 Stopped (03/22/24 1300)    prismaSol BGK 4/2.5 Stopped (03/22/24 1300)    niCARdipine Stopped (03/22/24 0059)    sodium chloride      fentaNYL 100 mcg/hr (03/23/24 0726)    sodium chloride 20 mL/hr at 03/23/24 0630    sodium chloride Stopped (03/06/24 1433)     Meds:    insulin glargine  30 Units SubCUTAneous Daily    OLANZapine  
[facility]   Nephrology progress note  3/24/2024, 10:20 AM      Pt Name:    Mumtaz Isals  MRN:     [id number]     YOB: 1955  Admit Date:    3/6/2024  5:24 AM    Chief Complaint: Nephrology following for MAGDI/CKD .    Subjective:  Patient seen and examined this morning.   305 fiO2 on vent.   Anuric.  On milrinone.    Objective:  24HR INTAKE/OUTPUT:    Intake/Output Summary (Last 24 hours) at 3/24/2024 1020  Last data filed at 3/24/2024 0400  Gross per 24 hour   Intake 1810.67 ml   Output 600 ml   Net 1210.67 ml      Admission weight: 59.7 kg (131 lb 9.6 oz)  Wt Readings from Last 3 Encounters:   03/23/24 77.9 kg (171 lb 11.8 oz)   03/23/24 77.9 kg (171 lb 11.8 oz)   03/01/24 61.2 kg (134 lb 14.7 oz)        Vitals :   Vitals:    03/24/24 0800 03/24/24 0832 03/24/24 0856 03/24/24 0902   BP: (!) 160/50 (!) 155/48  (!) 145/55   Pulse: 71 71 71    Resp: 11 15 25    Temp:  99.1 °F (37.3 °C)     TempSrc:       SpO2: 99% 98% 99%    Weight:       Height:           Physical examination  General Appearance:  Well developed. No distress  Mouth/Throat:  Oral mucosa moist  Neck:  Supple, no JVD  Lungs:  Breath sounds: clear  Heart::  S1,S2 heard  Abdomen:  Soft, non - tender  Musculoskeletal:  edema noted    Medications:  Infusion:    milrinone 0.125 mcg/kg/min (03/23/24 1750)    prismaSATE BGK 4/2.5 Stopped (03/22/24 1300)    heparin (PORCINE) Infusion 12 Units/kg/hr (03/24/24 0859)    dextrose      prismaSol BGK 4/2.5 Stopped (03/22/24 1300)    prismaSol BGK 4/2.5 Stopped (03/22/24 1300)    niCARdipine Stopped (03/22/24 0059)    sodium chloride      fentaNYL 100 mcg/hr (03/24/24 0435)    sodium chloride 20 mL/hr at 03/23/24 1750    sodium chloride Stopped (03/06/24 1433)     Meds:    amiodarone  200 mg Oral BID    insulin glargine  30 Units SubCUTAneous Daily    OLANZapine  7.5 mg Oral Nightly    nitroglycerin  1 inch Topical Q6H    insulin lispro  0-16 Units SubCUTAneous Q4H    
[facility]   Nephrology progress note  3/25/2024, 10:53 AM      Pt Name:    Mumtaz Islas  MRN:     [id number]     YOB: 1955  Admit Date:    3/6/2024  5:24 AM    Chief Complaint: Nephrology following for MAGDI/CKD .    Subjective:  Patient seen and examined this morning  Intubated and cannot assess any history or ROS  CVVH is off since Friday  He is currently back on pressors  He is on 50% FiO2.  No urine output  Having a lot of urine output again    Objective:  24HR INTAKE/OUTPUT:    Intake/Output Summary (Last 24 hours) at 3/25/2024 1053  Last data filed at 3/25/2024 0859  Gross per 24 hour   Intake 3900.43 ml   Output 766 ml   Net 3134.43 ml        Admission weight: 59.7 kg (131 lb 9.6 oz)  Wt Readings from Last 3 Encounters:   03/25/24 79.9 kg (176 lb 2.4 oz)   03/23/24 77.9 kg (171 lb 11.8 oz)   03/01/24 61.2 kg (134 lb 14.7 oz)        Vitals :   Vitals:    03/25/24 0800 03/25/24 0815 03/25/24 0830 03/25/24 0845   BP:       Pulse: 78 74 72 72   Resp: 10 30 14 17   Temp:    99.9 °F (37.7 °C)   TempSrc:    Core   SpO2: 100% 100% 100% 100%   Weight:       Height:           Physical examination  General Appearance:  Well developed. No distress  Mouth/Throat: ET tube in place  Neck: No accessory muscle use  Lungs:  Breath sounds: Rhonchi  Heart::  S1,S2 heard  Abdomen:  Soft, non - tender  Musculoskeletal:  Edema -some dependent edema noted    Medications:  Infusion:    fentaNYL 150 mcg/hr (03/25/24 0859)    dexmedeTOMIDine Stopped (03/24/24 2218)    norepinephrine 7 mcg/min (03/25/24 0859)    milrinone 0.125 mcg/kg/min (03/25/24 0859)    prismaSATE BGK 4/2.5 Stopped (03/22/24 1300)    heparin (PORCINE) Infusion 14 Units/kg/hr (03/25/24 0859)    dextrose      prismaSol BGK 4/2.5 Stopped (03/22/24 1300)    prismaSol BGK 4/2.5 Stopped (03/22/24 1300)    niCARdipine Stopped (03/22/24 0059)    sodium chloride      sodium chloride 20 mL/hr at 03/25/24 0859    sodium chloride Stopped 
[facility]   Nephrology progress note  3/26/2024, 10:01 AM      Pt Name:    Mumtaz Islas  MRN:     [id number]     YOB: 1955  Admit Date:    3/6/2024  5:24 AM    Chief Complaint: Nephrology following for MAGDI/CKD .    Subjective:  Patient seen and examined this morning  Intubated and cannot assess any history or ROS  CVVH is off since Friday  He is currently back on pressors fluctuating  He is on 40% FiO2.  No urine output    Objective:  24HR INTAKE/OUTPUT:    Intake/Output Summary (Last 24 hours) at 3/26/2024 1001  Last data filed at 3/26/2024 0957  Gross per 24 hour   Intake 2668 ml   Output 615 ml   Net 2053 ml        Admission weight: 59.7 kg (131 lb 9.6 oz)  Wt Readings from Last 3 Encounters:   03/26/24 82.8 kg (182 lb 8.7 oz)   03/23/24 77.9 kg (171 lb 11.8 oz)   03/01/24 61.2 kg (134 lb 14.7 oz)        Vitals :   Vitals:    03/26/24 0720 03/26/24 0740 03/26/24 0745 03/26/24 0800   BP: (!) 149/52  (!) 188/60 (!) 148/52   Pulse:   84 79   Resp:  30 24 16   Temp: 99.5 °F (37.5 °C)      TempSrc: Core      SpO2:    96%   Weight:       Height:           Physical examination  General Appearance:  Well developed. No distress  Mouth/Throat: ET tube in place  Neck: No accessory muscle use  Lungs:  Breath sounds: Rhonchi  Heart::  S1,S2 heard  Abdomen:  Soft, non - tender  Musculoskeletal:  Edema -some dependent edema noted    Medications:  Infusion:    fentaNYL 150 mcg/hr (03/26/24 0957)    dexmedeTOMIDine Stopped (03/24/24 2218)    norepinephrine 6 mcg/min (03/26/24 0957)    milrinone 0.125 mcg/kg/min (03/26/24 0957)    prismaSATE BGK 4/2.5 Stopped (03/22/24 1300)    heparin (PORCINE) Infusion 16 Units/kg/hr (03/26/24 0957)    dextrose      prismaSol BGK 4/2.5 Stopped (03/22/24 1300)    prismaSol BGK 4/2.5 Stopped (03/22/24 1300)    niCARdipine Stopped (03/22/24 0059)    sodium chloride      sodium chloride 20 mL/hr at 03/26/24 0957    sodium chloride Stopped (03/06/24 1433) 
[facility]   Nephrology progress note  3/27/2024, 10:01 AM      Pt Name:    Mumtaz Islas  MRN:     [id number]     YOB: 1955  Admit Date:    3/6/2024  5:24 AM    Chief Complaint: Nephrology following for MAGDI/CKD .    Subjective:  Patient seen and examined this morning  Intubated and cannot assess any history or ROS  Currently on CVVH tolerating 50 mm ultrafiltration  He is currently back on pressors currently at 9 mics  He is on 40% FiO2.  May be 5 mL urine output per hour    Objective:  24HR INTAKE/OUTPUT:    Intake/Output Summary (Last 24 hours) at 3/27/2024 1001  Last data filed at 3/27/2024 1000  Gross per 24 hour   Intake 2549.77 ml   Output 3093 ml   Net -543.23 ml        Admission weight: 59.7 kg (131 lb 9.6 oz)  Wt Readings from Last 3 Encounters:   03/27/24 83.3 kg (183 lb 10.3 oz)   03/23/24 77.9 kg (171 lb 11.8 oz)   03/01/24 61.2 kg (134 lb 14.7 oz)        Vitals :   Vitals:    03/27/24 0615 03/27/24 0630 03/27/24 0645 03/27/24 0700   BP:       Pulse: 81 80 82 83   Resp: 16 22 17 18   Temp:       TempSrc:       SpO2: 98% 98% 98% 98%   Weight:       Height:           Physical examination  General Appearance:  Well developed. No distress  Mouth/Throat: ET tube in place  Neck: No accessory muscle use  Lungs:  Breath sounds: Rhonchi  Heart::  S1,S2 heard  Abdomen:  Soft, non - tender  Musculoskeletal:  Edema -some dependent edema noted    Medications:  Infusion:    fentaNYL 150 mcg/hr (03/27/24 0950)    dexmedeTOMIDine Stopped (03/24/24 2218)    norepinephrine 7 mcg/min (03/27/24 0428)    milrinone 0.125 mcg/kg/min (03/26/24 2110)    prismaSATE BGK 4/2.5 800 mL/hr at 03/27/24 0509    heparin (PORCINE) Infusion 18 Units/kg/hr (03/27/24 0753)    dextrose      prismaSol BGK 4/2.5 800 mL/hr at 03/27/24 0513    prismaSol BGK 4/2.5 800 mL/hr at 03/27/24 0512    niCARdipine Stopped (03/22/24 0059)    sodium chloride      sodium chloride 20 mL/hr at 03/26/24 1851    sodium 
[facility]   Nephrology progress note  3/28/2024, 9:36 AM      Pt Name:    Mumtaz Islas  MRN:     [id number]     YOB: 1955  Admit Date:    3/6/2024  5:24 AM    Chief Complaint: Nephrology following for MAGDI/CKD .    Subjective:  Patient seen and examined this morning  Intubated and cannot assess any history or ROS  Currently on CVVH tolerating 50 ml ultrafiltration  Continues to be on pressors    Objective:  24HR INTAKE/OUTPUT:    Intake/Output Summary (Last 24 hours) at 3/28/2024 0936  Last data filed at 3/28/2024 0900  Gross per 24 hour   Intake 3536.38 ml   Output 4531 ml   Net -994.62 ml        Admission weight: 59.7 kg (131 lb 9.6 oz)  Wt Readings from Last 3 Encounters:   03/27/24 83.3 kg (183 lb 10.3 oz)   03/23/24 77.9 kg (171 lb 11.8 oz)   03/01/24 61.2 kg (134 lb 14.7 oz)        Vitals :   Vitals:    03/28/24 0830 03/28/24 0845 03/28/24 0854 03/28/24 0926   BP:    (!) 106/42   Pulse: 85 86 85 83   Resp: 21 18 16 16   Temp:    100.4 °F (38 °C)   TempSrc:       SpO2: 94% 93% 96% 95%   Weight:       Height:           Physical examination  General Appearance:  Well developed. No distress  Mouth/Throat: ET tube in place  Neck: No accessory muscle use  Lungs:  Breath sounds: Rhonchi  Heart::  S1,S2 heard  Abdomen:  Soft, non - tender  Musculoskeletal:  Edema -some dependent edema noted seems somewhat better    Medications:  Infusion:    fentaNYL 175 mcg/hr (03/28/24 0505)    dexmedeTOMIDine Stopped (03/24/24 2218)    norepinephrine 3 mcg/min (03/28/24 0435)    milrinone 0.125 mcg/kg/min (03/26/24 2110)    prismaSATE BGK 4/2.5 800 mL/hr at 03/28/24 0652    heparin (PORCINE) Infusion 18 Units/kg/hr (03/28/24 0503)    dextrose      prismaSol BGK 4/2.5 800 mL/hr at 03/28/24 0653    prismaSol BGK 4/2.5 800 mL/hr at 03/28/24 0653    niCARdipine Stopped (03/22/24 0059)    sodium chloride      sodium chloride 20 mL/hr at 03/26/24 1851    sodium chloride Stopped (03/06/24 1433) 
[facility]   Nephrology progress note  3/29/2024, 11:35 AM      Pt Name:    Mumtaz Islas  MRN:     [id number]     YOB: 1955  Admit Date:    3/6/2024  5:24 AM    Chief Complaint: Nephrology following for MAGDI/CKD .    Subjective:  Patient seen and examined   Intubated and cannot assess any history or ROS  Currently on CVVH tolerating 50 ml ultrafiltration  Currently off pressors    Objective:  24HR INTAKE/OUTPUT:    Intake/Output Summary (Last 24 hours) at 3/29/2024 1135  Last data filed at 3/29/2024 1112  Gross per 24 hour   Intake 2702.93 ml   Output 3069 ml   Net -366.07 ml        Admission weight: 59.7 kg (131 lb 9.6 oz)  Wt Readings from Last 3 Encounters:   03/29/24 77.6 kg (171 lb 1.2 oz)   03/23/24 77.9 kg (171 lb 11.8 oz)   03/01/24 61.2 kg (134 lb 14.7 oz)        Vitals :   Vitals:    03/29/24 0930 03/29/24 0945 03/29/24 1000 03/29/24 1100   BP:       Pulse: 67 66 66 67   Resp: 19 18 22 24   Temp:       TempSrc:       SpO2:    93%   Weight:       Height:           Physical examination  General Appearance:  Well developed. No distress  Mouth/Throat: ET tube in place  Neck: No accessory muscle use  Lungs:  Breath sounds: Rhonchi  Heart::  S1,S2 heard  Abdomen:  Soft, non - tender  Musculoskeletal:  Edema -some dependent edema noted seems somewhat better    Medications:  Infusion:    fentaNYL 150 mcg/hr (03/29/24 1056)    dexmedeTOMIDine Stopped (03/24/24 2218)    norepinephrine 2 mcg/min (03/29/24 1112)    milrinone Stopped (03/27/24 0739)    prismaSATE BGK 4/2.5 800 mL/hr at 03/29/24 1007    heparin (PORCINE) Infusion Stopped (03/29/24 0003)    dextrose      prismaSol BGK 4/2.5 800 mL/hr at 03/29/24 1004    prismaSol BGK 4/2.5 800 mL/hr at 03/29/24 1004    niCARdipine Stopped (03/22/24 0059)    sodium chloride      sodium chloride Stopped (03/28/24 1115)    sodium chloride Stopped (03/06/24 1433)     Meds:    piperacillin-tazobactam  3,375 mg IntraVENous Q12H    
[facility]   Nephrology progress note  3/30/2024, 9:45 AM      Pt Name:    Mumtaz Islas  MRN:     [id number]     YOB: 1955  Admit Date:    3/6/2024  5:24 AM    Chief Complaint: Nephrology following for MAGDI/CKD .    Subjective:  Patient seen and examined   Intubated and cannot assess any history or ROS  Currently on CVVH tolerating 50 ml ultrafiltration  Currently on pressors    Objective:  24HR INTAKE/OUTPUT:    Intake/Output Summary (Last 24 hours) at 3/30/2024 0945  Last data filed at 3/30/2024 0941  Gross per 24 hour   Intake 1707.91 ml   Output 2762 ml   Net -1054.09 ml        Admission weight: 59.7 kg (131 lb 9.6 oz)  Wt Readings from Last 3 Encounters:   03/30/24 76.6 kg (168 lb 14 oz)   03/23/24 77.9 kg (171 lb 11.8 oz)   03/01/24 61.2 kg (134 lb 14.7 oz)        Vitals :   Vitals:    03/30/24 0830 03/30/24 0845 03/30/24 0900 03/30/24 0915   BP:       Pulse: 69 67 67 67   Resp: 12 25 14 19   Temp:       TempSrc:       SpO2: 99% 99% 98% 97%   Weight:       Height:           Physical examination  General Appearance:  Well developed. No distress  Mouth/Throat: ET tube in place  Neck: No accessory muscle use  Lungs:  Breath sounds: Rhonchi  Heart::  S1,S2 heard  Abdomen:  Soft, non - tender  Musculoskeletal:  Edema -some dependent edema noted seems somewhat better    Medications:  Infusion:    fentaNYL 150 mcg/hr (03/30/24 0941)    dexmedeTOMIDine Stopped (03/24/24 2218)    norepinephrine 6 mcg/min (03/30/24 0941)    milrinone Stopped (03/27/24 0739)    prismaSATE BGK 4/2.5 800 mL/hr at 03/30/24 0618    heparin (PORCINE) Infusion 20 Units/kg/hr (03/30/24 0941)    dextrose      prismaSol BGK 4/2.5 800 mL/hr at 03/30/24 0618    prismaSol BGK 4/2.5 800 mL/hr at 03/30/24 0618    niCARdipine Stopped (03/22/24 0059)    sodium chloride      sodium chloride Stopped (03/28/24 1115)    sodium chloride Stopped (03/06/24 1433)     Meds:    piperacillin-tazobactam  3,375 mg IntraVENous 
[facility]   Nephrology progress note  3/31/2024, 11:48 AM      Pt Name:    Mumtaz Islas  MRN:     [id number]     YOB: 1955  Admit Date:    3/6/2024  5:24 AM    Chief Complaint: Nephrology following for MAGDI/CKD .    Subjective:  Patient seen and examined   Intubated and cannot assess any history or ROS  Currently on CVVH tolerating 50 ml ultrafiltration  Currently on pressors    Objective:  24HR INTAKE/OUTPUT:    Intake/Output Summary (Last 24 hours) at 3/31/2024 1148  Last data filed at 3/31/2024 1100  Gross per 24 hour   Intake 1116.19 ml   Output 3904 ml   Net -2787.81 ml        Admission weight: 59.7 kg (131 lb 9.6 oz)  Wt Readings from Last 3 Encounters:   03/31/24 75.1 kg (165 lb 9.1 oz)   03/23/24 77.9 kg (171 lb 11.8 oz)   03/01/24 61.2 kg (134 lb 14.7 oz)        Vitals :   Vitals:    03/31/24 0830 03/31/24 0845 03/31/24 0900 03/31/24 0915   BP:       Pulse: 72 70 71 71   Resp: 16 19 20 18   Temp:       TempSrc:       SpO2: 100% 98% 98% 99%   Weight:       Height:           Physical examination  General Appearance:  Well developed. No distress  Mouth/Throat: ET tube in place  Neck: No accessory muscle use  Lungs:  Breath sounds: Rhonchi  Heart::  S1,S2 heard  Abdomen:  Soft, non - tender  Musculoskeletal:  Edema -appears to be better    Medications:  Infusion:    fentaNYL 150 mcg/hr (03/31/24 0605)    norepinephrine 12 mcg/min (03/30/24 2257)    prismaSATE BGK 4/2.5 800 mL/hr at 03/31/24 0703    dextrose      prismaSol BGK 4/2.5 800 mL/hr at 03/31/24 0702    prismaSol BGK 4/2.5 800 mL/hr at 03/31/24 0702    sodium chloride      sodium chloride Stopped (03/28/24 1115)    sodium chloride Stopped (03/06/24 1433)     Meds:    insulin lispro  0-16 Units SubCUTAneous Q6H    piperacillin-tazobactam  3,375 mg IntraVENous Q12H    amiodarone  200 mg Oral BID    insulin glargine  30 Units SubCUTAneous Daily    OLANZapine  7.5 mg Oral Nightly    nitroglycerin  1 inch Topical Q6H 
[facility]   Nephrology progress note  3/9/2024, 11:55 AM      Pt Name:    Mumtaz Islas  MRN:     [id number]     YOB: 1955  Admit Date:    3/6/2024  5:24 AM    Chief Complaint: Nephrology following for Sandi/CKD .    Subjective:  Patient seen and examined  No chest pain or shortness of breath  Intubated and cannot assess any history or ROS  On ECMO  Tolerating CVVH well. UF -50 ml    Objective:  24HR INTAKE/OUTPUT:    Intake/Output Summary (Last 24 hours) at 3/9/2024 1155  Last data filed at 3/9/2024 1100  Gross per 24 hour   Intake 8206.9 ml   Output 4606 ml   Net 3600.9 ml      Admission weight: 59.7 kg (131 lb 9.6 oz)  Wt Readings from Last 3 Encounters:   03/09/24 82.7 kg (182 lb 5.1 oz)   03/01/24 61.2 kg (134 lb 14.7 oz)   02/26/24 62.7 kg (138 lb 3.2 oz)        Vitals :   Vitals:    03/09/24 1114 03/09/24 1115 03/09/24 1130 03/09/24 1145   BP:       Pulse:  90 90 90   Resp:  13 15 13   Temp:    98.1 °F (36.7 °C)   TempSrc:    Core   SpO2: 100% 100% 100% 100%   Weight:       Height:           Physical examination  General Appearance:  Well developed. No distress  Mouth/Throat:  Oral mucosa moist  Neck:  Supple, no JVD  Lungs:  Breath sounds: clear  Heart::  S1,S2 heard  Abdomen:  Soft, non - tender  Musculoskeletal:  Edema - minimal    Medications:  Infusion:    EPINEPHrine      prismaSATE BGK 4/2.5 500 mL/hr at 03/09/24 0914    prismaSol BGK 4/2.5 500 mL/hr at 03/09/24 0912    prismaSol BGK 4/2.5 500 mL/hr at 03/09/24 0916    sodium chloride      dexmedeTOMIDine 1.2 mcg/kg/hr (03/09/24 1115)    dextrose 50 mL/hr at 03/09/24 0730    amiodarone 0.5 mg/min (03/09/24 0843)    pantoprazole (PROTONIX) 80 mg in sodium chloride 0.9 % 100 mL infusion 8 mg/hr (03/09/24 0730)    fentaNYL 50 mcg/hr (03/09/24 0730)    milrinone 0.375 mcg/kg/min (03/07/24 1900)    sodium chloride Stopped (03/06/24 1251)    sodium chloride Stopped (03/08/24 2050)    sodium chloride Stopped (03/06/24 
[facility]   Nephrology progress note  4/2/2024, 9:45 AM      Pt Name:    Mumtaz Islas  MRN:     [id number]     YOB: 1955  Admit Date:    3/6/2024  5:24 AM    Chief Complaint: Nephrology following for MAGDI/CKD .    Subjective:  Patient seen and examined   Intubated and cannot assess any history or ROS  Currently on pressors  but getting better .    Objective:  24HR INTAKE/OUTPUT:    Intake/Output Summary (Last 24 hours) at 4/2/2024 0945  Last data filed at 4/2/2024 0741  Gross per 24 hour   Intake 1319.16 ml   Output 50 ml   Net 1269.16 ml        Admission weight: 59.7 kg (131 lb 9.6 oz)  Wt Readings from Last 3 Encounters:   04/02/24 77.9 kg (171 lb 11.8 oz)   03/23/24 77.9 kg (171 lb 11.8 oz)   03/01/24 61.2 kg (134 lb 14.7 oz)        Vitals :   Vitals:    04/02/24 0645 04/02/24 0700 04/02/24 0800 04/02/24 0838   BP:       Pulse: 83 83 84 84   Resp: 16 16 14 13   Temp:   98 °F (36.7 °C)    TempSrc:   Oral    SpO2: 100% 100% 100% 99%   Weight:       Height:           Physical examination  General Appearance:  Well developed. No distress  Mouth/Throat: ET tube in place  Neck: No accessory muscle use  Lungs:  Breath sounds: clear  Heart::  S1,S2 heard  Abdomen:  Soft, non - tender  Musculoskeletal:  Edema -appears to be better    Medications:  Infusion:    amiodarone 0.5 mg/min (04/02/24 0741)    fentaNYL 150 mcg/hr (04/02/24 0839)    norepinephrine 3 mcg/min (04/02/24 0741)    prismaSATE BGK 4/2.5 800 mL/hr at 03/31/24 0703    dextrose      prismaSol BGK 4/2.5 800 mL/hr at 03/31/24 0702    prismaSol BGK 4/2.5 800 mL/hr at 03/31/24 0702    sodium chloride      sodium chloride Stopped (03/28/24 1115)    sodium chloride Stopped (03/06/24 1433)     Meds:    insulin lispro  0-16 Units SubCUTAneous Q6H    piperacillin-tazobactam  3,375 mg IntraVENous Q12H    amiodarone  200 mg Oral BID    insulin glargine  30 Units SubCUTAneous Daily    OLANZapine  7.5 mg Oral Nightly    
[facility]   Nephrology progress note  4/3/2024, 10:08 AM      Pt Name:    Mumtaz Islas  MRN:     [id number]     YOB: 1955  Admit Date:    3/6/2024  5:24 AM    Chief Complaint: Nephrology following for MAGDI/CKD .    Subjective:  Patient seen and examined   Intubated and cannot assess any history or ROS  Currently on pressors   Not much urine output.    Objective:  24HR INTAKE/OUTPUT:    Intake/Output Summary (Last 24 hours) at 4/3/2024 1008  Last data filed at 4/3/2024 0638  Gross per 24 hour   Intake 1117.82 ml   Output 20 ml   Net 1097.82 ml        Admission weight: 59.7 kg (131 lb 9.6 oz)  Wt Readings from Last 3 Encounters:   04/02/24 77.9 kg (171 lb 11.8 oz)   03/23/24 77.9 kg (171 lb 11.8 oz)   03/01/24 61.2 kg (134 lb 14.7 oz)        Vitals :   Vitals:    04/03/24 0815 04/03/24 0830 04/03/24 0845 04/03/24 0900   BP:       Pulse: 85 84 84 84   Resp: 18 (!) 32 24 30   Temp:  98 °F (36.7 °C)     TempSrc:  Oral     SpO2: 97% 97% 96%    Weight:       Height:           Physical examination  General Appearance:  Well developed. No distress  Mouth/Throat: ET tube in place  Neck: No accessory muscle use  Lungs:  Breath sounds: clear  Heart::  S1,S2 heard  Abdomen:  Soft, non - tender  Musculoskeletal:  Edema -started to get slightly worse again    Medications:  Infusion:    amiodarone 0.5 mg/min (04/03/24 1004)    fentaNYL 125 mcg/hr (04/03/24 0638)    norepinephrine 7 mcg/min (04/03/24 0638)    prismaSATE BGK 4/2.5 800 mL/hr at 03/31/24 0703    dextrose      prismaSol BGK 4/2.5 800 mL/hr at 03/31/24 0702    prismaSol BGK 4/2.5 800 mL/hr at 03/31/24 0702    sodium chloride      sodium chloride Stopped (03/28/24 1115)    sodium chloride Stopped (03/06/24 1433)     Meds:    insulin lispro  0-16 Units SubCUTAneous Q6H    piperacillin-tazobactam  3,375 mg IntraVENous Q12H    amiodarone  200 mg Oral BID    insulin glargine  30 Units SubCUTAneous Daily    OLANZapine  7.5 mg Oral 
[facility]   Nephrology progress note  4/4/2024, 9:43 AM      Pt Name:    Mumtaz Islas  MRN:     [id number]     YOB: 1955  Admit Date:    3/6/2024  5:24 AM    Chief Complaint: Nephrology following for MAGDI/CKD .    Subjective:  Patient seen and examined   Intubated and cannot assess any history or ROS  Currently on pressors at the low-dose of around 3-4 mics  Not much urine output.    Objective:  24HR INTAKE/OUTPUT:    Intake/Output Summary (Last 24 hours) at 4/4/2024 0943  Last data filed at 4/4/2024 0704  Gross per 24 hour   Intake 1609.36 ml   Output 140 ml   Net 1469.36 ml        Admission weight: 59.7 kg (131 lb 9.6 oz)  Wt Readings from Last 3 Encounters:   04/04/24 79.5 kg (175 lb 4.3 oz)   03/23/24 77.9 kg (171 lb 11.8 oz)   03/01/24 61.2 kg (134 lb 14.7 oz)        Vitals :   Vitals:    04/04/24 0645 04/04/24 0700 04/04/24 0715 04/04/24 0730   BP: 120/68 (!) 121/59 (!) 108/49 117/63   Pulse: 90 93 92 93   Resp: 14 11 16 12   Temp:       TempSrc:       SpO2: 100% 100% 100% 100%   Weight:       Height:           Physical examination  General Appearance:  Well developed. No distress  Mouth/Throat: ET tube in place  Neck: No accessory muscle use  Lungs:  Breath sounds: clear  Heart::  S1,S2 heard  Abdomen:  Soft, non - tender  Musculoskeletal:  Edema -started to get slightly worse again    Medications:  Infusion:    fentaNYL 75 mcg/hr (04/04/24 0617)    norepinephrine 4 mcg/min (04/04/24 0704)    prismaSATE BGK 4/2.5 800 mL/hr at 03/31/24 0703    dextrose      prismaSol BGK 4/2.5 800 mL/hr at 03/31/24 0702    prismaSol BGK 4/2.5 800 mL/hr at 03/31/24 0702    sodium chloride      sodium chloride Stopped (03/28/24 1115)    sodium chloride Stopped (03/06/24 1433)     Meds:    midodrine  10 mg Oral TID WC    modafinil  200 mg Oral Daily    insulin lispro  0-16 Units SubCUTAneous Q6H    piperacillin-tazobactam  3,375 mg IntraVENous Q12H    amiodarone  200 mg Oral BID    insulin 
[facility]   Nephrology progress note  4/5/2024, 12:21 PM      Pt Name:    Mumtaz Islas  MRN:     [id number]     YOB: 1955  Admit Date:    3/6/2024  5:24 AM    Chief Complaint: Nephrology following for MAGDI/CKD .    Subjective:  Patient seen and examined   Intubated and cannot assess any history or ROS  Unable to get off pressors still on 2 mics of Levophed  No urine output    Objective:  24HR INTAKE/OUTPUT:    Intake/Output Summary (Last 24 hours) at 4/5/2024 1221  Last data filed at 4/5/2024 1140  Gross per 24 hour   Intake 2020.44 ml   Output 100 ml   Net 1920.44 ml        Admission weight: 59.7 kg (131 lb 9.6 oz)  Wt Readings from Last 3 Encounters:   04/05/24 80.4 kg (177 lb 4 oz)   03/23/24 77.9 kg (171 lb 11.8 oz)   03/01/24 61.2 kg (134 lb 14.7 oz)        Vitals :   Vitals:    04/05/24 1030 04/05/24 1045 04/05/24 1115 04/05/24 1130   BP: 91/66 (!) 89/51 115/63 (!) 98/57   Pulse: 91 91 91 91   Resp: 12 (!) 5 15 25   Temp:    99 °F (37.2 °C)   TempSrc:    Oral   SpO2:   100% 100%   Weight:       Height:           Physical examination  General Appearance:  Well developed. No distress  Mouth/Throat: ET tube in place  Neck: No accessory muscle use  Lungs:  Breath sounds: clear  Heart::  S1,S2 heard  Abdomen:  Soft, non - tender  Musculoskeletal:  Edema -started to get slightly worse again    Medications:  Infusion:    fentaNYL Stopped (04/05/24 0850)    norepinephrine 2 mcg/min (04/05/24 1140)    dextrose      sodium chloride      sodium chloride Stopped (03/28/24 1115)    sodium chloride Stopped (03/06/24 1433)     Meds:    insulin lispro  0-4 Units SubCUTAneous Q6H    oxyCODONE-acetaminophen  1 tablet Oral Q6H    midodrine  10 mg Oral TID WC    modafinil  200 mg Oral Daily    amiodarone  200 mg Oral BID    OLANZapine  7.5 mg Oral Nightly    nitroglycerin  1 inch Topical Q6H    polyethylene glycol  17 g Oral Daily    pantoprazole  40 mg IntraVENous BID    senna  5 mL Per G 
[facility]   Nephrology progress note  4/6/2024, 11:41 AM      Pt Name:    Mumtaz Islas  MRN:     [id number]     YOB: 1955  Admit Date:    3/6/2024  5:24 AM    Chief Complaint: Nephrology following for MAGDI and CRRT    Subjective:  Patient seen and examined   Ill-appearing  CRRT undergoing at bedside  Input and output reviewed  CRRT data reviewed  Currently on Levophed drip  On vasopressin drip  On 30% FiO2      Objective:  24HR INTAKE/OUTPUT:    Intake/Output Summary (Last 24 hours) at 4/6/2024 1141  Last data filed at 4/6/2024 1126  Gross per 24 hour   Intake 3133.39 ml   Output 2222 ml   Net 911.39 ml        Admission weight: 59.7 kg (131 lb 9.6 oz)  Wt Readings from Last 3 Encounters:   04/06/24 81.1 kg (178 lb 12.7 oz)   03/23/24 77.9 kg (171 lb 11.8 oz)   03/01/24 61.2 kg (134 lb 14.7 oz)        Vitals :   Vitals:    04/06/24 1045 04/06/24 1051 04/06/24 1100 04/06/24 1115   BP:   (!) 88/62 (!) 86/69   Pulse: 96 98 98 97   Resp: 26 28 23 29   Temp:       TempSrc:       SpO2: 94% 100%  94%   Weight:       Height:           Physical examination  General Appearance:chronically ill-appearing  Neck: + Trach in place  Lungs: On mechanical ventilator  Heart::  S1,S2 heard  Abdomen: Soft  Musculoskeletal: No significant edema  Neuro: Sedated    Medications:  Infusion:    fentaNYL 50 mcg/hr (04/06/24 1126)    VASOpressin 20 Units in sodium chloride 0.9 % 100 mL infusion 0.04 Units/min (04/06/24 1108)    heparin (porcine) 5,000 Units in sodium chloride 0.9 % 20 mL infusion      prismaSATE BGK 4/2.5 2,000 mL/hr at 04/06/24 0941    prismaSol BGK 4/2.5 1,500 mL/hr at 04/06/24 0841    prismaSol BGK 4/2.5 1,500 mL/hr at 04/06/24 0841    sodium bicarbonate 150 mEq in dextrose 5 % 1,000 mL infusion 125 mL/hr at 04/06/24 1126    norepinephrine 40 mcg/min (04/06/24 1126)    dextrose      sodium chloride      sodium chloride Stopped (03/28/24 1115)    sodium chloride Stopped (03/06/24 1433) 
[facility]   PROGRESS NOTE      Patient: Mumtaz Islas  Room #: 4D-12/012-A            YOB: 1955  Age: 69 y.o.  Gender: male            Admit Date & Time: 3/6/2024  5:24 AM    Assessment:    The patient was encountered prior to terminal extubation. The patient's spouse declined a visit and some family requested prayer.     Interventions:  The patient's family was provided a service at end of life outside the patient's room.     Outcomes:  The patient's family that was provided the service was tearful and expressing grief. The family in the room was respected for their wishes.     Plan:  1.Spiritual care will continue to follow the patient according to [facility] spiritual care SOP.       Electronically signed by Chaplain Beckie, on 4/6/2024 at 1:24 PM.  Spiritual Care Department  [facility]  [phone]     04/06/24 1323   Encounter Summary   Encounter Overview/Reason  Grief, Loss, and Adjustments   Service Provided For: Family   Referral/Consult From: Nurse   Support System Family members   Last Encounter  04/06/24   Complexity of Encounter Moderate   Begin Time 1313   End Time  1323   Total Time Calculated 10 min   Crisis   Type Family Care   Spiritual/Emotional needs   Type Other (comment)  (Death)   Grief, Loss, and Adjustments   Type End of Life   Assessment/Intervention/Outcome   Assessment Tearful;Sad   Intervention Prayer (assurance of)/[place];Read/Provided Scripture   Outcome Venting emotion;Grieving       
[facility]  Notice of Patient Passing      Patient Name- Mumtaz Islas   Acct Number- [id number]   Attending Physician- Manish Bess MD    Admitted on-3/6/2024  5:24 AM     On 4/6/2024 at 1511 patient was found in 4D12 with:   Absence of vital signs.   Absence of neurological response.    Confirmed time of death at 1511.   Physician or On-call Physician notified of time of death- yes    Family present at time of death- yes, wife Adriana    [facility] care present at time of death- no    Physician was notified and orders were obtained to release the body.   Post-Mortem documentation completed; form printed, signed, and given to admitting.    Bernie Harper RN RN Nursing Supervisor/ Manager  4/6/24   3:31 PM    ________________________________________________________________________        Pt Name: Mumtaz Islas   Address: [address] [place] [id number]  Phone: [phone] (home)        MRN: [id number]    AGE: 69 y.o.   YOB: 1955       GENDER: male     Primary Care Physician: Tom Villanueva DO   Attending Physician Name: Dr. Manish Bess    Date of Death: 04/06/24  Time of Death: 1511  Pronounced By: Dr. Coreas      Emergency Contact:   Name of Family Notified of Death: Adriana Islas   Relationship to Patient: Spouse   Phone Number: [phone]      Co-Morbidities:  Patient Active Problem List   Diagnosis    Ischemia, bowel (HCC)    Uncontrolled hypertension    Diabetes mellitus (HCC)    CAD (coronary artery disease)    AF (atrial fibrillation) (HCC)    Gastroenteritis    Diabetes mellitus type II, uncontrolled    Peptic ulcer disease    History of esophageal cancer    Hypothyroidism    Palpitation    Chest pain    PVD (peripheral vascular disease) (HCC)    Angina pectoris (HCC)    Abnormal nuclear stress test    medtronic dual pacer     Hypoglycemia    Cerebellar infarct (HCC)    Esophageal carcinoma (HCC)    Type 2 diabetes 
[facility]  OCCUPATIONAL THERAPY MISSED TREATMENT NOTE  [facility] ICU 4D  4D-12/012-A      Date: 3/28/2024  Patient Name: Mumtaz Islas        CSN: [id number]   : 1955  (69 y.o.)  Gender: male                REASON FOR MISSED TREATMENT:  Hold treatment per nursing request. Per RN and CM, pt not following commands, not appropriate for early mobility. Will defer orders, please reorder if appropriate.                
[facility]   PROGRESS NOTE      Patient: Mumtaz Islas  Room #: 4D-12/012-A            YOB: 1955  Age: 69 y.o.  Gender: male            Admit Date & Time: 3/6/2024  5:24 AM    Assessment:    The patient was attempted to be visited and is currently in active care. The care is such to inhibit a spiritual care visit at this time.     Interventions:  A the patient was unable to be visited at this time. A prayer was provided outside the patient's room.     Outcomes:  The patient continue to be followed by spiritual care.     Plan:  1.Spiritual care will continue to follow the patient according to [facility] spiritual care SOP.       Electronically signed by Chaplain Beckie, on 3/27/2024 at 2:37 PM.  Spiritual Care Department  [facility]  [phone]     03/27/24 1436   Encounter Summary   Encounter Overview/Reason  Follow-up   Service Provided For: Patient not available   Referral/Consult From: Nasreen   Last Encounter  03/27/24  (NR)   Complexity of Encounter Low   Begin Time 1424   End Time  1426   Total Time Calculated 2 min   Spiritual/Emotional needs   Type Spiritual Support   Assessment/Intervention/Outcome   Assessment Unable to assess   Intervention Prayer (assurance of)/[place]   Outcome Did not respond       
[facility]  Progress Note       Mumtaz Islas  AGE: 69 y.o.   GENDER: male  : 1955  UNIT: 4D-12/012-A  TODAY'S DATE:  3/14/2024  ADMISSION DATE: 3/6/2024  5:24 AM    Summary:     Consulted received for [facility] ECMO. Patient assisted x2 RN onto right side. Patient has OptiView transparent dressing to sacrum, intact. Photograph below. Purple/red discoloration observed appears blanchable at this time through dressing. Chux pad and sheet changed. Patient not offloaded at this time due to rotational bed setting. Patient on Latrell support surface. Patient in bed, RN in room. Will follow patient while on ECMO with assessments as requested/ as needed. Call with concerns.     3/14/24    Sacrum, intact    Plan:     Treatment Recommendations:   Utilize OptiView transparent dressing. Change every 7 days and PRN for soiling.     If multiple episodes of soiling occurs with use of OptiView dressing, recommend Triad protective barrier cream BID and PRN.     Utilize Mölnlycke Z-Jaret Fluidized Positioners for offloading.     
[facility] [place] 2[facility]  SPEECH THERAPY MISSED TREATMENT NOTE  [facility] ICU 4D      Date: 3/29/2024  Patient Name: Mumtaz Islas        MRN: [id number]    : 1955  (69 y.o.)    REASON FOR MISSED TREATMENT:  ST attempts to see for multi modal communication evaluation per early mobility protocol. Spoke with RN, Jhony who reports patient with poor command following. Plans for possible tracheostomy + PEG placement per chart review. MRI completed with results below:        IMPRESSION:     1. Small acute infarct in the left parietal cortex.  2. Moderate atrophy.  3. Probable ischemic changes in the white matter.  4. Inflammatory changes the mastoid air cells bilaterally and to lesser extent in the ethmoid air cells, maxillary and sphenoid sinuses bilaterally.  5. Moderate amount of fluid in the nasopharynx.    Will follow up as clinically indicated.     Josseline Hawley M.S. CCC-SLP [id number] 3/29/2024    
Neuro Radiologist Dr. Mueller has approved to complete the MRI on the patient given he is on a vent with a pacer. This was discussed with Dr. Olivarez as well.   
Not much change in patient's condition  Levophed weaned down to 5 mics because patient was hypotensive this morning.  Consideration for restarting dialysis later today as long as Levophed requirements are not too high; still no urgent need for dialysis making a paucity of urine  Sedated on fentanyl  CAT scan of the head does not show stroke; MRI not possible because of pacing wires;  Hemodynamics adequate with good cardiac parameters  Monitor chest tubes removed; 390 cc over the last 12 hours; patient continues to require chest tube drainage  Trach and PEG  
PAT VISIT  Appointment reminder call given  Date: 3/1  Arrival time: 08:00 and location; [facility]st [facility]   Bring Drivers license and insurance  Bring Medications in original bottles  Please be ready to give Urine Sample  If possible bring caregiver for appointment  Take am medications with water unless you are holding any for surgery  Appointment may last 2 hours    
PCP: Raymon Barakat MD    Last appt: 7/31/2019  Future Appointments   Date Time Provider Bonnie Jo   10/31/2019  9:30 AM Raymon Barakat MD [facility] SHILO SCHED       Requested Prescriptions     Pending Prescriptions Disp Refills    levothyroxine (SYNTHROID) 137 mcg tablet 90 Tab 0     Sig: Take 137 mcg by mouth Daily (before breakfast).
PICC Procedure Note    Mumtaz Islas   Admitted- 3/6/2024  5:24 AM  Admission diagnosis- Coronary artery disease, unspecified vessel or lesion type, unspecified whether angina present, unspecified whether native or transplanted heart [I25.10]  Paroxysmal atrial fibrillation (HCC) [I48.0]  CAD in native artery [I25.10]  CAD, multiple vessel [I25.10]      Attending Physician- Manish Bess MD  Ordering Physician- Dr Olivarez  Indication for Insertion: Poor Vascular Access    Catheter Insertion Date- 4/5/2024   Lot Number- [id number]  Gauge-5  Lumen-triple    Insertion Site- TESFAYE Brachial  Vein Diameter- 1.08 mm  Catheter Length- 42 cm  Internal Length- 42 cm  Exposed Catheter Length- 42cm   Upper Arm Circumference- 38cm  Tip Confirmation System Bundle met- Yes  If X-ray required, Tip Location- n/a  Radiologist- n/a    PICC insertion successful- Yes  Ultrasound- yes    Okay To Use PICC- Yes    Electronically signed by Delmar Medel, RN, RN on 4/5/2024 at 3:43 PM    
Patient appears to be neurologically intact; currently sedated on Precedex and fentanyl however did respond to commands; patient is hard of hearing so difficult to evaluate  ECMO management:   Flow on ECMO excellent; patient received volume administration last night with improvement in flow; continue sweep as is  Chest tube drainage significantly decreased; no further evidence of bleeding  Continuing CRRT with slow fluid removal; creatinine 2.5 and making small amount of urine  Pressors are now off; no epinephrine or Levophed required    
Patient extubated per order, DNR comfort care.  
Patient is status post mitral valve repair, CABG x 3 with maze procedure.  The first couple of hours he remained hemodynamically stable without any evidence of bleeding.  Approximately 2 hours after arrival to the ICU started to drain significant amount of blood.  Patient was transfused procoagulants in the form of FFP, platelets and cryoprecipitate.  PEEP was increased to 10 and additional protamine was administered.  TEG was performed which revealed low fibrinogen.  The product administration significantly improved bleeding and patient was transfused an additional unit of packed red blood cells.  Repeat chest x-ray did not show any significant widening of the mediastinum.  Patient's cardiac index was low and Primacor drip was started after discussion with Dr. Olivarez.  Nurses subsequently changed shift and repeat cardiac parameters are still inadequate.  This point he became hypotensive.  Epinephrine requirements have increased.  Primacor drip started for low cardiac index and high SVR; minimal response and still low cardiac index.  Levophed drip now being started for hypotension.  PA pressures were 28/21 and another chest x-ray was performed but still does not significant for widened mediastinum.  However given the low cardiac index and lack of response to inotropes and pressors I must consider cardiac tamponade as a cause of this patient's hemodynamic instability.  Bedside transthoracic echocardiography did not provide adequate windows and was inconclusive.  I have called and the OR team for reexploration and discussed with his wife.  Telephone consent obtained with Alis dwyer from [facility].  
Patient oriented to Same Day department and admitted to Same Day Surgery room 19.   Patient verbalized approval for first name, last initial with physician name on unit whiteboard.     Plan of care reviewed with patient.   Patient room whiteboard filled out and discussed with patient and responsible adult.   Patient and responsible adult offered Same Day Welcome Packet to review.    Call light in reach.   Bed in lowest position, locked, with one bed rail up.   SCDs and warming blanket in place.  Appropriate arm bands on patient.   Bathroom offered.   All questions and concerns of patient addressed.        Meds to Beds:   Patient informed of [facility]'s Meds to Beds program during admission. Patient is agreeable to program.   Contact information for the pharmacy and the Meds to Beds program:   Name: Janette   Relationship to patient:spouse/significant other   Phone number: [phone]    Dr. Shahid anesthesia notified regarding patients , no new orders given. States he will treat in OR. Per Dr. Bess patients diabetic scanner needs removal. Pts wife removed.            
Patient to operating room, no glasses, contacts, dentures, hearing aids, or jewelery. All personal items left in room on floor    
Patient's hemodynamics markedly improved.  Cardiac index slowly dropping from 1.9.  No repeat LAKSHMI to evaluate ventricular function.  Pressor and inotrope requirements markedly decreased; still on some epinephrine and Primacor.  SVR quite high    Neurologically not much change.  
Patient:  Mumtaz Islas    Unit/Bed:4D-12/012-A  MRN: [id number]   PCP: Tom Villanueva DO  Date of Admission: 3/6/2024    Assessment and Plan(All pulmonary edema, renal failure, PE, and respiratory failure diagnoses are acute in nature unless otherwise specified):        Acute hypoxic respiratory failure: Intubated at time of surgery.  Mental status precludes extubation.  Otherwise vent setting are minimal, currently Pressure support 10 and peep of 6 with fiO2 30%. Sat 100. No elevation in peak pressures. No PTX on prior xray. Mild bibasilar atelectasis on today's xray.  CAD status post CABG, mitral valve repair, maze procedure: Status post cardiac surgery 3/6 with Dr. Bess.  Cannulated for [facility] ECMO on 3/7/2024. S/P low-dose vasopressors.   s/p milrinone.  LAKSHMI 3/18 improved function. Explanted 3/19.  Not currently on vasopressors  Encephalopathy: Continues to have altered mental status.  Although patient is opening eyes today. He does not track. Plan is for MRI. CT head ordered 3/22 with no bleed and no acute ischemic changes. EEG confirmed encephalopathy. MRI ordered 3/23 and is pending completion.  Cardiomyopathy: Ejection fraction noted at 30 to 35%. Left Ventricle: Moderately reduced left ventricular systolic function with a visually estimated EF of 30 - 35%. Left ventricle size is normal. Normal wall thickness. Moderate hypokinesis of the following segments: basal inferior and mid inferior. Right Ventricle: Right ventricle is mildly dilated. Mitral Valve: Valve repaired by annular ring. Mild regurgitation. Left Atrium: Normal sized appendage. No left atrial appendage thrombus noted. No left atrial appendage mass noted. Interatrial Septum: No interatrial shunt visualized with color Doppler. Agitated saline study was negative with and without provocation. Patient is 2.8L positive on his stay.? Diuresis-Note: Nephrology ordered diuresis. Will monitor response.  Partial occulusion of L SFA: Spoke with  
Patient:  Mumtaz Islas    Unit/Bed:4D-12/012-A  MRN: [id number]   PCP: Tom Villanueva DO  Date of Admission: 3/6/2024    Assessment and Plan(All pulmonary edema, renal failure, PE, and respiratory failure diagnoses are acute in nature unless otherwise specified):        Acute hypoxic respiratory failure: Intubated at time of surgery.  Mental status precludes extubation.  Otherwise vent setting are minimal, currently Pressure support 12 and peep of 6 with fiO2 30%. Sat 100. No elevation in peak pressures. ?Artifact PTX on cxr  CAD status post CABG, mitral valve repair, maze procedure: Status post cardiac surgery 3/6 with Dr. Bess.  Cannulated for [facility] ECMO on 3/7/2024. S/P low-dose vasopressors.   s/p milrinone.  LAKSHMI 3/18 improved function. Explanted 3/19.  Not currently on vasopressors  Encephalopathy: Continues to have altered mental status.  Although patient is opening eyes today. He does not track. CV surgery plans for imaging over the next couple days if not improved.  CT head ordered 3/22 with no bleed and no acute ischemic changes. EEG confirmed encephalopathy. MRI ordered 3/23.  Cardiomyopathy: Ejection fraction noted at 30 to 35%. Left Ventricle: Moderately reduced left ventricular systolic function with a visually estimated EF of 30 - 35%. Left ventricle size is normal. Normal wall thickness. Moderate hypokinesis of the following segments: basal inferior and mid inferior. Right Ventricle: Right ventricle is mildly dilated. Mitral Valve: Valve repaired by annular ring. Mild regurgitation. Left Atrium: Normal sized appendage. No left atrial appendage thrombus noted. No left atrial appendage mass noted. Interatrial Septum: No interatrial shunt visualized with color Doppler. Agitated saline study was negative with and without provocation.  Partial occulusion of L SFA: Spoke with Dr. Bess.  Will start IV anticoagulation.  No plans for intervention at this time. Biphasic anterior tibial signal noted on left 
Pharmacy Consult      Mumtaz Islas is a 69 y.o. male for whom pharmacy has been consulted to dose medications for renal function (pt started on CVVH).    Patient Active Problem List   Diagnosis    Ischemia, bowel (HCC)    Uncontrolled hypertension    Diabetes mellitus (HCC)    CAD (coronary artery disease)    AF (atrial fibrillation) (HCC)    Gastroenteritis    Diabetes mellitus type II, uncontrolled    Peptic ulcer disease    History of esophageal cancer    Hypothyroidism    Palpitation    Chest pain    PVD (peripheral vascular disease) (HCC)    Angina pectoris (HCC)    Abnormal nuclear stress test    medtronic dual pacer     Hypoglycemia    Cerebellar infarct (HCC)    Esophageal carcinoma (HCC)    Type 2 diabetes mellitus without complications (HCC)    PAF (paroxysmal atrial fibrillation) (HCC)    Coronary atherosclerosis    Uncontrolled type 2 diabetes mellitus    PAD (peripheral artery disease) (HCC)    Abnormal stress test    CAD in native artery    CAD, multiple vessel    S/P mitral valve repair    ARF (acute renal failure) with tubular necrosis (HCC)    Hypernatremia    Metabolic acidosis    Other fluid overload    Other hypotension    Acute hypoxic respiratory failure (HCC)    Mild malnutrition (HCC)    Encephalopathy     Allergies:  Erythromycin and Seasonal     Recent Labs     04/04/24  0447 04/05/24  0827   CREATININE 3.6* 4.1*     Ht/Wt:   Ht Readings from Last 1 Encounters:   03/06/24 1.727 m (5' 8\")        Wt Readings from Last 1 Encounters:   04/05/24 80.4 kg (177 lb 4 oz)       Estimated Creatinine Clearance: 16 mL/min (A) (based on SCr of 4.1 mg/dL (HH)).    Assessment/Plan:  Reviewed medications for renal adjustment while on CRRT. No medications required dose adjustments, however would recommend watching for sedation with the scheduled Percocet, and titrate midodrine slowly if titration is needed.     Thank you for the consult.  Will continue to follow.  Mary Marshall, PharmD   Pharmacy 
[facility]  PHYSICAL THERAPY MISSED TREATMENT NOTE  [facility] ICU 4D    Date: 3/28/2024  Patient Name: Mumtaz Islas        MRN: [id number]   : 1955  (69 y.o.)  Gender: male                REASON FOR MISSED TREATMENT:  Hold treatment per nursing request.  Per RN and CM, pt is not following commands and not appropriate for early mobility PT at this time. Will discharge PT orders. Please reconsult if any changes.     
[facility]  OCCUPATIONAL THERAPY MISSED TREATMENT NOTE  [facility] ICU 4D  4D-12/012-A      Date: 3/27/2024  Patient Name: Mumtaz Islas        CSN: [id number]   : 1955  (69 y.o.)  Gender: male                REASON FOR MISSED TREATMENT:  Hold per RN, Pt not following commands. Possible MRI of brain today. Will check back 3/28.                
Procedure Type: CABG + MVr   PERIOPERATIVE OUTCOME ESTIMATE %   Operative Mortality 2.97%   Morbidity & Mortality 12.2%   Stroke 1.85%   Renal Failure 2.39%   Reoperation 3.64%   Prolonged Ventilation 7.92%   Deep Sternal Wound Infection 0.195%   Long Hospital Stay (>14 days) 9.37%   Short Hospital Stay (<6 days)* 16.1%     *higher values reflect a better outcome   Copy  Clinical Summary       Planned Surgery: CABG + MVr, Elective, First cardiovascular surgery   Demographics: 69 year old, White, male, 63kg, 173cm, BMI: 21 kg/m²   Insurance/Payor: Medicare   Lab Values: Creatinine: 1 mg/dL, Hematocrit: 40.3%, WBC Count: 10.8 10³/?L, Platelet Count: 514993 cells/?L   PreOp Medications: ACE Inhibitors/ARBs ? 48 hrs   Substance Abuse: Former smoker   Risk Factors / Comorbidities: Cancer ? 5 yrs, Hypertension, Family Hx of CAD   Pulmonary RF: Mild CLD   Cardiac Status: Ejection Fraction = 55%   Coronary Artery Disease: 3 vessels diseased, Proximal LAD Stenosis ? 70%, Other   Valve Disease: Mild AR, Moderate MR, Mild TR   Arrhythmia: Recent A-fib, Paroxysmal, Recent Atrial Flutter, Remote Sick Sinus Syndrome   Prev. Cardiac Interv: Previous PCI: At this facility, > 6 hours; Previous other: Permanent Pacemaker     
Pt is a 69y.o. male, intubated and sedated, in 4D-12. A family member is present, sharing that she is doing better now that he is doing better. She said he is improving slowly each day, and looking forward to weaning from the vent at some point soon.  provided active listening, words of encouragement, nurtured hope, brief conversation on pt condition and prayer-met with gratitude by family member present.     03/11/24 1527   Encounter Summary   Encounter Overview/Reason  Spiritual/Emotional Needs   Service Provided For: Patient and family together   Referral/Consult From: [facility]   Support System Family members   Last Encounter  03/11/24  (NR)   Complexity of Encounter Low   Begin Time 1238   End Time  1243   Total Time Calculated 5 min   Spiritual/Emotional needs   Type Spiritual Support   Assessment/Intervention/Outcome   Assessment Unable to assess;Calm;Coping;Hopeful;Peaceful   Intervention Active listening;Prayer (assurance of)/[place];Discussed illness injury and it’s impact   Outcome Expressed Gratitude;Engaged in conversation;Coping;Receptive       
Pt is a 69y.o. male, intubated and sedated, in 4D-12. No family were present;  prayed for pt in doorway to his room.     03/22/24 1525   Encounter Summary   Encounter Overview/Reason  Attempted Encounter   Service Provided For: Patient   Referral/Consult From: [facility]   Support System Family members   Last Encounter  03/22/24  (NR)   Complexity of Encounter Low   Begin Time 1415   End Time  1416   Total Time Calculated 1 min   Spiritual/Emotional needs   Type Spiritual Support   Assessment/Intervention/Outcome   Assessment Unable to assess   Intervention Prayer (assurance of)/[place]   Outcome Did not respond       
Pt is a 69y.o. male, intubated and sedated, in 4D-12. No family were present;  prayed for pt in the doorway to his room.     03/18/24 1548   Encounter Summary   Encounter Overview/Reason  Attempted Encounter   Service Provided For: Patient   Referral/Consult From: [facility]   Support System Family members   Last Encounter  03/18/24  (NR)   Complexity of Encounter Low   Begin Time 1413   End Time  1414   Total Time Calculated 1 min   Spiritual/Emotional needs   Type Spiritual Support   Assessment/Intervention/Outcome   Assessment Unable to assess   Intervention Prayer (assurance of)/[place]   Outcome Did not respond       
Pt is a 69y.o. male, quite drowsy and nonverbal, in 4D-12.  briefly introduced himself, stating he will pray for him.  prayed for pt to no response.     04/05/24 1239   Encounter Summary   Encounter Overview/Reason  Attempted Encounter   Service Provided For: Patient   Referral/Consult From: [facility] [place]   Support System Family members   Last Encounter  04/05/24  (NR)   Complexity of Encounter Low   Begin Time 1007   End Time  1009   Total Time Calculated 2 min   Spiritual/Emotional needs   Type Spiritual Support   Assessment/Intervention/Outcome   Assessment Unable to assess   Intervention Prayer (assurance of)/[place]   Outcome Did not respond       
Pt noted with increased b/p ,tachypnea eyes open head thrashing back and forth - follows no commands-  weaning levophed-  provider aware of drip titration . 0741 25 mcg bolus fentanyl given due to agitation  - 0745 no relief in agitation, 25 mcg bolus fentanyl given . 0755 less agitated b/p decreasing, respirations unlabored    
Pt pulling large tidal volumes causing CRRT to alarm \"excessive pressure\". Dr. Olivarez ordered to increase fent gtt and use prn ativan for vent synchrony.   
Pt was unresponsive but his family was there. Prayer was appreciated.    04/02/24 1527   Encounter Summary   Service Provided For: Patient   [facility]   Support System Family members   Last Encounter  04/02/24  (NR)   Complexity of Encounter Low   Begin Time 1456   End Time  1501   Total Time Calculated 5 min   Spiritual/Emotional needs   Type Spiritual Support   Assessment/Intervention/Outcome   Assessment Unable to assess       
Pt was unresponsive but was blessed    03/28/24 1513   Encounter Summary   Service Provided For: Patient   Referral/Consult From: [facility]   Last Encounter  03/28/24  (NR)   Complexity of Encounter Low   Begin Time 0922   End Time  0927   Total Time Calculated 5 min   Spiritual/Emotional needs   Type Spiritual Support   Assessment/Intervention/Outcome   Assessment Unable to assess       
Pt was unresponsive but was blessed.    03/07/24 1506   Encounter Summary   Service Provided For: Patient   Referral/Consult From: [facility]   Last Encounter  03/07/24  (NR)   Complexity of Encounter Low   Begin Time 1140   End Time  1145   Total Time Calculated 5 min   Spiritual/Emotional needs   Type Spiritual Support   Assessment/Intervention/Outcome   Assessment Unable to assess       
Pt was unresponsive but was blessed.    03/14/24 1532   Encounter Summary   Service Provided For: Patient   [facility]   Support System Family members   Last Encounter  03/14/24  (NR)   Complexity of Encounter Low   Begin Time 1520   End Time  1525   Total Time Calculated 5 min   Spiritual/Emotional needs   Type Spiritual Support   Assessment/Intervention/Outcome   Assessment Unable to assess       
Pt was unresponsive but was blessed.    03/19/24 1441   Encounter Summary   Service Provided For: Patient   [facility]   Support System Family members   Last Encounter  03/19/24  (NR)   Complexity of Encounter Low   Begin Time 1120   End Time  1125   Total Time Calculated 5 min   Spiritual/Emotional needs   Type Spiritual Support   Assessment/Intervention/Outcome   Assessment Unable to assess       
Pt was unresponsive but was blessed.    03/21/24 1431   Encounter Summary   Service Provided For: Patient   Referral/Consult From: [facility]   Last Encounter  03/21/24  (NR)   Complexity of Encounter Low   Begin Time 1010   End Time  1015   Total Time Calculated 5 min   Spiritual/Emotional needs   Type Spiritual Support   Assessment/Intervention/Outcome   Assessment Unable to assess       
Pt was unresponsive but was blessed.    03/26/24 1441   Encounter Summary   Service Provided For: Patient   Referral/Consult From: [facility]   Last Encounter  03/26/24  (NR)   Complexity of Encounter Low   Begin Time 1005   End Time  1010   Total Time Calculated 5 min   Spiritual/Emotional needs   Type Spiritual Support   Assessment/Intervention/Outcome   Assessment Unable to assess       
Pt was unresponsive but was blessed.    04/04/24 1435   Encounter Summary   Service Provided For: Patient   Referral/Consult From: [facility]   Last Encounter  04/04/24  (NR)   Complexity of Encounter Low   Begin Time 0930   End Time  0935   Total Time Calculated 5 min   Spiritual/Emotional needs   Type Spiritual Support   Assessment/Intervention/Outcome   Assessment Unable to assess       
RN noticed tube feed leaking around J tube. Dr Harrison paged. Xray with and without contrast performed. Xray confirmed correct postion. Dr. Harrison to order methylene blue to dye the tube feed. Dr. Harrison verbally ordered okay to use feeding tube for meds and nutrition.   1830: Methylene blue/ TF observed leaking around the J tube. Kandice Almaraz Paged and notified. TF stopped. Kandice chen  
Report given to ICU nurse via phone  
Rounds today with Barby Whitaker and critical care team:  ECMO management:  Patient doing well on 4 L flow; transient wean down to 2 L shows some cardiac recovery, this is better than yesterday; WANDA now 1.0-improved; plan for slow weaning from 4 L to 3 L over the course of the day.  Patient continues to require ECMO support, no clots in the ECMO circuit; continue full anticoagulation  Continues on baseline inotrope support  Chest tube output minimal; no evidence of bleeding  CRRT working well; discussed with Dr. Su from nephrology; will continue to take off 100 cc an hour    
Rounds today with Dr. Olivarez and critical care team  Patient status largely unchanged    ECMO management  Patient did not tolerate weaning ECMO, arterial line lost pulsatility using flows below 3 L/min.;  Continue full support on ECMO  heart still needs time to recuperate  Chest tube drainage acceptable and decreased significantly from yesterday  Transfuse as appropriate    Anticipate patient will need full support on ECMO until next week  
Rounds today with critical care team  Patient back on fentanyl because of thrashing and agitation.  Grossly neurologically intact; CAT scan of the head does not show any acute pathology; MRI not possible because of epicardial pacing wires  Maintains excellent hemodynamic numbers on 1.25 with Primacor  Has been off dialysis since Friday yesterday; nephrology following and will make a determination about need for continuation this week  Labs noted  Chest x-ray clear  Ventilator management as per critical care team  Consider need for tracheostomy and or gastrostomy  
Rounds today with nursing team and critical care team  Patient sedated on fentanyl and Precedex    ECMO management  Good flows on ECMO circuit.  Tolerating 3.5 L/min flow.  I decreased him to 3.0 L/min and left orders with the nurses to continue to wean.  Potential removal of ECMO later this week?  Continue full anticoagulation even though chest tube output has increased; transfused 2 units of packed red blood cells and keep up with any potential blood loss  Discussion with wife today who was present in the room  
Rounds with Barby Whitaker and critical care team:  ECMO management:  Continues on 4 L/min flow with excellent perfusion; transient turning down the flow shows progressive hypotension; still has some right heart failure  Continues on 5 mics of epinephrine and baseline 0.25 Primacor; expect he will need this through the weekend.  No plans on weaning ECMO at this time  Chest tube output minimal and fully anticoagulated  
Rounds with critical care team, Dr. Olivarez and Barby:     ECMO management  Transient wean of ECMO showed patient maintains pulsatility but becomes hypotensive into the 70s; patient on 0.25 Primacor but no inotropes or vasopressors     Needed blood transfusion over the weekend but not actively bleeding  On and off 1 dagmar of epi  Remains sedated and was fighting the vent     Plan for transesophageal echocardiogram today and potential weaning from ECMO                 
[facility]  SPEECH THERAPY MISSED TREATMENT NOTE  [facility] ICU 4D      Date: 2024  Patient Name: Mumtaz Islas        MRN: [id number]    : 1955  (69 y.o.)    REASON FOR MISSED TREATMENT:  Patient attempted to be seen on this date. Per RN, patient not appropriate for ST intervention. Per chart review, today is x7 MT in a row d/t poor direction following. At this time, ST to sign off. Please re-consult as patient becomes medically appropriate.     GITA Butler., Speech-Language Pathologist Intern          
[facility]  SPEECH THERAPY MISSED TREATMENT NOTE  [facility] ICU 4D      Date: 2024  Patient Name: Mumtaz Islas        MRN: [id number]    : 1955  (69 y.o.)    REASON FOR MISSED TREATMENT:  RN reports patient not appropriate for skilled ST assessment d/t poor alertness levels and inability to follow commands. ST to f/u on subsequent date as medically appropriate.     Claire Estrada MA, CCC-SLP [id number]          
This RT and Student Parlier helped with supineing the pt with no complications noted.  
This RT placed pt on  sweep to 3L and 90%. No complications at this time.  
Transient weaning from the ECMO circuit this morning showed some mild hypotension.  I plan on evaluating this in the operating room today with potential weaning and removal of ECMO circuit.  Discussion with wife and anesthesiologist Dr. Sutherland in ICU.  Wife understands there is significant risk and patient continues to be critically ill.  
Unable to get blood return from white lumen of CVC.  Notified primary RN Edilma.  Suggested order for cathflo at this time.   
[facility]  SPEECH THERAPY MISSED TREATMENT NOTE  [facility] ICU 4D      Date: 2024  Patient Name: Mumtaz Islas        MRN: [id number]    : 1955  (69 y.o.)    REASON FOR MISSED TREATMENT:  ST attempted to see patient this AM for completion of clinical swallow evaluation and pxrmhc-plnkexux-qjlyqhkof evaluation; however, per discussion with ELVER Jones, patient is not appropriate for evaluations d/t poor command following. ST to re-attempt at a later date/time as patient is medically appropriate and available.     Tala Aquino M.A., CCC-SLP [id number]            
Weaning trial performed at the bedside with transesophageal echocardiography.  Dr. Fry performing echo while pump was weaned down and clamped.  Patient hemodynamics remained stable with excellent pulsatility.  Patient 0.25 Primacor and no vasopressors or inotropes.  Will attempt weaning in the OR tomorrow.  Potential removal of ECMO if patient tolerates weaning.  
Whenever pt is stimulated - he moves his head back and forth as if indicating \"no\". (This is the same as yesterday. Wife indicates \"this is how he woke up in the past when he's had surgeries\") His eyes do not track and seem to rove back and forth. Dr. Hackett aware. Sedation has been increased with these episodes - see MAR.  
--  91.7  --  90.7   PLT 92*   < >  --  48*  --  40*  --  34*   APTT  --   --  68.2* 48.5*  --  57.6*  --   --    INR 1.02  --   --   --   --   --   --   --     < > = values in this interval not displayed.     BMP:   Recent Labs     03/07/24  0115 03/07/24  0218 03/07/24  1950 03/08/24  0010 03/08/24  0235 03/08/24  0410 03/08/24  0510 03/08/24  0601   *   < > 151* 151* 152* 154*  --   --    K 4.5   < > 4.9 3.9 4.8 4.7  --   --       < > 110 111  --  108  --   --    CO2 22*   < > 17* 14*  --  16*  --   --    PHOS 5.7*  --   --   --   --   --   --   --    BUN 21   < > 32* 30*  --  35*  --   --    CREATININE 1.7*   < > 2.8* 2.5*  --  2.8*  --   --    MG 2.8*   < > 2.4 2.0  --  2.2  --   --    POCGLU  --    < >  --   --   --   --    < > 285*    < > = values in this interval not displayed.     Last HgA1C:   Lab Results   Component Value Date    LABA1C 9.2 (H) 03/01/2024     Imaging:  CXR: 3/8/2024  pending    Scheduled Meds:    collagenase   Topical Daily    famotidine  20 mg Oral Daily    Or    famotidine (PEPCID) injection  20 mg IntraVENous Daily    chlorhexidine  15 mL Mouth/Throat BID    sodium chloride flush  5-40 mL IntraVENous 2 times per day    multivitamin  1 tablet Oral Daily with breakfast    sennosides-docusate sodium  1 tablet Oral BID    metoprolol tartrate  25 mg Oral BID    atorvastatin  40 mg Oral Nightly     ROS: All neg unless specifically mentioned in subjective section.     Exam:  General Appearance: non-responsive on Vent with ECMO  Cardiovascular: normal rate, regular rhythm, normal S1 and S2, no murmurs, rubs, clicks, or gallops  Pulmonary/Chest: BS throughout  ECMO lines x 3 coming out of chest   Neurological: non-responsive this morning  Sternum: Incision healing appropriately and no wound dehiscence noted.    Assessment:   Patient Active Problem List   Diagnosis    Ischemia, bowel (HCC)    Uncontrolled hypertension    Diabetes mellitus (HCC)    CAD (coronary artery disease)    AF 
71.2  TRU calculation:   ECMO 5 LPM @ 7100 RPM  CO 5.3  CI 2.8  SV 57    Turned FiO2 down from 100% to 75% on vent    0517: chugging- 500 ml albumin started @ 999 ml/hr    0604: ABG: pH 7.47; pCO2 32;  pO2 444; HCO3 23, %O2 Sat 100. BE -0.4   ECMO 5 LPM @ 6900 RPM    Turned FiO2 down from 75% to 50% on vent. Decreased sweep to 3.    0653: ABG: pH 7.47; pCO2 32.4;  pO2 416.8; HCO3 23.6, %O2 Sat 100. BE 0   ECMO 5.1 LPM @ 6900 RPM    Turned sweep to 2  
Av.3 °F (37.4 °C), Min:98.4 °F (36.9 °C), Max:100.4 °F (38 °C)    Labs:   CBC:  Recent Labs     24  0650 24  0635 24  1100 24  0630   WBC 10.5 11.2*  --  18.0*   HGB 8.2* 8.3* 9.9* 9.4*   HCT 23.3* 24.5* 29.0* 27.7*   MCV 84.1 86.9  --  86.8    437*  --  456*       BMP:   Recent Labs     24  1800 24  1954 24  0013 24  0630   *  --  132* 137   K 3.9  --  3.9 4.3   CL 99  --  100 102   CO2 23  --  24 24   PHOS 1.7*  --  2.2* 2.0*   BUN 30*  --  27* 26*   CREATININE 1.3*  --  1.1 1.1   MG 2.6*  --  2.4 2.5*   POCGLU  --  109*  --   --        Last HgA1C:   Lab Results   Component Value Date    LABA1C 9.2 (H) 2024     Imaging:  CXR: 3/29/2024  Findings:  Median sternotomy changes. Left chest wall cardiac pacing device.   Valvuloplasty. Small bilateral pleural effusions unchanged. Endotracheal   and enteric tubes in appropriate position. Chest tubes and vascular   catheters in appropriate position. Patchy airspace disease is similar. No   findings of pneumothorax.     IMPRESSION:  1. No significant change from previous exam. Support devices are in   appropriate position.      Scheduled Meds:    piperacillin-tazobactam  3,375 mg IntraVENous Q12H    amiodarone  200 mg Oral BID    insulin glargine  30 Units SubCUTAneous Daily    OLANZapine  7.5 mg Oral Nightly    nitroglycerin  1 inch Topical Q6H    insulin lispro  0-16 Units SubCUTAneous Q4H    polyethylene glycol  17 g Oral Daily    pantoprazole  40 mg IntraVENous BID    senna  5 mL Per G Tube BID    collagenase   Topical Daily    chlorhexidine  15 mL Mouth/Throat BID    sodium chloride flush  5-40 mL IntraVENous 2 times per day    multivitamin  1 tablet Oral Daily with breakfast    [Held by provider] metoprolol tartrate  25 mg Oral BID    atorvastatin  40 mg Oral Nightly     ROS: All neg unless specifically mentioned in subjective section.     Exam:  General Appearance: sedated on Vent  Cardiovascular: 
Daily    OLANZapine  7.5 mg Oral Nightly    nitroglycerin  1 inch Topical Q6H    polyethylene glycol  17 g Oral Daily    pantoprazole  40 mg IntraVENous BID    senna  5 mL Per G Tube BID    collagenase   Topical Daily    chlorhexidine  15 mL Mouth/Throat BID    sodium chloride flush  5-40 mL IntraVENous 2 times per day    multivitamin  1 tablet Oral Daily with breakfast    [Held by provider] metoprolol tartrate  25 mg Oral BID    atorvastatin  40 mg Oral Nightly       Lab Data :  CBC:   Recent Labs     03/30/24  0602 03/30/24  1820 03/31/24  0608 03/31/24  1220 03/31/24  2330 04/01/24  0420   WBC 19.2*  --  23.5*  --   --  24.2*   HGB 8.5*   < > 7.9* 7.9* 7.7* 7.8*   HCT 25.6*   < > 24.4* 24.4* 23.9* 24.5*   *  --  362  --   --  313    < > = values in this interval not displayed.       CMP:  Recent Labs     03/31/24  2330 04/01/24  0420 04/01/24  1211    136 136   K 4.4 4.6 4.7    103 102   CO2 24 24 19*   BUN 28* 31* 34*   CREATININE 1.4* 1.5* 1.7*   GLUCOSE 142* 124* 155*   CALCIUM 9.4 9.4 9.4   MG 2.5* 2.6* 2.8*   PHOS 2.6 3.0 3.4       Hepatic:   Recent Labs     03/31/24  2330 04/01/24  0420 04/01/24  1211   LABALBU 2.3* 2.3* 2.2*   AST 49* 45* 60*   ALT 13 12 15   BILITOT 1.1 1.2 1.9*   ALKPHOS 648* 653* 929*         Assessment and Plan:  Renal -acute kidney injury secondary to ATN from hypotension and acute blood loss  Patient was in positive fluid balance almost 20 L positive and was on ECMO not making much urine so having some metabolic acidosis - So started the patient on CVVH on 3/8/24 - 3/22/24.Restarted again on 3/26/2024- 3/31/24  Inter dialytic creat is worsening. Volume status is better.  Reevaluate for dialysis in AM  Electrolytes -reviewed electrolytes replace all electrolytes per protocol   Acidosis doing well  Status post CABG with mitral valve repair and maze procedure done on 3/6/2024 complicated by bleeding and pericardial effusion.  Status post redo surgery as well  S/P ECMO 
apixaban, reformed alcoholism, CAD, esophageal cancer, GERD, hyperlipidemia, hypertension, hypothyroidism, PVD, diabetes mellitus type 2.  Per report patient had a cardiac cath on 1/18/2024 that showed severe restenosis of the RCA stents and stenosis of the circumflex with normal LV function.  Patient will was referred to CV surgery.  On 3/6/2024 patient underwent CABG, mitral valve repair, maze with 3 grafts with Dr. Bess.  On 3/6/2024 patient underwent a reentry reexploration for bleeding and cardiac tamponade.  Repair of the right ventricle with noted bleeding at the site of the LIMA to LAD anastomosis.  On 3/7/2024 patient continued to be in progressive shock.  Patient was placed on VA ECMO.  On 3/8/2024 patient returned back to the OR for additional sutures and reinforcement of all cannula sites.  Repositioning of the venous cannula in the right atrium.     Past Medical History:  per HPI.  Family History: Mother: Heart disease, diabetes Father: Heart disease, heart attack, early death.  Social History: Reformed smoker, denies alcohol use, denies drug use.     ROS   Sedated on mechanical ventilation    Scheduled Meds:   insulin lispro  0-16 Units SubCUTAneous TID WC    insulin lispro  0-4 Units SubCUTAneous Nightly    polyethylene glycol  17 g Oral Daily    amiodarone  400 mg Oral BID    pantoprazole  40 mg IntraVENous BID    senna  5 mL Per G Tube BID    collagenase   Topical Daily    chlorhexidine  15 mL Mouth/Throat BID    sodium chloride flush  5-40 mL IntraVENous 2 times per day    multivitamin  1 tablet Oral Daily with breakfast    [Held by provider] metoprolol tartrate  25 mg Oral BID    atorvastatin  40 mg Oral Nightly     Continuous Infusions:   niCARdipine Stopped (03/15/24 0910)    sodium chloride      milrinone Stopped (03/13/24 0941)    midazolam 7 mg/hr (03/15/24 0929)    sodium chloride      bivalirudin trifluoroacetate (ANGIOMAX) 250 mg in sodium chloride 0.9 % 50 mL infusion 0.0532 mg/kg/hr 
dexmedeTOMIDine 1 mcg/kg/hr (03/16/24 0918)    sodium chloride 20 mL/hr at 03/16/24 0615    sodium chloride Stopped (03/06/24 1433)    dextrose       Meds:    insulin lispro  0-16 Units SubCUTAneous Q4H    polyethylene glycol  17 g Oral Daily    amiodarone  400 mg Oral BID    pantoprazole  40 mg IntraVENous BID    senna  5 mL Per G Tube BID    collagenase   Topical Daily    chlorhexidine  15 mL Mouth/Throat BID    sodium chloride flush  5-40 mL IntraVENous 2 times per day    multivitamin  1 tablet Oral Daily with breakfast    [Held by provider] metoprolol tartrate  25 mg Oral BID    atorvastatin  40 mg Oral Nightly       Lab Data :  CBC:   Recent Labs     03/16/24  0535 03/16/24  0758 03/16/24  1155   WBC 10.6 10.3 10.9*   HGB 9.4* 9.3* 9.1*   HCT 28.1* 27.5* 26.8*   PLT 78* 78* 74*       CMP:  Recent Labs     03/16/24  0400 03/16/24  0758 03/16/24  1155    136 137   K 4.8 4.7 4.6    104 104   CO2 24 23 26   BUN 31* 30* 30*   CREATININE 1.0 1.0 0.8   GLUCOSE 234* 223* 204*   CALCIUM 9.9 9.6 9.7   MG 2.7* 2.6* 2.8*   PHOS 2.3* 2.3* 2.4       Hepatic:   Recent Labs     03/16/24  0400 03/16/24  0758 03/16/24  1155   LABALBU 3.9 4.0 3.9   AST 55* 64* 83*   ALT <5* 6* 8*   BILITOT 3.2* 3.3* 4.4*   ALKPHOS 540* 613* 715*         Assessment and Plan:  Renal -acute kidney injury secondary to ischemic ATN  Continue with CRRT.  Patient anuric.  Remains in positive fluid balance of 18 L  Input and output reviewed.  Drips reviewed.  CRRT data reviewed  Continue with serial BMP monitoring for close monitoring of acid-base status and electrolyte balance  Patient currently on ECMO.  ICU managing  Replace electrolytes per protocol  Electrolytes -WNL at this time  Cardiogenic shock  Thrombocytopenia  Acidosis much improved on CVVH  Intermittent hypotension  Status post CABG with mitral valve repair and maze procedure done on 3/6/2024 complicated by bleeding and pericardial effusion.   Acute hypoxic respiratory failure.  
dexmedeTOMIDine 1 mcg/kg/hr (03/18/24 0700)    sodium chloride 20 mL/hr at 03/18/24 0700    sodium chloride Stopped (03/06/24 1433)    dextrose       Meds:    potassium & sodium phosphates  1 packet Oral 4x Daily    insulin lispro  0-16 Units SubCUTAneous Q4H    polyethylene glycol  17 g Oral Daily    amiodarone  400 mg Oral BID    pantoprazole  40 mg IntraVENous BID    senna  5 mL Per G Tube BID    collagenase   Topical Daily    chlorhexidine  15 mL Mouth/Throat BID    sodium chloride flush  5-40 mL IntraVENous 2 times per day    multivitamin  1 tablet Oral Daily with breakfast    [Held by provider] metoprolol tartrate  25 mg Oral BID    atorvastatin  40 mg Oral Nightly       Lab Data :  CBC:   Recent Labs     03/18/24  0024 03/18/24  0400 03/18/24  0830   WBC 13.1* 11.8* 11.5*   HGB 8.8* 8.6* 8.3*   HCT 26.2* 25.4* 25.0*   PLT 85* 79* 84*       CMP:  Recent Labs     03/18/24  0024 03/18/24  0400 03/18/24  0830   * 136 136   K 4.8 4.7 4.5    103 104   CO2 25 24 24   BUN 26* 27* 28*   CREATININE 0.7 0.8 0.8   GLUCOSE 225* 215* 219*   CALCIUM 9.9 9.8 9.9   MG 2.7* 2.7* 2.6*   PHOS 2.7 2.5 2.2*       Hepatic:   Recent Labs     03/18/24  0024 03/18/24  0400 03/18/24  0830   LABALBU 3.8 3.7 3.5   AST 74* 67* 64*   ALT 13 12 14   BILITOT 3.0* 2.6* 2.5*   ALKPHOS 670* 635* 640*         Assessment and Plan:  Renal -acute kidney injury secondary to ATN from hypotension and acute blood loss  Currently in positive fluid balance almost 20 L positive and currently on ECMO not making much urine so having some metabolic acidosis - So started the patient on CVVH on 3/8/24.  Currently CVVH going well.  Will adjust ultrafiltration as per the hemodynamics and cardiology.  Currently maintaining even  Monitor Every 4 hours labs and replace all electrolytes per protocol  Electrolytes -WNL  Acidosis much improved on CVVH  Status post CABG with mitral valve repair and maze procedure done on 3/6/2024 complicated by bleeding and 
held  Pertinent Labs: glucose 120  BUN 72  creatinine 4.1  Na 132  K+ 5  Mg 3.3  Pertinent Meds: MVI, protonix, glycolax, senokot, fentanyl, levophed    Wound Type: Surgical Incision (3/6 CABG,MVR,MAZE; 3/6 return to OR - re- exploration for bleeding and cardiac tamponade, repair of right ventricular bleeding and LIMA to LAD anastomosis;small open areas on heel, pretibial; pressure ulcer stage 2 sacrum and nose; DTI coccyx and mouth)       Current Nutrition Intake & Therapies:    Average Meal Intake: NPO  Average Supplements Intake: NPO  ADULT TUBE FEEDING; Jejunostomy; Renal Formula; Continuous; 10; Yes; 10; Q 6 hours; 40; 30; Q 4 hours  Current Tube Feeding (TF) Orders:  Feeding Route: Jejunostomy (placement 4/1/24)  Formula: Renal Formula (started 3/9)  Schedule: Continuous  Feeding Regimen: Nepro goal of 40ml/hour  Additives/Modulars: None (discontinue protein modular due to CRRT stopped)  Water Flushes: per Physician   If (+) chylous leak then recommend changing to Vivonex 10ml/hour increase 10ml every 4h as tolerated to goal of 60ml/hour to provide 1440 kcals, 72gms protein, 253gms cho, 17gms fat (MCT), 1221 ml free H20 in 1440ml total volume/24h  Goal TF & Flush Orders Provides: Nepro 40 ml/hr provides: 1699 kcal, 78 g protein, 154 g CHO, 24g fiber, 698 ml free water in total volume of 960 ml/ 24 hrs    Anthropometric Measures:  Height: 172.7 cm (5' 8\")  Ideal Body Weight (IBW): 154 lbs (70 kg)    Admission Body Weight: 64.4 kg (142 lb) (3/7 with +1 edema)  Current Body Weight: 80.3 kg (177 lb) (4/5 with +2-3 edema),   IBW.    Current BMI (kg/m2): 26.9  Usual Body Weight: 69.4 kg (153 lb) (2/14/23; 136# 12/26/23)  % Weight Change (Calculated): -7.2   BMI Categories: Overweight (BMI 25.0-29.9)    Estimated Daily Nutrient Needs:  Energy Requirements Based On: Kcal/kg  Weight Used for Energy Requirements: Admission (64.7kgm)  Energy (kcal/day): 6992-1572 (20-25/kgm)  Weight Used for Protein Requirements: 
showed severe restenosis of the RCA stents and stenosis of the circumflex with normal LV function.  Patient will was referred to CV surgery.  On 3/6/2024 patient underwent CABG, mitral valve repair, maze with 3 grafts with Dr. Bess.  On 3/6/2024 patient underwent a reentry reexploration for bleeding and cardiac tamponade.  Repair of the right ventricle with noted bleeding at the site of the LIMA to LAD anastomosis.  On 3/7/2024 patient continued to be in progressive shock.  Patient was placed on VA ECMO.  On 3/8/2024 patient returned back to the OR for additional sutures and reinforcement of all cannula sites.  Repositioning of the venous cannula in the right atrium.     Past Medical History:  per HPI.  Family History: Mother: Heart disease, diabetes Father: Heart disease, heart attack, early death.  Social History: Reformed smoker, denies alcohol use, denies drug use.     ROS   Sedated on mechanical ventilation    Scheduled Meds:   albumin human 25%  25 g IntraVENous Q6H    amiodarone  400 mg Oral BID    senna  5 mL Per G Tube BID    ceFAZolin  2,000 mg IntraVENous Q12H    insulin lispro  0-8 Units SubCUTAneous Q4H    collagenase   Topical Daily    chlorhexidine  15 mL Mouth/Throat BID    sodium chloride flush  5-40 mL IntraVENous 2 times per day    multivitamin  1 tablet Oral Daily with breakfast    [Held by provider] metoprolol tartrate  25 mg Oral BID    atorvastatin  40 mg Oral Nightly     Continuous Infusions:   milrinone 0.25 mcg/kg/min (03/13/24 0722)    midazolam 7 mg/hr (03/13/24 0440)    sodium chloride      bivalirudin trifluoroacetate (ANGIOMAX) 250 mg in sodium chloride 0.9 % 50 mL infusion 0.0532 mg/kg/hr (03/13/24 0440)    fentaNYL 150 mcg/hr (03/13/24 0637)    EPINEPHrine 11 mcg/min (03/13/24 0547)    prismaSATE BGK 4/2.5 750 mL/hr at 03/13/24 1113    prismaSol BGK 4/2.5 750 mL/hr at 03/13/24 1106    prismaSol BGK 4/2.5 750 mL/hr at 03/13/24 1112    sodium chloride      dexmedeTOMIDine 1 mcg/kg/hr 
to OR - re- exploration for bleeding and cardiac tamponade, repair of right ventricular bleeding and LIMA to LAD anastomosis;small open areas on heel, pretibial; pressure ulcer stage 2 sacrum and nose; DTI coccyx and mouth)       Current Nutrition Intake & Therapies:    Average Meal Intake: NPO  Average Supplements Intake: NPO  ADULT TUBE FEEDING; Jejunostomy; Renal Formula; Continuous; 10; Yes; 10; Q 6 hours; 40; 30; Q 4 hours  Current Tube Feeding (TF) Orders:  Feeding Route: Jejunostomy (placement 4/1/24)  Formula: Renal Formula (started 3/9)  Schedule: Continuous  Feeding Regimen: Nepro goal of 40ml/hour  Additives/Modulars: None - discontinued due to off CRRT  Water Flushes: per Physician  Current TF & Flush Orders Provides: to restart today at 10ml/ hour and gradually work to goal as tolerates   Goal TF & Flush Orders Provides: Nepro 40 ml/hr provides: 1699 kcal, 78 g protein, 154 g CHO, 24g fiber, 698 ml free water in total volume of 960 ml/ 24 hrs    Anthropometric Measures:  Height: 172.7 cm (5' 8\")  Ideal Body Weight (IBW): 154 lbs (70 kg)    Admission Body Weight: 64.4 kg (142 lb) (3/7 with +1 edema)  Current Body Weight: 77.9 kg (171 lb 11.8 oz) (4/2; +2 pitting edema),   IBW.    Current BMI (kg/m2): 26.1  Usual Body Weight: 69.4 kg (153 lb) (2/14/23; 136# 12/26/23)  % Weight Change (Calculated): -7.2                    BMI Categories: Overweight (BMI 25.0-29.9)    Estimated Daily Nutrient Needs:  Energy Requirements Based On: Kcal/kg  Weight Used for Energy Requirements: Admission (64.7kgm)  Energy (kcal/day): 2985-7130 (20-25/kgm)  Weight Used for Protein Requirements: Admission (64.7kgm)  Protein (g/day): 78-98gms (1.2-1.5/kgm IBW) monitoring renal status     Fluid (ml/day): per Physician    Nutrition Diagnosis:   In context of acute illness or injury (mild malnutrition) related to inadequate protein-energy intake as evidenced by mild muscle loss    Nutrition Interventions:   Food and/or Nutrient 
JVD.  Abdomen: soft.  Nontender. 3 chest tube ports   Extremities:  No clubbing, cyanosis, or edema x 4. Pupils faint   Vasculature: capillary refill < 3 seconds. Palpable dorsalis pedis pulses.  Skin:  pale and cold   Psych:  intubated on sedation   Lymph:  No supraclavicular adenopathy.  Neurologic:  intubated on sedation     Data: (All radiographs, tracings, PFTs, and imaging are personally viewed and interpreted unless otherwise noted).   Sodium 152, potassium 4.2, creatinine 1.9/BUN 25  CBC: WBC 11.9, H&H 9.2/27.3, platelets 34  Chest x-ray showed progressive perihilar and basilar hilar atelectasis.  Component of pulmonary edema centrally is not excluded.    Meets Continued ICU Level Care Criteria:    [x] Yes   [] No - Transfer Planned to listed location:  [] HOSPITALIST CONTACTED-      Case and plan discussed with Dr. Olivarez.        Electronically signed by Enrique Castro DO PGY1 emergency medicine resident  CRITICAL CARE SPECIALIST  Patient seen by me including key components of medical care.  Case discussed with resident physician.  Case discussed with Dr. Bess.  Patient with cardiogenic shock and biventricular failure.  Agree with transition to VA ECMO.  Issues with bleeding noted. Continue to add volume.  Surgical upgrade to VA LAVA planned.  Italicized font, if present,  represents changes to the note made by me.  CC time 35 minutes.  Time was discontiguous. Time does not include procedure. Time does include my direct assessment of the patient and coordination of care.  Time represents more than 50% of the time involved with patient care by the CC team.  Electronically signed by Fco Olivarez MD.     
MD  [facility]    This report has been created using voice recognition software. It may contain minor errors which are inherent in voice recognition technology  
Nutrient Delivery: Continue NPO, Modify Tube Feeding  Nutrition Education/Counseling: Education not appropriate  Coordination of Nutrition Care: Continue to monitor while inpatient, Interdisciplinary Rounds       Goals:  Previous Goal Met: Progressing toward Goal(s)  Goals: Tolerate nutrition support at goal rate, by next RD assessment       Nutrition Monitoring and Evaluation:      Food/Nutrient Intake Outcomes: Enteral Nutrition Intake/Tolerance  Physical Signs/Symptoms Outcomes: Biochemical Data, GI Status, Fluid Status or Edema, Hemodynamic Status, Nutrition Focused Physical Findings, Skin, Weight    Discharge Planning:    Too soon to determine     Li Esqueda RD, LD  Contact: [phone]     
RCA stents and stenosis of the circumflex with normal LV function.  Patient will was referred to CV surgery.  On 3/6/2024 patient underwent CABG, mitral valve repair, maze with 3 grafts with Dr. Bess.  On 3/6/2024 patient underwent a reentry reexploration for bleeding and cardiac tamponade.  Repair of the right ventricle with noted bleeding at the site of the LIMA to LAD anastomosis.  On 3/7/2024 patient continued to be in progressive shock.  Patient was placed on VA ECMO.  On 3/8/2024 patient returned back to the OR for additional sutures and reinforcement of all cannula sites. Repositioning of the venous cannula in the right atrium. Taken of ECMO on 2024.  Tracheostomy placed on 3/29/2024.\"    4/3: Overnight patient had intermittent change vent setting from spontaneous to high flow for his trach.  Patient agitated given as needed meds with improvement.    ROS: Pertinent positives as noted in the HPI. All other systems reviewed and negative.  PMH: CAD multiple vessel, s/p MV repair, ARF with tubular necrosis, hypernatremia, hypotension, encephalopathy, mild malnutrition  SHX: Former smoker, quit  no alcohol or drug use.  FHX: Mother ()-heart disease and diabetes.  Father () heart disease, heart attack  SOC:  reports that he quit smoking about 11 years ago. His smoking use included cigars and cigarettes. He started smoking about 52 years ago. He has a 40.0 pack-year smoking history. He has never used smokeless tobacco. He reports that he does not drink alcohol and does not use drugs.  Allergies: Erythromycin and Seasonal  Diet: ADULT TUBE FEEDING; Jejunostomy; Renal Formula; Continuous; 10; Yes; 10; Q 6 hours; 40; 30; Q 4 hours    MEDICATIONS:  Scheduled Meds:   insulin lispro  0-16 Units SubCUTAneous Q6H    piperacillin-tazobactam  3,375 mg IntraVENous Q12H    amiodarone  200 mg Oral BID    insulin glargine  30 Units SubCUTAneous Daily    OLANZapine  7.5 mg Oral Nightly    
cancer    Hypothyroidism    Palpitation    Chest pain    PVD (peripheral vascular disease) (HCC)    Angina pectoris (HCC)    Abnormal nuclear stress test    medtronic dual pacer     Hypoglycemia    Cerebellar infarct (HCC)    Esophageal carcinoma (HCC)    Type 2 diabetes mellitus without complications (HCC)    PAF (paroxysmal atrial fibrillation) (HCC)    Coronary atherosclerosis    Uncontrolled type 2 diabetes mellitus    PAD (peripheral artery disease) (HCC)    Abnormal stress test    CAD in native artery    CAD, multiple vessel    S/P mitral valve repair    ARF (acute renal failure) with tubular necrosis (HCC)    Hypernatremia    Metabolic acidosis    Other fluid overload    Other hypotension    Acute hypoxic respiratory failure (HCC)    Mild malnutrition (HCC)    Encephalopathy     Plan:   CXR reviewed- Continue daily CXR's   Removed two chest tubes leaving just one abbie drain in left chest.  MRI Brain today  Appreciate Dr. Olivarez and Renal MD team services.  Agree with one unit of PRBCs today.  Trach and PEG will be discussed following final results of MRI Brain    The plan of care was discussed in detail with Dr. Olivia Lang PA-C   
complicated by bleeding and pericardial effusion.  Status post redo surgery  Currently the patient is on ECMO which is mostly being taken down today  Fluid overload plan as mentioned above  Meds reviewed discussed with the nursing staff    Ravin Marcus MD  [facility]    This report has been created using voice recognition software. It may contain minor errors which are inherent in voice recognition technology  
mg Oral BID    atorvastatin  40 mg Oral Nightly     Continuous Infusions:   prismaSATE BGK 4/2.5 400 mL/hr at 03/22/24 0704    milrinone 0.0625 mcg/kg/min (03/21/24 1058)    amiodarone 1 mg/min (03/22/24 0700)    heparin (PORCINE) Infusion 14 Units/kg/hr (03/22/24 0700)    dextrose      prismaSol BGK 4/2.5 400 mL/hr at 03/22/24 0247    prismaSol BGK 4/2.5 400 mL/hr at 03/22/24 0406    niCARdipine Stopped (03/22/24 0059)    sodium chloride      fentaNYL 100 mcg/hr (03/22/24 0700)    EPINEPHrine Stopped (03/21/24 1719)    sodium chloride 20 mL/hr at 03/22/24 0700    sodium chloride Stopped (03/06/24 1433)       PHYSICAL EXAMINATION:  T:  98.2.  P:  101. RR:  22. B/P:  137/63.  FiO2:  35. O2 Sat:  100.  I/O:  5719/4878  Body mass index is 25.24 kg/m².   PC: 14/6: TV: 491: RRTotal: 18: Ti:1 sec:  General:   Acute on chronically ill-appearing white male  HEENT:  normocephalic and atraumatic.  No scleral icterus. PERR  Neck: supple.  No Thyromegaly.  Lungs: Diminished to auscultation.  No retractions  Cardiac: Accelerated junctional rhythm no JVD.  Abdomen: soft.  Nontender.  Extremities:  No clubbing, cyanosis, trace bilateral lower extremity edema  Vasculature: capillary refill > 3 seconds, on left.  Doppler dorsalis pedis pulses.  Skin:  warm and dry.  Psych: Sedated on mechanical ventilation  Lymph:  No supraclavicular adenopathy.  Neurologic:  No focal deficit. No seizure.       Data: (All radiographs, tracings, PFTs, and imaging are personally viewed and interpreted unless otherwise noted).   Sodium 134, potassium 4.0, chloride 100, CO2 20, BUN 34, creatinine 1.2, anion gap 12.0, glucose 106  WBC 18.4, hemoglobin 11.1, hematocrit 32.9, platelet count 241  Telemetry shows accelerated junctional rhythm  Chest x-ray 3/11/2024 reports left chest pacer, ECMO catheters.  LAKSHMI 3/7/2024 reports ejection fraction 55 to 60% with normal LV function.  No cardiac tamponade  Chest x-ray 3/13/2024 reports no significant 
tartrate  25 mg Oral BID    atorvastatin  40 mg Oral Nightly     ROS: All neg unless specifically mentioned in subjective section.     Exam:  General Appearance: sedated on Vent via Trach  Cardiovascular: IIRR  Pulmonary/Chest: coarse BS throughout  Neurological: no purposeful movement noted  Sternum: Incision healing appropriately with staples in place and no wound dehiscence noted.     Assessment:   Patient Active Problem List   Diagnosis    Ischemia, bowel (HCC)    Uncontrolled hypertension    Diabetes mellitus (HCC)    CAD (coronary artery disease)    AF (atrial fibrillation) (HCC)    Gastroenteritis    Diabetes mellitus type II, uncontrolled    Peptic ulcer disease    History of esophageal cancer    Hypothyroidism    Palpitation    Chest pain    PVD (peripheral vascular disease) (HCC)    Angina pectoris (HCC)    Abnormal nuclear stress test    medtronic dual pacer     Hypoglycemia    Cerebellar infarct (HCC)    Esophageal carcinoma (HCC)    Type 2 diabetes mellitus without complications (HCC)    PAF (paroxysmal atrial fibrillation) (HCC)    Coronary atherosclerosis    Uncontrolled type 2 diabetes mellitus    PAD (peripheral artery disease) (HCC)    Abnormal stress test    CAD in native artery    CAD, multiple vessel    S/P mitral valve repair    ARF (acute renal failure) with tubular necrosis (HCC)    Hypernatremia    Metabolic acidosis    Other fluid overload    Other hypotension    Acute hypoxic respiratory failure (HCC)    Mild malnutrition (HCC)    Encephalopathy     Plan:   CXR reviewed- Continue daily CXR's   Keep left chest abbie drain  Pt scheduled for PEG tube placement this morning  WBC elevated,  Afib this morning as well (Heparin stopped over wkd due to + occult stools)    The plan of care was discussed in detail with Dr. Olivia Lang PA-C   
the results and consented was obtain for PEG tube and tracheotomy.   3/29: Patient examined at bedside today. He's been afebrile over night and today. Patient was started on Zosyn based on respiratory cultures.  Plan today for tracheostomy and wean off sedation.   General surgery was consulted for PEG tube placement today, however due to patient's prior esophagectomy will consider J-tube.       Past 24 Hr Progress  Patient seen at the bedside.  Patient is still intubated, sedated on the vent.  Sedation very minimum.  Patient has no purposeful movements which precludes from extubation even though patient has currently on minimal vent setting which is appropriate level..  SBT trial.  Past Medical History: CAD hypertension.  Family History: CAD.  Social History: EMR.    ROS   As above HPI.  Patient is intubated/sedated on the vent.  Limited exam.    Scheduled Meds:   piperacillin-tazobactam  3,375 mg IntraVENous Q12H    amiodarone  200 mg Oral BID    insulin glargine  30 Units SubCUTAneous Daily    OLANZapine  7.5 mg Oral Nightly    nitroglycerin  1 inch Topical Q6H    insulin lispro  0-16 Units SubCUTAneous Q4H    polyethylene glycol  17 g Oral Daily    pantoprazole  40 mg IntraVENous BID    senna  5 mL Per G Tube BID    collagenase   Topical Daily    chlorhexidine  15 mL Mouth/Throat BID    sodium chloride flush  5-40 mL IntraVENous 2 times per day    multivitamin  1 tablet Oral Daily with breakfast    [Held by provider] metoprolol tartrate  25 mg Oral BID    atorvastatin  40 mg Oral Nightly     Continuous Infusions:   fentaNYL 150 mcg/hr (03/29/24 1159)    dexmedeTOMIDine Stopped (03/24/24 2218)    norepinephrine 4 mcg/min (03/29/24 1159)    milrinone Stopped (03/27/24 0739)    prismaSATE BGK 4/2.5 800 mL/hr at 03/29/24 1007    heparin (PORCINE) Infusion Stopped (03/29/24 0003)    dextrose      prismaSol BGK 4/2.5 800 mL/hr at 03/29/24 1004    prismaSol BGK 4/2.5 800 mL/hr at 03/29/24 1004    niCARdipine Stopped 
to pleural effusions.  Chest x-ray 3/18/2024 grossly stable      Meets Continued ICU Level Care Criteria:    [x] Yes   [] No - Transfer Planned to listed location:  [] HOSPITALIST CONTACTED-      Case and plan discussed with Dr. Olivarez and Dr. Bess         Electronically signed by JAKY Lang - [facility]  CRITICAL CARE SPECIALIST  Patient seen by me including key components of medical care.  Case discussed with NP.  Estimated NP time is 30 minutes.  Case discussed with Dr. Bess.  Case discussed with Dr. Fry.  I clamped ECMO circuit during LAKSHMI.  LVF actually improved when clamped.  LVF estimated at 35 %.  Case reviewed with Dr. Bess.  Plans to explant tomorrow.  Italicized font, if present,  represents changes to the note made by me.  CC time 35 minutes.  Time was discontiguous. Time does not include procedure. Time does include my direct assessment of the patient and coordination of care.  Time represents more than 50% of the time involved with patient care by the CC team.  Electronically signed by Fco Olivarez MD.     
ill looking  Remained on the ventilator with sedation.    Vent Settings:  Vent Mode: AC/PC Resp Rate (Set): 0 bpm/Vt (Set, mL): 0 mL/ /FiO2 : 30 %    Lab Results   Component Value Date/Time    PH 7.53 03/22/2024 01:45 AM    PCO2 30 03/22/2024 01:45 AM    PO2 107 03/22/2024 01:45 AM    HCO3 25 03/22/2024 01:45 AM    O2SAT 99 03/22/2024 01:45 AM     Lab Results   Component Value Date/Time    IFIO2 40 03/22/2024 01:45 AM    MODE PC 03/22/2024 01:45 AM    SETTIDVOL 400 03/08/2024 06:47 AM    SETPEEP 8.0 03/22/2024 01:45 AM       CBC:   Recent Labs     03/29/24  0630 03/30/24  0602 03/30/24  1820 03/30/24  2353 03/31/24  0608   WBC 18.0* 19.2*  --   --  23.5*   HGB 9.4* 8.5* 8.1* 8.5* 7.9*   HCT 27.7* 25.6* 24.6* 25.6* 24.4*   * 406*  --   --  362     BMP:  Recent Labs     03/30/24  1800 03/30/24 2353 03/31/24  0608   * 134* 137   K 4.2 4.1 4.3    100 103   CO2 24 23 23   BUN 23* 22 20   CREATININE 1.0 0.9 1.0   GLUCOSE 152* 146* 155*   MG 2.4 2.4 2.4   PHOS 2.2* 2.4 2.5   CALCIUM 9.2 9.1 9.0     Hepatic:   Recent Labs     03/30/24  1800 03/30/24  2353 03/31/24  0608   AST 64* 63* 58*   ALT 13 16 15   BILITOT 1.5* 1.4* 1.3*   ALKPHOS 609* 663* 658*     Cardiac Enzymes: No results for input(s): \"CKTOTAL\", \"CKMB\", \"TROPONINI\" in the last 72 hours.  BNP: No results for input(s): \"BNP\" in the last 72 hours.  INR: No results for input(s): \"INR\", \"PROTIME\" in the last 72 hours.  POC   Recent Labs     03/30/24  1230 03/30/24  1759 03/30/24  2145   POCGLU 167* 153* 141*     Recent Labs     03/28/24  1600 03/28/24  1954   LACTA 2.4* 1.8        fentaNYL 150 mcg/hr (03/31/24 0605)    norepinephrine 12 mcg/min (03/30/24 2257)    prismaSATE BGK 4/2.5 800 mL/hr at 03/31/24 0703    dextrose      prismaSol BGK 4/2.5 800 mL/hr at 03/31/24 0702    prismaSol BGK 4/2.5 800 mL/hr at 03/31/24 0702    sodium chloride      sodium chloride Stopped (03/28/24 1115)    sodium chloride Stopped (03/06/24 1433)        insulin 
remove sedation and clear assessment of mental status.  Italicized font, if present,  represents changes to the note made by me.  CC time 35 minutes.  Time was discontiguous. Time does not include procedure. Time does include my direct assessment of the patient and coordination of care.  Time represents more than 50% of the time involved with patient care by the CC team.  Electronically signed by Fco Olivarez MD.     
Comparison: CR/SR - XR CHEST PORTABLE - 03/16/2024 12:46 AM EDT  IMPRESSION:  1. ETT tip terminates 4 cm above juanita.  2. Persistent dense bibasilar opacities with likely small layering   effusions.      PUD and DVT prophylaxis reviewed.  Protonix 40 mg IV twice daily  Angiomax    Meets Continued ICU Level Care Criteria:    [x] Yes     Critical care time provided:34 Minutes. The time was discontiguous. Time does not include procedure/s.   Time does not include nurse practitioner assessment.  Time does include direct patient assessment and coordination of care by me.    Electronically signed by   Jimmy Nichols MD on 3/17/2024 at 3:13 PM                
01:48 PM     Respiratory culture: No results found for: \"CULTRESP\"  Aerobic and Anaerobic :  No results found for: \"LABAERO\"  No results found for: \"LABANAE\"    URINALYSIS:   Lab Results   Component Value Date/Time    NITRU NEGATIVE 03/01/2024 10:29 AM    WBCUA 0-2 03/01/2024 10:29 AM    BACTERIA NONE SEEN 03/01/2024 10:29 AM    RBCUA 0-2 03/01/2024 10:29 AM    BLOODU NEGATIVE 03/01/2024 10:29 AM    SPECGRAV 1.023 03/01/2024 10:29 AM    GLUCOSEU >=1000 10/19/2013 06:35 PM       RADIOLOGY: (All radiographs, tracings, PFTs, and imaging are personally viewed and interpreted unless otherwise noted).    Daily Chest X-Ray shows: -small bilateral pleural effusions and adjacent atelectasis/infiltrate similar to prior study.     CONSULTS:  [] None  [x] Cardiology  [x] Nephrology  [] GI   [] Pulm   [] ID  []Endocrine   [] Heme/Onc [] Rad/Onc     [] Neuro   [] Neuro Interventional [] Neuro Surgery   [] IR  [x] Gen Surgery [] Trauma Surgery [x]CV Surgery   [] ENT [] Ortho [] Podiatry [] Urology    [] Psych       [] Palliative  [] Hospice [] Pain management   []      []TCU (Rehab)   [x]RT [x] PT/OT  [x] SLP [] Wound Care      OTHERS:    CODE STATUS:  Full Code     Seen with multidisciplinary ICU team.    Meets Continued ICU Level Care Criteria:    [x] Yes   [] No - Transfer Planned to listed location:  [] HOSPITALIST CONTACTED-  (Name)    Parts of this transcriptions may have been dictated by use of voice recognition software and electronically transcribed. The transcription may contain errors not detected in proofreading. Please refer to my supervising physician's documentation if my documentation differs.       Electronically signed by Salo Hargrove MD on 4/1/2024 at 11:32 AM    Critical Care Service           Patient seen by me including key components of medical care.  Case discussed with resident physician.  Trial trach collar.  Now echo locating.  No command follow.  On dialysis.  On pressors.  Possible 
Daily with breakfast    [Held by provider] metoprolol tartrate  25 mg Oral BID    atorvastatin  40 mg Oral Nightly       24HR INTAKE/OUTPUT:    Intake/Output Summary (Last 24 hours) at 3/30/2024 1211  Last data filed at 3/30/2024 1100  Gross per 24 hour   Intake 1178.25 ml   Output 2917 ml   Net -1738.75 ml     Chest x-ray performed on: 3/30/24  IMPRESSION:  Impression:  Stable study. Small bilateral pleural effusions and mild   atelectasis/infiltrate.      PUD and DVT prophylaxis reviewed.  Protonix 40 mg IV twice daily.  He is currently on Zosyn    Meets Continued ICU Level Care Criteria:    [x] Yes     Critical Care time: 32 minutes.  Time was discontiguous. Time does not include procedure. Time does include my direct assessment of the patient and coordination of care.     Electronically signed by   Jimmy Nichols MD on 3/30/2024 at 12:11 PM              
right apex. Mild bibasilar atelectasis/pneumonia. Tiny effusion right side.      **This report has been created using voice recognition software.  It may contain minor errors which are inherent in voice recognition technology.**      Final report electronically signed by Dr. Taras Owen on 3/20/2024 11:36 AM      XR CHEST PORTABLE   Final Result   Impression:   No significant change from recent comparison.      This document has been electronically signed by: Reji Rea III, MD on    03/20/2024 04:45 AM      XR CHEST PORTABLE   Final Result   Impression:   1. Tubes and lines are as above.   2. Small area of increased density at the left lung base may represent    atelectasis or pneumonia.      This document has been electronically signed by: Nelida Schuler MD on    03/20/2024 12:07 AM      XR CHEST PORTABLE   Final Result   Impression:   1. Tubes and lines as above.   2. Bilateral pleural fluid collections are no longer identified.   3. Small left basilar opacity may represent atelectasis or pneumonia.      This document has been electronically signed by: Nelida Schuler MD on    03/19/2024 11:43 PM      XR CHEST PORTABLE   Final Result   1. Bilateral pleural effusions with interval improvement on the right.    Atelectasis and/or infiltrate within the bilateral lower lungs with    interval improvement on the right and interval worsening on the left.   2. Lines and tubes appear to be in appropriate position as described.      This document has been electronically signed by: Luli Christensen MD on    03/19/2024 05:51 PM      XR CHEST PORTABLE   Final Result   Impression:   1. Evaluation of the endotracheal tube is limited due to overlying device.    ETT tube is above the juanita. Other tubes and lines are as above.   2. Right pleural fluid collection similar to prior exam.   3. Retrocardiac density may represent atelectasis or pneumonia      This document has been electronically signed by: Nelida Schuler MD 
  XR CHEST PORTABLE   Final Result   1. ETT tip terminates 5 cm above juanita.   2. Grossly unchanged dense bibasilar opacities with small layering    effusions.      This document has been electronically signed by: Marlon Garcia MD on    03/18/2024 02:07 AM      XR CHEST PORTABLE   Final Result   1. ETT tip terminates 4 cm above juanita.   2. Persistent dense bibasilar opacities with likely small layering    effusions.      This document has been electronically signed by: Marlon Garcia MD on    03/17/2024 03:10 AM      XR CHEST PORTABLE   Final Result   1. No significant interval change.      This document has been electronically signed by: Raad Moore DO on    03/16/2024 02:13 AM      Vascular duplex lower extremity arteries bilateral   Final Result   1. No evidence of arterial occlusion or significant stenosis in the right    lower extremity.      2. Occlusion of the left mid superficial femoral artery and left posterior    tibial artery.      This document has been electronically signed by: Marcelino White MD on    03/16/2024 05:29 AM      Technique Used: Duplex examination performed utilizing grayscale, color    and spectral analysis.      XR CHEST PORTABLE   Final Result   1. No significant change.      This document has been electronically signed by: Raad Moore DO on    03/15/2024 02:18 AM      XR CHEST PORTABLE   Final Result   Impression:   Slightly increased right basilar atelectasis, postoperative findings,    lines and tubes otherwise stable.      This document has been electronically signed by: Reji Rea III, MD on    03/14/2024 01:13 AM      XR CHEST PORTABLE   Final Result   1. No significant interval change.      This document has been electronically signed by: Raad Moore DO on    03/13/2024 01:24 AM      XR CHEST PORTABLE   Final Result   1. No interval change.      This document has been electronically signed by: Raad Moore DO on    03/12/2024 02:30 AM      XR CHEST PORTABLE   Final

## 2024-04-06 NOTE — SIGNIFICANT EVENT
I spoke with Dr. Michael Harrison MD general surgeon on the case at bedside regarding patient's deteriorating clinical status and abnormal CT scan of abdominal findings.  At the end of the discussion Dr. Michael Harrison MD told me that patient is not a candidate for any type of surgical intervention on his abdomen.  He also told me that the prognosis is very poor.    I informed . Thomas Florian MD from cardiothoracic surgery service.  Dr. Thomas Florian MD request me to discuss with the patient's family regarding the plan of care.    Critical care time: 12minutes.

## 2024-04-07 LAB
BACTERIA BLD AEROBE CULT: NORMAL
BACTERIA BLD AEROBE CULT: NORMAL
BACTERIA SPEC RESP CULT: NORMAL
GRAM STN SPEC: NORMAL

## 2024-04-08 LAB
EKG ATRIAL RATE: 202 BPM
EKG Q-T INTERVAL: 432 MS
EKG QRS DURATION: 140 MS
EKG QTC CALCULATION (BAZETT): 531 MS
EKG R AXIS: -29 DEGREES
EKG T AXIS: 152 DEGREES
EKG VENTRICULAR RATE: 91 BPM

## 2024-04-09 LAB
1,25(OH)2D3 SERPL-MCNC: 22.8 PG/ML (ref 19.9–79.3)
BACTERIA SPEC RESP CULT: ABNORMAL
BACTERIA SPEC RESP CULT: ABNORMAL
ORGANISM: ABNORMAL

## 2024-04-11 LAB
BACTERIA BLD AEROBE CULT: NORMAL
BACTERIA BLD AEROBE CULT: NORMAL

## (undated) DEVICE — SUTURE PERMA-HAND SZ 2-0 L30IN NONABSORBABLE BLK L26MM SH K833H

## (undated) DEVICE — UNIVERSAL DRAPE: Brand: MEDLINE INDUSTRIES, INC.

## (undated) DEVICE — KIT OXGNTR NATILUS SMART

## (undated) DEVICE — PLUG,CATHETER,DRAINAGE PROTECTOR,TUBE: Brand: MEDLINE

## (undated) DEVICE — DRAPE KIT RAMAPR RADIATION SHIELD

## (undated) DEVICE — SUTURE VCRL + SZ 0 L36IN ABSRB VLT L36MM CT-1 1/2 CIR VCP346H

## (undated) DEVICE — GUIDEWIRE VASC L260CM DIA0.035IN RAD 3MM J TIP L7CM PTFE

## (undated) DEVICE — CLIP LIG SM TI 6 BLU HNDL FOR OPN AND ENDOSCP SGL APPL

## (undated) DEVICE — CLIP LIG M TI 6 SIL HNDL FOR OPN AND ENDOSCP SGL APPL

## (undated) DEVICE — CANNULA PERFUSION 5.5IN 9FR AORTIC ROOT

## (undated) DEVICE — SUTURE PROL SZ 2-0 L36IN NONABSORBABLE BLU SH L26MM 1/2 CIR 8523H

## (undated) DEVICE — BREAST HERNIA: Brand: MEDLINE INDUSTRIES, INC.

## (undated) DEVICE — PENCIL SMK EVAC ALL IN 1 DSGN ENH VISIBILITY IMPROVED AIR

## (undated) DEVICE — Device: Brand: ZDRIVE™

## (undated) DEVICE — Device

## (undated) DEVICE — Z DISC USE 2220306 SUTURE VCRL + SZ 3-0 L27IN ABSRB WHT CT-1 1/2 CIR VCP258H

## (undated) DEVICE — SYSTEM ENDOSCP VES HARV W/ TOOL CANN SEAL SHT PRT BLNT TIP

## (undated) DEVICE — HERNIA PK

## (undated) DEVICE — STABILIZER SURG VAC STD BLADE POD MALL FT ACROBATI

## (undated) DEVICE — CATHETER,URETHRAL,REDRUBBER,STERILE,20FR: Brand: MEDLINE

## (undated) DEVICE — KIT BLWR MISTER 5P 15L W/ TBNG SET IRRIG MIST TO IMPROVE

## (undated) DEVICE — SUTURE MCRYL + SZ 3-0 L27IN ABSRB UD PS1 L24MM 3/8 CIR REV MCP936H

## (undated) DEVICE — STABILIZER SURG TISS W/ CANSTR TBNG EVOLUTION OCTPS

## (undated) DEVICE — THORACIC CATHETER,STRAIGHT: Brand: ARGYLE

## (undated) DEVICE — CANNULA PERF SINGLE STG 30 FRX38 CM RT ANGLED BVL MTL DLP

## (undated) DEVICE — CANNULA SGL STG VEN R ANG 26FR OVERALL LEN 12 TO 15 IN 3/8

## (undated) DEVICE — ELECTRODE BLDE L4IN NONINSULATED EDGE

## (undated) DEVICE — CATHETER DIAG 5FR L100CM LUMN ID0.047IN JR4 CRV 0 SIDE H

## (undated) DEVICE — AGENT HEMOSTATIC SURG NU-KNIT ABS 3X4IN WOVEN KNIT 12/BX

## (undated) DEVICE — CATHETER VENT 20FR L15IN TIP PERF 2.75IN BULL W/ 1/4IN SLIP

## (undated) DEVICE — TIBURON NEONATAL DRAPE: Brand: CONVERTORS

## (undated) DEVICE — DRESSING HYDROFIBER AQUACEL AG ADVANTAGE 3.5X12 IN

## (undated) DEVICE — SUTURE ETHLN SZ 3-0 L18IN NONABSORBABLE BLK L24MM FS-1 3/8 663G

## (undated) DEVICE — OPEN HRT CDS

## (undated) DEVICE — CATHETER ANGIO 5FR L100CM GRY S STL NYL JL3.5 3 SEG BRAID L

## (undated) DEVICE — KIT VEN DRNGE VAC ACCSRY PERF VAVD STOCK W/ SPEC TRAP

## (undated) DEVICE — BARD® PTFE FELT, 5.1 CM X 5.1 CM: Brand: BARD® PTFE FELT

## (undated) DEVICE — ADAPTER PERF L7.5IN INLET LEG 3IN Y TYP VENT CLR CODE CLMP

## (undated) DEVICE — Device: Brand: NOMOLINE™ LH ADULT NASAL CO2 CANNULA WITH O2 4M

## (undated) DEVICE — GLOVE SURG SZ 7.5 L11.73IN FNGR THK9.8MIL STRW LTX POLYMER

## (undated) DEVICE — CLAMP ABLAT JAW L53MM L CRV 2 ELECTRD BPLR

## (undated) DEVICE — SUTURE PDS II SZ 1 L36IN ABSRB VLT L48MM CTX 1/2 CIR Z371T

## (undated) DEVICE — SUTURE VCRL + SZ 0 L18IN ABSRB UD L36MM CT-1 1/2 CIR VCP840D

## (undated) DEVICE — SUTURE PERMAHAND SZ 3-0 L18IN NONABSORBABLE BLK L26MM SH C013D

## (undated) DEVICE — SUTURE PROL 3-0 36IN NONABSORB BLU 26MM SH 1/2 CIR P8522H

## (undated) DEVICE — PROBE CRYOABLATION L10CM ALUM SMOOTH MAL RETRCT HND FLX TB

## (undated) DEVICE — COR-KNOT MINI® COMBO KITBASE PACKAGE TYPE - KITEACH STERILE PACKAGE KIT CONTAINS (2) SINGLE PATIENT USE COR-KNOT MINI® DEVICES AND (12) COR-KNOT® QUICK LOADS®.: Brand: COR-KNOT MINI®

## (undated) DEVICE — SET AUTOTRNS C175ML BOWL BTM OUTLT RESERVOIRXTRA

## (undated) DEVICE — SUTURE VCRL + SZ 2-0 L27IN ABSRB WHT SH 1/2 CIR TAPERCUT VCP417H

## (undated) DEVICE — GLIDESHEATH SLENDER NITINOL HYDROPHILIC COATED INTRODUCER SHEATH: Brand: GLIDESHEATH SLENDER

## (undated) DEVICE — AGENT HEMOSTATIC SURGIFLOW MATRIX KIT W/THROMBIN

## (undated) DEVICE — CONNECTOR DRNGE W3/8-0.5XH3/16XL3/16IN 2:1 SIL CARD STR

## (undated) DEVICE — SUTURE PROL SZ 3-0 L36IN NONABSORBABLE BLU L26MM SH 1/2 CIR 8522H

## (undated) DEVICE — OPEN HEART SUPPLEMENT: Brand: MEDLINE INDUSTRIES, INC.

## (undated) DEVICE — BASIC SINGLE BASIN BTC-LF: Brand: MEDLINE INDUSTRIES, INC.

## (undated) DEVICE — SURGICAL PROCEDURE PACK OXGNTR ST MARYS

## (undated) DEVICE — CANNULA PERF 18FR L12IN CONN SITE 3/8IN ART VENT DIL TIP

## (undated) DEVICE — PRESSURE DISPLAY SET 45IN COIL DIAPH STOPCOCK M LUERLOCK

## (undated) DEVICE — CONNECTOR STR 3/8IN ST 050506000 375STRCONN

## (undated) DEVICE — BAND COMPR L24CM REG CLR PLAS HEMSTAT EXT HK AND LOOP RETEN

## (undated) DEVICE — PACK CCF PLEG BRIDGE CUSTOM

## (undated) DEVICE — APPLIER CLP L9.375IN APER 2.1MM CLS L3.8MM 20 SM TI CLP

## (undated) DEVICE — DRAIN SURG 19FR 0.25IN SIL RND W/ TRCR INDIC DOT RADPQ FULL

## (undated) DEVICE — SPONGE GZ W4XL4IN COT 12 PLY TYP VII WVN C FLD DSGN STERILE

## (undated) DEVICE — CONNECTOR PERF 3/8X3/8X3/8IN EQL WYE

## (undated) DEVICE — DEVICE GRSP SHTH L160CM DIA2.5MM S STL 4 PRNG W/ INWARD

## (undated) DEVICE — DRAIN SURG SGL COLL PT TB FOR ATS BG OASIS

## (undated) DEVICE — GLOVE ORTHO 8   MSG9480

## (undated) DEVICE — THORACIC CATHETER,RIGHT ANGLE: Brand: ARGYLE

## (undated) DEVICE — PADPRO DEFIBRILLATION/PACING/CARDIOVERSION/MONITORING ELECTRODES, RADIOTRANSPARENT ELECRODE, PHYSIO-CONTROL QUIK-COMBO CONNECTOR, ADULT/CHILD = 10 KG: Brand: PADPRO

## (undated) DEVICE — CARDIAC CATH LAB PACK LF

## (undated) DEVICE — GLOVE ORANGE PI 7 1/2   MSG9075

## (undated) DEVICE — OPEN HRT CDS LF

## (undated) DEVICE — PUMP LIFESPARC KIT

## (undated) DEVICE — CATHETER VENT L15 IN L2.75 IN OD16 FR SIL BULL TIP GWIRE

## (undated) DEVICE — CORD BOVIE VALLEY LAB

## (undated) DEVICE — OPEN HRT PK

## (undated) DEVICE — APPLIER LIG CLP M L11IN TI STR RNG HNDL FOR 20 CLP DISP